# Patient Record
Sex: FEMALE | Race: WHITE | NOT HISPANIC OR LATINO | Employment: FULL TIME | URBAN - METROPOLITAN AREA
[De-identification: names, ages, dates, MRNs, and addresses within clinical notes are randomized per-mention and may not be internally consistent; named-entity substitution may affect disease eponyms.]

---

## 2017-09-28 ENCOUNTER — DOCUMENTATION ONLY (OUTPATIENT)
Dept: FAMILY MEDICINE | Facility: CLINIC | Age: 57
End: 2017-09-28

## 2017-09-28 NOTE — PROGRESS NOTES
Pre-Visit Chart Review  For Appointment Scheduled on 9/29/17.    Health Maintenance Due   Topic Date Due    Hepatitis C Screening  1960    TETANUS VACCINE  01/27/1980    Pap Smear with HPV Cotest  01/27/1983    Colonoscopy  01/27/2012    Lipid Panel  06/28/2015    Mammogram  06/13/2016    Influenza Vaccine  08/01/2017

## 2017-09-29 ENCOUNTER — HOSPITAL ENCOUNTER (OUTPATIENT)
Dept: RADIOLOGY | Facility: CLINIC | Age: 57
Discharge: HOME | End: 2017-09-29
Attending: FAMILY MEDICINE
Payer: MEDICAID

## 2017-09-29 ENCOUNTER — OFFICE VISIT (OUTPATIENT)
Dept: FAMILY MEDICINE | Facility: CLINIC | Age: 57
End: 2017-09-29
Payer: MEDICAID

## 2017-09-29 VITALS
HEIGHT: 61 IN | DIASTOLIC BLOOD PRESSURE: 75 MMHG | BODY MASS INDEX: 21.31 KG/M2 | RESPIRATION RATE: 20 BRPM | HEART RATE: 84 BPM | SYSTOLIC BLOOD PRESSURE: 120 MMHG | TEMPERATURE: 99 F | WEIGHT: 112.88 LBS

## 2017-09-29 DIAGNOSIS — Z12.39 BREAST CANCER SCREENING: ICD-10-CM

## 2017-09-29 DIAGNOSIS — Z29.9 PREVENTIVE MEASURE: ICD-10-CM

## 2017-09-29 DIAGNOSIS — Z11.4 ENCOUNTER FOR SCREENING FOR HIV: ICD-10-CM

## 2017-09-29 DIAGNOSIS — Z12.4 CERVICAL CANCER SCREENING: ICD-10-CM

## 2017-09-29 DIAGNOSIS — L60.0 INGROWN TOENAIL: ICD-10-CM

## 2017-09-29 DIAGNOSIS — Z00.00 WELLNESS EXAMINATION: ICD-10-CM

## 2017-09-29 DIAGNOSIS — H80.90 OTOSCLEROSIS, UNSPECIFIED LATERALITY: Primary | ICD-10-CM

## 2017-09-29 DIAGNOSIS — J06.9 UPPER RESPIRATORY TRACT INFECTION, UNSPECIFIED TYPE: ICD-10-CM

## 2017-09-29 DIAGNOSIS — Z12.11 COLON CANCER SCREENING: ICD-10-CM

## 2017-09-29 PROCEDURE — 77067 SCR MAMMO BI INCL CAD: CPT | Mod: 26,,, | Performed by: RADIOLOGY

## 2017-09-29 PROCEDURE — 99215 OFFICE O/P EST HI 40 MIN: CPT | Mod: PBBFAC,PO,25 | Performed by: FAMILY MEDICINE

## 2017-09-29 PROCEDURE — 77067 SCR MAMMO BI INCL CAD: CPT | Mod: TC

## 2017-09-29 PROCEDURE — 96372 THER/PROPH/DIAG INJ SC/IM: CPT | Mod: PBBFAC,PO

## 2017-09-29 PROCEDURE — 88142 CYTOPATH C/V THIN LAYER: CPT

## 2017-09-29 PROCEDURE — 87624 HPV HI-RISK TYP POOLED RSLT: CPT

## 2017-09-29 PROCEDURE — 3008F BODY MASS INDEX DOCD: CPT | Mod: ,,, | Performed by: FAMILY MEDICINE

## 2017-09-29 PROCEDURE — 99999 PR PBB SHADOW E&M-EST. PATIENT-LVL V: CPT | Mod: PBBFAC,,, | Performed by: FAMILY MEDICINE

## 2017-09-29 PROCEDURE — 99214 OFFICE O/P EST MOD 30 MIN: CPT | Mod: S$PBB,,, | Performed by: FAMILY MEDICINE

## 2017-09-29 PROCEDURE — 77063 BREAST TOMOSYNTHESIS BI: CPT | Mod: 26,,, | Performed by: RADIOLOGY

## 2017-09-29 RX ORDER — GABAPENTIN 400 MG/1
CAPSULE ORAL
Refills: 0 | COMMUNITY
Start: 2017-07-31 | End: 2017-09-29

## 2017-09-29 RX ORDER — DEXAMETHASONE SODIUM PHOSPHATE 4 MG/ML
2 INJECTION, SOLUTION INTRA-ARTICULAR; INTRALESIONAL; INTRAMUSCULAR; INTRAVENOUS; SOFT TISSUE
Status: DISCONTINUED | OUTPATIENT
Start: 2017-09-29 | End: 2017-09-29

## 2017-09-29 RX ORDER — DEXAMETHASONE SODIUM PHOSPHATE 4 MG/ML
2 INJECTION, SOLUTION INTRA-ARTICULAR; INTRALESIONAL; INTRAMUSCULAR; INTRAVENOUS; SOFT TISSUE
Status: COMPLETED | OUTPATIENT
Start: 2017-09-29 | End: 2017-09-29

## 2017-09-29 RX ORDER — IBUPROFEN 800 MG/1
TABLET ORAL
Refills: 0 | COMMUNITY
Start: 2017-07-31 | End: 2018-10-26

## 2017-09-29 RX ADMIN — DEXAMETHASONE SODIUM PHOSPHATE 2 MG: 4 INJECTION, SOLUTION INTRA-ARTICULAR; INTRALESIONAL; INTRAMUSCULAR; INTRAVENOUS; SOFT TISSUE at 09:09

## 2017-09-29 NOTE — PROGRESS NOTES
Administered 2 mg dexamethasone IM. Patient tolerated well. No bleeding at insertion site noted. Pain scale 0/10 with injection. 2 patient identifiers used (name, ), aseptic technique maintained.

## 2017-09-29 NOTE — PROGRESS NOTES
"[facility]  Progress Note    Subjective:       Patient ID: Floresita Martin is a 57 y.o. female.    Chief Complaint: Annual Exam and Establish Care    HPI57 y.o.female is here today to establish care.  Patient is currently not on any daily medications.  Patient is complaining of sinus congestion.  Patient has had sinus congestion for the past few weeks.  Patient has been using Tylenol Sinus over-the-counter which has not helped.  Patient is due for Pap smear, mammogram, colonoscopy, and annual labs today.  No further complaints at today's visit.  Review of Systems   Constitutional: Negative for chills, fatigue and fever.   HENT: Positive for congestion. Negative for ear pain, postnasal drip, sinus pressure, sneezing and sore throat.    Eyes: Negative for pain.   Respiratory: Negative for cough, shortness of breath and wheezing.    Cardiovascular: Negative for chest pain, palpitations and leg swelling.   Gastrointestinal: Negative for abdominal distention, abdominal pain, constipation, diarrhea, nausea and vomiting.   Genitourinary: Negative for difficulty urinating.   Musculoskeletal: Negative for back pain and myalgias.   Skin: Negative for rash.   Neurological: Negative for dizziness, seizures, syncope, weakness and numbness.   Psychiatric/Behavioral: Negative for sleep disturbance. The patient is not nervous/anxious.        Objective:      Vitals:    09/29/17 0843   BP: 120/75   BP Location: Right arm   Patient Position: Sitting   BP Method: Medium (Automatic)   Pulse: 84   Resp: 20   Temp: 98.5 °F (36.9 °C)   TempSrc: Oral   Weight: 51.2 kg (112 lb 14 oz)   Height: 5' 1" (1.549 m)     Body mass index is 21.33 kg/m².  Physical Exam   Constitutional: She is oriented to person, place, and time. She appears well-developed and well-nourished.   HENT:   Head: Normocephalic and atraumatic.   Right ear impaction   Eyes: Conjunctivae and EOM are normal. Pupils are equal, round, and reactive to light.   Neck: Normal " range of motion. Neck supple. No JVD present.   Cardiovascular: Normal rate, regular rhythm, normal heart sounds and intact distal pulses.  Exam reveals no gallop and no friction rub.    No murmur heard.  Pulmonary/Chest: Effort normal. No respiratory distress. She has no wheezes.   Abdominal: Soft. Bowel sounds are normal. There is no tenderness. There is no rebound and no guarding.   Musculoskeletal: Normal range of motion.   Neurological: She is alert and oriented to person, place, and time. No cranial nerve deficit.   Skin: Skin is warm and dry.   Psychiatric: She has a normal mood and affect. Her behavior is normal. Judgment and thought content normal.   Nursing note and vitals reviewed.      Assessment:       1. Otosclerosis, unspecified laterality    2. Breast cancer screening    3. Wellness examination    4. Preventive measure    5. Encounter for screening for HIV    6. Ingrown toenail    7. Upper respiratory tract infection, unspecified type    8. Colon cancer screening    9. Cervical cancer screening        Plan:       Otosclerosis, unspecified laterality  -     Ambulatory referral to ENT    Breast cancer screening  -     Mammo Digital Screening Bilateral With CAD; Future; Expected date: 09/29/2017    Wellness examination  -     CBC auto differential; Future; Expected date: 09/29/2017  -     Comprehensive metabolic panel; Future; Expected date: 09/29/2017  -     Hemoglobin A1c; Future; Expected date: 09/29/2017  -     Lipid panel; Future; Expected date: 09/29/2017  -     TSH; Future; Expected date: 09/29/2017  -     Vitamin D; Future; Expected date: 09/29/2017    Preventive measure  -     Hepatitis C antibody; Future; Expected date: 09/29/2017  -     RPR; Future; Expected date: 09/29/2017    Encounter for screening for HIV  -     HIV-1 and HIV-2 antibodies; Future; Expected date: 09/29/2017    Ingrown toenail  -     Ambulatory referral to Podiatry    Upper respiratory tract infection, unspecified type  -      dexamethasone injection 2 mg; Inject 0.5 mLs (2 mg total) into the vein one time.    Colon cancer screening  -     Ambulatory referral to Gastroenterology    Cervical cancer screening  -     Liquid-based pap smear, screening  -     HPV High Risk Genotypes, PCR      Return in about 6 months (around 3/29/2018).  Ryan Hollis MD  [facility]  9/29/2017 9:10 AM

## 2017-10-05 ENCOUNTER — TELEPHONE (OUTPATIENT)
Dept: FAMILY MEDICINE | Facility: CLINIC | Age: 57
End: 2017-10-05

## 2017-10-05 DIAGNOSIS — Z12.11 COLON CANCER SCREENING: ICD-10-CM

## 2017-10-05 NOTE — TELEPHONE ENCOUNTER
----- Message from Xena Rivera sent at 10/5/2017 10:04 AM CDT -----  Contact: Mariola  [facility] lab called regarding the patient. Please ask for Mariola at #[phone]

## 2017-10-08 ENCOUNTER — PATIENT MESSAGE (OUTPATIENT)
Dept: FAMILY MEDICINE | Facility: CLINIC | Age: 57
End: 2017-10-08

## 2017-10-16 DIAGNOSIS — Z12.11 COLON CANCER SCREENING: Primary | ICD-10-CM

## 2017-10-26 ENCOUNTER — HOSPITAL ENCOUNTER (OUTPATIENT)
Dept: RADIOLOGY | Facility: CLINIC | Age: 57
Discharge: HOME | End: 2017-10-26
Attending: PODIATRIST
Payer: MEDICAID

## 2017-10-26 ENCOUNTER — DOCUMENTATION ONLY (OUTPATIENT)
Dept: FAMILY MEDICINE | Facility: CLINIC | Age: 57
End: 2017-10-26

## 2017-10-26 ENCOUNTER — OFFICE VISIT (OUTPATIENT)
Dept: PODIATRY | Facility: CLINIC | Age: 57
End: 2017-10-26
Payer: MEDICAID

## 2017-10-26 VITALS — WEIGHT: 114.19 LBS | HEIGHT: 61 IN | BODY MASS INDEX: 21.56 KG/M2

## 2017-10-26 DIAGNOSIS — M79.674 TOE PAIN, RIGHT: ICD-10-CM

## 2017-10-26 DIAGNOSIS — M77.9 CAPSULITIS: Primary | ICD-10-CM

## 2017-10-26 DIAGNOSIS — L60.0 INGROWN NAIL: ICD-10-CM

## 2017-10-26 DIAGNOSIS — M79.671 FOOT PAIN, RIGHT: ICD-10-CM

## 2017-10-26 DIAGNOSIS — M77.9 CAPSULITIS: ICD-10-CM

## 2017-10-26 PROCEDURE — 99999 PR PBB SHADOW E&M-EST. PATIENT-LVL III: CPT | Mod: PBBFAC,,, | Performed by: PODIATRIST

## 2017-10-26 PROCEDURE — 73630 X-RAY EXAM OF FOOT: CPT | Mod: TC,PO,RT

## 2017-10-26 PROCEDURE — 99203 OFFICE O/P NEW LOW 30 MIN: CPT | Mod: 25,S$PBB,, | Performed by: PODIATRIST

## 2017-10-26 PROCEDURE — 29540 STRAPPING ANKLE &/FOOT: CPT | Mod: PBBFAC,PO,RT | Performed by: PODIATRIST

## 2017-10-26 PROCEDURE — 29540 STRAPPING ANKLE &/FOOT: CPT | Mod: S$PBB,RT,, | Performed by: PODIATRIST

## 2017-10-26 PROCEDURE — 99213 OFFICE O/P EST LOW 20 MIN: CPT | Mod: PBBFAC,25,PO | Performed by: PODIATRIST

## 2017-10-26 PROCEDURE — 73630 X-RAY EXAM OF FOOT: CPT | Mod: 26,RT,S$GLB, | Performed by: RADIOLOGY

## 2017-10-26 RX ORDER — DICLOFENAC SODIUM 10 MG/G
2 GEL TOPICAL 4 TIMES DAILY
Qty: 100 G | Refills: 2 | Status: SHIPPED | OUTPATIENT
Start: 2017-10-26 | End: 2017-10-26

## 2017-10-26 RX ORDER — LIDOCAINE HYDROCHLORIDE 20 MG/ML
JELLY TOPICAL
Qty: 30 ML | Refills: 2 | Status: SHIPPED | OUTPATIENT
Start: 2017-10-26 | End: 2019-02-15

## 2017-10-26 RX ORDER — HYDROCODONE BITARTRATE AND ACETAMINOPHEN 5; 325 MG/1; MG/1
TABLET ORAL
Refills: 0 | COMMUNITY
Start: 2017-10-09 | End: 2018-01-22 | Stop reason: DRUGHIGH

## 2017-10-26 RX ORDER — FAMOTIDINE 20 MG/1
TABLET, FILM COATED ORAL
Refills: 0 | COMMUNITY
Start: 2017-10-09 | End: 2018-01-22 | Stop reason: ALTCHOICE

## 2017-10-26 NOTE — PROGRESS NOTES
Reviewed resident note, exam and procedures were performed under my direct supervision.  Agree with note and care.  Discrepancies discussed.    Patient will stretch the tendo achilles complex three times daily as demonstrated in the office.  Literature was dispensed illustrating proper stretching technique.    I applied a plantar rest strapping to the patient's right foot to offload symptomatic area, support the arch, and relieve pain.    Patient will obtain over the counter arch supports and wear them in shoes whenever possible.  Athletic shoes intended for walking or running are usually best.    The patient was advised that NSAID-type medications have two very important potential side effects: gastrointestinal irritation including hemorrhage and renal injuries. She was asked to take the medication with food and to stop if she experiences any GI upset. I asked her to call for vomiting, abdominal pain or black/bloody stools. The patient expresses understanding of these issues and questions were answered.

## 2017-10-26 NOTE — PROGRESS NOTES
Pre-Visit Chart Review  For Appointment Scheduled on 10/27/17.    Health Maintenance Due   Topic Date Due    TETANUS VACCINE  01/27/1980    Pneumococcal PPSV23 (Medium Risk) (1) 01/27/1980    Colonoscopy  01/27/2012    Influenza Vaccine  08/01/2017

## 2017-10-26 NOTE — LETTER
October 26, 2017      Ryan Hollis MD  [address]  [place] [address]           [place] [facility]  [address]  [place] [address]  Phone: [phone]          Patient: Floresita Martin   MR Number: [id number]   YOB: 1960   Date of Visit: 10/26/2017       Dear Dr. Ryan Hollis:    Thank you for referring Floresita Martin to me for evaluation. Attached you will find relevant portions of my assessment and plan of care.    If you have questions, please do not hesitate to call me. I look forward to following Floresita Martin along with you.    Sincerely,    Onel Ramirez, JOCELYNN    Enclosure  CC:  No Recipients    If you would like to receive this communication electronically, please contact [email or web address] or [phone] to request more information on [employer] access.    For providers and/or their staff who would like to refer a patient to [facility], please contact us through our one-stop-shop provider referral line, [facility], at [phone].    If you feel you have received this communication in error or would no longer like to receive these types of communications, please e-mail [email or web address]

## 2017-10-26 NOTE — PROGRESS NOTES
Subjective:      Patient ID: Floresita Martin is a 57 y.o. female.    Chief Complaint: Foot Pain (right) and Ingrown Toenail (right great toe)    Pt is a 58 y/o female  has a past medical history of Alcohol abuse; Headache; and Hearing loss. Pt presents to clinic for a painful ingrown toe nail of the right big toe and right 5th digit pain. Pt relates she has a hx of ingrown fungal toe nails and normally uses nail nippers to cut out the edges at home. States she was not able to relieve the pain to the medial border of the big toe nail. States it hurts with pressure. Pt states she also has pain adjacent to the 5th toe on the ball of her foot. States she tripped at work (she is a ) about a month ago and has had pain since around that time. States she feels that she walks on the outside of her feet. Denies any open lacerations or SOI. No other complaints at this time.     Review of Systems   Skin: Positive for nail changes.   All other systems reviewed and are negative.          Objective:      Physical Exam   Constitutional: She is oriented to person, place, and time. She appears well-developed and well-nourished. No distress.   Cardiovascular: Intact distal pulses.    PT and DP 2/4, cande. CRT < 3 seconds to digits 1-5, cande. No erythema or edema noted, cande. No varicosities noted, cande.    Musculoskeletal: She exhibits no edema.   Strength 5/5 to all LE muscle groups, cande. POP noted to plantar lateral aspect of 5th metatarsal head with tailors bunion noted. No pain noted with ROM of the 5th MPJ.     5th metatarsal in plantarflexed position with forefoot varus noted. Arch present with NWB. Equinus noted with less than 10 degrees of dorsiflexion, cande.    Neurological: She is alert and oriented to person, place, and time.   Sensation intact to digits via light touch.    Skin: Skin is warm and dry. Capillary refill takes 2 to 3 seconds. She is not diaphoretic. No erythema.   Skin is warm, dry, and supple, cande. Toe nails  1-5 are thickened and discolored, cande. Incurvated toenail noted to medial border of right foot, proximally with POP noted.     No open lesions, drainage, or SOI noted, stable.    Psychiatric: She has a normal mood and affect. Her behavior is normal. Judgment and thought content normal.             Assessment:       Encounter Diagnoses   Name Primary?    Capsulitis Yes    Foot pain, right     Ingrown nail     Toe pain, right     Onychomycosis due to dermatophyte     Metatarsalgia of right foot          Plan:       Floresita was seen today for foot pain and ingrown toenail.    Diagnoses and all orders for this visit:    Capsulitis  -     X-Ray Foot Complete Right; Future    Foot pain, right  -     X-Ray Foot Complete Right; Future    Ingrown nail  -     X-Ray Foot Complete Right; Future    Toe pain, right  -     X-Ray Foot Complete Right; Future    Onychomycosis due to dermatophyte    Metatarsalgia of right foot    Other orders  -     Discontinue: diclofenac sodium 1 % Gel; Apply 2 g topically 4 (four) times daily.  -     lidocaine HCL 2% (XYLOCAINE) 2 % jelly; Apply topically as needed. Apply once nightly topically to affected area right foot.      I counseled the patient on her conditions, their implications and medical management.  Discussed conservative options vs sx options for onychocryptosis of medial border of right hallux. Pt she would like a permanent partial nail avulsion, to be scheduled next week.   Discussed conservative options for right 5th metatarsalgia such as stretches, OTC arch supports, rigid shoes with a wider toe box, RICE therapy, OTC NSAIDS and sx options in the future if conservative treatments do not work.   Xray ordered  Pt to RTC in 1 week for permanent partial nail avulsuion procedure    Zayra Lu, PGY2

## 2017-10-27 ENCOUNTER — TELEPHONE (OUTPATIENT)
Dept: FAMILY MEDICINE | Facility: CLINIC | Age: 57
End: 2017-10-27

## 2017-10-27 ENCOUNTER — OFFICE VISIT (OUTPATIENT)
Dept: FAMILY MEDICINE | Facility: CLINIC | Age: 57
End: 2017-10-27
Payer: MEDICAID

## 2017-10-27 VITALS
SYSTOLIC BLOOD PRESSURE: 110 MMHG | RESPIRATION RATE: 18 BRPM | DIASTOLIC BLOOD PRESSURE: 73 MMHG | HEART RATE: 81 BPM | TEMPERATURE: 98 F | BODY MASS INDEX: 21.64 KG/M2 | WEIGHT: 114.63 LBS | HEIGHT: 61 IN

## 2017-10-27 DIAGNOSIS — J30.9 ALLERGIC RHINITIS, UNSPECIFIED CHRONICITY, UNSPECIFIED SEASONALITY, UNSPECIFIED TRIGGER: ICD-10-CM

## 2017-10-27 DIAGNOSIS — E78.5 HYPERLIPIDEMIA, UNSPECIFIED HYPERLIPIDEMIA TYPE: Primary | ICD-10-CM

## 2017-10-27 DIAGNOSIS — Z72.0 TOBACCO ABUSE: ICD-10-CM

## 2017-10-27 DIAGNOSIS — R07.9 CHEST PAIN, UNSPECIFIED TYPE: ICD-10-CM

## 2017-10-27 DIAGNOSIS — Z23 IMMUNIZATION DUE: ICD-10-CM

## 2017-10-27 PROCEDURE — 99213 OFFICE O/P EST LOW 20 MIN: CPT | Mod: PBBFAC,PO | Performed by: FAMILY MEDICINE

## 2017-10-27 PROCEDURE — 90670 PCV13 VACCINE IM: CPT | Mod: PBBFAC,PO

## 2017-10-27 PROCEDURE — 99214 OFFICE O/P EST MOD 30 MIN: CPT | Mod: S$PBB,,, | Performed by: FAMILY MEDICINE

## 2017-10-27 PROCEDURE — 99999 PR PBB SHADOW E&M-EST. PATIENT-LVL III: CPT | Mod: PBBFAC,,, | Performed by: FAMILY MEDICINE

## 2017-10-27 PROCEDURE — 90471 IMMUNIZATION ADMIN: CPT | Mod: PBBFAC,PO

## 2017-10-27 PROCEDURE — 90472 IMMUNIZATION ADMIN EACH ADD: CPT | Mod: PBBFAC,PO

## 2017-10-27 RX ORDER — LORATADINE 10 MG/1
10 TABLET ORAL DAILY
Qty: 90 TABLET | Refills: 3 | Status: SHIPPED | OUTPATIENT
Start: 2017-10-27 | End: 2018-02-16 | Stop reason: SDUPTHER

## 2017-10-27 RX ORDER — FLUTICASONE PROPIONATE 50 MCG
1 SPRAY, SUSPENSION (ML) NASAL DAILY
Qty: 1 BOTTLE | Refills: 3 | Status: SHIPPED | OUTPATIENT
Start: 2017-10-27 | End: 2018-02-16 | Stop reason: SDUPTHER

## 2017-10-27 RX ORDER — ROSUVASTATIN CALCIUM 20 MG/1
20 TABLET, COATED ORAL DAILY
Qty: 90 TABLET | Refills: 3 | Status: SHIPPED | OUTPATIENT
Start: 2017-10-27 | End: 2017-10-30

## 2017-10-27 NOTE — TELEPHONE ENCOUNTER
----- Message from Floresita Fleming sent at 10/27/2017  8:43 AM CDT -----  Call [phone]  / asking to clarify message received this am

## 2017-10-27 NOTE — TELEPHONE ENCOUNTER
----- Message from Cassandra Carroll RT sent at 10/27/2017  3:50 PM CDT -----  Contact: pt    pt , requesting to inform her [facility] Medicaid needs pre authorization (lung scan and stress test) , please call [facility] at , please ida urgent, her appt for tomorrow may need cancellation, please call pt to confirm, thanks.

## 2017-10-28 ENCOUNTER — HOSPITAL ENCOUNTER (OUTPATIENT)
Dept: RADIOLOGY | Facility: HOSPITAL | Age: 57
Discharge: HOME | End: 2017-10-28
Attending: FAMILY MEDICINE
Payer: MEDICAID

## 2017-10-28 DIAGNOSIS — Z72.0 TOBACCO ABUSE: ICD-10-CM

## 2017-10-28 PROCEDURE — 76497 UNLISTED CT PROCEDURE: CPT | Mod: TC

## 2017-10-30 ENCOUNTER — TELEPHONE (OUTPATIENT)
Dept: FAMILY MEDICINE | Facility: CLINIC | Age: 57
End: 2017-10-30

## 2017-10-30 RX ORDER — ATORVASTATIN CALCIUM 40 MG/1
40 TABLET, FILM COATED ORAL DAILY
Qty: 90 TABLET | Refills: 3 | Status: SHIPPED | OUTPATIENT
Start: 2017-10-30 | End: 2018-11-21 | Stop reason: SDUPTHER

## 2017-10-30 NOTE — TELEPHONE ENCOUNTER
Patient needs anti cholesterol medication; insurance did not cover crestor without prior authorization ;patient has not taken any statins before ; did you want her to try something else ?

## 2017-10-31 ENCOUNTER — TELEPHONE (OUTPATIENT)
Dept: FAMILY MEDICINE | Facility: CLINIC | Age: 57
End: 2017-10-31

## 2017-10-31 NOTE — PROGRESS NOTES
"[facility]  [facility]    Subjective:       Patient ID: Floresita Martin is a 57 y.o. female.    Chief Complaint:HLD    HPI57 y.o.female is here today for hyperlipidemia follow-up visit.  Patient was recently found to have elevated cholesterol.  Patient is currently not on any medications for cholesterol.  Patient is also complaining of left-sided chest pain.  Patient will have chest pain occasionally.  The patient will sometimes radiate into the left arm.  Patient denies any other associated symptoms.  Patient has not had these symptoms worked up.  Patient has not had stress test completed in the past.  No further complaints at today's visit.  Review of Systems   Constitutional: Negative for chills, fatigue and fever.   HENT: Negative for congestion, ear pain, postnasal drip, sinus pressure, sneezing and sore throat.    Eyes: Negative for pain.   Respiratory: Negative for cough, shortness of breath and wheezing.    Cardiovascular: Positive for chest pain. Negative for palpitations and leg swelling.   Gastrointestinal: Negative for abdominal distention, abdominal pain, constipation, diarrhea, nausea and vomiting.   Genitourinary: Negative for difficulty urinating.   Musculoskeletal: Negative for back pain and myalgias.   Skin: Negative for rash.   Neurological: Negative for dizziness, seizures, syncope, weakness and numbness.   Psychiatric/Behavioral: Negative for sleep disturbance. The patient is not nervous/anxious.        Objective:      Vitals:    10/27/17 1320   BP: 110/73   BP Location: Right arm   Patient Position: Sitting   BP Method: Medium (Automatic)   Pulse: 81   Resp: 18   Temp: 98.4 °F (36.9 °C)   TempSrc: Oral   Weight: 52 kg (114 lb 10.2 oz)   Height: 5' 1" (1.549 m)     Body mass index is 21.66 kg/m².  Physical Exam   Constitutional: She is oriented to person, place, and time. She appears well-developed and well-nourished.   Eyes: Conjunctivae are normal.   Cardiovascular: Normal rate, " regular rhythm, normal heart sounds and intact distal pulses.  Exam reveals no gallop and no friction rub.    No murmur heard.  Pulmonary/Chest: Effort normal and breath sounds normal. No respiratory distress. She has no wheezes. She has no rales. She exhibits no tenderness.   Abdominal: Soft.   Musculoskeletal: Normal range of motion.   Neurological: She is alert and oriented to person, place, and time.   Skin: She is not diaphoretic.   Psychiatric: She has a normal mood and affect. Her behavior is normal. Judgment and thought content normal.   Vitals reviewed.      Assessment:       1. Hyperlipidemia, unspecified hyperlipidemia type    2. Tobacco abuse    3. Chest pain, unspecified type    4. Immunization due    5. Allergic rhinitis, unspecified chronicity, unspecified seasonality, unspecified trigger        Plan:       Hyperlipidemia, unspecified hyperlipidemia type  -     rosuvastatin (CRESTOR) 20 MG tablet; Take 1 tablet (20 mg total) by mouth once daily.  Dispense: 90 tablet; Refill: 3    Tobacco abuse  -     CT Chest Lung Screening Low Dose; Future; Expected date: 10/27/2017    Chest pain, unspecified type  -     Exercise stress echo with color flow; Future    Immunization due  -     Pneumococcal Conjugate Vaccine (13 Valent) (IM)    Allergic rhinitis, unspecified chronicity, unspecified seasonality, unspecified trigger  -     fluticasone (FLONASE) 50 mcg/actuation nasal spray; 1 spray by Each Nare route once daily.  Dispense: 1 Bottle; Refill: 3  -     loratadine (CLARITIN) 10 mg tablet; Take 1 tablet (10 mg total) by mouth once daily.  Dispense: 90 tablet; Refill: 3    Other orders  -     Influenza - Quadrivalent (3 years & older) (PF)      Return in about 4 weeks (around 11/24/2017).  Ryan Hollis MD  [facility]  10/31/2017 10:00 AM

## 2017-11-01 NOTE — TELEPHONE ENCOUNTER
----- Message from Ryan Hollis MD sent at 11/1/2017  2:01 PM CDT -----  Chest CT shows small left upper lobe pulmonary nodules. Followup chest CT in one year is recommended

## 2017-11-09 ENCOUNTER — CLINICAL SUPPORT (OUTPATIENT)
Dept: CARDIOLOGY | Facility: CLINIC | Age: 57
End: 2017-11-09
Payer: MEDICAID

## 2017-11-09 DIAGNOSIS — R07.9 CHEST PAIN, UNSPECIFIED TYPE: ICD-10-CM

## 2017-11-09 PROCEDURE — 93325 DOPPLER ECHO COLOR FLOW MAPG: CPT | Mod: PBBFAC,PO | Performed by: INTERNAL MEDICINE

## 2017-11-09 PROCEDURE — 93351 STRESS TTE COMPLETE: CPT | Mod: 26,S$PBB,, | Performed by: INTERNAL MEDICINE

## 2017-11-09 PROCEDURE — 93320 DOPPLER ECHO COMPLETE: CPT | Mod: 26,S$PBB,, | Performed by: INTERNAL MEDICINE

## 2017-11-14 LAB
DIASTOLIC DYSFUNCTION: NO
ESTIMATED PA SYSTOLIC PRESSURE: 13.99
MITRAL VALVE REGURGITATION: NORMAL
RETIRED EF AND QEF - SEE NOTES: 55 (ref 55–65)

## 2017-11-15 ENCOUNTER — HOSPITAL ENCOUNTER (OUTPATIENT)
Facility: HOSPITAL | Age: 57
Discharge: HOME | End: 2017-11-15
Attending: INTERNAL MEDICINE | Admitting: INTERNAL MEDICINE
Payer: MEDICAID

## 2017-11-15 ENCOUNTER — ANESTHESIA (OUTPATIENT)
Dept: ENDOSCOPY | Facility: HOSPITAL | Age: 57
End: 2017-11-15
Payer: MEDICAID

## 2017-11-15 ENCOUNTER — ANESTHESIA EVENT (OUTPATIENT)
Dept: ENDOSCOPY | Facility: HOSPITAL | Age: 57
End: 2017-11-15
Payer: MEDICAID

## 2017-11-15 ENCOUNTER — SURGERY (OUTPATIENT)
Age: 57
End: 2017-11-15

## 2017-11-15 VITALS
TEMPERATURE: 98 F | WEIGHT: 115 LBS | SYSTOLIC BLOOD PRESSURE: 112 MMHG | RESPIRATION RATE: 15 BRPM | HEIGHT: 61 IN | HEART RATE: 75 BPM | DIASTOLIC BLOOD PRESSURE: 72 MMHG | OXYGEN SATURATION: 96 % | BODY MASS INDEX: 21.71 KG/M2

## 2017-11-15 VITALS — RESPIRATION RATE: 24 BRPM

## 2017-11-15 DIAGNOSIS — Z12.11 SCREEN FOR COLON CANCER: ICD-10-CM

## 2017-11-15 DIAGNOSIS — K63.89 MELANOSIS COLI: Primary | ICD-10-CM

## 2017-11-15 PROCEDURE — 37000008 HC ANESTHESIA 1ST 15 MINUTES: Performed by: INTERNAL MEDICINE

## 2017-11-15 PROCEDURE — 63600175 PHARM REV CODE 636 W HCPCS: Performed by: NURSE ANESTHETIST, CERTIFIED REGISTERED

## 2017-11-15 PROCEDURE — D9220A PRA ANESTHESIA: Mod: ANES,,, | Performed by: ANESTHESIOLOGY

## 2017-11-15 PROCEDURE — 37000009 HC ANESTHESIA EA ADD 15 MINS: Performed by: INTERNAL MEDICINE

## 2017-11-15 PROCEDURE — 25000003 PHARM REV CODE 250: Performed by: INTERNAL MEDICINE

## 2017-11-15 PROCEDURE — G0121 COLON CA SCRN NOT HI RSK IND: HCPCS | Performed by: INTERNAL MEDICINE

## 2017-11-15 PROCEDURE — 45378 DIAGNOSTIC COLONOSCOPY: CPT | Mod: ,,, | Performed by: INTERNAL MEDICINE

## 2017-11-15 PROCEDURE — D9220A PRA ANESTHESIA: Mod: CRNA,,, | Performed by: NURSE ANESTHETIST, CERTIFIED REGISTERED

## 2017-11-15 RX ORDER — SODIUM CHLORIDE 9 MG/ML
INJECTION, SOLUTION INTRAVENOUS CONTINUOUS
Status: DISCONTINUED | OUTPATIENT
Start: 2017-11-15 | End: 2017-11-15 | Stop reason: HOSPADM

## 2017-11-15 RX ORDER — LIDOCAINE HYDROCHLORIDE 20 MG/ML
INJECTION, SOLUTION EPIDURAL; INFILTRATION; INTRACAUDAL; PERINEURAL
Status: DISCONTINUED
Start: 2017-11-15 | End: 2017-11-15 | Stop reason: HOSPADM

## 2017-11-15 RX ORDER — PROPOFOL 10 MG/ML
VIAL (ML) INTRAVENOUS
Status: DISCONTINUED | OUTPATIENT
Start: 2017-11-15 | End: 2017-11-15

## 2017-11-15 RX ORDER — LIDOCAINE HCL/PF 100 MG/5ML
SYRINGE (ML) INTRAVENOUS
Status: DISCONTINUED | OUTPATIENT
Start: 2017-11-15 | End: 2017-11-15

## 2017-11-15 RX ORDER — PROPOFOL 10 MG/ML
INJECTION, EMULSION INTRAVENOUS
Status: COMPLETED
Start: 2017-11-15 | End: 2017-11-15

## 2017-11-15 RX ADMIN — LIDOCAINE HYDROCHLORIDE 75 MG: 20 INJECTION, SOLUTION INTRAVENOUS at 10:11

## 2017-11-15 RX ADMIN — PROPOFOL 30 MG: 10 INJECTION, EMULSION INTRAVENOUS at 10:11

## 2017-11-15 RX ADMIN — SODIUM CHLORIDE: 0.9 INJECTION, SOLUTION INTRAVENOUS at 09:11

## 2017-11-15 RX ADMIN — PROPOFOL 100 MG: 10 INJECTION, EMULSION INTRAVENOUS at 10:11

## 2017-11-15 NOTE — DISCHARGE INSTRUCTIONS
Eating a High-Fiber Diet  Fiber is what gives strength and structure to plants. Most grains, beans, vegetables, and fruits contain fiber. Foods rich in fiber are often low in calories and fat, and they fill you up more. They may also reduce your risks for certain health problems. To find out the amount of fiber in canned, packaged, or frozen foods, read the Nutrition Facts label. It tells you how much fiber is in a serving.    Types of fiber and their benefits  There are two types of fiber: insoluble and soluble. They both aid digestion and help you maintain a healthy weight.  · Insoluble fiber. This is found in whole grains, cereals, certain fruits and vegetables such as apple skin, corn, and carrots. Insoluble fiber may prevent constipation and reduce the risk for certain types of cancer.  · Soluble fiber. This type of fiber is in oats, beans, and certain fruits and vegetables such as strawberries and peas. Soluble fiber can reduce cholesterol, which may help lower the risk for heart disease. It also helps control blood sugar levels.  Look for high-fiber foods  Try these foods to add fiber to your diet:  · Whole-grain breads and cereals. Try to eat 6 to 8 ounces a day. Include wheat and oat bran cereals, whole-wheat muffins or toast, and corn tortillas in your meals.  · Fruits. Try to eat 2 cups a day. Apples, oranges, strawberries, pears, and bananas are good sources. (Note: Fruit juice is low in fiber.)  · Vegetables. Try to eat at least 2.5 cups a day. Add asparagus, carrots, broccoli, peas, and corn to your meals.  · Beans. One cup of cooked lentils gives you over 15 grams of fiber. Try navy beans, lentils, and chickpeas.  · Seeds. A small handful of seeds gives you about 3 grams of fiber. Try sunflower seeds.  Keep track of your fiber  Keep track of how much fiber you eat. Start by reading food labels. Then eat a variety of foods high in fiber. As you begin to eat more fiber, ask your healthcare provider  how much water you should be drinking to keep your digestive system working smoothly.  You should aim for a certain amount of fiber in your diet each day. If you are a woman, that amount is between 25 and 28 grams per day. Men should aim for 30 to 33 grams per day. After age 50, your daily fiber needs drop to 22 grams for women and 28 grams for men.  Before you reach for the fiber supplements, think about this. Fiber is found naturally in healthy whole foods. It gives you that feeling of fullness after you eat. Taking fiber supplements or eating fiber-enriched foods will not give you this full feeling.  Your fiber intake is a good measure for the quality of your overall diet. If you are missing out on your daily amount of fiber, you may be lacking other important nutrients as well.  Date Last Reviewed: 5/11/2015 © 2000-2017 [employer]. [address] [place] [address]. All rights reserved. This information is not intended as a substitute for professional medical care. Always follow your healthcare professional's instructions.

## 2017-11-15 NOTE — ANESTHESIA POSTPROCEDURE EVALUATION
"Anesthesia Post Evaluation    Patient: Floresita Martin    Procedure(s) Performed: Procedure(s) (LRB):  COLONOSCOPY (N/A)    Final Anesthesia Type: general  Patient location during evaluation: PACU  Patient participation: Yes- Able to Participate  Level of consciousness: awake and alert  Post-procedure vital signs: reviewed and stable  Pain management: adequate  Airway patency: patent  PONV status at discharge: No PONV  Anesthetic complications: no      Cardiovascular status: hemodynamically stable  Respiratory status: unassisted and room air  Hydration status: euvolemic  Follow-up not needed.        Visit Vitals  /72   Pulse 75   Temp 36.5 °C (97.7 °F)   Resp 15   Ht 5' 1" (1.549 m)   Wt 52.2 kg (115 lb)   LMP  (LMP Unknown)   SpO2 96%   Breastfeeding? No   BMI 21.73 kg/m²       Pain/Lauren Score: Pain Assessment Performed: Yes (11/15/2017 11:16 AM)  Presence of Pain: denies (11/15/2017 11:16 AM)  Lauren Score: 10 (11/15/2017 11:16 AM)      "

## 2017-11-15 NOTE — TRANSFER OF CARE
"Anesthesia Transfer of Care Note    Patient: Floresita Martin    Procedure(s) Performed: Procedure(s) (LRB):  COLONOSCOPY (N/A)    Patient location: PACU    Anesthesia Type: general    Transport from OR: Transported from OR on room air with adequate spontaneous ventilation    Post pain: adequate analgesia    Post assessment: no apparent anesthetic complications    Post vital signs: stable    Level of consciousness: awake    Nausea/Vomiting: no nausea/vomiting    Complications: none    Transfer of care protocol was followed      Last vitals:   Visit Vitals  /74 (BP Location: Left arm)   Pulse 84   Temp 36.6 °C (97.9 °F) (Skin)   Resp 18   Ht 5' 1" (1.549 m)   Wt 52.2 kg (115 lb)   LMP  (LMP Unknown)   SpO2 97%   Breastfeeding? No   BMI 21.73 kg/m²     "

## 2017-11-15 NOTE — ANESTHESIA PREPROCEDURE EVALUATION
11/15/2017  Floresita Martin is a 57 y.o., female.    Anesthesia Evaluation    I have reviewed the Patient Summary Reports.    I have reviewed the Nursing Notes.   I have reviewed the Medications.     Review of Systems  Anesthesia Hx:  No problems with previous Anesthesia    Social:  Alcohol Use, Smoker    Hematology/Oncology:  Hematology Normal   Oncology Normal     EENT/Dental:   Hearing loss   Cardiovascular:  Cardiovascular Normal     Pulmonary:  Pulmonary Normal    Renal/:  Renal/ Normal     Hepatic/GI:   Bowel Prep.    Musculoskeletal:  Musculoskeletal Normal    Neurological:   Headaches    Endocrine:  Endocrine Normal    Dermatological:  Skin Normal    Psych:  Psychiatric Normal           Physical Exam  General:  Well nourished    Airway/Jaw/Neck:  Airway Findings: Mouth Opening: Normal Tongue: Normal  General Airway Assessment: Adult  Mallampati: I  TM Distance: Normal, at least 6 cm        Eyes/Ears/Nose:  EYES/EARS/NOSE FINDINGS: Normal   Dental:  DENTAL FINDINGS: Normal   Chest/Lungs:  Chest/Lungs Findings: Clear to auscultation, Normal Respiratory Rate     Heart/Vascular:  Heart Findings: Rate: Normal  Rhythm: Regular Rhythm  Sounds: Normal        Mental Status:  Mental Status Findings: Normal        Anesthesia Plan  Type of Anesthesia, risks & benefits discussed:  Anesthesia Type:  general  Patient's Preference:   Intra-op Monitoring Plan: standard ASA monitors  Intra-op Monitoring Plan Comments:   Post Op Pain Control Plan:   Post Op Pain Control Plan Comments:   Induction:   IV  Beta Blocker:  Patient is not currently on a Beta-Blocker (No further documentation required).       Informed Consent: Patient understands risks and agrees with Anesthesia plan.  Questions answered. Anesthesia consent signed with patient.  ASA Score: 2     Day of Surgery Review of History & Physical: I have interviewed  and examined the patient. I have reviewed the patient's H&P dated:  There are no significant changes.  H&P update referred to the provider.         Ready For Surgery From Anesthesia Perspective.

## 2017-11-15 NOTE — H&P
"[facility] Note    CC: Screening colonoscopy    HPI 57 y.o. female presents for initial routine screening colonoscopy    Past Medical History:   Diagnosis Date    Alcohol abuse     Headache     Hearing loss          Review of Systems  General ROS: negative for - chills, fever or weight loss  Cardiovascular ROS: no chest pain or dyspnea on exertion  Gastrointestinal ROS: no blood in stool, no diarrhea, no hematemesis    Physical Examination  /74 (BP Location: Left arm)   Pulse 84   Temp 97.9 °F (36.6 °C) (Skin)   Resp 18   Ht 5' 1" (1.549 m)   Wt 52.2 kg (115 lb)   LMP  (LMP Unknown)   SpO2 97%   Breastfeeding? No   BMI 21.73 kg/m²   General appearance: alert, cooperative, no distress  HENT: Normocephalic, atraumatic, neck symmetrical, no nasal discharge, sclera anicteric   Lungs: clear to auscultation bilaterally, symmetric chest wall expansion bilaterally  Heart: regular rate and rhythm without rub; no displacement of the PMI   Abdomen: soft, nontender, nondistended  Extremities: extremities symmetric; no clubbing, cyanosis, or edema    Labs:  Lab Results   Component Value Date    WBC 7.31 09/29/2017    HGB 15.0 09/29/2017    HCT 43.2 09/29/2017    MCV 90 09/29/2017     09/29/2017         Assessment:   57 y.o. female presents for routine screening colonoscopy    Plan:  -Proceed to colonoscopy    Peg Powers MD  [facility]  [address], [address]  [place] [address]  Office: [phone]  Fax: [phone]  "

## 2017-11-15 NOTE — PLAN OF CARE
Written and verbal discharge instructions given and reviewed with pt and Rohan perdue at bedside.  Both received and verbalized understanding.  Dr Powers at bedside to review findings.  Pt in NAD and meets all discharge criteria.

## 2017-11-17 ENCOUNTER — TELEPHONE (OUTPATIENT)
Dept: FAMILY MEDICINE | Facility: CLINIC | Age: 57
End: 2017-11-17

## 2017-11-17 NOTE — TELEPHONE ENCOUNTER
----- Message from Ryan Hollis MD sent at 11/17/2017  8:14 AM CST -----  Please call informed patient that her recent stress test did not show any signs of ischemia.  Follow-up as scheduled.

## 2017-12-13 ENCOUNTER — TELEPHONE (OUTPATIENT)
Dept: FAMILY MEDICINE | Facility: CLINIC | Age: 57
End: 2017-12-13

## 2017-12-13 NOTE — TELEPHONE ENCOUNTER
----- Message from Karly Aragon sent at 12/13/2017 10:19 AM CST -----  Contact: self   Patient want to speak with a nurse regarding atorvastatin  Giving her diarrhea and upset stomach please call back at [phone] (home)

## 2017-12-13 NOTE — TELEPHONE ENCOUNTER
Patient states that she started atorvastatin in early November. Has been having an upset stomach on a daily basis. She stopped all other vitamin supplements and medications but continues to have an issue with daily nausea. Patient feels it is due to the atorvastatin.  Reporting that she is going to hold the medication for a few days and will let us know the outcome.

## 2018-01-17 ENCOUNTER — PATIENT MESSAGE (OUTPATIENT)
Dept: PODIATRY | Facility: CLINIC | Age: 58
End: 2018-01-17

## 2018-01-22 ENCOUNTER — DOCUMENTATION ONLY (OUTPATIENT)
Dept: FAMILY MEDICINE | Facility: CLINIC | Age: 58
End: 2018-01-22

## 2018-01-22 ENCOUNTER — OFFICE VISIT (OUTPATIENT)
Dept: FAMILY MEDICINE | Facility: CLINIC | Age: 58
End: 2018-01-22
Payer: MEDICAID

## 2018-01-22 VITALS
HEIGHT: 61 IN | DIASTOLIC BLOOD PRESSURE: 72 MMHG | HEART RATE: 82 BPM | WEIGHT: 118.81 LBS | SYSTOLIC BLOOD PRESSURE: 107 MMHG | TEMPERATURE: 98 F | BODY MASS INDEX: 22.43 KG/M2

## 2018-01-22 DIAGNOSIS — J06.9 UPPER RESPIRATORY TRACT INFECTION, UNSPECIFIED TYPE: ICD-10-CM

## 2018-01-22 DIAGNOSIS — E78.5 HYPERLIPIDEMIA, UNSPECIFIED HYPERLIPIDEMIA TYPE: ICD-10-CM

## 2018-01-22 DIAGNOSIS — R05.3 CHRONIC COUGH: ICD-10-CM

## 2018-01-22 DIAGNOSIS — K21.9 GASTROESOPHAGEAL REFLUX DISEASE WITHOUT ESOPHAGITIS: ICD-10-CM

## 2018-01-22 PROCEDURE — 99999 PR PBB SHADOW E&M-EST. PATIENT-LVL III: CPT | Mod: PBBFAC,,, | Performed by: FAMILY MEDICINE

## 2018-01-22 PROCEDURE — 99213 OFFICE O/P EST LOW 20 MIN: CPT | Mod: PBBFAC,PO | Performed by: FAMILY MEDICINE

## 2018-01-22 PROCEDURE — 99214 OFFICE O/P EST MOD 30 MIN: CPT | Mod: S$PBB,,, | Performed by: FAMILY MEDICINE

## 2018-01-22 RX ORDER — DEXAMETHASONE SODIUM PHOSPHATE 4 MG/ML
4 INJECTION, SOLUTION INTRA-ARTICULAR; INTRALESIONAL; INTRAMUSCULAR; INTRAVENOUS; SOFT TISSUE
Status: COMPLETED | OUTPATIENT
Start: 2018-01-22 | End: 2018-01-22

## 2018-01-22 RX ORDER — OMEPRAZOLE 20 MG/1
20 CAPSULE, DELAYED RELEASE ORAL DAILY
Qty: 90 CAPSULE | Refills: 1 | Status: SHIPPED | OUTPATIENT
Start: 2018-01-22 | End: 2018-02-15 | Stop reason: SDUPTHER

## 2018-01-22 RX ORDER — HYDROCODONE BITARTRATE AND ACETAMINOPHEN 10; 325 MG/1; MG/1
1 TABLET ORAL 3 TIMES DAILY PRN
COMMUNITY
End: 2020-10-02

## 2018-01-22 RX ADMIN — DEXAMETHASONE SODIUM PHOSPHATE 4 MG: 4 INJECTION, SOLUTION INTRAMUSCULAR; INTRAVENOUS at 07:01

## 2018-01-22 NOTE — PROGRESS NOTES
"[facility]  Progress Note    Subjective:       Patient ID: Floresita Martin is a 57 y.o. female.    Chief Complaint: HLD Follow-up and Discuss CT Chest Results    HPI57 y.o.female here for her Hyperlipidemia follow-up. She has been taking Atorvastatin 40MG daily. She denies any chest pain, SOB or palpitations. Will repeat lipid panel in 3 months. She complains of a chronic morning cough  productive of whitish sputum which she attributes to her continued smoking. She is interested in quitting and has tried Chantix and nicotine patches in the past without success. She denies any weight loss, night sweats, hemoptysis or fevers. She reports worsening reflux, nausea, vomiting and constipation which are likely due to her use of hydrocodone-acetaminophen. Recommend limiting her use of this medication and starting Omeprazole.      Review of Systems   Constitutional: Negative for activity change and unexpected weight change.   HENT: Negative for hearing loss, rhinorrhea and trouble swallowing.    Eyes: Negative for discharge and visual disturbance.   Respiratory: Positive for cough. Negative for chest tightness, shortness of breath and wheezing.    Cardiovascular: Negative for chest pain and palpitations.   Gastrointestinal: Positive for constipation, nausea and vomiting. Negative for abdominal pain, blood in stool and diarrhea.   Endocrine: Negative for polydipsia and polyuria.   Genitourinary: Negative for difficulty urinating, dysuria, hematuria and menstrual problem.   Musculoskeletal: Positive for neck pain. Negative for arthralgias and joint swelling.   Neurological: Positive for weakness and headaches.   Psychiatric/Behavioral: Negative for confusion and dysphoric mood.       Objective:      Vitals:    01/22/18 0711   BP: 107/72   Pulse: 82   Temp: 98.4 °F (36.9 °C)   TempSrc: Oral   Weight: 53.9 kg (118 lb 13.3 oz)   Height: 5' 1" (1.549 m)     Body mass index is 22.45 kg/m².  Physical Exam   Constitutional: She " is oriented to person, place, and time. She appears well-developed and well-nourished.   HENT:   Head: Normocephalic and atraumatic.   Cardiovascular: Normal rate, regular rhythm, normal heart sounds and intact distal pulses.  Exam reveals no gallop and no friction rub.    No murmur heard.  Pulmonary/Chest: Effort normal and breath sounds normal. No respiratory distress. She has no wheezes.   Abdominal: Soft. Bowel sounds are normal.   Neurological: She is alert and oriented to person, place, and time.   Psychiatric: She has a normal mood and affect.       Assessment:       1. Lung nodule < 6cm on CT    2. Hyperlipidemia, unspecified hyperlipidemia type    3. Chronic cough    4. Upper respiratory tract infection, unspecified type    5. Gastroesophageal reflux disease without esophagitis        Plan:       Lung nodule < 6cm on CT   Chest CT 10/27/17 with 4 small L-pulmonary nodules largest <4mm   F/u CT chest in 1 year   Encouraged smoking cessation     Hyperlipidemia, unspecified hyperlipidemia type   Continue Atorvastatin 40 MG daily   Repeat Lipid Panel   F/u in 3 months     Chronic cough   Encouraged smoking cessation    F/u CT chest in 1 year    Upper respiratory tract infection, unspecified type  -     dexamethasone injection 4 mg; Inject 1 mL (4 mg total) into the muscle one time.    Gastroesophageal reflux disease without esophagitis  -     omeprazole (PRILOSEC) 20 MG capsule; Take 1 capsule (20 mg total) by mouth once daily.  Dispense: 90 capsule; Refill: 1      Follow-up in about 3 months (around 4/22/2018).  Ryan Hollis MD  [facility]  1/22/2018 7:29 AM

## 2018-01-22 NOTE — PROGRESS NOTES
Pre-Visit Chart Review  For Appointment Scheduled on  1/22/18.    Health Maintenance Due   Topic Date Due    TETANUS VACCINE  01/27/1980    Pneumococcal PPSV23 (Medium Risk) (1) 01/27/1980

## 2018-02-01 ENCOUNTER — OFFICE VISIT (OUTPATIENT)
Dept: PODIATRY | Facility: CLINIC | Age: 58
End: 2018-02-01
Payer: MEDICAID

## 2018-02-01 VITALS — WEIGHT: 120.56 LBS | HEIGHT: 61 IN | BODY MASS INDEX: 22.76 KG/M2

## 2018-02-01 DIAGNOSIS — M79.674 TOE PAIN, RIGHT: ICD-10-CM

## 2018-02-01 DIAGNOSIS — L60.0 INGROWN NAIL: Primary | ICD-10-CM

## 2018-02-01 PROCEDURE — 11750 EXCISION NAIL&NAIL MATRIX: CPT | Mod: PBBFAC,PO | Performed by: PODIATRIST

## 2018-02-01 PROCEDURE — 99999 PR PBB SHADOW E&M-EST. PATIENT-LVL III: CPT | Mod: PBBFAC,,, | Performed by: PODIATRIST

## 2018-02-01 PROCEDURE — 11750 EXCISION NAIL&NAIL MATRIX: CPT | Mod: T5,PBBFAC,PO | Performed by: PODIATRIST

## 2018-02-01 PROCEDURE — 11750 EXCISION NAIL&NAIL MATRIX: CPT | Mod: T5,S$PBB,, | Performed by: PODIATRIST

## 2018-02-01 PROCEDURE — 99499 UNLISTED E&M SERVICE: CPT | Mod: S$PBB,,, | Performed by: PODIATRIST

## 2018-02-01 PROCEDURE — 99213 OFFICE O/P EST LOW 20 MIN: CPT | Mod: PBBFAC,PO,25 | Performed by: PODIATRIST

## 2018-02-01 RX ORDER — TOBRAMYCIN 3 MG/ML
SOLUTION/ DROPS OPHTHALMIC
Qty: 5 ML | Refills: 3 | Status: SHIPPED | OUTPATIENT
Start: 2018-02-01 | End: 2020-02-05

## 2018-02-01 NOTE — PROGRESS NOTES
Subjective:      Patient ID: Floresita Martin is a 58 y.o. female.    Chief Complaint: Ingrown Toenail (procedure right great toe later border)    Sharp pain right big toe/toenail from ingrown nail.  indicated medial border with index finger. Gradual onset, worsening over past several weeks, aggravated by increased weight bearing, shoe gear, pressure.  No previous medical treatment.  OTC pain med not helping.  Denies trauma, surgery right big toe.  Review of Systems   Constitution: Negative for chills, diaphoresis, fever, malaise/fatigue and night sweats.   Cardiovascular: Negative for claudication, cyanosis, leg swelling and syncope.   Skin: Positive for nail changes. Negative for color change, dry skin, rash, suspicious lesions and unusual hair distribution.   Musculoskeletal: Negative for falls, joint pain, joint swelling, muscle cramps, muscle weakness and stiffness.   Gastrointestinal: Negative for constipation, diarrhea, nausea and vomiting.   Neurological: Negative for brief paralysis, disturbances in coordination, focal weakness, numbness, paresthesias, sensory change and tremors.           Objective:      Physical Exam   Constitutional: She is oriented to person, place, and time. She appears well-developed and well-nourished. She is cooperative. No distress.   Cardiovascular:   Pulses:       Popliteal pulses are 2+ on the right side, and 2+ on the left side.        Dorsalis pedis pulses are 2+ on the right side, and 2+ on the left side.        Posterior tibial pulses are 1+ on the right side, and 1+ on the left side.   Capillary refill 3 seconds all toes/distal feet, all toes/both feet warm to touch.      Negative lymphadenopathy bilateral popliteal fossa and tarsal tunnel.      Negavie lower extremity edema bilateral.     Musculoskeletal:        Right ankle: She exhibits normal range of motion, no swelling, no ecchymosis, no deformity, no laceration and normal pulse. Achilles tendon normal. Achilles tendon  exhibits no pain, no defect and normal Diana's test results.   Normal angle, base, station of gait. All ten toes without clubbing, cyanosis, or signs of ischemia.  No pain to palpation bilateral lower extremities.  Range of motion, stability, muscle strength, and muscle tone normal bilateral feet and legs.     Lymphadenopathy:   Negative lymphadenopathy bilateral popliteal fossa and tarsal tunnel.    Negative lymphangitic streaking bilateral feet/ankles/legs.   Neurological: She is alert and oriented to person, place, and time. She has normal strength. She displays no atrophy and no tremor. No sensory deficit. She exhibits normal muscle tone. Gait normal.   Reflex Scores:       Patellar reflexes are 2+ on the right side and 2+ on the left side.       Achilles reflexes are 2+ on the right side and 2+ on the left side.  Negative tinel sign to percussion sural, superficial peroneal, deep peroneal, saphenous, and posterior tibial nerves right and left ankles and feet.     Skin: Skin is warm, dry and intact. Capillary refill takes 2 to 3 seconds. No abrasion, no bruising, no burn, no ecchymosis, no laceration, no lesion and no rash noted. She is not diaphoretic. No cyanosis or erythema. No pallor. Nails show no clubbing.     Visible and palpable ingrowth of toenail medial border right hallux with pain to palpation, and focal localized erythema and edema,  without ulceration, drainage, pus, tracking, fluctuance, malodor, or cardinal signs infection.    Otherwise, Skin is normal age and health appropriate color, turgor, texture, and temperature bilateral lower extremities without ulceration, hyperpigmentation, discoloration, masses nodules or cords palpated.  No ecchymosis, erythema, edema, or cardinal signs of infection bilateral lower extremities.     Psychiatric: She has a normal mood and affect.             Assessment:       Encounter Diagnoses   Name Primary?    Ingrown nail Yes    Toe pain, right          Plan:        Floresita was seen today for ingrown toenail.    Diagnoses and all orders for this visit:    Ingrown nail    Toe pain, right    Other orders  -     tobramycin sulfate 0.3% (TOBREX) 0.3 % ophthalmic solution; 1-2 drops topically twice daily to affected toe(s).      I counseled the patient on her conditions, their implications and medical management.    Dress daily with antibiotic drops and dry sterile dressing.    Discussed conservative treatment with shoes of adequate dimensions, material, and style to alleviate symptoms and delay or prevent surgical intervention.    Matrixectomy right medial hallux.              Follow-up in about 2 weeks (around 2/15/2018).

## 2018-02-01 NOTE — PROCEDURES
Nail Removal  Date/Time: 2/1/2018 10:14 AM  Performed by: KARO ESPINOZA  Authorized by: KARO ESPINOZA     Consent Done?:  Yes (Verbal)    Location:  Right foot  Location detail:  Right big toe  Anesthesia:  Digital block  Local anesthetic: lidocaine 2% without epinephrine  Anesthetic total (ml):  3  Preparation:  Skin prepped with alcohol    Amount removed:  1/5 (medial border)  Wedge excision of skin of nail fold: No    Nail bed sutured?: No    Nail matrix removed:  Partial  Removed nail replaced and anchored: No    Dressing applied:  4x4, antibiotic ointment and dressing applied  Patient tolerance:  Patient tolerated the procedure well with no immediate complications     Time out performed prior to anesthetizing the surgical area and all patient identifiers, procedures, and site markings are in agreement.

## 2018-02-15 ENCOUNTER — OFFICE VISIT (OUTPATIENT)
Dept: PODIATRY | Facility: CLINIC | Age: 58
End: 2018-02-15
Payer: MEDICAID

## 2018-02-15 ENCOUNTER — TELEPHONE (OUTPATIENT)
Dept: PODIATRY | Facility: CLINIC | Age: 58
End: 2018-02-15

## 2018-02-15 VITALS — BODY MASS INDEX: 22.51 KG/M2 | WEIGHT: 119.25 LBS | HEIGHT: 61 IN

## 2018-02-15 DIAGNOSIS — Z98.890 POST-OPERATIVE STATE: Primary | ICD-10-CM

## 2018-02-15 DIAGNOSIS — K21.9 GASTROESOPHAGEAL REFLUX DISEASE WITHOUT ESOPHAGITIS: ICD-10-CM

## 2018-02-15 PROCEDURE — 99213 OFFICE O/P EST LOW 20 MIN: CPT | Mod: PBBFAC,PO | Performed by: PODIATRIST

## 2018-02-15 PROCEDURE — 99024 POSTOP FOLLOW-UP VISIT: CPT | Mod: ,,, | Performed by: PODIATRIST

## 2018-02-15 PROCEDURE — 99999 PR PBB SHADOW E&M-EST. PATIENT-LVL III: CPT | Mod: PBBFAC,,, | Performed by: PODIATRIST

## 2018-02-15 RX ORDER — OMEPRAZOLE 20 MG/1
20 CAPSULE, DELAYED RELEASE ORAL DAILY
Qty: 90 CAPSULE | Refills: 1 | Status: SHIPPED | OUTPATIENT
Start: 2018-02-15 | End: 2018-08-14 | Stop reason: SDUPTHER

## 2018-02-15 NOTE — TELEPHONE ENCOUNTER
Patient is currently taking Omeprazole 20 mg would like a refill. Please send to [facility] on [address].

## 2018-02-15 NOTE — PROGRESS NOTES
Subjective:      Patient ID: Floresita Martin is a 58 y.o. female.    Chief Complaint: Post-op Evaluation (right foot)    2 weeks s/p matrixectomy medial right hallux.  Covering all times with band aid dressings and using topical antibiotic.  Denies trauma, signs infection, pain right big toe.    Review of Systems   Constitution: Negative for chills, diaphoresis, fever, weakness, malaise/fatigue and night sweats.   Skin: Positive for nail changes.           Objective:      Physical Exam   Cardiovascular:   Pulses:       Dorsalis pedis pulses are 2+ on the right side, and 2+ on the left side.        Posterior tibial pulses are 2+ on the right side, and 2+ on the left side.   All toes warm, pink.   Skin:   Wound medial right hallux pink, granular,  without drainage, pus, tracking, fluctuance, malodor, or cardinal signs infection.     Otherwise, Skin is normal age and health appropriate color, turgor, texture, and temperature bilateral lower extremities without ulceration, hyperpigmentation, discoloration, masses nodules or cords palpated.  No ecchymosis, erythema, edema, or cardinal signs of infection bilateral lower extremities.               Assessment:       Encounter Diagnosis   Name Primary?    Post-operative state Yes         Plan:       Floresita was seen today for post-op evaluation.    Diagnoses and all orders for this visit:    Post-operative state      I counseled the patient on her conditions, their implications and medical management.    Continue current care until completely healed.    Discussed conservative treatment with shoes of adequate dimensions, material, and style to alleviate symptoms and delay or prevent surgical intervention.            Follow-up if symptoms worsen or fail to improve.

## 2018-02-16 DIAGNOSIS — J30.9 ALLERGIC RHINITIS, UNSPECIFIED CHRONICITY, UNSPECIFIED SEASONALITY, UNSPECIFIED TRIGGER: ICD-10-CM

## 2018-02-16 RX ORDER — FLUTICASONE PROPIONATE 50 MCG
1 SPRAY, SUSPENSION (ML) NASAL DAILY
Qty: 1 BOTTLE | Refills: 3 | Status: SHIPPED | OUTPATIENT
Start: 2018-02-16 | End: 2018-08-19 | Stop reason: SDUPTHER

## 2018-02-16 RX ORDER — LORATADINE 10 MG/1
10 TABLET ORAL DAILY
Qty: 90 TABLET | Refills: 3 | Status: SHIPPED | OUTPATIENT
Start: 2018-02-16 | End: 2019-02-28 | Stop reason: SDUPTHER

## 2018-03-23 ENCOUNTER — TELEPHONE (OUTPATIENT)
Dept: FAMILY MEDICINE | Facility: CLINIC | Age: 58
End: 2018-03-23

## 2018-03-23 NOTE — TELEPHONE ENCOUNTER
Spoke with patient. Appointment scheduled. Patient given the cardiologist name she saw for her procedure

## 2018-03-23 NOTE — TELEPHONE ENCOUNTER
----- Message from Malissa Nguyen sent at 3/23/2018 12:53 PM CDT -----  Contact: patient  Needs to schedule appt to get medical clearance for surgery on April 24th. Please call back [phone]

## 2018-04-12 ENCOUNTER — TELEPHONE (OUTPATIENT)
Dept: FAMILY MEDICINE | Facility: CLINIC | Age: 58
End: 2018-04-12

## 2018-04-12 PROBLEM — E78.49 OTHER HYPERLIPIDEMIA: Status: ACTIVE | Noted: 2018-04-12

## 2018-04-12 NOTE — TELEPHONE ENCOUNTER
Called Pt to schedule appt for surgical clearance for TDA C-7 with Donnell Garibay, she stated appt was already made, verbalizing and understanding

## 2018-04-19 ENCOUNTER — TELEPHONE (OUTPATIENT)
Dept: FAMILY MEDICINE | Facility: CLINIC | Age: 58
End: 2018-04-19

## 2018-04-19 NOTE — TELEPHONE ENCOUNTER
----- Message from Serene Briceño LPN sent at 4/16/2018  4:33 PM CDT -----  Contact: self      ----- Message -----  From: Malissa Nguyen  Sent: 4/16/2018  11:52 AM  To: [facility] Staff    Patient requesting call back regarding upcoming labs. Call back number [phone]

## 2018-04-19 NOTE — TELEPHONE ENCOUNTER
Returned patient's call. Patient stated that she cancelled her lab and xray appt because [facility] already ordered the labs and xray and the results will be sent to Dr. Hollis and she doesn't want to do them twice.

## 2018-04-24 ENCOUNTER — TELEPHONE (OUTPATIENT)
Dept: FAMILY MEDICINE | Facility: CLINIC | Age: 58
End: 2018-04-24

## 2018-04-24 NOTE — TELEPHONE ENCOUNTER
Called Pt regarding lab she states she had them done at [facility], verbalization and understanding

## 2018-04-25 ENCOUNTER — DOCUMENTATION ONLY (OUTPATIENT)
Dept: FAMILY MEDICINE | Facility: CLINIC | Age: 58
End: 2018-04-25

## 2018-04-25 NOTE — PROGRESS NOTES
Pre-Visit Chart Review  For Appointment Scheduled on 4/26/18    Health Maintenance Due   Topic Date Due    TETANUS VACCINE  01/27/1980    Pneumococcal PPSV23 (Medium Risk) (1) 01/27/1980

## 2018-04-26 ENCOUNTER — OFFICE VISIT (OUTPATIENT)
Dept: FAMILY MEDICINE | Facility: CLINIC | Age: 58
End: 2018-04-26
Payer: MEDICAID

## 2018-04-26 ENCOUNTER — LAB VISIT (OUTPATIENT)
Dept: LAB | Facility: HOSPITAL | Age: 58
End: 2018-04-26
Attending: FAMILY MEDICINE
Payer: MEDICAID

## 2018-04-26 VITALS
TEMPERATURE: 99 F | BODY MASS INDEX: 22.15 KG/M2 | HEIGHT: 61 IN | DIASTOLIC BLOOD PRESSURE: 82 MMHG | HEART RATE: 84 BPM | SYSTOLIC BLOOD PRESSURE: 116 MMHG | WEIGHT: 117.31 LBS

## 2018-04-26 DIAGNOSIS — H80.90 OTOSCLEROSIS, UNSPECIFIED LATERALITY: Primary | ICD-10-CM

## 2018-04-26 DIAGNOSIS — Z01.818 PRE-OP EVALUATION: ICD-10-CM

## 2018-04-26 DIAGNOSIS — E78.49 OTHER HYPERLIPIDEMIA: ICD-10-CM

## 2018-04-26 LAB
CHOLEST SERPL-MCNC: 173 MG/DL
CHOLEST/HDLC SERPL: 2.7 {RATIO}
HDLC SERPL-MCNC: 64 MG/DL
HDLC SERPL: 37 %
LDLC SERPL CALC-MCNC: 88.4 MG/DL
NONHDLC SERPL-MCNC: 109 MG/DL
TRIGL SERPL-MCNC: 103 MG/DL

## 2018-04-26 PROCEDURE — 36415 COLL VENOUS BLD VENIPUNCTURE: CPT | Mod: PO

## 2018-04-26 PROCEDURE — 80061 LIPID PANEL: CPT

## 2018-04-26 PROCEDURE — 99213 OFFICE O/P EST LOW 20 MIN: CPT | Mod: PBBFAC,PO | Performed by: FAMILY MEDICINE

## 2018-04-26 PROCEDURE — 99999 PR PBB SHADOW E&M-EST. PATIENT-LVL III: CPT | Mod: PBBFAC,,, | Performed by: FAMILY MEDICINE

## 2018-04-26 PROCEDURE — 99214 OFFICE O/P EST MOD 30 MIN: CPT | Mod: S$PBB,,, | Performed by: FAMILY MEDICINE

## 2018-04-26 NOTE — PROGRESS NOTES
"[facility]  Progress Note    Subjective:       Patient ID: Floresita Martin is a 58 y.o. female.    Chief Complaint: follow up after labs and xray and surgical clearance    HPI58 y.o.female is here today for pre-op clearance.  Patient is scheduled for cervical neck surgery this month.  Have reviewed all imaging and labs.  Patient has had hearing loss in right ear. No further complaints.   Review of Systems   Constitutional: Negative for activity change, chills, fever and unexpected weight change.   HENT: Positive for hearing loss. Negative for congestion, ear pain, postnasal drip, rhinorrhea, sneezing, sore throat and trouble swallowing.    Eyes: Negative for pain, discharge and visual disturbance.   Respiratory: Negative for cough, chest tightness, shortness of breath and wheezing.    Cardiovascular: Negative for chest pain, palpitations and leg swelling.   Gastrointestinal: Negative for abdominal pain, blood in stool, nausea and vomiting.   Endocrine: Negative.  Negative for polydipsia and polyuria.   Genitourinary: Negative for difficulty urinating, dysuria, frequency, hematuria, menstrual problem and urgency.   Musculoskeletal: Positive for neck pain. Negative for arthralgias, joint swelling and myalgias.   Skin: Negative for rash.   Allergic/Immunologic: Negative.    Neurological: Negative for syncope and headaches.   Hematological: Negative.    Psychiatric/Behavioral: Negative.  Negative for confusion and dysphoric mood.       Objective:      Vitals:    04/26/18 0744   BP: 116/82   BP Location: Right arm   Patient Position: Sitting   BP Method: Small (Automatic)   Pulse: 84   Temp: 98.9 °F (37.2 °C)   TempSrc: Oral   Weight: 53.2 kg (117 lb 4.6 oz)   Height: 5' 1" (1.549 m)     Body mass index is 22.16 kg/m².  Physical Exam   Constitutional: She is oriented to person, place, and time. She appears well-developed and well-nourished. No distress.   HENT:   Head: Normocephalic and atraumatic. "   Cardiovascular: Normal rate, regular rhythm, normal heart sounds and intact distal pulses.  Exam reveals no gallop and no friction rub.    No murmur heard.  Pulmonary/Chest: Effort normal and breath sounds normal. No respiratory distress. She has no rales.   Abdominal: Soft. She exhibits no distension and no mass. There is no rebound and no guarding. No hernia.   Musculoskeletal: Normal range of motion.   Neurological: She is alert and oriented to person, place, and time.   Skin: Skin is warm.   Psychiatric: She has a normal mood and affect. Her behavior is normal. Judgment and thought content normal.   Vitals reviewed.      Assessment:       1. Otosclerosis, unspecified laterality    2. Other hyperlipidemia    3. Pre-op evaluation    4. Lung nodule < 6cm on CT        Plan:       Otosclerosis, unspecified laterality  -     Ambulatory referral to ENT    Other hyperlipidemia        - Follow up lipid panel     Pre-op evaluation        - Low risk for moderate risk procedure proceed as scheduled     Lung nodule < 6cm on CT        - Repeat CT chest 6 months     Follow-up in about 6 months (around 10/26/2018).  Ryan Hollis MD  [facility]  4/26/2018 8:03 AM

## 2018-05-08 ENCOUNTER — TELEPHONE (OUTPATIENT)
Dept: CARDIOLOGY | Facility: CLINIC | Age: 58
End: 2018-05-08

## 2018-05-08 ENCOUNTER — TELEPHONE (OUTPATIENT)
Dept: FAMILY MEDICINE | Facility: CLINIC | Age: 58
End: 2018-05-08

## 2018-05-08 NOTE — TELEPHONE ENCOUNTER
----- Message from Kath Petty sent at 5/8/2018  3:38 PM CDT -----  Contact: Pam with Dr Phoenix Orozco with Dr Garibay calling regarding EKG, and stress test. Please fax to [phone]. Please call Pam at [phone].Thanks!

## 2018-05-08 NOTE — TELEPHONE ENCOUNTER
----- Message from Jackson Howe sent at 5/8/2018  9:32 AM CDT -----  Contact: Liliana with Dr. Victorino Ford with Dr. Gleason, [phone]. Calling to check on medical clearance for the patient. Please advise. Thanks.

## 2018-08-14 DIAGNOSIS — K21.9 GASTROESOPHAGEAL REFLUX DISEASE WITHOUT ESOPHAGITIS: ICD-10-CM

## 2018-08-14 RX ORDER — OMEPRAZOLE 20 MG/1
20 CAPSULE, DELAYED RELEASE ORAL DAILY
Qty: 90 CAPSULE | Refills: 0 | Status: SHIPPED | OUTPATIENT
Start: 2018-08-14 | End: 2018-11-11 | Stop reason: SDUPTHER

## 2018-08-19 DIAGNOSIS — J30.9 ALLERGIC RHINITIS: ICD-10-CM

## 2018-08-20 RX ORDER — FLUTICASONE PROPIONATE 50 MCG
SPRAY, SUSPENSION (ML) NASAL
Qty: 16 ML | Refills: 0 | Status: SHIPPED | OUTPATIENT
Start: 2018-08-20 | End: 2018-09-25 | Stop reason: SDUPTHER

## 2018-09-25 DIAGNOSIS — J30.9 ALLERGIC RHINITIS: ICD-10-CM

## 2018-09-26 RX ORDER — FLUTICASONE PROPIONATE 50 MCG
SPRAY, SUSPENSION (ML) NASAL
Qty: 16 ML | Refills: 0 | Status: SHIPPED | OUTPATIENT
Start: 2018-09-26 | End: 2018-10-24 | Stop reason: SDUPTHER

## 2018-10-24 DIAGNOSIS — J30.9 ALLERGIC RHINITIS: ICD-10-CM

## 2018-10-24 RX ORDER — FLUTICASONE PROPIONATE 50 MCG
SPRAY, SUSPENSION (ML) NASAL
Qty: 16 ML | Refills: 0 | Status: SHIPPED | OUTPATIENT
Start: 2018-10-24 | End: 2019-06-03 | Stop reason: SDUPTHER

## 2018-10-25 ENCOUNTER — DOCUMENTATION ONLY (OUTPATIENT)
Dept: FAMILY MEDICINE | Facility: CLINIC | Age: 58
End: 2018-10-25

## 2018-10-25 NOTE — PROGRESS NOTES
Pre-Visit Chart Review  For Appointment Scheduled on 10/26/2018      Health Maintenance Due   Topic Date Due    TETANUS VACCINE  01/27/1980    Pneumococcal PPSV23 (Medium Risk) (1) 01/27/1980    Influenza Vaccine  08/01/2018

## 2018-10-26 ENCOUNTER — OFFICE VISIT (OUTPATIENT)
Dept: FAMILY MEDICINE | Facility: CLINIC | Age: 58
End: 2018-10-26
Payer: MEDICAID

## 2018-10-26 ENCOUNTER — LAB VISIT (OUTPATIENT)
Dept: LAB | Facility: HOSPITAL | Age: 58
End: 2018-10-26
Attending: PHYSICIAN ASSISTANT
Payer: MEDICAID

## 2018-10-26 VITALS
DIASTOLIC BLOOD PRESSURE: 80 MMHG | WEIGHT: 116.63 LBS | TEMPERATURE: 99 F | SYSTOLIC BLOOD PRESSURE: 138 MMHG | HEIGHT: 61 IN | BODY MASS INDEX: 22.02 KG/M2 | HEART RATE: 102 BPM

## 2018-10-26 DIAGNOSIS — N39.0 URINARY TRACT INFECTION WITHOUT HEMATURIA, SITE UNSPECIFIED: Primary | ICD-10-CM

## 2018-10-26 DIAGNOSIS — Z72.0 TOBACCO ABUSE: ICD-10-CM

## 2018-10-26 DIAGNOSIS — R10.10 UPPER ABDOMINAL PAIN: ICD-10-CM

## 2018-10-26 DIAGNOSIS — R49.0 HOARSE: ICD-10-CM

## 2018-10-26 DIAGNOSIS — E78.5 HYPERLIPIDEMIA, UNSPECIFIED HYPERLIPIDEMIA TYPE: ICD-10-CM

## 2018-10-26 DIAGNOSIS — Z29.9 PREVENTIVE MEASURE: ICD-10-CM

## 2018-10-26 LAB
ALBUMIN SERPL BCP-MCNC: 3.9 G/DL
ALP SERPL-CCNC: 107 U/L
ALT SERPL W/O P-5'-P-CCNC: 57 U/L
AMYLASE SERPL-CCNC: 61 U/L
ANION GAP SERPL CALC-SCNC: 4 MMOL/L
AST SERPL-CCNC: 43 U/L
BASOPHILS # BLD AUTO: 0.03 K/UL
BASOPHILS NFR BLD: 0.3 %
BILIRUB SERPL-MCNC: 0.5 MG/DL
BILIRUB SERPL-MCNC: NEGATIVE MG/DL
BLOOD URINE, POC: ABNORMAL
BUN SERPL-MCNC: 12 MG/DL
CALCIUM SERPL-MCNC: 9.8 MG/DL
CHLORIDE SERPL-SCNC: 107 MMOL/L
CHOLEST SERPL-MCNC: 187 MG/DL
CHOLEST/HDLC SERPL: 2.2 {RATIO}
CO2 SERPL-SCNC: 28 MMOL/L
COLOR, POC UA: YELLOW
CREAT SERPL-MCNC: 0.7 MG/DL
DIFFERENTIAL METHOD: ABNORMAL
EOSINOPHIL # BLD AUTO: 0.1 K/UL
EOSINOPHIL NFR BLD: 0.5 %
ERYTHROCYTE [DISTWIDTH] IN BLOOD BY AUTOMATED COUNT: 17.1 %
EST. GFR  (AFRICAN AMERICAN): >60 ML/MIN/1.73 M^2
EST. GFR  (NON AFRICAN AMERICAN): >60 ML/MIN/1.73 M^2
GLUCOSE SERPL-MCNC: 87 MG/DL
GLUCOSE UR QL STRIP: NORMAL
HCT VFR BLD AUTO: 37.2 %
HDLC SERPL-MCNC: 84 MG/DL
HDLC SERPL: 44.9 %
HGB BLD-MCNC: 12.3 G/DL
IMM GRANULOCYTES # BLD AUTO: 0.03 K/UL
IMM GRANULOCYTES NFR BLD AUTO: 0.3 %
KETONES UR QL STRIP: NEGATIVE
LDLC SERPL CALC-MCNC: 92 MG/DL
LEUKOCYTE ESTERASE URINE, POC: ABNORMAL
LIPASE SERPL-CCNC: 24 U/L
LYMPHOCYTES # BLD AUTO: 2.9 K/UL
LYMPHOCYTES NFR BLD: 27.4 %
MCH RBC QN AUTO: 31.8 PG
MCHC RBC AUTO-ENTMCNC: 33.1 G/DL
MCV RBC AUTO: 96 FL
MONOCYTES # BLD AUTO: 0.5 K/UL
MONOCYTES NFR BLD: 4.4 %
NEUTROPHILS # BLD AUTO: 7.1 K/UL
NEUTROPHILS NFR BLD: 67.1 %
NITRITE, POC UA: NEGATIVE
NONHDLC SERPL-MCNC: 103 MG/DL
NRBC BLD-RTO: 0 /100 WBC
PH, POC UA: 7
PLATELET # BLD AUTO: 360 K/UL
PMV BLD AUTO: 8.9 FL
POTASSIUM SERPL-SCNC: 4.1 MMOL/L
PROT SERPL-MCNC: 6.7 G/DL
PROTEIN, POC: NEGATIVE
RBC # BLD AUTO: 3.87 M/UL
SODIUM SERPL-SCNC: 139 MMOL/L
SPECIFIC GRAVITY, POC UA: 1
TRIGL SERPL-MCNC: 55 MG/DL
UROBILINOGEN, POC UA: NORMAL
WBC # BLD AUTO: 10.5 K/UL

## 2018-10-26 PROCEDURE — 85025 COMPLETE CBC W/AUTO DIFF WBC: CPT

## 2018-10-26 PROCEDURE — 81001 URINALYSIS AUTO W/SCOPE: CPT | Mod: PBBFAC,PO | Performed by: PHYSICIAN ASSISTANT

## 2018-10-26 PROCEDURE — 36415 COLL VENOUS BLD VENIPUNCTURE: CPT | Mod: PO

## 2018-10-26 PROCEDURE — 90686 IIV4 VACC NO PRSV 0.5 ML IM: CPT | Mod: PBBFAC,PO

## 2018-10-26 PROCEDURE — 99999 PR PBB SHADOW E&M-EST. PATIENT-LVL V: CPT | Mod: PBBFAC,,, | Performed by: PHYSICIAN ASSISTANT

## 2018-10-26 PROCEDURE — 90732 PPSV23 VACC 2 YRS+ SUBQ/IM: CPT | Mod: PBBFAC,PO

## 2018-10-26 PROCEDURE — 99214 OFFICE O/P EST MOD 30 MIN: CPT | Mod: S$PBB,,, | Performed by: PHYSICIAN ASSISTANT

## 2018-10-26 PROCEDURE — 80053 COMPREHEN METABOLIC PANEL: CPT

## 2018-10-26 PROCEDURE — 82150 ASSAY OF AMYLASE: CPT

## 2018-10-26 PROCEDURE — 99215 OFFICE O/P EST HI 40 MIN: CPT | Mod: PBBFAC,PO,25 | Performed by: PHYSICIAN ASSISTANT

## 2018-10-26 PROCEDURE — 80061 LIPID PANEL: CPT

## 2018-10-26 PROCEDURE — 83690 ASSAY OF LIPASE: CPT

## 2018-10-26 RX ORDER — CYCLOBENZAPRINE HCL 10 MG
TABLET ORAL
Refills: 3 | COMMUNITY
Start: 2018-09-18 | End: 2022-01-05 | Stop reason: SDUPTHER

## 2018-10-26 RX ORDER — DIAZEPAM 5 MG/1
TABLET ORAL
COMMUNITY
Start: 2018-07-25 | End: 2019-02-15

## 2018-10-26 RX ORDER — SULFAMETHOXAZOLE AND TRIMETHOPRIM 800; 160 MG/1; MG/1
1 TABLET ORAL 2 TIMES DAILY
Qty: 14 TABLET | Refills: 0 | Status: SHIPPED | OUTPATIENT
Start: 2018-10-26 | End: 2019-02-15

## 2018-10-26 NOTE — PROGRESS NOTES
Subjective:       Patient ID: Floresita Martin is a 58 y.o. female.    Chief Complaint: Follow-up    Patient with hyperlipidemia on statin presents for routine follow-up.  She is complaining of dysuria and urinary frequency.  Symptoms began several days ago.  Symptoms improved temporarily with increased water intake.  She does not have frequent urinary tract infections.  Patient is also complaining of left upper quadrant and right upper quadrant abdominal pain. Pain has been occurring now for approximately 2 months.  Pain occurs regardless of mealtime.  She describes the pain as a dull aching sensation.  She has not taken anything for the pain.  She denies diarrhea and constipation.  She denies nausea and vomiting.  She takes omeprazole with control of GERD.  Patient was found to have lung nodule on screening chest CT.  She is due for repeat CT scan.  She continues to smoke.  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  She has not seen ENT for evaluation  Patients patient medical/surgical, social and family histories have been reviewed         Review of Systems   Constitutional: Negative for activity change and unexpected weight change.   HENT: Positive for hearing loss. Negative for rhinorrhea and trouble swallowing.    Eyes: Negative for discharge and visual disturbance.   Respiratory: Positive for chest tightness. Negative for wheezing.    Cardiovascular: Positive for chest pain. Negative for palpitations.   Gastrointestinal: Negative for blood in stool, constipation, diarrhea and vomiting.   Endocrine: Negative for polydipsia and polyuria.   Genitourinary: Positive for difficulty urinating and dysuria. Negative for hematuria and menstrual problem.   Musculoskeletal: Positive for arthralgias and neck pain. Negative for joint swelling.   Neurological: Positive for headaches. Negative for weakness.   Psychiatric/Behavioral: Negative for confusion and dysphoric  mood.       Objective:      Physical Exam   Constitutional: She appears well-developed and well-nourished. She is cooperative. No distress.   HENT:   Head: Normocephalic and atraumatic.   Right Ear: Tympanic membrane, external ear and ear canal normal.   Left Ear: Tympanic membrane, external ear and ear canal normal.   Mouth/Throat: Oropharynx is clear and moist.   Very hoarse deep voice   Eyes: Conjunctivae and EOM are normal. Pupils are equal, round, and reactive to light. No scleral icterus.   Neck: Trachea normal. No tracheal tenderness present. No thyroid mass and no thyromegaly present.   Cardiovascular: Normal rate, regular rhythm and normal heart sounds.   Pulmonary/Chest: Effort normal and breath sounds normal.   Abdominal: Soft. Bowel sounds are normal.   Musculoskeletal:        Right lower leg: She exhibits no edema.        Left lower leg: She exhibits no edema.   Lymphadenopathy:        Head (right side): No submental, no submandibular, no tonsillar, no preauricular and no posterior auricular adenopathy present.        Head (left side): No submental, no submandibular, no tonsillar, no preauricular and no posterior auricular adenopathy present.     She has no cervical adenopathy.   Neurological: She is alert.   Skin: Skin is warm and dry.   Psychiatric: She has a normal mood and affect. Her speech is normal. Judgment normal. Cognition and memory are normal.   Vitals reviewed.      Assessment:       1. Urinary tract infection without hematuria, site unspecified    2. Lung nodule < 6cm on CT    3. Hyperlipidemia, unspecified hyperlipidemia type    4. Preventive measure    5. Upper abdominal pain    6. Tobacco abuse    7. Hoarse        Plan:       Floresita was seen today for follow-up.    Diagnoses and all orders for this visit:    Urinary tract infection without hematuria, site unspecified  -     Urine culture; Future    sulfamethoxazole-trimethoprim 800-160mg (BACTRIM DS) 800-160 mg Tab; Take 1 tablet by  mouth 2 (two) times daily.    Lung nodule < 6cm on CT      -     CT Chest W Wo Contrast; Future    Hyperlipidemia, unspecified hyperlipidemia type  -     Comprehensive metabolic panel; Future  -     Lipid panel; Future    Preventive measure  -     CBC auto differential; Future    Upper abdominal pain  -     US Abdomen Complete; Future  -     Amylase; Future  -     Lipase; Future    Tobacco abuse    Hoarse  -     Ambulatory referral to ENT    Other orders  -     (In Office Administered) Pneumococcal Polysaccharide Vaccine (23 Valent) (SQ/IM)  -

## 2018-10-26 NOTE — PROGRESS NOTES
Administered Pneumovax 23 and flu vaccine, IM. Identified patient's name&. Patient tolerated well, aseptic technique maintained. Pain scale 0/10 with injection. Instructed patient to wait in the clinic for 15 minutes after the shot was given.

## 2018-10-30 ENCOUNTER — TELEPHONE (OUTPATIENT)
Dept: FAMILY MEDICINE | Facility: CLINIC | Age: 58
End: 2018-10-30

## 2018-10-30 NOTE — TELEPHONE ENCOUNTER
----- Message from Adolfo Blanc sent at 10/30/2018  2:23 PM CDT -----  Contact: Patient  Type:  Patient Returning Call    Who Called:  Patient  Who Left Message for Patient:  Dr. Michael's office  Does the patient know what this is regarding?:  Does not wish to disclose  Best Call Back Number:  [phone]   Additional Information:  NA

## 2018-10-31 ENCOUNTER — HOSPITAL ENCOUNTER (OUTPATIENT)
Dept: RADIOLOGY | Facility: HOSPITAL | Age: 58
Discharge: HOME | End: 2018-10-31
Attending: PHYSICIAN ASSISTANT
Payer: MEDICAID

## 2018-10-31 DIAGNOSIS — R10.10 UPPER ABDOMINAL PAIN: ICD-10-CM

## 2018-10-31 DIAGNOSIS — R74.01 TRANSAMINITIS: Primary | ICD-10-CM

## 2018-10-31 PROCEDURE — 71250 CT THORAX DX C-: CPT | Mod: TC

## 2018-10-31 PROCEDURE — 76700 US EXAM ABDOM COMPLETE: CPT | Mod: 26,,, | Performed by: RADIOLOGY

## 2018-10-31 PROCEDURE — 76700 US EXAM ABDOM COMPLETE: CPT | Mod: TC

## 2018-10-31 PROCEDURE — 71250 CT THORAX DX C-: CPT | Mod: 26,,, | Performed by: RADIOLOGY

## 2018-11-05 ENCOUNTER — TELEPHONE (OUTPATIENT)
Dept: FAMILY MEDICINE | Facility: CLINIC | Age: 58
End: 2018-11-05

## 2018-11-05 DIAGNOSIS — Z12.39 SCREENING FOR BREAST CANCER: Primary | ICD-10-CM

## 2018-11-05 NOTE — TELEPHONE ENCOUNTER
Pt is requesting for Mammogram order . She is coming in for her lab this Wednesday.    LOV: 10/26/18  Last mammo: 09/29/17

## 2018-11-05 NOTE — TELEPHONE ENCOUNTER
----- Message from Anat Vanessa sent at 11/5/2018  9:04 AM CST -----  Contact: pt [phone]  Patient called and asked if you will add the Mammogram orders to be done this Wednesday along with her Labs.

## 2018-11-07 ENCOUNTER — HOSPITAL ENCOUNTER (OUTPATIENT)
Dept: RADIOLOGY | Facility: CLINIC | Age: 58
Discharge: HOME | End: 2018-11-07
Attending: PHYSICIAN ASSISTANT
Payer: MEDICAID

## 2018-11-07 DIAGNOSIS — Z12.39 SCREENING FOR BREAST CANCER: ICD-10-CM

## 2018-11-07 PROCEDURE — 77063 BREAST TOMOSYNTHESIS BI: CPT | Mod: TC,PO

## 2018-11-07 PROCEDURE — 77063 BREAST TOMOSYNTHESIS BI: CPT | Mod: 26,,, | Performed by: RADIOLOGY

## 2018-11-07 PROCEDURE — 77067 SCR MAMMO BI INCL CAD: CPT | Mod: TC,PO

## 2018-11-07 PROCEDURE — 77067 SCR MAMMO BI INCL CAD: CPT | Mod: 26,,, | Performed by: RADIOLOGY

## 2018-11-08 ENCOUNTER — TELEPHONE (OUTPATIENT)
Dept: RADIOLOGY | Facility: HOSPITAL | Age: 58
End: 2018-11-08

## 2018-11-08 DIAGNOSIS — R92.8 ABNORMAL MAMMOGRAM OF LEFT BREAST: Primary | ICD-10-CM

## 2018-11-11 DIAGNOSIS — K21.9 GASTROESOPHAGEAL REFLUX DISEASE WITHOUT ESOPHAGITIS: ICD-10-CM

## 2018-11-12 RX ORDER — OMEPRAZOLE 20 MG/1
CAPSULE, DELAYED RELEASE ORAL
Qty: 90 CAPSULE | Refills: 0 | Status: SHIPPED | OUTPATIENT
Start: 2018-11-12 | End: 2019-02-15

## 2018-11-19 ENCOUNTER — HOSPITAL ENCOUNTER (OUTPATIENT)
Dept: RADIOLOGY | Facility: HOSPITAL | Age: 58
Discharge: HOME | End: 2018-11-19
Attending: PHYSICIAN ASSISTANT
Payer: MEDICAID

## 2018-11-19 DIAGNOSIS — R92.8 ABNORMAL MAMMOGRAM OF LEFT BREAST: ICD-10-CM

## 2018-11-19 PROCEDURE — 77061 BREAST TOMOSYNTHESIS UNI: CPT | Mod: TC,LT

## 2018-11-19 PROCEDURE — 77061 BREAST TOMOSYNTHESIS UNI: CPT | Mod: 26,LT,, | Performed by: RADIOLOGY

## 2018-11-19 PROCEDURE — 77065 DX MAMMO INCL CAD UNI: CPT | Mod: 26,LT,, | Performed by: RADIOLOGY

## 2018-11-19 PROCEDURE — 77065 DX MAMMO INCL CAD UNI: CPT | Mod: TC,LT

## 2018-11-21 RX ORDER — ATORVASTATIN CALCIUM 40 MG/1
TABLET, FILM COATED ORAL
Qty: 30 TABLET | Refills: 5 | Status: SHIPPED | OUTPATIENT
Start: 2018-11-21 | End: 2019-06-25 | Stop reason: SDUPTHER

## 2019-02-14 ENCOUNTER — DOCUMENTATION ONLY (OUTPATIENT)
Dept: FAMILY MEDICINE | Facility: CLINIC | Age: 59
End: 2019-02-14

## 2019-02-15 ENCOUNTER — TELEPHONE (OUTPATIENT)
Dept: FAMILY MEDICINE | Facility: CLINIC | Age: 59
End: 2019-02-15

## 2019-02-15 ENCOUNTER — OFFICE VISIT (OUTPATIENT)
Dept: FAMILY MEDICINE | Facility: CLINIC | Age: 59
End: 2019-02-15
Payer: MEDICAID

## 2019-02-15 VITALS
HEIGHT: 61 IN | SYSTOLIC BLOOD PRESSURE: 129 MMHG | DIASTOLIC BLOOD PRESSURE: 86 MMHG | TEMPERATURE: 98 F | WEIGHT: 121.25 LBS | BODY MASS INDEX: 22.89 KG/M2 | HEART RATE: 107 BPM

## 2019-02-15 DIAGNOSIS — K21.9 GASTROESOPHAGEAL REFLUX DISEASE WITHOUT ESOPHAGITIS: Primary | ICD-10-CM

## 2019-02-15 DIAGNOSIS — E78.49 OTHER HYPERLIPIDEMIA: ICD-10-CM

## 2019-02-15 DIAGNOSIS — F17.210 CIGARETTE NICOTINE DEPENDENCE WITHOUT COMPLICATION: ICD-10-CM

## 2019-02-15 PROCEDURE — 99214 OFFICE O/P EST MOD 30 MIN: CPT | Mod: S$PBB,,, | Performed by: FAMILY MEDICINE

## 2019-02-15 PROCEDURE — 99213 OFFICE O/P EST LOW 20 MIN: CPT | Mod: PBBFAC,PO | Performed by: FAMILY MEDICINE

## 2019-02-15 PROCEDURE — 99214 PR OFFICE/OUTPT VISIT, EST, LEVL IV, 30-39 MIN: ICD-10-PCS | Mod: S$PBB,,, | Performed by: FAMILY MEDICINE

## 2019-02-15 PROCEDURE — 99999 PR PBB SHADOW E&M-EST. PATIENT-LVL III: ICD-10-PCS | Mod: PBBFAC,,, | Performed by: FAMILY MEDICINE

## 2019-02-15 PROCEDURE — 99999 PR PBB SHADOW E&M-EST. PATIENT-LVL III: CPT | Mod: PBBFAC,,, | Performed by: FAMILY MEDICINE

## 2019-02-15 RX ORDER — OMEPRAZOLE 40 MG/1
40 CAPSULE, DELAYED RELEASE ORAL DAILY
Qty: 90 CAPSULE | Refills: 3 | Status: SHIPPED | OUTPATIENT
Start: 2019-02-15 | End: 2020-03-09

## 2019-02-15 NOTE — TELEPHONE ENCOUNTER
----- Message from Toni Hughes MD sent at 2/15/2019  3:35 PM CST -----  Please call patient and assist with scheduling a 6 month follow-up with me.  Please also notify her that I called in her increased dose of omeprazole to her pharmacy.  She is now to take omeprazole 40 mg daily.

## 2019-02-15 NOTE — TELEPHONE ENCOUNTER
Spoke to pt. Informed 40mg omeprazole was called into pharm. Scheduled 6 month f/u. Pt verbalized understanding.

## 2019-02-16 NOTE — PROGRESS NOTES
Subjective:   Patient ID: Floresita Martin is a 59 y.o. female     Chief Complaint:Establish Care      Patient with GERD which is currently stable on omeprazole.  Patient with hyperlipidemia which is currently stable on statin.  Patient with chronic nicotine dependence which is currently uncontrolled.      Review of Systems   Constitutional: Negative for activity change and unexpected weight change.   HENT: Positive for hearing loss. Negative for rhinorrhea and trouble swallowing.    Eyes: Negative for discharge and visual disturbance.   Respiratory: Negative for chest tightness and wheezing.    Cardiovascular: Negative for chest pain and palpitations.   Gastrointestinal: Negative for blood in stool, constipation, diarrhea and vomiting.   Endocrine: Negative for polydipsia and polyuria.   Genitourinary: Negative for difficulty urinating, dysuria, hematuria and menstrual problem.   Musculoskeletal: Positive for neck pain. Negative for arthralgias and joint swelling.   Neurological: Negative for weakness and headaches.   Psychiatric/Behavioral: Negative for confusion and dysphoric mood.     Past Medical History:   Diagnosis Date    Alcohol abuse     Headache     Hearing loss      Objective:     Vitals:    02/15/19 1330   BP: 129/86   Pulse: 107   Temp: 98.2 °F (36.8 °C)     Body mass index is 22.91 kg/m².  Physical Exam   Neck: Neck supple. No tracheal deviation present.   Cardiovascular: Normal rate, regular rhythm and normal heart sounds.   Pulmonary/Chest: Effort normal. No respiratory distress.   Musculoskeletal: Normal range of motion. She exhibits no edema.     Assessment:     1. Gastroesophageal reflux disease without esophagitis    2. Other hyperlipidemia    3. Cigarette nicotine dependence without complication      Plan:   Gastroesophageal reflux disease without esophagitis  -     omeprazole (PRILOSEC) 40 MG capsule; Take 1 capsule (40 mg total) by mouth once daily.  Dispense: 90 capsule; Refill:  3  Consult patient on importance of taking this medication for 3 months daily.  Patient also notified follow-up if after discontinuing medication for 3 months her symptoms recur or if her symptoms recur while being on the medication.    Other hyperlipidemia  Stable on statin.  Reviewed blood work from October 2018 shows an LDL cholesterol of 92 which is well controlled.    Cigarette nicotine dependence without complication  Constipation length the treatment options for her nicotine dependence.  Patient has been on changes in the past but was unable to quit.  Patient understands the risks of smoking including cardiovascular disease, cancer, and stroke.  Patient is currently vaping with some nicotine.  Counseled patient that we do not know the complications of vaping and may not be better than smoking cigarettes.      Time spent with patient: 30 minutes and over half of that time was spent on counseling an coordination of care.    Toni Hughes MD  02/15/2019    Portions of this note have been dictated with NARGIS Reyes

## 2019-02-28 DIAGNOSIS — J30.9 ALLERGIC RHINITIS: ICD-10-CM

## 2019-02-28 RX ORDER — LORATADINE 10 MG/1
TABLET ORAL
Qty: 90 TABLET | Refills: 0 | Status: SHIPPED | OUTPATIENT
Start: 2019-02-28 | End: 2019-05-25 | Stop reason: SDUPTHER

## 2019-05-25 DIAGNOSIS — J30.9 ALLERGIC RHINITIS: ICD-10-CM

## 2019-05-27 RX ORDER — LORATADINE 10 MG/1
TABLET ORAL
Qty: 90 TABLET | Refills: 0 | Status: SHIPPED | OUTPATIENT
Start: 2019-05-27 | End: 2019-08-21 | Stop reason: SDUPTHER

## 2019-06-03 DIAGNOSIS — J30.9 ALLERGIC RHINITIS: ICD-10-CM

## 2019-06-03 RX ORDER — FLUTICASONE PROPIONATE 50 MCG
SPRAY, SUSPENSION (ML) NASAL
Qty: 16 ML | Refills: 0 | Status: SHIPPED | OUTPATIENT
Start: 2019-06-03 | End: 2019-06-30 | Stop reason: SDUPTHER

## 2019-06-25 RX ORDER — ATORVASTATIN CALCIUM 40 MG/1
TABLET, FILM COATED ORAL
Qty: 30 TABLET | Refills: 5 | Status: SHIPPED | OUTPATIENT
Start: 2019-06-25 | End: 2019-12-10 | Stop reason: SDUPTHER

## 2019-06-30 DIAGNOSIS — J30.9 ALLERGIC RHINITIS: ICD-10-CM

## 2019-07-01 RX ORDER — FLUTICASONE PROPIONATE 50 MCG
SPRAY, SUSPENSION (ML) NASAL
Qty: 16 ML | Refills: 0 | Status: SHIPPED | OUTPATIENT
Start: 2019-07-01 | End: 2019-08-01 | Stop reason: SDUPTHER

## 2019-08-01 DIAGNOSIS — J30.9 ALLERGIC RHINITIS: ICD-10-CM

## 2019-08-01 RX ORDER — FLUTICASONE PROPIONATE 50 MCG
SPRAY, SUSPENSION (ML) NASAL
Qty: 16 ML | Refills: 0 | Status: SHIPPED | OUTPATIENT
Start: 2019-08-01 | End: 2019-09-02 | Stop reason: SDUPTHER

## 2019-08-07 ENCOUNTER — DOCUMENTATION ONLY (OUTPATIENT)
Dept: FAMILY MEDICINE | Facility: CLINIC | Age: 59
End: 2019-08-07

## 2019-08-07 NOTE — PROGRESS NOTES
Pre-Visit Chart Review  For Appointment Scheduled on 8/12/19    Health Maintenance Due   Topic Date Due    TETANUS VACCINE  [birth year]27[birth year]1980

## 2019-08-12 ENCOUNTER — OFFICE VISIT (OUTPATIENT)
Dept: FAMILY MEDICINE | Facility: CLINIC | Age: 59
End: 2019-08-12
Payer: MEDICAID

## 2019-08-12 VITALS
WEIGHT: 119.25 LBS | TEMPERATURE: 98 F | SYSTOLIC BLOOD PRESSURE: 112 MMHG | HEIGHT: 61 IN | OXYGEN SATURATION: 96 % | DIASTOLIC BLOOD PRESSURE: 82 MMHG | BODY MASS INDEX: 22.51 KG/M2 | HEART RATE: 79 BPM

## 2019-08-12 DIAGNOSIS — E78.49 OTHER HYPERLIPIDEMIA: ICD-10-CM

## 2019-08-12 DIAGNOSIS — K21.9 GASTROESOPHAGEAL REFLUX DISEASE WITHOUT ESOPHAGITIS: ICD-10-CM

## 2019-08-12 DIAGNOSIS — Z01.419 WOMEN'S ANNUAL ROUTINE GYNECOLOGICAL EXAMINATION: Primary | ICD-10-CM

## 2019-08-12 PROCEDURE — 99999 PR PBB SHADOW E&M-EST. PATIENT-LVL IV: CPT | Mod: PBBFAC,,, | Performed by: FAMILY MEDICINE

## 2019-08-12 PROCEDURE — 99214 OFFICE O/P EST MOD 30 MIN: CPT | Mod: PBBFAC,PO | Performed by: FAMILY MEDICINE

## 2019-08-12 PROCEDURE — 99214 OFFICE O/P EST MOD 30 MIN: CPT | Mod: S$PBB,,, | Performed by: FAMILY MEDICINE

## 2019-08-12 PROCEDURE — 90750 HZV VACC RECOMBINANT IM: CPT | Mod: PBBFAC,PO

## 2019-08-12 PROCEDURE — 99214 PR OFFICE/OUTPT VISIT, EST, LEVL IV, 30-39 MIN: ICD-10-PCS | Mod: S$PBB,,, | Performed by: FAMILY MEDICINE

## 2019-08-12 PROCEDURE — 99999 PR PBB SHADOW E&M-EST. PATIENT-LVL IV: ICD-10-PCS | Mod: PBBFAC,,, | Performed by: FAMILY MEDICINE

## 2019-08-12 RX ORDER — UBIDECARENONE 30 MG
30 CAPSULE ORAL DAILY
COMMUNITY

## 2019-08-12 RX ORDER — LUBIPROSTONE 8 UG/1
CAPSULE, GELATIN COATED ORAL
Refills: 0 | COMMUNITY
Start: 2019-07-23 | End: 2020-02-05

## 2019-08-14 NOTE — PROGRESS NOTES
Subjective:   Patient ID: Floresita Martin is a 59 y.o. female     Chief Complaint:Follow-up (6 months)      Patient hyperlipidemia currently stable statin.  Patient occurred currently stable on PPI.  Patient with irritable bowel syndrome currently stable on Amitiza.    Review of Systems   Constitutional: Negative for activity change.   HENT: Positive for hearing loss. Negative for rhinorrhea and trouble swallowing.    Eyes: Negative for discharge and visual disturbance.   Respiratory: Negative for chest tightness and wheezing.    Cardiovascular: Negative for chest pain and palpitations.   Gastrointestinal: Negative for blood in stool, constipation, diarrhea and vomiting.   Endocrine: Negative for polydipsia and polyuria.   Genitourinary: Negative for difficulty urinating, dysuria, hematuria and menstrual problem.   Musculoskeletal: Positive for neck pain. Negative for arthralgias and joint swelling.   Neurological: Positive for weakness. Negative for headaches.   Psychiatric/Behavioral: Negative for confusion and dysphoric mood.     Past Medical History:   Diagnosis Date    Alcohol abuse     Headache     Hearing loss      Objective:     Vitals:    08/12/19 0751   BP: 112/82   Pulse: 79   Temp: 97.7 °F (36.5 °C)     Body mass index is 22.54 kg/m².  Physical Exam   Neck: Neck supple. No tracheal deviation present.   Cardiovascular: Normal rate, regular rhythm and normal heart sounds.   Pulmonary/Chest: Effort normal. No respiratory distress.   Musculoskeletal: Normal range of motion. She exhibits no edema.     Assessment:     1. Women's annual routine gynecological examination    2. Other hyperlipidemia    3. Gastroesophageal reflux disease without esophagitis      Plan:   Women's annual routine gynecological examination  -     Ambulatory referral to Obstetrics / Gynecology  -     Mammo Digital Screening Bilateral With CAD; Future; Expected date: 11/12/2019    Other hyperlipidemia  -     Lipid panel; Future;  Expected date: 11/12/2019  Reviewed blood work from October 2018 shows an LDL cholesterol 92 which is well controlled.  Will continue monitor this annually.    Gastroesophageal reflux disease without esophagitis  -     Comprehensive metabolic panel; Future; Expected date: 11/12/2019  Patient with GERD which currently stable on chronic PPI.  Reviewed blood work from October 2018 does not show any evidence of kidney dysfunction.  Will continue patient on PPI monitor regularly for any evidence of this.      Other orders  -     (In Office Administered) Zoster Recombinant Vaccine        Time spent with patient: 15 minutes and over half of that time was spent on counseling an coordination of care.    Toni Hughes MD  08/14/2019    Portions of this note have been dictated with NARGIS Melchor.

## 2019-08-21 DIAGNOSIS — J30.9 ALLERGIC RHINITIS: ICD-10-CM

## 2019-08-21 RX ORDER — LORATADINE 10 MG/1
TABLET ORAL
Qty: 90 TABLET | Refills: 0 | Status: SHIPPED | OUTPATIENT
Start: 2019-08-21 | End: 2019-11-13 | Stop reason: SDUPTHER

## 2019-09-02 DIAGNOSIS — J30.9 ALLERGIC RHINITIS: ICD-10-CM

## 2019-09-03 RX ORDER — FLUTICASONE PROPIONATE 50 MCG
SPRAY, SUSPENSION (ML) NASAL
Qty: 16 ML | Refills: 5 | Status: SHIPPED | OUTPATIENT
Start: 2019-09-03 | End: 2020-02-24

## 2019-09-09 ENCOUNTER — TELEPHONE (OUTPATIENT)
Dept: FAMILY MEDICINE | Facility: CLINIC | Age: 59
End: 2019-09-09

## 2019-09-09 NOTE — TELEPHONE ENCOUNTER
----- Message from Joseluis Brown sent at 9/9/2019 12:26 PM CDT -----  Patient does not know if she should be fasting or not. Please message patient to advise.

## 2019-11-12 ENCOUNTER — HOSPITAL ENCOUNTER (OUTPATIENT)
Dept: RADIOLOGY | Facility: CLINIC | Age: 59
Discharge: HOME | End: 2019-11-12
Attending: FAMILY MEDICINE
Payer: MEDICAID

## 2019-11-12 ENCOUNTER — CLINICAL SUPPORT (OUTPATIENT)
Dept: INTERNAL MEDICINE | Facility: CLINIC | Age: 59
End: 2019-11-12
Payer: MEDICAID

## 2019-11-12 DIAGNOSIS — Z12.31 ENCOUNTER FOR SCREENING MAMMOGRAM FOR MALIGNANT NEOPLASM OF BREAST: ICD-10-CM

## 2019-11-12 DIAGNOSIS — Z01.419 WOMEN'S ANNUAL ROUTINE GYNECOLOGICAL EXAMINATION: ICD-10-CM

## 2019-11-12 PROCEDURE — 77063 MAMMO DIGITAL SCREENING BILAT WITH TOMOSYNTHESIS_CAD: ICD-10-PCS | Mod: 26,,, | Performed by: RADIOLOGY

## 2019-11-12 PROCEDURE — 90471 IMMUNIZATION ADMIN: CPT | Mod: PBBFAC,PO

## 2019-11-12 PROCEDURE — 77067 SCR MAMMO BI INCL CAD: CPT | Mod: TC,PO

## 2019-11-12 PROCEDURE — 77067 SCR MAMMO BI INCL CAD: CPT | Mod: 26,,, | Performed by: RADIOLOGY

## 2019-11-12 PROCEDURE — 77067 MAMMO DIGITAL SCREENING BILAT WITH TOMOSYNTHESIS_CAD: ICD-10-PCS | Mod: 26,,, | Performed by: RADIOLOGY

## 2019-11-12 PROCEDURE — 77063 BREAST TOMOSYNTHESIS BI: CPT | Mod: 26,,, | Performed by: RADIOLOGY

## 2019-11-12 NOTE — PROGRESS NOTES
Two person identification name, d.o.b with verbal feedback.  Aseptic technique used. Administration influenza Quad  vaccine on L deltoid.  Tolerated well.  VIS 8/7/15  given/mp

## 2019-11-13 DIAGNOSIS — J30.9 ALLERGIC RHINITIS: ICD-10-CM

## 2019-11-13 RX ORDER — LORATADINE 10 MG/1
TABLET ORAL
Qty: 90 TABLET | Refills: 3 | Status: SHIPPED | OUTPATIENT
Start: 2019-11-13 | End: 2020-10-29

## 2019-12-10 RX ORDER — ATORVASTATIN CALCIUM 40 MG/1
TABLET, FILM COATED ORAL
Qty: 30 TABLET | Refills: 11 | Status: SHIPPED | OUTPATIENT
Start: 2019-12-10 | End: 2021-02-24 | Stop reason: SDUPTHER

## 2019-12-27 ENCOUNTER — HOSPITAL ENCOUNTER (EMERGENCY)
Facility: HOSPITAL | Age: 59
Discharge: HOME | End: 2019-12-27
Attending: EMERGENCY MEDICINE
Payer: MEDICAID

## 2019-12-27 VITALS
DIASTOLIC BLOOD PRESSURE: 80 MMHG | BODY MASS INDEX: 21.71 KG/M2 | OXYGEN SATURATION: 100 % | RESPIRATION RATE: 16 BRPM | SYSTOLIC BLOOD PRESSURE: 131 MMHG | HEART RATE: 95 BPM | HEIGHT: 61 IN | TEMPERATURE: 98 F | WEIGHT: 115 LBS

## 2019-12-27 DIAGNOSIS — M79.674 PAIN OF TOE OF RIGHT FOOT: ICD-10-CM

## 2019-12-27 DIAGNOSIS — L03.031 CELLULITIS OF TOE OF RIGHT FOOT: Primary | ICD-10-CM

## 2019-12-27 PROCEDURE — 99283 EMERGENCY DEPT VISIT LOW MDM: CPT

## 2019-12-27 RX ORDER — SULFAMETHOXAZOLE AND TRIMETHOPRIM 800; 160 MG/1; MG/1
1 TABLET ORAL 2 TIMES DAILY
Qty: 14 TABLET | Refills: 0 | Status: SHIPPED | OUTPATIENT
Start: 2019-12-27 | End: 2020-01-03

## 2019-12-27 NOTE — ED PROVIDER NOTES
"Encounter Date: 2019    SCRIBE #1 NOTE: Leny DO, maribeth scribing for, and in the presence of, CLAU Banda.       History     Chief Complaint   Patient presents with    Toe Pain     "popped a blister and doesn't seem to be healing", right 2nd digit       Time seen by provider: 12:01 PM on 2019    Floresita Martin is a 59 y.o. female who presents to the ED with complaints of nonhealing wound to the right 2nd toe that occurred a few days ago. Patient endorses "popping a blister" on her toe and has applied neosporin and bandages to the toe without improvements. Patient denies drainage from the blister. She has no other medical concerns or complaints at this moment. She denies onset of any other new symptoms currently. PMHx includes alcohol abuse. SHx includes  and stapedectomy. Tylenol-codeine allergy noted.     The history is provided by the patient.     Review of patient's allergies indicates:   Allergen Reactions    Tylenol-codeine [acetaminophen-codeine] Nausea And Vomiting     Past Medical History:   Diagnosis Date    Alcohol abuse     Headache     Hearing loss      Past Surgical History:   Procedure Laterality Date     SECTION      2    COLONOSCOPY N/A 11/15/2017    Procedure: COLONOSCOPY;  Surgeon: Peg Powers MD;  Location: [facility];  Service: Endoscopy;  Laterality: N/A;    NECK SURGERY  2018    STAPEDECTOMY Right 2015     Family History   Problem Relation Age of Onset    Diabetes Mother     Hypertension Father     Breast cancer Sister 47     Social History     Tobacco Use    Smoking status: Current Every Day Smoker     Packs/day: 1.50     Years: 30.00     Pack years: 45.00     Types: Cigarettes    Smokeless tobacco: Never Used   Substance Use Topics    Alcohol use: Yes     Comment: social     Drug use: No     Review of Systems   Constitutional: Negative for chills, diaphoresis and fever.   HENT: Negative for sore throat.  "   Respiratory: Negative for shortness of breath.    Cardiovascular: Negative for chest pain.   Gastrointestinal: Negative for nausea.   Genitourinary: Negative for dysuria.   Musculoskeletal: Negative for back pain and myalgias.   Skin: Positive for color change and wound (right 2nd toe). Negative for pallor and rash.   Neurological: Negative for weakness and numbness.   Hematological: Does not bruise/bleed easily.   Psychiatric/Behavioral: The patient is not nervous/anxious.    All other systems reviewed and are negative.      Physical Exam     Initial Vitals [12/27/19 1131]   BP Pulse Resp Temp SpO2   131/80 95 16 98.1 °F (36.7 °C) 100 %      MAP       --         Physical Exam    Nursing note and vitals reviewed.  Constitutional: She appears well-developed and well-nourished. She is cooperative.  Non-toxic appearance.   HENT:   Head: Normocephalic and atraumatic.   Nose: Nose normal.   Mouth/Throat: Uvula is midline, oropharynx is clear and moist and mucous membranes are normal.   Eyes: Conjunctivae and EOM are normal. Pupils are equal, round, and reactive to light.   Cardiovascular: Normal rate, regular rhythm, S1 normal, S2 normal, normal heart sounds, intact distal pulses and normal pulses. Exam reveals no gallop and no friction rub.    No murmur heard.  Pulmonary/Chest: Breath sounds normal. No respiratory distress. She has no wheezes. She has no rhonchi. She has no rales. She exhibits no tenderness.   Abdominal: Normal appearance.   Musculoskeletal: Normal range of motion. She exhibits no edema or tenderness.   Neurological: She is alert and oriented to person, place, and time. She has normal strength. GCS score is 15. GCS eye subscore is 4. GCS verbal subscore is 5. GCS motor subscore is 6.   Skin: Skin is warm. Capillary refill takes less than 2 seconds. There is erythema.   Reddened blister to the tip of right toe with surrounding cellulitis.    Psychiatric: She has a normal mood and affect. Thought  content normal.       ED Course   Procedures  Labs Reviewed - No data to display        Medical Decision Making:   History:   Old Medical Records: I decided to obtain old medical records.  Initial Assessment:   59 y.o. female who presents to the ED with complaints of nonhealing wound to the right 2nd toe that occurred a few days ago.  Differential Diagnosis:   Toe cellulitis, abscess, blister  ED Management:  Pt examined and noted to have a blister to the tip of her right 2nd toe with surrounding cellulitis. Pt denies any fevers. She has been advised to wear better fitting shoes and I believe the cause of the blister was poor fitting shoes. Pt discharged with bactrim and advised to return tomorrow if there is not any improvement. Patient given strict return precautions and voiced understanding of all discharge instructions. Pt was stable at discharge.                 Scribe Attestation:   Scribe #1: I performed the above scribed service and the documentation accurately describes the services I performed. I attest to the accuracy of the note.      Patient given strict return precautions and voiced understanding of all discharge instructions. Pt was stable at discharge.           ED Course as of Dec 27 1241   Fri Dec 27, 2019   1149 BP: 131/80 [AT]   1149 Temp: 98.1 °F (36.7 °C) [AT]   1149 Temp src: Oral [AT]   1150 Pulse: 95 [AT]   1150 Resp: 16 [AT]   1150 SpO2: 100 % [AT]      ED Course User Index  [AT] MOI Banda          Clinical Impression:       ICD-10-CM ICD-9-CM   1. Cellulitis of toe of right foot L03.031 681.10   2. Pain of toe of right foot M79.674 729.5         Disposition:   Disposition: Discharged  Condition: Stable                     MOI Banda  12/27/19 7335

## 2019-12-30 ENCOUNTER — TELEPHONE (OUTPATIENT)
Dept: FAMILY MEDICINE | Facility: CLINIC | Age: 59
End: 2019-12-30

## 2019-12-30 NOTE — TELEPHONE ENCOUNTER
----- Message from Saige Rice sent at 12/30/2019  8:25 AM CST -----  Type:  Sooner Appointment Request    Caller is requesting a sooner appointment.  Caller declined first available appointment listed below.  Caller will not accept being placed on the waitlist and is requesting a message be sent to doctor.    Name of Caller:  patient  When is the first available appointment?  01 07 19 with nurse practitioner  Symptoms:  Hospital followup appt from [facility] on 12 27 19 for cellulitis on rt toe next to big toe  Best Call Back Number: [phone]  Additional Information:  Patient is needing appt Thursday or Friday of this week but I do not show any appts with anyone/please advise

## 2020-01-07 ENCOUNTER — CLINICAL SUPPORT (OUTPATIENT)
Dept: URGENT CARE | Facility: CLINIC | Age: 60
End: 2020-01-07
Payer: MEDICAID

## 2020-01-07 VITALS
BODY MASS INDEX: 21.9 KG/M2 | WEIGHT: 116 LBS | OXYGEN SATURATION: 97 % | HEIGHT: 61 IN | TEMPERATURE: 98 F | RESPIRATION RATE: 18 BRPM | SYSTOLIC BLOOD PRESSURE: 137 MMHG | DIASTOLIC BLOOD PRESSURE: 87 MMHG | HEART RATE: 91 BPM

## 2020-01-07 DIAGNOSIS — S90.424A BLISTER OF TOE OF RIGHT FOOT, INITIAL ENCOUNTER: ICD-10-CM

## 2020-01-07 DIAGNOSIS — R09.89 DECREASED CIRCULATION IN FINGERS OR TOES: Primary | ICD-10-CM

## 2020-01-07 PROCEDURE — 99204 OFFICE O/P NEW MOD 45 MIN: CPT | Mod: S$GLB,,, | Performed by: NURSE PRACTITIONER

## 2020-01-07 PROCEDURE — 99204 PR OFFICE/OUTPT VISIT, NEW, LEVL IV, 45-59 MIN: ICD-10-PCS | Mod: S$GLB,,, | Performed by: NURSE PRACTITIONER

## 2020-01-07 RX ORDER — MUPIROCIN 20 MG/G
OINTMENT TOPICAL
Qty: 22 G | Refills: 1 | Status: SHIPPED | OUTPATIENT
Start: 2020-01-07 | End: 2020-02-05

## 2020-01-07 NOTE — PROGRESS NOTES
"Subjective:       Patient ID: Floresita Martin is a 59 y.o. female.    Vitals:  height is 5' 1" (1.549 m) and weight is 52.6 kg (116 lb). Her oral temperature is 98.1 °F (36.7 °C). Her blood pressure is 137/87 and her pulse is 91. Her respiration is 18 and oxygen saturation is 97%.     Chief Complaint: Recurrent Skin Infections    Was seen at [facility] on 12/27/19 for cellulitis of Rt 2nd toe; here for follow up (wound check)    Also complains of feet turning purplish red. Tested for reynauds a few years ago and told was negative. Wants to see a podiatrist      Constitution: Negative for appetite change, chills and fever.   HENT: Negative for ear pain, congestion and sore throat.    Neck: Negative for painful lymph nodes.   Eyes: Negative for eye discharge and eye redness.   Respiratory: Negative for cough.    Gastrointestinal: Negative for vomiting and diarrhea.   Genitourinary: Negative for dysuria.   Musculoskeletal: Negative for muscle ache.   Skin: Positive for erythema. Negative for rash.   Neurological: Negative for headaches and seizures.   Hematologic/Lymphatic: Negative for swollen lymph nodes.       Objective:      Physical Exam   Constitutional: She is oriented to person, place, and time. She appears well-developed and well-nourished.   HENT:   Head: Normocephalic and atraumatic. Head is without abrasion, without contusion and without laceration.   Right Ear: External ear normal.   Left Ear: External ear normal.   Nose: Nose normal.   Mouth/Throat: Oropharynx is clear and moist and mucous membranes are normal.   Eyes: Pupils are equal, round, and reactive to light. Conjunctivae, EOM and lids are normal.   Neck: Trachea normal, full passive range of motion without pain and phonation normal. Neck supple.   Cardiovascular: Normal rate, regular rhythm and normal heart sounds.   Pulmonary/Chest: Effort normal and breath sounds normal. No stridor. No respiratory distress.   Musculoskeletal: Normal range of " motion.        Left foot: There is tenderness.   Purplish discoloration to distal half of left foot, cap refill <2sec   Neurological: She is alert and oriented to person, place, and time.   Skin: Skin is warm, dry, intact and no rash. Capillary refill takes less than 2 seconds. Lesions:  erythema and lesion (open well healing blister to plantar aspect of tip of right second toe, no erythema)abrasion, burn, bruising and ecchymosis  Psychiatric: She has a normal mood and affect. Her speech is normal and behavior is normal. Judgment and thought content normal. Cognition and memory are normal.   Nursing note and vitals reviewed.        Assessment:       1. Decreased circulation in fingers or toes    2. Blister of toe of right foot, initial encounter        Plan:         Decreased circulation in fingers or toes  -     Ambulatory referral to Podiatry    Blister of toe of right foot, initial encounter    Other orders  -     mupirocin (BACTROBAN) 2 % ointment; Apply to affected area 3 times daily  Dispense: 22 g; Refill: 1

## 2020-01-10 ENCOUNTER — TELEPHONE (OUTPATIENT)
Dept: FAMILY MEDICINE | Facility: CLINIC | Age: 60
End: 2020-01-10

## 2020-01-10 NOTE — TELEPHONE ENCOUNTER
Spoke to ramila from Dr. Gillis office. Pt scheduled for spine surgery on 02/11/2020. Pt having pre-op at hospital on 01/14/2019, per Dr. Hughes a 20 minute appointment is okay. Pt scheduled on 02/05/2020 with Dr. Hughes. Ramila stated she would fax over the pre-op information from 01/14/2020

## 2020-01-10 NOTE — TELEPHONE ENCOUNTER
----- Message from Surinder Chance sent at 1/10/2020  9:55 AM CST -----  Contact: lorrie with Dr. Phoenix Mcgrath with Dr. Garibay's office called and needs a medical clearance.    Lorrie can be reached at [phone]

## 2020-02-04 ENCOUNTER — DOCUMENTATION ONLY (OUTPATIENT)
Dept: FAMILY MEDICINE | Facility: CLINIC | Age: 60
End: 2020-02-04

## 2020-02-04 NOTE — PROGRESS NOTES
Pre-Visit Chart Review  For Appointment Scheduled on 2/5/2020.       Health Maintenance Due   Topic Date Due    TETANUS VACCINE  01/27/1978    LDCT Lung Screen  10/31/2019

## 2020-02-05 ENCOUNTER — OFFICE VISIT (OUTPATIENT)
Dept: FAMILY MEDICINE | Facility: CLINIC | Age: 60
End: 2020-02-05
Payer: MEDICAID

## 2020-02-05 VITALS
WEIGHT: 117.5 LBS | BODY MASS INDEX: 22.19 KG/M2 | HEIGHT: 61 IN | DIASTOLIC BLOOD PRESSURE: 64 MMHG | HEART RATE: 66 BPM | TEMPERATURE: 97 F | SYSTOLIC BLOOD PRESSURE: 104 MMHG

## 2020-02-05 DIAGNOSIS — E78.49 OTHER HYPERLIPIDEMIA: ICD-10-CM

## 2020-02-05 DIAGNOSIS — F17.210 CIGARETTE NICOTINE DEPENDENCE WITHOUT COMPLICATION: ICD-10-CM

## 2020-02-05 DIAGNOSIS — Z01.818 PRE-OP EVALUATION: Primary | ICD-10-CM

## 2020-02-05 PROCEDURE — 99999 PR PBB SHADOW E&M-EST. PATIENT-LVL III: ICD-10-PCS | Mod: PBBFAC,,, | Performed by: FAMILY MEDICINE

## 2020-02-05 PROCEDURE — 99214 OFFICE O/P EST MOD 30 MIN: CPT | Mod: S$PBB,,, | Performed by: FAMILY MEDICINE

## 2020-02-05 PROCEDURE — 99214 PR OFFICE/OUTPT VISIT, EST, LEVL IV, 30-39 MIN: ICD-10-PCS | Mod: S$PBB,,, | Performed by: FAMILY MEDICINE

## 2020-02-05 PROCEDURE — 99213 OFFICE O/P EST LOW 20 MIN: CPT | Mod: PBBFAC,PO | Performed by: FAMILY MEDICINE

## 2020-02-05 PROCEDURE — 99999 PR PBB SHADOW E&M-EST. PATIENT-LVL III: CPT | Mod: PBBFAC,,, | Performed by: FAMILY MEDICINE

## 2020-02-05 NOTE — PROGRESS NOTES
Subjective:   Patient ID: Floresita Martin is a 60 y.o. female     Chief Complaint:Pre-op Exam      Patient here for preop evaluation for lumbar fusion.  Patient has had major surgery for the past and denies any issues with anesthesia.  Patient states she is able to walk a block without shortness of breath or chest pain.  Patient states she has quit smoking cigarettes.  Patient is smoking they.  Patient denies any previous issues with cardiac problems.    Review of Systems   Constitutional: Negative for activity change and unexpected weight change.   HENT: Negative for hearing loss, rhinorrhea and trouble swallowing.    Eyes: Negative for discharge and visual disturbance.   Respiratory: Negative for chest tightness and wheezing.    Cardiovascular: Negative for chest pain and palpitations.   Gastrointestinal: Negative for blood in stool, constipation, diarrhea and vomiting.   Endocrine: Negative for polydipsia and polyuria.   Genitourinary: Negative for difficulty urinating, dysuria, hematuria and menstrual problem.   Musculoskeletal: Negative for arthralgias, joint swelling and neck pain.   Neurological: Positive for weakness. Negative for headaches.   Psychiatric/Behavioral: Negative for confusion and dysphoric mood.     Past Medical History:   Diagnosis Date    Alcohol abuse     Headache     Hearing loss      Objective:     Vitals:    02/05/20 1357   BP: 104/64   Pulse: 66   Temp: 97.4 °F (36.3 °C)     Body mass index is 22.2 kg/m².  Physical Exam   Neck: Neck supple. No tracheal deviation present.   Cardiovascular: Normal rate, regular rhythm and normal heart sounds.   Pulmonary/Chest: Effort normal. No respiratory distress.   Musculoskeletal: Normal range of motion. She exhibits no edema.     Assessment:     1. Pre-op evaluation    2. Other hyperlipidemia    3. Cigarette nicotine dependence without complication      Plan:   Pre-op evaluation  Reviewed patient's blood work, EKG, chest x-ray and does not  indicate any reason to delay surgery further.  Patient is moderate risk for high-risk surgery.    Other hyperlipidemia  Currently stable statin.    Nicotine dependence  Patient states she has quit smoking cigarettes in favor of a being.  Counseled patient this is not necessarily a better option.  It is good that she has quit smoking cigarettes but she also needs to reducing quit smoking vapoe.  Patient understands risks associated with this.    Counseled the patient in length on the risks of chronic tobacco and nicotine abuse.  Counseled him on the risks including heart disease, lung disease, brain disease, and cancer.  Patient understands these risks.  Also  the patient on assistance that we can provide with helping him to reduce the amount of tobacco he abuses.  Discussed prescription medications including both Chantix and Wellbutrin as well as the side effects associated with these medications.  Also discussed nicotine replacement therapy such as the gum and patch.  Also discussed counseling which we can provide here in this clinic to assist him with smoking cessation. Discussion lasted for 3-10mins.  Patient will reduce smoking.      Time spent with patient: 15 minutes and over half of that time was spent on counseling an coordination of care.    Toni Hughes MD  02/05/2020    Portions of this note have been dictated with NARGIS Reyes

## 2020-02-23 DIAGNOSIS — J30.9 ALLERGIC RHINITIS: ICD-10-CM

## 2020-02-24 RX ORDER — FLUTICASONE PROPIONATE 50 MCG
SPRAY, SUSPENSION (ML) NASAL
Qty: 16 G | Refills: 0 | Status: SHIPPED | OUTPATIENT
Start: 2020-02-24 | End: 2020-03-27

## 2020-03-07 DIAGNOSIS — K21.9 GASTROESOPHAGEAL REFLUX DISEASE WITHOUT ESOPHAGITIS: ICD-10-CM

## 2020-03-09 ENCOUNTER — TELEPHONE (OUTPATIENT)
Dept: FAMILY MEDICINE | Facility: CLINIC | Age: 60
End: 2020-03-09

## 2020-03-09 RX ORDER — OMEPRAZOLE 40 MG/1
CAPSULE, DELAYED RELEASE ORAL
Qty: 90 CAPSULE | Refills: 3 | Status: SHIPPED | OUTPATIENT
Start: 2020-03-09 | End: 2021-02-24

## 2020-03-09 NOTE — TELEPHONE ENCOUNTER
----- Message from Melissa Barrera sent at 3/9/2020 11:47 AM CDT -----  Type:  RX Refill Request    Who Called:  Pat with [facility]  Refill or New Rx:  refill  RX Name and Strength:  omeprazole (PRILOSEC) 40 MG capsule  How is the patient currently taking it? (ex. 1XDay):  1 x day  Is this a 30 day or 90 day RX:  90  Preferred Pharmacy with phone number:    [id number] - [place] - [address] AT [facility] [address]  [address] [address]  Phone: [phone] Fax: [phone]  Local or Mail Order:  local  Ordering Provider:  Toni Hughes  Best Call Back Number:  [phone]  Additional Information:

## 2020-03-27 DIAGNOSIS — J30.9 ALLERGIC RHINITIS: ICD-10-CM

## 2020-03-27 RX ORDER — FLUTICASONE PROPIONATE 50 MCG
SPRAY, SUSPENSION (ML) NASAL
Qty: 16 G | Refills: 0 | Status: SHIPPED | OUTPATIENT
Start: 2020-03-27 | End: 2020-04-28

## 2020-04-28 DIAGNOSIS — J30.9 ALLERGIC RHINITIS: ICD-10-CM

## 2020-04-28 RX ORDER — FLUTICASONE PROPIONATE 50 MCG
SPRAY, SUSPENSION (ML) NASAL
Qty: 16 G | Refills: 0 | Status: SHIPPED | OUTPATIENT
Start: 2020-04-28 | End: 2020-05-25

## 2020-05-23 DIAGNOSIS — J30.9 ALLERGIC RHINITIS: ICD-10-CM

## 2020-05-25 RX ORDER — FLUTICASONE PROPIONATE 50 MCG
SPRAY, SUSPENSION (ML) NASAL
Qty: 16 G | Refills: 0 | Status: SHIPPED | OUTPATIENT
Start: 2020-05-25 | End: 2020-06-23

## 2020-10-02 ENCOUNTER — OFFICE VISIT (OUTPATIENT)
Dept: FAMILY MEDICINE | Facility: CLINIC | Age: 60
End: 2020-10-02
Payer: MEDICAID

## 2020-10-02 VITALS
TEMPERATURE: 97 F | BODY MASS INDEX: 21.94 KG/M2 | HEART RATE: 53 BPM | WEIGHT: 116.19 LBS | HEIGHT: 61 IN | OXYGEN SATURATION: 95 % | SYSTOLIC BLOOD PRESSURE: 118 MMHG | DIASTOLIC BLOOD PRESSURE: 66 MMHG

## 2020-10-02 DIAGNOSIS — E78.49 OTHER HYPERLIPIDEMIA: ICD-10-CM

## 2020-10-02 DIAGNOSIS — J32.9 SINUSITIS, UNSPECIFIED CHRONICITY, UNSPECIFIED LOCATION: Primary | ICD-10-CM

## 2020-10-02 DIAGNOSIS — I25.10 ATHEROSCLEROSIS OF NATIVE CORONARY ARTERY OF NATIVE HEART WITHOUT ANGINA PECTORIS: ICD-10-CM

## 2020-10-02 PROCEDURE — 99213 OFFICE O/P EST LOW 20 MIN: CPT | Mod: PBBFAC,PO | Performed by: FAMILY MEDICINE

## 2020-10-02 PROCEDURE — 99999 PR PBB SHADOW E&M-EST. PATIENT-LVL III: ICD-10-PCS | Mod: PBBFAC,,, | Performed by: FAMILY MEDICINE

## 2020-10-02 PROCEDURE — 99999 PR PBB SHADOW E&M-EST. PATIENT-LVL III: CPT | Mod: PBBFAC,,, | Performed by: FAMILY MEDICINE

## 2020-10-02 PROCEDURE — 99214 PR OFFICE/OUTPT VISIT, EST, LEVL IV, 30-39 MIN: ICD-10-PCS | Mod: S$PBB,,, | Performed by: FAMILY MEDICINE

## 2020-10-02 PROCEDURE — 99214 OFFICE O/P EST MOD 30 MIN: CPT | Mod: S$PBB,,, | Performed by: FAMILY MEDICINE

## 2020-10-02 RX ORDER — PREGABALIN 50 MG/1
CAPSULE ORAL
COMMUNITY
Start: 2020-09-14 | End: 2021-02-26

## 2020-10-02 RX ORDER — OXYCODONE HYDROCHLORIDE 20 MG/1
TABLET ORAL
COMMUNITY
Start: 2020-09-16 | End: 2022-01-05 | Stop reason: SDUPTHER

## 2020-10-02 RX ORDER — AMOXICILLIN AND CLAVULANATE POTASSIUM 875; 125 MG/1; MG/1
1 TABLET, FILM COATED ORAL EVERY 12 HOURS
Qty: 14 TABLET | Refills: 0 | Status: SHIPPED | OUTPATIENT
Start: 2020-10-02 | End: 2020-10-09

## 2020-10-02 RX ORDER — LUBIPROSTONE 8 UG/1
CAPSULE, GELATIN COATED ORAL
COMMUNITY
Start: 2020-09-22 | End: 2021-02-26

## 2020-10-02 RX ORDER — DIAZEPAM 5 MG/1
TABLET ORAL
COMMUNITY
Start: 2020-09-15 | End: 2022-01-05

## 2020-10-07 ENCOUNTER — TELEPHONE (OUTPATIENT)
Dept: CARDIOLOGY | Facility: HOSPITAL | Age: 60
End: 2020-10-07

## 2020-10-07 ENCOUNTER — TELEPHONE (OUTPATIENT)
Dept: FAMILY MEDICINE | Facility: CLINIC | Age: 60
End: 2020-10-07

## 2020-10-07 NOTE — TELEPHONE ENCOUNTER
Malissa Hughes Toni Staff             Good Morning,   I could not start the authorization for the requested test. I will need the completed office notes to submit to insurance to complete the process. Can someone let me know when they are completed so I can send them?     Thanks,   Malissa GRAYSON

## 2020-10-07 NOTE — TELEPHONE ENCOUNTER
Informed patient that stress test has not been approved and appointment had been cancelled. Patient verbalized understanding. Informed patient that we would send information in to her insurance as soon as we have it in.

## 2020-10-07 NOTE — TELEPHONE ENCOUNTER
----- Message from Melissa Barrera sent at 10/7/2020  2:18 PM CDT -----  Contact: [facility]  Type: Needs Medical Advice  Who Called:  Yessenia with [facility]  Best Call Back Number: [phone]  Additional Information: caller states will have to cancel patient stress test tomorrow, authorization not obtained, case was close with [employer] due to not receiving information to approve. , advise office needs contact patient to advise why canceled and to reschedule

## 2020-10-12 ENCOUNTER — TELEPHONE (OUTPATIENT)
Dept: FAMILY MEDICINE | Facility: CLINIC | Age: 60
End: 2020-10-12

## 2020-10-12 NOTE — TELEPHONE ENCOUNTER
----- Message from Sandie Bradley sent at 10/12/2020  7:59 AM CDT -----  Regarding: advise  Contact: ADALID NAZARIO [[id number]]  Patient is requesting a call back from the nurse checking on prior auth on stress test.   Please call the patient upon request at phone number [phone].

## 2020-10-12 NOTE — TELEPHONE ENCOUNTER
Phone number given to to [facility] so that she may schedule her test.

## 2020-10-12 NOTE — PROGRESS NOTES
Subjective:   Patient ID: Floresita Martin is a 60 y.o. female     Chief Complaint:Otalgia and Cough      Otalgia   This is a new problem. The current episode started in the past 7 days. The problem occurs constantly. The problem has been rapidly worsening. There has been no fever. The pain is at a severity of 8/10. Associated symptoms include coughing, hearing loss, neck pain, rhinorrhea and a sore throat. Pertinent negatives include no abdominal pain, diarrhea, drainage, ear discharge, headaches, rash or vomiting. She has tried nothing for the symptoms. The treatment provided no relief. Her past medical history is significant for hearing loss. There is no history of a chronic ear infection or a tympanostomy tube.     Review of Systems   Constitutional: Negative for chills and fever.   HENT: Positive for ear pain, hearing loss, rhinorrhea and sore throat. Negative for ear discharge and trouble swallowing.    Respiratory: Positive for cough. Negative for shortness of breath.    Cardiovascular: Negative for chest pain and leg swelling.   Gastrointestinal: Negative for abdominal distention, abdominal pain, diarrhea and vomiting.   Genitourinary: Negative for dysuria and flank pain.   Musculoskeletal: Positive for neck pain. Negative for arthralgias and back pain.   Skin: Negative for color change, pallor and rash.   Neurological: Negative for weakness and headaches.   Psychiatric/Behavioral: Negative for agitation and confusion.     Past Medical History:   Diagnosis Date    Alcohol abuse     Headache     Hearing loss      Past Surgical History:   Procedure Laterality Date     SECTION      2    COLONOSCOPY N/A 11/15/2017    Procedure: COLONOSCOPY;  Surgeon: Peg Powers MD;  Location: [facility];  Service: Endoscopy;  Laterality: N/A;    NECK SURGERY  2018    STAPEDECTOMY Right 2015     Objective:     Vitals:    10/02/20 1330   BP: 118/66   Pulse: (!) 53   Temp: 96.7 °F (35.9 °C)      Body mass index is 21.95 kg/m².  Physical Exam  Vitals signs and nursing note reviewed.   Constitutional:       Appearance: She is well-developed.   HENT:      Head: Normocephalic and atraumatic.   Eyes:      General: No scleral icterus.     Conjunctiva/sclera: Conjunctivae normal.   Neck:      Musculoskeletal: Normal range of motion and neck supple.   Cardiovascular:      Heart sounds: No murmur.   Pulmonary:      Effort: Pulmonary effort is normal. No respiratory distress.   Musculoskeletal: Normal range of motion.         General: No deformity.   Skin:     Coloration: Skin is not pale.      Findings: No rash.   Neurological:      Mental Status: She is alert and oriented to person, place, and time.   Psychiatric:         Behavior: Behavior normal.         Thought Content: Thought content normal.         Judgment: Judgment normal.       Assessment:     1. Sinusitis, unspecified chronicity, unspecified location    2. Atherosclerosis of native coronary artery of native heart without angina pectoris    3. Other hyperlipidemia      Plan:   Sinusitis, unspecified chronicity, unspecified location  -     amoxicillin-clavulanate 875-125mg (AUGMENTIN) 875-125 mg per tablet; Take 1 tablet by mouth every 12 (twelve) hours. for 7 days  Dispense: 14 tablet; Refill: 0    Atherosclerosis of native coronary artery of native heart without angina pectoris  -     NM Myocardial Perfusion Spect Multi Exer; Future; Expected date: 10/02/2020  -     Nuclear Stress Test; Future    Other hyperlipidemia  -     Comprehensive metabolic panel; Future; Expected date: 01/02/2021  -     Lipid Panel; Future; Expected date: 10/02/2020            Time spent with patient: 15 minutes and over half of that time was spent on counseling an coordination of care.    Toni Hughes MD  10/12/2020    Portions of this note have been dictated with NARGIS Reyes

## 2020-10-12 NOTE — TELEPHONE ENCOUNTER
----- Message from Shanae Calle sent at 10/12/2020  2:37 PM CDT -----  Type: Needs Medical Advice  Who Called: pt  Symptoms (please be specific):    How long has patient had these symptoms:    Pharmacy name and phone #:    Best Call Back Number:   Additional Information: requesting a callback in regards to after summary visit report

## 2020-10-15 ENCOUNTER — TELEPHONE (OUTPATIENT)
Dept: CARDIOLOGY | Facility: HOSPITAL | Age: 60
End: 2020-10-15

## 2020-10-15 ENCOUNTER — TELEPHONE (OUTPATIENT)
Dept: FAMILY MEDICINE | Facility: CLINIC | Age: 60
End: 2020-10-15

## 2020-10-15 NOTE — TELEPHONE ENCOUNTER
----- Message from Melissa Yen sent at 10/15/2020  8:55 AM CDT -----  Type: Need Stress test rescheduled    Who Called:  Yessenia Gonzáles with [facility]  Best Call Back Number: [phone]  Additional Information:  Stated patient's stress test scheduled for tomorrow needs to be rescheduled due to no authorization/please call patient back to reschedule or advise.

## 2020-10-16 NOTE — TELEPHONE ENCOUNTER
This message has been forwarded to Alize Stanton/Brenda Guzman over Radiology via Teams Messaging via teams messaging

## 2020-10-22 ENCOUNTER — TELEPHONE (OUTPATIENT)
Dept: FAMILY MEDICINE | Facility: CLINIC | Age: 60
End: 2020-10-22

## 2020-10-22 NOTE — TELEPHONE ENCOUNTER
----- Message from Uzma Stephenson sent at 10/22/2020 10:45 AM CDT -----  Type: Needs Medical Advice  Who Called:  kath   Symptoms (please be specific):    How long has patient had these symptoms:    Pharmacy name and phone #:    Best Call Back Number:   Additional Information: Kath juanita gaona order a nuclear stress test Oct 02 and had schedule had to cancel the test because was not authorized they would like r/s wanted to know if auth has been obtain

## 2020-10-30 ENCOUNTER — TELEPHONE (OUTPATIENT)
Dept: FAMILY MEDICINE | Facility: CLINIC | Age: 60
End: 2020-10-30

## 2020-10-30 NOTE — TELEPHONE ENCOUNTER
Kath notified cannot see authorization. Will call back with further info regarding authorization approval

## 2020-10-30 NOTE — TELEPHONE ENCOUNTER
----- Message from Karly Aragon sent at 10/30/2020  9:40 AM CDT -----  Regarding: Stress Test  Contact: Kath Houston  Placed call to Kath shields want to speak with a nurse regarding id stress test was authorized before scheduling please call back at [phone]

## 2020-10-30 NOTE — TELEPHONE ENCOUNTER
Received: Today  Message Contents   Vibha Hughes MD; KRUNAL Muñoz Staff; Lázaro Miller LPN; Malissa Higuera LPN             Good Morning,       The patient referenced above is scheduled for an OP NM MYOCARDIAL PERFUSION SPECT MULTI STUDY however, the authorization has been denied through [employer]. The case number [id number].       Denial Rational Description:   The specific reason for the decision is: Your provider asked for Nuclear Medicine Images of   myocardial (heart muscle) during rest or exercise because you have a potential problem   with the blood vessels to your heart. The request can be approved if your records (office   visit notes, test results, treatment) show you meet health plan criteria for the requested   Nuclear medicine test. Our physician reviewer looked at your records. You do not meet the   criteria described below.     Your records show that you may have a problem with the blood vessels that carry blood to   your heart. The reason this request cannot be approved is because your records must show   you have results of a recent workup (history and exam) performed by your doctor support   a diagnosis of CAD (coronary artery disease or a disease to the blood vessels that provide   blood to your heart muscle), results of a tracing of your heart's actions (electrocardiogram   or ECG) and one of the following:   -You are having new, returned, or worsening signs or symptoms related to your heart and   results of an ECG were unclear.   -You had results on your ECG that were unclear and have never been checked.   -You had a new change on your ECG.         If Toni Hughes MD wishes to have a physician to physician discussion to have the decision overturned, Toni Hughes MD should call [phone] and follow the prompts to engage in a Physician-to-Physician discussion. Please ensure Toni Hughes MD has the case number provided above available when  making this call.       Kindly,   Vibha Sullivan

## 2020-11-02 ENCOUNTER — TELEPHONE (OUTPATIENT)
Dept: FAMILY MEDICINE | Facility: CLINIC | Age: 60
End: 2020-11-02

## 2020-11-02 NOTE — TELEPHONE ENCOUNTER
Vibha Hughes MD; KRUNAL Mcclain; Lázaro Miller LPN; Malissa Higuera LPN             Good Morning,       The patient referenced above is scheduled for an OP NM MYOCARDIAL PERFUSION SPECT MULTI STUDY however, the authorization has been denied through [employer]. The case number is:[id number].       Denial Rational Description:   The specific reason for the decision is: Your provider asked for Nuclear Medicine Images of   myocardial (heart muscle) during rest or exercise because you have a potential problem   with the blood vessels to your heart. The request can be approved if your records (office   visit notes, test results, treatment) show you meet health plan criteria for the requested   Nuclear medicine test. Our physician reviewer looked at your records. You do not meet the   criteria described below.     Your records show that you may have a problem with the blood vessels that carry blood to   your heart. The reason this request cannot be approved is because your records must show   you have results of a recent workup (history and exam) performed by your doctor support   a diagnosis of CAD (coronary artery disease or a disease to the blood vessels that provide   blood to your heart muscle), results of a tracing of your heart's actions (electrocardiogram   or ECG) and one of the following:   -You are having new, returned, or worsening signs or symptoms related to your heart and   results of an ECG were unclear.   -You had results on your ECG that were unclear and have never been checked.   -You had a new change on your ECG.         If Toni Hughes MD wishes to have a physician to physician discussion to have the decision overturned, Toni Hughes MD should call [phone] and follow the prompts to engage in a Physician-to-Physician discussion. Please ensure Toni Hughes MD has the case number provided above available when making this call.       Kindly,   Vibha  Nate

## 2020-11-03 ENCOUNTER — LAB VISIT (OUTPATIENT)
Dept: LAB | Facility: HOSPITAL | Age: 60
End: 2020-11-03
Attending: FAMILY MEDICINE
Payer: MEDICAID

## 2020-11-03 DIAGNOSIS — E78.49 OTHER HYPERLIPIDEMIA: ICD-10-CM

## 2020-11-03 LAB
ALBUMIN SERPL BCP-MCNC: 3.9 G/DL (ref 3.5–5.2)
ALP SERPL-CCNC: 104 U/L (ref 55–135)
ALT SERPL W/O P-5'-P-CCNC: 28 U/L (ref 10–44)
ANION GAP SERPL CALC-SCNC: 11 MMOL/L (ref 8–16)
AST SERPL-CCNC: 26 U/L (ref 10–40)
BILIRUB SERPL-MCNC: 0.2 MG/DL (ref 0.1–1)
BUN SERPL-MCNC: 12 MG/DL (ref 6–20)
CALCIUM SERPL-MCNC: 9.3 MG/DL (ref 8.7–10.5)
CHLORIDE SERPL-SCNC: 102 MMOL/L (ref 95–110)
CHOLEST SERPL-MCNC: 191 MG/DL (ref 120–199)
CHOLEST/HDLC SERPL: 2.4 {RATIO} (ref 2–5)
CO2 SERPL-SCNC: 26 MMOL/L (ref 23–29)
CREAT SERPL-MCNC: 0.8 MG/DL (ref 0.5–1.4)
EST. GFR  (AFRICAN AMERICAN): >60 ML/MIN/1.73 M^2
EST. GFR  (NON AFRICAN AMERICAN): >60 ML/MIN/1.73 M^2
GLUCOSE SERPL-MCNC: 93 MG/DL (ref 70–110)
HDLC SERPL-MCNC: 81 MG/DL (ref 40–75)
HDLC SERPL: 42.4 % (ref 20–50)
LDLC SERPL CALC-MCNC: 88 MG/DL (ref 63–159)
NONHDLC SERPL-MCNC: 110 MG/DL
POTASSIUM SERPL-SCNC: 4.2 MMOL/L (ref 3.5–5.1)
PROT SERPL-MCNC: 7 G/DL (ref 6–8.4)
SODIUM SERPL-SCNC: 139 MMOL/L (ref 136–145)
TRIGL SERPL-MCNC: 110 MG/DL (ref 30–150)

## 2020-11-03 PROCEDURE — 80061 LIPID PANEL: CPT

## 2020-11-03 PROCEDURE — 36415 COLL VENOUS BLD VENIPUNCTURE: CPT | Mod: PO

## 2020-11-03 PROCEDURE — 80053 COMPREHEN METABOLIC PANEL: CPT

## 2020-11-12 ENCOUNTER — OFFICE VISIT (OUTPATIENT)
Dept: URGENT CARE | Facility: CLINIC | Age: 60
End: 2020-11-12
Payer: MEDICAID

## 2020-11-12 ENCOUNTER — TELEPHONE (OUTPATIENT)
Dept: FAMILY MEDICINE | Facility: CLINIC | Age: 60
End: 2020-11-12

## 2020-11-12 VITALS
SYSTOLIC BLOOD PRESSURE: 135 MMHG | RESPIRATION RATE: 16 BRPM | DIASTOLIC BLOOD PRESSURE: 79 MMHG | TEMPERATURE: 97 F | OXYGEN SATURATION: 96 % | HEART RATE: 95 BPM

## 2020-11-12 DIAGNOSIS — Z12.39 ENCOUNTER FOR SCREENING FOR MALIGNANT NEOPLASM OF BREAST, UNSPECIFIED SCREENING MODALITY: Primary | ICD-10-CM

## 2020-11-12 DIAGNOSIS — R09.82 POST-NASAL DRAINAGE: Primary | ICD-10-CM

## 2020-11-12 DIAGNOSIS — R05.9 COUGH: ICD-10-CM

## 2020-11-12 PROCEDURE — U0003 INFECTIOUS AGENT DETECTION BY NUCLEIC ACID (DNA OR RNA); SEVERE ACUTE RESPIRATORY SYNDROME CORONAVIRUS 2 (SARS-COV-2) (CORONAVIRUS DISEASE [COVID-19]), AMPLIFIED PROBE TECHNIQUE, MAKING USE OF HIGH THROUGHPUT TECHNOLOGIES AS DESCRIBED BY CMS-2020-01-R: HCPCS

## 2020-11-12 PROCEDURE — 99214 OFFICE O/P EST MOD 30 MIN: CPT | Mod: S$GLB,,, | Performed by: NURSE PRACTITIONER

## 2020-11-12 PROCEDURE — 99214 PR OFFICE/OUTPT VISIT, EST, LEVL IV, 30-39 MIN: ICD-10-PCS | Mod: S$GLB,,, | Performed by: NURSE PRACTITIONER

## 2020-11-12 RX ORDER — DEXAMETHASONE SODIUM PHOSPHATE 4 MG/ML
8 INJECTION, SOLUTION INTRA-ARTICULAR; INTRALESIONAL; INTRAMUSCULAR; INTRAVENOUS; SOFT TISSUE
Status: COMPLETED | OUTPATIENT
Start: 2020-11-12 | End: 2020-11-12

## 2020-11-12 RX ORDER — FLUTICASONE PROPIONATE 50 MCG
2 SPRAY, SUSPENSION (ML) NASAL DAILY
Qty: 16 G | Refills: 0 | Status: SHIPPED | OUTPATIENT
Start: 2020-11-12 | End: 2021-02-26 | Stop reason: SDUPTHER

## 2020-11-12 RX ORDER — CETIRIZINE HYDROCHLORIDE 10 MG/1
10 TABLET ORAL DAILY
Qty: 90 TABLET | Refills: 0 | Status: SHIPPED | OUTPATIENT
Start: 2020-11-12 | End: 2021-02-26

## 2020-11-12 RX ORDER — GUAIFENESIN 1200 MG/1
1200 TABLET, EXTENDED RELEASE ORAL 2 TIMES DAILY
Qty: 20 TABLET | Refills: 0 | Status: SHIPPED | OUTPATIENT
Start: 2020-11-12 | End: 2020-11-22

## 2020-11-12 RX ADMIN — DEXAMETHASONE SODIUM PHOSPHATE 8 MG: 4 INJECTION, SOLUTION INTRA-ARTICULAR; INTRALESIONAL; INTRAMUSCULAR; INTRAVENOUS; SOFT TISSUE at 06:11

## 2020-11-12 NOTE — PROGRESS NOTES
Subjective:       Patient ID: Floresita Martin is a 60 y.o. female.    Chief Complaint: No chief complaint on file.    Patient presents today for COVID testing. She is having symptoms. Symptoms include: cough, congestion. She was recently treated with Augmentin for URI. Denies f/c/n/v/d.     Review of Systems   Constitutional: Negative for chills, fatigue and fever.   HENT: Negative for congestion.    Respiratory: Positive for cough. Negative for shortness of breath.    Cardiovascular: Negative for chest pain.   Gastrointestinal: Negative for abdominal pain, diarrhea, nausea and vomiting.   Genitourinary: Negative for dysuria.   Musculoskeletal: Negative for myalgias and neck stiffness.   Neurological: Negative for dizziness, light-headedness and headaches.       Objective:      Vitals:    11/12/20 1449   BP: 135/79   Pulse: 95   Resp: 16   Temp: 97.4 °F (36.3 °C)     Physical Exam  Constitutional:       General: She is not in acute distress.     Appearance: Normal appearance. She is normal weight. She is not ill-appearing.   HENT:      Head: Normocephalic and atraumatic.      Right Ear: External ear normal.      Left Ear: External ear normal.      Mouth/Throat:      Pharynx: Pharyngeal swelling and posterior oropharyngeal erythema present.      Comments: Cobblestoning and PND noted.  Eyes:      General: No scleral icterus.  Cardiovascular:      Rate and Rhythm: Normal rate and regular rhythm.      Heart sounds: Normal heart sounds.   Pulmonary:      Effort: Pulmonary effort is normal.      Breath sounds: Normal breath sounds.   Skin:     Capillary Refill: Capillary refill takes less than 2 seconds.   Neurological:      Mental Status: She is alert and oriented to person, place, and time.   Psychiatric:         Mood and Affect: Mood normal.         Behavior: Behavior normal.         Thought Content: Thought content normal.         Judgment: Judgment normal.         Assessment:       1. Post-nasal drainage    2.  Cough        Plan:       Post-nasal drainage  -     dexamethasone injection 8 mg  -     fluticasone propionate (FLONASE) 50 mcg/actuation nasal spray; 2 sprays (100 mcg total) by Each Nostril route once daily.  Dispense: 16 g; Refill: 0  -     cetirizine (ZYRTEC) 10 MG tablet; Take 1 tablet (10 mg total) by mouth once daily.  Dispense: 90 tablet; Refill: 0  -     guaiFENesin 1,200 mg Ta12; Take 1,200 mg by mouth 2 (two) times daily. for 10 days  Dispense: 20 tablet; Refill: 0    Cough  -     COVID-19 Routine Screening  -     dexamethasone injection 8 mg  -     fluticasone propionate (FLONASE) 50 mcg/actuation nasal spray; 2 sprays (100 mcg total) by Each Nostril route once daily.  Dispense: 16 g; Refill: 0  -     cetirizine (ZYRTEC) 10 MG tablet; Take 1 tablet (10 mg total) by mouth once daily.  Dispense: 90 tablet; Refill: 0  -     guaiFENesin 1,200 mg Ta12; Take 1,200 mg by mouth 2 (two) times daily. for 10 days  Dispense: 20 tablet; Refill: 0    Further recommendations to follow after above.    No follow-ups on file.

## 2020-11-12 NOTE — TELEPHONE ENCOUNTER
----- Message from Uzma Stephenson sent at 11/12/2020 11:10 AM CST -----  Regarding: sooner appt  Type:  Sooner Apoointment Request    Caller is requesting a sooner appointment.  Caller declined first available appointment listed below.  Caller will not accept being placed on the waitlist and is requesting a message be sent to doctor.    Name of Caller:  pt  When is the first available appointment?  01/17  Symptoms:  rt ear poss infection  Best Call Back Number:  [phone] (home)     Additional Information:  na

## 2020-11-12 NOTE — TELEPHONE ENCOUNTER
----- Message from Uzma Stephenson sent at 11/12/2020 11:05 AM CST -----  Regarding: referral ob/gyn  Type: Needs Medical Advice  Who Called:  pt  Symptoms (please be specific):    How long has patient had these symptoms:    Pharmacy name and phone #:    Best Call Back Number: [phone] (home)     Additional Information: pt needs a referral to so she can schedule ob/gyn appt thanks

## 2020-11-12 NOTE — TELEPHONE ENCOUNTER
Pt called and stated still has cough, runny nose, and sore throat. Pt scheduled for Covid screening 11/12/2020 at 2:20. Pt verbalized understanding

## 2020-11-12 NOTE — TELEPHONE ENCOUNTER
----- Message from Kath Vaca sent at 11/12/2020 10:57 AM CST -----  Regarding: mammogram orders  Patient is calling to set appointment for yearly mammogram , please have md enter the orders in epic for Digital Mammo Screening with [facility] please, then let me know they are in so I can follow up with our patient to come in . Thanking you in advance.    Kath Vaca    [phone]

## 2020-11-13 LAB — SARS-COV-2 RNA RESP QL NAA+PROBE: NOT DETECTED

## 2020-11-17 ENCOUNTER — TELEPHONE (OUTPATIENT)
Dept: URGENT CARE | Facility: CLINIC | Age: 60
End: 2020-11-17

## 2020-12-09 ENCOUNTER — HOSPITAL ENCOUNTER (OUTPATIENT)
Dept: RADIOLOGY | Facility: CLINIC | Age: 60
Discharge: HOME | End: 2020-12-09
Attending: FAMILY MEDICINE
Payer: MEDICAID

## 2020-12-09 DIAGNOSIS — Z12.39 ENCOUNTER FOR SCREENING FOR MALIGNANT NEOPLASM OF BREAST, UNSPECIFIED SCREENING MODALITY: ICD-10-CM

## 2020-12-09 PROCEDURE — 77067 MAMMO DIGITAL SCREENING BILAT WITH TOMO: ICD-10-PCS | Mod: 26,,, | Performed by: RADIOLOGY

## 2020-12-09 PROCEDURE — 77067 SCR MAMMO BI INCL CAD: CPT | Mod: TC,PO

## 2020-12-09 PROCEDURE — 77067 SCR MAMMO BI INCL CAD: CPT | Mod: 26,,, | Performed by: RADIOLOGY

## 2020-12-09 PROCEDURE — 77063 BREAST TOMOSYNTHESIS BI: CPT | Mod: 26,,, | Performed by: RADIOLOGY

## 2020-12-09 PROCEDURE — 77063 MAMMO DIGITAL SCREENING BILAT WITH TOMO: ICD-10-PCS | Mod: 26,,, | Performed by: RADIOLOGY

## 2021-01-04 ENCOUNTER — PATIENT MESSAGE (OUTPATIENT)
Dept: ADMINISTRATIVE | Facility: HOSPITAL | Age: 61
End: 2021-01-04

## 2021-02-26 ENCOUNTER — OFFICE VISIT (OUTPATIENT)
Dept: FAMILY MEDICINE | Facility: CLINIC | Age: 61
End: 2021-02-26
Payer: MEDICARE

## 2021-02-26 VITALS
TEMPERATURE: 98 F | DIASTOLIC BLOOD PRESSURE: 76 MMHG | HEART RATE: 92 BPM | HEIGHT: 61 IN | WEIGHT: 123.44 LBS | SYSTOLIC BLOOD PRESSURE: 132 MMHG | BODY MASS INDEX: 23.3 KG/M2 | OXYGEN SATURATION: 97 %

## 2021-02-26 DIAGNOSIS — R82.90 CLOUDY URINE: ICD-10-CM

## 2021-02-26 DIAGNOSIS — R63.5 WEIGHT GAIN: ICD-10-CM

## 2021-02-26 DIAGNOSIS — R07.9 CHEST PAIN, UNSPECIFIED TYPE: ICD-10-CM

## 2021-02-26 DIAGNOSIS — N39.0 URINARY TRACT INFECTION WITHOUT HEMATURIA, SITE UNSPECIFIED: Primary | ICD-10-CM

## 2021-02-26 DIAGNOSIS — Z12.2 ENCOUNTER FOR SCREENING FOR MALIGNANT NEOPLASM OF RESPIRATORY ORGANS: ICD-10-CM

## 2021-02-26 LAB
BILIRUB SERPL-MCNC: NEGATIVE MG/DL
BLOOD URINE, POC: NORMAL
CLARITY, POC UA: CLEAR
COLOR, POC UA: YELLOW
GLUCOSE UR QL STRIP: NORMAL
KETONES UR QL STRIP: NEGATIVE
LEUKOCYTE ESTERASE URINE, POC: NORMAL
NITRITE, POC UA: NEGATIVE
PH, POC UA: 6
PROTEIN, POC: NORMAL
SPECIFIC GRAVITY, POC UA: 1.01
UROBILINOGEN, POC UA: NORMAL

## 2021-02-26 PROCEDURE — 3008F PR BODY MASS INDEX (BMI) DOCUMENTED: ICD-10-PCS | Mod: CPTII,S$GLB,, | Performed by: PHYSICIAN ASSISTANT

## 2021-02-26 PROCEDURE — 1125F PR PAIN SEVERITY QUANTIFIED, PAIN PRESENT: ICD-10-PCS | Mod: S$GLB,,, | Performed by: PHYSICIAN ASSISTANT

## 2021-02-26 PROCEDURE — 99999 PR PBB SHADOW E&M-EST. PATIENT-LVL V: ICD-10-PCS | Mod: PBBFAC,,, | Performed by: PHYSICIAN ASSISTANT

## 2021-02-26 PROCEDURE — 3008F BODY MASS INDEX DOCD: CPT | Mod: CPTII,S$GLB,, | Performed by: PHYSICIAN ASSISTANT

## 2021-02-26 PROCEDURE — 99214 OFFICE O/P EST MOD 30 MIN: CPT | Mod: 25,S$GLB,, | Performed by: PHYSICIAN ASSISTANT

## 2021-02-26 PROCEDURE — 93010 EKG 12-LEAD: ICD-10-PCS | Mod: S$GLB,,, | Performed by: INTERNAL MEDICINE

## 2021-02-26 PROCEDURE — 93005 EKG 12-LEAD: ICD-10-PCS | Mod: S$GLB,,, | Performed by: PHYSICIAN ASSISTANT

## 2021-02-26 PROCEDURE — 99214 PR OFFICE/OUTPT VISIT, EST, LEVL IV, 30-39 MIN: ICD-10-PCS | Mod: 25,S$GLB,, | Performed by: PHYSICIAN ASSISTANT

## 2021-02-26 PROCEDURE — 81002 URINALYSIS NONAUTO W/O SCOPE: CPT | Mod: S$GLB,,, | Performed by: PHYSICIAN ASSISTANT

## 2021-02-26 PROCEDURE — 99999 PR PBB SHADOW E&M-EST. PATIENT-LVL V: CPT | Mod: PBBFAC,,, | Performed by: PHYSICIAN ASSISTANT

## 2021-02-26 PROCEDURE — 1125F AMNT PAIN NOTED PAIN PRSNT: CPT | Mod: S$GLB,,, | Performed by: PHYSICIAN ASSISTANT

## 2021-02-26 PROCEDURE — 93005 ELECTROCARDIOGRAM TRACING: CPT | Mod: S$GLB,,, | Performed by: PHYSICIAN ASSISTANT

## 2021-02-26 PROCEDURE — 81002 POCT URINE DIPSTICK WITHOUT MICROSCOPE: ICD-10-PCS | Mod: S$GLB,,, | Performed by: PHYSICIAN ASSISTANT

## 2021-02-26 PROCEDURE — 93010 ELECTROCARDIOGRAM REPORT: CPT | Mod: S$GLB,,, | Performed by: INTERNAL MEDICINE

## 2021-02-26 RX ORDER — GABAPENTIN 300 MG/1
300 CAPSULE ORAL 3 TIMES DAILY
COMMUNITY
Start: 2021-02-04 | End: 2022-01-05

## 2021-02-26 RX ORDER — NITROFURANTOIN 25; 75 MG/1; MG/1
100 CAPSULE ORAL 2 TIMES DAILY
Qty: 14 CAPSULE | Refills: 0 | Status: SHIPPED | OUTPATIENT
Start: 2021-02-26 | End: 2021-05-26

## 2021-03-01 ENCOUNTER — LAB VISIT (OUTPATIENT)
Dept: LAB | Facility: HOSPITAL | Age: 61
End: 2021-03-01
Attending: PHYSICIAN ASSISTANT
Payer: MEDICARE

## 2021-03-01 DIAGNOSIS — R63.5 WEIGHT GAIN: ICD-10-CM

## 2021-03-01 PROCEDURE — 84439 ASSAY OF FREE THYROXINE: CPT

## 2021-03-01 PROCEDURE — 36415 COLL VENOUS BLD VENIPUNCTURE: CPT | Mod: PO

## 2021-03-01 PROCEDURE — 84443 ASSAY THYROID STIM HORMONE: CPT

## 2021-03-02 ENCOUNTER — HOSPITAL ENCOUNTER (OUTPATIENT)
Dept: RADIOLOGY | Facility: HOSPITAL | Age: 61
Discharge: HOME | End: 2021-03-02
Attending: PHYSICIAN ASSISTANT
Payer: MEDICARE

## 2021-03-02 DIAGNOSIS — Z12.2 ENCOUNTER FOR SCREENING FOR MALIGNANT NEOPLASM OF RESPIRATORY ORGANS: ICD-10-CM

## 2021-03-02 LAB
T4 FREE SERPL-MCNC: 0.99 NG/DL (ref 0.71–1.51)
TSH SERPL DL<=0.005 MIU/L-ACNC: 0.77 UIU/ML (ref 0.4–4)

## 2021-03-02 PROCEDURE — 71271 CT THORAX LUNG CANCER SCR C-: CPT | Mod: TC

## 2021-03-02 PROCEDURE — 71271 CT CHEST LUNG SCREENING LOW DOSE: ICD-10-PCS | Mod: 26,,, | Performed by: RADIOLOGY

## 2021-03-02 PROCEDURE — 71271 CT THORAX LUNG CANCER SCR C-: CPT | Mod: 26,,, | Performed by: RADIOLOGY

## 2021-03-13 ENCOUNTER — PATIENT OUTREACH (OUTPATIENT)
Dept: ADMINISTRATIVE | Facility: OTHER | Age: 61
End: 2021-03-13

## 2021-03-17 ENCOUNTER — OFFICE VISIT (OUTPATIENT)
Dept: CARDIOLOGY | Facility: CLINIC | Age: 61
End: 2021-03-17
Payer: MEDICARE

## 2021-03-17 VITALS
DIASTOLIC BLOOD PRESSURE: 70 MMHG | HEART RATE: 84 BPM | BODY MASS INDEX: 22.68 KG/M2 | HEIGHT: 61 IN | WEIGHT: 120.13 LBS | SYSTOLIC BLOOD PRESSURE: 110 MMHG

## 2021-03-17 DIAGNOSIS — F17.200 TOBACCO DEPENDENCE: ICD-10-CM

## 2021-03-17 DIAGNOSIS — M54.50 LOW BACK PAIN, UNSPECIFIED BACK PAIN LATERALITY, UNSPECIFIED CHRONICITY, UNSPECIFIED WHETHER SCIATICA PRESENT: ICD-10-CM

## 2021-03-17 DIAGNOSIS — R07.9 EXERTIONAL CHEST PAIN: ICD-10-CM

## 2021-03-17 DIAGNOSIS — E78.5 DYSLIPIDEMIA: ICD-10-CM

## 2021-03-17 DIAGNOSIS — R06.09 DOE (DYSPNEA ON EXERTION): ICD-10-CM

## 2021-03-17 DIAGNOSIS — J44.9 CHRONIC OBSTRUCTIVE PULMONARY DISEASE, UNSPECIFIED COPD TYPE: ICD-10-CM

## 2021-03-17 PROCEDURE — 99499 UNLISTED E&M SERVICE: CPT | Mod: S$GLB,,, | Performed by: INTERNAL MEDICINE

## 2021-03-17 PROCEDURE — 3008F PR BODY MASS INDEX (BMI) DOCUMENTED: ICD-10-PCS | Mod: CPTII,S$GLB,, | Performed by: INTERNAL MEDICINE

## 2021-03-17 PROCEDURE — 99204 PR OFFICE/OUTPT VISIT, NEW, LEVL IV, 45-59 MIN: ICD-10-PCS | Mod: S$GLB,,, | Performed by: INTERNAL MEDICINE

## 2021-03-17 PROCEDURE — 3008F BODY MASS INDEX DOCD: CPT | Mod: CPTII,S$GLB,, | Performed by: INTERNAL MEDICINE

## 2021-03-17 PROCEDURE — 99999 PR PBB SHADOW E&M-EST. PATIENT-LVL III: CPT | Mod: PBBFAC,,, | Performed by: INTERNAL MEDICINE

## 2021-03-17 PROCEDURE — 1126F PR PAIN SEVERITY QUANTIFIED, NO PAIN PRESENT: ICD-10-PCS | Mod: S$GLB,,, | Performed by: INTERNAL MEDICINE

## 2021-03-17 PROCEDURE — 1126F AMNT PAIN NOTED NONE PRSNT: CPT | Mod: S$GLB,,, | Performed by: INTERNAL MEDICINE

## 2021-03-17 PROCEDURE — 99999 PR PBB SHADOW E&M-EST. PATIENT-LVL III: ICD-10-PCS | Mod: PBBFAC,,, | Performed by: INTERNAL MEDICINE

## 2021-03-17 PROCEDURE — 99204 OFFICE O/P NEW MOD 45 MIN: CPT | Mod: S$GLB,,, | Performed by: INTERNAL MEDICINE

## 2021-03-17 PROCEDURE — 99499 RISK ADDL DX/OHS AUDIT: ICD-10-PCS | Mod: S$GLB,,, | Performed by: INTERNAL MEDICINE

## 2021-03-17 RX ORDER — IBUPROFEN 800 MG/1
800 TABLET ORAL 3 TIMES DAILY
COMMUNITY
End: 2022-01-05 | Stop reason: SDUPTHER

## 2021-04-05 ENCOUNTER — PATIENT MESSAGE (OUTPATIENT)
Dept: ADMINISTRATIVE | Facility: HOSPITAL | Age: 61
End: 2021-04-05

## 2021-04-13 ENCOUNTER — HOSPITAL ENCOUNTER (OUTPATIENT)
Dept: RADIOLOGY | Facility: HOSPITAL | Age: 61
Discharge: HOME | End: 2021-04-13
Attending: INTERNAL MEDICINE
Payer: MEDICARE

## 2021-04-13 ENCOUNTER — CLINICAL SUPPORT (OUTPATIENT)
Dept: CARDIOLOGY | Facility: CLINIC | Age: 61
End: 2021-04-13
Attending: INTERNAL MEDICINE
Payer: MEDICARE

## 2021-04-13 VITALS — BODY MASS INDEX: 22.66 KG/M2 | HEIGHT: 61 IN | WEIGHT: 120 LBS

## 2021-04-13 DIAGNOSIS — R07.9 EXERTIONAL CHEST PAIN: ICD-10-CM

## 2021-04-13 DIAGNOSIS — J44.9 CHRONIC OBSTRUCTIVE PULMONARY DISEASE, UNSPECIFIED COPD TYPE: ICD-10-CM

## 2021-04-13 DIAGNOSIS — M54.50 LOW BACK PAIN, UNSPECIFIED BACK PAIN LATERALITY, UNSPECIFIED CHRONICITY, UNSPECIFIED WHETHER SCIATICA PRESENT: ICD-10-CM

## 2021-04-13 DIAGNOSIS — E78.5 DYSLIPIDEMIA: ICD-10-CM

## 2021-04-13 DIAGNOSIS — F17.200 TOBACCO DEPENDENCE: ICD-10-CM

## 2021-04-13 DIAGNOSIS — R06.09 DOE (DYSPNEA ON EXERTION): ICD-10-CM

## 2021-04-13 LAB
AV INDEX (PROSTH): 0.62
AV MEAN GRADIENT: 3 MMHG
AV PEAK GRADIENT: 5 MMHG
AV VALVE AREA: 1.95 CM2
AV VELOCITY RATIO: 0.66
CV ECHO LV RWT: 0.4 CM
CV PHARM DOSE: 0.4 MG
CV STRESS BASE HR: 90 BPM
DIASTOLIC BLOOD PRESSURE: 78 MMHG
DOP CALC AO PEAK VEL: 1.17 M/S
DOP CALC AO VTI: 20.89 CM
DOP CALC LVOT AREA: 3.1 CM2
DOP CALC LVOT DIAMETER: 2 CM
DOP CALC LVOT PEAK VEL: 0.77 M/S
DOP CALC LVOT STROKE VOLUME: 40.69 CM3
DOP CALCLVOT PEAK VEL VTI: 12.96 CM
E WAVE DECELERATION TIME: 214.01 MSEC
E/A RATIO: 0.92
E/E' RATIO: 6.35 M/S
ECHO LV POSTERIOR WALL: 0.78 CM (ref 0.6–1.1)
EJECTION FRACTION: 60 %
FRACTIONAL SHORTENING: 23 % (ref 28–44)
INTERVENTRICULAR SEPTUM: 0.78 CM (ref 0.6–1.1)
IVRT: 133.21 MSEC
LA MAJOR: 3.63 CM
LA MINOR: 3.82 CM
LA WIDTH: 2.79 CM
LEFT ATRIUM SIZE: 2.47 CM
LEFT ATRIUM VOLUME: 21.81 CM3
LEFT INTERNAL DIMENSION IN SYSTOLE: 2.98 CM (ref 2.1–4)
LEFT VENTRICLE DIASTOLIC VOLUME: 65.19 ML
LEFT VENTRICLE SYSTOLIC VOLUME: 34.5 ML
LEFT VENTRICULAR INTERNAL DIMENSION IN DIASTOLE: 3.88 CM (ref 3.5–6)
LEFT VENTRICULAR MASS: 85.94 G
LV LATERAL E/E' RATIO: 6.75 M/S
LV SEPTAL E/E' RATIO: 6 M/S
MV PEAK A VEL: 0.59 M/S
MV PEAK E VEL: 0.54 M/S
MV STENOSIS PRESSURE HALF TIME: 62.06 MS
MV VALVE AREA P 1/2 METHOD: 3.54 CM2
NUC REST EJECTION FRACTION: 69
OHS CV CPX 1 MINUTE RECOVERY HEART RATE: 117 BPM
OHS CV CPX 85 PERCENT MAX PREDICTED HEART RATE MALE: 131
OHS CV CPX MAX PREDICTED HEART RATE: 154
OHS CV CPX PATIENT IS FEMALE: 1
OHS CV CPX PATIENT IS MALE: 0
OHS CV CPX PEAK DIASTOLIC BLOOD PRESSURE: 74 MMHG
OHS CV CPX PEAK HEAR RATE: 121 BPM
OHS CV CPX PEAK RATE PRESSURE PRODUCT: NORMAL
OHS CV CPX PEAK SYSTOLIC BLOOD PRESSURE: 130 MMHG
OHS CV CPX PERCENT MAX PREDICTED HEART RATE ACHIEVED: 79
OHS CV CPX RATE PRESSURE PRODUCT PRESENTING: NORMAL
PISA TR MAX VEL: 2.61 M/S
RA MAJOR: 3.32 CM
RA PRESSURE: 3 MMHG
RA WIDTH: 2.74 CM
RIGHT VENTRICULAR END-DIASTOLIC DIMENSION: 2.47 CM
RV TISSUE DOPPLER FREE WALL SYSTOLIC VELOCITY 1 (APICAL 4 CHAMBER VIEW): 11.76 CM/S
SINUS: 3.11 CM
STJ: 2.54 CM
SYSTOLIC BLOOD PRESSURE: 129 MMHG
TDI LATERAL: 0.08 M/S
TDI SEPTAL: 0.09 M/S
TDI: 0.09 M/S
TR MAX PG: 27 MMHG
TRICUSPID ANNULAR PLANE SYSTOLIC EXCURSION: 1.03 CM
TV REST PULMONARY ARTERY PRESSURE: 30 MMHG

## 2021-04-13 PROCEDURE — 93017 CV STRESS TEST TRACING ONLY: CPT | Mod: PO

## 2021-04-13 PROCEDURE — 78452 HT MUSCLE IMAGE SPECT MULT: CPT | Mod: PO

## 2021-04-13 PROCEDURE — 93016 STRESS TEST WITH MYOCARDIAL PERFUSION (CUPID ONLY): ICD-10-PCS | Mod: ,,, | Performed by: INTERNAL MEDICINE

## 2021-04-13 PROCEDURE — 93306 ECHO (CUPID ONLY): ICD-10-PCS | Mod: S$GLB,,, | Performed by: INTERNAL MEDICINE

## 2021-04-13 PROCEDURE — 93016 CV STRESS TEST SUPVJ ONLY: CPT | Mod: ,,, | Performed by: INTERNAL MEDICINE

## 2021-04-13 PROCEDURE — A9502 TC99M TETROFOSMIN: HCPCS | Mod: PO

## 2021-04-13 PROCEDURE — 93306 TTE W/DOPPLER COMPLETE: CPT | Mod: S$GLB,,, | Performed by: INTERNAL MEDICINE

## 2021-04-13 PROCEDURE — 78452 STRESS TEST WITH MYOCARDIAL PERFUSION (CUPID ONLY): ICD-10-PCS | Mod: 26,,, | Performed by: INTERNAL MEDICINE

## 2021-04-13 PROCEDURE — 93018 CV STRESS TEST I&R ONLY: CPT | Mod: ,,, | Performed by: INTERNAL MEDICINE

## 2021-04-13 PROCEDURE — 78452 HT MUSCLE IMAGE SPECT MULT: CPT | Mod: 26,,, | Performed by: INTERNAL MEDICINE

## 2021-04-13 PROCEDURE — 93018 PR CARDIAC STRESS TST,INTERP/REPT ONLY: ICD-10-PCS | Mod: ,,, | Performed by: INTERNAL MEDICINE

## 2021-04-13 RX ORDER — REGADENOSON 0.08 MG/ML
0.4 INJECTION, SOLUTION INTRAVENOUS ONCE
Status: COMPLETED | OUTPATIENT
Start: 2021-04-13 | End: 2021-04-13

## 2021-04-13 RX ADMIN — REGADENOSON 0.4 MG: 0.08 INJECTION, SOLUTION INTRAVENOUS at 09:04

## 2021-04-14 ENCOUNTER — PATIENT MESSAGE (OUTPATIENT)
Dept: CARDIOLOGY | Facility: CLINIC | Age: 61
End: 2021-04-14

## 2021-04-14 ENCOUNTER — TELEPHONE (OUTPATIENT)
Dept: CARDIOLOGY | Facility: CLINIC | Age: 61
End: 2021-04-14

## 2021-05-06 ENCOUNTER — PATIENT MESSAGE (OUTPATIENT)
Dept: RESEARCH | Facility: HOSPITAL | Age: 61
End: 2021-05-06

## 2021-05-26 ENCOUNTER — OFFICE VISIT (OUTPATIENT)
Dept: FAMILY MEDICINE | Facility: CLINIC | Age: 61
End: 2021-05-26
Payer: MEDICARE

## 2021-05-26 VITALS
OXYGEN SATURATION: 95 % | BODY MASS INDEX: 22.73 KG/M2 | HEART RATE: 84 BPM | HEIGHT: 61 IN | TEMPERATURE: 98 F | SYSTOLIC BLOOD PRESSURE: 130 MMHG | DIASTOLIC BLOOD PRESSURE: 78 MMHG | WEIGHT: 120.38 LBS

## 2021-05-26 DIAGNOSIS — E78.49 OTHER HYPERLIPIDEMIA: Primary | ICD-10-CM

## 2021-05-26 PROCEDURE — 1126F AMNT PAIN NOTED NONE PRSNT: CPT | Mod: S$GLB,,, | Performed by: FAMILY MEDICINE

## 2021-05-26 PROCEDURE — 99499 UNLISTED E&M SERVICE: CPT | Mod: S$GLB,,, | Performed by: FAMILY MEDICINE

## 2021-05-26 PROCEDURE — 99499 RISK ADDL DX/OHS AUDIT: ICD-10-PCS | Mod: S$GLB,,, | Performed by: FAMILY MEDICINE

## 2021-05-26 PROCEDURE — 99396 PREV VISIT EST AGE 40-64: CPT | Mod: S$GLB,,, | Performed by: FAMILY MEDICINE

## 2021-05-26 PROCEDURE — 99999 PR PBB SHADOW E&M-EST. PATIENT-LVL III: CPT | Mod: PBBFAC,,, | Performed by: FAMILY MEDICINE

## 2021-05-26 PROCEDURE — 1126F PR PAIN SEVERITY QUANTIFIED, NO PAIN PRESENT: ICD-10-PCS | Mod: S$GLB,,, | Performed by: FAMILY MEDICINE

## 2021-05-26 PROCEDURE — 99999 PR PBB SHADOW E&M-EST. PATIENT-LVL III: ICD-10-PCS | Mod: PBBFAC,,, | Performed by: FAMILY MEDICINE

## 2021-05-26 PROCEDURE — 3008F PR BODY MASS INDEX (BMI) DOCUMENTED: ICD-10-PCS | Mod: CPTII,S$GLB,, | Performed by: FAMILY MEDICINE

## 2021-05-26 PROCEDURE — 3008F BODY MASS INDEX DOCD: CPT | Mod: CPTII,S$GLB,, | Performed by: FAMILY MEDICINE

## 2021-05-26 PROCEDURE — 99396 PR PREVENTIVE VISIT,EST,40-64: ICD-10-PCS | Mod: S$GLB,,, | Performed by: FAMILY MEDICINE

## 2021-05-26 RX ORDER — OXYCODONE 9 MG/1
CAPSULE, EXTENDED RELEASE ORAL
COMMUNITY
Start: 2021-05-04 | End: 2022-01-05

## 2021-05-26 RX ORDER — LACTULOSE 10 G/15ML
SOLUTION ORAL; RECTAL
COMMUNITY
Start: 2021-05-10 | End: 2022-01-05

## 2021-05-26 RX ORDER — AMITRIPTYLINE HYDROCHLORIDE 25 MG/1
TABLET, FILM COATED ORAL
COMMUNITY
Start: 2021-03-12 | End: 2022-01-05 | Stop reason: SDUPTHER

## 2021-12-07 ENCOUNTER — PATIENT MESSAGE (OUTPATIENT)
Dept: ADMINISTRATIVE | Facility: OTHER | Age: 61
End: 2021-12-07
Payer: MEDICARE

## 2021-12-09 ENCOUNTER — PATIENT MESSAGE (OUTPATIENT)
Dept: ADMINISTRATIVE | Facility: OTHER | Age: 61
End: 2021-12-09
Payer: MEDICARE

## 2021-12-09 ENCOUNTER — PATIENT OUTREACH (OUTPATIENT)
Dept: OTHER | Facility: OTHER | Age: 61
End: 2021-12-09
Payer: MEDICARE

## 2021-12-09 ENCOUNTER — PATIENT MESSAGE (OUTPATIENT)
Dept: OTHER | Facility: OTHER | Age: 61
End: 2021-12-09
Payer: MEDICARE

## 2021-12-09 DIAGNOSIS — F17.200 SMOKING: Primary | ICD-10-CM

## 2022-01-05 ENCOUNTER — LAB VISIT (OUTPATIENT)
Dept: LAB | Facility: HOSPITAL | Age: 62
End: 2022-01-05
Attending: FAMILY MEDICINE
Payer: MEDICARE

## 2022-01-05 ENCOUNTER — OFFICE VISIT (OUTPATIENT)
Dept: FAMILY MEDICINE | Facility: CLINIC | Age: 62
End: 2022-01-05
Payer: MEDICARE

## 2022-01-05 VITALS
WEIGHT: 119.94 LBS | BODY MASS INDEX: 22.64 KG/M2 | OXYGEN SATURATION: 99 % | DIASTOLIC BLOOD PRESSURE: 60 MMHG | SYSTOLIC BLOOD PRESSURE: 120 MMHG | HEIGHT: 61 IN | HEART RATE: 70 BPM

## 2022-01-05 DIAGNOSIS — E78.49 OTHER HYPERLIPIDEMIA: ICD-10-CM

## 2022-01-05 DIAGNOSIS — J44.9 CHRONIC OBSTRUCTIVE PULMONARY DISEASE, UNSPECIFIED COPD TYPE: ICD-10-CM

## 2022-01-05 DIAGNOSIS — Z12.39 ENCOUNTER FOR SCREENING FOR MALIGNANT NEOPLASM OF BREAST, UNSPECIFIED SCREENING MODALITY: ICD-10-CM

## 2022-01-05 DIAGNOSIS — L72.9 SCALP CYST: ICD-10-CM

## 2022-01-05 DIAGNOSIS — Z12.31 ENCOUNTER FOR SCREENING MAMMOGRAM FOR MALIGNANT NEOPLASM OF BREAST: ICD-10-CM

## 2022-01-05 DIAGNOSIS — J44.9 CHRONIC OBSTRUCTIVE PULMONARY DISEASE, UNSPECIFIED COPD TYPE: Primary | ICD-10-CM

## 2022-01-05 LAB
ALBUMIN SERPL BCP-MCNC: 3.8 G/DL (ref 3.5–5.2)
ALP SERPL-CCNC: 100 U/L (ref 55–135)
ALT SERPL W/O P-5'-P-CCNC: 23 U/L (ref 10–44)
ANION GAP SERPL CALC-SCNC: 8 MMOL/L (ref 8–16)
AST SERPL-CCNC: 22 U/L (ref 10–40)
BASOPHILS # BLD AUTO: 0.02 K/UL (ref 0–0.2)
BASOPHILS NFR BLD: 0.4 % (ref 0–1.9)
BILIRUB SERPL-MCNC: 0.4 MG/DL (ref 0.1–1)
BUN SERPL-MCNC: 11 MG/DL (ref 6–20)
CALCIUM SERPL-MCNC: 9.6 MG/DL (ref 8.7–10.5)
CHLORIDE SERPL-SCNC: 102 MMOL/L (ref 95–110)
CHOLEST SERPL-MCNC: 188 MG/DL (ref 120–199)
CHOLEST/HDLC SERPL: 2.9 {RATIO} (ref 2–5)
CO2 SERPL-SCNC: 26 MMOL/L (ref 23–29)
CREAT SERPL-MCNC: 0.7 MG/DL (ref 0.5–1.4)
DIFFERENTIAL METHOD: ABNORMAL
EOSINOPHIL # BLD AUTO: 0.1 K/UL (ref 0–0.5)
EOSINOPHIL NFR BLD: 1.8 % (ref 0–8)
ERYTHROCYTE [DISTWIDTH] IN BLOOD BY AUTOMATED COUNT: 14.8 % (ref 11.5–14.5)
EST. GFR  (AFRICAN AMERICAN): >60 ML/MIN/1.73 M^2
EST. GFR  (NON AFRICAN AMERICAN): >60 ML/MIN/1.73 M^2
GLUCOSE SERPL-MCNC: 101 MG/DL (ref 70–110)
HCT VFR BLD AUTO: 37.9 % (ref 37–48.5)
HDLC SERPL-MCNC: 64 MG/DL (ref 40–75)
HDLC SERPL: 34 % (ref 20–50)
HGB BLD-MCNC: 12.7 G/DL (ref 12–16)
IMM GRANULOCYTES # BLD AUTO: 0.01 K/UL (ref 0–0.04)
IMM GRANULOCYTES NFR BLD AUTO: 0.2 % (ref 0–0.5)
LDLC SERPL CALC-MCNC: 107.2 MG/DL (ref 63–159)
LYMPHOCYTES # BLD AUTO: 1.8 K/UL (ref 1–4.8)
LYMPHOCYTES NFR BLD: 34.7 % (ref 18–48)
MCH RBC QN AUTO: 31.5 PG (ref 27–31)
MCHC RBC AUTO-ENTMCNC: 33.5 G/DL (ref 32–36)
MCV RBC AUTO: 94 FL (ref 82–98)
MONOCYTES # BLD AUTO: 0.4 K/UL (ref 0.3–1)
MONOCYTES NFR BLD: 7.2 % (ref 4–15)
NEUTROPHILS # BLD AUTO: 2.9 K/UL (ref 1.8–7.7)
NEUTROPHILS NFR BLD: 55.7 % (ref 38–73)
NONHDLC SERPL-MCNC: 124 MG/DL
NRBC BLD-RTO: 0 /100 WBC
PLATELET # BLD AUTO: 389 K/UL (ref 150–450)
PMV BLD AUTO: 8.7 FL (ref 9.2–12.9)
POTASSIUM SERPL-SCNC: 4.2 MMOL/L (ref 3.5–5.1)
PROT SERPL-MCNC: 6.7 G/DL (ref 6–8.4)
RBC # BLD AUTO: 4.03 M/UL (ref 4–5.4)
SODIUM SERPL-SCNC: 136 MMOL/L (ref 136–145)
TRIGL SERPL-MCNC: 84 MG/DL (ref 30–150)
WBC # BLD AUTO: 5.13 K/UL (ref 3.9–12.7)

## 2022-01-05 PROCEDURE — 3074F PR MOST RECENT SYSTOLIC BLOOD PRESSURE < 130 MM HG: ICD-10-PCS | Mod: HCNC,CPTII,S$GLB, | Performed by: FAMILY MEDICINE

## 2022-01-05 PROCEDURE — 80061 LIPID PANEL: CPT | Mod: HCNC | Performed by: FAMILY MEDICINE

## 2022-01-05 PROCEDURE — 85025 COMPLETE CBC W/AUTO DIFF WBC: CPT | Mod: HCNC | Performed by: FAMILY MEDICINE

## 2022-01-05 PROCEDURE — 90686 IIV4 VACC NO PRSV 0.5 ML IM: CPT | Mod: HCNC,S$GLB,, | Performed by: FAMILY MEDICINE

## 2022-01-05 PROCEDURE — G0008 FLU VACCINE (QUAD) GREATER THAN OR EQUAL TO 3YO PRESERVATIVE FREE IM: ICD-10-PCS | Mod: HCNC,S$GLB,, | Performed by: FAMILY MEDICINE

## 2022-01-05 PROCEDURE — 3074F SYST BP LT 130 MM HG: CPT | Mod: HCNC,CPTII,S$GLB, | Performed by: FAMILY MEDICINE

## 2022-01-05 PROCEDURE — 99999 PR PBB SHADOW E&M-EST. PATIENT-LVL III: CPT | Mod: PBBFAC,HCNC,, | Performed by: FAMILY MEDICINE

## 2022-01-05 PROCEDURE — 36415 COLL VENOUS BLD VENIPUNCTURE: CPT | Mod: HCNC,PO | Performed by: FAMILY MEDICINE

## 2022-01-05 PROCEDURE — 99499 RISK ADDL DX/OHS AUDIT: ICD-10-PCS | Mod: HCNC,S$GLB,, | Performed by: FAMILY MEDICINE

## 2022-01-05 PROCEDURE — 1159F MED LIST DOCD IN RCRD: CPT | Mod: HCNC,CPTII,S$GLB, | Performed by: FAMILY MEDICINE

## 2022-01-05 PROCEDURE — 3078F PR MOST RECENT DIASTOLIC BLOOD PRESSURE < 80 MM HG: ICD-10-PCS | Mod: HCNC,CPTII,S$GLB, | Performed by: FAMILY MEDICINE

## 2022-01-05 PROCEDURE — 99999 PR PBB SHADOW E&M-EST. PATIENT-LVL III: ICD-10-PCS | Mod: PBBFAC,HCNC,, | Performed by: FAMILY MEDICINE

## 2022-01-05 PROCEDURE — 99396 PR PREVENTIVE VISIT,EST,40-64: ICD-10-PCS | Mod: HCNC,S$GLB,, | Performed by: FAMILY MEDICINE

## 2022-01-05 PROCEDURE — 1159F PR MEDICATION LIST DOCUMENTED IN MEDICAL RECORD: ICD-10-PCS | Mod: HCNC,CPTII,S$GLB, | Performed by: FAMILY MEDICINE

## 2022-01-05 PROCEDURE — 99396 PREV VISIT EST AGE 40-64: CPT | Mod: HCNC,S$GLB,, | Performed by: FAMILY MEDICINE

## 2022-01-05 PROCEDURE — 90686 FLU VACCINE (QUAD) GREATER THAN OR EQUAL TO 3YO PRESERVATIVE FREE IM: ICD-10-PCS | Mod: HCNC,S$GLB,, | Performed by: FAMILY MEDICINE

## 2022-01-05 PROCEDURE — 3008F PR BODY MASS INDEX (BMI) DOCUMENTED: ICD-10-PCS | Mod: HCNC,CPTII,S$GLB, | Performed by: FAMILY MEDICINE

## 2022-01-05 PROCEDURE — 3008F BODY MASS INDEX DOCD: CPT | Mod: HCNC,CPTII,S$GLB, | Performed by: FAMILY MEDICINE

## 2022-01-05 PROCEDURE — 80053 COMPREHEN METABOLIC PANEL: CPT | Mod: HCNC | Performed by: FAMILY MEDICINE

## 2022-01-05 PROCEDURE — G0008 ADMIN INFLUENZA VIRUS VAC: HCPCS | Mod: HCNC,S$GLB,, | Performed by: FAMILY MEDICINE

## 2022-01-05 PROCEDURE — 3078F DIAST BP <80 MM HG: CPT | Mod: HCNC,CPTII,S$GLB, | Performed by: FAMILY MEDICINE

## 2022-01-05 PROCEDURE — 99499 UNLISTED E&M SERVICE: CPT | Mod: HCNC,S$GLB,, | Performed by: FAMILY MEDICINE

## 2022-01-05 NOTE — PROGRESS NOTES
Subjective:   Patient ID: Floresita Martin is a 61 y.o. female     Chief Complaint:Hyperlipidemia      Here for checkup    Hyperlipidemia  Pertinent negatives include no chest pain.     Review of Systems   Constitutional: Negative for activity change and unexpected weight change.   HENT: Negative for hearing loss, rhinorrhea and trouble swallowing.    Eyes: Negative for discharge and visual disturbance.   Respiratory: Negative for chest tightness and wheezing.    Cardiovascular: Negative for chest pain and palpitations.   Gastrointestinal: Negative for blood in stool, constipation, diarrhea and vomiting.   Endocrine: Negative for polydipsia and polyuria.   Genitourinary: Positive for difficulty urinating. Negative for dysuria, hematuria and menstrual problem.   Musculoskeletal: Positive for arthralgias and neck pain. Negative for joint swelling.   Neurological: Negative for weakness and headaches.   Psychiatric/Behavioral: Negative for confusion and dysphoric mood.     Past Medical History:   Diagnosis Date    Alcohol abuse     Headache     Hearing loss      Past Surgical History:   Procedure Laterality Date     SECTION      2    COLONOSCOPY N/A 11/15/2017    Procedure: COLONOSCOPY;  Surgeon: Peg Powers MD;  Location: [facility];  Service: Endoscopy;  Laterality: N/A;    NECK SURGERY  2018    STAPEDECTOMY Right 2015     Objective:     Vitals:    22 0835   BP: 120/60   Pulse: 70     Body mass index is 22.66 kg/m².  Physical Exam  Vitals and nursing note reviewed.   Constitutional:       Appearance: She is well-developed and well-nourished.   HENT:      Head: Normocephalic and atraumatic.   Eyes:      General: No scleral icterus.     Conjunctiva/sclera: Conjunctivae normal.   Cardiovascular:      Heart sounds: No murmur heard.      Pulmonary:      Effort: Pulmonary effort is normal. No respiratory distress.   Musculoskeletal:         General: No deformity. Normal range of  motion.      Cervical back: Normal range of motion and neck supple.   Skin:     Coloration: Skin is not pale.      Findings: No rash.   Neurological:      Mental Status: She is alert and oriented to person, place, and time.   Psychiatric:         Mood and Affect: Mood and affect normal.         Behavior: Behavior normal.         Thought Content: Thought content normal.         Judgment: Judgment normal.       Assessment:     1. Chronic obstructive pulmonary disease, unspecified COPD type    2. Other hyperlipidemia    3. Encounter for screening for malignant neoplasm of breast, unspecified screening modality    4. Encounter for screening mammogram for malignant neoplasm of breast     5. Scalp cyst      Plan:   Chronic obstructive pulmonary disease, unspecified COPD type  -     CBC Auto Differential; Future; Expected date: 01/05/2022    Other hyperlipidemia  -     CBC Auto Differential; Future; Expected date: 01/05/2022  -     Comprehensive Metabolic Panel; Future; Expected date: 01/05/2022  -     Lipid Panel; Future; Expected date: 01/05/2022  -     Urinalysis; Future; Expected date: 01/05/2022    Encounter for screening for malignant neoplasm of breast, unspecified screening modality  -     Mammo Digital Screening Bilat; Future; Expected date: 01/05/2022    Encounter for screening mammogram for malignant neoplasm of breast   -     Mammo Digital Screening Bilat; Future; Expected date: 01/05/2022    Scalp cyst  Noted behing right hear. Has had for years with limited change. Advised if any change in size/shape/color f/u immediately but otherwise will watch as appears benign at this time    Other orders  -     Influenza - Quadrivalent *Preferred* (6 months+) (PF)            Toni Hughes MD  01/05/2022    Portions of this note have been dictated with M Modal.

## 2022-01-05 NOTE — PROGRESS NOTES
Patient verified by name and . Patient received flu vaccine in left Deltoid per patient request. Patient tolerated injection well. Patient advised to wait in clinic for 15 minutes in case of adverse reactions. Patient demonstrated understanding.

## 2022-01-06 ENCOUNTER — PATIENT MESSAGE (OUTPATIENT)
Dept: FAMILY MEDICINE | Facility: CLINIC | Age: 62
End: 2022-01-06
Payer: MEDICARE

## 2022-01-06 DIAGNOSIS — N30.00 ACUTE CYSTITIS WITHOUT HEMATURIA: Primary | ICD-10-CM

## 2022-01-06 RX ORDER — NITROFURANTOIN 25; 75 MG/1; MG/1
100 CAPSULE ORAL 2 TIMES DAILY
Qty: 10 CAPSULE | Refills: 0 | Status: SHIPPED | OUTPATIENT
Start: 2022-01-06 | End: 2022-01-12

## 2022-01-07 ENCOUNTER — PATIENT MESSAGE (OUTPATIENT)
Dept: FAMILY MEDICINE | Facility: CLINIC | Age: 62
End: 2022-01-07
Payer: MEDICARE

## 2022-01-11 ENCOUNTER — PATIENT OUTREACH (OUTPATIENT)
Dept: OTHER | Facility: OTHER | Age: 62
End: 2022-01-11

## 2022-01-18 ENCOUNTER — HOSPITAL ENCOUNTER (OUTPATIENT)
Dept: RADIOLOGY | Facility: CLINIC | Age: 62
Discharge: HOME | End: 2022-01-18
Attending: FAMILY MEDICINE
Payer: MEDICARE

## 2022-01-18 DIAGNOSIS — Z12.31 ENCOUNTER FOR SCREENING MAMMOGRAM FOR MALIGNANT NEOPLASM OF BREAST: ICD-10-CM

## 2022-01-18 DIAGNOSIS — Z12.39 ENCOUNTER FOR SCREENING FOR MALIGNANT NEOPLASM OF BREAST, UNSPECIFIED SCREENING MODALITY: ICD-10-CM

## 2022-01-18 PROCEDURE — 77063 BREAST TOMOSYNTHESIS BI: CPT | Mod: TC,HCNC,PO

## 2022-01-18 PROCEDURE — 77067 MAMMO DIGITAL SCREENING BILAT WITH TOMO: ICD-10-PCS | Mod: 26,HCNC,, | Performed by: RADIOLOGY

## 2022-01-18 PROCEDURE — 77067 SCR MAMMO BI INCL CAD: CPT | Mod: TC,HCNC,PO

## 2022-01-18 PROCEDURE — 77067 SCR MAMMO BI INCL CAD: CPT | Mod: 26,HCNC,, | Performed by: RADIOLOGY

## 2022-01-18 PROCEDURE — 77063 MAMMO DIGITAL SCREENING BILAT WITH TOMO: ICD-10-PCS | Mod: 26,HCNC,, | Performed by: RADIOLOGY

## 2022-01-18 PROCEDURE — 77063 BREAST TOMOSYNTHESIS BI: CPT | Mod: 26,HCNC,, | Performed by: RADIOLOGY

## 2022-02-04 ENCOUNTER — TELEPHONE (OUTPATIENT)
Dept: FAMILY MEDICINE | Facility: CLINIC | Age: 62
End: 2022-02-04
Payer: MEDICARE

## 2022-02-04 NOTE — TELEPHONE ENCOUNTER
----- Message from Nik Mccarthy sent at 2/4/2022  3:57 PM CST -----  Contact: Self  Type:  Patient Returning Call    Who Called:  Patient  Who Left Message for Patient:  Ruby  Does the patient know what this is regarding?:  appt  Best Call Back Number:  [phone]   Additional Information:

## 2022-02-04 NOTE — TELEPHONE ENCOUNTER
----- Message from Jose Rome sent at 2/4/2022  2:40 PM CST -----  Contact: pt at [phone]  Type: Needs Medical Advice  Who Called:  pt  Best Call Back Number: [phone]  Additional Information: pt is calling the office to let the doctor know she thinks she still has an UTI cause she still has the symptoms she had before after taking all the medication. Please call back and advise.

## 2022-02-07 ENCOUNTER — TELEPHONE (OUTPATIENT)
Dept: FAMILY MEDICINE | Facility: CLINIC | Age: 62
End: 2022-02-07
Payer: MEDICARE

## 2022-02-07 NOTE — TELEPHONE ENCOUNTER
----- Message from Leidy Condon sent at 2/7/2022 10:43 AM CST -----  Type:  Patient Returning Call    Who Called:  patient   Who Left Message for Patient:  Ruby   Does the patient know what this is regarding?:  yes   Best Call Back Number:  [phone]   Additional Information:  please advise-thank you

## 2022-02-07 NOTE — TELEPHONE ENCOUNTER
----- Message from Gerri Orta sent at 2/7/2022 10:54 AM CST -----  Type:  Patient Returning Call    Who Called:  PT  Who Left Message for Patient:  Ruby  Does the patient know what this is regarding?:  Being seen by DR. Sebastian Call Back Number:  [phone]  Additional Information:  Please call and advise

## 2022-02-07 NOTE — TELEPHONE ENCOUNTER
Spoke to pt states she has an OB/GYN appt scheduled for Wed. Offered pt sooner appt pt advised she will wait to discuss with OB

## 2022-03-07 ENCOUNTER — OFFICE VISIT (OUTPATIENT)
Dept: FAMILY MEDICINE | Facility: CLINIC | Age: 62
End: 2022-03-07
Payer: MEDICARE

## 2022-03-07 VITALS
HEART RATE: 100 BPM | SYSTOLIC BLOOD PRESSURE: 118 MMHG | BODY MASS INDEX: 21.52 KG/M2 | WEIGHT: 114 LBS | DIASTOLIC BLOOD PRESSURE: 78 MMHG | TEMPERATURE: 99 F | HEIGHT: 61 IN | OXYGEN SATURATION: 96 %

## 2022-03-07 DIAGNOSIS — R82.90 MALODOROUS URINE: Primary | ICD-10-CM

## 2022-03-07 LAB
BILIRUB SERPL-MCNC: NEGATIVE MG/DL
BLOOD URINE, POC: NEGATIVE
CLARITY, POC UA: CLEAR
COLOR, POC UA: NORMAL
GLUCOSE UR QL STRIP: NORMAL
KETONES UR QL STRIP: NEGATIVE
LEUKOCYTE ESTERASE URINE, POC: NORMAL
NITRITE, POC UA: POSITIVE
PH, POC UA: 6
PROTEIN, POC: NORMAL
SPECIFIC GRAVITY, POC UA: 1.01
UROBILINOGEN, POC UA: NORMAL

## 2022-03-07 PROCEDURE — 3074F SYST BP LT 130 MM HG: CPT | Mod: CPTII,S$GLB,, | Performed by: PHYSICIAN ASSISTANT

## 2022-03-07 PROCEDURE — 99213 PR OFFICE/OUTPT VISIT, EST, LEVL III, 20-29 MIN: ICD-10-PCS | Mod: S$GLB,,, | Performed by: PHYSICIAN ASSISTANT

## 2022-03-07 PROCEDURE — 99999 PR PBB SHADOW E&M-EST. PATIENT-LVL IV: CPT | Mod: PBBFAC,,, | Performed by: PHYSICIAN ASSISTANT

## 2022-03-07 PROCEDURE — 3078F PR MOST RECENT DIASTOLIC BLOOD PRESSURE < 80 MM HG: ICD-10-PCS | Mod: CPTII,S$GLB,, | Performed by: PHYSICIAN ASSISTANT

## 2022-03-07 PROCEDURE — 81002 URINALYSIS NONAUTO W/O SCOPE: CPT | Mod: S$GLB,,, | Performed by: PHYSICIAN ASSISTANT

## 2022-03-07 PROCEDURE — 81002 POCT URINE DIPSTICK WITHOUT MICROSCOPE: ICD-10-PCS | Mod: S$GLB,,, | Performed by: PHYSICIAN ASSISTANT

## 2022-03-07 PROCEDURE — 3078F DIAST BP <80 MM HG: CPT | Mod: CPTII,S$GLB,, | Performed by: PHYSICIAN ASSISTANT

## 2022-03-07 PROCEDURE — 99213 OFFICE O/P EST LOW 20 MIN: CPT | Mod: S$GLB,,, | Performed by: PHYSICIAN ASSISTANT

## 2022-03-07 PROCEDURE — 99999 PR PBB SHADOW E&M-EST. PATIENT-LVL IV: ICD-10-PCS | Mod: PBBFAC,,, | Performed by: PHYSICIAN ASSISTANT

## 2022-03-07 PROCEDURE — 1159F PR MEDICATION LIST DOCUMENTED IN MEDICAL RECORD: ICD-10-PCS | Mod: CPTII,S$GLB,, | Performed by: PHYSICIAN ASSISTANT

## 2022-03-07 PROCEDURE — 1159F MED LIST DOCD IN RCRD: CPT | Mod: CPTII,S$GLB,, | Performed by: PHYSICIAN ASSISTANT

## 2022-03-07 PROCEDURE — 87186 SC STD MICRODIL/AGAR DIL: CPT | Performed by: PHYSICIAN ASSISTANT

## 2022-03-07 PROCEDURE — 3008F BODY MASS INDEX DOCD: CPT | Mod: CPTII,S$GLB,, | Performed by: PHYSICIAN ASSISTANT

## 2022-03-07 PROCEDURE — 3008F PR BODY MASS INDEX (BMI) DOCUMENTED: ICD-10-PCS | Mod: CPTII,S$GLB,, | Performed by: PHYSICIAN ASSISTANT

## 2022-03-07 PROCEDURE — 87088 URINE BACTERIA CULTURE: CPT | Performed by: PHYSICIAN ASSISTANT

## 2022-03-07 PROCEDURE — 81001 URINALYSIS AUTO W/SCOPE: CPT | Performed by: PHYSICIAN ASSISTANT

## 2022-03-07 PROCEDURE — 3074F PR MOST RECENT SYSTOLIC BLOOD PRESSURE < 130 MM HG: ICD-10-PCS | Mod: CPTII,S$GLB,, | Performed by: PHYSICIAN ASSISTANT

## 2022-03-07 PROCEDURE — 87077 CULTURE AEROBIC IDENTIFY: CPT | Performed by: PHYSICIAN ASSISTANT

## 2022-03-07 PROCEDURE — 87086 URINE CULTURE/COLONY COUNT: CPT | Performed by: PHYSICIAN ASSISTANT

## 2022-03-07 NOTE — PROGRESS NOTES
Subjective:       Patient ID: Floresita Martin is a 62 y.o. female.    Chief Complaint: Urinary Tract Infection    Patient presents for follow up today of recurrent uti symptoms. She first presented to clinic on 1/5/22 with symptoms of cloudy urine and odor. Her UA was positive for cystitis and she was prescribed Nitrofurantoin BID x 5 days. Following this her symptoms persisted and had her urine checked at her OBGYN's office on 2/9/22, and she was prescribed Bactrim BID x 3 days. Today she reports she still occasionally has cloudy urine and odor. In office , her dipstick today is positive for leukocytes and nitrates. She notes that when she tries to urinate, she has to wait and relax longer before she will begin to urinate. She endorses neck and back pain, but states this is chronic issue for her which she takes oxycodone for. She denies any fevers, chills, night sweats, flank pain, pelvic pain, dysuria, hematuria, urgency, increased frequency, nausea, vomiting, chest pain, shortness of breath, numbness or headaches.    Dysuria   This is a recurrent problem. The current episode started more than 1 month ago. The problem occurs intermittently. The problem has been unchanged. The pain is at a severity of 6/10. The pain is moderate. There has been no fever. She is not sexually active. There is no history of pyelonephritis. Associated symptoms include a discharge and hesitancy. Pertinent negatives include no behavior changes, chills, flank pain, frequency, hematuria, nausea, possible pregnancy, sweats, urgency, vomiting, weight loss, constipation, rash or withholding. She has tried antibiotics for the symptoms. The treatment provided mild relief. There is no history of catheterization, diabetes insipidus, diabetes mellitus, genitourinary reflux, hypertension, kidney stones, recurrent UTIs, a single kidney, STD, urinary stasis or a urological procedure.     Review of Systems   Constitutional: Negative for chills, fever  and weight loss.   HENT: Negative for sore throat.    Respiratory: Negative for cough and shortness of breath.    Cardiovascular: Negative for chest pain.   Gastrointestinal: Negative for abdominal pain, constipation, nausea and vomiting.   Genitourinary: Positive for difficulty urinating and hesitancy. Negative for dysuria, flank pain, frequency, hematuria, pelvic pain and urgency.   Skin: Negative for rash.   Neurological: Negative for light-headedness, numbness and headaches.       Objective:      Physical Exam  Vitals reviewed.   Constitutional:       Appearance: Normal appearance. She is not ill-appearing.   HENT:      Head: Normocephalic and atraumatic.      Right Ear: External ear normal.      Left Ear: External ear normal.   Eyes:      Conjunctiva/sclera: Conjunctivae normal.   Cardiovascular:      Rate and Rhythm: Normal rate and regular rhythm.      Pulses: Normal pulses.   Pulmonary:      Effort: Pulmonary effort is normal.      Breath sounds: Normal breath sounds.   Abdominal:      General: Abdomen is flat. Bowel sounds are normal. There is no distension.      Palpations: Abdomen is soft.      Tenderness: There is no right CVA tenderness or left CVA tenderness.   Musculoskeletal:      Cervical back: Neck supple.   Lymphadenopathy:      Cervical: No cervical adenopathy.   Skin:     General: Skin is warm and dry.      Capillary Refill: Capillary refill takes less than 2 seconds.   Neurological:      Mental Status: She is alert and oriented to person, place, and time.         Assessment:       1. Malodorous urine        Plan:           Floresita was seen today for urinary tract infection. Patient is still experiencing recurrent UTI symptoms despite previous antibiotic use. I will wait for her culture results before prescribing an antibiotic. I instructed patient to call our office if her symptoms worsen. Patient was agreeable to this plan and had no other questions.     Diagnoses and all orders for this  "visit:    Urinary tract infection without hematuria, site unspecified  -order urinalysis and culture      patient was seen and examined by me and I agree with HPI, ROS, PE as well as  assessment and plan   Documentation entered by me for this encounter may have been done in part using speech-recognition technology. Although I have made an effort to ensure accuracy, "sound like" errors may exist and should be interpreted in context.     "

## 2022-03-08 LAB
BACTERIA #/AREA URNS AUTO: ABNORMAL /HPF
BILIRUB UR QL STRIP: NEGATIVE
CLARITY UR REFRACT.AUTO: ABNORMAL
COLOR UR AUTO: YELLOW
GLUCOSE UR QL STRIP: NEGATIVE
HGB UR QL STRIP: NEGATIVE
KETONES UR QL STRIP: ABNORMAL
LEUKOCYTE ESTERASE UR QL STRIP: ABNORMAL
MICROSCOPIC COMMENT: ABNORMAL
NITRITE UR QL STRIP: POSITIVE
PH UR STRIP: 6 [PH] (ref 5–8)
PROT UR QL STRIP: NEGATIVE
RBC #/AREA URNS AUTO: 1 /HPF (ref 0–4)
SP GR UR STRIP: 1 (ref 1–1.03)
SQUAMOUS #/AREA URNS AUTO: 1 /HPF
URN SPEC COLLECT METH UR: ABNORMAL
WBC #/AREA URNS AUTO: 13 /HPF (ref 0–5)
WBC CLUMPS UR QL AUTO: ABNORMAL

## 2022-03-10 LAB — BACTERIA UR CULT: ABNORMAL

## 2022-03-10 RX ORDER — NITROFURANTOIN 25; 75 MG/1; MG/1
100 CAPSULE ORAL 2 TIMES DAILY
Qty: 14 CAPSULE | Refills: 0 | Status: SHIPPED | OUTPATIENT
Start: 2022-03-10 | End: 2023-01-05 | Stop reason: ALTCHOICE

## 2022-04-20 ENCOUNTER — PES CALL (OUTPATIENT)
Dept: ADMINISTRATIVE | Facility: CLINIC | Age: 62
End: 2022-04-20
Payer: MEDICARE

## 2022-05-12 ENCOUNTER — PATIENT MESSAGE (OUTPATIENT)
Dept: SMOKING CESSATION | Facility: CLINIC | Age: 62
End: 2022-05-12
Payer: MEDICARE

## 2022-07-13 ENCOUNTER — TELEPHONE (OUTPATIENT)
Dept: FAMILY MEDICINE | Facility: CLINIC | Age: 62
End: 2022-07-13
Payer: MEDICARE

## 2022-07-13 ENCOUNTER — PATIENT MESSAGE (OUTPATIENT)
Dept: FAMILY MEDICINE | Facility: CLINIC | Age: 62
End: 2022-07-13
Payer: MEDICARE

## 2022-12-10 DIAGNOSIS — J30.9 ALLERGIC RHINITIS: ICD-10-CM

## 2022-12-10 RX ORDER — FLUTICASONE PROPIONATE 50 MCG
SPRAY, SUSPENSION (ML) NASAL
Qty: 48 G | Refills: 0 | Status: SHIPPED | OUTPATIENT
Start: 2022-12-10 | End: 2023-08-07

## 2022-12-10 NOTE — TELEPHONE ENCOUNTER
Refill Decision Note   Floresita Martin  is requesting a refill authorization.  Brief Assessment and Rationale for Refill:  Approve    -Medication-Related Problems Identified: Requires labs  Medication Therapy Plan:       Medication Reconciliation Completed: No   Comments:     Provider Staff:     Action is required for this patient.   Please see care gap opportunities below in Care Due Message.     Thanks!  [facility]     Appointments      Date Provider   Last Visit   1/5/2022 Toni Hughes MD   Next Visit   Visit date not found Toni Hughes MD     Note composed:5:30 PM 12/10/2022           Note composed:5:30 PM 12/10/2022

## 2022-12-10 NOTE — TELEPHONE ENCOUNTER
Care Due:                  Date            Visit Type   Department     Provider  --------------------------------------------------------------------------------                                MYCHART                              FOLLOWUP/OF  [facility]  Last Visit: 01-      [facility] VISIT   MEDICINE       Toni Hughes  Next Visit: None Scheduled  None         None Found                                                            Last  Test          Frequency    Reason                     Performed    Due Date  --------------------------------------------------------------------------------    Office Visit  12 months..  atorvastatin, loratadine,   01- 12-                             omeprazole...............    CMP.........  12 months..  atorvastatin.............  01- 01-    Lipid Panel.  12 months..  atorvastatin.............  01- 01-    [employer] Embedded Care Gaps. Reference number: [id number]. 12/10/2022   3:12:16 PM CST

## 2023-01-05 ENCOUNTER — OFFICE VISIT (OUTPATIENT)
Dept: FAMILY MEDICINE | Facility: CLINIC | Age: 63
End: 2023-01-05
Payer: MEDICARE

## 2023-01-05 VITALS
TEMPERATURE: 98 F | WEIGHT: 115.31 LBS | HEART RATE: 91 BPM | OXYGEN SATURATION: 99 % | DIASTOLIC BLOOD PRESSURE: 80 MMHG | SYSTOLIC BLOOD PRESSURE: 122 MMHG | BODY MASS INDEX: 21.77 KG/M2 | HEIGHT: 61 IN

## 2023-01-05 DIAGNOSIS — R23.1 LIVEDO RETICULARIS: ICD-10-CM

## 2023-01-05 DIAGNOSIS — Z72.0 TOBACCO USE: ICD-10-CM

## 2023-01-05 DIAGNOSIS — I73.00 RAYNAUD'S PHENOMENON WITHOUT GANGRENE: ICD-10-CM

## 2023-01-05 DIAGNOSIS — Z87.891 PERSONAL HISTORY OF NICOTINE DEPENDENCE: ICD-10-CM

## 2023-01-05 DIAGNOSIS — Z12.31 ENCOUNTER FOR SCREENING MAMMOGRAM FOR MALIGNANT NEOPLASM OF BREAST: ICD-10-CM

## 2023-01-05 DIAGNOSIS — D17.0 LIPOMA OF HEAD: ICD-10-CM

## 2023-01-05 DIAGNOSIS — E78.49 OTHER HYPERLIPIDEMIA: Primary | ICD-10-CM

## 2023-01-05 DIAGNOSIS — S91.309A WOUND OF FOOT: ICD-10-CM

## 2023-01-05 DIAGNOSIS — Z23 NEED FOR IMMUNIZATION AGAINST INFLUENZA: ICD-10-CM

## 2023-01-05 PROCEDURE — 99999 PR PBB SHADOW E&M-EST. PATIENT-LVL IV: ICD-10-PCS | Mod: PBBFAC,HCNC,, | Performed by: PHYSICIAN ASSISTANT

## 2023-01-05 PROCEDURE — 3079F DIAST BP 80-89 MM HG: CPT | Mod: HCNC,CPTII,S$GLB, | Performed by: PHYSICIAN ASSISTANT

## 2023-01-05 PROCEDURE — 99214 OFFICE O/P EST MOD 30 MIN: CPT | Mod: HCNC,S$GLB,, | Performed by: PHYSICIAN ASSISTANT

## 2023-01-05 PROCEDURE — 3008F PR BODY MASS INDEX (BMI) DOCUMENTED: ICD-10-PCS | Mod: HCNC,CPTII,S$GLB, | Performed by: PHYSICIAN ASSISTANT

## 2023-01-05 PROCEDURE — 90686 FLU VACCINE (QUAD) GREATER THAN OR EQUAL TO 3YO PRESERVATIVE FREE IM: ICD-10-PCS | Mod: HCNC,S$GLB,, | Performed by: PHYSICIAN ASSISTANT

## 2023-01-05 PROCEDURE — 99999 PR PBB SHADOW E&M-EST. PATIENT-LVL IV: CPT | Mod: PBBFAC,HCNC,, | Performed by: PHYSICIAN ASSISTANT

## 2023-01-05 PROCEDURE — 3074F PR MOST RECENT SYSTOLIC BLOOD PRESSURE < 130 MM HG: ICD-10-PCS | Mod: HCNC,CPTII,S$GLB, | Performed by: PHYSICIAN ASSISTANT

## 2023-01-05 PROCEDURE — 3079F PR MOST RECENT DIASTOLIC BLOOD PRESSURE 80-89 MM HG: ICD-10-PCS | Mod: HCNC,CPTII,S$GLB, | Performed by: PHYSICIAN ASSISTANT

## 2023-01-05 PROCEDURE — 3074F SYST BP LT 130 MM HG: CPT | Mod: HCNC,CPTII,S$GLB, | Performed by: PHYSICIAN ASSISTANT

## 2023-01-05 PROCEDURE — 99214 PR OFFICE/OUTPT VISIT, EST, LEVL IV, 30-39 MIN: ICD-10-PCS | Mod: HCNC,S$GLB,, | Performed by: PHYSICIAN ASSISTANT

## 2023-01-05 PROCEDURE — G0008 ADMIN INFLUENZA VIRUS VAC: HCPCS | Mod: HCNC,S$GLB,, | Performed by: PHYSICIAN ASSISTANT

## 2023-01-05 PROCEDURE — G0008 FLU VACCINE (QUAD) GREATER THAN OR EQUAL TO 3YO PRESERVATIVE FREE IM: ICD-10-PCS | Mod: HCNC,S$GLB,, | Performed by: PHYSICIAN ASSISTANT

## 2023-01-05 PROCEDURE — 3008F BODY MASS INDEX DOCD: CPT | Mod: HCNC,CPTII,S$GLB, | Performed by: PHYSICIAN ASSISTANT

## 2023-01-05 PROCEDURE — 90686 IIV4 VACC NO PRSV 0.5 ML IM: CPT | Mod: HCNC,S$GLB,, | Performed by: PHYSICIAN ASSISTANT

## 2023-01-05 NOTE — PROGRESS NOTES
Subjective:       Patient ID: Floresita Martin is a 62 y.o. female.    Chief Complaint: Annual Exam    Patient with hyperlipidemia presents for routine follow-up.  Cholesterol is at goal.  She is compliant with Lipitor and has no side effects.  She continues to smoke.  She is concerned about a lump on the scalp.  The lump has been present for about 10 years.  She feels that the lump is increasing in size.  Lump is not tender.  She also is concerned about a nonhealing wound on the right 2nd toe.  She states that the wound has been present for several weeks.  She states that frequently when her hands or feet get cold her fingers and toes will turn white.  Color returns to normal with rewarming.  She states that for as long as she can remember she is had a mottled look to her skin.  Patients patient medical/surgical, social and family histories have been reviewed       Review of Systems   Constitutional:  Negative for unexpected weight change.   Respiratory:  Negative for cough.    Skin:  Positive for wound.        Positive for lump on scalp      Objective:      Physical Exam  Constitutional:       General: She is not in acute distress.     Appearance: She is well-developed.   HENT:      Head: Normocephalic and atraumatic.      Right Ear: Tympanic membrane, ear canal and external ear normal.      Left Ear: Tympanic membrane, ear canal and external ear normal.      Mouth/Throat:      Mouth: Mucous membranes are moist.      Pharynx: Oropharynx is clear.   Cardiovascular:      Rate and Rhythm: Normal rate and regular rhythm.      Pulses:           Dorsalis pedis pulses are 1+ on the right side and 2+ on the left side.      Heart sounds: Normal heart sounds.   Pulmonary:      Effort: Pulmonary effort is normal.      Breath sounds: Normal breath sounds.   Musculoskeletal:      Cervical back: No tenderness.      Right lower leg: No edema.      Left lower leg: No edema.        Feet:    Skin:     General: Skin is warm and  "dry.      Capillary Refill: Capillary refill takes less than 2 seconds.      Coloration: Skin is mottled.   Neurological:      General: No focal deficit present.      Mental Status: She is alert.   Psychiatric:         Behavior: Behavior is cooperative.       Assessment:       1. Other hyperlipidemia    2. Tobacco use    3. Personal history of nicotine dependence    4. Encounter for screening mammogram for malignant neoplasm of breast    5. Lipoma of head    6. Wound of foot    7. Raynaud's phenomenon without gangrene    8. Livedo reticularis    9. Need for immunization against influenza          Plan:       Floresita was seen today for annual exam.    Diagnoses and all orders for this visit:    Other hyperlipidemia  -     COMPREHENSIVE METABOLIC PANEL; Future  -     Lipid Panel; Future  Continue statin  Tobacco use  -     CBC Auto Differential; Future  -     CT Chest Lung Screening Low Dose; Future    Personal history of nicotine dependence  -     CT Chest Lung Screening Low Dose; Future  -     US Lower Extremity Arteries Bilateral; Future    Encounter for screening mammogram for malignant neoplasm of breast  -     Mammo Digital Screening Bilat w/ Jairo; Future    Lipoma of head  -     Ambulatory referral/consult to General Surgery; Future    Wound of foot  -     Ambulatory referral/consult to Podiatry; Future  -     US Lower Extremity Arteries Bilateral; Future    Raynaud's phenomenon without gangrene  -     DERREK; Future  -     Sedimentation rate; Future  -     C-REACTIVE PROTEIN; Future    Livedo reticularis  Will send information via my chart          Follow up for fastng lab, CT, podiatry, gen surg, US , .           Documentation entered by me for this encounter may have been done in part using speech-recognition technology. Although I have made an effort to ensure accuracy, "sound like" errors may exist and should be interpreted in context.   I spent a total of 30 minutes on the day of the visit.This includes face " to face time and non-face to face time preparing to see the patient (eg, review of tests), obtaining and/or reviewing separately obtained history, documenting clinical information in the electronic or other health record, independently interpreting results and communicating results to the patient/family/caregiver, or care coordinator.

## 2023-01-11 ENCOUNTER — HOSPITAL ENCOUNTER (OUTPATIENT)
Dept: RADIOLOGY | Facility: HOSPITAL | Age: 63
Discharge: HOME | End: 2023-01-11
Attending: PHYSICIAN ASSISTANT
Payer: MEDICARE

## 2023-01-11 ENCOUNTER — OFFICE VISIT (OUTPATIENT)
Dept: SURGERY | Facility: CLINIC | Age: 63
End: 2023-01-11
Payer: MEDICARE

## 2023-01-11 VITALS
SYSTOLIC BLOOD PRESSURE: 158 MMHG | HEART RATE: 103 BPM | RESPIRATION RATE: 16 BRPM | HEIGHT: 61 IN | TEMPERATURE: 98 F | WEIGHT: 115.31 LBS | DIASTOLIC BLOOD PRESSURE: 80 MMHG | BODY MASS INDEX: 21.77 KG/M2

## 2023-01-11 DIAGNOSIS — Z87.891 PERSONAL HISTORY OF NICOTINE DEPENDENCE: ICD-10-CM

## 2023-01-11 DIAGNOSIS — D17.0 LIPOMA OF HEAD: Primary | ICD-10-CM

## 2023-01-11 DIAGNOSIS — F17.200 SMOKER: ICD-10-CM

## 2023-01-11 DIAGNOSIS — S91.309A WOUND OF FOOT: ICD-10-CM

## 2023-01-11 DIAGNOSIS — R91.1 LUNG NODULE: Primary | ICD-10-CM

## 2023-01-11 DIAGNOSIS — Z72.0 TOBACCO USE: ICD-10-CM

## 2023-01-11 PROCEDURE — 3077F PR MOST RECENT SYSTOLIC BLOOD PRESSURE >= 140 MM HG: ICD-10-PCS | Mod: CPTII,S$GLB,, | Performed by: SURGERY

## 2023-01-11 PROCEDURE — 93922 US ARTERIAL LOWER EXTREMITY BILAT WITH ABI (XPD): ICD-10-PCS | Mod: 26,HCNC,, | Performed by: RADIOLOGY

## 2023-01-11 PROCEDURE — 3077F SYST BP >= 140 MM HG: CPT | Mod: CPTII,S$GLB,, | Performed by: SURGERY

## 2023-01-11 PROCEDURE — 71271 CT CHEST LUNG SCREENING LOW DOSE: ICD-10-PCS | Mod: 26,HCNC,, | Performed by: RADIOLOGY

## 2023-01-11 PROCEDURE — 3008F BODY MASS INDEX DOCD: CPT | Mod: CPTII,S$GLB,, | Performed by: SURGERY

## 2023-01-11 PROCEDURE — 71271 CT THORAX LUNG CANCER SCR C-: CPT | Mod: TC,HCNC

## 2023-01-11 PROCEDURE — 3008F PR BODY MASS INDEX (BMI) DOCUMENTED: ICD-10-PCS | Mod: CPTII,S$GLB,, | Performed by: SURGERY

## 2023-01-11 PROCEDURE — 99203 OFFICE O/P NEW LOW 30 MIN: CPT | Mod: S$GLB,,, | Performed by: SURGERY

## 2023-01-11 PROCEDURE — 1159F PR MEDICATION LIST DOCUMENTED IN MEDICAL RECORD: ICD-10-PCS | Mod: CPTII,S$GLB,, | Performed by: SURGERY

## 2023-01-11 PROCEDURE — 93925 US ARTERIAL LOWER EXTREMITY BILAT WITH ABI (XPD): ICD-10-PCS | Mod: 26,HCNC,, | Performed by: RADIOLOGY

## 2023-01-11 PROCEDURE — 3079F PR MOST RECENT DIASTOLIC BLOOD PRESSURE 80-89 MM HG: ICD-10-PCS | Mod: CPTII,S$GLB,, | Performed by: SURGERY

## 2023-01-11 PROCEDURE — 93925 LOWER EXTREMITY STUDY: CPT | Mod: 26,HCNC,, | Performed by: RADIOLOGY

## 2023-01-11 PROCEDURE — 71271 CT THORAX LUNG CANCER SCR C-: CPT | Mod: 26,HCNC,, | Performed by: RADIOLOGY

## 2023-01-11 PROCEDURE — 1159F MED LIST DOCD IN RCRD: CPT | Mod: CPTII,S$GLB,, | Performed by: SURGERY

## 2023-01-11 PROCEDURE — 3079F DIAST BP 80-89 MM HG: CPT | Mod: CPTII,S$GLB,, | Performed by: SURGERY

## 2023-01-11 PROCEDURE — 93922 UPR/L XTREMITY ART 2 LEVELS: CPT | Mod: 26,HCNC,, | Performed by: RADIOLOGY

## 2023-01-11 PROCEDURE — 99203 PR OFFICE/OUTPT VISIT, NEW, LEVL III, 30-44 MIN: ICD-10-PCS | Mod: S$GLB,,, | Performed by: SURGERY

## 2023-01-11 PROCEDURE — 93925 LOWER EXTREMITY STUDY: CPT | Mod: TC,HCNC

## 2023-01-11 NOTE — H&P
GENERAL SURGERY  OUTPATIENT H&P    REASON FOR VISIT/CC: Scalp lesion    HPI: Floresita Martin is a 62 y.o. female here for evaluation of a right posterior scalp lesion.  Patient reports lesions been present for at least 5 years but is slowly enlarging.  There is no drainage or pain at baseline.  There is slight discomfort with pressure. No similar lesions present. No previous attempt at removal. Reports she has a twin sister which has had multiple similar lesions removed from her scalp.    I have reviewed the patient's chart including prior progress notes, procedures and testing.     ROS:   Review of Systems   All other systems reviewed and are negative.    PROBLEM LIST:  Patient Active Problem List   Diagnosis    Otosclerosis    URI (upper respiratory infection)    Screen for colon cancer    Melanosis coli    Lung nodule < 6cm on CT    Gastroesophageal reflux disease without esophagitis    Other hyperlipidemia    Dependence on nicotine from cigarettes    Pain of toe of right foot    Cellulitis of toe of right foot    Chronic obstructive pulmonary disease, unspecified COPD type         HISTORY  Past Medical History:   Diagnosis Date    Alcohol abuse     Headache     Hearing loss        Past Surgical History:   Procedure Laterality Date     SECTION      2    COLONOSCOPY N/A 11/15/2017    Procedure: COLONOSCOPY;  Surgeon: Peg Powers MD;  Location: [facility];  Service: Endoscopy;  Laterality: N/A;    NECK SURGERY  2018    STAPEDECTOMY Right 2015       Social History     Tobacco Use    Smoking status: Every Day     Packs/day: 1.50     Years: 30.00     Pack years: 45.00     Types: Cigarettes     Last attempt to quit: 2020     Years since quittin.9    Smokeless tobacco: Never   Substance Use Topics    Alcohol use: Yes     Comment: social     Drug use: No       Family History   Problem Relation Age of Onset    Diabetes Mother     Hypertension Father     Breast cancer Sister 47    Breast  cancer Sister     Breast cancer Sister     Breast cancer Sister          MEDS:  Current Outpatient Medications on File Prior to Visit   Medication Sig Dispense Refill    amitriptyline (ELAVIL) 25 MG tablet Take 1-2 tablets by mouth at bedtime 60 tablet 1    atorvastatin (LIPITOR) 40 MG tablet Take 1 tablet (40 mg total) by mouth once daily. 90 tablet 1    co-enzyme Q-10 30 mg capsule Take 30 mg by mouth 3 (three) times daily.      cyclobenzaprine (FLEXERIL) 10 MG tablet Take 1 tablet by mouth twice a day 60 tablet 1    fluticasone propionate (FLONASE) 50 mcg/actuation nasal spray SHAKE LIQUID AND USE 1 SPRAY(50 MCG) IN EACH NOSTRIL EVERY DAY AT 6 AM 48 g 0    ibuprofen (ADVIL,MOTRIN) 800 MG tablet Take 1 tablet by mouth three times a day as needed for pain 90 tablet 1    lactulose (CHRONULAC) 10 gram/15 mL solution TAKE 15 MLS BY MOUTH TWICE DAILY AS NEEDED 900 mL 1    loratadine (CLARITIN) 10 mg tablet Take 1 tablet (10 mg total) by mouth once daily. 90 tablet 3    multivitamin capsule Take 1 capsule by mouth once daily.      naloxone (NARCAN) 4 mg/actuation Spry Spray 1 spray into one nostril single dose may repeat every 2-3 minutes until responsive or EMS arrives 1 each 1    omeprazole (PRILOSEC) 40 MG capsule Take 1 capsule (40 mg total) by mouth once daily. 90 capsule 1    oxyCODONE (ROXICODONE) 20 mg Tab immediate release tablet Take 1 tablet by mouth five times a day as needed for pain More than 7 days supply is medically necessary 150 tablet 0     No current facility-administered medications on file prior to visit.       ALLERGIES:  Review of patient's allergies indicates:   Allergen Reactions    Codeine Nausea And Vomiting    Tylenol-codeine [acetaminophen-codeine] Nausea And Vomiting         VITALS:  Vitals:    01/11/23 1323   BP: (!) 158/80   Pulse: 103   Resp: 16   Temp: 97.6 °F (36.4 °C)         PHYSICAL EXAM:  Physical Exam  Vitals reviewed.   Constitutional:       General: She is not in acute  distress.     Appearance: Normal appearance. She is well-developed.   HENT:      Head: Normocephalic and atraumatic.        Comments: Approximally 2 x 2 cm mobile subcutaneous mass consistent with lipoma     Nose: Nose normal.   Eyes:      General: No scleral icterus.     Conjunctiva/sclera: Conjunctivae normal.   Neck:      Trachea: No tracheal tenderness or tracheal deviation.   Cardiovascular:      Rate and Rhythm: Normal rate and regular rhythm.      Pulses: Normal pulses.   Pulmonary:      Effort: Pulmonary effort is normal. No accessory muscle usage or respiratory distress.      Breath sounds: Normal breath sounds.   Abdominal:      General: There is no distension.      Palpations: Abdomen is soft.      Tenderness: There is no abdominal tenderness.   Musculoskeletal:         General: No swelling or tenderness. Normal range of motion.      Cervical back: Normal range of motion and neck supple. No rigidity.   Skin:     General: Skin is warm and dry.      Coloration: Skin is not jaundiced.      Findings: No erythema.   Neurological:      General: No focal deficit present.      Mental Status: She is alert and oriented to person, place, and time.      Motor: No weakness or abnormal muscle tone.   Psychiatric:         Mood and Affect: Mood normal.         Behavior: Behavior normal.         Thought Content: Thought content normal.         Judgment: Judgment normal.         LABS:  Lab Results   Component Value Date    WBC 5.20 01/11/2023    RBC 4.17 01/11/2023    HGB 13.4 01/11/2023    HCT 38.8 01/11/2023     01/11/2023     Lab Results   Component Value Date    GLU 82 01/11/2023     (L) 01/11/2023    K 4.7 01/11/2023    CL 98 01/11/2023    CO2 25 01/11/2023    BUN 12 01/11/2023    CREATININE 0.8 01/11/2023    CALCIUM 9.5 01/11/2023     Lab Results   Component Value Date    ALT 22 01/11/2023    AST 27 01/11/2023    ALKPHOS 101 01/11/2023    BILITOT 0.4 01/11/2023     No results found for: MG,  PHOS    STUDIES:      ASSESSMENT & PLAN:  62 y.o. female with right scalp subcutaneous mass  -exam and history consistent with lipoma  -given location on the scale I have recommended excision OR better visualization and use of electrocautery  -risk of removal including pain, bleeding, scarring, infection, recurrence, intraoperative fire, etc. were reviewed, patient expressed understanding these risks and desires surgical intervention prevent further enlargement and relieve occasional discomfort  -will schedule for 01/23/2023 at [facility]  -labs and chest imaging up-to-date, will obtain EKG

## 2023-01-17 ENCOUNTER — HOSPITAL ENCOUNTER (OUTPATIENT)
Dept: PREADMISSION TESTING | Facility: HOSPITAL | Age: 63
Discharge: HOME | End: 2023-01-17
Attending: SURGERY
Payer: MEDICARE

## 2023-01-17 VITALS
OXYGEN SATURATION: 97 % | TEMPERATURE: 99 F | BODY MASS INDEX: 22.58 KG/M2 | HEIGHT: 60 IN | HEART RATE: 88 BPM | SYSTOLIC BLOOD PRESSURE: 136 MMHG | DIASTOLIC BLOOD PRESSURE: 83 MMHG | WEIGHT: 115 LBS | RESPIRATION RATE: 18 BRPM

## 2023-01-17 DIAGNOSIS — D17.0 LIPOMA OF HEAD: ICD-10-CM

## 2023-01-17 PROCEDURE — 93010 ELECTROCARDIOGRAM REPORT: CPT | Mod: ,,, | Performed by: SPECIALIST

## 2023-01-17 PROCEDURE — 93005 ELECTROCARDIOGRAM TRACING: CPT | Performed by: SPECIALIST

## 2023-01-17 PROCEDURE — 93010 EKG 12-LEAD: ICD-10-PCS | Mod: ,,, | Performed by: SPECIALIST

## 2023-01-17 NOTE — DISCHARGE INSTRUCTIONS
To confirm, Your doctor has instructed you that surgery is scheduled for: Monday 1/23/23    Pre-Op will call the afternoon prior to surgery between 4:00 and 6:00 PM with the final arrival time.  Friday 1/20/23    Please report to Registration at front of the hospital --it is best to park in the garage on [facility] [place]    Do not eat or drink anything after midnight the night before your surgery - THIS INCLUDES  WATER, GUM, MINTS AND CANDY.    YOU MAY BRUSH YOUR TEETH     TAKE APPROVED  MEDICATIONS WITH A SMALL SIP OF WATER THE MORNING OF YOUR PROCEDURE: See Medication list    PLEASE NOTE:  The surgery schedule has many variables which may affect the time of your surgery case.  Family members should be available if your surgery time changes.  Plan to be here the day of your procedure between 4-6 hours.    DO NOT TAKE THESE MEDICATIONS 5-7 DAYS PRIOR to your procedure or per your surgeon's request: ASPIRIN, ALEVE, ADVIL, IBUPROFEN,  JORDAN SELTZER, BC , FISH OIL , VITAMIN E, HERBALS  (May take Tylenol)                                                   IMPORTANT INSTRUCTIONS    Do not smoke, vape or drink alcoholic beverages 24 hours prior to your procedure.  Shower the night before AND the morning of your procedure with a Chlorhexidine wash such as Hibiclens or Dial antibacterial soap from the neck down.    Do not get it on your face or in your eyes.  You may use your own shampoo and face wash. This helps your skin to be as bacteria free as possible.   Do not apply any deodorant, lotion or powder after you shower.   DO NOT remove hair from the surgery site.  Do not shave the incision site unless you are given specific instructions to do so.    Sleep in a bed with clean sheets.  Do not sleep with a pet in the bed.   If you wear contact lenses, dentures, hearing aids or glasses, bring a container to put them in during surgery and give to a family member for safe keeping.    Please leave all jewelry, piercing's and valuables  at home.   ONLY if you have been diagnosed with sleep apnea , please bring your C-PAP machine.  ONLY if you wear home oxygen please bring your portable oxygen tank the day of your procedure.   ONLY if you have a neuro stimulator, please bring the controller with you the morning of surgery  If your doctor has scheduled you for an overnight stay, bring a small overnight bag with any personal items you need.  Wear comfortable clothing that is easy to remove and place back on after surgery.     Make arrangements in advance for transportation home by a responsible adult.    You must make arrangements for transportation, TAXI'S, UBER'S OR LYFTS ARE NOT ALLOWED.      If you have any questions about these instructions, call Pre-Op Admit  Nursing at [phone] , Pre-Op Day Surgery Unit at [phone]

## 2023-01-19 ENCOUNTER — HOSPITAL ENCOUNTER (OUTPATIENT)
Dept: RADIOLOGY | Facility: CLINIC | Age: 63
Discharge: HOME | End: 2023-01-19
Attending: PHYSICIAN ASSISTANT
Payer: MEDICARE

## 2023-01-19 DIAGNOSIS — Z12.31 ENCOUNTER FOR SCREENING MAMMOGRAM FOR MALIGNANT NEOPLASM OF BREAST: ICD-10-CM

## 2023-01-19 PROCEDURE — 77063 MAMMO DIGITAL SCREENING BILAT WITH TOMO: ICD-10-PCS | Mod: 26,HCNC,, | Performed by: RADIOLOGY

## 2023-01-19 PROCEDURE — 77067 MAMMO DIGITAL SCREENING BILAT WITH TOMO: ICD-10-PCS | Mod: 26,HCNC,, | Performed by: RADIOLOGY

## 2023-01-19 PROCEDURE — 77067 SCR MAMMO BI INCL CAD: CPT | Mod: 26,HCNC,, | Performed by: RADIOLOGY

## 2023-01-19 PROCEDURE — 77067 SCR MAMMO BI INCL CAD: CPT | Mod: TC,HCNC,PO

## 2023-01-19 PROCEDURE — 77063 BREAST TOMOSYNTHESIS BI: CPT | Mod: 26,HCNC,, | Performed by: RADIOLOGY

## 2023-01-20 ENCOUNTER — OFFICE VISIT (OUTPATIENT)
Dept: FAMILY MEDICINE | Facility: CLINIC | Age: 63
End: 2023-01-20
Payer: MEDICARE

## 2023-01-20 ENCOUNTER — HOSPITAL ENCOUNTER (OUTPATIENT)
Dept: RADIOLOGY | Facility: HOSPITAL | Age: 63
Discharge: HOME | End: 2023-01-20
Attending: PHYSICIAN ASSISTANT
Payer: MEDICARE

## 2023-01-20 VITALS
HEART RATE: 102 BPM | HEIGHT: 60 IN | TEMPERATURE: 98 F | SYSTOLIC BLOOD PRESSURE: 130 MMHG | OXYGEN SATURATION: 98 % | BODY MASS INDEX: 22.64 KG/M2 | WEIGHT: 115.31 LBS | DIASTOLIC BLOOD PRESSURE: 84 MMHG

## 2023-01-20 DIAGNOSIS — M25.649 STIFFNESS OF HAND JOINT, UNSPECIFIED LATERALITY: ICD-10-CM

## 2023-01-20 DIAGNOSIS — M25.561 CHRONIC PAIN OF RIGHT KNEE: ICD-10-CM

## 2023-01-20 DIAGNOSIS — G89.29 CHRONIC PAIN OF RIGHT KNEE: ICD-10-CM

## 2023-01-20 DIAGNOSIS — M25.649 STIFFNESS OF HAND JOINT, UNSPECIFIED LATERALITY: Primary | ICD-10-CM

## 2023-01-20 PROCEDURE — 3079F PR MOST RECENT DIASTOLIC BLOOD PRESSURE 80-89 MM HG: ICD-10-PCS | Mod: HCNC,CPTII,S$GLB, | Performed by: PHYSICIAN ASSISTANT

## 2023-01-20 PROCEDURE — 99214 OFFICE O/P EST MOD 30 MIN: CPT | Mod: HCNC,S$GLB,, | Performed by: PHYSICIAN ASSISTANT

## 2023-01-20 PROCEDURE — 99999 PR PBB SHADOW E&M-EST. PATIENT-LVL V: ICD-10-PCS | Mod: PBBFAC,HCNC,, | Performed by: PHYSICIAN ASSISTANT

## 2023-01-20 PROCEDURE — 3008F BODY MASS INDEX DOCD: CPT | Mod: HCNC,CPTII,S$GLB, | Performed by: PHYSICIAN ASSISTANT

## 2023-01-20 PROCEDURE — 77077 JOINT SURVEY SINGLE VIEW: CPT | Mod: 26,HCNC,, | Performed by: RADIOLOGY

## 2023-01-20 PROCEDURE — 3008F PR BODY MASS INDEX (BMI) DOCUMENTED: ICD-10-PCS | Mod: HCNC,CPTII,S$GLB, | Performed by: PHYSICIAN ASSISTANT

## 2023-01-20 PROCEDURE — 3075F SYST BP GE 130 - 139MM HG: CPT | Mod: HCNC,CPTII,S$GLB, | Performed by: PHYSICIAN ASSISTANT

## 2023-01-20 PROCEDURE — 99999 PR PBB SHADOW E&M-EST. PATIENT-LVL V: CPT | Mod: PBBFAC,HCNC,, | Performed by: PHYSICIAN ASSISTANT

## 2023-01-20 PROCEDURE — 1159F PR MEDICATION LIST DOCUMENTED IN MEDICAL RECORD: ICD-10-PCS | Mod: HCNC,CPTII,S$GLB, | Performed by: PHYSICIAN ASSISTANT

## 2023-01-20 PROCEDURE — 99214 PR OFFICE/OUTPT VISIT, EST, LEVL IV, 30-39 MIN: ICD-10-PCS | Mod: HCNC,S$GLB,, | Performed by: PHYSICIAN ASSISTANT

## 2023-01-20 PROCEDURE — 3079F DIAST BP 80-89 MM HG: CPT | Mod: HCNC,CPTII,S$GLB, | Performed by: PHYSICIAN ASSISTANT

## 2023-01-20 PROCEDURE — 3075F PR MOST RECENT SYSTOLIC BLOOD PRESS GE 130-139MM HG: ICD-10-PCS | Mod: HCNC,CPTII,S$GLB, | Performed by: PHYSICIAN ASSISTANT

## 2023-01-20 PROCEDURE — 77077 JOINT SURVEY SINGLE VIEW: CPT | Mod: TC,HCNC

## 2023-01-20 PROCEDURE — 1159F MED LIST DOCD IN RCRD: CPT | Mod: HCNC,CPTII,S$GLB, | Performed by: PHYSICIAN ASSISTANT

## 2023-01-20 PROCEDURE — 77077 XR ARTHRITIS SURVEY: ICD-10-PCS | Mod: 26,HCNC,, | Performed by: RADIOLOGY

## 2023-01-20 RX ORDER — CELECOXIB 200 MG/1
200 CAPSULE ORAL DAILY
Qty: 30 CAPSULE | Refills: 2 | Status: SHIPPED | OUTPATIENT
Start: 2023-01-20 | End: 2023-06-28

## 2023-01-21 NOTE — PROGRESS NOTES
Subjective:       Patient ID: Floresita Martin is a 62 y.o. female.    Chief Complaint: No chief complaint on file.    Patient presents for evaluation of hand pain/stiffness and right knee pain.  She has had this off/on for many months.  Symptoms are worsening.  She takes ibuprofen 800 without relief.  She is under pain management for chronic neck and low back pain having had surgeries without pain relief.  She takes oxycodone as prescribed and this too is not helping hands or knee. No injury.  Patients patient medical/surgical, social and family histories have been reviewed       Review of Systems   Constitutional:  Negative for activity change and unexpected weight change.   HENT:  Negative for hearing loss, rhinorrhea and trouble swallowing.    Eyes:  Negative for discharge.   Respiratory:  Negative for chest tightness and wheezing.    Cardiovascular:  Negative for chest pain and palpitations.   Gastrointestinal:  Negative for blood in stool, constipation, diarrhea and vomiting.   Endocrine: Negative for polydipsia and polyuria.   Genitourinary:  Negative for dysuria, hematuria and menstrual problem.   Musculoskeletal:  Positive for arthralgias, joint swelling and neck pain.   Neurological:  Negative for weakness and headaches.   Psychiatric/Behavioral:  Negative for confusion and dysphoric mood.      Objective:      Physical Exam  Constitutional:       General: She is not in acute distress.     Appearance: Normal appearance. She is not ill-appearing.   HENT:      Head: Normocephalic and atraumatic.   Eyes:      Conjunctiva/sclera: Conjunctivae normal.   Pulmonary:      Effort: Pulmonary effort is normal.   Musculoskeletal:      Right hand: Deformity present. No bony tenderness. Decreased range of motion. Normal strength. Normal capillary refill.      Left hand: Deformity present. No bony tenderness. Decreased range of motion. Normal strength. Normal capillary refill.      Right knee: No deformity, effusion or  "crepitus. Decreased range of motion. No tenderness. No LCL laxity or MCL laxity. Normal patellar mobility.      Instability Tests: Anterior drawer test negative. Posterior drawer test negative.   Neurological:      Mental Status: She is alert.   Psychiatric:         Mood and Affect: Mood normal.       Assessment:       1. Stiffness of hand joint, unspecified laterality    2. Chronic pain of right knee          Plan:       Diagnoses and all orders for this visit:    Stiffness of hand joint, unspecified laterality  -     RHEUMATOID FACTOR; Future  -     X-ray Arthritis Survey; Future  -     celecoxib (CELEBREX) 200 MG capsule; Take 1 capsule (200 mg total) by mouth once daily.    Chronic pain of right knee  -     RHEUMATOID FACTOR; Future  -     X-ray Arthritis Survey; Future  -     celecoxib (CELEBREX) 200 MG capsule; Take 1 capsule (200 mg total) by mouth once daily.  -     Ambulatory referral/consult to Orthopedics; Future  -     Ambulatory referral/consult to Orthopedics; Future     D/c ibuprofen 800      Follow up for lab and xrays .       Further recommendations will be made based on results       Documentation entered by me for this encounter may have been done in part using speech-recognition technology. Although I have made an effort to ensure accuracy, "sound like" errors may exist and should be interpreted in context.   I spent a total of 40 minutes on the day of the visit.This includes face to face time and non-face to face time preparing to see the patient (eg, review of tests), obtaining and/or reviewing separately obtained history, documenting clinical information in the electronic or other health record, independently interpreting results and communicating results to the patient/family/caregiver, or care coordinator.    "

## 2023-01-23 ENCOUNTER — ANESTHESIA (OUTPATIENT)
Dept: SURGERY | Facility: HOSPITAL | Age: 63
End: 2023-01-23
Payer: MEDICARE

## 2023-01-23 ENCOUNTER — HOSPITAL ENCOUNTER (OUTPATIENT)
Facility: HOSPITAL | Age: 63
Discharge: HOME | End: 2023-01-23
Attending: SURGERY | Admitting: SURGERY
Payer: MEDICARE

## 2023-01-23 ENCOUNTER — ANESTHESIA EVENT (OUTPATIENT)
Dept: SURGERY | Facility: HOSPITAL | Age: 63
End: 2023-01-23
Payer: MEDICARE

## 2023-01-23 ENCOUNTER — TELEPHONE (OUTPATIENT)
Dept: ORTHOPEDICS | Facility: CLINIC | Age: 63
End: 2023-01-23
Payer: MEDICARE

## 2023-01-23 VITALS
TEMPERATURE: 99 F | WEIGHT: 115 LBS | HEART RATE: 80 BPM | SYSTOLIC BLOOD PRESSURE: 137 MMHG | RESPIRATION RATE: 18 BRPM | BODY MASS INDEX: 22.58 KG/M2 | HEIGHT: 60 IN | OXYGEN SATURATION: 95 % | DIASTOLIC BLOOD PRESSURE: 75 MMHG

## 2023-01-23 DIAGNOSIS — D17.0 LIPOMA OF HEAD: ICD-10-CM

## 2023-01-23 PROCEDURE — 36000707: Performed by: SURGERY

## 2023-01-23 PROCEDURE — 37000009 HC ANESTHESIA EA ADD 15 MINS: Performed by: SURGERY

## 2023-01-23 PROCEDURE — 71000015 HC POSTOP RECOV 1ST HR: Performed by: SURGERY

## 2023-01-23 PROCEDURE — 37000008 HC ANESTHESIA 1ST 15 MINUTES: Performed by: SURGERY

## 2023-01-23 PROCEDURE — 25000003 PHARM REV CODE 250: Performed by: NURSE ANESTHETIST, CERTIFIED REGISTERED

## 2023-01-23 PROCEDURE — 63600175 PHARM REV CODE 636 W HCPCS: Performed by: NURSE ANESTHETIST, CERTIFIED REGISTERED

## 2023-01-23 PROCEDURE — 11422 PR EXC SKIN BENIG 1.1-2 CM REMAINDR BODY: ICD-10-PCS | Mod: ,,, | Performed by: SURGERY

## 2023-01-23 PROCEDURE — 25000003 PHARM REV CODE 250: Performed by: SURGERY

## 2023-01-23 PROCEDURE — 27201423 OPTIME MED/SURG SUP & DEVICES STERILE SUPPLY: Performed by: SURGERY

## 2023-01-23 PROCEDURE — 36000706: Performed by: SURGERY

## 2023-01-23 PROCEDURE — 11422 EXC H-F-NK-SP B9+MARG 1.1-2: CPT | Mod: ,,, | Performed by: SURGERY

## 2023-01-23 PROCEDURE — 88304 TISSUE EXAM BY PATHOLOGIST: CPT | Mod: TC

## 2023-01-23 RX ORDER — IBUPROFEN 200 MG
400 TABLET ORAL EVERY 6 HOURS PRN
Refills: 0 | COMMUNITY
Start: 2023-01-23 | End: 2023-01-26

## 2023-01-23 RX ORDER — ONDANSETRON 4 MG/1
4 TABLET, ORALLY DISINTEGRATING ORAL EVERY 6 HOURS PRN
Qty: 8 TABLET | Refills: 0 | Status: SHIPPED | OUTPATIENT
Start: 2023-01-23

## 2023-01-23 RX ORDER — OXYCODONE HYDROCHLORIDE 5 MG/1
5 TABLET ORAL ONCE AS NEEDED
Status: DISCONTINUED | OUTPATIENT
Start: 2023-01-23 | End: 2023-01-23 | Stop reason: HOSPADM

## 2023-01-23 RX ORDER — PROPOFOL 10 MG/ML
INJECTION, EMULSION INTRAVENOUS
Status: DISCONTINUED | OUTPATIENT
Start: 2023-01-23 | End: 2023-01-23

## 2023-01-23 RX ORDER — ACETAMINOPHEN 325 MG/1
650 TABLET ORAL EVERY 6 HOURS PRN
Refills: 0 | COMMUNITY
Start: 2023-01-23 | End: 2023-06-28

## 2023-01-23 RX ORDER — SODIUM CHLORIDE, SODIUM LACTATE, POTASSIUM CHLORIDE, CALCIUM CHLORIDE 600; 310; 30; 20 MG/100ML; MG/100ML; MG/100ML; MG/100ML
INJECTION, SOLUTION INTRAVENOUS CONTINUOUS PRN
Status: DISCONTINUED | OUTPATIENT
Start: 2023-01-23 | End: 2023-01-23

## 2023-01-23 RX ORDER — ONDANSETRON 4 MG/1
4 TABLET, ORALLY DISINTEGRATING ORAL ONCE AS NEEDED
Status: DISCONTINUED | OUTPATIENT
Start: 2023-01-23 | End: 2023-01-23 | Stop reason: HOSPADM

## 2023-01-23 RX ORDER — OXYCODONE HYDROCHLORIDE 5 MG/1
10 TABLET ORAL ONCE AS NEEDED
Status: DISCONTINUED | OUTPATIENT
Start: 2023-01-23 | End: 2023-01-23 | Stop reason: HOSPADM

## 2023-01-23 RX ORDER — LIDOCAINE HCL/PF 100 MG/5ML
SYRINGE (ML) INTRAVENOUS
Status: DISCONTINUED | OUTPATIENT
Start: 2023-01-23 | End: 2023-01-23

## 2023-01-23 RX ORDER — LIDOCAINE HYDROCHLORIDE AND EPINEPHRINE 10; 10 MG/ML; UG/ML
INJECTION, SOLUTION INFILTRATION; PERINEURAL
Status: DISCONTINUED | OUTPATIENT
Start: 2023-01-23 | End: 2023-01-23 | Stop reason: HOSPADM

## 2023-01-23 RX ORDER — TRAMADOL HYDROCHLORIDE 50 MG/1
50 TABLET ORAL EVERY 6 HOURS PRN
Qty: 8 TABLET | Refills: 0 | Status: SHIPPED | OUTPATIENT
Start: 2023-01-23 | End: 2023-06-28

## 2023-01-23 RX ORDER — ACETAMINOPHEN 10 MG/ML
INJECTION, SOLUTION INTRAVENOUS
Status: DISCONTINUED | OUTPATIENT
Start: 2023-01-23 | End: 2023-01-23

## 2023-01-23 RX ORDER — FENTANYL CITRATE 50 UG/ML
INJECTION, SOLUTION INTRAMUSCULAR; INTRAVENOUS
Status: DISCONTINUED | OUTPATIENT
Start: 2023-01-23 | End: 2023-01-23

## 2023-01-23 RX ORDER — FAMOTIDINE 10 MG/ML
INJECTION INTRAVENOUS
Status: DISCONTINUED | OUTPATIENT
Start: 2023-01-23 | End: 2023-01-23

## 2023-01-23 RX ORDER — ONDANSETRON 2 MG/ML
INJECTION INTRAMUSCULAR; INTRAVENOUS
Status: DISCONTINUED | OUTPATIENT
Start: 2023-01-23 | End: 2023-01-23

## 2023-01-23 RX ORDER — MIDAZOLAM HYDROCHLORIDE 1 MG/ML
INJECTION INTRAMUSCULAR; INTRAVENOUS
Status: DISCONTINUED | OUTPATIENT
Start: 2023-01-23 | End: 2023-01-23

## 2023-01-23 RX ORDER — BUPIVACAINE HYDROCHLORIDE 2.5 MG/ML
INJECTION, SOLUTION EPIDURAL; INFILTRATION; INTRACAUDAL
Status: DISCONTINUED | OUTPATIENT
Start: 2023-01-23 | End: 2023-01-23 | Stop reason: HOSPADM

## 2023-01-23 RX ADMIN — FENTANYL CITRATE 50 MCG: 50 INJECTION INTRAMUSCULAR; INTRAVENOUS at 07:01

## 2023-01-23 RX ADMIN — FAMOTIDINE 20 MG: 10 INJECTION, SOLUTION INTRAVENOUS at 07:01

## 2023-01-23 RX ADMIN — PROPOFOL 50 MG: 10 INJECTION, EMULSION INTRAVENOUS at 07:01

## 2023-01-23 RX ADMIN — PROPOFOL 3 MG: 10 INJECTION, EMULSION INTRAVENOUS at 07:01

## 2023-01-23 RX ADMIN — SODIUM CHLORIDE, SODIUM LACTATE, POTASSIUM CHLORIDE, AND CALCIUM CHLORIDE: .6; .31; .03; .02 INJECTION, SOLUTION INTRAVENOUS at 07:01

## 2023-01-23 RX ADMIN — ACETAMINOPHEN 1000 MG: 10 INJECTION, SOLUTION INTRAVENOUS at 07:01

## 2023-01-23 RX ADMIN — LIDOCAINE HYDROCHLORIDE 50 MG: 20 INJECTION, SOLUTION INTRAVENOUS at 07:01

## 2023-01-23 RX ADMIN — PROPOFOL 20 MG: 10 INJECTION, EMULSION INTRAVENOUS at 07:01

## 2023-01-23 RX ADMIN — ONDANSETRON 4 MG: 2 INJECTION INTRAMUSCULAR; INTRAVENOUS at 07:01

## 2023-01-23 RX ADMIN — MIDAZOLAM HYDROCHLORIDE 2 MG: 1 INJECTION, SOLUTION INTRAMUSCULAR; INTRAVENOUS at 07:01

## 2023-01-23 NOTE — PLAN OF CARE
Patient vital signs normal. Patient leaving via car with friend. Patient instructed to call doctor for any problems.

## 2023-01-23 NOTE — TRANSFER OF CARE
Anesthesia Transfer of Care Note    Patient: Floresita Martin    Procedure(s) Performed: Procedure(s) (LRB):  EXCISION, LIPOMA (SCALP) (Right)    Patient location: OPS    Anesthesia Type: MAC    Transport from OR: Transported from OR on room air with adequate spontaneous ventilation    Post pain: adequate analgesia    Post assessment: no apparent anesthetic complications    Post vital signs: stable    Level of consciousness: awake and alert    Nausea/Vomiting: no nausea/vomiting    Complications: none    Transfer of care protocol was followed      Last vitals:   Visit Vitals  /70   Pulse 85   Temp 36.9 °C (98.5 °F) (Oral)   Resp 18   Ht 5' (1.524 m)   Wt 52.2 kg (115 lb)   LMP  (LMP Unknown)   Breastfeeding No   BMI 22.46 kg/m²

## 2023-01-23 NOTE — ANESTHESIA PREPROCEDURE EVALUATION
2023  Floresita Martin is a 62 y.o., female.    Patient Active Problem List   Diagnosis    Otosclerosis    URI (upper respiratory infection)    Screen for colon cancer    Melanosis coli    Lung nodule < 6cm on CT    Gastroesophageal reflux disease without esophagitis    Other hyperlipidemia    Dependence on nicotine from cigarettes    Pain of toe of right foot    Cellulitis of toe of right foot    Chronic obstructive pulmonary disease, unspecified COPD type       Past Surgical History:   Procedure Laterality Date     SECTION      2    COLONOSCOPY N/A 11/15/2017    Procedure: COLONOSCOPY;  Surgeon: Peg Powers MD;  Location: [facility];  Service: Endoscopy;  Laterality: N/A;    EPIDURAL STEROID INJECTION INTO CERVICAL SPINE      EPIDURAL STEROID INJECTION INTO LUMBAR SPINE      LUMBAR FUSION      with cage    NECK SURGERY  2018    with hardware    RADIOFREQUENCY ABLATION      cervical    RADIOFREQUENCY ABLATION      lumbar    STAPEDECTOMY Right 2015    STAPEDECTOMY Left         Tobacco Use:  The patient  reports that she has been smoking cigarettes. She has a 45.00 pack-year smoking history. She has never used smokeless tobacco.     Results for orders placed or performed during the hospital encounter of 23   EKG 12-lead    Collection Time: 23  2:53 PM    Narrative    Test Reason : D17.0,    Vent. Rate : 087 BPM     Atrial Rate : 087 BPM     P-R Int : 150 ms          QRS Dur : 092 ms      QT Int : 384 ms       P-R-T Axes : 052 077 037 degrees     QTc Int : 462 ms    Normal sinus rhythm  Low voltage QRS  Borderline Abnormal ECG  When compared with ECG of 2021 09:04,  Previous ECG has undetermined rhythm, needs review  Minimal criteria for Anterior infarct are no longer Present  Confirmed by Emmanuel PEDERSON, Viet GARCIA ([id number]) on 2023 9:53:12  AM    Referred By:             Confirmed By:Viet Benitez MD             Lab Results   Component Value Date    WBC 5.20 01/11/2023    HGB 13.4 01/11/2023    HCT 38.8 01/11/2023    MCV 93 01/11/2023     01/11/2023     BMP  Lab Results   Component Value Date     (L) 01/11/2023    K 4.7 01/11/2023    CL 98 01/11/2023    CO2 25 01/11/2023    BUN 12 01/11/2023    CREATININE 0.8 01/11/2023    CALCIUM 9.5 01/11/2023    ANIONGAP 11 01/11/2023    GLU 82 01/11/2023     01/05/2022    GLU 93 11/03/2020       Results for orders placed in visit on 04/13/21    Echo Color Flow Doppler? Yes    Interpretation Summary  · The left ventricle is normal in size with normal systolic function.  · The estimated ejection fraction is 60%.  · Normal left ventricular diastolic function.  · Normal right ventricular size with normal right ventricular systolic function.  · Tricuspid Valve There is trace regurgitation  · Normal central venous pressure (3 mmHg).  · The estimated PA systolic pressure is 30 mmHg.            Pre-op Assessment    I have reviewed the Patient Summary Reports.     I have reviewed the Nursing Notes. I have reviewed the NPO Status.   I have reviewed the Medications.     Review of Systems  Anesthesia Hx:  No problems with previous Anesthesia  Denies Family Hx of Anesthesia complications.   Denies Personal Hx of Anesthesia complications.   Social:  Smoker Alcohol abuse   Hematology/Oncology:  Hematology Normal        EENT/Dental:  EENT/Dental Normal  Ears General/Symptom(s) (Reports hearing loss)    Cardiovascular:  Cardiovascular Normal     Pulmonary:   COPD    Renal/:  Renal/ Normal     Hepatic/GI:   GERD    Musculoskeletal:  Musculoskeletal Normal    Neurological:   Headaches    Endocrine:  Endocrine Normal    Psych:  Psychiatric Normal           Physical Exam  General: Well nourished    Airway:  Mallampati: II   Mouth Opening: Normal  TM Distance: Normal  Tongue: Normal  Neck ROM: Normal  ROM    Dental:  Edentulous    Chest/Lungs:  Clear to auscultation, Normal Respiratory Rate    Heart:  Rate: Normal  Rhythm: Regular Rhythm        Anesthesia Plan  Type of Anesthesia, risks & benefits discussed:    Anesthesia Type: MAC  Intra-op Monitoring Plan: Standard ASA Monitors  Post Op Pain Control Plan: IV/PO Opioids PRN  (medical reason for not using multimodal pain management)  Informed Consent: Informed consent signed with the Patient and all parties understand the risks and agree with anesthesia plan.  All questions answered.   ASA Score: 3  Anesthesia Plan Notes: MAC with propofol  Multimodal Ofirmev 1000 mg and local by surgeon    Ready For Surgery From Anesthesia Perspective.     .

## 2023-01-23 NOTE — DISCHARGE SUMMARY
[facility]  Discharge Note  Short Stay    Procedure(s) (LRB):  EXCISION, LIPOMA (SCALP) (Right)      OUTCOME: Patient tolerated treatment/procedure well without complication and is now ready for discharge.    DISPOSITION: Home or Self Care    FINAL DIAGNOSIS:  <principal problem not specified>    FOLLOWUP: In clinic    DISCHARGE INSTRUCTIONS:    Discharge Procedure Orders   Diet Adult Regular     Ice to affected area     Change dressing (specify)   Order Comments: Post-Operative Wound Care    A triple antibiotic ointment has been placed over the incision on your scalp. You may have some bloody drainage present.  It is okay to shower using soap and water the day after surgery and gently pat the area dry. You may reapply some triple antibiotic ointment over the incision site after showering. After 3-5 days the incision should be mostly healed and you do not necessarily need to apply the ointment after that.    If you noticed redness, swelling, fever, increasing pain or significant drainage from your wound please call/message the office or the 24 hr nurse hotline after hours.     Notify your health care provider if you experience any of the following:  temperature >100.4     Notify your health care provider if you experience any of the following:  persistent nausea and vomiting or diarrhea     Notify your health care provider if you experience any of the following:  severe uncontrolled pain     Notify your health care provider if you experience any of the following:  redness, tenderness, or signs of infection (pain, swelling, redness, odor or green/yellow discharge around incision site)     Notify your health care provider if you experience any of the following:  worsening rash     Notify your health care provider if you experience any of the following:  increased confusion or weakness     Shower on day dressing removed (No bath)   Order Comments: Okay to shower day after surgery.  Okay to wash hair with  typical shampoo.  Would avoid prolonged submersion for 1 week.        TIME SPENT ON DISCHARGE: 10 minutes

## 2023-01-23 NOTE — ANESTHESIA POSTPROCEDURE EVALUATION
Anesthesia Post Evaluation    Patient: Floresita Martin    Procedure(s) Performed: Procedure(s) (LRB):  EXCISION, LIPOMA (SCALP) (Right)    Final Anesthesia Type: MAC      Patient location: OR Holding Area.  Patient participation: Yes- Able to Participate  Level of consciousness: awake and alert, oriented and awake  Post-procedure vital signs: reviewed and stable  Pain management: adequate  Airway patency: patent    PONV status at discharge: No PONV  Anesthetic complications: no      Cardiovascular status: blood pressure returned to baseline, hemodynamically stable and stable  Respiratory status: unassisted, spontaneous ventilation and room air  Hydration status: euvolemic  Follow-up not needed.          Vitals Value Taken Time   /58 01/23/23 0807   Temp 98.0 01/23/23 0807   Pulse 85 01/23/23 0807   Resp 16 01/23/23 0807   SpO2 98 % 01/23/23 0807         No case tracking events are documented in the log.      Pain/Lauren Score: No data recorded

## 2023-01-23 NOTE — OP NOTE
DATE OF PROCEDURE: 01/23/2023    PREOPERATIVE DIAGNOSIS: Scalp lipoma    POSTOPERATIVE DIAGNOSIS: Sebaceous cyst of the scalp    PROCEDURE: Excision of right posterior scalp sebaceous cyst 2 x 2 x 1 cm    SURGEON: Ruben Brooks M.D    ASSISTANT: None    ANESTHESIA: Local MAC    ESTIMATED BLOOD LOSS: Minimal    SPECIMEN: Scalp cyst    CONDITION: Stable    COMPLICATIONS: None    FINDINGS:   1. Lesion was consistent with sebaceous cyst and measured 2 x 2 x 1 cm    INDICATIONS: The patient is a 8-year-old female with a somewhat tender and enlarging right posterior scalp mass.  Counseled on options and agreed proceed with excision in the OR.    PROCEDURE IN DETAIL: Patient taken operating room and laid in the left lateral decubitus position.  Monitored anesthesia care was administered.  She did not require antibiotics. Her hair was parted and the lesion was identified.  It was prepped with a non alcoholic based prep.  It was then draped in sterile fashion.  Time-out was performed by members of the operative team. Lidocaine 1% with epinephrine was injected surrounding the lesion.  Using a 15 blade scalpel elliptical incision was made.  As we continued dissection into the subcutaneous tissue was obvious that this was a cystic structure.  It was circumferentially dissected from the scalp and surrounding tissue. It measured approximally 2 x 2 x 1 cm. It was excised in totality.  It was passed off for specimen.  The wound was irrigated copiously.  Hemostasis was assured with electrocautery.  The incision was then reapproximated with interrupted 3-0 chromic sutures. Triple antibiotic ointment was applied over the incision. Patient was aroused from sedation and taken to the recovery room stable condition having suffered no complications.  All counts correct x2 the case. I was present scrubbed throughout all operative portions of the case.    DISPO: Discharge home

## 2023-01-24 ENCOUNTER — TELEPHONE (OUTPATIENT)
Dept: ORTHOPEDICS | Facility: CLINIC | Age: 63
End: 2023-01-24
Payer: MEDICARE

## 2023-01-26 DIAGNOSIS — M25.561 RIGHT KNEE PAIN, UNSPECIFIED CHRONICITY: Primary | ICD-10-CM

## 2023-01-27 ENCOUNTER — OFFICE VISIT (OUTPATIENT)
Dept: ORTHOPEDICS | Facility: CLINIC | Age: 63
End: 2023-01-27
Payer: MEDICARE

## 2023-01-27 ENCOUNTER — HOSPITAL ENCOUNTER (OUTPATIENT)
Dept: RADIOLOGY | Facility: HOSPITAL | Age: 63
Discharge: HOME | End: 2023-01-27
Attending: ORTHOPAEDIC SURGERY
Payer: MEDICARE

## 2023-01-27 VITALS — WEIGHT: 115 LBS | BODY MASS INDEX: 22.58 KG/M2 | HEIGHT: 60 IN

## 2023-01-27 DIAGNOSIS — G89.29 CHRONIC PAIN OF RIGHT KNEE: ICD-10-CM

## 2023-01-27 DIAGNOSIS — M25.561 RIGHT KNEE PAIN, UNSPECIFIED CHRONICITY: ICD-10-CM

## 2023-01-27 DIAGNOSIS — S83.241A ACUTE MEDIAL MENISCUS TEAR OF RIGHT KNEE, INITIAL ENCOUNTER: Primary | ICD-10-CM

## 2023-01-27 DIAGNOSIS — M25.561 CHRONIC PAIN OF RIGHT KNEE: ICD-10-CM

## 2023-01-27 PROCEDURE — 99999 PR PBB SHADOW E&M-EST. PATIENT-LVL IV: CPT | Mod: PBBFAC,HCNC,, | Performed by: ORTHOPAEDIC SURGERY

## 2023-01-27 PROCEDURE — 1159F MED LIST DOCD IN RCRD: CPT | Mod: HCNC,CPTII,S$GLB, | Performed by: ORTHOPAEDIC SURGERY

## 2023-01-27 PROCEDURE — 99204 OFFICE O/P NEW MOD 45 MIN: CPT | Mod: HCNC,S$GLB,, | Performed by: ORTHOPAEDIC SURGERY

## 2023-01-27 PROCEDURE — 73562 XR KNEE ORTHO RIGHT WITH FLEXION: ICD-10-PCS | Mod: 26,HCNC,LT, | Performed by: RADIOLOGY

## 2023-01-27 PROCEDURE — 73562 X-RAY EXAM OF KNEE 3: CPT | Mod: 26,HCNC,LT, | Performed by: RADIOLOGY

## 2023-01-27 PROCEDURE — 99999 PR PBB SHADOW E&M-EST. PATIENT-LVL IV: ICD-10-PCS | Mod: PBBFAC,HCNC,, | Performed by: ORTHOPAEDIC SURGERY

## 2023-01-27 PROCEDURE — 3008F BODY MASS INDEX DOCD: CPT | Mod: HCNC,CPTII,S$GLB, | Performed by: ORTHOPAEDIC SURGERY

## 2023-01-27 PROCEDURE — 73564 X-RAY EXAM KNEE 4 OR MORE: CPT | Mod: 26,HCNC,RT, | Performed by: RADIOLOGY

## 2023-01-27 PROCEDURE — 99204 PR OFFICE/OUTPT VISIT, NEW, LEVL IV, 45-59 MIN: ICD-10-PCS | Mod: HCNC,S$GLB,, | Performed by: ORTHOPAEDIC SURGERY

## 2023-01-27 PROCEDURE — 73564 X-RAY EXAM KNEE 4 OR MORE: CPT | Mod: TC,HCNC,PN,RT

## 2023-01-27 PROCEDURE — 3008F PR BODY MASS INDEX (BMI) DOCUMENTED: ICD-10-PCS | Mod: HCNC,CPTII,S$GLB, | Performed by: ORTHOPAEDIC SURGERY

## 2023-01-27 PROCEDURE — 1159F PR MEDICATION LIST DOCUMENTED IN MEDICAL RECORD: ICD-10-PCS | Mod: HCNC,CPTII,S$GLB, | Performed by: ORTHOPAEDIC SURGERY

## 2023-01-27 PROCEDURE — 73564 XR KNEE ORTHO RIGHT WITH FLEXION: ICD-10-PCS | Mod: 26,HCNC,RT, | Performed by: RADIOLOGY

## 2023-01-27 NOTE — PROGRESS NOTES
Patient ID: Floresita Martin is a 63 y.o. female    Chief Complaint:   Chief Complaint   Patient presents with    Right Knee - Pain, Initial Visit       History of Present Illness:    Floresita Martin is a pleasant 63 y.o. female who presents for evaluation of right knee pain. She  reports pain for the past 3 weeks. She does endorse a history of trauma.  Suffered a twisting injury of her knee and had acute pain mostly medial..  Squatting and stairs makes it worse. The pain is mostly medial. She does endorse nighttime pain.   She is independent with all activities of daily living.     In the past she has tried the following treatments:    NSAIDS     She currently does not work.     Instability: Yes  Mechanical Symptoms: Yes    Diabetic:  No  Smoker:  Yes        PAST MEDICAL HISTORY:   Past Medical History:   Diagnosis Date    Alcohol abuse     Headache     Hearing loss     HLD (hyperlipidemia)      PAST SURGICAL HISTORY:   Past Surgical History:   Procedure Laterality Date     SECTION      2    COLONOSCOPY N/A 11/15/2017    Procedure: COLONOSCOPY;  Surgeon: Peg Powers MD;  Location: [facility];  Service: Endoscopy;  Laterality: N/A;    EPIDURAL STEROID INJECTION INTO CERVICAL SPINE      EPIDURAL STEROID INJECTION INTO LUMBAR SPINE      LIPOMA RESECTION Right 2023    Procedure: EXCISION, LIPOMA;  Surgeon: Ruben Boroks Jr., MD;  Location: [facility] OR;  Service: General;  Laterality: Right;    LUMBAR FUSION  2019    with cage    NECK SURGERY  2018    with hardware    RADIOFREQUENCY ABLATION      cervical    RADIOFREQUENCY ABLATION      lumbar    STAPEDECTOMY Right 2015    STAPEDECTOMY Left      FAMILY HISTORY:   Family History   Problem Relation Age of Onset    Diabetes Mother     Hypertension Father     Breast cancer Sister 47    Breast cancer Sister     Breast cancer Sister     Breast cancer Sister      SOCIAL HISTORY:   Social History     Occupational History    Not on file    Tobacco Use    Smoking status: Every Day     Packs/day: 1.50     Years: 30.00     Pack years: 45.00     Types: Cigarettes    Smokeless tobacco: Never   Substance and Sexual Activity    Alcohol use: Yes     Comment: social     Drug use: No    Sexual activity: Yes     Partners: Male        MEDICATIONS:   Current Outpatient Medications:     acetaminophen (TYLENOL) 325 MG tablet, Take 2 tablets (650 mg total) by mouth every 6 (six) hours as needed for Pain., Disp: , Rfl: 0    amitriptyline (ELAVIL) 25 MG tablet, Take 1-2 tablets by mouth at bedtime, Disp: 60 tablet, Rfl: 1    atorvastatin (LIPITOR) 40 MG tablet, Take 1 tablet (40 mg total) by mouth once daily., Disp: 90 tablet, Rfl: 1    celecoxib (CELEBREX) 200 MG capsule, Take 1 capsule (200 mg total) by mouth once daily., Disp: 30 capsule, Rfl: 2    co-enzyme Q-10 30 mg capsule, Take 30 mg by mouth once daily., Disp: , Rfl:     cyclobenzaprine (FLEXERIL) 10 MG tablet, Take 1 tablet by mouth twice a day, Disp: 60 tablet, Rfl: 1    fluticasone propionate (FLONASE) 50 mcg/actuation nasal spray, SHAKE LIQUID AND USE 1 SPRAY(50 MCG) IN EACH NOSTRIL EVERY DAY AT 6 AM, Disp: 48 g, Rfl: 0    lactulose (CHRONULAC) 10 gram/15 mL solution, TAKE 15 MLS BY MOUTH TWICE DAILY AS NEEDED, Disp: 900 mL, Rfl: 1    loratadine (CLARITIN) 10 mg tablet, Take 1 tablet (10 mg total) by mouth once daily., Disp: 90 tablet, Rfl: 3    multivitamin capsule, Take 1 capsule by mouth once daily., Disp: , Rfl:     naloxone (NARCAN) 4 mg/actuation Spry, Spray 1 spray into one nostril single dose may repeat every 2-3 minutes until responsive or EMS arrives, Disp: 1 each, Rfl: 1    omeprazole (PRILOSEC) 40 MG capsule, Take 1 capsule (40 mg total) by mouth once daily., Disp: 90 capsule, Rfl: 1    ondansetron (ZOFRAN-ODT) 4 MG TbDL, Take 1 tablet (4 mg total) by mouth every 6 (six) hours as needed (nausea)., Disp: 8 tablet, Rfl: 0    oxyCODONE (ROXICODONE) 20 mg Tab immediate release tablet, Take 1  tablet by mouth five times a day as needed for pain More than 7 days supply is medically necessary, Disp: 150 tablet, Rfl: 0    [START ON 1/31/2023] oxyCODONE (ROXICODONE) 20 mg Tab immediate release tablet, Take 1 tablet by mouth five times a day as needed for pain More than 7 days supply is medically necessary, Disp: 150 tablet, Rfl: 0    traMADoL (ULTRAM) 50 mg tablet, Take 1 tablet (50 mg total) by mouth every 6 (six) hours as needed for Pain., Disp: 8 tablet, Rfl: 0    TUMERIC-GING-OLIVE-OREG-CAPRYL ORAL, Take 1 tablet by mouth., Disp: , Rfl:   ALLERGIES:   Review of patient's allergies indicates:   Allergen Reactions    Codeine Nausea And Vomiting    Tylenol-codeine [acetaminophen-codeine] Nausea And Vomiting         Physical Exam     There were no vitals filed for this visit.  Alert and oriented to person, place and time. No acute distress. Well-groomed, not ill appearing. Pupils round and reactive, normal respiratory effort, no audible wheezing.     GENERAL:  A well-developed, well-nourished 63 y.o. female who is alert and       oriented in no acute distress.      Gait: She  walks with a antalgic gait.                   EXTREMITIES:  Examination of lower extremities reveals there is no visible mass or deformity.    Left knee:  ROM 5-135    Ligamentously stable to varus/valgus stress.    Anterior and posterior drawers negative.    No pain over pes bursa.    No warmth    No erythema     Effusion Yes    medial joint line tenderness    Negative Patellofemoral grind/crepitus     Positive medial Justus      The skin over both lower extremities is normal and unremarkable.  She has a  painless range of motion of the hips and ankles bilaterally.   Sensation is intact in both lower extremities.    There are no motor deficits in the lower extremities bilaterally.   Pedal pulses are palpable distally bilaterally.    She has no calf tenderness to palpation nor edema.       Imaging:       X-Ray: I have reviewed all  pertinent results/findings and my personal findings are:  Mild arthritis of the medial compartment otherwise normal alignment.  No evidence of fracture or loose body.      Assessment & Plan    Acute medial meniscus tear of right knee, initial encounter  -     MRI Knee Without Contrast Right; Future; Expected date: 01/27/2023    Chronic pain of right knee  -     Ambulatory referral/consult to Orthopedics  -     MRI Knee Without Contrast Right; Future; Expected date: 01/27/2023         Treatment options were discussed with the patient in detail. We discussed the patient's current imaging as well as differential diagnosis in detail. Based on the patient's symptoms of mechanical symptoms, instability , and traumatic injury, I do believe an MRI of the Right Knee is indicated to rule out meniscus and/or ACL tear, chondral injury and damage to intra-articular structures    The patient will follow-up after MRI to discuss results and further treatment options. They will call the clinic with any questions or concerns.     Follow up: for MRI/CT Results

## 2023-02-03 ENCOUNTER — OFFICE VISIT (OUTPATIENT)
Dept: PULMONOLOGY | Facility: CLINIC | Age: 63
End: 2023-02-03
Payer: MEDICARE

## 2023-02-03 VITALS
DIASTOLIC BLOOD PRESSURE: 79 MMHG | OXYGEN SATURATION: 97 % | HEART RATE: 92 BPM | WEIGHT: 118.38 LBS | BODY MASS INDEX: 23.24 KG/M2 | HEIGHT: 60 IN | SYSTOLIC BLOOD PRESSURE: 134 MMHG

## 2023-02-03 DIAGNOSIS — F17.200 SMOKER: ICD-10-CM

## 2023-02-03 DIAGNOSIS — F17.210 CIGARETTE NICOTINE DEPENDENCE WITHOUT COMPLICATION: ICD-10-CM

## 2023-02-03 DIAGNOSIS — J44.9 CHRONIC OBSTRUCTIVE PULMONARY DISEASE, UNSPECIFIED COPD TYPE: Primary | ICD-10-CM

## 2023-02-03 DIAGNOSIS — R91.8 LUNG NODULES: ICD-10-CM

## 2023-02-03 PROBLEM — R91.1 LUNG NODULE: Status: ACTIVE | Noted: 2018-01-22

## 2023-02-03 PROCEDURE — 1159F PR MEDICATION LIST DOCUMENTED IN MEDICAL RECORD: ICD-10-PCS | Mod: HCNC,CPTII,S$GLB, | Performed by: NURSE PRACTITIONER

## 2023-02-03 PROCEDURE — 3075F SYST BP GE 130 - 139MM HG: CPT | Mod: HCNC,CPTII,S$GLB, | Performed by: NURSE PRACTITIONER

## 2023-02-03 PROCEDURE — 99204 PR OFFICE/OUTPT VISIT, NEW, LEVL IV, 45-59 MIN: ICD-10-PCS | Mod: HCNC,S$GLB,, | Performed by: NURSE PRACTITIONER

## 2023-02-03 PROCEDURE — 99999 PR PBB SHADOW E&M-EST. PATIENT-LVL V: CPT | Mod: PBBFAC,HCNC,, | Performed by: NURSE PRACTITIONER

## 2023-02-03 PROCEDURE — 3078F DIAST BP <80 MM HG: CPT | Mod: HCNC,CPTII,S$GLB, | Performed by: NURSE PRACTITIONER

## 2023-02-03 PROCEDURE — 99204 OFFICE O/P NEW MOD 45 MIN: CPT | Mod: HCNC,S$GLB,, | Performed by: NURSE PRACTITIONER

## 2023-02-03 PROCEDURE — 1159F MED LIST DOCD IN RCRD: CPT | Mod: HCNC,CPTII,S$GLB, | Performed by: NURSE PRACTITIONER

## 2023-02-03 PROCEDURE — 3078F PR MOST RECENT DIASTOLIC BLOOD PRESSURE < 80 MM HG: ICD-10-PCS | Mod: HCNC,CPTII,S$GLB, | Performed by: NURSE PRACTITIONER

## 2023-02-03 PROCEDURE — 3008F PR BODY MASS INDEX (BMI) DOCUMENTED: ICD-10-PCS | Mod: HCNC,CPTII,S$GLB, | Performed by: NURSE PRACTITIONER

## 2023-02-03 PROCEDURE — 3008F BODY MASS INDEX DOCD: CPT | Mod: HCNC,CPTII,S$GLB, | Performed by: NURSE PRACTITIONER

## 2023-02-03 PROCEDURE — 99999 PR PBB SHADOW E&M-EST. PATIENT-LVL V: ICD-10-PCS | Mod: PBBFAC,HCNC,, | Performed by: NURSE PRACTITIONER

## 2023-02-03 PROCEDURE — 3075F PR MOST RECENT SYSTOLIC BLOOD PRESS GE 130-139MM HG: ICD-10-PCS | Mod: HCNC,CPTII,S$GLB, | Performed by: NURSE PRACTITIONER

## 2023-02-03 RX ORDER — IBUPROFEN 200 MG
1 TABLET ORAL DAILY
Qty: 28 PATCH | Refills: 0 | Status: SHIPPED | OUTPATIENT
Start: 2023-02-03 | End: 2023-06-28

## 2023-02-03 NOTE — PATIENT INSTRUCTIONS
CT chest reviewed, shows mild emphysema to bilateral upper lung fields.  This is consistent with a diagnosis of COPD.    I ordered a Lung Function Test to evaluate lung strength. Please complete prior to next appointment.     Can trial daily inhaler, Trelegy.  This is 1 puff once per day.  This inhaler contains an inhaled steroid component. Rinse mouth after each use due to risk for thrush development. If mouth or tongue develops white sores please contact the clinic and I will order a prescription mouth wash.     CT chest does show new 12 mm nodule to the left upper lung and new 5 mm nodule to the left lower lung.  Will proceed with PET scan at this time.  Broch calculator 32% change of nodule being cancerous.   Will review CT images with collaborating MD next week and if change of plan, will call you.    Nicotine patches ordered.    Continue current medication regiment. Keep follow up appointment as scheduled. Please call the office if you have any questions or concerns.

## 2023-02-03 NOTE — PROGRESS NOTES
2/3/2023    Floresita Martin  New Patient Consult    Chief Complaint   Patient presents with    Cough     Pt states she coughs up white thick mucus.        HPI:  2023:  Denies being seen by prior pulmonologist. Denies current inhaler use, supplemental oxygen use, CPAP use.  Denies personal history of cancer, PE, anticoagulation use.  Reports she believes she is diagnosed with early stages of COPD.  Underwent CT chest per PCP was referred to pulmonology.  Denies shortness of breath, states she is able to do all of her ADLs without issue.  Denies wheezing, chest tightness.  Cough:  Intermittent, productive with white sputum.  No time correlation identified.  Denies frequent use of antibiotic, steroid.  Current smoker - typical 1 pack per day use.  Started at 17 years old.  Currently vaping as well, nicotine containing. Not interested in smoking cessation referral at this time however requesting Nicotine patches.            Social Hx: Lives with friend - 1 dog in the home.  Former .  No Asbestosis exposure, Smoking Hx:  As above  Family Hx:  No Lung Cancer, no COPD, no Asthma  Medical Hx:  No previous pneumonia ; no previous shoulder/chest surgery          The chief compliant  problem is new to me   PFSH:  Past Medical History:   Diagnosis Date    Alcohol abuse     Headache     Hearing loss     HLD (hyperlipidemia)          Past Surgical History:   Procedure Laterality Date     SECTION      2    COLONOSCOPY N/A 11/15/2017    Procedure: COLONOSCOPY;  Surgeon: Peg Powers MD;  Location: [facility];  Service: Endoscopy;  Laterality: N/A;    EPIDURAL STEROID INJECTION INTO CERVICAL SPINE      EPIDURAL STEROID INJECTION INTO LUMBAR SPINE      LIPOMA RESECTION Right 2023    Procedure: EXCISION, LIPOMA;  Surgeon: Ruben Brooks Jr., MD;  Location: [facility] OR;  Service: General;  Laterality: Right;    LUMBAR FUSION  2019    with cage    NECK SURGERY  2018    with hardware     RADIOFREQUENCY ABLATION      cervical    RADIOFREQUENCY ABLATION      lumbar    STAPEDECTOMY Right 05/28/2015    STAPEDECTOMY Left      Social History     Tobacco Use    Smoking status: Every Day     Packs/day: 1.50     Years: 30.00     Pack years: 45.00     Types: Cigarettes    Smokeless tobacco: Never   Substance Use Topics    Alcohol use: Yes     Comment: social     Drug use: No     Family History   Problem Relation Age of Onset    Diabetes Mother     Hypertension Father     Breast cancer Sister 47    Breast cancer Sister     Breast cancer Sister     Breast cancer Sister      Review of patient's allergies indicates:   Allergen Reactions    Codeine Nausea And Vomiting    Tylenol-codeine [acetaminophen-codeine] Nausea And Vomiting     I have reviewed past medical, family, and social history. I have reviewed previous nurse notes.    Performance Status:The patient's activity level is no limits with regular activity.      Review of Systems   Constitutional:  Negative for activity change, appetite change, chills, diaphoresis, fatigue, fever and unexpected weight change.   HENT:  Positive for congestion, postnasal drip and rhinorrhea. Negative for sinus pressure, sinus pain, sore throat and trouble swallowing.    Eyes:  Negative for photophobia and visual disturbance.   Respiratory:  Positive for cough. Negative for choking, chest tightness, shortness of breath and wheezing.    Cardiovascular:  Negative for chest pain, palpitations and leg swelling.   Gastrointestinal:  Negative for abdominal distention, abdominal pain, blood in stool, constipation, diarrhea, nausea and vomiting.   Genitourinary:  Negative for difficulty urinating, dysuria, flank pain and hematuria.   Musculoskeletal:  Positive for back pain and neck pain. Negative for gait problem and joint swelling.   Skin:  Negative for rash and wound.   Allergic/Immunologic: Negative for environmental allergies and food allergies.   Neurological:  Negative for  dizziness, seizures, syncope, weakness, light-headedness, numbness and headaches.   Hematological:  Does not bruise/bleed easily.   Psychiatric/Behavioral:  Negative for confusion and sleep disturbance. The patient is not nervous/anxious.        Exam:Comprehensive exam done. /79 (BP Location: Left arm, Patient Position: Sitting, BP Method: Medium (Automatic))   Pulse 92   Ht 5' (1.524 m)   Wt 53.7 kg (118 lb 6.2 oz)   LMP  (LMP Unknown)   SpO2 97%   BMI 23.12 kg/m²   Exam included Vitals as listed  Constitutional: She is oriented to person, place, and time. She appears well-developed. No distress.   Nose: Nose normal.   Mouth/Throat: Uvula is midline, oropharynx is clear and moist and mucous membranes are normal. No dental caries. No oropharyngeal exudate, posterior oropharyngeal edema, posterior oropharyngeal erythema or tonsillar abscesses.    Eyes: Pupils are equal, round, and reactive to light.   Neck: No JVD present. No thyromegaly present.   Cardiovascular: Normal rate, regular rhythm and normal heart sounds. Exam reveals no gallop and no friction rub.   No murmur heard.  Pulmonary/Chest: Effort normal and breath sounds normal. No accessory muscle usage or stridor. No apnea and no tachypnea. No respiratory distress, decreased breath sounds, wheezes, rhonchi, rales, or tenderness. Clear breath sounds throughout all lung fields, on room air, in no acute distress.   Abdominal: Soft. She exhibits no mass. There is no tenderness. No hepatosplenomegaly, hernias and normoactive bowel sounds  Musculoskeletal: Normal range of motion. exhibits no edema.   Neurological:  alert and oriented to person, place, and time. not disoriented.   Skin: Skin is warm and dry. Capillary refill takes less 2 sec. No cyanosis or erythema. No pallor. Nails show no clubbing.   Psychiatric: normal mood and affect. behavior is normal. Judgment and thought content normal.       Radiographs (ct chest and cxr) reviewed: view by  direct vision     CT Chest Lung Screening Low Dose1/11/2023 FINDINGS:  Lungs: There is a new spiculated, solid nodule within the left upper lobe measuring 1.2 cm.  There is also new solid nodule at the left lung base measuring 5 mm (series 4, image 378).  A few scattered old calcified granulomas are present.  There are mild emphysematous changes.   Pleura:   No effusion..   Heart and pericardium: Normal size without effusion.   Aorta and vasculature: Atherosclerosis including coronary arteries.   Lymph nodes: No hilar, mediastinal, or axial lymphadenopathy.   Chest wall and skeletal structures: Postoperative change of the lower cervical spine.  No suspicious bony lesions.   Upper abdomen: Unremarkable.   Impression:   Lung-RADS Category:  4B - Suspicious - consultation advised - possible next steps  Chest CT, tissue sampling and-or PET/CT.   Clinically or potentially clinically significant non lung cancer finding:  None.   Prior Lung Cancer Modifier:  No history of prior lung cancer.   This report was flagged in Epic as abnormal.       X-Ray Chest PA And Lateral 4/15/2016 Findings: The cardiomediastinal silhouette is within normal limits.  The lungs are well expanded and clear.   In pression: No active cardiopulmonary process.      Labs reviewed    Lab Results   Component Value Date    WBC 5.20 01/11/2023    RBC 4.17 01/11/2023    HGB 13.4 01/11/2023    HCT 38.8 01/11/2023    MCV 93 01/11/2023    MCH 32.1 (H) 01/11/2023    MCHC 34.5 01/11/2023    RDW 14.1 01/11/2023     01/11/2023    MPV 8.6 (L) 01/11/2023    GRAN 2.6 01/11/2023    GRAN 49.2 01/11/2023    LYMPH 2.0 01/11/2023    LYMPH 37.5 01/11/2023    MONO 0.4 01/11/2023    MONO 7.3 01/11/2023    EOS 0.3 01/11/2023    BASO 0.02 01/11/2023    EOSINOPHIL 5.4 01/11/2023    BASOPHIL 0.4 01/11/2023       PFT will be done and results to be reviewed  Pulmonary Functions Testing Results:        Plan:  Clinical impression is ambiguous and will need repeated  evaluation wrt will require conference with MD.    Floresita was seen today for cough.    Diagnoses and all orders for this visit:    Chronic obstructive pulmonary disease, unspecified COPD type  -     Complete PFT w/ bronchodilator; Future    Lung nodules  -     Ambulatory referral/consult to Pulmonology  -     NM PET CT Routine FDG; Future    Smoker  -     Ambulatory referral/consult to Pulmonology    Cigarette nicotine dependence without complication  -     nicotine (NICODERM CQ) 14 mg/24 hr; Place 1 patch onto the skin once daily.        Follow up in about 4 weeks (around 3/3/2023), or if symptoms worsen or fail to improve.    Discussed with patient above for education the following:      Patient Instructions   CT chest reviewed, shows mild emphysema to bilateral upper lung fields.  This is consistent with a diagnosis of COPD.    I ordered a Lung Function Test to evaluate lung strength. Please complete prior to next appointment.     Can trial daily inhaler, Trelegy.  This is 1 puff once per day.  This inhaler contains an inhaled steroid component. Rinse mouth after each use due to risk for thrush development. If mouth or tongue develops white sores please contact the clinic and I will order a prescription mouth wash.     CT chest does show new 12 mm nodule to the left upper lung and new 5 mm nodule to the left lower lung.  Will proceed with PET scan at this time.  Broch calculator 32% change of nodule being cancerous.   Will review CT images with collaborating MD next week and if change of plan, will call you.    Nicotine patches ordered.    Continue current medication regiment. Keep follow up appointment as scheduled. Please call the office if you have any questions or concerns.

## 2023-02-05 ENCOUNTER — HOSPITAL ENCOUNTER (OUTPATIENT)
Dept: RADIOLOGY | Facility: HOSPITAL | Age: 63
Discharge: HOME | End: 2023-02-05
Attending: ORTHOPAEDIC SURGERY
Payer: MEDICARE

## 2023-02-05 DIAGNOSIS — S83.241A ACUTE MEDIAL MENISCUS TEAR OF RIGHT KNEE, INITIAL ENCOUNTER: ICD-10-CM

## 2023-02-05 DIAGNOSIS — M25.561 CHRONIC PAIN OF RIGHT KNEE: ICD-10-CM

## 2023-02-05 DIAGNOSIS — G89.29 CHRONIC PAIN OF RIGHT KNEE: ICD-10-CM

## 2023-02-05 PROCEDURE — 73721 MRI JNT OF LWR EXTRE W/O DYE: CPT | Mod: TC,HCNC,RT

## 2023-02-05 PROCEDURE — 73721 MRI JNT OF LWR EXTRE W/O DYE: CPT | Mod: 26,HCNC,RT, | Performed by: RADIOLOGY

## 2023-02-05 PROCEDURE — 73721 MRI KNEE WITHOUT CONTRAST RIGHT: ICD-10-PCS | Mod: 26,HCNC,RT, | Performed by: RADIOLOGY

## 2023-02-06 ENCOUNTER — TELEPHONE (OUTPATIENT)
Dept: PULMONOLOGY | Facility: CLINIC | Age: 63
End: 2023-02-06
Payer: MEDICARE

## 2023-02-06 ENCOUNTER — OFFICE VISIT (OUTPATIENT)
Dept: SURGERY | Facility: CLINIC | Age: 63
End: 2023-02-06
Payer: MEDICARE

## 2023-02-06 VITALS — TEMPERATURE: 99 F | HEART RATE: 92 BPM | DIASTOLIC BLOOD PRESSURE: 76 MMHG | SYSTOLIC BLOOD PRESSURE: 132 MMHG

## 2023-02-06 DIAGNOSIS — L72.11 PILAR CYST OF SCALP: Primary | ICD-10-CM

## 2023-02-06 DIAGNOSIS — F17.210 CIGARETTE NICOTINE DEPENDENCE WITHOUT COMPLICATION: ICD-10-CM

## 2023-02-06 PROCEDURE — 3078F DIAST BP <80 MM HG: CPT | Mod: CPTII,S$GLB,, | Performed by: SURGERY

## 2023-02-06 PROCEDURE — 3075F PR MOST RECENT SYSTOLIC BLOOD PRESS GE 130-139MM HG: ICD-10-PCS | Mod: CPTII,S$GLB,, | Performed by: SURGERY

## 2023-02-06 PROCEDURE — 99024 PR POST-OP FOLLOW-UP VISIT: ICD-10-PCS | Mod: S$GLB,,, | Performed by: SURGERY

## 2023-02-06 PROCEDURE — 99024 POSTOP FOLLOW-UP VISIT: CPT | Mod: S$GLB,,, | Performed by: SURGERY

## 2023-02-06 PROCEDURE — 3075F SYST BP GE 130 - 139MM HG: CPT | Mod: CPTII,S$GLB,, | Performed by: SURGERY

## 2023-02-06 PROCEDURE — 3078F PR MOST RECENT DIASTOLIC BLOOD PRESSURE < 80 MM HG: ICD-10-PCS | Mod: CPTII,S$GLB,, | Performed by: SURGERY

## 2023-02-06 RX ORDER — IBUPROFEN 200 MG
1 TABLET ORAL DAILY
Qty: 28 PATCH | Refills: 0 | OUTPATIENT
Start: 2023-02-06

## 2023-02-06 NOTE — PROGRESS NOTES
GENERAL SURGERY  POST-OP PROGRESS NOTE    HPI: Floresita Martin is a 63 y.o. female status post excision of right posterior scalp lesion here for follow-up.  No major complaints.  Mild soreness at the site.  No drainage.    VITALS:  Vitals:    02/06/23 1332   BP: 132/76   Pulse: 92   Temp: 98.7 °F (37.1 °C)       PHYSICAL EXAM:  Scalp excision site well healed with sutures in place, no significant underlying fluid collection, no wound breakdown, no sign of infection    PATHOLOGY:  SKIN, SCALP CYST, EXCISION:   - PILAR CYST.     ASSESSMENT & PLAN:  63 y.o. female s/p excision of pilar cyst from the scalp  -area well healed without signs of infection or underlying fluid collection  -suture knots and material removed  -return to clinic p.r.n.

## 2023-02-06 NOTE — TELEPHONE ENCOUNTER
Spoke with patient already about Freya Swartz NP talking with Dr. López already and agrees with plan of care.

## 2023-02-06 NOTE — TELEPHONE ENCOUNTER
Medication requesting - Nicotine TD 14mg/24H   Last Rx-02/03/2023 quanity -1 refill-0   Last office visit -02/03/2023  Next Office Visit-03/07/2023

## 2023-02-06 NOTE — TELEPHONE ENCOUNTER
----- Message from Freya Swartz NP sent at 2/6/2023 11:54 AM CST -----  Regarding: Follow Up  Please call patient and inform her that I discussed case with Dr. López - she agrees with plan of care we discussed in clinic last week. Proceed with PET at this time.    Thank you!

## 2023-02-07 ENCOUNTER — OFFICE VISIT (OUTPATIENT)
Dept: ORTHOPEDICS | Facility: CLINIC | Age: 63
End: 2023-02-07
Payer: MEDICARE

## 2023-02-07 VITALS — WEIGHT: 115 LBS | BODY MASS INDEX: 21.71 KG/M2 | RESPIRATION RATE: 18 BRPM | HEIGHT: 61 IN

## 2023-02-07 DIAGNOSIS — Z00.00 ENCOUNTER FOR MEDICARE ANNUAL WELLNESS EXAM: ICD-10-CM

## 2023-02-07 DIAGNOSIS — M25.561 CHRONIC PAIN OF RIGHT KNEE: Primary | ICD-10-CM

## 2023-02-07 DIAGNOSIS — G89.29 CHRONIC PAIN OF RIGHT KNEE: Primary | ICD-10-CM

## 2023-02-07 DIAGNOSIS — S83.272A COMPLEX TEAR OF LATERAL MENISCUS OF LEFT KNEE AS CURRENT INJURY, INITIAL ENCOUNTER: ICD-10-CM

## 2023-02-07 PROCEDURE — 3008F BODY MASS INDEX DOCD: CPT | Mod: HCNC,CPTII,S$GLB, | Performed by: ORTHOPAEDIC SURGERY

## 2023-02-07 PROCEDURE — 3008F PR BODY MASS INDEX (BMI) DOCUMENTED: ICD-10-PCS | Mod: HCNC,CPTII,S$GLB, | Performed by: ORTHOPAEDIC SURGERY

## 2023-02-07 PROCEDURE — 20610 DRAIN/INJ JOINT/BURSA W/O US: CPT | Mod: HCNC,LT,S$GLB, | Performed by: ORTHOPAEDIC SURGERY

## 2023-02-07 PROCEDURE — 20610 PR DRAIN/INJECT LARGE JOINT/BURSA: ICD-10-PCS | Mod: HCNC,LT,S$GLB, | Performed by: ORTHOPAEDIC SURGERY

## 2023-02-07 PROCEDURE — 99214 OFFICE O/P EST MOD 30 MIN: CPT | Mod: 25,HCNC,S$GLB, | Performed by: ORTHOPAEDIC SURGERY

## 2023-02-07 PROCEDURE — 99214 PR OFFICE/OUTPT VISIT, EST, LEVL IV, 30-39 MIN: ICD-10-PCS | Mod: 25,HCNC,S$GLB, | Performed by: ORTHOPAEDIC SURGERY

## 2023-02-07 PROCEDURE — 99999 PR PBB SHADOW E&M-EST. PATIENT-LVL II: ICD-10-PCS | Mod: PBBFAC,HCNC,, | Performed by: ORTHOPAEDIC SURGERY

## 2023-02-07 PROCEDURE — 99999 PR PBB SHADOW E&M-EST. PATIENT-LVL II: CPT | Mod: PBBFAC,HCNC,, | Performed by: ORTHOPAEDIC SURGERY

## 2023-02-07 RX ORDER — TRIAMCINOLONE ACETONIDE 40 MG/ML
40 INJECTION, SUSPENSION INTRA-ARTICULAR; INTRAMUSCULAR
Status: DISCONTINUED | OUTPATIENT
Start: 2023-02-07 | End: 2023-02-07 | Stop reason: HOSPADM

## 2023-02-07 RX ADMIN — TRIAMCINOLONE ACETONIDE 40 MG: 40 INJECTION, SUSPENSION INTRA-ARTICULAR; INTRAMUSCULAR at 02:02

## 2023-02-07 NOTE — PROGRESS NOTES
Patient ID: Floresita Martin is a 63 y.o. female    Chief Complaint:   Chief Complaint   Patient presents with    Right Knee - Pain       History of Present Illness:    Floresita Martin is a pleasant 63 y.o. female who presents for evaluation of right knee pain. She  reports pain for the past 3 weeks. She does endorse a history of trauma.  Suffered a twisting injury of her knee and had acute pain mostly medial..  Squatting and stairs makes it worse. The pain is mostly medial. She does endorse nighttime pain.   She is independent with all activities of daily living.     In the past she has tried the following treatments:    NSAIDS     She currently does not work.     Instability: Yes  Mechanical Symptoms: Yes    Diabetic:  No  Smoker:  Yes    ____________________________________________________________________    Interval history 2023 : Patient returns today for MRI follow-up of their left knee.  They report no changes since I saw them last.  Still having pain with terminal flexion.    PAST MEDICAL HISTORY:   Past Medical History:   Diagnosis Date    Alcohol abuse     Headache     Hearing loss     HLD (hyperlipidemia)      PAST SURGICAL HISTORY:   Past Surgical History:   Procedure Laterality Date     SECTION      2    COLONOSCOPY N/A 11/15/2017    Procedure: COLONOSCOPY;  Surgeon: Peg Powers MD;  Location: [facility];  Service: Endoscopy;  Laterality: N/A;    EPIDURAL STEROID INJECTION INTO CERVICAL SPINE      EPIDURAL STEROID INJECTION INTO LUMBAR SPINE      LIPOMA RESECTION Right 2023    Procedure: EXCISION, LIPOMA;  Surgeon: Ruben Brooks Jr., MD;  [facility]: [facility] OR;  Service: General;  Laterality: Right;    LUMBAR FUSION  2019    with cage    NECK SURGERY  2018    with hardware    RADIOFREQUENCY ABLATION      cervical    RADIOFREQUENCY ABLATION      lumbar    STAPEDECTOMY Right 2015    STAPEDECTOMY Left      FAMILY HISTORY:   Family History   Problem Relation Age of  Onset    Diabetes Mother     Hypertension Father     Breast cancer Sister 47    Breast cancer Sister     Breast cancer Sister     Breast cancer Sister      SOCIAL HISTORY:   Social History     Occupational History    Not on file   Tobacco Use    Smoking status: Every Day     Packs/day: 1.50     Years: 30.00     Pack years: 45.00     Types: Cigarettes    Smokeless tobacco: Never   Substance and Sexual Activity    Alcohol use: Yes     Comment: social     Drug use: No    Sexual activity: Yes     Partners: Male        MEDICATIONS:   Current Outpatient Medications:     acetaminophen (TYLENOL) 325 MG tablet, Take 2 tablets (650 mg total) by mouth every 6 (six) hours as needed for Pain., Disp: , Rfl: 0    amitriptyline (ELAVIL) 25 MG tablet, Take 1-2 tablets by mouth at bedtime, Disp: 60 tablet, Rfl: 1    atorvastatin (LIPITOR) 40 MG tablet, Take 1 tablet (40 mg total) by mouth once daily., Disp: 90 tablet, Rfl: 1    celecoxib (CELEBREX) 200 MG capsule, Take 1 capsule (200 mg total) by mouth once daily., Disp: 30 capsule, Rfl: 2    co-enzyme Q-10 30 mg capsule, Take 30 mg by mouth once daily., Disp: , Rfl:     cyclobenzaprine (FLEXERIL) 10 MG tablet, Take 1 tablet by mouth twice a day, Disp: 60 tablet, Rfl: 1    fluticasone propionate (FLONASE) 50 mcg/actuation nasal spray, SHAKE LIQUID AND USE 1 SPRAY(50 MCG) IN EACH NOSTRIL EVERY DAY AT 6 AM, Disp: 48 g, Rfl: 0    lactulose (CHRONULAC) 10 gram/15 mL solution, TAKE 15 MLS BY MOUTH TWICE DAILY AS NEEDED, Disp: 900 mL, Rfl: 1    loratadine (CLARITIN) 10 mg tablet, Take 1 tablet (10 mg total) by mouth once daily., Disp: 90 tablet, Rfl: 3    multivitamin capsule, Take 1 capsule by mouth once daily., Disp: , Rfl:     naloxone (NARCAN) 4 mg/actuation Spry, Spray 1 spray into one nostril single dose may repeat every 2-3 minutes until responsive or EMS arrives, Disp: 1 each, Rfl: 1    nicotine (NICODERM CQ) 14 mg/24 hr, Place 1 patch onto the skin once daily., Disp: 28 patch,  Rfl: 0    omeprazole (PRILOSEC) 40 MG capsule, Take 1 capsule (40 mg total) by mouth once daily., Disp: 90 capsule, Rfl: 1    ondansetron (ZOFRAN-ODT) 4 MG TbDL, Take 1 tablet (4 mg total) by mouth every 6 (six) hours as needed (nausea)., Disp: 8 tablet, Rfl: 0    oxyCODONE (ROXICODONE) 20 mg Tab immediate release tablet, Take 1 tablet by mouth five times a day as needed for pain More than 7 days supply is medically necessary, Disp: 150 tablet, Rfl: 0    oxyCODONE (ROXICODONE) 20 mg Tab immediate release tablet, Take 1 tablet by mouth five times a day as needed for pain More than 7 days supply is medically necessary, Disp: 150 tablet, Rfl: 0    traMADoL (ULTRAM) 50 mg tablet, Take 1 tablet (50 mg total) by mouth every 6 (six) hours as needed for Pain., Disp: 8 tablet, Rfl: 0    TUMERIC-GING-OLIVE-OREG-CAPRYL ORAL, Take 1 tablet by mouth., Disp: , Rfl:   ALLERGIES:   Review of patient's allergies indicates:   Allergen Reactions    Codeine Nausea And Vomiting    Tylenol-codeine [acetaminophen-codeine] Nausea And Vomiting         Physical Exam     Vitals:    02/07/23 1433   Resp: 18     Alert and oriented to person, place and time. No acute distress. Well-groomed, not ill appearing. Pupils round and reactive, normal respiratory effort, no audible wheezing.     GENERAL:  A well-developed, well-nourished 63 y.o. female who is alert and       oriented in no acute distress.      Gait: She  walks with a antalgic gait.                   EXTREMITIES:  Examination of lower extremities reveals there is no visible mass or deformity.    Left knee:  ROM 5-135    Ligamentously stable to varus/valgus stress.    Anterior and posterior drawers negative.    No pain over pes bursa.    No warmth    No erythema     Effusion Yes    medial joint line tenderness    Negative Patellofemoral grind/crepitus     Positive medial Justus      The skin over both lower extremities is normal and unremarkable.  She has a  painless range of motion of  the hips and ankles bilaterally.   Sensation is intact in both lower extremities.    There are no motor deficits in the lower extremities bilaterally.   Pedal pulses are palpable distally bilaterally.    She has no calf tenderness to palpation nor edema.       Imaging:       X-Ray: I have reviewed all pertinent results/findings and my personal findings are:  Mild arthritis of the medial compartment otherwise normal alignment.  No evidence of fracture or loose body.    MRI of the left knee showing lateral meniscus tear    Assessment & Plan    Chronic pain of right knee  -     Ambulatory referral/consult to Physical/Occupational Therapy; Future; Expected date: 02/14/2023    Complex tear of lateral meniscus of left knee as current injury, initial encounter     I made the decision to obtain old records of the patient including previous notes and imaging. New imaging, if ordered today of the extremity or extremities, were evaluated. I independently reviewed and interpreted the radiographs and/or MRIs/CT scan today as well as prior imaging. I also reviewed the referring provider's documentation as well as any pertinent labs, tests and imaging.         After clinical evaluation, we discussed at length different treatment options including anti-inflammatories, acetaminophen, rest, ice, heat, formal physical therapy including strengthening and stretching exercises, home exercise programs, injections and finally surgical intervention. We discussed the morbidity and mortality associated with surgery as well as the risks and expectations of surgery.  We also discussed weight loss and BMI and its effects on joints and overall health as well as smoking cessation if appropriate.    After shared medical decision-making with the patient and  family, the patient would like to proceed with a trial of:     Corticosteroid Injection left knee   Formal Physical Therapy    We can consider arthroscopy with meniscus debridement versus repair  if pain is refractory to conservative treatment    All questions were answered and patient is agreeable to the above plan.

## 2023-02-07 NOTE — PROCEDURES
Large Joint Aspiration/Injection: L knee joint    Date/Time: 2/7/2023 2:45 PM  Performed by: Ryne Reed MD  Authorized by: Ryne Reed MD     Consent Done?:  Yes (Verbal)  Indications:  Pain  Site marked: the procedure site was marked    Timeout: prior to procedure the correct patient, procedure, and site was verified    Local anesthetic:  Lidocaine 1% without epinephrine  Anesthetic total (ml):  3      Details:  Needle Size:  22 G  Approach:  Anterolateral  Location:  Knee  Site:  L knee joint  Medications:  40 mg triamcinolone acetonide 40 mg/mL  Patient tolerance:  Patient tolerated the procedure well with no immediate complications

## 2023-02-09 DIAGNOSIS — Z00.00 ENCOUNTER FOR MEDICARE ANNUAL WELLNESS EXAM: ICD-10-CM

## 2023-02-22 ENCOUNTER — HOSPITAL ENCOUNTER (OUTPATIENT)
Dept: RADIOLOGY | Facility: HOSPITAL | Age: 63
Discharge: HOME | End: 2023-02-22
Attending: NURSE PRACTITIONER
Payer: MEDICARE

## 2023-02-22 VITALS — BODY MASS INDEX: 21.14 KG/M2 | WEIGHT: 112 LBS | HEIGHT: 61 IN

## 2023-02-22 DIAGNOSIS — R91.8 LUNG NODULES: ICD-10-CM

## 2023-02-22 DIAGNOSIS — R91.1 SOLITARY PULMONARY NODULE ON LUNG CT: Primary | ICD-10-CM

## 2023-02-22 LAB — GLUCOSE SERPL-MCNC: 97 MG/DL (ref 70–110)

## 2023-02-22 PROCEDURE — A9552 F18 FDG: HCPCS | Mod: PO

## 2023-02-22 PROCEDURE — 78815 PET IMAGE W/CT SKULL-THIGH: CPT | Mod: TC,PS,PO

## 2023-02-28 ENCOUNTER — TELEPHONE (OUTPATIENT)
Dept: PULMONOLOGY | Facility: CLINIC | Age: 63
End: 2023-02-28
Payer: MEDICARE

## 2023-02-28 NOTE — TELEPHONE ENCOUNTER
----- Message from Jeannine Pitts sent at 2/28/2023 10:23 AM CST -----  Contact: ADALID NAZARIO [[id number]] [phone]  Type:  Needs Medical Advice    Who Called: ADALID NAZARIO [[id number]]  Symptoms (please be specific): Mouth tenderness  How long has patient had these symptoms: Since beginning use of inhaler for about 3 days  Pharmacy name and phone #: [facility] STORE #[id number] [place], [place] - [address] AT [facility] [address], [phone]  Would the patient rather a call back or a response via [facility]? Phone call  Best Call Back Number: [phone]  Additional Information:

## 2023-03-03 ENCOUNTER — TELEPHONE (OUTPATIENT)
Dept: PULMONOLOGY | Facility: CLINIC | Age: 63
End: 2023-03-03
Payer: MEDICARE

## 2023-03-03 NOTE — TELEPHONE ENCOUNTER
Left message with patient to  patches from the [facility] at [facility] RX was sent to this Pharmacy. 3/3/2023

## 2023-03-06 ENCOUNTER — TELEPHONE (OUTPATIENT)
Dept: PULMONOLOGY | Facility: CLINIC | Age: 63
End: 2023-03-06
Payer: MEDICARE

## 2023-03-06 NOTE — TELEPHONE ENCOUNTER
Spoke with patient. Patient had back injections today. She is still extremely sore. Patient wishing to postpone test and appointment until feeling better. She requested last week of month. Moved patient appointment. Patient v/u.     ----- Message from Lexy Simon sent at 3/6/2023  4:33 PM CST -----  Contact: pt  Patient is calling she was told she needs to martha appt  Please give pt a call back [phone]

## 2023-03-09 ENCOUNTER — TELEPHONE (OUTPATIENT)
Dept: INTERVENTIONAL RADIOLOGY/VASCULAR | Facility: HOSPITAL | Age: 63
End: 2023-03-09
Payer: MEDICARE

## 2023-03-09 NOTE — NURSING
Radiology Pre-Procedural Instructions- [facility]    Radiology Nurse contacted patient to provide instructions for scheduled CT Lung Biopsy Friday 3/10 at 9 AM .   Patient instructed to arrive no later than 730 AM am to outpatient registration.   Registration will direct patient to outpatient lab for pre-op lab work if required.  Following labs, patient needs to check in at the surgery waiting room desk located on the second floor.   Patient is not currently taking anticoagulants at this time.  Patient instructed to remain NPO after midnight with the exception of approved essential meds taken with a small sip of water.  Instructed patient to bathe the night before and the morning of their procedure with an anti bacterial soap or Hibiclens.   Patient is aware of their responsibility to make post transportation arrangements home by a responsible adult.   Patient educated on all additional pre-op instructions per policy and verbalized understanding.

## 2023-03-10 ENCOUNTER — HOSPITAL ENCOUNTER (OUTPATIENT)
Dept: RADIOLOGY | Facility: HOSPITAL | Age: 63
Discharge: HOME | End: 2023-03-10
Attending: RADIOLOGY
Payer: MEDICARE

## 2023-03-10 ENCOUNTER — HOSPITAL ENCOUNTER (OUTPATIENT)
Dept: RADIOLOGY | Facility: HOSPITAL | Age: 63
Discharge: HOME | End: 2023-03-10
Attending: NURSE PRACTITIONER
Payer: MEDICARE

## 2023-03-10 ENCOUNTER — PES CALL (OUTPATIENT)
Dept: ADMINISTRATIVE | Facility: CLINIC | Age: 63
End: 2023-03-10
Payer: MEDICARE

## 2023-03-10 VITALS
SYSTOLIC BLOOD PRESSURE: 122 MMHG | WEIGHT: 112 LBS | RESPIRATION RATE: 18 BRPM | HEART RATE: 72 BPM | TEMPERATURE: 98 F | OXYGEN SATURATION: 94 % | DIASTOLIC BLOOD PRESSURE: 65 MMHG | BODY MASS INDEX: 21.14 KG/M2 | HEIGHT: 61 IN

## 2023-03-10 DIAGNOSIS — R91.1 LUNG NODULE: Primary | ICD-10-CM

## 2023-03-10 DIAGNOSIS — R91.1 SOLITARY PULMONARY NODULE ON LUNG CT: ICD-10-CM

## 2023-03-10 PROCEDURE — 71045 X-RAY EXAM CHEST 1 VIEW: CPT | Mod: 26,HCNC,, | Performed by: RADIOLOGY

## 2023-03-10 PROCEDURE — 88305 TISSUE EXAM BY PATHOLOGIST: CPT | Mod: HCNC | Performed by: PATHOLOGY

## 2023-03-10 PROCEDURE — 88305 TISSUE EXAM BY PATHOLOGIST: ICD-10-PCS | Mod: 26,HCNC,, | Performed by: PATHOLOGY

## 2023-03-10 PROCEDURE — 71045 X-RAY EXAM CHEST 1 VIEW: CPT | Mod: TC,HCNC,FY

## 2023-03-10 PROCEDURE — 32408 CT BIOPSY LUNG W/ GUIDANCE: ICD-10-PCS | Mod: HCNC,,, | Performed by: RADIOLOGY

## 2023-03-10 PROCEDURE — 32408 CORE NDL BX LNG/MED PERQ: CPT | Mod: HCNC

## 2023-03-10 PROCEDURE — 32408 CORE NDL BX LNG/MED PERQ: CPT | Mod: HCNC,,, | Performed by: RADIOLOGY

## 2023-03-10 PROCEDURE — 88305 TISSUE EXAM BY PATHOLOGIST: CPT | Mod: 26,HCNC,, | Performed by: PATHOLOGY

## 2023-03-10 PROCEDURE — 71045 XR CHEST 1 VIEW: ICD-10-PCS | Mod: 26,HCNC,76, | Performed by: RADIOLOGY

## 2023-03-10 PROCEDURE — 99900103 DSU ONLY-NO CHARGE-INITIAL HR (STAT): Mod: HCNC

## 2023-03-10 PROCEDURE — 71045 XR CHEST 1 VIEW: ICD-10-PCS | Mod: 26,HCNC,, | Performed by: RADIOLOGY

## 2023-03-10 PROCEDURE — 99900104 DSU ONLY-NO CHARGE-EA ADD'L HR (STAT): Mod: HCNC

## 2023-03-10 PROCEDURE — 63600175 PHARM REV CODE 636 W HCPCS: Mod: HCNC | Performed by: RADIOLOGY

## 2023-03-10 PROCEDURE — 71045 X-RAY EXAM CHEST 1 VIEW: CPT | Mod: 26,HCNC,76, | Performed by: RADIOLOGY

## 2023-03-10 RX ORDER — SODIUM CHLORIDE 9 MG/ML
INJECTION, SOLUTION INTRAVENOUS CONTINUOUS
Status: DISCONTINUED | OUTPATIENT
Start: 2023-03-10 | End: 2023-03-11 | Stop reason: HOSPADM

## 2023-03-10 RX ORDER — MIDAZOLAM HYDROCHLORIDE 1 MG/ML
INJECTION INTRAMUSCULAR; INTRAVENOUS
Status: COMPLETED | OUTPATIENT
Start: 2023-03-10 | End: 2023-03-10

## 2023-03-10 RX ORDER — LIDOCAINE HYDROCHLORIDE 10 MG/ML
1 INJECTION, SOLUTION EPIDURAL; INFILTRATION; INTRACAUDAL; PERINEURAL ONCE AS NEEDED
Status: DISCONTINUED | OUTPATIENT
Start: 2023-03-10 | End: 2023-03-11 | Stop reason: HOSPADM

## 2023-03-10 RX ORDER — FENTANYL CITRATE 50 UG/ML
INJECTION, SOLUTION INTRAMUSCULAR; INTRAVENOUS
Status: COMPLETED | OUTPATIENT
Start: 2023-03-10 | End: 2023-03-10

## 2023-03-10 RX ADMIN — FENTANYL CITRATE 25 MCG: 50 INJECTION, SOLUTION INTRAMUSCULAR; INTRAVENOUS at 09:03

## 2023-03-10 RX ADMIN — MIDAZOLAM HYDROCHLORIDE 1 MG: 1 INJECTION, SOLUTION INTRAMUSCULAR; INTRAVENOUS at 09:03

## 2023-03-10 NOTE — H&P
H&P   Pt had Pulmonary consult 2 February 2023 for 12 mm nodule in left lung. Agree with that H&P and have no changes to make. Will proceed with Ct directed left lung nodule biopsy.

## 2023-03-10 NOTE — Clinical Note
A pre-sedation assessment was completed by the physician immediately prior to sedation start. ASA2/mallampati2

## 2023-03-10 NOTE — NURSING
Procedure explained and pt consented in DSU by Radiologist.   Pt arrived via stretcher to CT for CT Guided Lung Biopsy.   AAOx4,- Time out performed.     Pt received Fentanyl 75 mcg and Versed 2 mg IV. Vital signs monitored and remained stable for duration of procedure. Biopsies collected By Radiologist.   Specimens submitted to lab for Pathology.   Bandage applied to pts left lateral thorax. CDI- Report called to Marjorie   Pt transported to DSU  STAT Post- Chest CT - bleeding present- no pneumo per rad  STAT Post- Chest  Xray - completed and awaiting dictation.  Timed chest Xray ordered and due at 1300 and must be cleared by radiologist prior to pts discharge

## 2023-03-10 NOTE — DISCHARGE SUMMARY
Radiology Discharge Summary      Admit date: 3/10/2023  7:30 AM  Discharge date: March 10, 2023    Instructions Given to patient: YesVerbal    Diet: Regular    Activity:NO Restrictions    Medications on discharge (List): Refer to Discharge Medication List    Hospital Course: Following informed consent and time out  the patient received moderate sedation Fentanyl 75 mcg and Versed 2 mg IV and Dr. Byrd and RN Stan monitored the patient for BP, pulse and pulse oximetry from 0945 until 1000.      Pt brought to Ct and following local prep and local anesthesia a 19 g needle advance to edge of mass and three 20 g needle cores obtained. Post Bx no pneumothorax and moiderate contusion seen. CXCRs ordered     Description of Condition on Discharge: stable    Discharge Disposition: Home    Discharge Diagnosis: left lung mass    Patient to Follow up with Dr. Abernathy

## 2023-03-10 NOTE — PLAN OF CARE
1325 Md stated ok to d/c pt home, xr wnl.  1330 Pt tolerating po fluids, pain controlled. All belongings returned to pt. D/c instructions given and explained to pt, pt v/u. D/c'd out to pov via wc per staff with nadn and  at side

## 2023-03-13 ENCOUNTER — TELEPHONE (OUTPATIENT)
Dept: PULMONOLOGY | Facility: CLINIC | Age: 63
End: 2023-03-13
Payer: MEDICARE

## 2023-03-13 LAB
FINAL PATHOLOGIC DIAGNOSIS: NORMAL
GROSS: NORMAL
Lab: NORMAL

## 2023-03-13 NOTE — TELEPHONE ENCOUNTER
----- Message from Vanessa Paula sent at 3/13/2023  1:09 PM CDT -----  Regarding: advise  Contact: pt  Type: Needs Medical Advice  Who Called:  Patient  Symptoms (please be specific):  biopsy   How long has patient had these symptoms:    Pharmacy name and phone #:   Best Call Back Number: [phone] (home)     Additional Information: test results needed and has questions regarding it.

## 2023-03-20 ENCOUNTER — TELEPHONE (OUTPATIENT)
Dept: PULMONOLOGY | Facility: CLINIC | Age: 63
End: 2023-03-20
Payer: MEDICARE

## 2023-03-20 NOTE — TELEPHONE ENCOUNTER
Talked with 3/20/2023, and addressed concerns and questions. Appointment remains and follow up made with Provider.

## 2023-03-20 NOTE — TELEPHONE ENCOUNTER
----- Message from Ora Diana sent at 3/20/2023 10:11 AM CDT -----  Who Called: Pt    What is the request in detail: Requesting call back to speak with Brook Rodrigez about upcoming appt. Please advise.     Can the clinic reply by [facility]? No    Best Call Back Number: [phone]      Additional Information:

## 2023-03-20 NOTE — TELEPHONE ENCOUNTER
----- Message from Gerri Frankie sent at 3/20/2023  9:52 AM CDT -----  Contact: patient  Type:  Needs Medical Advice    Who Called:  patient     Would the patient rather a call back or a response via [facility]? Call     Best Call Back Number: [phone] (home)      Additional Information:  Patient would like to speak with the nurse in regards to a question that she has about an upcoming appointment that she has scheduled.     Please call to advise

## 2023-03-21 DIAGNOSIS — R91.8 LUNG NODULES: Primary | ICD-10-CM

## 2023-05-10 ENCOUNTER — HOSPITAL ENCOUNTER (OUTPATIENT)
Dept: RADIOLOGY | Facility: HOSPITAL | Age: 63
Discharge: HOME | End: 2023-05-10
Attending: NURSE PRACTITIONER
Payer: MEDICARE

## 2023-05-10 DIAGNOSIS — R91.8 LUNG NODULES: ICD-10-CM

## 2023-05-10 PROCEDURE — 71250 CT CHEST WITHOUT CONTRAST: ICD-10-PCS | Mod: 26,HCNC,, | Performed by: RADIOLOGY

## 2023-05-10 PROCEDURE — 71250 CT THORAX DX C-: CPT | Mod: 26,HCNC,, | Performed by: RADIOLOGY

## 2023-05-10 PROCEDURE — 71250 CT THORAX DX C-: CPT | Mod: TC,HCNC

## 2023-05-17 ENCOUNTER — OFFICE VISIT (OUTPATIENT)
Dept: PULMONOLOGY | Facility: CLINIC | Age: 63
End: 2023-05-17
Payer: MEDICARE

## 2023-05-17 VITALS
HEIGHT: 61 IN | DIASTOLIC BLOOD PRESSURE: 87 MMHG | BODY MASS INDEX: 21.48 KG/M2 | SYSTOLIC BLOOD PRESSURE: 169 MMHG | HEART RATE: 83 BPM | OXYGEN SATURATION: 96 % | WEIGHT: 113.75 LBS

## 2023-05-17 DIAGNOSIS — J44.9 CHRONIC OBSTRUCTIVE PULMONARY DISEASE, UNSPECIFIED COPD TYPE: ICD-10-CM

## 2023-05-17 DIAGNOSIS — F17.210 CIGARETTE NICOTINE DEPENDENCE WITHOUT COMPLICATION: ICD-10-CM

## 2023-05-17 DIAGNOSIS — J40 BRONCHITIS: ICD-10-CM

## 2023-05-17 DIAGNOSIS — R91.1 SOLITARY PULMONARY NODULE ON LUNG CT: ICD-10-CM

## 2023-05-17 DIAGNOSIS — R91.8 LUNG NODULES: Primary | ICD-10-CM

## 2023-05-17 DIAGNOSIS — R91.8 GROUND GLASS OPACITY PRESENT ON IMAGING OF LUNG: ICD-10-CM

## 2023-05-17 PROCEDURE — 3077F PR MOST RECENT SYSTOLIC BLOOD PRESSURE >= 140 MM HG: ICD-10-PCS | Mod: CPTII,,, | Performed by: NURSE PRACTITIONER

## 2023-05-17 PROCEDURE — 3008F PR BODY MASS INDEX (BMI) DOCUMENTED: ICD-10-PCS | Mod: CPTII,,, | Performed by: NURSE PRACTITIONER

## 2023-05-17 PROCEDURE — 1159F PR MEDICATION LIST DOCUMENTED IN MEDICAL RECORD: ICD-10-PCS | Mod: CPTII,,, | Performed by: NURSE PRACTITIONER

## 2023-05-17 PROCEDURE — 3077F SYST BP >= 140 MM HG: CPT | Mod: CPTII,,, | Performed by: NURSE PRACTITIONER

## 2023-05-17 PROCEDURE — 99999 PR PBB SHADOW E&M-EST. PATIENT-LVL IV: CPT | Mod: PBBFAC,,, | Performed by: NURSE PRACTITIONER

## 2023-05-17 PROCEDURE — 3079F DIAST BP 80-89 MM HG: CPT | Mod: CPTII,,, | Performed by: NURSE PRACTITIONER

## 2023-05-17 PROCEDURE — 3079F PR MOST RECENT DIASTOLIC BLOOD PRESSURE 80-89 MM HG: ICD-10-PCS | Mod: CPTII,,, | Performed by: NURSE PRACTITIONER

## 2023-05-17 PROCEDURE — 99999 PR PBB SHADOW E&M-EST. PATIENT-LVL IV: ICD-10-PCS | Mod: PBBFAC,,, | Performed by: NURSE PRACTITIONER

## 2023-05-17 PROCEDURE — 99214 OFFICE O/P EST MOD 30 MIN: CPT | Mod: ,,, | Performed by: NURSE PRACTITIONER

## 2023-05-17 PROCEDURE — 3008F BODY MASS INDEX DOCD: CPT | Mod: CPTII,,, | Performed by: NURSE PRACTITIONER

## 2023-05-17 PROCEDURE — 1159F MED LIST DOCD IN RCRD: CPT | Mod: CPTII,,, | Performed by: NURSE PRACTITIONER

## 2023-05-17 PROCEDURE — 99214 PR OFFICE/OUTPT VISIT, EST, LEVL IV, 30-39 MIN: ICD-10-PCS | Mod: ,,, | Performed by: NURSE PRACTITIONER

## 2023-05-17 RX ORDER — ALBUTEROL SULFATE 90 UG/1
2 AEROSOL, METERED RESPIRATORY (INHALATION) EVERY 6 HOURS PRN
Qty: 18 G | Refills: 11 | Status: SHIPPED | OUTPATIENT
Start: 2023-05-17

## 2023-05-17 RX ORDER — IPRATROPIUM BROMIDE AND ALBUTEROL SULFATE 2.5; .5 MG/3ML; MG/3ML
3 SOLUTION RESPIRATORY (INHALATION) EVERY 6 HOURS PRN
Qty: 75 ML | Refills: 11 | Status: SHIPPED | OUTPATIENT
Start: 2023-05-17 | End: 2024-05-16

## 2023-05-17 RX ORDER — FLUTICASONE FUROATE, UMECLIDINIUM BROMIDE AND VILANTEROL TRIFENATATE 200; 62.5; 25 UG/1; UG/1; UG/1
1 POWDER RESPIRATORY (INHALATION) DAILY
Qty: 60 EACH | Refills: 11 | Status: SHIPPED | OUTPATIENT
Start: 2023-05-17

## 2023-05-17 NOTE — PATIENT INSTRUCTIONS
Repeat CT Chest shows unchanged extent of prior noted nodule - now shows concern to left lower lobe in addition to ground glass opacities throughout.    I have ordered sputum cultures - you will leave the office today with sputum specimen cups. Bring to any [facility] within 4 hours of obtaining specimen. Rinse your mouth prior to collecting sample. Please collect sample in the morning by deep coughing prior to eating or drinking.  Checking Respiratory culture, AFB, Fungal.    I ordered a Lung Function Test to evaluate lung strength. Please complete prior to next appointment. Do this In one month.    Recommend getting back on COPD inhaler - Trelegy, one puff once per day.  This inhaler contains an inhaled steroid component. Rinse mouth after each use due to risk for thrush development. If mouth or tongue develops white sores please contact the clinic and I will order a prescription mouth wash.     Can use Albuterol as needed for shortness of breath, wheezing, cough.    Recommend doing nebulized treatments, two treatments per day for the next 10 days.    Continue Mucinex.    Continue current medication regiment. Keep follow up appointment as scheduled. Please call the office if you have any questions or concerns.

## 2023-05-17 NOTE — PROGRESS NOTES
"5/17/2023    Floresita Martin  In office visit     Chief Complaint   Patient presents with    Cough     A little. Coughs up white/yellow mucus.        HPI:  05/17/2023:  Underwent lung biopsy to lung nodule of BELEN - lung biopsy was negative for malignancy - Per pathology report, "Alveolated lung tissue with regionally dense fibrosis and occasional interstitial lymphoid follicles."  Underwent repeat CT Chest last week revealing unchanged lung nodule however shows new ground glass opacities throughout both lungs, with new area of concern to LLL.   Developed symptoms of a head cold approx 5 days ago - states has been coughing over the last three days. Initially, mucous was thick, difficult to expectorate however has been on Mucinex for the last three days - mucous is yellow/white in color.  Denies wheezing, chest tightness.  Recently cut back on smoking over the last five days - states was smoking 2.5 ppd however is now down to less than 15 cigarettes per day.   Did not yet undergo PFT.  Trialed Trelegy for one month - states did not notice much improvement in symptoms with use.          02/03/2023:  Denies being seen by prior pulmonologist. Denies current inhaler use, supplemental oxygen use, CPAP use.  Denies personal history of cancer, PE, anticoagulation use.  Reports she believes she is diagnosed with early stages of COPD.  Underwent CT chest per PCP was referred to pulmonology.  Denies shortness of breath, states she is able to do all of her ADLs without issue.  Denies wheezing, chest tightness.  Cough:  Intermittent, productive with white sputum.  No time correlation identified.  Denies frequent use of antibiotic, steroid.  Current smoker - typical 1 pack per day use.  Started at 17 years old.  Currently vaping as well, nicotine containing. Not interested in smoking cessation referral at this time however requesting Nicotine patches.  Patient Instructions   CT chest reviewed, shows mild emphysema to bilateral " upper lung fields.  This is consistent with a diagnosis of COPD.  I ordered a Lung Function Test to evaluate lung strength. Please complete prior to next appointment.   Can trial daily inhaler, Trelegy.  This is 1 puff once per day.  This inhaler contains an inhaled steroid component. Rinse mouth after each use due to risk for thrush development. If mouth or tongue develops white sores please contact the clinic and I will order a prescription mouth wash.   CT chest does show new 12 mm nodule to the left upper lung and new 5 mm nodule to the left lower lung.  Will proceed with PET scan at this time.  Broch calculator 32% change of nodule being cancerous.   Will review CT images with collaborating MD next week and if change of plan, will call you.  Nicotine patches ordered.  Continue current medication regiment. Keep follow up appointment as scheduled. Please call the office if you have any questions or concerns.         Social Hx: Lives with friend - 1 dog in the home.  Former .  No Asbestosis exposure, Smoking Hx:  As above  Family Hx:  No Lung Cancer, no COPD, no Asthma  Medical Hx:  No previous pneumonia ; no previous shoulder/chest surgery        The chief compliant  problem varies with instability at times.  PFSH:  Past Medical History:   Diagnosis Date    Alcohol abuse     Headache     Hearing loss     HLD (hyperlipidemia)          Past Surgical History:   Procedure Laterality Date    BACK SURGERY       SECTION      2    COLONOSCOPY N/A 11/15/2017    Procedure: COLONOSCOPY;  Surgeon: Peg Powers MD;  Location: [facility];  Service: Endoscopy;  Laterality: N/A;    EPIDURAL STEROID INJECTION INTO CERVICAL SPINE      EPIDURAL STEROID INJECTION INTO LUMBAR SPINE      LIPOMA RESECTION Right 2023    Procedure: EXCISION, LIPOMA;  Surgeon: Ruben Brooks Jr., MD;  Location: [facility] OR;  Service: General;  Laterality: Right;    LUMBAR FUSION  2019    with cage    NECK SURGERY  2018     "with hardware    RADIOFREQUENCY ABLATION      cervical    RADIOFREQUENCY ABLATION      lumbar    STAPEDECTOMY Right 05/28/2015    STAPEDECTOMY Left      Social History     Tobacco Use    Smoking status: Every Day     Packs/day: 1.50     Years: 30.00     Pack years: 45.00     Types: Cigarettes    Smokeless tobacco: Never   Substance Use Topics    Alcohol use: Yes     Comment: social     Drug use: No     Family History   Problem Relation Age of Onset    Diabetes Mother     Hypertension Father     Breast cancer Sister 47    Breast cancer Sister     Breast cancer Sister     Breast cancer Sister      Review of patient's allergies indicates:   Allergen Reactions    Codeine Nausea And Vomiting    Tylenol-codeine [acetaminophen-codeine] Nausea And Vomiting     I have reviewed past medical, family, and social history. I have reviewed previous nurse notes.    Performance Status:The patient's activity level is no limits with regular activity.      Review of Systems:  a review of eleven systems covering constitutional, Eye, HEENT, Psych, Respiratory, Cardiac, GI, , Musculoskeletal, Endocrine, Dermatologic was negative except for pertinent findings as listed ABOVE and below: pertinent positive as above, rest is good       Exam:Comprehensive exam done. BP (!) 169/87 (BP Location: Left arm, Patient Position: Sitting, BP Method: Pediatric (Automatic))   Pulse 83   Ht 5' 1" (1.549 m)   Wt 51.6 kg (113 lb 12.1 oz)   LMP  (LMP Unknown)   SpO2 96% Comment: room air at rest  BMI 21.49 kg/m²   Exam included Vitals as listed  Constitutional: She is oriented to person, place, and time. She appears well-developed. No distress.   Nose: Nose normal.   Mouth/Throat: Uvula is midline, oropharynx is clear and moist and mucous membranes are normal. No dental caries. No oropharyngeal exudate, posterior oropharyngeal edema, posterior oropharyngeal erythema or tonsillar abscesses.    Eyes: Pupils are equal, round, and reactive to light. "   Neck: No JVD present. No thyromegaly present.   Cardiovascular: Normal rate, regular rhythm and normal heart sounds. Exam reveals no gallop and no friction rub.   No murmur heard.  Pulmonary/Chest: Effort normal and breath sounds normal. No accessory muscle usage or stridor. No apnea and no tachypnea. No respiratory distress, decreased breath sounds, wheezes, rhonchi, rales, or tenderness. Clear breath sounds throughout all lung fields, on room air, in no acute distress.   Abdominal: Soft. She exhibits no mass. There is no tenderness. No hepatosplenomegaly, hernias and normoactive bowel sounds  Musculoskeletal: Normal range of motion. exhibits no edema.   Neurological:  alert and oriented to person, place, and time. not disoriented.   Skin: Skin is warm and dry. Capillary refill takes less 2 sec. No cyanosis or erythema. No pallor. Nails show no clubbing.   Psychiatric: normal mood and affect. behavior is normal. Judgment and thought content normal.       Radiographs (ct chest and cxr) reviewed: view by direct vision     CT Chest Without Contrast 5/10/2023 FINDINGS:  The heart and great vessels are of normal size and contour. Enlargement or aneurysm is not seen. Adenopathy or soft tissue masses within the mediastinum are not seen.   In the left upper lobe there is still seen a peanut shaped 1.6 cm by 1.1 cm soft tissue density mass.  This was biopsied on March 10, 2023 with a benign result.  There is a strand extending to the lateral pleura.   There is however new patchy interstitial infiltrate identified in the left lingula and at the lung bases..  This is most likely infectious since it was not present on the prior CTs chest.  There is a small area of consolidation seen in the lingula on series 4, image 314 measuring 1.5 cm.  A mass or infiltrate in the right lung is not identified.  No pneumothorax or pleural effusion is noted.   Impression:   Stable 1.6 x 1.1 cm peanut shaped soft tissue density mass of the  left upper lobe status post biopsy.  New patchy interstitial infiltrates identified in the left lingula and lung bases, however with a small area of consolidation measuring 1.5 cm.  These are most likely an infectious process.  Follow-up by CT scan is recommended.   This report was flagged in Epic as abnormal.             CT Chest Lung Screening Low Dose 1/11/2023 FINDINGS:  Lungs: There is a new spiculated, solid nodule within the left upper lobe measuring 1.2 cm.  There is also new solid nodule at the left lung base measuring 5 mm (series 4, image 378).  A few scattered old calcified granulomas are present.  There are mild emphysematous changes.   Pleura:   No effusion..   Heart and pericardium: Normal size without effusion.   Aorta and vasculature: Atherosclerosis including coronary arteries.   Lymph nodes: No hilar, mediastinal, or axial lymphadenopathy.   Chest wall and skeletal structures: Postoperative change of the lower cervical spine.  No suspicious bony lesions.   Upper abdomen: Unremarkable.   Impression:   Lung-RADS Category:  4B - Suspicious - consultation advised - possible next steps  Chest CT, tissue sampling and-or PET/CT.   Clinically or potentially clinically significant non lung cancer finding:  None.   Prior Lung Cancer Modifier:  No history of prior lung cancer.   This report was flagged in Epic as abnormal.       X-Ray Chest PA And Lateral 4/15/2016 Findings: The cardiomediastinal silhouette is within normal limits.  The lungs are well expanded and clear.   In pression: No active cardiopulmonary process.      Labs reviewed    Lab Results   Component Value Date    WBC 7.06 03/10/2023    RBC 4.14 03/10/2023    HGB 13.2 03/10/2023    HCT 38.7 03/10/2023    MCV 94 03/10/2023    MCH 31.9 (H) 03/10/2023    MCHC 34.1 03/10/2023    RDW 14.9 (H) 03/10/2023     03/10/2023    MPV 8.4 (L) 03/10/2023    GRAN 3.8 03/10/2023    GRAN 53.8 03/10/2023    LYMPH 2.6 03/10/2023    LYMPH 36.3 03/10/2023     MONO 0.5 03/10/2023    MONO 7.6 03/10/2023    EOS 0.1 03/10/2023    BASO 0.03 03/10/2023    EOSINOPHIL 1.6 03/10/2023    BASOPHIL 0.4 03/10/2023       PFT will be done and results to be reviewed  Pulmonary Functions Testing Results:        Plan:  Clinical impression is ambiguous and will need repeated evaluation wrt will require conference with MD.    Floresita was seen today for cough.    Diagnoses and all orders for this visit:    Lung nodules    Solitary pulmonary nodule on lung CT  -     NEBULIZER FOR HOME USE  -     albuterol-ipratropium (DUO-NEB) 2.5 mg-0.5 mg/3 mL nebulizer solution; Take 3 mLs by nebulization every 6 (six) hours as needed for Wheezing or Shortness of Breath. Rescue  -     fluticasone-umeclidin-vilanter (TRELEGY ELLIPTA) 200-62.5-25 mcg inhaler; Inhale 1 puff into the lungs once daily.  -     albuterol (PROVENTIL/VENTOLIN HFA) 90 mcg/actuation inhaler; Inhale 2 puffs into the lungs every 6 (six) hours as needed for Wheezing or Shortness of Breath. Rescue  -     Culture, Respiratory with Gram Stain; Standing  -     AFB Culture & Smear; Standing  -     CULTURE, FUNGUS; Future    Cigarette nicotine dependence without complication    Chronic obstructive pulmonary disease, unspecified COPD type  -     NEBULIZER FOR HOME USE  -     albuterol-ipratropium (DUO-NEB) 2.5 mg-0.5 mg/3 mL nebulizer solution; Take 3 mLs by nebulization every 6 (six) hours as needed for Wheezing or Shortness of Breath. Rescue  -     fluticasone-umeclidin-vilanter (TRELEGY ELLIPTA) 200-62.5-25 mcg inhaler; Inhale 1 puff into the lungs once daily.  -     albuterol (PROVENTIL/VENTOLIN HFA) 90 mcg/actuation inhaler; Inhale 2 puffs into the lungs every 6 (six) hours as needed for Wheezing or Shortness of Breath. Rescue  -     Culture, Respiratory with Gram Stain; Standing  -     AFB Culture & Smear; Standing  -     CULTURE, FUNGUS; Future    Bronchitis  -     NEBULIZER FOR HOME USE  -     albuterol-ipratropium (DUO-NEB) 2.5 mg-0.5  mg/3 mL nebulizer solution; Take 3 mLs by nebulization every 6 (six) hours as needed for Wheezing or Shortness of Breath. Rescue  -     fluticasone-umeclidin-vilanter (TRELEGY ELLIPTA) 200-62.5-25 mcg inhaler; Inhale 1 puff into the lungs once daily.  -     albuterol (PROVENTIL/VENTOLIN HFA) 90 mcg/actuation inhaler; Inhale 2 puffs into the lungs every 6 (six) hours as needed for Wheezing or Shortness of Breath. Rescue  -     Culture, Respiratory with Gram Stain; Standing  -     AFB Culture & Smear; Standing  -     CULTURE, FUNGUS; Future    Ground glass opacity present on imaging of lung          Follow up in about 8 weeks (around 7/12/2023), or if symptoms worsen or fail to improve.    Discussed with patient above for education the following:      Patient Instructions   Repeat CT Chest shows unchanged extent of prior noted nodule - now shows concern to left lower lobe in addition to ground glass opacities throughout.    I have ordered sputum cultures - you will leave the office today with sputum specimen cups. Bring to any [facility] within 4 hours of obtaining specimen. Rinse your mouth prior to collecting sample. Please collect sample in the morning by deep coughing prior to eating or drinking.  Checking Respiratory culture, AFB, Fungal.    I ordered a Lung Function Test to evaluate lung strength. Please complete prior to next appointment. Do this In one month.    Recommend getting back on COPD inhaler - Trelegy, one puff once per day.  This inhaler contains an inhaled steroid component. Rinse mouth after each use due to risk for thrush development. If mouth or tongue develops white sores please contact the clinic and I will order a prescription mouth wash.     Can use Albuterol as needed for shortness of breath, wheezing, cough.    Recommend doing nebulized treatments, two treatments per day for the next 10 days.    Continue Mucinex.    Continue current medication regiment. Keep follow up appointment as  scheduled. Please call the office if you have any questions or concerns.

## 2023-05-19 ENCOUNTER — TELEPHONE (OUTPATIENT)
Dept: PULMONOLOGY | Facility: CLINIC | Age: 63
End: 2023-05-19
Payer: MEDICARE

## 2023-05-19 NOTE — TELEPHONE ENCOUNTER
Advised [facility] handles nebulizer. Provided phone number for patient to contact for status.     ----- Message from Jose Rome sent at 5/19/2023 10:56 AM CDT -----  Contact: pt at [phone]  Type: Needs Medical Advice  Who Called:  pt  Best Call Back Number: [phone]  Additional Information: pt is calling the office to get a script for the nebulizer and the medicine to go in it.

## 2023-05-30 DIAGNOSIS — J44.9 CHRONIC OBSTRUCTIVE PULMONARY DISEASE, UNSPECIFIED COPD TYPE: Primary | ICD-10-CM

## 2023-05-31 DIAGNOSIS — R91.8 GROUND GLASS OPACITY PRESENT ON IMAGING OF LUNG: ICD-10-CM

## 2023-05-31 DIAGNOSIS — R91.8 LUNG NODULES: Primary | ICD-10-CM

## 2023-06-15 ENCOUNTER — HOSPITAL ENCOUNTER (OUTPATIENT)
Dept: RADIOLOGY | Facility: HOSPITAL | Age: 63
Discharge: HOME | End: 2023-06-15
Attending: NURSE PRACTITIONER
Payer: MEDICARE

## 2023-06-15 ENCOUNTER — TELEPHONE (OUTPATIENT)
Dept: PULMONOLOGY | Facility: CLINIC | Age: 63
End: 2023-06-15
Payer: MEDICARE

## 2023-06-15 DIAGNOSIS — J44.9 CHRONIC OBSTRUCTIVE PULMONARY DISEASE, UNSPECIFIED COPD TYPE: ICD-10-CM

## 2023-06-15 PROCEDURE — 71046 X-RAY EXAM CHEST 2 VIEWS: CPT | Mod: 26,,, | Performed by: RADIOLOGY

## 2023-06-15 PROCEDURE — 71046 X-RAY EXAM CHEST 2 VIEWS: CPT | Mod: TC,FY

## 2023-06-15 PROCEDURE — 71046 XR CHEST PA AND LATERAL: ICD-10-PCS | Mod: 26,,, | Performed by: RADIOLOGY

## 2023-06-15 NOTE — TELEPHONE ENCOUNTER
Spoke with patient. Patient wanting to know if PFT still needed. I advised it would still be needed to determine lung strength.     ----- Message from Gerri David sent at 6/15/2023  1:32 PM CDT -----  Contact: patient  Type:  Needs Medical Advice    Who Called: patient     Would the patient rather a call back or a response via [facility]? Call     Best Call Back Number: [phone] (home)      Additional Information: Patient would like to speak with the nurse in regards to her xray.     Please call to advise

## 2023-06-16 ENCOUNTER — HOSPITAL ENCOUNTER (OUTPATIENT)
Dept: PULMONOLOGY | Facility: HOSPITAL | Age: 63
Discharge: HOME | End: 2023-06-16
Payer: MEDICARE

## 2023-06-16 DIAGNOSIS — J44.9 CHRONIC OBSTRUCTIVE PULMONARY DISEASE, UNSPECIFIED COPD TYPE: ICD-10-CM

## 2023-06-16 PROCEDURE — 94726 PLETHYSMOGRAPHY LUNG VOLUMES: CPT

## 2023-06-16 PROCEDURE — 94060 EVALUATION OF WHEEZING: CPT

## 2023-06-16 PROCEDURE — 94729 DIFFUSING CAPACITY: CPT

## 2023-06-16 RX ORDER — ALBUTEROL SULFATE 2.5 MG/.5ML
2.5 SOLUTION RESPIRATORY (INHALATION)
Status: COMPLETED | OUTPATIENT
Start: 2023-06-16 | End: 2023-06-16

## 2023-06-16 RX ADMIN — ALBUTEROL SULFATE 2.5 MG: 2.5 SOLUTION RESPIRATORY (INHALATION) at 08:06

## 2023-06-20 LAB
BRPFT: ABNORMAL
BRPFT: ABNORMAL
DLCO SINGLE BREATH LLN: 15
DLCO SINGLE BREATH PRE REF: 69.5 %
DLCO SINGLE BREATH REF: 20.74
DLCOC SBVA LLN: 3.01
DLCOC SBVA REF: 4.67
DLCOC SINGLE BREATH LLN: 15
DLCOC SINGLE BREATH REF: 20.74
DLCOVA LLN: 3.01
DLCOVA PRE REF: 62.4 %
DLCOVA PRE: 2.91 ML/(MIN*MMHG*L) (ref 3.01–6.34)
DLCOVA REF: 4.67
ERV LLN: -16449.25
ERV PRE REF: 203.5 %
ERV REF: 0.75
FEF 25 75 CHG: -10 %
FEF 25 75 LLN: 1.05
FEF 25 75 POST REF: 70.3 %
FEF 25 75 PRE REF: 78.1 %
FEF 25 75 REF: 2.07
FET100 CHG: 2.5 %
FEV1 CHG: -0.2 %
FEV1 FVC CHG: -0.2 %
FEV1 FVC LLN: 67
FEV1 FVC POST REF: 91 %
FEV1 FVC PRE REF: 91.2 %
FEV1 FVC REF: 80
FEV1 LLN: 1.67
FEV1 POST REF: 105.4 %
FEV1 PRE REF: 105.6 %
FEV1 REF: 2.22
FRCPLETH LLN: 1.71
FRCPLETH PREREF: 140.3 %
FRCPLETH REF: 2.53
FVC CHG: 0 %
FVC LLN: 2.11
FVC POST REF: 115.3 %
FVC PRE REF: 115.3 %
FVC REF: 2.8
IVC PRE: 3.17 L (ref 2.11–3.52)
IVC SINGLE BREATH LLN: 2.11
IVC SINGLE BREATH PRE REF: 113.2 %
IVC SINGLE BREATH REF: 2.8
MVV LLN: 68
MVV PRE REF: 111.7 %
MVV REF: 80
PEF CHG: 25.6 %
PEF LLN: 4.23
PEF POST REF: 104.2 %
PEF PRE REF: 83 %
PEF REF: 5.79
POST FEF 25 75: 1.45 L/S (ref 1.05–3.45)
POST FET 100: 9.76 SEC
POST FEV1 FVC: 72.43 % (ref 67.35–90.04)
POST FEV1: 2.34 L (ref 1.67–2.75)
POST FVC: 3.23 L (ref 2.11–3.52)
POST PEF: 6.03 L/S (ref 4.23–7.34)
PRE DLCO: 14.41 ML/(MIN*MMHG) (ref 15–26.47)
PRE ERV: 1.53 L (ref -16449.25–16450.75)
PRE FEF 25 75: 1.62 L/S (ref 1.05–3.45)
PRE FET 100: 9.52 SEC
PRE FEV1 FVC: 72.59 % (ref 67.35–90.04)
PRE FEV1: 2.35 L (ref 1.67–2.75)
PRE FRC PL: 3.55 L (ref 1.71–3.35)
PRE FVC: 3.23 L (ref 2.11–3.52)
PRE MVV: 89.12 L/MIN (ref 67.83–91.78)
PRE PEF: 4.8 L/S (ref 4.23–7.34)
PRE RV: 2.02 L (ref 1.2–2.36)
PRE TLC: 5.25 L (ref 3.45–5.42)
RAW LLN: 3.06
RAW PRE REF: 115.1 %
RAW PRE: 3.52 CMH2O*S/L (ref 3.06–3.06)
RAW REF: 3.06
RV LLN: 1.2
RV PRE REF: 113.7 %
RV REF: 1.78
RVTLC LLN: 30
RVTLC PRE REF: 97 %
RVTLC PRE: 38.51 % (ref 30.11–49.29)
RVTLC REF: 40
TLC LLN: 3.45
TLC PRE REF: 118.5 %
TLC REF: 4.44
VA PRE: 4.94 L (ref 4.29–4.29)
VA SINGLE BREATH LLN: 4.29
VA SINGLE BREATH PRE REF: 115.4 %
VA SINGLE BREATH REF: 4.29
VC LLN: 2.11
VC PRE REF: 115.3 %
VC PRE: 3.23 L (ref 2.11–3.52)
VC REF: 2.8

## 2023-06-20 PROCEDURE — 94060 EVALUATION OF WHEEZING: CPT | Mod: 26,,, | Performed by: INTERNAL MEDICINE

## 2023-06-20 PROCEDURE — 94729 DIFFUSING CAPACITY: CPT | Mod: 26,,, | Performed by: INTERNAL MEDICINE

## 2023-06-20 PROCEDURE — 94060 PR EVAL OF BRONCHOSPASM: ICD-10-PCS | Mod: 26,,, | Performed by: INTERNAL MEDICINE

## 2023-06-20 PROCEDURE — 94726 PULM FUNCT TST PLETHYSMOGRAP: ICD-10-PCS | Mod: 26,,, | Performed by: INTERNAL MEDICINE

## 2023-06-20 PROCEDURE — 94726 PLETHYSMOGRAPHY LUNG VOLUMES: CPT | Mod: 26,,, | Performed by: INTERNAL MEDICINE

## 2023-06-20 PROCEDURE — 94729 PR C02/MEMBANE DIFFUSE CAPACITY: ICD-10-PCS | Mod: 26,,, | Performed by: INTERNAL MEDICINE

## 2023-06-21 ENCOUNTER — TELEPHONE (OUTPATIENT)
Dept: PULMONOLOGY | Facility: CLINIC | Age: 63
End: 2023-06-21
Payer: MEDICARE

## 2023-06-21 NOTE — TELEPHONE ENCOUNTER
----- Message from Hortencia Batista Patient Care Assistant sent at 6/21/2023 10:59 AM CDT -----  Contact: Pt  Type: Return Call    Who Called: Pt  Who Left Message for Pt: Unsure  Does the patient know what this is regarding: Yes/Results  Best Call Back Number: [phone]  Thank you~

## 2023-06-28 ENCOUNTER — OFFICE VISIT (OUTPATIENT)
Dept: FAMILY MEDICINE | Facility: CLINIC | Age: 63
End: 2023-06-28
Payer: MEDICARE

## 2023-06-28 VITALS
DIASTOLIC BLOOD PRESSURE: 70 MMHG | SYSTOLIC BLOOD PRESSURE: 112 MMHG | HEART RATE: 91 BPM | HEIGHT: 61 IN | TEMPERATURE: 99 F | WEIGHT: 110.88 LBS | OXYGEN SATURATION: 95 % | BODY MASS INDEX: 20.93 KG/M2

## 2023-06-28 DIAGNOSIS — J84.10 CALCIFIED GRANULOMA OF LUNG: ICD-10-CM

## 2023-06-28 DIAGNOSIS — G89.29 OTHER CHRONIC PAIN: ICD-10-CM

## 2023-06-28 DIAGNOSIS — Z00.00 ENCOUNTER FOR MEDICARE ANNUAL WELLNESS EXAM: Primary | ICD-10-CM

## 2023-06-28 DIAGNOSIS — Z00.00 ENCOUNTER FOR PREVENTIVE HEALTH EXAMINATION: ICD-10-CM

## 2023-06-28 DIAGNOSIS — F11.20 OPIOID DEPENDENCE WITH CURRENT USE: ICD-10-CM

## 2023-06-28 DIAGNOSIS — F17.210 CIGARETTE NICOTINE DEPENDENCE WITHOUT COMPLICATION: ICD-10-CM

## 2023-06-28 DIAGNOSIS — E78.49 OTHER HYPERLIPIDEMIA: ICD-10-CM

## 2023-06-28 DIAGNOSIS — I70.0 ABDOMINAL AORTIC ATHEROSCLEROSIS: ICD-10-CM

## 2023-06-28 DIAGNOSIS — J44.9 CHRONIC OBSTRUCTIVE PULMONARY DISEASE, UNSPECIFIED COPD TYPE: ICD-10-CM

## 2023-06-28 PROBLEM — L03.031 CELLULITIS OF TOE OF RIGHT FOOT: Status: RESOLVED | Noted: 2019-12-27 | Resolved: 2023-06-28

## 2023-06-28 PROBLEM — J06.9 URI (UPPER RESPIRATORY INFECTION): Status: RESOLVED | Noted: 2017-09-29 | Resolved: 2023-06-28

## 2023-06-28 PROBLEM — R91.1 LUNG NODULE: Status: RESOLVED | Noted: 2018-01-22 | Resolved: 2023-06-28

## 2023-06-28 PROBLEM — Z12.11 SCREEN FOR COLON CANCER: Status: RESOLVED | Noted: 2017-11-15 | Resolved: 2023-06-28

## 2023-06-28 PROBLEM — J98.4 CALCIFIED GRANULOMA OF LUNG: Status: ACTIVE | Noted: 2023-06-28

## 2023-06-28 PROBLEM — M79.674 PAIN OF TOE OF RIGHT FOOT: Status: RESOLVED | Noted: 2019-12-27 | Resolved: 2023-06-28

## 2023-06-28 PROCEDURE — 3074F PR MOST RECENT SYSTOLIC BLOOD PRESSURE < 130 MM HG: ICD-10-PCS | Mod: CPTII,S$GLB,, | Performed by: PHYSICIAN ASSISTANT

## 2023-06-28 PROCEDURE — G9919 PR SCREENING AND POSITIVE: ICD-10-PCS | Mod: CPTII,S$GLB,, | Performed by: PHYSICIAN ASSISTANT

## 2023-06-28 PROCEDURE — 1160F PR REVIEW ALL MEDS BY PRESCRIBER/CLIN PHARMACIST DOCUMENTED: ICD-10-PCS | Mod: CPTII,S$GLB,, | Performed by: PHYSICIAN ASSISTANT

## 2023-06-28 PROCEDURE — 1159F MED LIST DOCD IN RCRD: CPT | Mod: CPTII,S$GLB,, | Performed by: PHYSICIAN ASSISTANT

## 2023-06-28 PROCEDURE — 3074F SYST BP LT 130 MM HG: CPT | Mod: CPTII,S$GLB,, | Performed by: PHYSICIAN ASSISTANT

## 2023-06-28 PROCEDURE — 99999 PR PBB SHADOW E&M-EST. PATIENT-LVL V: ICD-10-PCS | Mod: PBBFAC,,, | Performed by: PHYSICIAN ASSISTANT

## 2023-06-28 PROCEDURE — 3078F DIAST BP <80 MM HG: CPT | Mod: CPTII,S$GLB,, | Performed by: PHYSICIAN ASSISTANT

## 2023-06-28 PROCEDURE — 99499 UNLISTED E&M SERVICE: CPT | Mod: HCNC,S$GLB,, | Performed by: PHYSICIAN ASSISTANT

## 2023-06-28 PROCEDURE — 99999 PR PBB SHADOW E&M-EST. PATIENT-LVL V: CPT | Mod: PBBFAC,,, | Performed by: PHYSICIAN ASSISTANT

## 2023-06-28 PROCEDURE — 1160F RVW MEDS BY RX/DR IN RCRD: CPT | Mod: CPTII,S$GLB,, | Performed by: PHYSICIAN ASSISTANT

## 2023-06-28 PROCEDURE — 1159F PR MEDICATION LIST DOCUMENTED IN MEDICAL RECORD: ICD-10-PCS | Mod: CPTII,S$GLB,, | Performed by: PHYSICIAN ASSISTANT

## 2023-06-28 PROCEDURE — 3078F PR MOST RECENT DIASTOLIC BLOOD PRESSURE < 80 MM HG: ICD-10-PCS | Mod: CPTII,S$GLB,, | Performed by: PHYSICIAN ASSISTANT

## 2023-06-28 PROCEDURE — G0439 PPPS, SUBSEQ VISIT: HCPCS | Mod: S$GLB,,, | Performed by: PHYSICIAN ASSISTANT

## 2023-06-28 PROCEDURE — G0439 PR MEDICARE ANNUAL WELLNESS SUBSEQUENT VISIT: ICD-10-PCS | Mod: S$GLB,,, | Performed by: PHYSICIAN ASSISTANT

## 2023-06-28 PROCEDURE — G9919 SCRN ND POS ND PROV OF REC: HCPCS | Mod: CPTII,S$GLB,, | Performed by: PHYSICIAN ASSISTANT

## 2023-06-28 PROCEDURE — 99499 RISK ADDL DX/OHS AUDIT: ICD-10-PCS | Mod: HCNC,S$GLB,, | Performed by: PHYSICIAN ASSISTANT

## 2023-06-28 PROCEDURE — 3008F PR BODY MASS INDEX (BMI) DOCUMENTED: ICD-10-PCS | Mod: CPTII,S$GLB,, | Performed by: PHYSICIAN ASSISTANT

## 2023-06-28 PROCEDURE — 3008F BODY MASS INDEX DOCD: CPT | Mod: CPTII,S$GLB,, | Performed by: PHYSICIAN ASSISTANT

## 2023-06-28 RX ORDER — CYCLOSPORINE 0.5 MG/ML
1 EMULSION OPHTHALMIC 2 TIMES DAILY
COMMUNITY
Start: 2023-06-27

## 2023-06-28 NOTE — PROGRESS NOTES
"Floresita Martin presented for a  Medicare AWV and comprehensive Health Risk Assessment today. The following components were reviewed and updated:    Medical history  Family History  Social history  Allergies and Current Medications  Health Risk Assessment  Health Maintenance  Care Team     ** See Completed Assessments for Annual Wellness Visit within the encounter summary.**       The following assessments were completed:  Living Situation  CAGE  Depression Screening  Timed Get Up and Go  Whisper Test  Cognitive Function Screening  Nutrition Screening  ADL Screening  PAQ Screening    Vitals:    06/28/23 1058   BP: 112/70   BP Location: Left arm   Patient Position: Sitting   BP Method: Medium (Manual)   Pulse: 91   Temp: 98.8 °F (37.1 °C)   TempSrc: Oral   SpO2: 95%   Weight: 50.3 kg (110 lb 14.3 oz)   Height: 5' 1" (1.549 m)     Body mass index is 20.95 kg/m².  Physical Exam  Constitutional:       Appearance: Normal appearance. She is well-developed and normal weight.   Pulmonary:      Effort: Pulmonary effort is normal.   Musculoskeletal:      Right foot: No deformity.   Neurological:      Mental Status: She is alert and oriented to person, place, and time.   Psychiatric:         Behavior: Behavior normal.         Thought Content: Thought content normal.         Judgment: Judgment normal.           Diagnoses and health risks identified today and associated recommendations/orders:    Floresita was seen today for health risk assessment.    Diagnoses and all orders for this visit:    Encounter for Medicare annual wellness exam  -     Ambulatory Referral/Consult to Enhanced Annual Wellness Visit (eAWV)    Chronic obstructive pulmonary disease, unspecified COPD type  Comments:  Stable, followed by pulmonology    Opioid dependence with current use  Comments:  Stable, followed by pain management  I have reviewed patient's current opioid prescription and he/she is at moderate risk of abuse- score of 7  The prescription is followed " by Dr. Bond.    Opioid Risk Assessment:7         Abdominal aortic atherosclerosis- on PET scan  Comments:  Stable, continue regimen    Calcified granuloma of lung- petscan 2023  Comments:  Stable, recent biopsy.  Followed by pulmonology    Other hyperlipidemia  Comments:  Controlled, continue current regimen    Cigarette nicotine dependence without complication  Comments:  Patient has decreased her smoking amount but does not wish to quit    Other chronic pain  Comments:  Stable, followed by pain management    Encounter for preventive health examination        Provided Floresita with a 5-10 year written screening schedule and personal prevention plan. Recommendations were developed using the USPSTF age appropriate recommendations. Education, counseling, and referrals were provided as needed. After Visit Summary printed and given to patient which includes a list of additional screenings\tests needed.    No follow-ups on file.    POONAM Gomes    I offered to discuss advanced care planning, including how to pick a person who would make decisions for you if you were unable to make them for yourself, called a health care power of , and what kind of decisions you might make such as use of life sustaining treatments such as ventilators and tube feeding when faced with a life limiting illness recorded on a living will that they will need to know. (How you want to be cared for as you near the end of your natural life)     X Patient is interested in learning more about how to make advanced directives.  I provided them paperwork and offered to discuss this with them.

## 2023-06-28 NOTE — PATIENT INSTRUCTIONS
Counseling and Referral of Other Preventative  (Italic type indicates deductible and co-insurance are waived)    Patient Name: Floresita Martin  Today's Date: 6/28/2023    Health Maintenance       Date Due Completion Date    TETANUS VACCINE Never done ---    Lipid Panel 01/11/2024 1/11/2023    Mammogram 01/19/2024 1/19/2023    LDCT Lung Screen 05/10/2024 5/10/2023    High Dose Statin 06/28/2024 6/28/2023    Cervical Cancer Screening 02/09/2025 2/9/2022    Pneumococcal Vaccines (Age 0-64) (3 - PPSV23 if available, else PCV20) 01/27/2027 10/26/2018    Colorectal Cancer Screening 11/15/2027 11/15/2017        No orders of the defined types were placed in this encounter.    The following information is provided to all patients.  This information is to help you find resources for any of the problems found today that may be affecting your health:                Living healthy guide: www.[facility].[place].[facility]      Understanding Diabetes: www.diabetes.org      Eating healthy: www.cdc.gov/healthyweight      CDC home safety checklist: www.cdc.gov/steadi/patient.html      Agency on Aging: www.goea.[place].[facility]      Alcoholics anonymous (AA): www.aa.org      Physical Activity: www.cathleen.nih.gov/sw0yxey      Tobacco use: www.quitwithusla.org

## 2023-08-03 DIAGNOSIS — J30.9 ALLERGIC RHINITIS: ICD-10-CM

## 2023-08-03 NOTE — TELEPHONE ENCOUNTER
Care Due:                  Date            Visit Type   Department     Provider  --------------------------------------------------------------------------------                                MYCHART                              FOLLOWUP/OF  [facility]  Last Visit: 01-      [facility] VISIT   MEDICINE       Toni Hughes  Next Visit: None Scheduled  None         None Found                                                            Last  Test          Frequency    Reason                     Performed    Due Date  --------------------------------------------------------------------------------    Office Visit  12 months..  atorvastatin, loratadine,   01- 12-                             omeprazole...............    [employer] Embedded Care Due Messages. Reference number: [id number].   8/03/2023 3:28:11 AM CDT

## 2023-08-07 RX ORDER — FLUTICASONE PROPIONATE 50 MCG
SPRAY, SUSPENSION (ML) NASAL
Qty: 16 G | Refills: 1 | Status: SHIPPED | OUTPATIENT
Start: 2023-08-07 | End: 2023-12-05

## 2023-09-14 ENCOUNTER — HOSPITAL ENCOUNTER (OUTPATIENT)
Dept: RADIOLOGY | Facility: HOSPITAL | Age: 63
Discharge: HOME | End: 2023-09-14
Attending: NURSE PRACTITIONER
Payer: MEDICARE

## 2023-09-14 DIAGNOSIS — R91.8 GROUND GLASS OPACITY PRESENT ON IMAGING OF LUNG: ICD-10-CM

## 2023-09-14 PROCEDURE — 71250 CT THORAX DX C-: CPT | Mod: TC

## 2023-09-14 PROCEDURE — 71250 CT THORAX DX C-: CPT | Mod: 26,,, | Performed by: RADIOLOGY

## 2023-09-14 PROCEDURE — 71250 CT CHEST WITHOUT CONTRAST: ICD-10-PCS | Mod: 26,,, | Performed by: RADIOLOGY

## 2023-09-15 ENCOUNTER — TELEPHONE (OUTPATIENT)
Dept: PULMONOLOGY | Facility: CLINIC | Age: 63
End: 2023-09-15
Payer: MEDICARE

## 2023-09-15 NOTE — TELEPHONE ENCOUNTER
----- Message from Ashley Simental sent at 9/15/2023  2:21 PM CDT -----  Contact: pt [phone]  Type:  Patient Returning Call    Who Called:  Pt   Who Left Message for Patient: na   Does the patient know what this is regarding?:  Possible test results   Best Call Back Number:  [phone]

## 2023-09-20 ENCOUNTER — OFFICE VISIT (OUTPATIENT)
Dept: PULMONOLOGY | Facility: CLINIC | Age: 63
End: 2023-09-20
Payer: MEDICARE

## 2023-09-20 VITALS
DIASTOLIC BLOOD PRESSURE: 96 MMHG | BODY MASS INDEX: 20.62 KG/M2 | OXYGEN SATURATION: 99 % | SYSTOLIC BLOOD PRESSURE: 173 MMHG | HEART RATE: 79 BPM | WEIGHT: 109.13 LBS

## 2023-09-20 DIAGNOSIS — J84.10 CALCIFIED GRANULOMA OF LUNG: ICD-10-CM

## 2023-09-20 DIAGNOSIS — F17.200 TOBACCO DEPENDENCE: ICD-10-CM

## 2023-09-20 DIAGNOSIS — J44.9 CHRONIC OBSTRUCTIVE PULMONARY DISEASE, UNSPECIFIED COPD TYPE: Primary | ICD-10-CM

## 2023-09-20 DIAGNOSIS — R91.8 LUNG NODULES: ICD-10-CM

## 2023-09-20 PROCEDURE — 1159F MED LIST DOCD IN RCRD: CPT | Mod: HCNC,CPTII,S$GLB, | Performed by: NURSE PRACTITIONER

## 2023-09-20 PROCEDURE — 99214 OFFICE O/P EST MOD 30 MIN: CPT | Mod: HCNC,S$GLB,, | Performed by: NURSE PRACTITIONER

## 2023-09-20 PROCEDURE — 1159F PR MEDICATION LIST DOCUMENTED IN MEDICAL RECORD: ICD-10-PCS | Mod: HCNC,CPTII,S$GLB, | Performed by: NURSE PRACTITIONER

## 2023-09-20 PROCEDURE — 3008F PR BODY MASS INDEX (BMI) DOCUMENTED: ICD-10-PCS | Mod: HCNC,CPTII,S$GLB, | Performed by: NURSE PRACTITIONER

## 2023-09-20 PROCEDURE — 3008F BODY MASS INDEX DOCD: CPT | Mod: HCNC,CPTII,S$GLB, | Performed by: NURSE PRACTITIONER

## 2023-09-20 PROCEDURE — 99999 PR PBB SHADOW E&M-EST. PATIENT-LVL IV: ICD-10-PCS | Mod: PBBFAC,HCNC,, | Performed by: NURSE PRACTITIONER

## 2023-09-20 PROCEDURE — 99999 PR PBB SHADOW E&M-EST. PATIENT-LVL IV: CPT | Mod: PBBFAC,HCNC,, | Performed by: NURSE PRACTITIONER

## 2023-09-20 PROCEDURE — 99214 PR OFFICE/OUTPT VISIT, EST, LEVL IV, 30-39 MIN: ICD-10-PCS | Mod: HCNC,S$GLB,, | Performed by: NURSE PRACTITIONER

## 2023-09-20 NOTE — PROGRESS NOTES
"9/20/2023    Floresita Martin  In office visit     Chief Complaint   Patient presents with    Follow-up     8 wk f/u -  Pt states she is doing well.       HPI:  09/20/2023: Hx: Lung nodules, Tobacco Dependence  States shortness of breath is at baseline - denies wheezing, chest tightness. Cough has resolved, mucous production is minimal with white mucous.   Still smoking - at approx 1 ppd at this point - not interested in smoking cessation program.  Using Trelegy once per day with benefit and has Albuterol inhaler for as needed use. Did trial Neb treatments after last appt however states she experienced dizziness after use.   Did not submit sputum samples - states not enough mucous is being produced.   Denies recent UC or ER visits for respiratory complaints - denies recent use of abx, steroids.        05/17/2023:  Underwent lung biopsy to lung nodule of BELEN - lung biopsy was negative for malignancy - Per pathology report, "Alveolated lung tissue with regionally dense fibrosis and occasional interstitial lymphoid follicles."  Underwent repeat CT Chest last week revealing unchanged lung nodule however shows new ground glass opacities throughout both lungs, with new area of concern to LLL.   Developed symptoms of a head cold approx 5 days ago - states has been coughing over the last three days. Initially, mucous was thick, difficult to expectorate however has been on Mucinex for the last three days - mucous is yellow/white in color.  Denies wheezing, chest tightness.  Recently cut back on smoking over the last five days - states was smoking 2.5 ppd however is now down to less than 15 cigarettes per day.   Did not yet undergo PFT.  Trialed Trelegy for one month - states did not notice much improvement in symptoms with use.  Patient Instructions   Repeat CT Chest shows unchanged extent of prior noted nodule - now shows concern to left lower lobe in addition to ground glass opacities throughout.  I have ordered sputum " cultures - you will leave the office today with sputum specimen cups. Bring to any [facility] within 4 hours of obtaining specimen. Rinse your mouth prior to collecting sample. Please collect sample in the morning by deep coughing prior to eating or drinking.  Checking Respiratory culture, AFB, Fungal.  I ordered a Lung Function Test to evaluate lung strength. Please complete prior to next appointment. Do this In one month.  Recommend getting back on COPD inhaler - Trelegy, one puff once per day.  This inhaler contains an inhaled steroid component. Rinse mouth after each use due to risk for thrush development. If mouth or tongue develops white sores please contact the clinic and I will order a prescription mouth wash.   Can use Albuterol as needed for shortness of breath, wheezing, cough.  Recommend doing nebulized treatments, two treatments per day for the next 10 days.  Continue Mucinex.  Continue current medication regiment. Keep follow up appointment as scheduled. Please call the office if you have any questions or concerns.             02/03/2023:  Denies being seen by prior pulmonologist. Denies current inhaler use, supplemental oxygen use, CPAP use.  Denies personal history of cancer, PE, anticoagulation use.  Reports she believes she is diagnosed with early stages of COPD.  Underwent CT chest per PCP was referred to pulmonology.  Denies shortness of breath, states she is able to do all of her ADLs without issue.  Denies wheezing, chest tightness.  Cough:  Intermittent, productive with white sputum.  No time correlation identified.  Denies frequent use of antibiotic, steroid.  Current smoker - typical 1 pack per day use.  Started at 17 years old.  Currently vaping as well, nicotine containing. Not interested in smoking cessation referral at this time however requesting Nicotine patches.  Patient Instructions   CT chest reviewed, shows mild emphysema to bilateral upper lung fields.  This is consistent with a  diagnosis of COPD.  I ordered a Lung Function Test to evaluate lung strength. Please complete prior to next appointment.   Can trial daily inhaler, Trelegy.  This is 1 puff once per day.  This inhaler contains an inhaled steroid component. Rinse mouth after each use due to risk for thrush development. If mouth or tongue develops white sores please contact the clinic and I will order a prescription mouth wash.   CT chest does show new 12 mm nodule to the left upper lung and new 5 mm nodule to the left lower lung.  Will proceed with PET scan at this time.  Broch calculator 32% change of nodule being cancerous.   Will review CT images with collaborating MD next week and if change of plan, will call you.  Nicotine patches ordered.  Continue current medication regiment. Keep follow up appointment as scheduled. Please call the office if you have any questions or concerns.         Social Hx: Lives with friend - 1 dog in the home.  Former .  No Asbestosis exposure, Smoking Hx:  As above  Family Hx:  No Lung Cancer, no COPD, no Asthma  Medical Hx:  No previous pneumonia ; no previous shoulder/chest surgery        The chief compliant problem varies with instability at times.  PFSH:  Past Medical History:   Diagnosis Date    Alcohol abuse     Cellulitis of toe of right foot 2019    Headache     Hearing loss     HLD (hyperlipidemia)          Past Surgical History:   Procedure Laterality Date    BACK SURGERY       SECTION      2    COLONOSCOPY N/A 11/15/2017    Procedure: COLONOSCOPY;  Surgeon: Peg Powers MD;  Location: [facility];  Service: Endoscopy;  Laterality: N/A;    EPIDURAL STEROID INJECTION INTO CERVICAL SPINE      EPIDURAL STEROID INJECTION INTO LUMBAR SPINE      JOINT REPLACEMENT  2018    LIPOMA RESECTION Right 2023    Procedure: EXCISION, LIPOMA;  Surgeon: Ruben Brooks Jr., MD;  Location: [facility] OR;  Service: General;  Laterality: Right;    LUMBAR FUSION      with cage     LUNG BIOPSY      NECK SURGERY  05/2018    with hardware    RADIOFREQUENCY ABLATION      cervical    RADIOFREQUENCY ABLATION      lumbar    SPINE SURGERY  05/2018    STAPEDECTOMY Right 05/28/2015    STAPEDECTOMY Left     TUBAL LIGATION  1/1990     Social History     Tobacco Use    Smoking status: Every Day     Current packs/day: 1.50     Average packs/day: 1.5 packs/day for 30.0 years (45.0 ttl pk-yrs)     Types: Cigarettes    Smokeless tobacco: Never   Substance Use Topics    Alcohol use: Yes     Comment: social     Drug use: No     Family History   Problem Relation Age of Onset    Arthritis Mother     Diabetes Mother     Hypertension Father     Cancer Sister     Breast cancer Sister     No Known Problems Sister     Cancer Sister     Cancer Sister     Cancer Sister      Review of patient's allergies indicates:   Allergen Reactions    Codeine Nausea And Vomiting    Tylenol-codeine [acetaminophen-codeine] Nausea And Vomiting     I have reviewed past medical, family, and social history. I have reviewed previous nurse notes.    Performance Status:The patient's activity level is no limits with regular activity.      Review of Systems:  a review of eleven systems covering constitutional, Eye, HEENT, Psych, Respiratory, Cardiac, GI, , Musculoskeletal, Endocrine, Dermatologic was negative except for pertinent findings as listed ABOVE and below: pertinent positive as above, rest is good       Exam:Comprehensive exam done. Wt 49.5 kg (109 lb 2 oz)   LMP  (LMP Unknown)   SpO2 99%   BMI 20.62 kg/m²   Exam included Vitals as listed  Constitutional: She is oriented to person, place, and time. She appears well-developed. No distress.   Nose: Nose normal.   Mouth/Throat: Uvula is midline, oropharynx is clear and moist and mucous membranes are normal. No dental caries. No oropharyngeal exudate, posterior oropharyngeal edema, posterior oropharyngeal erythema or tonsillar abscesses.    Eyes: Pupils are equal, round, and  reactive to light.   Neck: No JVD present. No thyromegaly present.   Cardiovascular: Normal rate, regular rhythm and normal heart sounds. Exam reveals no gallop and no friction rub.   No murmur heard.  Pulmonary/Chest: Effort normal and breath sounds normal. No accessory muscle usage or stridor. No apnea and no tachypnea. No respiratory distress, decreased breath sounds, wheezes, rhonchi, rales, or tenderness. Clear breath sounds throughout all lung fields, on room air, in no acute distress.   Abdominal: Soft. She exhibits no mass. There is no tenderness. No hepatosplenomegaly, hernias and normoactive bowel sounds  Musculoskeletal: Normal range of motion. exhibits no edema.   Neurological:  alert and oriented to person, place, and time. not disoriented.   Skin: Skin is warm and dry. Capillary refill takes less 2 sec. No cyanosis or erythema. No pallor. Nails show no clubbing.   Psychiatric: normal mood and affect. behavior is normal. Judgment and thought content normal.       Radiographs (ct chest and cxr) reviewed: view by direct vision   Patient imaging studies were reviewed and interpreted independently. My personal interpretation of most recent Chest Xray and CT Chest includes:    CT Chest - 9/14/2023 Nodule with no growth noted to BELEN - two smaller nodules to LLL have been present and unchanged since at least Jan 2023.    CT Chest Without Contrast 5/10/2023 FINDINGS:  The heart and great vessels are of normal size and contour. Enlargement or aneurysm is not seen. Adenopathy or soft tissue masses within the mediastinum are not seen.   In the left upper lobe there is still seen a peanut shaped 1.6 cm by 1.1 cm soft tissue density mass.  This was biopsied on March 10, 2023 with a benign result.  There is a strand extending to the lateral pleura.   There is however new patchy interstitial infiltrate identified in the left lingula and at the lung bases..  This is most likely infectious since it was not present on  the prior CTs chest.  There is a small area of consolidation seen in the lingula on series 4, image 314 measuring 1.5 cm.  A mass or infiltrate in the right lung is not identified.  No pneumothorax or pleural effusion is noted.   Impression:   Stable 1.6 x 1.1 cm peanut shaped soft tissue density mass of the left upper lobe status post biopsy.  New patchy interstitial infiltrates identified in the left lingula and lung bases, however with a small area of consolidation measuring 1.5 cm.  These are most likely an infectious process.  Follow-up by CT scan is recommended.   This report was flagged in Epic as abnormal.             CT Chest Lung Screening Low Dose 1/11/2023 FINDINGS:  Lungs: There is a new spiculated, solid nodule within the left upper lobe measuring 1.2 cm.  There is also new solid nodule at the left lung base measuring 5 mm (series 4, image 378).  A few scattered old calcified granulomas are present.  There are mild emphysematous changes.   Pleura:   No effusion..   Heart and pericardium: Normal size without effusion.   Aorta and vasculature: Atherosclerosis including coronary arteries.   Lymph nodes: No hilar, mediastinal, or axial lymphadenopathy.   Chest wall and skeletal structures: Postoperative change of the lower cervical spine.  No suspicious bony lesions.   Upper abdomen: Unremarkable.   Impression:   Lung-RADS Category:  4B - Suspicious - consultation advised - possible next steps  Chest CT, tissue sampling and-or PET/CT.   Clinically or potentially clinically significant non lung cancer finding:  None.   Prior Lung Cancer Modifier:  No history of prior lung cancer.   This report was flagged in Epic as abnormal.       X-Ray Chest PA And Lateral 4/15/2016 Findings: The cardiomediastinal silhouette is within normal limits.  The lungs are well expanded and clear.   In pression: No active cardiopulmonary process.    Patient's labs were reviewed including CBC and CMP    Lab Results   Component  Value Date    WBC 7.06 03/10/2023    RBC 4.14 03/10/2023    HGB 13.2 03/10/2023    HCT 38.7 03/10/2023    MCV 94 03/10/2023    MCH 31.9 (H) 03/10/2023    MCHC 34.1 03/10/2023    RDW 14.9 (H) 03/10/2023     03/10/2023    MPV 8.4 (L) 03/10/2023    GRAN 3.8 03/10/2023    GRAN 53.8 03/10/2023    LYMPH 2.6 03/10/2023    LYMPH 36.3 03/10/2023    MONO 0.5 03/10/2023    MONO 7.6 03/10/2023    EOS 0.1 03/10/2023    BASO 0.03 03/10/2023    EOSINOPHIL 1.6 03/10/2023    BASOPHIL 0.4 03/10/2023   CMP  Sodium   Date Value Ref Range Status   01/11/2023 134 (L) 136 - 145 mmol/L Final     Potassium   Date Value Ref Range Status   01/11/2023 4.7 3.5 - 5.1 mmol/L Final     Chloride   Date Value Ref Range Status   01/11/2023 98 95 - 110 mmol/L Final     CO2   Date Value Ref Range Status   01/11/2023 25 23 - 29 mmol/L Final     Glucose   Date Value Ref Range Status   01/11/2023 82 70 - 110 mg/dL Final     BUN   Date Value Ref Range Status   01/11/2023 12 6 - 20 mg/dL Final     Creatinine   Date Value Ref Range Status   01/11/2023 0.8 0.5 - 1.4 mg/dL Final     Calcium   Date Value Ref Range Status   01/11/2023 9.5 8.7 - 10.5 mg/dL Final     Total Protein   Date Value Ref Range Status   01/11/2023 7.0 6.0 - 8.4 g/dL Final     Albumin   Date Value Ref Range Status   01/11/2023 3.9 3.5 - 5.2 g/dL Final     Total Bilirubin   Date Value Ref Range Status   01/11/2023 0.4 0.1 - 1.0 mg/dL Final     Comment:     For infants and newborns, interpretation of results should be based  on gestational age, weight and in agreement with clinical  observations.    Premature Infant recommended reference ranges:  Up to 24 hours.............<8.0 mg/dL  Up to 48 hours............<12.0 mg/dL  3-5 days..................<15.0 mg/dL  6-29 days.................<15.0 mg/dL       Alkaline Phosphatase   Date Value Ref Range Status   01/11/2023 101 55 - 135 U/L Final     AST   Date Value Ref Range Status   01/11/2023 27 10 - 40 U/L Final     ALT   Date Value Ref  Range Status   01/11/2023 22 10 - 44 U/L Final     Anion Gap   Date Value Ref Range Status   01/11/2023 11 8 - 16 mmol/L Final     eGFR   Date Value Ref Range Status   01/11/2023 >60 >60 mL/min/1.73 m^2 Final         PFT reviewed  Pulmonary Functions Testing Results:        Plan:  Clinical impression is ambiguous and will need repeated evaluation wrt will require conference with MD.    Floresita was seen today for follow-up.    Diagnoses and all orders for this visit:    Chronic obstructive pulmonary disease, unspecified COPD type    Calcified granuloma of lung- petscan 2023    Tobacco dependence    Lung nodules            Follow up in about 3 months (around 12/20/2023), or if symptoms worsen or fail to improve.    Discussed with patient above for education the following:      Patient Instructions   Overall CT Chest with no acute changes - recommend repeat CT in one year to further monitor nodules. CT Due in Sept 2024.    Your PFT shows overall preserved lung function or lung strength.     It would be beneficial to stop tobacco use. Please inform the office if you become interested [facility] as discussed in the clinic today.      Continue COPD regimen including Trelegy once per day. Can continue to use Albuterol as needed. Will give spacer in clinic today for Albuterol.    If you need nebulized treatments, can trial half a vial of medicine and see if you tolerate that better.    Continue current prescription medication regiment. Keep follow up appointment as scheduled. Please call the office if you have any questions or concerns.

## 2023-09-21 ENCOUNTER — NURSE TRIAGE (OUTPATIENT)
Dept: ADMINISTRATIVE | Facility: CLINIC | Age: 63
End: 2023-09-21
Payer: MEDICARE

## 2023-09-21 NOTE — TELEPHONE ENCOUNTER
Pt reports her blood pressure has been running around 165/90. Advised, per protocol. Verbalizes understanding. Transferred to scheduling.    Reason for Disposition   Systolic BP >= 160 OR Diastolic >= 100    Additional Information   Negative: Sounds like a life-threatening emergency to the triager   Negative: Systolic BP >= 160 OR Diastolic >= 100, and any cardiac (e.g., breathing difficulty, chest pain) or neurologic symptoms (e.g., new-onset blurred or double vision)   Negative: Pregnant 20 or more weeks (or postpartum < 6 weeks) with new hand or face swelling   Negative: Pregnant 20 or more weeks (or postpartum < 6 weeks) and Systolic BP >= 160 OR Diastolic >= 110   Negative: Patient sounds very sick or weak to the triager   Negative: Systolic BP >= 200 OR Diastolic >= 120 and having NO cardiac or neurologic symptoms   Negative: Pregnant 20 or more weeks (or postpartum < 6 weeks) with Systolic BP >= 140 OR Diastolic >= 90   Negative: Systolic BP >= 180 OR Diastolic >= 110, and missed most recent dose of blood pressure medication   Negative: Systolic BP >= 180 OR Diastolic >= 110   Negative: Patient wants to be seen   Negative: Ran out of BP medications   Negative: Taking BP medications and feels is having side effects (e.g., impotence, cough, dizziness)    Protocols used: Blood Pressure - High-A-OH

## 2023-09-22 ENCOUNTER — OFFICE VISIT (OUTPATIENT)
Dept: FAMILY MEDICINE | Facility: CLINIC | Age: 63
End: 2023-09-22
Payer: MEDICARE

## 2023-09-22 VITALS
TEMPERATURE: 98 F | SYSTOLIC BLOOD PRESSURE: 172 MMHG | WEIGHT: 104.75 LBS | DIASTOLIC BLOOD PRESSURE: 90 MMHG | HEART RATE: 85 BPM | HEIGHT: 61 IN | RESPIRATION RATE: 18 BRPM | OXYGEN SATURATION: 98 % | BODY MASS INDEX: 19.78 KG/M2

## 2023-09-22 DIAGNOSIS — R03.0 ELEVATED BP WITHOUT DIAGNOSIS OF HYPERTENSION: Primary | ICD-10-CM

## 2023-09-22 DIAGNOSIS — F17.210 CIGARETTE NICOTINE DEPENDENCE WITHOUT COMPLICATION: ICD-10-CM

## 2023-09-22 DIAGNOSIS — J44.9 CHRONIC OBSTRUCTIVE PULMONARY DISEASE, UNSPECIFIED COPD TYPE: ICD-10-CM

## 2023-09-22 DIAGNOSIS — F11.20 OPIOID DEPENDENCE WITH CURRENT USE: ICD-10-CM

## 2023-09-22 PROCEDURE — 3008F PR BODY MASS INDEX (BMI) DOCUMENTED: ICD-10-PCS | Mod: HCNC,CPTII,S$GLB, | Performed by: NURSE PRACTITIONER

## 2023-09-22 PROCEDURE — 3080F DIAST BP >= 90 MM HG: CPT | Mod: HCNC,CPTII,S$GLB, | Performed by: NURSE PRACTITIONER

## 2023-09-22 PROCEDURE — 99214 PR OFFICE/OUTPT VISIT, EST, LEVL IV, 30-39 MIN: ICD-10-PCS | Mod: HCNC,S$GLB,, | Performed by: NURSE PRACTITIONER

## 2023-09-22 PROCEDURE — 1159F PR MEDICATION LIST DOCUMENTED IN MEDICAL RECORD: ICD-10-PCS | Mod: HCNC,CPTII,S$GLB, | Performed by: NURSE PRACTITIONER

## 2023-09-22 PROCEDURE — 1160F PR REVIEW ALL MEDS BY PRESCRIBER/CLIN PHARMACIST DOCUMENTED: ICD-10-PCS | Mod: HCNC,CPTII,S$GLB, | Performed by: NURSE PRACTITIONER

## 2023-09-22 PROCEDURE — 3077F PR MOST RECENT SYSTOLIC BLOOD PRESSURE >= 140 MM HG: ICD-10-PCS | Mod: HCNC,CPTII,S$GLB, | Performed by: NURSE PRACTITIONER

## 2023-09-22 PROCEDURE — 99999 PR PBB SHADOW E&M-EST. PATIENT-LVL V: CPT | Mod: PBBFAC,HCNC,, | Performed by: NURSE PRACTITIONER

## 2023-09-22 PROCEDURE — 99214 OFFICE O/P EST MOD 30 MIN: CPT | Mod: HCNC,S$GLB,, | Performed by: NURSE PRACTITIONER

## 2023-09-22 PROCEDURE — 3077F SYST BP >= 140 MM HG: CPT | Mod: HCNC,CPTII,S$GLB, | Performed by: NURSE PRACTITIONER

## 2023-09-22 PROCEDURE — 1159F MED LIST DOCD IN RCRD: CPT | Mod: HCNC,CPTII,S$GLB, | Performed by: NURSE PRACTITIONER

## 2023-09-22 PROCEDURE — 3008F BODY MASS INDEX DOCD: CPT | Mod: HCNC,CPTII,S$GLB, | Performed by: NURSE PRACTITIONER

## 2023-09-22 PROCEDURE — 3080F PR MOST RECENT DIASTOLIC BLOOD PRESSURE >= 90 MM HG: ICD-10-PCS | Mod: HCNC,CPTII,S$GLB, | Performed by: NURSE PRACTITIONER

## 2023-09-22 PROCEDURE — 99999 PR PBB SHADOW E&M-EST. PATIENT-LVL V: ICD-10-PCS | Mod: PBBFAC,HCNC,, | Performed by: NURSE PRACTITIONER

## 2023-09-22 PROCEDURE — 1160F RVW MEDS BY RX/DR IN RCRD: CPT | Mod: HCNC,CPTII,S$GLB, | Performed by: NURSE PRACTITIONER

## 2023-09-22 RX ORDER — LOSARTAN POTASSIUM 50 MG/1
50 TABLET ORAL DAILY
Qty: 30 TABLET | Refills: 11 | Status: SHIPPED | OUTPATIENT
Start: 2023-09-22 | End: 2023-10-24

## 2023-09-22 NOTE — PATIENT INSTRUCTIONS
Take blood pressure in a.m. and p.m. daily until Thursday-send readings to  vis patient portal  Take Losartan medication once daily for blood pressure greater than 140/90  Follow low sodium diet  4 weeks nurse visit for blood pressure checks                  Boaz Canas,     If you are due for any health screening(s) below please notify me so we can arrange them to be ordered and scheduled to maintain your health. Most healthy patients complete it. Don't lose out on improving your health.     Tests to Keep You Healthy    Mammogram: Met on 1/19/2023  Colon Cancer Screening: Met on 11/15/2017  Cervical Cancer Screening: Met on 2/9/2022  Tobacco Cessation: NO              Dear MsFlavia Martin:    Your [facility] Team is dedicated to helping you stay healthy with regularly scheduled recommended screenings.  Scheduling routine screenings is important to maintaining good health. Our records indicate that you may be overdue for your screening pap smear. A pap smear screening can help identify patients at risk for developing cervical cancer at an early stage, when it is most likely to be successfully treated.    We encourage you to schedule your appointment with your [facility] provider or some primary care providers also perform this screening.    If you have completed or scheduled your pap smear screening outside of [facility], please notify your primary care team so we can update your health record.      If you have questions or would like to schedule your screening, please contact your primary care clinic.    Sincerely,    Your [facility] Team             Patient Education       Checking Your Blood Pressure at Home   The Basics   Written by the doctors and editors at [facility]   How is blood pressure measured? -- Blood pressure is usually measured with a device that goes around your upper arm. This is often done in a doctor's office. But some people also check their blood pressure themselves, at home or  "at work.  Blood pressure is explained with 2 numbers. For instance, your blood pressure might be "140 over 90." The first (top) number is the pressure inside your arteries when your heart is david. The second (bottom) number is the pressure inside your arteries when your heart is relaxed. The table shows how doctors and nurses define high and normal blood pressure (table 1).  If your blood pressure gets too high, it puts you at risk for heart attack, stroke, and kidney disease. High blood pressure does not usually cause symptoms. But it can be serious.  What is a home blood pressure meter? -- A home blood pressure meter (or "monitor") is a device you can use to check your blood pressure yourself. It has a cuff that goes around your upper arm (figure 1). Some devices have a cuff that goes around your wrist instead. But doctors aren't sure if these work as well. The meter also has a small screen, or dial, that shows your blood pressure numbers.  There are also special meters you can wear for a day or 2. These are different because they automatically check your blood pressure throughout the day and night, even while you are sleeping. If your doctor thinks you should use one of these devices, they will talk to you about how to wear it.  Why do I need to check my blood pressure at home? -- If your doctor knows or suspects that you have high blood pressure, they might want you to check it at home. There are a few reasons for this. Your doctor might want to look at:  Whether your blood pressure measures the same at home as it did in the doctor's office  How well your blood pressure medicines are working  Changes in your blood pressure, for example, if it goes up and down  People who check their own blood pressure at home usually do better at keeping it low.  How do I choose a home blood pressure meter? -- When choosing a home blood pressure meter, you will probably want to think about:  Cost - Some devices cost more " than others. You should also check to see if your insurance will help pay for your device.  Size - It's important to make sure the cuff fits your arm comfortably. Your doctor or nurse can help you with this.  How easy it is to use - You should make sure you understand how to use the device. You also need to be able to read the numbers on the screen.  You do not need a prescription to buy a home blood pressure meter. You can buy them at most pharmacies or over the internet. Your doctor or nurse can help you choose the right device for you.  How do I check my blood pressure at home? -- Once you have a home blood pressure meter, your doctor or nurse should check it to make sure it fits you and works correctly.  When it's time to check your blood pressure:  Go to the bathroom and empty your bladder first. Having a full bladder can temporarily increase your blood pressure, making the results inaccurate.  Sit in a chair with your feet flat on the ground.  Try to breathe normally and stay calm.  Attach the cuff to your arm. Place the cuff directly on your skin, not over your clothing. The cuff should be tight enough to not slip down, but not uncomfortably tight.  Sit and relax for about 3 to 5 minutes with the cuff on.  Follow the directions that came with your device to start measuring your blood pressure. This might involve squeezing the bulb at the end of the tube to inflate the cuff (fill it with air). With some monitors, you just need to press a button to inflate the cuff. When the cuff fills with air, it feels like someone is squeezing your arm, but it should not hurt. Then you will slowly deflate the cuff (let the air out of it), or it will deflate by itself. The screen or dial will show your blood pressure numbers.  Stay seated and relax for 1 minute, then measure your blood pressure again.  How often should I check my blood pressure? -- It depends. Different people need to follow different schedules. Your doctor  or nurse will tell you how often to check your blood pressure, and when. Some people need to check their blood pressure twice a day, in the morning and evening.  Your doctor or nurse will probably tell you to keep track of your blood pressure for at least a few days (table 2). Then they will look at the numbers. The reason for this is that it's normal for your blood pressure to change a bit from day to day. For example, the numbers might change depending on whether you recently had caffeine, just exercised, or feel stressed. Checking your blood pressure over several days - or longer - will give your doctor or nurse a better idea of what is average for you.  How should I keep track of my blood pressure? -- Some blood pressure meters will record your numbers for you, or send them to your computer or smartphone. If yours does not do this, you will need to write them down. Your doctor or nurse can help you figure out the best way to keep track of the numbers.  What if my blood pressure is high? -- Your doctor or nurse will tell you what to do if your blood pressure is high when you check it at home. If you get a number that is higher than normal, measure it again to see if it is still high. If it is very high (above a certain number, which your doctor or nurse will tell you to watch out for), you should call your doctor right away.  If your blood pressure is only a little high, your doctor or nurse might tell you to keep checking it for a few more days or weeks, and then call if it does not go back down. Then they can help you decide what to do next.  All topics are updated as new evidence becomes available and our peer review process is complete.  This topic retrieved from [employer] on: Sep 21, 2021.  Topic 752995 Version 4.0  Release: 29.4.2 - C29.263  © 2021 UpToDate, Inc. and/or its affiliates. All rights reserved.  table 1: Definition of normal and high blood pressure  Level  Top number  Bottom number    High 130 or  "above 80 or above   Elevated 120 to 129 79 or below   Normal 119 or below 79 or below   These definitions are from the American College of Cardiology/American Heart Association. Other expert groups might use slightly different definitions.  "Elevated blood pressure" is a term doctor or nurses use as a warning. It means you do not yet have high blood pressure, but your blood pressure is not as low as it should be for good health.  Graphic 25644 Version 6.0  figure 1: Using a home blood pressure meter     This is an example of a person using a home blood pressure meter.  Graphic 463603 Version 1.0    table 2: 7-day diary for checking blood pressure at home  Day 1  Day 2  Day 3  Day 4  Day 5  Day 6  Day 7    Morning  1st read Morning  1st read Morning  1st read Morning  1st read Morning  1st read Morning  1st read Morning  1st read   Systolic: __________ Systolic: __________ Systolic: __________ Systolic: __________ Systolic: __________ Systolic: __________ Systolic: __________   Diastolic: __________ Diastolic: __________ Diastolic: __________ Diastolic: __________ Diastolic: __________ Diastolic: __________ Diastolic: __________   Pulse: __________ Pulse: __________ Pulse: __________ Pulse: __________ Pulse: __________ Pulse: __________ Pulse: __________   Morning  2nd read Morning  2nd read Morning  2nd read Morning  2nd read Morning  2nd read Morning  2nd read Morning  2nd read   Systolic: __________ Systolic: __________ Systolic: __________ Systolic: __________ Systolic: __________ Systolic: __________ Systolic: __________   Diastolic: __________ Diastolic: __________ Diastolic: __________ Diastolic: __________ Diastolic: __________ Diastolic: __________ Diastolic: __________   Pulse: __________ Pulse: __________ Pulse: __________ Pulse: __________ Pulse: __________ Pulse: __________ Pulse: __________   Evening  1st read Evening  1st read Evening  1st read Evening  1st read Evening  1st read Evening  1st read " Evening  1st read   Systolic: __________ Systolic: __________ Systolic: __________ Systolic: __________ Systolic: __________ Systolic: __________ Systolic: __________   Diastolic: __________ Diastolic: __________ Diastolic: __________ Diastolic: __________ Diastolic: __________ Diastolic: __________ Diastolic: __________   Pulse: __________ Pulse: __________ Pulse: __________ Pulse: __________ Pulse: __________ Pulse: __________ Pulse: __________   Evening  2nd read Evening  2nd read Evening  2nd read Evening  2nd read Evening  2nd read Evening  2nd read Evening  2nd read   Systolic: __________ Systolic: __________ Systolic: __________ Systolic: __________ Systolic: __________ Systolic: __________ Systolic: __________   Diastolic: __________ Diastolic: __________ Diastolic: __________ Diastolic: __________ Diastolic: __________ Diastolic: __________ Diastolic: __________   Pulse: __________ Pulse: __________ Pulse: __________ Pulse: __________ Pulse: __________ Pulse: __________ Pulse: __________   Notes    Notes    Notes    Notes    Notes    Notes    Notes      ____________________ ____________________ ____________________ ____________________ ____________________ ____________________ ____________________   ____________________ ____________________ ____________________ ____________________ ____________________ ____________________ ____________________   ____________________ ____________________ ____________________ ____________________ ____________________ ____________________ ____________________   Patient name: ______________________________     Patient ID: ________________________________    Primary care provider: _______________________    Average BP: _______________________________    Graphic 613269 Version 1.0  Consumer Information Use and Disclaimer   This information is not specific medical advice and does not replace information you receive from your health care provider. This is only a brief summary of  general information. It does NOT include all information about conditions, illnesses, injuries, tests, procedures, treatments, therapies, discharge instructions or life-style choices that may apply to you. You must talk with your health care provider for complete information about your health and treatment options. This information should not be used to decide whether or not to accept your health care provider's advice, instructions or recommendations. Only your health care provider has the knowledge and training to provide advice that is right for you. The use of this information is governed by the [employer] End User License Agreement, available at [email or web address]://[email or web address]/solutions/[email or web address]/about/xiomy.The use of [employer] content is governed by the [employer] Terms of Use. ©2021 UpToDate, Inc. All rights reserved.  Copyright   © 2021 UpToDate, Inc. and/or its affiliates. All rights reserved.

## 2023-09-22 NOTE — PROGRESS NOTES
Subjective:       Patient ID: Floresita Martin is a 63 y.o. female.    Chief Complaint: Follow-up (B/P issues)    Hypertension  This is a recurrent problem. The current episode started 1 to 4 weeks ago. The problem has been waxing and waning since onset. Associated symptoms include anxiety. Pertinent negatives include no blurred vision, chest pain, headaches, malaise/fatigue, palpitations, peripheral edema or shortness of breath. Agents associated with hypertension include decongestants and NSAIDs. Risk factors for coronary artery disease include smoking/tobacco exposure. Past treatments include nothing. There are no compliance problems.  Identifiable causes of hypertension include a hypertension causing med.     Last office visit with  on 1/5/22  Past Medical History:   Diagnosis Date    Alcohol abuse     Cellulitis of toe of right foot 12/27/2019    Headache     Hearing loss     HLD (hyperlipidemia)        Review of patient's allergies indicates:   Allergen Reactions    Codeine Nausea And Vomiting    Tylenol-codeine [acetaminophen-codeine] Nausea And Vomiting         Current Outpatient Medications:     albuterol (PROVENTIL/VENTOLIN HFA) 90 mcg/actuation inhaler, Inhale 2 puffs into the lungs every 6 (six) hours as needed for Wheezing or Shortness of Breath. Rescue, Disp: 18 g, Rfl: 11    albuterol-ipratropium (DUO-NEB) 2.5 mg-0.5 mg/3 mL nebulizer solution, Take 3 mLs by nebulization every 6 (six) hours as needed for Wheezing or Shortness of Breath. Rescue, Disp: 75 mL, Rfl: 11    amitriptyline (ELAVIL) 25 MG tablet, Take 1-2 tablet by mouth at bedtime More than a 7 day supply are medically necessary, Disp: 60 tablet, Rfl: 1    atorvastatin (LIPITOR) 40 MG tablet, Take 1 tablet (40 mg total) by mouth once daily., Disp: 90 tablet, Rfl: 1    co-enzyme Q-10 30 mg capsule, Take 30 mg by mouth once daily., Disp: , Rfl:     fluticasone propionate (FLONASE) 50 mcg/actuation nasal spray, SHAKE LIQUID AND USE 1  SPRAY(50 MCG) IN EACH NOSTRIL EVERY DAY AT 6 AM, Disp: 16 g, Rfl: 1    fluticasone-umeclidin-vilanter (TRELEGY ELLIPTA) 200-62.5-25 mcg inhaler, Inhale 1 puff into the lungs once daily., Disp: 60 each, Rfl: 11    ibuprofen (ADVIL,MOTRIN) 800 MG tablet, Take 1 tablet by mouth three times a day as needed for pain, Disp: 90 tablet, Rfl: 1    lactulose (CHRONULAC) 10 gram/15 mL solution, Take 15 mls by mouth twice daily as needed, Disp: 900 mL, Rfl: 1    loratadine (CLARITIN) 10 mg tablet, Take 1 tablet (10 mg total) by mouth once daily., Disp: 90 tablet, Rfl: 3    morphine (MS CONTIN) 15 MG 12 hr tablet, Take 1 tablet by mouth every twelve hours, Disp: 60 tablet, Rfl: 0    multivitamin capsule, Take 1 capsule by mouth once daily., Disp: , Rfl:     naloxone (NARCAN) 4 mg/actuation Spry, Spray 1 spray into one nostril single dose may repeat every 2-3 minutes until responsive or EMS arrives, Disp: 1 each, Rfl: 1    omeprazole (PRILOSEC) 40 MG capsule, TAKE 1 CAPSULE(40 MG) BY MOUTH EVERY DAY, Disp: 90 capsule, Rfl: 1    ondansetron (ZOFRAN-ODT) 4 MG TbDL, Take 1 tablet (4 mg total) by mouth every 6 (six) hours as needed (nausea)., Disp: 8 tablet, Rfl: 0    oxyCODONE (ROXICODONE) 20 mg Tab immediate release tablet, 1 tablet by mouth five times a day, Disp: 150 tablet, Rfl: 0    RESTASIS 0.05 % ophthalmic emulsion, , Disp: , Rfl:     losartan (COZAAR) 50 MG tablet, Take 1 tablet (50 mg total) by mouth once daily., Disp: 30 tablet, Rfl: 11    Review of Systems   Constitutional:  Negative for malaise/fatigue and unexpected weight change.   HENT:  Negative for trouble swallowing.    Eyes:  Negative for blurred vision and visual disturbance.   Respiratory:  Negative for shortness of breath.    Cardiovascular:  Negative for chest pain, palpitations and leg swelling.   Gastrointestinal:  Negative for blood in stool.   Genitourinary:  Negative for hematuria.   Allergic/Immunologic: Negative for immunocompromised state.  "  Neurological:  Negative for headaches.   Hematological:  Does not bruise/bleed easily.   Psychiatric/Behavioral:  Negative for agitation. The patient is not nervous/anxious.        Objective:      BP (!) 172/90   Pulse 85   Temp 97.7 °F (36.5 °C) (Oral)   Resp 18   Ht 5' 1" (1.549 m)   Wt 47.5 kg (104 lb 11.5 oz)   LMP  (LMP Unknown)   SpO2 98%   BMI 19.79 kg/m²   Physical Exam  Constitutional:       Appearance: She is well-developed.   HENT:      Head: Normocephalic.   Cardiovascular:      Rate and Rhythm: Normal rate and regular rhythm.      Heart sounds: Normal heart sounds.   Pulmonary:      Effort: Pulmonary effort is normal.   Musculoskeletal:         General: Normal range of motion.   Skin:     General: Skin is warm and dry.   Neurological:      Mental Status: She is alert and oriented to person, place, and time.   Psychiatric:         Behavior: Behavior normal.         Thought Content: Thought content normal.         Judgment: Judgment normal.         Assessment:       1. Elevated BP without diagnosis of hypertension    2. Chronic obstructive pulmonary disease, unspecified COPD type    3. Cigarette nicotine dependence without complication    4. Opioid dependence with current use    5. Body mass index (BMI) of 19.0 to 19.9 in adult        Plan:       Elevated BP without diagnosis of hypertension  -     losartan (COZAAR) 50 MG tablet; Take 1 tablet (50 mg total) by mouth once daily.  Dispense: 30 tablet; Refill: 11  One week BP log  Low sodium diet  4 weeks nurse visit for BP check  Chronic obstructive pulmonary disease, unspecified COPD type  Stable, continue inhaler  Cigarette nicotine dependence without complication  Educated on tobacco cessation  Opioid dependence with current use  Reji Moss  Body mass index (BMI) of 19.0 to 19.9 in adult  Stable, continue managment        Patient readiness: acceptance and barriers:none    During the course of the visit the patient was educated and " counseled about the following:     Hypertension:   Dietary sodium restriction.  Regular aerobic exercise.    Goals: Hypertension: Reduce Blood Pressure    Did patient meet goals/outcomes: No    The following self management tools provided: blood pressure log    Patient Instructions (the written plan) was given to the patient/family.     Time spent with patient: 30 minutes    Barriers to medications present (no )    Adverse reactions to current medications (no)    Over the counter medications reviewed (Yes)

## 2023-09-23 ENCOUNTER — PATIENT MESSAGE (OUTPATIENT)
Dept: FAMILY MEDICINE | Facility: CLINIC | Age: 63
End: 2023-09-23
Payer: MEDICARE

## 2023-09-24 NOTE — TELEPHONE ENCOUNTER
LOV--9-22-23 with Mrs. Matt. Elevated blood pressure was one of the issues discussed during visit. Plan states submit blood pressure lone in one week/nurse blood pressure check in 4 weeks.  Patient submitted a one day blood pressure log. Please see attached portal message. Thank you.

## 2023-09-27 ENCOUNTER — PATIENT MESSAGE (OUTPATIENT)
Dept: FAMILY MEDICINE | Facility: CLINIC | Age: 63
End: 2023-09-27
Payer: MEDICARE

## 2023-09-27 NOTE — TELEPHONE ENCOUNTER
Patient submitted blood pressure log for review. Please see attached portal message from patient. Thank you.

## 2023-09-28 ENCOUNTER — PATIENT MESSAGE (OUTPATIENT)
Dept: FAMILY MEDICINE | Facility: CLINIC | Age: 63
End: 2023-09-28
Payer: MEDICARE

## 2023-09-29 ENCOUNTER — PATIENT MESSAGE (OUTPATIENT)
Dept: FAMILY MEDICINE | Facility: CLINIC | Age: 63
End: 2023-09-29
Payer: MEDICARE

## 2023-10-02 DIAGNOSIS — E78.49 OTHER HYPERLIPIDEMIA: Primary | ICD-10-CM

## 2023-10-02 RX ORDER — ATORVASTATIN CALCIUM 40 MG/1
40 TABLET, FILM COATED ORAL DAILY
Qty: 90 TABLET | Refills: 1 | Status: SHIPPED | OUTPATIENT
Start: 2023-10-02

## 2023-10-02 NOTE — TELEPHONE ENCOUNTER
Care Due:                  Date            Visit Type   Department     Provider  --------------------------------------------------------------------------------                                MYCHART                              FOLLOWUP/OF  [facility]  Last Visit: 01-      [facility] VISIT   MEDICINE       Toni Hughse  Next Visit: None Scheduled  None         None Found                                                            Last  Test          Frequency    Reason                     Performed    Due Date  --------------------------------------------------------------------------------    Office Visit  12 months..  atorvastatin, loratadine,   01- 12-                             omeprazole...............    [employer] Embedded Care Due Messages. Reference number: [id number].   10/02/2023 9:15:17 AM CDT

## 2023-10-02 NOTE — TELEPHONE ENCOUNTER
Please inform patient that I have reviewed her BP readings. It seems her BP readings are better when she takes Losartan 1 mg-please take the Losartan 1 mg daily.

## 2023-10-18 DIAGNOSIS — M48.062 SPINAL STENOSIS, LUMBAR REGION, WITH NEUROGENIC CLAUDICATION: ICD-10-CM

## 2023-10-18 DIAGNOSIS — M43.27 FUSION OF SPINE OF LUMBOSACRAL REGION: ICD-10-CM

## 2023-10-18 DIAGNOSIS — M54.16 RADICULOPATHY, LUMBAR REGION: Primary | ICD-10-CM

## 2023-10-23 ENCOUNTER — TELEPHONE (OUTPATIENT)
Dept: FAMILY MEDICINE | Facility: CLINIC | Age: 63
End: 2023-10-23

## 2023-10-23 ENCOUNTER — CLINICAL SUPPORT (OUTPATIENT)
Dept: FAMILY MEDICINE | Facility: CLINIC | Age: 63
End: 2023-10-23
Payer: MEDICARE

## 2023-10-23 VITALS — DIASTOLIC BLOOD PRESSURE: 90 MMHG | SYSTOLIC BLOOD PRESSURE: 150 MMHG | HEART RATE: 91 BPM

## 2023-10-23 DIAGNOSIS — Z01.30 BLOOD PRESSURE CHECK: Primary | ICD-10-CM

## 2023-10-23 PROCEDURE — 99999 PR PBB SHADOW E&M-EST. PATIENT-LVL II: ICD-10-PCS | Mod: PBBFAC,HCNC,,

## 2023-10-23 PROCEDURE — 99999 PR PBB SHADOW E&M-EST. PATIENT-LVL II: CPT | Mod: PBBFAC,HCNC,,

## 2023-10-23 NOTE — TELEPHONE ENCOUNTER
"Patient present for nurse blood pressure check. Denies chest pain, headache, dizziness, dyspnea, or pain. No noted edema.  Patient is currently prescribed the following:     Losartan 50 mg take one tablet by mouth once daily.         Patient advised she did not take blood pressure medication today.  Patient states she hasn't taken blood pressure medication in 2-3 days.  Patient states "I take it only if my blood pressure is high. Some times I take only half a tablet because I don't want my blood pressure to drop too low."  Patient states she drank coffee this morning.  Patient also verbalized smoking a cigarette this morning.      Blood pressure assessed manually with result of 150/86, Pulse 91 in right arm.  Blood pressure reassessed in left arm with result of 150/90.  Will send message to Dr. Hughes for notification.  "

## 2023-10-23 NOTE — TELEPHONE ENCOUNTER
Call placed to patient for notification. Patient verbalized understanding.  Appointment scheduled for the date of 10-24-23. Patient agreed to appointment date, time, and location.

## 2023-10-24 ENCOUNTER — OFFICE VISIT (OUTPATIENT)
Dept: FAMILY MEDICINE | Facility: CLINIC | Age: 63
End: 2023-10-24
Payer: MEDICARE

## 2023-10-24 VITALS
DIASTOLIC BLOOD PRESSURE: 86 MMHG | OXYGEN SATURATION: 97 % | HEART RATE: 90 BPM | HEIGHT: 61 IN | WEIGHT: 105.19 LBS | SYSTOLIC BLOOD PRESSURE: 138 MMHG | TEMPERATURE: 98 F | BODY MASS INDEX: 19.86 KG/M2

## 2023-10-24 DIAGNOSIS — I10 HYPERTENSION, UNSPECIFIED TYPE: ICD-10-CM

## 2023-10-24 PROCEDURE — 99213 OFFICE O/P EST LOW 20 MIN: CPT | Mod: HCNC,S$GLB,,

## 2023-10-24 PROCEDURE — 3008F PR BODY MASS INDEX (BMI) DOCUMENTED: ICD-10-PCS | Mod: HCNC,CPTII,S$GLB,

## 2023-10-24 PROCEDURE — 1160F RVW MEDS BY RX/DR IN RCRD: CPT | Mod: HCNC,CPTII,S$GLB,

## 2023-10-24 PROCEDURE — 3079F PR MOST RECENT DIASTOLIC BLOOD PRESSURE 80-89 MM HG: ICD-10-PCS | Mod: HCNC,CPTII,S$GLB,

## 2023-10-24 PROCEDURE — 3079F DIAST BP 80-89 MM HG: CPT | Mod: HCNC,CPTII,S$GLB,

## 2023-10-24 PROCEDURE — 4010F PR ACE/ARB THEARPY RXD/TAKEN: ICD-10-PCS | Mod: HCNC,CPTII,S$GLB,

## 2023-10-24 PROCEDURE — 3075F PR MOST RECENT SYSTOLIC BLOOD PRESS GE 130-139MM HG: ICD-10-PCS | Mod: HCNC,CPTII,S$GLB,

## 2023-10-24 PROCEDURE — 1160F PR REVIEW ALL MEDS BY PRESCRIBER/CLIN PHARMACIST DOCUMENTED: ICD-10-PCS | Mod: HCNC,CPTII,S$GLB,

## 2023-10-24 PROCEDURE — 1159F MED LIST DOCD IN RCRD: CPT | Mod: HCNC,CPTII,S$GLB,

## 2023-10-24 PROCEDURE — 99213 PR OFFICE/OUTPT VISIT, EST, LEVL III, 20-29 MIN: ICD-10-PCS | Mod: HCNC,S$GLB,,

## 2023-10-24 PROCEDURE — 99999 PR PBB SHADOW E&M-EST. PATIENT-LVL V: CPT | Mod: PBBFAC,HCNC,,

## 2023-10-24 PROCEDURE — 4010F ACE/ARB THERAPY RXD/TAKEN: CPT | Mod: HCNC,CPTII,S$GLB,

## 2023-10-24 PROCEDURE — 3008F BODY MASS INDEX DOCD: CPT | Mod: HCNC,CPTII,S$GLB,

## 2023-10-24 PROCEDURE — 3075F SYST BP GE 130 - 139MM HG: CPT | Mod: HCNC,CPTII,S$GLB,

## 2023-10-24 PROCEDURE — 1159F PR MEDICATION LIST DOCUMENTED IN MEDICAL RECORD: ICD-10-PCS | Mod: HCNC,CPTII,S$GLB,

## 2023-10-24 PROCEDURE — 99999 PR PBB SHADOW E&M-EST. PATIENT-LVL V: ICD-10-PCS | Mod: PBBFAC,HCNC,,

## 2023-10-24 RX ORDER — LOSARTAN POTASSIUM 25 MG/1
25 TABLET ORAL DAILY
Qty: 30 TABLET | Refills: 0
Start: 2023-10-24 | End: 2023-12-19 | Stop reason: SDUPTHER

## 2023-10-24 NOTE — PROGRESS NOTES
Subjective:       Patient ID: Floresita Martin is a 63 y.o. female.    Chief Complaint: Follow-up      Floresita Martin is a 63 y.o. female with history of  HLP who presents to clinic for evaluation of elevated blood pressure. Patient brings in blood pressure log with systolic ranging from 160-130 and diastolic ranging from 90-70. She came in for a blood pressure check yesterday, and her blood pressure was elevated at 150/90. She has been taking Losartan 50 mg when she takes her blood pressure and it is >140/80. She took her Losartan today, and her blood pressure in clinic is 138/86.     Follow-up  Pertinent negatives include no abdominal pain, arthralgias, chest pain, chills, coughing, fatigue, fever, headaches, nausea or rash.       Review of Systems   Constitutional:  Negative for activity change, appetite change, chills, fatigue, fever and unexpected weight change.   Eyes:  Negative for visual disturbance.   Respiratory:  Negative for cough, shortness of breath and wheezing.    Cardiovascular:  Negative for chest pain, palpitations and leg swelling.   Gastrointestinal:  Negative for abdominal pain, constipation, diarrhea and nausea.   Musculoskeletal:  Negative for arthralgias.   Skin:  Negative for rash.   Neurological:  Negative for dizziness, light-headedness and headaches.   Psychiatric/Behavioral:  Negative for dysphoric mood. The patient is not nervous/anxious.          Past Medical History:   Diagnosis Date    Alcohol abuse     Cellulitis of toe of right foot 12/27/2019    Headache     Hearing loss     HLD (hyperlipidemia)        Review of patient's allergies indicates:   Allergen Reactions    Codeine Nausea And Vomiting    Tylenol-codeine [acetaminophen-codeine] Nausea And Vomiting         Current Outpatient Medications:     albuterol (PROVENTIL/VENTOLIN HFA) 90 mcg/actuation inhaler, Inhale 2 puffs into the lungs every 6 (six) hours as needed for Wheezing or Shortness of Breath. Rescue, Disp: 18 g,  Rfl: 11    albuterol-ipratropium (DUO-NEB) 2.5 mg-0.5 mg/3 mL nebulizer solution, Take 3 mLs by nebulization every 6 (six) hours as needed for Wheezing or Shortness of Breath. Rescue, Disp: 75 mL, Rfl: 11    amitriptyline (ELAVIL) 25 MG tablet, Take 1-2 tablet by mouth at bedtime More than a 7 day supply are medically necessary, Disp: 60 tablet, Rfl: 1    atorvastatin (LIPITOR) 40 MG tablet, Take 1 tablet (40 mg total) by mouth once daily., Disp: 90 tablet, Rfl: 1    co-enzyme Q-10 30 mg capsule, Take 30 mg by mouth once daily., Disp: , Rfl:     fluticasone propionate (FLONASE) 50 mcg/actuation nasal spray, SHAKE LIQUID AND USE 1 SPRAY(50 MCG) IN EACH NOSTRIL EVERY DAY AT 6 AM, Disp: 16 g, Rfl: 1    fluticasone-umeclidin-vilanter (TRELEGY ELLIPTA) 200-62.5-25 mcg inhaler, Inhale 1 puff into the lungs once daily., Disp: 60 each, Rfl: 11    ibuprofen (ADVIL,MOTRIN) 800 MG tablet, Take 1 tablet by mouth three times a day as needed for pain, Disp: 90 tablet, Rfl: 1    lactulose (CHRONULAC) 10 gram/15 mL solution, Take 15 mls by mouth twice daily as needed, Disp: 900 mL, Rfl: 1    loratadine (CLARITIN) 10 mg tablet, TAKE 1 TABLET BY MOUTH DAILY, Disp: 90 tablet, Rfl: 3    lubiprostone (AMITIZA) 24 MCG Cap, Take 1 capsule by mouth twice a day as needed for pain, Disp: 60 capsule, Rfl: 1    morphine (MS CONTIN) 15 MG 12 hr tablet, Take 1 tablet by mouth every twelve hours, Disp: 60 tablet, Rfl: 0    [START ON 11/17/2023] morphine (MS CONTIN) 30 MG 12 hr tablet, Take 1 tablet by mouth every twelve hours More than 7 days supply is medically necessary, Disp: 60 tablet, Rfl: 0    morphine (MS CONTIN) 30 MG 12 hr tablet, Take 1 tablet by mouth twice a day as needed for pain More than a 7 day supply are medically necessary, Disp: 60 tablet, Rfl: 0    multivitamin capsule, Take 1 capsule by mouth once daily., Disp: , Rfl:     naloxone (NARCAN) 4 mg/actuation Spry, Spray 1 spray into one nostril single dose may repeat every 2-3  minutes until responsive or EMS arrives, Disp: 1 each, Rfl: 1    omeprazole (PRILOSEC) 40 MG capsule, TAKE 1 CAPSULE(40 MG) BY MOUTH EVERY DAY, Disp: 90 capsule, Rfl: 1    ondansetron (ZOFRAN-ODT) 4 MG TbDL, Take 1 tablet (4 mg total) by mouth every 6 (six) hours as needed (nausea)., Disp: 8 tablet, Rfl: 0    oxyCODONE (ROXICODONE) 20 mg Tab immediate release tablet, 1 tablet by mouth five times a day, Disp: 150 tablet, Rfl: 0    oxyCODONE (ROXICODONE) 20 mg Tab immediate release tablet, Take 1 tablet by mouth five times a day as needed for pain More than 7 days supply is medically necessary, Disp: 150 tablet, Rfl: 0    [START ON 11/17/2023] oxyCODONE (ROXICODONE) 20 mg Tab immediate release tablet, Take 1 tablet by mouth five times a day as needed for pain More than 7 days supply is medically necessary, Disp: 150 tablet, Rfl: 0    RESTASIS 0.05 % ophthalmic emulsion, , Disp: , Rfl:     losartan (COZAAR) 25 MG tablet, Take 1 tablet (25 mg total) by mouth once daily., Disp: 30 tablet, Rfl: 0    Objective:        Physical Exam  Vitals reviewed.   Constitutional:       Appearance: Normal appearance.   HENT:      Head: Normocephalic and atraumatic.   Eyes:      Conjunctiva/sclera: Conjunctivae normal.   Cardiovascular:      Rate and Rhythm: Normal rate and regular rhythm.      Heart sounds: Normal heart sounds.   Pulmonary:      Effort: Pulmonary effort is normal.      Breath sounds: Normal breath sounds.   Musculoskeletal:         General: Normal range of motion.      Cervical back: Normal range of motion and neck supple.      Right lower leg: No edema.      Left lower leg: No edema.   Skin:     General: Skin is warm and dry.   Neurological:      Mental Status: She is alert and oriented to person, place, and time.   Psychiatric:         Mood and Affect: Mood normal.         Behavior: Behavior normal.         Thought Content: Thought content normal.           Visit Vitals  /86   Pulse 90   Temp 98 °F (36.7 °C)  "  Ht 5' 1" (1.549 m)   Wt 47.7 kg (105 lb 2.6 oz)   LMP  (LMP Unknown)   SpO2 97%   BMI 19.87 kg/m²      Assessment:         1. Hypertension, unspecified type        Plan:         Floresita was seen today for follow-up.    Diagnoses and all orders for this visit:    Hypertension, unspecified type  -     losartan (COZAAR) 25 MG tablet; Take 1 tablet (25 mg total) by mouth once daily.  -     Continue to monitor blood pressure 1-2x daily and please bring log into next appointment  -     Discontinue the Losartan if she experiences lightheadedness/ dizziness or blood pressure is less than 100/60      Follow up in about 4 weeks (around 11/21/2023) with me for blood pressure f/u. Follow up with Dr. Hughes in about 6 months.         Family Medicine Physician Assistant     Future Appointments       Date Provider Specialty Appt Notes    11/28/2023 Tennille Castañeda PA-C Family Medicine 1MO F/U    1/8/2024 Freya Swartz NP Pulmonology 3 month f/u             Tests to Keep You Healthy    Mammogram: Met on 1/19/2023  Colon Cancer Screening: Met on 11/15/2017  Cervical Cancer Screening: Met on 2/9/2022  Last Blood Pressure <= 139/89 (10/24/2023): Yes  Tobacco Cessation: NO       I spent a total of 20 minutes on the day of the visit.This includes face to face time and non-face to face time preparing to see the patient (eg, review of tests), obtaining and/or reviewing separately obtained history, documenting clinical information in the electronic or other health record, independently interpreting results and communicating results to the patient/family/caregiver, or care coordinator.    We have addressed [3] Low: 2 or more self-limited or minor problems / 1 stable chronic illness / 1 acute, uncomplicated illness or injury  The complexity of the data reviewed and analyzed for this visit was [3] Limited (Reviewed prior external note, ordered unique testing or reviewed the results of each unique test)   The risk of complications and/or " morbidity or mortality are [3] Low risk   The level of Medical Decision Making for this visit is [3] Low

## 2023-10-24 NOTE — PATIENT INSTRUCTIONS
Take Losartan 25 mg daily and continue to monitor your blood pressures daily    Boaz Canas,     If you are due for any health screening(s) below please notify me so we can arrange them to be ordered and scheduled. Most healthy patients at your age complete them, but you are free to accept or refuse.     If you can't do it, I'll definitely understand. If you can, I'd certainly appreciate it!    Tests to Keep You Healthy    Mammogram: Met on 1/19/2023  Colon Cancer Screening: Met on 11/15/2017  Cervical Cancer Screening: Met on 2/9/2022  Tobacco Cessation: NO      Were here to help you quit smoking     Our records indicated that you are still smoking. One of the best things you can do for your health is to stop smoking and we are here to help.     Talk with your provider about our Smoking Cessation Program and how we can support you on your journey.

## 2023-10-27 ENCOUNTER — HOSPITAL ENCOUNTER (OUTPATIENT)
Dept: RADIOLOGY | Facility: HOSPITAL | Age: 63
Discharge: HOME | End: 2023-10-27
Attending: PAIN MEDICINE
Payer: OTHER MISCELLANEOUS

## 2023-10-27 DIAGNOSIS — M43.27 FUSION OF SPINE OF LUMBOSACRAL REGION: ICD-10-CM

## 2023-10-27 DIAGNOSIS — M54.16 RADICULOPATHY, LUMBAR REGION: ICD-10-CM

## 2023-10-27 DIAGNOSIS — M48.062 SPINAL STENOSIS, LUMBAR REGION, WITH NEUROGENIC CLAUDICATION: ICD-10-CM

## 2023-10-27 LAB
CREAT SERPL-MCNC: 0.7 MG/DL (ref 0.5–1.4)
SAMPLE: NORMAL

## 2023-10-27 PROCEDURE — 82565 ASSAY OF CREATININE: CPT | Mod: PO

## 2023-10-27 PROCEDURE — A9585 GADOBUTROL INJECTION: HCPCS | Mod: PO | Performed by: PAIN MEDICINE

## 2023-10-27 PROCEDURE — 25500020 PHARM REV CODE 255: Mod: PO | Performed by: PAIN MEDICINE

## 2023-10-27 PROCEDURE — 72114 X-RAY EXAM L-S SPINE BENDING: CPT | Mod: TC,PO

## 2023-10-27 RX ORDER — GADOBUTROL 604.72 MG/ML
5.5 INJECTION INTRAVENOUS
Status: COMPLETED | OUTPATIENT
Start: 2023-10-27 | End: 2023-10-27

## 2023-10-27 RX ADMIN — GADOBUTROL 5.5 ML: 604.72 INJECTION INTRAVENOUS at 10:10

## 2023-12-05 DIAGNOSIS — J30.9 ALLERGIC RHINITIS: ICD-10-CM

## 2023-12-05 RX ORDER — FLUTICASONE PROPIONATE 50 MCG
1 SPRAY, SUSPENSION (ML) NASAL DAILY
Qty: 16 G | Refills: 11 | Status: SHIPPED | OUTPATIENT
Start: 2023-12-05 | End: 2024-01-22 | Stop reason: SDUPTHER

## 2023-12-05 NOTE — TELEPHONE ENCOUNTER
Care Due:                  Date            Visit Type   Department     Provider  --------------------------------------------------------------------------------                                MYCHART                              FOLLOWUP/OF  [facility]  Last Visit: 01-      FICE VISIT   MEDICINE       Toni Hughes                              EP -                              PRIMARY      [facility]  Next Visit: 07-      CARE ([facility])   MEDICINE       Toni Hughes                                                            Last  Test          Frequency    Reason                     Performed    Due Date  --------------------------------------------------------------------------------    Office Visit  15 months..  loratadine, omeprazole...  01- 03-    [facility] Embedded Care Due Messages. Reference number: [id number].   12/05/2023 10:10:21 AM CST

## 2023-12-19 ENCOUNTER — OFFICE VISIT (OUTPATIENT)
Dept: FAMILY MEDICINE | Facility: CLINIC | Age: 63
End: 2023-12-19
Payer: MEDICARE

## 2023-12-19 VITALS
WEIGHT: 108.44 LBS | HEIGHT: 61 IN | RESPIRATION RATE: 12 BRPM | TEMPERATURE: 98 F | HEART RATE: 80 BPM | OXYGEN SATURATION: 95 % | SYSTOLIC BLOOD PRESSURE: 120 MMHG | BODY MASS INDEX: 20.47 KG/M2 | DIASTOLIC BLOOD PRESSURE: 60 MMHG

## 2023-12-19 DIAGNOSIS — F17.200 NEEDS SMOKING CESSATION EDUCATION: ICD-10-CM

## 2023-12-19 DIAGNOSIS — I10 HYPERTENSION, UNSPECIFIED TYPE: ICD-10-CM

## 2023-12-19 DIAGNOSIS — Z12.31 BREAST CANCER SCREENING BY MAMMOGRAM: Primary | ICD-10-CM

## 2023-12-19 PROCEDURE — 3078F DIAST BP <80 MM HG: CPT | Mod: HCNC,CPTII,S$GLB,

## 2023-12-19 PROCEDURE — 3074F SYST BP LT 130 MM HG: CPT | Mod: HCNC,CPTII,S$GLB,

## 2023-12-19 PROCEDURE — 99213 OFFICE O/P EST LOW 20 MIN: CPT | Mod: HCNC,S$GLB,,

## 2023-12-19 PROCEDURE — 1159F MED LIST DOCD IN RCRD: CPT | Mod: HCNC,CPTII,S$GLB,

## 2023-12-19 PROCEDURE — 1159F PR MEDICATION LIST DOCUMENTED IN MEDICAL RECORD: ICD-10-PCS | Mod: HCNC,CPTII,S$GLB,

## 2023-12-19 PROCEDURE — 3074F PR MOST RECENT SYSTOLIC BLOOD PRESSURE < 130 MM HG: ICD-10-PCS | Mod: HCNC,CPTII,S$GLB,

## 2023-12-19 PROCEDURE — 3008F BODY MASS INDEX DOCD: CPT | Mod: HCNC,CPTII,S$GLB,

## 2023-12-19 PROCEDURE — 1160F PR REVIEW ALL MEDS BY PRESCRIBER/CLIN PHARMACIST DOCUMENTED: ICD-10-PCS | Mod: HCNC,CPTII,S$GLB,

## 2023-12-19 PROCEDURE — 99999 PR PBB SHADOW E&M-EST. PATIENT-LVL V: CPT | Mod: PBBFAC,HCNC,,

## 2023-12-19 PROCEDURE — 3008F PR BODY MASS INDEX (BMI) DOCUMENTED: ICD-10-PCS | Mod: HCNC,CPTII,S$GLB,

## 2023-12-19 PROCEDURE — 4010F PR ACE/ARB THEARPY RXD/TAKEN: ICD-10-PCS | Mod: HCNC,CPTII,S$GLB,

## 2023-12-19 PROCEDURE — 99213 PR OFFICE/OUTPT VISIT, EST, LEVL III, 20-29 MIN: ICD-10-PCS | Mod: HCNC,S$GLB,,

## 2023-12-19 PROCEDURE — 4010F ACE/ARB THERAPY RXD/TAKEN: CPT | Mod: HCNC,CPTII,S$GLB,

## 2023-12-19 PROCEDURE — 3078F PR MOST RECENT DIASTOLIC BLOOD PRESSURE < 80 MM HG: ICD-10-PCS | Mod: HCNC,CPTII,S$GLB,

## 2023-12-19 PROCEDURE — 1160F RVW MEDS BY RX/DR IN RCRD: CPT | Mod: HCNC,CPTII,S$GLB,

## 2023-12-19 PROCEDURE — 99999 PR PBB SHADOW E&M-EST. PATIENT-LVL V: ICD-10-PCS | Mod: PBBFAC,HCNC,,

## 2023-12-19 RX ORDER — LOSARTAN POTASSIUM 50 MG/1
50 TABLET ORAL DAILY
Qty: 90 TABLET | Refills: 3 | Status: SHIPPED | OUTPATIENT
Start: 2023-12-19 | End: 2024-12-18

## 2023-12-19 NOTE — PROGRESS NOTES
Subjective:       Patient ID: Floresita Martin is a 63 y.o. female.    Chief Complaint: Follow-up (2mth f/u)      Floresita Martin is a 63 y.o. female with history of HLP who presents to clinic for follow up on HTN. Has been taking Losartan 50 mg since last visit. Brings in her blood pressure log and cuff. Log with systolics ranging from 120-150. Her automatic blood pressure cuff reads 133/78, our manual blood pressure 120/60. No headache, dizziness, weakness, chest pain, palpitations.         Review of Systems   Constitutional:  Negative for activity change, appetite change, fatigue and unexpected weight change.   Respiratory:  Negative for cough and shortness of breath.    Cardiovascular:  Negative for chest pain, palpitations and leg swelling.   Gastrointestinal:  Negative for abdominal pain, constipation, diarrhea and nausea.   Skin:  Negative for rash.   Neurological:  Negative for dizziness, weakness, light-headedness and headaches.         Past Medical History:   Diagnosis Date    Alcohol abuse     Cellulitis of toe of right foot 12/27/2019    Headache     Hearing loss     HLD (hyperlipidemia)        Review of patient's allergies indicates:   Allergen Reactions    Codeine Nausea And Vomiting    Tylenol-codeine [acetaminophen-codeine] Nausea And Vomiting         Current Outpatient Medications:     albuterol (PROVENTIL/VENTOLIN HFA) 90 mcg/actuation inhaler, Inhale 2 puffs into the lungs every 6 (six) hours as needed for Wheezing or Shortness of Breath. Rescue, Disp: 18 g, Rfl: 11    albuterol-ipratropium (DUO-NEB) 2.5 mg-0.5 mg/3 mL nebulizer solution, Take 3 mLs by nebulization every 6 (six) hours as needed for Wheezing or Shortness of Breath. Rescue, Disp: 75 mL, Rfl: 11    amitriptyline (ELAVIL) 25 MG tablet, Take 1-2 tablet by mouth every night at bedtime, Disp: 60 tablet, Rfl: 1    atorvastatin (LIPITOR) 40 MG tablet, Take 1 tablet (40 mg total) by mouth once daily., Disp: 90 tablet, Rfl: 1    co-enzyme  Q-10 30 mg capsule, Take 30 mg by mouth once daily., Disp: , Rfl:     fluticasone propionate (FLONASE) 50 mcg/actuation nasal spray, 1 spray (50 mcg total) by Each Nostril route once daily., Disp: 16 g, Rfl: 11    fluticasone-umeclidin-vilanter (TRELEGY ELLIPTA) 200-62.5-25 mcg inhaler, Inhale 1 puff into the lungs once daily., Disp: 60 each, Rfl: 11    ibuprofen (ADVIL,MOTRIN) 800 MG tablet, Take 1 tablet by mouth three times a day as needed for pain, Disp: 90 tablet, Rfl: 1    lactulose (CHRONULAC) 10 gram/15 mL solution, Take 15 mls by mouth twice daily as needed, Disp: 900 mL, Rfl: 1    lactulose (CHRONULAC) 10 gram/15 mL solution, Take 15 ml twice a day as needed, Disp: 946 mL, Rfl: 1    loratadine (CLARITIN) 10 mg tablet, TAKE 1 TABLET BY MOUTH DAILY, Disp: 90 tablet, Rfl: 3    lubiprostone (AMITIZA) 24 MCG Cap, Take 1 capsule by mouth twice a day as needed for pain, Disp: 60 capsule, Rfl: 1    morphine (MS CONTIN) 30 MG 12 hr tablet, Take 1 tablet by mouth every twelve hours More than 7 days supply is medically necessary, Disp: 60 tablet, Rfl: 0    morphine (MS CONTIN) 30 MG 12 hr tablet, Take 1 tablet by mouth twice a day as needed for pain More than a 7 day supply are medically necessary, Disp: 60 tablet, Rfl: 0    morphine (MS CONTIN) 30 MG 12 hr tablet, Take 1 tablet by mouth twice a day as needed for pain, Disp: 60 tablet, Rfl: 0    multivitamin capsule, Take 1 capsule by mouth once daily., Disp: , Rfl:     naloxone (NARCAN) 4 mg/actuation Spry, Spray 1 spray into one nostril single dose may repeat every 2-3 minutes until responsive or EMS arrives, Disp: 1 each, Rfl: 1    omeprazole (PRILOSEC) 40 MG capsule, TAKE 1 CAPSULE(40 MG) BY MOUTH EVERY DAY, Disp: 90 capsule, Rfl: 1    ondansetron (ZOFRAN-ODT) 4 MG TbDL, Take 1 tablet (4 mg total) by mouth every 6 (six) hours as needed (nausea)., Disp: 8 tablet, Rfl: 0    oxyCODONE (ROXICODONE) 20 mg Tab immediate release tablet, 1 tablet by mouth five times a  "day, Disp: 150 tablet, Rfl: 0    oxyCODONE (ROXICODONE) 20 mg Tab immediate release tablet, Take 1 tablet by mouth five times a day as needed for pain More than 7 days supply is medically necessary, Disp: 150 tablet, Rfl: 0    oxyCODONE (ROXICODONE) 20 mg Tab immediate release tablet, Take 1 tablet by mouth five times a day as needed for pain More than a 7 day supply are medically necessary, Disp: 150 tablet, Rfl: 0    [START ON 1/12/2024] oxyCODONE (ROXICODONE) 20 mg Tab immediate release tablet, Take 1 tablet by mouth five times a day as needed for pain More than a 7 day supply are medically necessary, Disp: 150 tablet, Rfl: 0    RESTASIS 0.05 % ophthalmic emulsion, , Disp: , Rfl:     losartan (COZAAR) 50 MG tablet, Take 1 tablet (50 mg total) by mouth once daily., Disp: 90 tablet, Rfl: 3    Objective:        Physical Exam  Vitals reviewed.   Constitutional:       Appearance: Normal appearance.   HENT:      Head: Normocephalic and atraumatic.   Eyes:      Conjunctiva/sclera: Conjunctivae normal.   Cardiovascular:      Rate and Rhythm: Normal rate and regular rhythm.      Heart sounds: Normal heart sounds.   Pulmonary:      Effort: Pulmonary effort is normal.      Breath sounds: Normal breath sounds.   Musculoskeletal:         General: Normal range of motion.      Cervical back: Normal range of motion and neck supple.   Skin:     General: Skin is warm and dry.   Neurological:      Mental Status: She is alert and oriented to person, place, and time.   Psychiatric:         Mood and Affect: Mood normal.         Behavior: Behavior normal.         Thought Content: Thought content normal.         Judgment: Judgment normal.           Visit Vitals  /60   Pulse 80   Temp 98.4 °F (36.9 °C) (Oral)   Resp 12   Ht 5' 1" (1.549 m)   Wt 49.2 kg (108 lb 7.5 oz)   LMP  (LMP Unknown)   SpO2 95%   BMI 20.49 kg/m²      Assessment:         1. Breast cancer screening by mammogram    2. Hypertension, unspecified type        Plan: "         Floresita was seen today for follow-up.    Diagnoses and all orders for this visit:    Breast cancer screening by mammogram  -     Mammo Digital Screening Bilat w/ J[facility]; Future    Hypertension, unspecified type  -     losartan (COZAAR) 50 MG tablet; Take 1 tablet (50 mg total) by mouth once daily.  -     Stable, continue current medications. Continue to monitor. Discussed that her cuff seems to run about 10 mmHg higher than manual reading.   -     CBC Auto Differential; Future  -     COMPREHENSIVE METABOLIC PANEL; Future  -     Lipid Panel; Future   -     Routine labs due in January        Follow up as scheduled with PCP         Family Medicine Physician Assistant     Future Appointments       Date Provider Specialty Appt Notes    1/8/2024 Freya Swartz NP Pulmonology 3 month f/u    2/12/2024  Lab .    7/1/2024 Toni Hughes MD Family Medicine Annual             Tests to Keep You Healthy    Mammogram: Met on 1/19/2023  Colon Cancer Screening: Met on 11/15/2017  Cervical Cancer Screening: Met on 2/9/2022  Last Blood Pressure <= 139/89 (12/19/2023): Yes  Tobacco Cessation: NO       I spent a total of 20 minutes on the day of the visit.This includes face to face time and non-face to face time preparing to see the patient (eg, review of tests), obtaining and/or reviewing separately obtained history, documenting clinical information in the electronic or other health record, independently interpreting results and communicating results to the patient/family/caregiver, or care coordinator.    We have addressed [3] Low: 2 or more self-limited or minor problems / 1 stable chronic illness / 1 acute, uncomplicated illness or injury  The complexity of the data reviewed and analyzed for this visit was [3] Limited (Reviewed prior external note, ordered unique testing or reviewed the results of each unique test)   The risk of complications and/or morbidity or mortality are [3] Low risk   The level of Medical Decision  Making for this visit is [3] Low

## 2024-01-04 ENCOUNTER — TELEPHONE (OUTPATIENT)
Dept: PULMONOLOGY | Facility: CLINIC | Age: 64
End: 2024-01-04
Payer: MEDICARE

## 2024-01-04 NOTE — TELEPHONE ENCOUNTER
----- Message from Ushaaugustus Mata sent at 1/4/2024 10:00 AM CST -----  Regarding: appt request  Contact: pt  Type: Appointment Request    Caller is requesting an appointment.      Name of Caller:pt    Would the patient rather a call back or a response via [facility]? Call back    Best Call Back Number:[phone]    Additional Information: pt sts she received a letter stating it was time for a lung scan and xrays.  Please advise.  Thank you.

## 2024-01-22 DIAGNOSIS — J30.9 ALLERGIC RHINITIS: ICD-10-CM

## 2024-01-22 RX ORDER — FLUTICASONE PROPIONATE 50 MCG
1 SPRAY, SUSPENSION (ML) NASAL DAILY
Qty: 16 G | Refills: 11 | Status: SHIPPED | OUTPATIENT
Start: 2024-01-22

## 2024-01-22 RX ORDER — LORATADINE 10 MG/1
10 TABLET ORAL DAILY
Qty: 90 TABLET | Refills: 3 | Status: SHIPPED | OUTPATIENT
Start: 2024-01-22

## 2024-01-22 NOTE — TELEPHONE ENCOUNTER
No care due was identified.  [facility] Embedded Care Due Messages. Reference number: [id number].   1/22/2024 10:00:49 AM CST

## 2024-02-12 ENCOUNTER — HOSPITAL ENCOUNTER (OUTPATIENT)
Dept: RADIOLOGY | Facility: CLINIC | Age: 64
Discharge: HOME | End: 2024-02-12
Payer: MEDICARE

## 2024-02-12 DIAGNOSIS — Z12.31 BREAST CANCER SCREENING BY MAMMOGRAM: ICD-10-CM

## 2024-02-12 PROCEDURE — 77067 SCR MAMMO BI INCL CAD: CPT | Mod: TC,HCNC,PO

## 2024-02-12 PROCEDURE — 77067 SCR MAMMO BI INCL CAD: CPT | Mod: 26,HCNC,, | Performed by: RADIOLOGY

## 2024-02-12 PROCEDURE — 77063 BREAST TOMOSYNTHESIS BI: CPT | Mod: 26,HCNC,, | Performed by: RADIOLOGY

## 2024-02-13 ENCOUNTER — TELEPHONE (OUTPATIENT)
Dept: FAMILY MEDICINE | Facility: CLINIC | Age: 64
End: 2024-02-13
Payer: MEDICARE

## 2024-02-13 NOTE — TELEPHONE ENCOUNTER
----- Message from Tennille Castañeda PA-C sent at 2/12/2024  4:55 PM CST -----  Mammogram without evidence of malignancy. Recommend to repeat in 1 year.

## 2024-02-13 NOTE — TELEPHONE ENCOUNTER
Patient viewed lab results online on 2-12-24 @ 2:30 PM.  Follow up call placed to patient. No answer received. Left message requesting return call to office.

## 2024-02-13 NOTE — TELEPHONE ENCOUNTER
----- Message from Tennille Castañeda PA-C sent at 2/12/2024  1:20 PM CST -----  Labs look good overall. Alkaline phosphatase slightly elevated. Is the patient having any right upper abdominal pain?     Cholesterol, liver and kidney function look good.

## 2024-02-15 NOTE — TELEPHONE ENCOUNTER
Patient Seen in: [facility] Emergency Department      History     Chief Complaint   Patient presents with    Headache     Stated Complaint: headache, sore throat, body aches    Subjective:   HPI    Patient for constellation of symptoms including sore throat, ear pain right worse than left, body aches, subjective fevers at home.  Slight nausea.  No phono photophobia.  Headache is generalized.  No neck pain or stiffness    No vomiting    Going on for 3 days    Objective:   No pertinent past medical history.            No pertinent past surgical history.              No pertinent social history.            Review of Systems    Positive for stated complaint: headache, sore throat, body aches  Other systems are as noted in HPI.  Constitutional and vital signs reviewed.      All other systems reviewed and negative except as noted above.    Physical Exam     ED Triage Vitals [02/14/24 2036]   /70   Pulse 95   Resp 20   Temp 100.1 °F (37.8 °C)   Temp src Temporal   SpO2 95 %   O2 Device None (Room air)       Current:/70   Pulse 95   Temp 100.1 °F (37.8 °C) (Temporal)   Resp 20   Ht 154.9 cm (5' 1\")   Wt 56.7 kg   LMP 10/20/2021   SpO2 95%   BMI 23.62 kg/m²         Physical Exam    Constitutional:  Appears well-developed and well-nourished.   Head: Normocephalic and atraumatic.   Nose: Nose normal.   Eyes: EOM are normal. Pupils are equal, round, and reactive to light.   Neck: Normal range of motion. Neck supple. No JVD present.   Cardiovascular: Normal rate and regular rhythm.    Pulmonary/Chest: Effort normal and breath sounds normal. No stridor.   Abdominal: Soft. There is no tenderness. There is no guarding.   Musculoskeletal: Exhibits no edema or tenderness.   Neurological: Pt is alert and oriented to person, place, and time.     There is no nystagmus.  There are no cranial nerve deficits.  Speech is fluent and not slurred.  There is no word finding difficulty.     Strength is 5 out of 5 in  Returned Liliana's call and advised that Dr. Hollis cleared her for surgery. Faxed clinic notes over to office. Received fax confirmation back.    the upper extremities bilaterally.  Sensation is symmetric in the upper extremities and not diminished.    Normal strength and sensation bilateral lower extremities.      no cerebellar   no meningeal signs    Skin: Skin is warm and dry.   Psychiatric: Normal mood and affect. Thought content normal.       Bilateral TMs unremarkable    Lungs clear no murmurs    Oropharynx:    ED Course     Labs Reviewed   RAPID STREP A SCREEN () - Abnormal; Notable for the following components:       Result Value    Rapid Strep A Result Positive for Beta Streptococcus, Group A (*)     All other components within normal limits   SARS-COV-2/FLU A AND B/RSV BY PCR (GENEXPERT) - Normal    Narrative:     This test is intended for the qualitative detection and differentiation of SARS-CoV-2, influenza A, influenza B, and respiratory syncytial virus (RSV) viral RNA in nasopharyngeal or nares swabs from individuals suspected of respiratory viral infection consistent with COVID-19 by their healthcare provider. Signs and symptoms of respiratory viral infection due to SARS-CoV-2, influenza, and RSV can be similar.    Test performed using the Xpert Xpress SARS-CoV-2/FLU/RSV (real time RT-PCR)  assay on the FlexMinderpert instrument, [employer], [place] [address].   This test is being used under the Food and Drug Administration's Emergency Use Authorization.    The authorized Fact Sheet for Healthcare Providers for this assay is available upon request from the laboratory.             Viral swabs negative, strep test positive         MDM      Well-appearing well-hydrated patient here with infectious symptoms as described above.    Differential diagnoses considered: Viral syndrome such as COVID, influenza, strep throat also considered.  Pneumonia also consideration history as she reports a slight cough.  Patient is well-appearing not meningitic, would not consider a lumbar puncture at this point.    Strep test is positive.  This may be the source of her  symptoms.  Will treat amoxicillin and see if her symptoms improve.  She return to the ER for new or worsening symptoms.  Explained to the patient and her  at bedside      *My independent interpretation of radiographs: No pneumonia or pneumothorax on chest x-ray    *Discussion of ongoing management of this patient's care included: n/a  *Comorbidities contributing to the complexity of decision making: n/a  *External charts reviewed: n/a  *Additional sources of history: n/a    Shared decision making was done by: patient, myself.                                     Medical Decision Making      Disposition and Plan     Clinical Impression:  1. Streptococcal sore throat         Disposition:  Discharge  2/14/2024  9:53 pm    Follow-up:  Ben Cortez  [address] [place] [address]  [address] [place] [id number] [phone]    Go in 2 day(s)            Medications Prescribed:  Current Discharge Medication List        START taking these medications    Details   amoxicillin 875 MG Oral Tab Take 1 tablet (875 mg total) by mouth 2 (two) times daily for 10 days.  Qty: 20 tablet, Refills: 0

## 2024-02-27 ENCOUNTER — OFFICE VISIT (OUTPATIENT)
Dept: PULMONOLOGY | Facility: CLINIC | Age: 64
End: 2024-02-27
Payer: MEDICARE

## 2024-02-27 VITALS
HEART RATE: 73 BPM | BODY MASS INDEX: 20.82 KG/M2 | HEIGHT: 61 IN | WEIGHT: 110.25 LBS | DIASTOLIC BLOOD PRESSURE: 70 MMHG | OXYGEN SATURATION: 96 % | SYSTOLIC BLOOD PRESSURE: 112 MMHG

## 2024-02-27 DIAGNOSIS — R91.8 GROUND GLASS OPACITY PRESENT ON IMAGING OF LUNG: ICD-10-CM

## 2024-02-27 DIAGNOSIS — F17.210 CIGARETTE NICOTINE DEPENDENCE WITHOUT COMPLICATION: ICD-10-CM

## 2024-02-27 DIAGNOSIS — J44.9 CHRONIC OBSTRUCTIVE PULMONARY DISEASE, UNSPECIFIED COPD TYPE: Primary | ICD-10-CM

## 2024-02-27 DIAGNOSIS — R91.8 LUNG NODULES: ICD-10-CM

## 2024-02-27 DIAGNOSIS — J84.10 CALCIFIED GRANULOMA OF LUNG: ICD-10-CM

## 2024-02-27 PROCEDURE — 3074F SYST BP LT 130 MM HG: CPT | Mod: HCNC,CPTII,S$GLB, | Performed by: NURSE PRACTITIONER

## 2024-02-27 PROCEDURE — 3008F BODY MASS INDEX DOCD: CPT | Mod: HCNC,CPTII,S$GLB, | Performed by: NURSE PRACTITIONER

## 2024-02-27 PROCEDURE — 3078F DIAST BP <80 MM HG: CPT | Mod: HCNC,CPTII,S$GLB, | Performed by: NURSE PRACTITIONER

## 2024-02-27 PROCEDURE — 99999 PR PBB SHADOW E&M-EST. PATIENT-LVL V: CPT | Mod: PBBFAC,HCNC,, | Performed by: NURSE PRACTITIONER

## 2024-02-27 PROCEDURE — 99214 OFFICE O/P EST MOD 30 MIN: CPT | Mod: HCNC,S$GLB,, | Performed by: NURSE PRACTITIONER

## 2024-02-27 PROCEDURE — 1159F MED LIST DOCD IN RCRD: CPT | Mod: HCNC,CPTII,S$GLB, | Performed by: NURSE PRACTITIONER

## 2024-02-27 NOTE — PATIENT INSTRUCTIONS
CT Due in Sept 2024.    It would be beneficial to stop tobacco use. Please inform the office if you become interested in the [facility] as discussed in the clinic today.      Continue COPD regimen including Trelegy once per day.     Can continue to use Albuterol as needed for shortness of breath, wheezing, cough.    If you need nebulized treatments, can trial half a vial of medicine and see if you tolerate that better.    Continue current prescription medication regiment. Keep follow up appointment as scheduled. Please call the office if you have any questions or concerns.

## 2024-02-27 NOTE — PROGRESS NOTES
2/27/2024    Floresitanick Martin  In office visit     Chief Complaint   Patient presents with    3m F/U       HPI:  02/27/2024: Hx: Lung nodules, Tobacco Dependence  Using Trelegy once per week with benefit - only using PRN.   States shortness of breath is at baseline - denies wheezing, chest tightness, denies cough, mucous production.  Still smoking - approx 15 cigarettes per day - has recently cut down in use.  Denies recent UC or ER visit for respiratory complaints, denies recent steroid or abx use.        09/20/2023: Hx: Lung nodules, Tobacco Dependence  States shortness of breath is at baseline - denies wheezing, chest tightness. Cough has resolved, mucous production is minimal with white mucous.   Still smoking - at approx 1 ppd at this point - not interested in smoking cessation program.  Using Trelegy once per day with benefit and has Albuterol inhaler for as needed use. Did trial Neb treatments after last appt however states she experienced dizziness after use.   Did not submit sputum samples - states not enough mucous is being produced.   Denies recent UC or ER visits for respiratory complaints - denies recent use of abx, steroids.  Patient Instructions   Overall CT Chest with no acute changes - recommend repeat CT in one year to further monitor nodules. CT Due in Sept 2024.  Your PFT shows overall preserved lung function or lung strength.   It would be beneficial to stop tobacco use. Please inform the office if you become interested in the [facility] Smoking Cessation Program as discussed in the clinic today.    Continue COPD regimen including Trelegy once per day. Can continue to use Albuterol as needed. Will give spacer in clinic today for Albuterol.  If you need nebulized treatments, can trial half a vial of medicine and see if you tolerate that better.  Continue current prescription medication regiment. Keep follow up appointment as scheduled. Please call the office if you have any questions or concerns.  "        05/17/2023:  Underwent lung biopsy to lung nodule of BELEN - lung biopsy was negative for malignancy - Per pathology report, "Alveolated lung tissue with regionally dense fibrosis and occasional interstitial lymphoid follicles."  Underwent repeat CT Chest last week revealing unchanged lung nodule however shows new ground glass opacities throughout both lungs, with new area of concern to LLL.   Developed symptoms of a head cold approx 5 days ago - states has been coughing over the last three days. Initially, mucous was thick, difficult to expectorate however has been on Mucinex for the last three days - mucous is yellow/white in color.  Denies wheezing, chest tightness.  Recently cut back on smoking over the last five days - states was smoking 2.5 ppd however is now down to less than 15 cigarettes per day.   Did not yet undergo PFT.  Trialed Trelegy for one month - states did not notice much improvement in symptoms with use.  Patient Instructions   Repeat CT Chest shows unchanged extent of prior noted nodule - now shows concern to left lower lobe in addition to ground glass opacities throughout.  I have ordered sputum cultures - you will leave the office today with sputum specimen cups. Bring to any [facility] within 4 hours of obtaining specimen. Rinse your mouth prior to collecting sample. Please collect sample in the morning by deep coughing prior to eating or drinking.  Checking Respiratory culture, AFB, Fungal.  I ordered a Lung Function Test to evaluate lung strength. Please complete prior to next appointment. Do this In one month.  Recommend getting back on COPD inhaler - Trelegy, one puff once per day.  This inhaler contains an inhaled steroid component. Rinse mouth after each use due to risk for thrush development. If mouth or tongue develops white sores please contact the clinic and I will order a prescription mouth wash.   Can use Albuterol as needed for shortness of breath, wheezing, " cough.  Recommend doing nebulized treatments, two treatments per day for the next 10 days.  Continue Mucinex.  Continue current medication regiment. Keep follow up appointment as scheduled. Please call the office if you have any questions or concerns.             02/03/2023:  Denies being seen by prior pulmonologist. Denies current inhaler use, supplemental oxygen use, CPAP use.  Denies personal history of cancer, PE, anticoagulation use.  Reports she believes she is diagnosed with early stages of COPD.  Underwent CT chest per PCP was referred to pulmonology.  Denies shortness of breath, states she is able to do all of her ADLs without issue.  Denies wheezing, chest tightness.  Cough:  Intermittent, productive with white sputum.  No time correlation identified.  Denies frequent use of antibiotic, steroid.  Current smoker - typical 1 pack per day use.  Started at 17 years old.  Currently vaping as well, nicotine containing. Not interested in smoking cessation referral at this time however requesting Nicotine patches.  Patient Instructions   CT chest reviewed, shows mild emphysema to bilateral upper lung fields.  This is consistent with a diagnosis of COPD.  I ordered a Lung Function Test to evaluate lung strength. Please complete prior to next appointment.   Can trial daily inhaler, Trelegy.  This is 1 puff once per day.  This inhaler contains an inhaled steroid component. Rinse mouth after each use due to risk for thrush development. If mouth or tongue develops white sores please contact the clinic and I will order a prescription mouth wash.   CT chest does show new 12 mm nodule to the left upper lung and new 5 mm nodule to the left lower lung.  Will proceed with PET scan at this time.  Broch calculator 32% change of nodule being cancerous.   Will review CT images with collaborating MD next week and if change of plan, will call you.  Nicotine patches ordered.  Continue current medication regiment. Keep follow up  appointment as scheduled. Please call the office if you have any questions or concerns.         Social Hx: Lives with friend - 1 dog in the home.  Former .  No Asbestosis exposure, Smoking Hx:  As above  Family Hx:  No Lung Cancer, no COPD, no Asthma  Medical Hx:  No previous pneumonia ; no previous shoulder/chest surgery        The chief compliant problem varies with instability at times.  PFSH:  Past Medical History:   Diagnosis Date    Alcohol abuse     Cellulitis of toe of right foot 2019    Headache     Hearing loss     HLD (hyperlipidemia)          Past Surgical History:   Procedure Laterality Date    BACK SURGERY       SECTION      2    COLONOSCOPY N/A 11/15/2017    Procedure: COLONOSCOPY;  Surgeon: Peg Powers MD;  Location: [facility];  Service: Endoscopy;  Laterality: N/A;    EPIDURAL STEROID INJECTION INTO CERVICAL SPINE      EPIDURAL STEROID INJECTION INTO LUMBAR SPINE      JOINT REPLACEMENT  2018    LIPOMA RESECTION Right 2023    Procedure: EXCISION, LIPOMA;  Surgeon: Ruben Brooks Jr., MD;  Location: [facility] OR;  Service: General;  Laterality: Right;    LUMBAR FUSION      with cage    LUNG BIOPSY      NECK SURGERY  2018    with hardware    RADIOFREQUENCY ABLATION      cervical    RADIOFREQUENCY ABLATION      lumbar    SPINE SURGERY  2018    STAPEDECTOMY Right 2015    STAPEDECTOMY Left     TUBAL LIGATION  1990     Social History     Tobacco Use    Smoking status: Every Day     Current packs/day: 1.50     Average packs/day: 1.5 packs/day for 30.0 years (45.0 ttl pk-yrs)     Types: Cigarettes    Smokeless tobacco: Never   Substance Use Topics    Alcohol use: Yes     Comment: social     Drug use: No     Family History   Problem Relation Age of Onset    Arthritis Mother     Diabetes Mother     Hypertension Father     Cancer Sister     Breast cancer Sister     No Known Problems Sister     Cancer Sister     Cancer Sister     Cancer Sister      Review  "of patient's allergies indicates:   Allergen Reactions    Codeine Nausea And Vomiting    Tylenol-codeine [acetaminophen-codeine] Nausea And Vomiting     I have reviewed past medical, family, and social history. I have reviewed previous nurse notes.    Performance Status:The patient's activity level is no limits with regular activity.      Review of Systems:  a review of eleven systems covering constitutional, Eye, HEENT, Psych, Respiratory, Cardiac, GI, , Musculoskeletal, Endocrine, Dermatologic was negative except for pertinent findings as listed ABOVE and below: pertinent positive as above, rest is good       Exam:Comprehensive exam done. /70 (BP Location: Left arm, Patient Position: Sitting, BP Method: Medium (Automatic))   Pulse 73   Ht 5' 1" (1.549 m)   Wt 50 kg (110 lb 3.7 oz)   LMP  (LMP Unknown)   SpO2 96%   BMI 20.83 kg/m²   Exam included Vitals as listed  Constitutional: She is oriented to person, place, and time. She appears well-developed. No distress.   Nose: Nose normal.   Mouth/Throat: Uvula is midline, oropharynx is clear and moist and mucous membranes are normal. No dental caries. No oropharyngeal exudate, posterior oropharyngeal edema, posterior oropharyngeal erythema or tonsillar abscesses.    Eyes: Pupils are equal, round, and reactive to light.   Neck: No JVD present. No thyromegaly present.   Cardiovascular: Normal rate, regular rhythm and normal heart sounds. Exam reveals no gallop and no friction rub.   No murmur heard.  Pulmonary/Chest: Effort normal and breath sounds normal. No accessory muscle usage or stridor. No apnea and no tachypnea. No respiratory distress, decreased breath sounds, wheezes, rhonchi, rales, or tenderness. Clear breath sounds throughout all lung fields, on room air, in no acute distress.   Abdominal: Soft. She exhibits no mass. There is no tenderness. No hepatosplenomegaly, hernias and normoactive bowel sounds  Musculoskeletal: Normal range of motion. " exhibits no edema.   Neurological:  alert and oriented to person, place, and time. not disoriented.   Skin: Skin is warm and dry. Capillary refill takes less 2 sec. No cyanosis or erythema. No pallor. Nails show no clubbing.   Psychiatric: normal mood and affect. behavior is normal. Judgment and thought content normal.       Radiographs (ct chest and cxr) reviewed: view by direct vision   Patient imaging studies were reviewed and interpreted independently. My personal interpretation of most recent Chest Xray and CT Chest includes:    CT Chest - 9/14/2023 Nodule with no growth noted to BELEN - two smaller nodules to LLL have been present and unchanged since at least Jan 2023.    CT Chest Without Contrast 5/10/2023 FINDINGS:  The heart and great vessels are of normal size and contour. Enlargement or aneurysm is not seen. Adenopathy or soft tissue masses within the mediastinum are not seen.   In the left upper lobe there is still seen a peanut shaped 1.6 cm by 1.1 cm soft tissue density mass.  This was biopsied on March 10, 2023 with a benign result.  There is a strand extending to the lateral pleura.   There is however new patchy interstitial infiltrate identified in the left lingula and at the lung bases..  This is most likely infectious since it was not present on the prior CTs chest.  There is a small area of consolidation seen in the lingula on series 4, image 314 measuring 1.5 cm.  A mass or infiltrate in the right lung is not identified.  No pneumothorax or pleural effusion is noted.   Impression:   Stable 1.6 x 1.1 cm peanut shaped soft tissue density mass of the left upper lobe status post biopsy.  New patchy interstitial infiltrates identified in the left lingula and lung bases, however with a small area of consolidation measuring 1.5 cm.  These are most likely an infectious process.  Follow-up by CT scan is recommended.   This report was flagged in Epic as abnormal.             CT Chest Lung Screening Low Dose  1/11/2023 FINDINGS:  Lungs: There is a new spiculated, solid nodule within the left upper lobe measuring 1.2 cm.  There is also new solid nodule at the left lung base measuring 5 mm (series 4, image 378).  A few scattered old calcified granulomas are present.  There are mild emphysematous changes.   Pleura:   No effusion..   Heart and pericardium: Normal size without effusion.   Aorta and vasculature: Atherosclerosis including coronary arteries.   Lymph nodes: No hilar, mediastinal, or axial lymphadenopathy.   Chest wall and skeletal structures: Postoperative change of the lower cervical spine.  No suspicious bony lesions.   Upper abdomen: Unremarkable.   Impression:   Lung-RADS Category:  4B - Suspicious - consultation advised - possible next steps  Chest CT, tissue sampling and-or PET/CT.   Clinically or potentially clinically significant non lung cancer finding:  None.   Prior Lung Cancer Modifier:  No history of prior lung cancer.   This report was flagged in Epic as abnormal.       X-Ray Chest PA And Lateral 4/15/2016 Findings: The cardiomediastinal silhouette is within normal limits.  The lungs are well expanded and clear.   In pression: No active cardiopulmonary process.    Patient's labs were reviewed including CBC and CMP    Lab Results   Component Value Date    WBC 5.37 02/12/2024    RBC 4.23 02/12/2024    HGB 13.0 02/12/2024    HCT 38.3 02/12/2024    MCV 91 02/12/2024    MCH 30.7 02/12/2024    MCHC 33.9 02/12/2024    RDW 15.7 (H) 02/12/2024     02/12/2024    MPV 9.1 (L) 02/12/2024    GRAN 2.4 02/12/2024    GRAN 44.6 02/12/2024    LYMPH 2.4 02/12/2024    LYMPH 43.8 02/12/2024    MONO 0.4 02/12/2024    MONO 7.4 02/12/2024    EOS 0.2 02/12/2024    BASO 0.03 02/12/2024    EOSINOPHIL 3.0 02/12/2024    BASOPHIL 0.6 02/12/2024   CMP  Sodium   Date Value Ref Range Status   02/12/2024 138 136 - 145 mmol/L Final     Potassium   Date Value Ref Range Status   02/12/2024 4.0 3.5 - 5.1 mmol/L Final     Chloride    Date Value Ref Range Status   02/12/2024 103 95 - 110 mmol/L Final     CO2   Date Value Ref Range Status   02/12/2024 28 23 - 29 mmol/L Final     Glucose   Date Value Ref Range Status   02/12/2024 85 70 - 110 mg/dL Final     BUN   Date Value Ref Range Status   02/12/2024 9 8 - 23 mg/dL Final     Creatinine   Date Value Ref Range Status   02/12/2024 0.7 0.5 - 1.4 mg/dL Final     Calcium   Date Value Ref Range Status   02/12/2024 9.8 8.7 - 10.5 mg/dL Final     Total Protein   Date Value Ref Range Status   02/12/2024 6.9 6.0 - 8.4 g/dL Final     Albumin   Date Value Ref Range Status   02/12/2024 3.4 (L) 3.5 - 5.2 g/dL Final     Total Bilirubin   Date Value Ref Range Status   02/12/2024 0.2 0.1 - 1.0 mg/dL Final     Comment:     For infants and newborns, interpretation of results should be based  on gestational age, weight and in agreement with clinical  observations.    Premature Infant recommended reference ranges:  Up to 24 hours.............<8.0 mg/dL  Up to 48 hours............<12.0 mg/dL  3-5 days..................<15.0 mg/dL  6-29 days.................<15.0 mg/dL       Alkaline Phosphatase   Date Value Ref Range Status   02/12/2024 161 (H) 55 - 135 U/L Final     AST   Date Value Ref Range Status   02/12/2024 29 10 - 40 U/L Final     ALT   Date Value Ref Range Status   02/12/2024 20 10 - 44 U/L Final     Anion Gap   Date Value Ref Range Status   02/12/2024 7 (L) 8 - 16 mmol/L Final     eGFR   Date Value Ref Range Status   02/12/2024 >60.0 >60 mL/min/1.73 m^2 Final         PFT reviewed  Pulmonary Functions Testing Results:        Plan:  Clinical impression is ambiguous and will need repeated evaluation wrt will require conference with MD. Canas was seen today for 3m f/u.    Diagnoses and all orders for this visit:    Chronic obstructive pulmonary disease, unspecified COPD type    Calcified granuloma of lung    Lung nodules    Ground glass opacity present on imaging of lung    Cigarette nicotine dependence  without complication  -     CT Chest Lung Screening Low Dose; Future              Follow up in about 8 months (around 10/27/2024), or if symptoms worsen or fail to improve.    Discussed with patient above for education the following:      Patient Instructions   CT Due in Sept 2024.    It would be beneficial to stop tobacco use. Please inform the office if you become interested in the [facility]essation [facility] as discussed in the clinic today.      Continue COPD regimen including Trelegy once per day.     Can continue to use Albuterol as needed for shortness of breath, wheezing, cough.    If you need nebulized treatments, can trial half a vial of medicine and see if you tolerate that better.    Continue current prescription medication regiment. Keep follow up appointment as scheduled. Please call the office if you have any questions or concerns.

## 2024-03-24 ENCOUNTER — HOSPITAL ENCOUNTER (INPATIENT)
Facility: HOSPITAL | Age: 64
LOS: 2 days | Discharge: LEFT BEFORE DISCHARGE | DRG: 070 | End: 2024-03-26
Attending: EMERGENCY MEDICINE | Admitting: INTERNAL MEDICINE
Payer: MEDICARE

## 2024-03-24 DIAGNOSIS — R78.81 BACTEREMIA DUE TO PSEUDOMONAS: ICD-10-CM

## 2024-03-24 DIAGNOSIS — B96.5 BACTEREMIA DUE TO PSEUDOMONAS: ICD-10-CM

## 2024-03-24 DIAGNOSIS — E87.1 HYPONATREMIA: ICD-10-CM

## 2024-03-24 DIAGNOSIS — J18.9 PNEUMONIA DUE TO INFECTIOUS ORGANISM, UNSPECIFIED LATERALITY, UNSPECIFIED PART OF LUNG: Primary | ICD-10-CM

## 2024-03-24 DIAGNOSIS — G93.41 ACUTE METABOLIC ENCEPHALOPATHY: ICD-10-CM

## 2024-03-24 PROBLEM — N30.90 CYSTITIS: Status: ACTIVE | Noted: 2024-03-24

## 2024-03-24 LAB
ADENOVIRUS: NOT DETECTED
ALBUMIN SERPL BCP-MCNC: 3.2 G/DL (ref 3.5–5.2)
ALP SERPL-CCNC: 110 U/L (ref 55–135)
ALT SERPL W/O P-5'-P-CCNC: 21 U/L (ref 10–44)
AMPHET+METHAMPHET UR QL: NEGATIVE
ANION GAP SERPL CALC-SCNC: 10 MMOL/L (ref 8–16)
APAP SERPL-MCNC: 0.3 UG/ML (ref 10–20)
AST SERPL-CCNC: 47 U/L (ref 10–40)
BACTERIA #/AREA URNS HPF: ABNORMAL /HPF
BARBITURATES UR QL SCN>200 NG/ML: NEGATIVE
BASOPHILS # BLD AUTO: 0.04 K/UL (ref 0–0.2)
BASOPHILS NFR BLD: 0.4 % (ref 0–1.9)
BENZODIAZ UR QL SCN>200 NG/ML: NEGATIVE
BILIRUB SERPL-MCNC: 0.3 MG/DL (ref 0.1–1)
BILIRUB UR QL STRIP: NEGATIVE
BORDETELLA PARAPERTUSSIS (IS1001): NOT DETECTED
BORDETELLA PERTUSSIS (PTXP): NOT DETECTED
BUN SERPL-MCNC: 26 MG/DL (ref 8–23)
BZE UR QL SCN: NEGATIVE
CALCIUM SERPL-MCNC: 9.3 MG/DL (ref 8.7–10.5)
CANNABINOIDS UR QL SCN: NEGATIVE
CHLAMYDIA PNEUMONIAE: NOT DETECTED
CHLORIDE SERPL-SCNC: 100 MMOL/L (ref 95–110)
CLARITY UR: CLEAR
CO2 SERPL-SCNC: 19 MMOL/L (ref 23–29)
COLOR UR: YELLOW
CORONAVIRUS 229E, COMMON COLD VIRUS: NOT DETECTED
CORONAVIRUS HKU1, COMMON COLD VIRUS: NOT DETECTED
CORONAVIRUS NL63, COMMON COLD VIRUS: NOT DETECTED
CORONAVIRUS OC43, COMMON COLD VIRUS: NOT DETECTED
CREAT SERPL-MCNC: 0.9 MG/DL (ref 0.5–1.4)
CREAT UR-MCNC: 58 MG/DL (ref 15–325)
DIFFERENTIAL METHOD BLD: ABNORMAL
EOSINOPHIL # BLD AUTO: 0 K/UL (ref 0–0.5)
EOSINOPHIL NFR BLD: 0.2 % (ref 0–8)
ERYTHROCYTE [DISTWIDTH] IN BLOOD BY AUTOMATED COUNT: 15.8 % (ref 11.5–14.5)
EST. GFR  (NO RACE VARIABLE): >60 ML/MIN/1.73 M^2
ETHANOL SERPL-MCNC: <10 MG/DL
FLUBV RNA NPH QL NAA+NON-PROBE: NOT DETECTED
GLUCOSE SERPL-MCNC: 91 MG/DL (ref 70–110)
GLUCOSE UR QL STRIP: NEGATIVE
HCT VFR BLD AUTO: 27.3 % (ref 37–48.5)
HGB BLD-MCNC: 9.1 G/DL (ref 12–16)
HGB UR QL STRIP: ABNORMAL
HPIV1 RNA NPH QL NAA+NON-PROBE: NOT DETECTED
HPIV2 RNA NPH QL NAA+NON-PROBE: NOT DETECTED
HPIV3 RNA NPH QL NAA+NON-PROBE: NOT DETECTED
HPIV4 RNA NPH QL NAA+NON-PROBE: NOT DETECTED
HUMAN METAPNEUMOVIRUS: NOT DETECTED
HYALINE CASTS #/AREA URNS LPF: 4 /LPF
IMM GRANULOCYTES # BLD AUTO: 0.03 K/UL (ref 0–0.04)
IMM GRANULOCYTES NFR BLD AUTO: 0.3 % (ref 0–0.5)
INFLUENZA A (SUBTYPES H1,H1-2009,H3): NOT DETECTED
KETONES UR QL STRIP: NEGATIVE
LACTATE SERPL-SCNC: 0.8 MMOL/L (ref 0.5–1.9)
LEUKOCYTE ESTERASE UR QL STRIP: NEGATIVE
LYMPHOCYTES # BLD AUTO: 0.9 K/UL (ref 1–4.8)
LYMPHOCYTES NFR BLD: 9.2 % (ref 18–48)
MCH RBC QN AUTO: 30.4 PG (ref 27–31)
MCHC RBC AUTO-ENTMCNC: 33.3 G/DL (ref 32–36)
MCV RBC AUTO: 91 FL (ref 82–98)
MICROSCOPIC COMMENT: ABNORMAL
MONOCYTES # BLD AUTO: 0.4 K/UL (ref 0.3–1)
MONOCYTES NFR BLD: 4.1 % (ref 4–15)
MYCOPLASMA PNEUMONIAE: NOT DETECTED
NEUTROPHILS # BLD AUTO: 8 K/UL (ref 1.8–7.7)
NEUTROPHILS NFR BLD: 85.8 % (ref 38–73)
NITRITE UR QL STRIP: NEGATIVE
NRBC BLD-RTO: 0 /100 WBC
OPIATES UR QL SCN: ABNORMAL
PCP UR QL SCN>25 NG/ML: NEGATIVE
PH UR STRIP: 6 [PH] (ref 5–8)
PLATELET # BLD AUTO: 231 K/UL (ref 150–450)
PMV BLD AUTO: 9.6 FL (ref 9.2–12.9)
POTASSIUM SERPL-SCNC: 4.3 MMOL/L (ref 3.5–5.1)
PROT SERPL-MCNC: 6 G/DL (ref 6–8.4)
PROT UR QL STRIP: ABNORMAL
RBC # BLD AUTO: 2.99 M/UL (ref 4–5.4)
RBC #/AREA URNS HPF: 3 /HPF (ref 0–4)
RESPIRATORY INFECTION PANEL SOURCE: NORMAL
RSV RNA NPH QL NAA+NON-PROBE: NOT DETECTED
RV+EV RNA NPH QL NAA+NON-PROBE: NOT DETECTED
SALICYLATES SERPL-MCNC: <1.5 MG/DL (ref 15–30)
SARS-COV-2 RNA RESP QL NAA+PROBE: NOT DETECTED
SODIUM SERPL-SCNC: 129 MMOL/L (ref 136–145)
SP GR UR STRIP: 1.01 (ref 1–1.03)
TOXICOLOGY INFORMATION: ABNORMAL
TSH SERPL DL<=0.005 MIU/L-ACNC: 1.59 UIU/ML (ref 0.34–5.6)
URN SPEC COLLECT METH UR: ABNORMAL
UROBILINOGEN UR STRIP-ACNC: NEGATIVE EU/DL
WBC # BLD AUTO: 9.31 K/UL (ref 3.9–12.7)
WBC #/AREA URNS HPF: 3 /HPF (ref 0–5)

## 2024-03-24 PROCEDURE — 12000002 HC ACUTE/MED SURGE SEMI-PRIVATE ROOM

## 2024-03-24 PROCEDURE — 87486 CHLMYD PNEUM DNA AMP PROBE: CPT | Performed by: EMERGENCY MEDICINE

## 2024-03-24 PROCEDURE — 96374 THER/PROPH/DIAG INJ IV PUSH: CPT

## 2024-03-24 PROCEDURE — 99285 EMERGENCY DEPT VISIT HI MDM: CPT | Mod: 25

## 2024-03-24 PROCEDURE — 99900035 HC TECH TIME PER 15 MIN (STAT)

## 2024-03-24 PROCEDURE — 85025 COMPLETE CBC W/AUTO DIFF WBC: CPT | Performed by: EMERGENCY MEDICINE

## 2024-03-24 PROCEDURE — 83605 ASSAY OF LACTIC ACID: CPT | Performed by: EMERGENCY MEDICINE

## 2024-03-24 PROCEDURE — 99406 BEHAV CHNG SMOKING 3-10 MIN: CPT

## 2024-03-24 PROCEDURE — 94761 N-INVAS EAR/PLS OXIMETRY MLT: CPT

## 2024-03-24 PROCEDURE — 80143 DRUG ASSAY ACETAMINOPHEN: CPT | Performed by: EMERGENCY MEDICINE

## 2024-03-24 PROCEDURE — 87154 CUL TYP ID BLD PTHGN 6+ TRGT: CPT | Performed by: EMERGENCY MEDICINE

## 2024-03-24 PROCEDURE — 25000003 PHARM REV CODE 250: Performed by: EMERGENCY MEDICINE

## 2024-03-24 PROCEDURE — 84443 ASSAY THYROID STIM HORMONE: CPT | Performed by: EMERGENCY MEDICINE

## 2024-03-24 PROCEDURE — 87186 SC STD MICRODIL/AGAR DIL: CPT | Performed by: EMERGENCY MEDICINE

## 2024-03-24 PROCEDURE — 63600175 PHARM REV CODE 636 W HCPCS: Performed by: EMERGENCY MEDICINE

## 2024-03-24 PROCEDURE — 82077 ASSAY SPEC XCP UR&BREATH IA: CPT | Performed by: EMERGENCY MEDICINE

## 2024-03-24 PROCEDURE — 80307 DRUG TEST PRSMV CHEM ANLYZR: CPT | Performed by: EMERGENCY MEDICINE

## 2024-03-24 PROCEDURE — 27000221 HC OXYGEN, UP TO 24 HOURS

## 2024-03-24 PROCEDURE — 96361 HYDRATE IV INFUSION ADD-ON: CPT

## 2024-03-24 PROCEDURE — 99900031 HC PATIENT EDUCATION (STAT)

## 2024-03-24 PROCEDURE — 36415 COLL VENOUS BLD VENIPUNCTURE: CPT | Performed by: EMERGENCY MEDICINE

## 2024-03-24 PROCEDURE — 25000003 PHARM REV CODE 250: Performed by: INTERNAL MEDICINE

## 2024-03-24 PROCEDURE — 80179 DRUG ASSAY SALICYLATE: CPT | Performed by: EMERGENCY MEDICINE

## 2024-03-24 PROCEDURE — 87798 DETECT AGENT NOS DNA AMP: CPT | Mod: 59 | Performed by: EMERGENCY MEDICINE

## 2024-03-24 PROCEDURE — 81001 URINALYSIS AUTO W/SCOPE: CPT | Performed by: EMERGENCY MEDICINE

## 2024-03-24 PROCEDURE — 94799 UNLISTED PULMONARY SVC/PX: CPT

## 2024-03-24 PROCEDURE — 63600175 PHARM REV CODE 636 W HCPCS: Performed by: INTERNAL MEDICINE

## 2024-03-24 PROCEDURE — 87077 CULTURE AEROBIC IDENTIFY: CPT | Performed by: EMERGENCY MEDICINE

## 2024-03-24 PROCEDURE — 87040 BLOOD CULTURE FOR BACTERIA: CPT | Mod: 59 | Performed by: EMERGENCY MEDICINE

## 2024-03-24 PROCEDURE — 80053 COMPREHEN METABOLIC PANEL: CPT | Performed by: EMERGENCY MEDICINE

## 2024-03-24 RX ORDER — LEVOFLOXACIN 5 MG/ML
250 INJECTION, SOLUTION INTRAVENOUS
Status: DISCONTINUED | OUTPATIENT
Start: 2024-03-25 | End: 2024-03-24

## 2024-03-24 RX ORDER — NALOXONE HCL 0.4 MG/ML
0.4 VIAL (ML) INJECTION
Status: COMPLETED | OUTPATIENT
Start: 2024-03-24 | End: 2024-03-24

## 2024-03-24 RX ORDER — LEVOFLOXACIN 5 MG/ML
500 INJECTION, SOLUTION INTRAVENOUS
Status: DISCONTINUED | OUTPATIENT
Start: 2024-03-24 | End: 2024-03-24

## 2024-03-24 RX ORDER — LANOLIN ALCOHOL/MO/W.PET/CERES
800 CREAM (GRAM) TOPICAL
Status: DISCONTINUED | OUTPATIENT
Start: 2024-03-24 | End: 2024-03-27 | Stop reason: HOSPADM

## 2024-03-24 RX ORDER — ACETAMINOPHEN 325 MG/1
650 TABLET ORAL EVERY 4 HOURS PRN
Status: DISCONTINUED | OUTPATIENT
Start: 2024-03-24 | End: 2024-03-27 | Stop reason: HOSPADM

## 2024-03-24 RX ORDER — ATORVASTATIN CALCIUM 40 MG/1
40 TABLET, FILM COATED ORAL DAILY
Status: DISCONTINUED | OUTPATIENT
Start: 2024-03-25 | End: 2024-03-27 | Stop reason: HOSPADM

## 2024-03-24 RX ORDER — SODIUM CHLORIDE 9 MG/ML
1000 INJECTION, SOLUTION INTRAVENOUS
Status: COMPLETED | OUTPATIENT
Start: 2024-03-24 | End: 2024-03-24

## 2024-03-24 RX ORDER — TALC
6 POWDER (GRAM) TOPICAL NIGHTLY PRN
Status: DISCONTINUED | OUTPATIENT
Start: 2024-03-24 | End: 2024-03-27 | Stop reason: HOSPADM

## 2024-03-24 RX ORDER — IPRATROPIUM BROMIDE AND ALBUTEROL SULFATE 2.5; .5 MG/3ML; MG/3ML
3 SOLUTION RESPIRATORY (INHALATION) EVERY 6 HOURS PRN
Status: DISCONTINUED | OUTPATIENT
Start: 2024-03-24 | End: 2024-03-27 | Stop reason: HOSPADM

## 2024-03-24 RX ORDER — ONDANSETRON HYDROCHLORIDE 2 MG/ML
4 INJECTION, SOLUTION INTRAVENOUS EVERY 6 HOURS PRN
Status: DISCONTINUED | OUTPATIENT
Start: 2024-03-24 | End: 2024-03-27 | Stop reason: HOSPADM

## 2024-03-24 RX ORDER — LEVOFLOXACIN 5 MG/ML
500 INJECTION, SOLUTION INTRAVENOUS ONCE
Status: DISCONTINUED | OUTPATIENT
Start: 2024-03-24 | End: 2024-03-24

## 2024-03-24 RX ORDER — ALUMINUM HYDROXIDE, MAGNESIUM HYDROXIDE, AND SIMETHICONE 1200; 120; 1200 MG/30ML; MG/30ML; MG/30ML
30 SUSPENSION ORAL 4 TIMES DAILY PRN
Status: DISCONTINUED | OUTPATIENT
Start: 2024-03-24 | End: 2024-03-27 | Stop reason: HOSPADM

## 2024-03-24 RX ORDER — FAMOTIDINE 20 MG/1
20 TABLET, FILM COATED ORAL DAILY
Status: DISCONTINUED | OUTPATIENT
Start: 2024-03-25 | End: 2024-03-26

## 2024-03-24 RX ORDER — SODIUM CHLORIDE 9 MG/ML
INJECTION, SOLUTION INTRAVENOUS CONTINUOUS
Status: DISCONTINUED | OUTPATIENT
Start: 2024-03-24 | End: 2024-03-25

## 2024-03-24 RX ORDER — SODIUM,POTASSIUM PHOSPHATES 280-250MG
2 POWDER IN PACKET (EA) ORAL
Status: DISCONTINUED | OUTPATIENT
Start: 2024-03-24 | End: 2024-03-27 | Stop reason: HOSPADM

## 2024-03-24 RX ADMIN — PIPERACILLIN SODIUM AND TAZOBACTAM SODIUM 3.38 G: 3; .375 INJECTION, POWDER, LYOPHILIZED, FOR SOLUTION INTRAVENOUS at 07:03

## 2024-03-24 RX ADMIN — SODIUM CHLORIDE 1000 ML: 9 INJECTION, SOLUTION INTRAVENOUS at 05:03

## 2024-03-24 RX ADMIN — SODIUM CHLORIDE: 9 INJECTION, SOLUTION INTRAVENOUS at 07:03

## 2024-03-24 RX ADMIN — NALXONE HYDROCHLORIDE 0.4 MG: 0.4 INJECTION INTRAMUSCULAR; INTRAVENOUS; SUBCUTANEOUS at 02:03

## 2024-03-24 NOTE — ED PROVIDER NOTES
Encounter Date: 3/24/2024       History     Chief Complaint   Patient presents with    Altered Mental Status     Pt arrived by ems from home, pt was noted by family to be altered starting around 1000 yesterday. Pt family reports hallucinations and fatigue. Pt is prescribed oxycodone and morphine, unknown if she took any     Patient presents emergency department with reported altered mental status per patient's  patient hallucinating started around 10:00 a.m. yesterday morning patient appear to be seen birds in the house she told the paramedics that she was looking for her dogs telephone she does answer my questions to orientation but confabulates on more detailed questions patient does have access to multiple medications that are sedating including multiple narcotics found at the home she does have pinpoint pupils but there is no evidence of respiratory depression at this time she does have a history of alcohol abuse in the past        Review of patient's allergies indicates:   Allergen Reactions    Codeine Nausea And Vomiting    Tylenol-codeine [acetaminophen-codeine] Nausea And Vomiting     Past Medical History:   Diagnosis Date    Alcohol abuse     Cellulitis of toe of right foot 2019    Headache     Hearing loss     HLD (hyperlipidemia)      Past Surgical History:   Procedure Laterality Date    BACK SURGERY       SECTION      2    COLONOSCOPY N/A 11/15/2017    Procedure: COLONOSCOPY;  Surgeon: Peg Powers MD;  Location: [facility];  Service: Endoscopy;  Laterality: N/A;    EPIDURAL STEROID INJECTION INTO CERVICAL SPINE      EPIDURAL STEROID INJECTION INTO LUMBAR SPINE      JOINT REPLACEMENT  2018    LIPOMA RESECTION Right 2023    Procedure: EXCISION, LIPOMA;  Surgeon: Ruben Brooks Jr., MD;  Location: [facility];  Service: General;  Laterality: Right;    LUMBAR FUSION      with cage    LUNG BIOPSY      NECK SURGERY  2018    with hardware    RADIOFREQUENCY ABLATION       cervical    RADIOFREQUENCY ABLATION      lumbar    SPINE SURGERY  05/2018    STAPEDECTOMY Right 05/28/2015    STAPEDECTOMY Left     TUBAL LIGATION  1/1990     Family History   Problem Relation Age of Onset    Arthritis Mother     Diabetes Mother     Hypertension Father     Cancer Sister     Breast cancer Sister     No Known Problems Sister     Cancer Sister     Cancer Sister     Cancer Sister      Social History     Tobacco Use    Smoking status: Every Day     Current packs/day: 1.50     Average packs/day: 1.5 packs/day for 30.0 years (45.0 ttl pk-yrs)     Types: Cigarettes    Smokeless tobacco: Never   Substance Use Topics    Alcohol use: Yes     Comment: social     Drug use: No     Review of Systems   Unable to perform ROS: Mental status change       Physical Exam     Initial Vitals [03/24/24 1411]   BP Pulse Resp Temp SpO2   109/68 100 16 98.1 °F (36.7 °C) 100 %      MAP       --         Physical Exam    Constitutional: She appears well-developed and well-nourished. No distress.   HENT:   Head: Normocephalic and atraumatic.   Right Ear: External ear normal.   Left Ear: External ear normal.   Mouth/Throat: Oropharynx is clear and moist.   Eyes: Conjunctivae and EOM are normal. Pupils are equal, round, and reactive to light.   Neck: Neck supple.   Normal range of motion.  Cardiovascular:  Normal rate, regular rhythm, normal heart sounds and intact distal pulses.           Pulmonary/Chest: Breath sounds normal. No respiratory distress.   Abdominal: Abdomen is soft. Bowel sounds are normal. There is no abdominal tenderness.   Musculoskeletal:         General: No edema. Normal range of motion.      Cervical back: Normal range of motion and neck supple.     Neurological: She is alert and oriented to person, place, and time. She has normal strength. GCS score is 15. GCS eye subscore is 4. GCS verbal subscore is 5. GCS motor subscore is 6.   Skin: Skin is warm and dry. Capillary refill takes less than 2 seconds. No  rash noted.   Psychiatric: Her behavior is normal. Her affect is blunt. Her speech is delayed. She is actively hallucinating. Cognition and memory are normal. She is inattentive.         ED Course   Procedures  Labs Reviewed   CBC W/ AUTO DIFFERENTIAL - Abnormal; Notable for the following components:       Result Value    RBC 2.99 (*)     Hemoglobin 9.1 (*)     Hematocrit 27.3 (*)     RDW 15.8 (*)     Gran # (ANC) 8.0 (*)     Lymph # 0.9 (*)     Gran % 85.8 (*)     Lymph % 9.2 (*)     All other components within normal limits   COMPREHENSIVE METABOLIC PANEL - Abnormal; Notable for the following components:    Sodium 129 (*)     CO2 19 (*)     BUN 26 (*)     Albumin 3.2 (*)     AST 47 (*)     All other components within normal limits   URINALYSIS, REFLEX TO URINE CULTURE - Abnormal; Notable for the following components:    Protein, UA Trace (*)     Occult Blood UA 1+ (*)     All other components within normal limits    Narrative:     Specimen Source->Urine   DRUG SCREEN PANEL, URINE EMERGENCY - Abnormal; Notable for the following components:    Opiate Scrn, Ur Presumptive Positive (*)     All other components within normal limits    Narrative:     Specimen Source->Urine   ACETAMINOPHEN LEVEL - Abnormal; Notable for the following components:    Acetaminophen (Tylenol), Serum 0.3 (*)     All other components within normal limits   SALICYLATE LEVEL - Abnormal; Notable for the following components:    Salicylate Lvl <1.5 (*)     All other components within normal limits   URINALYSIS MICROSCOPIC - Abnormal; Notable for the following components:    Hyaline Casts, UA 4 (*)     All other components within normal limits    Narrative:     Specimen Source->Urine   RESPIRATORY INFECTION PANEL (PCR), NASOPHARYNGEAL    Narrative:     Respiratory Infection Panel source->NP Swab   CULTURE, URINE   CULTURE, BLOOD   CULTURE, BLOOD   TSH   ALCOHOL,MEDICAL (ETHANOL)   LACTIC ACID, PLASMA   URINALYSIS, REFLEX TO URINE CULTURE           Imaging Results              X-Ray Chest AP Portable (Final result)  Result time 03/24/24 17:47:33      Final result by Delia Bueno Jr., MD (03/24/24 17:47:33)                   Narrative:    EXAMINATION:  XR CHEST AP PORTABLE    CLINICAL INDICATION:  Female, 64 years old. ams    COMPARISON:  March 10, 2023    FINDINGS:  No change in the cardiomediastinal silhouette.    Today's study shows rather diffuse interstitial prominence in the lungs with reticulonodular infiltrative changes in the lower lung fields, more pronounced on the right. No pleural effusion can be seen. No pneumothorax can be seen.    IMPRESSION:  Interstitial infiltration in the lungs has developed since March 10, 2023. The changes are suggestive of pneumonia.    Electronically signed by:  Delia Bueno MD  03/24/2024 05:47 PM CDT Workstation: [id number]                                     CT Head Without Contrast (Final result)  Result time 03/24/24 15:47:32      Final result by Varinder Gregory MD (03/24/24 15:47:32)                   Narrative:    CMS MANDATED QUALITY DATA - CT RADIATION 436    All CT scans at this facility utilize dose modulation, iterative reconstruction, and/or weight based dosing when appropriate to reduce radiation dose to as low as reasonably achievable.    CLINICAL HISTORY:  64 years (1960) Female Mental status change, unknown cause Altered Mental Status (Pt arrived by ems from home, pt was noted by family to be altered starting around 1000 yesterday. Pt family reports hallucinations and fatigue. Pt is prescribed oxycodone and morphine, unknown if she took any)    TECHNIQUE:  CT HEAD WITHOUT IV CONTRAST. Axial CT of the brain without contrast using soft tissue and bone algorithm. Please note in the acute setting if there is a clinical concern for an acute stroke MRI would be more sensitive/specific for evaluation of ischemia.    COMPARISON:  PET/CT from 2/22/2023.    FINDINGS:  No acute intracranial  hemorrhage, hydrocephalus, herniation or midline shift and the basal and suprasellar cisterns are patent. No acute skull fracture is identified. The ventricles and sulci are normal. There is normal gray white differentiation.  Orbital contents appear within normal limits. External auditory canals are unremarkable. The visualized paranasal sinuses and mastoid air cells are essentially clear. The patient is edentulous.    IMPRESSION:  No acute intracranial process                  .    Electronically signed by:  Varinder Gregory MD  03/24/2024 03:47 PM CDT Workstation: [id number]                                     Medications   piperacillin-tazobactam 3.375 g in dextrose 5 % 100 mL IVPB (ready to mix) (has no administration in time range)   albuterol-ipratropium 2.5 mg-0.5 mg/3 mL nebulizer solution 3 mL (has no administration in time range)   atorvastatin tablet 40 mg (has no administration in time range)   melatonin tablet 6 mg (has no administration in time range)   ondansetron injection 4 mg (has no administration in time range)   aluminum-magnesium hydroxide-simethicone 200-200-20 mg/5 mL suspension 30 mL (has no administration in time range)   acetaminophen tablet 650 mg (has no administration in time range)   potassium bicarbonate disintegrating tablet 50 mEq (has no administration in time range)   potassium bicarbonate disintegrating tablet 35 mEq (has no administration in time range)   potassium bicarbonate disintegrating tablet 60 mEq (has no administration in time range)   magnesium oxide tablet 800 mg (has no administration in time range)   magnesium oxide tablet 800 mg (has no administration in time range)   potassium, sodium phosphates 280-160-250 mg packet 2 packet (has no administration in time range)   potassium, sodium phosphates 280-160-250 mg packet 2 packet (has no administration in time range)   potassium, sodium phosphates 280-160-250 mg packet 2 packet (has no administration in time range)    famotidine tablet 20 mg (has no administration in time range)   0.9%  NaCl infusion (has no administration in time range)   naloxone 0.4 mg/mL injection 0.4 mg (0.4 mg Intravenous Given 3/24/24 1440)   0.9%  NaCl infusion (1,000 mLs Intravenous New Bag 3/24/24 1701)     Medical Decision Making  Laboratory evaluation reviewed patient is mildly hyponatremic he does have rare bacteria in her urine lactate is normal chest x-ray does reveal evidence of possible pneumonia she did develop hypoxia in the emergency department which improved with Narcan I suspect some of her mental status change maybe secondary to excessive narcotic use however given her pneumonia and hyponatremia the encephalopathy may be partially metabolic in nature I have begun IV fluid resuscitation we have administered antibiotics emergency department discussed patient's findings with Dr. Townsend who will evaluate patient in the ER for admission    Amount and/or Complexity of Data Reviewed  Labs: ordered. Decision-making details documented in ED Course.  Radiology: ordered. Decision-making details documented in ED Course.    Risk  Prescription drug management.               ED Course as of 03/24/24 1930   Sun Mar 24, 2024   1704 Bacteria, UA: Rare [AT]      ED Course User Index  [AT] Waldo Simmons MD                           Clinical Impression:  Final diagnoses:  [G93.41] Acute metabolic encephalopathy  [J18.9] Pneumonia due to infectious organism, unspecified laterality, unspecified part of lung (Primary)  [E87.1] Hyponatremia          ED Disposition Condition    Admit Fair                Waldo Simmons MD  03/24/24 1930

## 2024-03-25 PROBLEM — J96.01 ACUTE HYPOXIC RESPIRATORY FAILURE: Status: ACTIVE | Noted: 2024-03-25

## 2024-03-25 LAB
ACINETOBACTER CALCOACETICUS/BAUMANNII COMPLEX: NOT DETECTED
ALBUMIN SERPL BCP-MCNC: 3 G/DL (ref 3.5–5.2)
ALP SERPL-CCNC: 106 U/L (ref 55–135)
ALT SERPL W/O P-5'-P-CCNC: 19 U/L (ref 10–44)
ANION GAP SERPL CALC-SCNC: 5 MMOL/L (ref 8–16)
ANISOCYTOSIS BLD QL SMEAR: SLIGHT
AST SERPL-CCNC: 27 U/L (ref 10–40)
BACTEROIDES FRAGILIS: NOT DETECTED
BASOPHILS # BLD AUTO: 0.02 K/UL (ref 0–0.2)
BASOPHILS NFR BLD: 0.2 % (ref 0–1.9)
BILIRUB SERPL-MCNC: 0.6 MG/DL (ref 0.1–1)
BUN SERPL-MCNC: 11 MG/DL (ref 8–23)
CALCIUM SERPL-MCNC: 9.1 MG/DL (ref 8.7–10.5)
CANDIDA ALBICANS: NOT DETECTED
CANDIDA AURIS: NOT DETECTED
CANDIDA GLABRATA: NOT DETECTED
CANDIDA KRUSEI: NOT DETECTED
CANDIDA PARAPSILOSIS: NOT DETECTED
CANDIDA TROPICALIS: NOT DETECTED
CHLORIDE SERPL-SCNC: 106 MMOL/L (ref 95–110)
CO2 SERPL-SCNC: 24 MMOL/L (ref 23–29)
CREAT SERPL-MCNC: 0.6 MG/DL (ref 0.5–1.4)
CRYPTOCOCCUS NEOFORMANS/GATTII: NOT DETECTED
CTX-M GENE (ESBL PRODUCER): NOT DETECTED
DIFFERENTIAL METHOD BLD: ABNORMAL
ENTEROBACTER CLOACAE COMPLEX: NOT DETECTED
ENTEROBACTERALES: NOT DETECTED
ENTEROCOCCUS FAECALIS: NOT DETECTED
ENTEROCOCCUS FAECIUM: NOT DETECTED
EOSINOPHIL # BLD AUTO: 0 K/UL (ref 0–0.5)
EOSINOPHIL NFR BLD: 0.3 % (ref 0–8)
ERYTHROCYTE [DISTWIDTH] IN BLOOD BY AUTOMATED COUNT: 15.9 % (ref 11.5–14.5)
ESCHERICHIA COLI: NOT DETECTED
EST. GFR  (NO RACE VARIABLE): >60 ML/MIN/1.73 M^2
GLUCOSE SERPL-MCNC: 100 MG/DL (ref 70–110)
HAEMOPHILUS INFLUENZAE: NOT DETECTED
HCT VFR BLD AUTO: 28.8 % (ref 37–48.5)
HGB BLD-MCNC: 9.9 G/DL (ref 12–16)
IMM GRANULOCYTES # BLD AUTO: 0.05 K/UL (ref 0–0.04)
IMM GRANULOCYTES NFR BLD AUTO: 0.4 % (ref 0–0.5)
IMP GENE (CARBAPENEM RESISTANT): NOT DETECTED
KLEBSIELLA AEROGENES: NOT DETECTED
KLEBSIELLA OXYTOCA: NOT DETECTED
KLEBSIELLA PNEUMONIAE GROUP: NOT DETECTED
KPC RESISTANCE GENE (CARBAPENEM): NOT DETECTED
LISTERIA MONOCYTOGENES: NOT DETECTED
LYMPHOCYTES # BLD AUTO: 1.5 K/UL (ref 1–4.8)
LYMPHOCYTES NFR BLD: 11.6 % (ref 18–48)
MAGNESIUM SERPL-MCNC: 1.6 MG/DL (ref 1.6–2.6)
MCH RBC QN AUTO: 30.7 PG (ref 27–31)
MCHC RBC AUTO-ENTMCNC: 34.4 G/DL (ref 32–36)
MCR-1: ABNORMAL
MCV RBC AUTO: 89 FL (ref 82–98)
MEC A/C AND MREJ (MRSA): ABNORMAL
MEC A/C: ABNORMAL
MONOCYTES # BLD AUTO: 0.5 K/UL (ref 0.3–1)
MONOCYTES NFR BLD: 3.7 % (ref 4–15)
NDM GENE (CARBAPENEM RESISTANT): NOT DETECTED
NEISSERIA MENINGITIDIS: NOT DETECTED
NEUTROPHILS # BLD AUTO: 10.6 K/UL (ref 1.8–7.7)
NEUTROPHILS NFR BLD: 83.8 % (ref 38–73)
NRBC BLD-RTO: 0 /100 WBC
OXA-48-LIKE (CARBAPENEM RESISTANT): ABNORMAL
PLATELET # BLD AUTO: 259 K/UL (ref 150–450)
PLATELET BLD QL SMEAR: ABNORMAL
PMV BLD AUTO: 9.4 FL (ref 9.2–12.9)
POTASSIUM SERPL-SCNC: 3.8 MMOL/L (ref 3.5–5.1)
PROCALCITONIN SERPL IA-MCNC: 4.36 NG/ML (ref 0–0.5)
PROT SERPL-MCNC: 5.7 G/DL (ref 6–8.4)
PROTEUS SPECIES: NOT DETECTED
PSEUDOMONAS AERUGINOSA: DETECTED
RBC # BLD AUTO: 3.23 M/UL (ref 4–5.4)
SALMONELLA SP: NOT DETECTED
SERRATIA MARCESCENS: NOT DETECTED
SODIUM SERPL-SCNC: 135 MMOL/L (ref 136–145)
STAPHYLOCOCCUS AUREUS: NOT DETECTED
STAPHYLOCOCCUS EPIDERMIDIS: NOT DETECTED
STAPHYLOCOCCUS LUGDUNESIS: NOT DETECTED
STAPHYLOCOCCUS SPECIES: NOT DETECTED
STENOTROPHOMONAS MALTOPHILIA: NOT DETECTED
STREPTOCOCCUS AGALACTIAE: NOT DETECTED
STREPTOCOCCUS PNEUMONIAE: NOT DETECTED
STREPTOCOCCUS PYOGENES: NOT DETECTED
STREPTOCOCCUS SPECIES: NOT DETECTED
VAN A/B (VRE GENE): ABNORMAL
VIM GENE (CARBAPENEM RESISTANT): NOT DETECTED
WBC # BLD AUTO: 12.64 K/UL (ref 3.9–12.7)

## 2024-03-25 PROCEDURE — 63600175 PHARM REV CODE 636 W HCPCS: Performed by: INTERNAL MEDICINE

## 2024-03-25 PROCEDURE — 84145 PROCALCITONIN (PCT): CPT | Performed by: INTERNAL MEDICINE

## 2024-03-25 PROCEDURE — 36415 COLL VENOUS BLD VENIPUNCTURE: CPT | Performed by: INTERNAL MEDICINE

## 2024-03-25 PROCEDURE — 85025 COMPLETE CBC W/AUTO DIFF WBC: CPT | Performed by: INTERNAL MEDICINE

## 2024-03-25 PROCEDURE — 25000003 PHARM REV CODE 250: Performed by: INTERNAL MEDICINE

## 2024-03-25 PROCEDURE — 83735 ASSAY OF MAGNESIUM: CPT | Performed by: INTERNAL MEDICINE

## 2024-03-25 PROCEDURE — 80053 COMPREHEN METABOLIC PANEL: CPT | Performed by: INTERNAL MEDICINE

## 2024-03-25 PROCEDURE — 12000002 HC ACUTE/MED SURGE SEMI-PRIVATE ROOM

## 2024-03-25 RX ORDER — MORPHINE SULFATE 2 MG/ML
1 INJECTION, SOLUTION INTRAMUSCULAR; INTRAVENOUS ONCE
Status: COMPLETED | OUTPATIENT
Start: 2024-03-25 | End: 2024-03-25

## 2024-03-25 RX ORDER — OXYCODONE HYDROCHLORIDE 10 MG/1
10 TABLET ORAL EVERY 6 HOURS PRN
Status: DISCONTINUED | OUTPATIENT
Start: 2024-03-26 | End: 2024-03-27 | Stop reason: HOSPADM

## 2024-03-25 RX ORDER — MAGNESIUM SULFATE HEPTAHYDRATE 40 MG/ML
2 INJECTION, SOLUTION INTRAVENOUS ONCE
Status: COMPLETED | OUTPATIENT
Start: 2024-03-25 | End: 2024-03-25

## 2024-03-25 RX ORDER — OXYCODONE HYDROCHLORIDE 10 MG/1
10 TABLET ORAL 4 TIMES DAILY
Status: DISCONTINUED | OUTPATIENT
Start: 2024-03-25 | End: 2024-03-25

## 2024-03-25 RX ORDER — PREGABALIN 75 MG/1
75 CAPSULE ORAL ONCE
Status: COMPLETED | OUTPATIENT
Start: 2024-03-25 | End: 2024-03-25

## 2024-03-25 RX ADMIN — PREGABALIN 75 MG: 75 CAPSULE ORAL at 02:03

## 2024-03-25 RX ADMIN — ACETAMINOPHEN 650 MG: 325 TABLET ORAL at 10:03

## 2024-03-25 RX ADMIN — OXYCODONE HYDROCHLORIDE 10 MG: 5 TABLET ORAL at 01:03

## 2024-03-25 RX ADMIN — MORPHINE SULFATE 1 MG: 2 INJECTION, SOLUTION INTRAMUSCULAR; INTRAVENOUS at 02:03

## 2024-03-25 RX ADMIN — ATORVASTATIN CALCIUM 40 MG: 40 TABLET, FILM COATED ORAL at 09:03

## 2024-03-25 RX ADMIN — PIPERACILLIN SODIUM AND TAZOBACTAM SODIUM 3.38 G: 3; .375 INJECTION, POWDER, LYOPHILIZED, FOR SOLUTION INTRAVENOUS at 04:03

## 2024-03-25 RX ADMIN — PIPERACILLIN SODIUM AND TAZOBACTAM SODIUM 3.38 G: 3; .375 INJECTION, POWDER, LYOPHILIZED, FOR SOLUTION INTRAVENOUS at 12:03

## 2024-03-25 RX ADMIN — OXYCODONE HYDROCHLORIDE 10 MG: 5 TABLET ORAL at 07:03

## 2024-03-25 RX ADMIN — FAMOTIDINE 20 MG: 20 TABLET ORAL at 09:03

## 2024-03-25 RX ADMIN — PIPERACILLIN SODIUM AND TAZOBACTAM SODIUM 3.38 G: 3; .375 INJECTION, POWDER, LYOPHILIZED, FOR SOLUTION INTRAVENOUS at 07:03

## 2024-03-25 RX ADMIN — MAGNESIUM SULFATE HEPTAHYDRATE 2 G: 40 INJECTION, SOLUTION INTRAVENOUS at 10:03

## 2024-03-25 NOTE — ASSESSMENT & PLAN NOTE
In the background of hyponatremia  Also encephalopathic from opioid use  Check urine drug screen

## 2024-03-25 NOTE — PHARMACY MED REC
"              .        Admission Medication History     The home medication history was taken by Nanci Gabriel.    You may go to "Admission" then "Reconcile Home Medications" tabs to review and/or act upon these items.     The home medication list has been updated by the Pharmacy department.   Please read ALL comments highlighted in yellow.   Please address this information as you see fit.    Feel free to contact us if you have any questions or require assistance.        Medications listed below were obtained from: Patient/family and [employer]ytic software- [employer]  No current facility-administered medications on file prior to encounter.     Current Outpatient Medications on File Prior to Encounter   Medication Sig Dispense Refill    albuterol (PROVENTIL/VENTOLIN HFA) 90 mcg/actuation inhaler Inhale 2 puffs into the lungs every 6 (six) hours as needed for Wheezing or Shortness of Breath. Rescue 18 g 11    albuterol-ipratropium (DUO-NEB) 2.5 mg-0.5 mg/3 mL nebulizer solution Take 3 mLs by nebulization every 6 (six) hours as needed for Wheezing or Shortness of Breath. Rescue 75 mL 11    amitriptyline (ELAVIL) 25 MG tablet Take 1-2 tablet by mouth every night at bedtime (Patient taking differently: Take 25-50 mg by mouth every evening.) 60 tablet 0    atorvastatin (LIPITOR) 40 MG tablet Take 1 tablet (40 mg total) by mouth once daily. 90 tablet 1    co-enzyme Q-10 30 mg capsule Take 30 mg by mouth once daily.      fluticasone propionate (FLONASE) 50 mcg/actuation nasal spray 1 spray (50 mcg total) by Each Nostril route once daily. 16 g 11    fluticasone-umeclidin-vilanter (TRELEGY ELLIPTA) 200-62.5-25 mcg inhaler Inhale 1 puff into the lungs once daily. 60 each 11    loratadine (CLARITIN) 10 mg tablet Take 1 tablet (10 mg total) by mouth once daily. 90 tablet 3    losartan (COZAAR) 50 MG tablet Take 1 tablet (50 mg total) by mouth once daily. 90 tablet 3    morphine (MS CONTIN) 30 MG 12 hr tablet Take 1 tablet by mouth " every twelve hours More than 7 days supply is medically necessary (Patient taking differently: Take 30 mg by mouth 2 (two) times daily.) 60 tablet 0    multivitamin capsule Take 1 capsule by mouth once daily.      naloxone (NARCAN) 4 mg/actuation Spry Spray 1 spray into one nostril single dose may repeat every 2-3 minutes until responsive or EMS arrives (Patient taking differently: 1 spray by Nasal route once.) 1 each 1    omeprazole (PRILOSEC) 40 MG capsule TAKE 1 CAPSULE(40 MG) BY MOUTH EVERY DAY (Patient taking differently: Take 40 mg by mouth every morning.) 90 capsule 1    ondansetron (ZOFRAN-ODT) 4 MG TbDL Take 1 tablet (4 mg total) by mouth every 6 (six) hours as needed (nausea). 8 tablet 0    oxyCODONE (ROXICODONE) 20 mg Tab immediate release tablet Take 1 tablet by mouth five times a day as needed for pain More than 7 days supply is medically necessary (Patient taking differently: Take 20 mg by mouth 5 (five) times daily.) 150 tablet 0    pregabalin (LYRICA) 75 MG capsule Take 1 capsule by mouth three times a day More than 7 days supply is medically necessary (Patient taking differently: Take 75 mg by mouth 3 (three) times daily.) 90 capsule 1    RESTASIS 0.05 % ophthalmic emulsion Place 1 drop into both eyes 2 (two) times daily.      lubiprostone (AMITIZA) 24 MCG Cap Take 1 capsule by mouth twice a day as needed for pain (Patient not taking: Reported on 3/24/2024) 60 capsule 1    [DISCONTINUED] ibuprofen (ADVIL,MOTRIN) 800 MG tablet Take 1 tablet by mouth three times a day as needed for pain 90 tablet 1    [DISCONTINUED] lactulose (CHRONULAC) 10 gram/15 mL solution Take 15 mls by mouth twice daily as needed 900 mL 1    [DISCONTINUED] lactulose (CHRONULAC) 10 gram/15 mL solution Take 15 ml twice a day as needed 946 mL 1    [DISCONTINUED] lactulose (CHRONULAC) 10 gram/15 mL solution Take 15 mLs (10 g total) by mouth 2 (two) times daily. 900 mL 0    [DISCONTINUED] lactulose (CHRONULAC) 10 gram/15 mL solution  Take 15 mLs (10 g total) by mouth 2 (two) times daily as needed. 900 mL 0    [DISCONTINUED] morphine (MS CONTIN) 30 MG 12 hr tablet Take 1 tablet by mouth every twelve hours More than 7 days supply is medically necessary 60 tablet 0    [DISCONTINUED] morphine (MS CONTIN) 30 MG 12 hr tablet Take 1 tablet by mouth twice a day as needed for pain More than a 7 day supply are medically necessary 60 tablet 0    [DISCONTINUED] morphine (MS CONTIN) 30 MG 12 hr tablet Take 1 tablet by mouth twice a day as needed for pain 60 tablet 0    [DISCONTINUED] morphine (MS CONTIN) 30 MG 12 hr tablet Take 1 tablet by mouth twice a day as needed for pain 60 tablet 0    [DISCONTINUED] morphine (MS CONTIN) 30 MG 12 hr tablet Take 1 tablet by mouth every twelve hours More than 7 days supply is medically necessary 60 tablet 0    [DISCONTINUED] morphine (MS CONTIN) 30 MG 12 hr tablet Take 1 tablet by mouth every twelve hours More than 7 days supply is medically necessary 60 tablet 0    [DISCONTINUED] oxyCODONE (ROXICODONE) 20 mg Tab immediate release tablet 1 tablet by mouth five times a day 150 tablet 0    [DISCONTINUED] oxyCODONE (ROXICODONE) 20 mg Tab immediate release tablet Take 1 tablet by mouth five times a day as needed for pain More than 7 days supply is medically necessary 150 tablet 0    [DISCONTINUED] oxyCODONE (ROXICODONE) 20 mg Tab immediate release tablet Take 1 tablet by mouth five times a day as needed for pain More than a 7 day supply are medically necessary 150 tablet 0    [DISCONTINUED] oxyCODONE (ROXICODONE) 20 mg Tab immediate release tablet Take 1 tablet by mouth five times a day as needed for pain More than a 7 day supply are medically necessary 150 tablet 0    [DISCONTINUED] oxyCODONE (ROXICODONE) 20 mg Tab immediate release tablet Take 1 tablet by mouth five times a day as needed for pain More than 7 days supply is medically necessary 150 tablet 0    [DISCONTINUED] oxyCODONE (ROXICODONE) 20 mg Tab immediate release  tablet Take 1 tablet by mouth five times a day as needed for pain More than 7 days supply is medically necessary 150 tablet 0       Potential issues to be addressed PRIOR TO DISCHARGE  Patient reported not taking the following medications: (Amitiza). These medications remain on the home medication list. Please address accordingly.     Nanci Gabriel  EXT 1920

## 2024-03-25 NOTE — RESPIRATORY THERAPY
03/24/24 2114   Patient Assessment/Suction   Level of Consciousness (AVPU) alert   Respiratory Effort Normal;Unlabored   Expansion/Accessory Muscles/Retractions no retractions;no use of accessory muscles   All Lung Fields Breath Sounds coarse   Rhythm/Pattern, Respiratory pattern regular;unlabored   Cough Frequency infrequent   Cough Type nonproductive   Skin Integrity   $ Wound Care Tech Time 15 min   Area Observed Left;Right;Behind ear;Cheek;Nares   Skin Appearance without discoloration   PRE-TX-O2   Device (Oxygen Therapy) nasal cannula   $ Is the patient on Low Flow Oxygen? Yes   Flow (L/min) 2   SpO2 95 %   Pulse Oximetry Type Continuous   $ Pulse Oximetry - Multiple Charge Pulse Oximetry - Multiple   Pulse 103   Resp 19   Aerosol Therapy   $ Aerosol Therapy Charges PRN treatment not required   Respiratory Treatment Status (SVN) PRN treatment not required   Education   $ Education Bronchodilator;15 min   Tobacco Cessation Intervention   Do you use any type of tobacco product? Yes   Are you interested in quitting use of tobacco products? Thinking about quitting   Are you interested in Nicotine Replacement for symptom relief? No   $ Smoking Cessation Charges Smoking Cessation - Intermediate (CTTS)   Respiratory Evaluation   $ Care Plan Tech Time 15 min   $ Eval/Re-eval Charges Evaluation   Evaluation For New Orders   Admitting Diagnosis Encehphalopathy   Cardiac Diagnosis n/a   Pulmonary Diagnosis COPD   Current Surgeries n/a   Home Oxygen   Has Home Oxygen? No   Home Aerosol, MDI, DPI, and Other Treatments/Therapies   Home Respiratory Therapy Per Patient/Review of Chart Yes   Aerosol Home Meds/Freq Duoneb prn   MDI Home Meds/Freq Albuterol prn   Oxygen Care Plan   Oxygen Care Plan Per Protocol   SPO2 Goal (%) 92% non-cardiac   Bronchodilator Care Plan   Rationale No Rationale found   Atelectasis Care Plan   Rationale No Rational Found   Airway Clearance Care Plan   Rationale No rationale found

## 2024-03-25 NOTE — PROGRESS NOTES
"Pharmacist Renal Dose Adjustment Note    Floresita Martin is a 64 y.o. female being treated with the medication Levofloxacin    Patient Data:    Vital Signs (Most Recent):  Temp: 98.1 °F (36.7 °C) (03/24/24 1411)  Pulse: 100 (03/24/24 1844)  Resp: 13 (03/24/24 1844)  BP: 109/68 (03/24/24 1411)  SpO2: (!) 94 % (03/24/24 1844) Vital Signs (72h Range):  Temp:  [98.1 °F (36.7 °C)]   Pulse:  [100]   Resp:  [13-16]   BP: (109)/(68)   SpO2:  [82 %-100 %]        Ht: 5' 1" (1.549 m)  Wt: 49.9 kg (110 lb)  Estimated Creatinine Clearance: 48.9 mL/min (based on SCr of 0.9 mg/dL).  Body mass index is 20.78 kg/m².    Per [facility] renal dosing protocol:     Previous Order: Levofloxacin 500 mg Q24H    Will be changed to:     New Order: Levofloxacin 500 mg once followed by Levofloxacin 250 mg Q24H,    Due to: CrCl < 50 mL/min    Renal dose adjustments performed as noted above.    We will continue monitoring and adjusting as necessary.    Pharmacist: Chester Godinez PharmD  Ext: [phone]      "

## 2024-03-25 NOTE — SUBJECTIVE & OBJECTIVE
Interval History:  Patient reported she wants to go home, I told her that she is still on 2 L O2 NC and her cultures are pending, and she needs to be here in hospital today for treatment.  Blood culture and respiratory culture pending, procalcitonin 4.361, on IV Zosyn.  Patient requested to resume home med oxycodone.    Review of Systems   Unable to perform ROS: Acuity of condition     Objective:     Vital Signs (Most Recent):  Temp: 98.1 °F (36.7 °C) (03/24/24 1411)  Pulse: 82 (03/25/24 1001)  Resp: (!) 23 (03/25/24 1001)  BP: (!) 166/79 (03/25/24 1001)  SpO2: 100 % (03/25/24 1001) Vital Signs (24h Range):  Temp:  [98.1 °F (36.7 °C)] 98.1 °F (36.7 °C)  Pulse:  [] 82  Resp:  [13-23] 23  SpO2:  [82 %-100 %] 100 %  BP: (105-166)/(60-90) 166/79     Weight: 49.9 kg (110 lb)  Body mass index is 20.78 kg/m².    Intake/Output Summary (Last 24 hours) at 3/25/2024 1337  Last data filed at 3/24/2024 2343  Gross per 24 hour   Intake 412.5 ml   Output --   Net 412.5 ml         Physical Exam  Constitutional:       General: She is not in acute distress.     Appearance: She is ill-appearing.      Comments: Looks anxious    HENT:      Head: Normocephalic.      Nose: Nose normal.   Eyes:      Extraocular Movements: Extraocular movements intact.   Cardiovascular:      Rate and Rhythm: Normal rate.   Pulmonary:      Effort: No respiratory distress.   Abdominal:      Tenderness: There is no abdominal tenderness.   Musculoskeletal:         General: No swelling.   Skin:     General: Skin is warm.      Capillary Refill: Capillary refill takes less than 2 seconds.   Neurological:      Comments: Alert and Oriented x2             Significant Labs: All pertinent labs within the past 24 hours have been reviewed.  CBC:   Recent Labs   Lab 03/24/24  1443 03/25/24  0710   WBC 9.31 12.64   HGB 9.1* 9.9*   HCT 27.3* 28.8*    259     CMP:   Recent Labs   Lab 03/24/24  1443 03/25/24  0710   * 135*   K 4.3 3.8    106   CO2 19*  24   GLU 91 100   BUN 26* 11   CREATININE 0.9 0.6   CALCIUM 9.3 9.1   PROT 6.0 5.7*   ALBUMIN 3.2* 3.0*   BILITOT 0.3 0.6   ALKPHOS 110 106   AST 47* 27   ALT 21 19   ANIONGAP 10 5*     Magnesium:   Recent Labs   Lab 03/25/24  0710   MG 1.6       Significant Imaging: I have reviewed all pertinent imaging results/findings within the past 24 hours.

## 2024-03-25 NOTE — HPI
64 year old pt getting admitted with acute metabolic encephalopathy/acute hyponatremia/acute pneumonia/suspected cystitis  Pt was very confused since yesterday   She was hallucinating ans was seeing birds inside the house and was very sleepy   called EMS and pt was brought to ER  Pt has H/o opioid dependence and on a variety  of pain meds as per chart review

## 2024-03-25 NOTE — ASSESSMENT & PLAN NOTE
In the background of hyponatremia  Also encephalopathic from opioid use  urine drug screen positive for opiate     Fall precautions  Delirium precautions   Improving

## 2024-03-25 NOTE — ASSESSMENT & PLAN NOTE
Patient with Hypoxic Respiratory failure which is Acute.  she is not on home oxygen. Supplemental oxygen was provided and noted-      .   Signs/symptoms of respiratory failure include- tachypnea. Contributing diagnoses includes - Pneumonia Labs and images were reviewed. Patient Has not had a recent ABG. Will treat underlying causes and adjust management of respiratory failure as follows-     Supplemental oxygen, wean as able

## 2024-03-25 NOTE — PLAN OF CARE
[facility] - Emergency Dept  Initial Discharge Assessment       Primary Care Provider: Toni Hughes MD    Admission Diagnosis: Acute metabolic encephalopathy [G93.41]    Admission Date: 3/24/2024  Expected Discharge Date:     Transition of Care Barriers: None     completed discharge assessment with Pt and spouse at bedside. Pt AAOx4s. Pt lives at home with spouse. Demographics, PCP, and insurance verified. No home health. No dialysis. Pt completes ADLs without assistance. Pt to discharge home via family transport. Pt has no other needs to be addressed at this time.    Payor: Isabella Oliver MEDICARE / Plan: [employer] H[employer] PPO SPECIAL NEEDS / Product Type: Medicare Advantage /     Extended Emergency Contact Information  Primary Emergency Contact: Lilly Cabrera  Address: pt unknown   [place]  Mobile Phone: [phone]  Relation: Sister  Preferred language: English   needed? No  Secondary Emergency Contact: TimcarolynRohan  Mobile Phone: [phone]  Relation: Spouse    Discharge Plan A: Home with family  Discharge Plan B: Home with family      [facility]  [address]  [place] [address]  Phone: [phone] Fax: [phone]    [id number] [place] - [address] AT [facility] [address]  [address] [place] [address]  Phone: [phone] Fax: [phone]      Initial Assessment (most recent)       Adult Discharge Assessment - 03/25/24 1140          Discharge Assessment    Assessment Type Discharge Planning Assessment     Confirmed/corrected address, phone number and insurance Yes     Confirmed Demographics Correct on Facesheet     Source of Information patient;family     Does patient/caregiver understand observation status Yes     Communicated GRACE with patient/caregiver Date not available/Unable to determine     Reason For Admission Acute metabolic encephalopathy      People in Home spouse     Facility Arrived From: home     Do you expect to return to your current living situation? Yes     Do you have help at home or someone to help you manage your care at home? Yes     Who are your caregiver(s) and their phone number(s)? Rohan Fernandes (significant other) [phone]     Prior to hospitilization cognitive status: Alert/Oriented     Current cognitive status: Alert/Oriented     Walking or Climbing Stairs Difficulty no     Dressing/Bathing Difficulty no     Home Accessibility not wheelchair accessible;stairs to enter home     Number of Stairs, Main Entrance other (see comments)   14    Stair Railings, Main Entrance railings safe and in good condition;railings on both sides of stairs     Home Layout Able to live on 1st floor     Equipment Currently Used at Home blood pressure machine     Readmission within 30 days? No     Patient currently being followed by outpatient case management? No     Do you currently have service(s) that help you manage your care at home? No     Do you take prescription medications? Yes     Do you have prescription coverage? Yes     Coverage Payor: [employer] MANAGED MEDICARE - [facility] [facility] SPECIAL NEEDS - CAPITATED     Do you have any problems affording any of your prescribed medications? No     Is the patient taking medications as prescribed? yes     Who is going to help you get home at discharge? Rohan Fernandes (significant other) [phone]     How do you get to doctors appointments? car, drives self     Are you on dialysis? No     Do you take coumadin? No     Discharge Plan A Home with family     Discharge Plan B Home with family     DME Needed Upon Discharge  none     Discharge Plan discussed with: Spouse/sig other;Patient     Name(s) and Number(s) Rohan Fernandes (significant other) [phone]     Transition of Care Barriers None

## 2024-03-25 NOTE — H&P
[facility] - Emergency Dept  Hospital Medicine  History & Physical    Patient Name: Floresita Martin  MRN: [id number]  Patient Class: IP- Inpatient  Admission Date: 3/24/2024  Attending Physician: Kris Townsend MD   Primary Care Provider: Toni Hughes MD         Patient information was obtained from past medical records, ER records, and ER MD .     Subjective:     Principal Problem:Acute metabolic encephalopathy    Chief Complaint:   Chief Complaint   Patient presents with    Altered Mental Status     Pt arrived by ems from home, pt was noted by family to be altered starting around 1000 yesterday. Pt family reports hallucinations and fatigue. Pt is prescribed oxycodone and morphine, unknown if she took any        HPI: 64 year old pt getting admitted with acute metabolic encephalopathy/acute hyponatremia/acute pneumonia/suspected cystitis  Pt was very confused since yesterday   She was hallucinating ans was seeing birds inside the house and was very sleepy   called EMS and pt was brought to ER  Pt has H/o opioid dependence and on a variety  of pain meds as per chart review     Past Medical History:   Diagnosis Date    Alcohol abuse     Cellulitis of toe of right foot 2019    Headache     Hearing loss     HLD (hyperlipidemia)        Past Surgical History:   Procedure Laterality Date    BACK SURGERY       SECTION      2    COLONOSCOPY N/A 11/15/2017    Procedure: COLONOSCOPY;  Surgeon: Peg Powers MD;  Location: [facility];  Service: Endoscopy;  Laterality: N/A;    EPIDURAL STEROID INJECTION INTO CERVICAL SPINE      EPIDURAL STEROID INJECTION INTO LUMBAR SPINE      JOINT REPLACEMENT  2018    LIPOMA RESECTION Right 2023    Procedure: EXCISION, LIPOMA;  Surgeon: Ruben Brooks Jr., MD;  Location: [facility] OR;  Service: General;  Laterality: Right;    LUMBAR FUSION      with cage    LUNG BIOPSY      NECK SURGERY  2018    with hardware    RADIOFREQUENCY  ABLATION      cervical    RADIOFREQUENCY ABLATION      lumbar    SPINE SURGERY  05/2018    STAPEDECTOMY Right 05/28/2015    STAPEDECTOMY Left     TUBAL LIGATION  1/1990       Review of patient's allergies indicates:   Allergen Reactions    Codeine Nausea And Vomiting    Tylenol-codeine [acetaminophen-codeine] Nausea And Vomiting       No current facility-administered medications on file prior to encounter.     Current Outpatient Medications on File Prior to Encounter   Medication Sig    albuterol (PROVENTIL/VENTOLIN HFA) 90 mcg/actuation inhaler Inhale 2 puffs into the lungs every 6 (six) hours as needed for Wheezing or Shortness of Breath. Rescue    albuterol-ipratropium (DUO-NEB) 2.5 mg-0.5 mg/3 mL nebulizer solution Take 3 mLs by nebulization every 6 (six) hours as needed for Wheezing or Shortness of Breath. Rescue    amitriptyline (ELAVIL) 25 MG tablet Take 1-2 tablet by mouth every night at bedtime    atorvastatin (LIPITOR) 40 MG tablet Take 1 tablet (40 mg total) by mouth once daily.    co-enzyme Q-10 30 mg capsule Take 30 mg by mouth once daily.    fluticasone propionate (FLONASE) 50 mcg/actuation nasal spray 1 spray (50 mcg total) by Each Nostril route once daily.    fluticasone-umeclidin-vilanter (TRELEGY ELLIPTA) 200-62.5-25 mcg inhaler Inhale 1 puff into the lungs once daily.    ibuprofen (ADVIL,MOTRIN) 800 MG tablet Take 1 tablet by mouth three times a day as needed for pain    lactulose (CHRONULAC) 10 gram/15 mL solution Take 15 mls by mouth twice daily as needed    lactulose (CHRONULAC) 10 gram/15 mL solution Take 15 ml twice a day as needed (Patient not taking: Reported on 2/27/2024)    lactulose (CHRONULAC) 10 gram/15 mL solution Take 15 mLs (10 g total) by mouth 2 (two) times daily. (Patient not taking: Reported on 2/27/2024)    lactulose (CHRONULAC) 10 gram/15 mL solution Take 15 mLs (10 g total) by mouth 2 (two) times daily as needed.    loratadine (CLARITIN) 10 mg tablet Take 1 tablet (10 mg  total) by mouth once daily.    losartan (COZAAR) 50 MG tablet Take 1 tablet (50 mg total) by mouth once daily.    lubiprostone (AMITIZA) 24 MCG Cap Take 1 capsule by mouth twice a day as needed for pain (Patient not taking: Reported on 2/27/2024)    morphine (MS CONTIN) 30 MG 12 hr tablet Take 1 tablet by mouth every twelve hours More than 7 days supply is medically necessary    morphine (MS CONTIN) 30 MG 12 hr tablet Take 1 tablet by mouth twice a day as needed for pain More than a 7 day supply are medically necessary (Patient not taking: Reported on 2/27/2024)    morphine (MS CONTIN) 30 MG 12 hr tablet Take 1 tablet by mouth twice a day as needed for pain    morphine (MS CONTIN) 30 MG 12 hr tablet Take 1 tablet by mouth twice a day as needed for pain (Patient not taking: Reported on 2/27/2024)    morphine (MS CONTIN) 30 MG 12 hr tablet Take 1 tablet by mouth every twelve hours More than 7 days supply is medically necessary (Patient not taking: Reported on 2/27/2024)    [START ON 4/17/2024] morphine (MS CONTIN) 30 MG 12 hr tablet Take 1 tablet by mouth every twelve hours More than 7 days supply is medically necessary    morphine (MS CONTIN) 30 MG 12 hr tablet Take 1 tablet by mouth every twelve hours More than 7 days supply is medically necessary    multivitamin capsule Take 1 capsule by mouth once daily.    naloxone (NARCAN) 4 mg/actuation Spry Spray 1 spray into one nostril single dose may repeat every 2-3 minutes until responsive or EMS arrives    omeprazole (PRILOSEC) 40 MG capsule TAKE 1 CAPSULE(40 MG) BY MOUTH EVERY DAY    ondansetron (ZOFRAN-ODT) 4 MG TbDL Take 1 tablet (4 mg total) by mouth every 6 (six) hours as needed (nausea).    oxyCODONE (ROXICODONE) 20 mg Tab immediate release tablet 1 tablet by mouth five times a day (Patient not taking: Reported on 2/27/2024)    oxyCODONE (ROXICODONE) 20 mg Tab immediate release tablet Take 1 tablet by mouth five times a day as needed for pain More than 7 days  supply is medically necessary    oxyCODONE (ROXICODONE) 20 mg Tab immediate release tablet Take 1 tablet by mouth five times a day as needed for pain More than a 7 day supply are medically necessary (Patient not taking: Reported on 2/27/2024)    oxyCODONE (ROXICODONE) 20 mg Tab immediate release tablet Take 1 tablet by mouth five times a day as needed for pain More than a 7 day supply are medically necessary (Patient not taking: Reported on 2/27/2024)    oxyCODONE (ROXICODONE) 20 mg Tab immediate release tablet Take 1 tablet by mouth five times a day as needed for pain More than 7 days supply is medically necessary (Patient not taking: Reported on 2/27/2024)    [START ON 4/9/2024] oxyCODONE (ROXICODONE) 20 mg Tab immediate release tablet Take 1 tablet by mouth five times a day as needed for pain More than 7 days supply is medically necessary    oxyCODONE (ROXICODONE) 20 mg Tab immediate release tablet Take 1 tablet by mouth five times a day as needed for pain More than 7 days supply is medically necessary    pregabalin (LYRICA) 75 MG capsule Take 1 capsule by mouth three times a day More than 7 days supply is medically necessary    RESTASIS 0.05 % ophthalmic emulsion      Family History       Problem Relation (Age of Onset)    Arthritis Mother    Breast cancer Sister    Cancer Sister, Sister, Sister, Sister    Diabetes Mother    Hypertension Father    No Known Problems Sister          Tobacco Use    Smoking status: Every Day     Current packs/day: 1.50     Average packs/day: 1.5 packs/day for 30.0 years (45.0 ttl pk-yrs)     Types: Cigarettes    Smokeless tobacco: Never   Substance and Sexual Activity    Alcohol use: Yes     Comment: social     Drug use: No    Sexual activity: Not Currently     Partners: Male     Birth control/protection: Post-menopausal     Review of Systems   Unable to perform ROS: Mental status change     Objective:     Vital Signs (Most Recent):  Temp: 98.1 °F (36.7 °C) (03/24/24 1411)  Pulse:  100 (03/24/24 1844)  Resp: 13 (03/24/24 1844)  BP: 109/68 (03/24/24 1411)  SpO2: (!) 94 % (03/24/24 1844) Vital Signs (24h Range):  Temp:  [98.1 °F (36.7 °C)] 98.1 °F (36.7 °C)  Pulse:  [100] 100  Resp:  [13-16] 13  SpO2:  [82 %-100 %] 94 %  BP: (109)/(68) 109/68     Weight: 49.9 kg (110 lb)  Body mass index is 20.78 kg/m².     Physical Exam  Vitals and nursing note reviewed.   Constitutional:       General: She is not in acute distress.     Comments: Confused    HENT:      Head: Atraumatic.      Right Ear: External ear normal.      Left Ear: External ear normal.      Nose: Nose normal.      Mouth/Throat:      Mouth: Mucous membranes are moist.   Eyes:      Extraocular Movements: Extraocular movements intact.   Cardiovascular:      Rate and Rhythm: Normal rate.   Pulmonary:      Effort: Pulmonary effort is normal.   Abdominal:      Palpations: Abdomen is soft.   Musculoskeletal:         General: Normal range of motion.      Cervical back: Normal range of motion.   Skin:     General: Skin is warm.   Neurological:      Mental Status: She is disoriented.   Psychiatric:         Behavior: Behavior normal.                Significant Labs: All pertinent labs within the past 24 hours have been reviewed.  CBC:   Recent Labs   Lab 03/24/24  1443   WBC 9.31   HGB 9.1*   HCT 27.3*        CMP:   Recent Labs   Lab 03/24/24  1443   *   K 4.3      CO2 19*   GLU 91   BUN 26*   CREATININE 0.9   CALCIUM 9.3   PROT 6.0   ALBUMIN 3.2*   BILITOT 0.3   ALKPHOS 110   AST 47*   ALT 21   ANIONGAP 10       Significant Imaging: I have reviewed all pertinent imaging results/findings within the past 24 hours.    Assessment/Plan:     * Acute metabolic encephalopathy  In the background of hyponatremia  Also encephalopathic from opioid use  Check urine drug screen        Cystitis  Possible cystitis  Check cultures  Maintain iv abx      Acute pneumonia  Has to r/o aspiration pneumonia  Maintain iv zosyn      Acute  "hyponatremia  Gentle iv hydration and reassess later      Opioid dependence with current use  Aware       Chronic obstructive pulmonary disease, unspecified COPD type  Stable  Duonebs PRN basis    Dependence on nicotine from cigarettes  Aware       VTE Risk Mitigation (From admission, onward)           Ordered     IP VTE LOW RISK PATIENT  Once         03/24/24 1923     Place sequential compression device  Until discontinued         03/24/24 1923                               Pharmacist Renal Dose Adjustment Note    Floresita Martin is a 64 y.o. female being treated with the medication Levofloxacin    Patient Data:    Vital Signs (Most Recent):  Temp: 98.1 °F (36.7 °C) (03/24/24 1411)  Pulse: 100 (03/24/24 1844)  Resp: 13 (03/24/24 1844)  BP: 109/68 (03/24/24 1411)  SpO2: (!) 94 % (03/24/24 1844) Vital Signs (72h Range):  Temp:  [98.1 °F (36.7 °C)]   Pulse:  [100]   Resp:  [13-16]   BP: (109)/(68)   SpO2:  [82 %-100 %]        Ht: 5' 1" (1.549 m)  Wt: 49.9 kg (110 lb)  Estimated Creatinine Clearance: 48.9 mL/min (based on SCr of 0.9 mg/dL).  Body mass index is 20.78 kg/m².    Per [facility] renal dosing protocol:     Previous Order: Levofloxacin 500 mg Q24H    Will be changed to:     New Order: Levofloxacin 500 mg once followed by Levofloxacin 250 mg Q24H,    Due to: CrCl < 50 mL/min    Renal dose adjustments performed as noted above.    We will continue monitoring and adjusting as necessary.    Pharmacist: Chester Godinez, PharmD  Ext: [phone]        Kris Townsend MD  Department of Hospital Medicine  Formerly [facility] - Emergency Dept          "

## 2024-03-25 NOTE — SUBJECTIVE & OBJECTIVE
Past Medical History:   Diagnosis Date    Alcohol abuse     Cellulitis of toe of right foot 2019    Headache     Hearing loss     HLD (hyperlipidemia)        Past Surgical History:   Procedure Laterality Date    BACK SURGERY       SECTION      2    COLONOSCOPY N/A 11/15/2017    Procedure: COLONOSCOPY;  Surgeon: Peg Powers MD;  Location: [facility];  Service: Endoscopy;  Laterality: N/A;    EPIDURAL STEROID INJECTION INTO CERVICAL SPINE      EPIDURAL STEROID INJECTION INTO LUMBAR SPINE      JOINT REPLACEMENT  2018    LIPOMA RESECTION Right 2023    Procedure: EXCISION, LIPOMA;  Surgeon: Ruben Brooks Jr., MD;  Location: [facility];  Service: General;  Laterality: Right;    LUMBAR FUSION  2019    with cage    LUNG BIOPSY      NECK SURGERY  2018    with hardware    RADIOFREQUENCY ABLATION      cervical    RADIOFREQUENCY ABLATION      lumbar    SPINE SURGERY  2018    STAPEDECTOMY Right 2015    STAPEDECTOMY Left     TUBAL LIGATION  1990       Review of patient's allergies indicates:   Allergen Reactions    Codeine Nausea And Vomiting    Tylenol-codeine [acetaminophen-codeine] Nausea And Vomiting       No current facility-administered medications on file prior to encounter.     Current Outpatient Medications on File Prior to Encounter   Medication Sig    albuterol (PROVENTIL/VENTOLIN HFA) 90 mcg/actuation inhaler Inhale 2 puffs into the lungs every 6 (six) hours as needed for Wheezing or Shortness of Breath. Rescue    albuterol-ipratropium (DUO-NEB) 2.5 mg-0.5 mg/3 mL nebulizer solution Take 3 mLs by nebulization every 6 (six) hours as needed for Wheezing or Shortness of Breath. Rescue    amitriptyline (ELAVIL) 25 MG tablet Take 1-2 tablet by mouth every night at bedtime    atorvastatin (LIPITOR) 40 MG tablet Take 1 tablet (40 mg total) by mouth once daily.    co-enzyme Q-10 30 mg capsule Take 30 mg by mouth once daily.    fluticasone propionate (FLONASE) 50 mcg/actuation  nasal spray 1 spray (50 mcg total) by Each Nostril route once daily.    fluticasone-umeclidin-vilanter (TRELEGY ELLIPTA) 200-62.5-25 mcg inhaler Inhale 1 puff into the lungs once daily.    ibuprofen (ADVIL,MOTRIN) 800 MG tablet Take 1 tablet by mouth three times a day as needed for pain    lactulose (CHRONULAC) 10 gram/15 mL solution Take 15 mls by mouth twice daily as needed    lactulose (CHRONULAC) 10 gram/15 mL solution Take 15 ml twice a day as needed (Patient not taking: Reported on 2/27/2024)    lactulose (CHRONULAC) 10 gram/15 mL solution Take 15 mLs (10 g total) by mouth 2 (two) times daily. (Patient not taking: Reported on 2/27/2024)    lactulose (CHRONULAC) 10 gram/15 mL solution Take 15 mLs (10 g total) by mouth 2 (two) times daily as needed.    loratadine (CLARITIN) 10 mg tablet Take 1 tablet (10 mg total) by mouth once daily.    losartan (COZAAR) 50 MG tablet Take 1 tablet (50 mg total) by mouth once daily.    lubiprostone (AMITIZA) 24 MCG Cap Take 1 capsule by mouth twice a day as needed for pain (Patient not taking: Reported on 2/27/2024)    morphine (MS CONTIN) 30 MG 12 hr tablet Take 1 tablet by mouth every twelve hours More than 7 days supply is medically necessary    morphine (MS CONTIN) 30 MG 12 hr tablet Take 1 tablet by mouth twice a day as needed for pain More than a 7 day supply are medically necessary (Patient not taking: Reported on 2/27/2024)    morphine (MS CONTIN) 30 MG 12 hr tablet Take 1 tablet by mouth twice a day as needed for pain    morphine (MS CONTIN) 30 MG 12 hr tablet Take 1 tablet by mouth twice a day as needed for pain (Patient not taking: Reported on 2/27/2024)    morphine (MS CONTIN) 30 MG 12 hr tablet Take 1 tablet by mouth every twelve hours More than 7 days supply is medically necessary (Patient not taking: Reported on 2/27/2024)    [START ON 4/17/2024] morphine (MS CONTIN) 30 MG 12 hr tablet Take 1 tablet by mouth every twelve hours More than 7 days supply is medically  necessary    morphine (MS CONTIN) 30 MG 12 hr tablet Take 1 tablet by mouth every twelve hours More than 7 days supply is medically necessary    multivitamin capsule Take 1 capsule by mouth once daily.    naloxone (NARCAN) 4 mg/actuation Spry Spray 1 spray into one nostril single dose may repeat every 2-3 minutes until responsive or EMS arrives    omeprazole (PRILOSEC) 40 MG capsule TAKE 1 CAPSULE(40 MG) BY MOUTH EVERY DAY    ondansetron (ZOFRAN-ODT) 4 MG TbDL Take 1 tablet (4 mg total) by mouth every 6 (six) hours as needed (nausea).    oxyCODONE (ROXICODONE) 20 mg Tab immediate release tablet 1 tablet by mouth five times a day (Patient not taking: Reported on 2/27/2024)    oxyCODONE (ROXICODONE) 20 mg Tab immediate release tablet Take 1 tablet by mouth five times a day as needed for pain More than 7 days supply is medically necessary    oxyCODONE (ROXICODONE) 20 mg Tab immediate release tablet Take 1 tablet by mouth five times a day as needed for pain More than a 7 day supply are medically necessary (Patient not taking: Reported on 2/27/2024)    oxyCODONE (ROXICODONE) 20 mg Tab immediate release tablet Take 1 tablet by mouth five times a day as needed for pain More than a 7 day supply are medically necessary (Patient not taking: Reported on 2/27/2024)    oxyCODONE (ROXICODONE) 20 mg Tab immediate release tablet Take 1 tablet by mouth five times a day as needed for pain More than 7 days supply is medically necessary (Patient not taking: Reported on 2/27/2024)    [START ON 4/9/2024] oxyCODONE (ROXICODONE) 20 mg Tab immediate release tablet Take 1 tablet by mouth five times a day as needed for pain More than 7 days supply is medically necessary    oxyCODONE (ROXICODONE) 20 mg Tab immediate release tablet Take 1 tablet by mouth five times a day as needed for pain More than 7 days supply is medically necessary    pregabalin (LYRICA) 75 MG capsule Take 1 capsule by mouth three times a day More than 7 days supply is  medically necessary    RESTASIS 0.05 % ophthalmic emulsion      Family History       Problem Relation (Age of Onset)    Arthritis Mother    Breast cancer Sister    Cancer Sister, Sister, Sister, Sister    Diabetes Mother    Hypertension Father    No Known Problems Sister          Tobacco Use    Smoking status: Every Day     Current packs/day: 1.50     Average packs/day: 1.5 packs/day for 30.0 years (45.0 ttl pk-yrs)     Types: Cigarettes    Smokeless tobacco: Never   Substance and Sexual Activity    Alcohol use: Yes     Comment: social     Drug use: No    Sexual activity: Not Currently     Partners: Male     Birth control/protection: Post-menopausal     Review of Systems   Unable to perform ROS: Mental status change     Objective:     Vital Signs (Most Recent):  Temp: 98.1 °F (36.7 °C) (03/24/24 1411)  Pulse: 100 (03/24/24 1844)  Resp: 13 (03/24/24 1844)  BP: 109/68 (03/24/24 1411)  SpO2: (!) 94 % (03/24/24 1844) Vital Signs (24h Range):  Temp:  [98.1 °F (36.7 °C)] 98.1 °F (36.7 °C)  Pulse:  [100] 100  Resp:  [13-16] 13  SpO2:  [82 %-100 %] 94 %  BP: (109)/(68) 109/68     Weight: 49.9 kg (110 lb)  Body mass index is 20.78 kg/m².     Physical Exam  Vitals and nursing note reviewed.   Constitutional:       General: She is not in acute distress.     Comments: Confused    HENT:      Head: Atraumatic.      Right Ear: External ear normal.      Left Ear: External ear normal.      Nose: Nose normal.      Mouth/Throat:      Mouth: Mucous membranes are moist.   Eyes:      Extraocular Movements: Extraocular movements intact.   Cardiovascular:      Rate and Rhythm: Normal rate.   Pulmonary:      Effort: Pulmonary effort is normal.   Abdominal:      Palpations: Abdomen is soft.   Musculoskeletal:         General: Normal range of motion.      Cervical back: Normal range of motion.   Skin:     General: Skin is warm.   Neurological:      Mental Status: She is disoriented.   Psychiatric:         Behavior: Behavior normal.                 Significant Labs: All pertinent labs within the past 24 hours have been reviewed.  CBC:   Recent Labs   Lab 03/24/24  1443   WBC 9.31   HGB 9.1*   HCT 27.3*        CMP:   Recent Labs   Lab 03/24/24  1443   *   K 4.3      CO2 19*   GLU 91   BUN 26*   CREATININE 0.9   CALCIUM 9.3   PROT 6.0   ALBUMIN 3.2*   BILITOT 0.3   ALKPHOS 110   AST 47*   ALT 21   ANIONGAP 10       Significant Imaging: I have reviewed all pertinent imaging results/findings within the past 24 hours.

## 2024-03-25 NOTE — PROGRESS NOTES
[facility] Emergency Dept  Hospital Medicine  Progress Note    Patient Name: Floresita Martin  MRN: [id number]  Patient Class: IP- Inpatient   Admission Date: 3/24/2024  Length of Stay: 1 days  Attending Physician: Giuseppe Fox MD  Primary Care Provider: Toni Hughes MD        Subjective:     Principal Problem:Acute metabolic encephalopathy        HPI:  64 year old pt getting admitted with acute metabolic encephalopathy/acute hyponatremia/acute pneumonia/suspected cystitis  Pt was very confused since yesterday   She was hallucinating ans was seeing birds inside the house and was very sleepy   called EMS and pt was brought to ER  Pt has H/o opioid dependence and on a variety  of pain meds as per chart review     Overview/Hospital Course:  Patient got admitted due to acute hypoxic respiratory failure and pneumonia.  Blood cultures and respiratory culture obtained.  Patient is started on IV Zosyn.    Interval History:  Patient reported she wants to go home, I told her that she is still on 2 L O2 NC and her cultures are pending, and she needs to be here in hospital today for treatment.  Blood culture and respiratory culture pending, procalcitonin 4.361, on IV Zosyn.  Patient requested to resume home med oxycodone.    Review of Systems   Unable to perform ROS: Acuity of condition     Objective:     Vital Signs (Most Recent):  Temp: 98.1 °F (36.7 °C) (03/24/24 1411)  Pulse: 82 (03/25/24 1001)  Resp: (!) 23 (03/25/24 1001)  BP: (!) 166/79 (03/25/24 1001)  SpO2: 100 % (03/25/24 1001) Vital Signs (24h Range):  Temp:  [98.1 °F (36.7 °C)] 98.1 °F (36.7 °C)  Pulse:  [] 82  Resp:  [13-23] 23  SpO2:  [82 %-100 %] 100 %  BP: (105-166)/(60-90) 166/79     Weight: 49.9 kg (110 lb)  Body mass index is 20.78 kg/m².    Intake/Output Summary (Last 24 hours) at 3/25/2024 1337  Last data filed at 3/24/2024 2343  Gross per 24 hour   Intake 412.5 ml   Output --   Net 412.5 ml         Physical  Exam  Constitutional:       General: She is not in acute distress.     Appearance: She is ill-appearing.      Comments: Looks anxious    HENT:      Head: Normocephalic.      Nose: Nose normal.   Eyes:      Extraocular Movements: Extraocular movements intact.   Cardiovascular:      Rate and Rhythm: Normal rate.   Pulmonary:      Effort: No respiratory distress.   Abdominal:      Tenderness: There is no abdominal tenderness.   Musculoskeletal:         General: No swelling.   Skin:     General: Skin is warm.      Capillary Refill: Capillary refill takes less than 2 seconds.   Neurological:      Comments: Alert and Oriented x2             Significant Labs: All pertinent labs within the past 24 hours have been reviewed.  CBC:   Recent Labs   Lab 03/24/24  1443 03/25/24  0710   WBC 9.31 12.64   HGB 9.1* 9.9*   HCT 27.3* 28.8*    259     CMP:   Recent Labs   Lab 03/24/24  1443 03/25/24  0710   * 135*   K 4.3 3.8    106   CO2 19* 24   GLU 91 100   BUN 26* 11   CREATININE 0.9 0.6   CALCIUM 9.3 9.1   PROT 6.0 5.7*   ALBUMIN 3.2* 3.0*   BILITOT 0.3 0.6   ALKPHOS 110 106   AST 47* 27   ALT 21 19   ANIONGAP 10 5*     Magnesium:   Recent Labs   Lab 03/25/24  0710   MG 1.6       Significant Imaging: I have reviewed all pertinent imaging results/findings within the past 24 hours.    Assessment/Plan:      * Acute metabolic encephalopathy  In the background of hyponatremia  Also encephalopathic from opioid use  urine drug screen positive for opiate     Fall precautions  Delirium precautions   Improving          Acute hypoxic respiratory failure  Patient with Hypoxic Respiratory failure which is Acute.  she is not on home oxygen. Supplemental oxygen was provided and noted-      .   Signs/symptoms of respiratory failure include- tachypnea. Contributing diagnoses includes - Pneumonia Labs and images were reviewed. Patient Has not had a recent ABG. Will treat underlying causes and adjust management of respiratory  failure as follows-     Supplemental oxygen, wean as able     Cystitis  Possible cystitis  Check cultures  Maintain iv abx      Acute pneumonia  Blood culture and respiratory culture pending   iv zosyn      Acute hyponatremia  Gentle iv hydration  Monitor       Opioid dependence with current use  Aware       Chronic obstructive pulmonary disease, unspecified COPD type  Stable  Duonebs PRN basis    Dependence on nicotine from cigarettes  Aware       VTE Risk Mitigation (From admission, onward)           Ordered     IP VTE LOW RISK PATIENT  Once         03/24/24 1923     Place sequential compression device  Until discontinued         03/24/24 1923                    Discharge Planning   GRACE:      Code Status: Full Code   Is the patient medically ready for discharge?:     Reason for patient still in hospital (select all that apply): Treatment  Discharge Plan A: Home with family                  Giuseppe Fox MD  Department of Hospital Medicine   [facility] - Emergency Dept

## 2024-03-25 NOTE — HOSPITAL COURSE
Patient got admitted due to acute hypoxic respiratory failure and pneumonia.  Blood cultures and respiratory culture obtained.  Patient is started on IV Zosyn.  Blood cultures grew Pseudomonas, repeat blood cultures sent, Infectious Disease consulted.  Anemia workup including iron studies, vitamin B12 and folic acid levels, stool for occult blood ordered. At night, patient left against medical advice.

## 2024-03-26 ENCOUNTER — CLINICAL SUPPORT (OUTPATIENT)
Dept: CARDIOLOGY | Facility: HOSPITAL | Age: 64
DRG: 070 | End: 2024-03-26
Attending: INTERNAL MEDICINE
Payer: MEDICARE

## 2024-03-26 ENCOUNTER — CLINICAL SUPPORT (OUTPATIENT)
Dept: SMOKING CESSATION | Facility: CLINIC | Age: 64
End: 2024-03-26
Payer: MEDICARE

## 2024-03-26 VITALS
OXYGEN SATURATION: 100 % | SYSTOLIC BLOOD PRESSURE: 142 MMHG | HEIGHT: 61 IN | BODY MASS INDEX: 22.07 KG/M2 | WEIGHT: 116.88 LBS | DIASTOLIC BLOOD PRESSURE: 72 MMHG | HEART RATE: 82 BPM | TEMPERATURE: 98 F | RESPIRATION RATE: 19 BRPM

## 2024-03-26 VITALS — WEIGHT: 116 LBS | BODY MASS INDEX: 21.9 KG/M2 | HEIGHT: 61 IN

## 2024-03-26 DIAGNOSIS — F17.200 NICOTINE DEPENDENCE: Primary | ICD-10-CM

## 2024-03-26 PROBLEM — I10 HYPERTENSION, ESSENTIAL: Status: ACTIVE | Noted: 2024-03-26

## 2024-03-26 PROBLEM — D64.9 NORMOCYTIC ANEMIA: Status: ACTIVE | Noted: 2024-03-26

## 2024-03-26 PROBLEM — R78.81 BACTEREMIA DUE TO PSEUDOMONAS: Status: ACTIVE | Noted: 2024-03-26

## 2024-03-26 PROBLEM — B96.5 BACTEREMIA DUE TO PSEUDOMONAS: Status: ACTIVE | Noted: 2024-03-26

## 2024-03-26 LAB
ALBUMIN SERPL BCP-MCNC: 3.1 G/DL (ref 3.5–5.2)
ALP SERPL-CCNC: 114 U/L (ref 55–135)
ALT SERPL W/O P-5'-P-CCNC: 20 U/L (ref 10–44)
ANION GAP SERPL CALC-SCNC: 9 MMOL/L (ref 8–16)
ANISOCYTOSIS BLD QL SMEAR: SLIGHT
AORTIC ROOT ANNULUS: 3 CM
AORTIC VALVE CUSP SEPERATION: 1.8 CM
AST SERPL-CCNC: 22 U/L (ref 10–40)
AV INDEX (PROSTH): 0.77
AV MEAN GRADIENT: 3 MMHG
AV PEAK GRADIENT: 6 MMHG
AV VALVE AREA BY VELOCITY RATIO: 2.39 CM²
AV VALVE AREA: 2.43 CM²
AV VELOCITY RATIO: 0.76
BASOPHILS # BLD AUTO: 0.02 K/UL (ref 0–0.2)
BASOPHILS NFR BLD: 0.2 % (ref 0–1.9)
BILIRUB SERPL-MCNC: 0.6 MG/DL (ref 0.1–1)
BSA FOR ECHO PROCEDURE: 1.5 M2
BUN SERPL-MCNC: 7 MG/DL (ref 8–23)
CALCIUM SERPL-MCNC: 9.2 MG/DL (ref 8.7–10.5)
CHLORIDE SERPL-SCNC: 103 MMOL/L (ref 95–110)
CO2 SERPL-SCNC: 25 MMOL/L (ref 23–29)
CREAT SERPL-MCNC: 0.6 MG/DL (ref 0.5–1.4)
CV ECHO LV RWT: 0.38 CM
DIFFERENTIAL METHOD BLD: ABNORMAL
DOP CALC AO PEAK VEL: 1.22 M/S
DOP CALC AO VTI: 27.8 CM
DOP CALC LVOT AREA: 3.1 CM2
DOP CALC LVOT DIAMETER: 2 CM
DOP CALC LVOT PEAK VEL: 0.93 M/S
DOP CALC LVOT STROKE VOLUME: 67.51 CM3
DOP CALC MV VTI: 23.6 CM
DOP CALCLVOT PEAK VEL VTI: 21.5 CM
E WAVE DECELERATION TIME: 162 MSEC
E/A RATIO: 0.99
E/E' RATIO: 7.17 M/S
ECHO LV POSTERIOR WALL: 0.87 CM (ref 0.6–1.1)
EOSINOPHIL # BLD AUTO: 0.1 K/UL (ref 0–0.5)
EOSINOPHIL NFR BLD: 0.8 % (ref 0–8)
ERYTHROCYTE [DISTWIDTH] IN BLOOD BY AUTOMATED COUNT: 15.6 % (ref 11.5–14.5)
EST. GFR  (NO RACE VARIABLE): >60 ML/MIN/1.73 M^2
FERRITIN SERPL-MCNC: 417.2 NG/ML (ref 20–300)
FOLATE SERPL-MCNC: >22.3 NG/ML (ref 4–24)
FRACTIONAL SHORTENING: 36 % (ref 28–44)
GLUCOSE SERPL-MCNC: 93 MG/DL (ref 70–110)
HCT VFR BLD AUTO: 29.3 % (ref 37–48.5)
HGB BLD-MCNC: 10.2 G/DL (ref 12–16)
IMM GRANULOCYTES # BLD AUTO: 0.06 K/UL (ref 0–0.04)
IMM GRANULOCYTES NFR BLD AUTO: 0.7 % (ref 0–0.5)
INTERVENTRICULAR SEPTUM: 1.05 CM (ref 0.6–1.1)
IRON SERPL-MCNC: 31 UG/DL (ref 30–160)
IVC DIAMETER: 0.94 CM
LEFT INTERNAL DIMENSION IN SYSTOLE: 2.96 CM (ref 2.1–4)
LEFT VENTRICLE DIASTOLIC VOLUME INDEX: 65.22 ML/M2
LEFT VENTRICLE DIASTOLIC VOLUME: 97.83 ML
LEFT VENTRICLE MASS INDEX: 101 G/M2
LEFT VENTRICLE SYSTOLIC VOLUME INDEX: 22.6 ML/M2
LEFT VENTRICLE SYSTOLIC VOLUME: 33.87 ML
LEFT VENTRICULAR INTERNAL DIMENSION IN DIASTOLE: 4.61 CM (ref 3.5–6)
LEFT VENTRICULAR MASS: 150.75 G
LV LATERAL E/E' RATIO: 8 M/S
LV SEPTAL E/E' RATIO: 6.5 M/S
LVOT MG: 2 MMHG
LVOT MV: 0.62 CM/S
LYMPHOCYTES # BLD AUTO: 1.7 K/UL (ref 1–4.8)
LYMPHOCYTES NFR BLD: 20 % (ref 18–48)
MAGNESIUM SERPL-MCNC: 1.8 MG/DL (ref 1.6–2.6)
MCH RBC QN AUTO: 30.6 PG (ref 27–31)
MCHC RBC AUTO-ENTMCNC: 34.8 G/DL (ref 32–36)
MCV RBC AUTO: 88 FL (ref 82–98)
MONOCYTES # BLD AUTO: 0.7 K/UL (ref 0.3–1)
MONOCYTES NFR BLD: 7.9 % (ref 4–15)
MV MEAN GRADIENT: 2 MMHG
MV PEAK A VEL: 1.05 M/S
MV PEAK E VEL: 1.04 M/S
MV PEAK GRADIENT: 5 MMHG
MV STENOSIS PRESSURE HALF TIME: 59 MS
MV VALVE AREA BY CONTINUITY EQUATION: 2.86 CM2
MV VALVE AREA P 1/2 METHOD: 3.73 CM2
NEUTROPHILS # BLD AUTO: 6 K/UL (ref 1.8–7.7)
NEUTROPHILS NFR BLD: 70.4 % (ref 38–73)
NRBC BLD-RTO: 0 /100 WBC
OB PNL STL: POSITIVE
OHS LV EJECTION FRACTION SIMPSONS BIPLANE MOD: 61 %
PISA MRMAX VEL: 2.84 M/S
PISA TR MAX VEL: 2.76 M/S
PLATELET # BLD AUTO: 291 K/UL (ref 150–450)
PLATELET BLD QL SMEAR: ABNORMAL
PMV BLD AUTO: 9.3 FL (ref 9.2–12.9)
POTASSIUM SERPL-SCNC: 3.6 MMOL/L (ref 3.5–5.1)
PROT SERPL-MCNC: 6.1 G/DL (ref 6–8.4)
PV MV: 0.65 M/S
PV PEAK GRADIENT: 3 MMHG
PV PEAK VELOCITY: 0.87 M/S
RA PRESSURE ESTIMATED: 3 MMHG
RBC # BLD AUTO: 3.33 M/UL (ref 4–5.4)
RV TB RVSP: 6 MMHG
SATURATED IRON: 12 % (ref 20–50)
SODIUM SERPL-SCNC: 137 MMOL/L (ref 136–145)
TDI LATERAL: 0.13 M/S
TDI SEPTAL: 0.16 M/S
TDI: 0.15 M/S
TOTAL IRON BINDING CAPACITY: 259 UG/DL (ref 250–450)
TR MAX PG: 30 MMHG
TRANSFERRIN SERPL-MCNC: 185 MG/DL (ref 200–375)
TRICUSPID ANNULAR PLANE SYSTOLIC EXCURSION: 1.37 CM
TV REST PULMONARY ARTERY PRESSURE: 33 MMHG
VIT B12 SERPL-MCNC: >1500 PG/ML (ref 210–950)
WBC # BLD AUTO: 8.51 K/UL (ref 3.9–12.7)
Z-SCORE OF LEFT VENTRICULAR DIMENSION IN END DIASTOLE: 0.39
Z-SCORE OF LEFT VENTRICULAR DIMENSION IN END SYSTOLE: 0.58

## 2024-03-26 PROCEDURE — 99900035 HC TECH TIME PER 15 MIN (STAT)

## 2024-03-26 PROCEDURE — 99223 1ST HOSP IP/OBS HIGH 75: CPT | Mod: ,,, | Performed by: INTERNAL MEDICINE

## 2024-03-26 PROCEDURE — 82272 OCCULT BLD FECES 1-3 TESTS: CPT | Performed by: INTERNAL MEDICINE

## 2024-03-26 PROCEDURE — 94761 N-INVAS EAR/PLS OXIMETRY MLT: CPT

## 2024-03-26 PROCEDURE — 83540 ASSAY OF IRON: CPT | Performed by: INTERNAL MEDICINE

## 2024-03-26 PROCEDURE — 82607 VITAMIN B-12: CPT | Performed by: INTERNAL MEDICINE

## 2024-03-26 PROCEDURE — 85025 COMPLETE CBC W/AUTO DIFF WBC: CPT | Performed by: INTERNAL MEDICINE

## 2024-03-26 PROCEDURE — 82746 ASSAY OF FOLIC ACID SERUM: CPT | Performed by: INTERNAL MEDICINE

## 2024-03-26 PROCEDURE — 36415 COLL VENOUS BLD VENIPUNCTURE: CPT | Performed by: INTERNAL MEDICINE

## 2024-03-26 PROCEDURE — 93306 TTE W/DOPPLER COMPLETE: CPT | Mod: 26,,, | Performed by: INTERNAL MEDICINE

## 2024-03-26 PROCEDURE — 25000003 PHARM REV CODE 250: Performed by: INTERNAL MEDICINE

## 2024-03-26 PROCEDURE — 80053 COMPREHEN METABOLIC PANEL: CPT | Performed by: INTERNAL MEDICINE

## 2024-03-26 PROCEDURE — 63600175 PHARM REV CODE 636 W HCPCS: Performed by: INTERNAL MEDICINE

## 2024-03-26 PROCEDURE — 99406 BEHAV CHNG SMOKING 3-10 MIN: CPT

## 2024-03-26 PROCEDURE — 83735 ASSAY OF MAGNESIUM: CPT | Performed by: INTERNAL MEDICINE

## 2024-03-26 PROCEDURE — 12000002 HC ACUTE/MED SURGE SEMI-PRIVATE ROOM

## 2024-03-26 PROCEDURE — 99406 BEHAV CHNG SMOKING 3-10 MIN: CPT | Mod: S$GLB,,, | Performed by: INTERNAL MEDICINE

## 2024-03-26 PROCEDURE — 87040 BLOOD CULTURE FOR BACTERIA: CPT | Mod: 59 | Performed by: INTERNAL MEDICINE

## 2024-03-26 PROCEDURE — 94760 N-INVAS EAR/PLS OXIMETRY 1: CPT

## 2024-03-26 PROCEDURE — 93306 TTE W/DOPPLER COMPLETE: CPT

## 2024-03-26 PROCEDURE — 82728 ASSAY OF FERRITIN: CPT | Performed by: INTERNAL MEDICINE

## 2024-03-26 RX ORDER — HYDRALAZINE HYDROCHLORIDE 20 MG/ML
5 INJECTION INTRAMUSCULAR; INTRAVENOUS ONCE
Status: COMPLETED | OUTPATIENT
Start: 2024-03-26 | End: 2024-03-26

## 2024-03-26 RX ORDER — MAGNESIUM SULFATE 1 G/100ML
1 INJECTION INTRAVENOUS ONCE
Status: COMPLETED | OUTPATIENT
Start: 2024-03-26 | End: 2024-03-26

## 2024-03-26 RX ORDER — HYDRALAZINE HYDROCHLORIDE 20 MG/ML
5 INJECTION INTRAMUSCULAR; INTRAVENOUS EVERY 6 HOURS PRN
Status: DISCONTINUED | OUTPATIENT
Start: 2024-03-26 | End: 2024-03-27 | Stop reason: HOSPADM

## 2024-03-26 RX ORDER — LOSARTAN POTASSIUM 50 MG/1
50 TABLET ORAL DAILY
Status: DISCONTINUED | OUTPATIENT
Start: 2024-03-26 | End: 2024-03-27 | Stop reason: HOSPADM

## 2024-03-26 RX ORDER — FAMOTIDINE 20 MG/1
20 TABLET, FILM COATED ORAL 2 TIMES DAILY
Status: DISCONTINUED | OUTPATIENT
Start: 2024-03-26 | End: 2024-03-27 | Stop reason: HOSPADM

## 2024-03-26 RX ORDER — OXYCODONE HYDROCHLORIDE 10 MG/1
10 TABLET ORAL ONCE
Status: COMPLETED | OUTPATIENT
Start: 2024-03-26 | End: 2024-03-26

## 2024-03-26 RX ORDER — HYDRALAZINE HYDROCHLORIDE 20 MG/ML
10 INJECTION INTRAMUSCULAR; INTRAVENOUS ONCE
Status: COMPLETED | OUTPATIENT
Start: 2024-03-26 | End: 2024-03-26

## 2024-03-26 RX ADMIN — MAGNESIUM SULFATE HEPTAHYDRATE 1 G: 1 INJECTION, SOLUTION INTRAVENOUS at 09:03

## 2024-03-26 RX ADMIN — PIPERACILLIN SODIUM AND TAZOBACTAM SODIUM 3.38 G: 3; .375 INJECTION, POWDER, LYOPHILIZED, FOR SOLUTION INTRAVENOUS at 03:03

## 2024-03-26 RX ADMIN — ACETAMINOPHEN 650 MG: 325 TABLET ORAL at 03:03

## 2024-03-26 RX ADMIN — OXYCODONE HYDROCHLORIDE 10 MG: 10 TABLET ORAL at 02:03

## 2024-03-26 RX ADMIN — Medication 6 MG: at 08:03

## 2024-03-26 RX ADMIN — OXYCODONE HYDROCHLORIDE 10 MG: 10 TABLET ORAL at 08:03

## 2024-03-26 RX ADMIN — PIPERACILLIN SODIUM AND TAZOBACTAM SODIUM 3.38 G: 3; .375 INJECTION, POWDER, LYOPHILIZED, FOR SOLUTION INTRAVENOUS at 11:03

## 2024-03-26 RX ADMIN — HYDRALAZINE HYDROCHLORIDE 10 MG: 20 INJECTION INTRAMUSCULAR; INTRAVENOUS at 05:03

## 2024-03-26 RX ADMIN — HYDRALAZINE HYDROCHLORIDE 5 MG: 20 INJECTION INTRAMUSCULAR; INTRAVENOUS at 05:03

## 2024-03-26 RX ADMIN — OXYCODONE HYDROCHLORIDE 10 MG: 10 TABLET ORAL at 07:03

## 2024-03-26 RX ADMIN — LOSARTAN POTASSIUM 50 MG: 50 TABLET, FILM COATED ORAL at 12:03

## 2024-03-26 RX ADMIN — HYDRALAZINE HYDROCHLORIDE 5 MG: 20 INJECTION INTRAMUSCULAR; INTRAVENOUS at 12:03

## 2024-03-26 RX ADMIN — PIPERACILLIN SODIUM AND TAZOBACTAM SODIUM 3.38 G: 3; .375 INJECTION, POWDER, LYOPHILIZED, FOR SOLUTION INTRAVENOUS at 07:03

## 2024-03-26 RX ADMIN — FAMOTIDINE 20 MG: 20 TABLET ORAL at 08:03

## 2024-03-26 RX ADMIN — OXYCODONE HYDROCHLORIDE 10 MG: 10 TABLET ORAL at 01:03

## 2024-03-26 RX ADMIN — ATORVASTATIN CALCIUM 40 MG: 40 TABLET, FILM COATED ORAL at 08:03

## 2024-03-26 NOTE — PROGRESS NOTES
Pharmacist Renal Dose Adjustment Note    Floresita Martin is a 64 y.o. female being treated with the medication famotidine    Patient Data:    Vital Signs (Most Recent):  Temp: 98.7 °F (37.1 °C) (03/26/24 0442)  Pulse: 65 (03/26/24 0442)  Resp: 20 (03/26/24 0442)  BP: (!) 167/79 (03/26/24 0442)  SpO2: 95 % (03/26/24 0442) Vital Signs (72h Range):  Temp:  [98.1 °F (36.7 °C)-98.7 °F (37.1 °C)]   Pulse:  []   Resp:  [13-34]   BP: (105-183)/(60-91)   SpO2:  [82 %-100 %]      Recent Labs   Lab 03/24/24  1443 03/25/24  0710 03/26/24  0432   CREATININE 0.9 0.6 0.6     Serum creatinine: 0.6 mg/dL 03/26/24 0432  Estimated creatinine clearance: 73.4 mL/min    Medication:famotidine dose: 20 mg frequency Q24H will be changed to medication:famotidine dose:20 mg frequency:BID  Reason: CrCl > 50 mL/min     Pharmacist's Name: Skyler Rucker  Pharmacist's Extension: [phone]

## 2024-03-26 NOTE — PLAN OF CARE
Plan of care reviewed with patient including safety measures, pain management, and assist with ADL's.  Questions answered.  Patient verbalizes understanding of plan of care.

## 2024-03-26 NOTE — PROGRESS NOTES
03/26/24 1130   Tobacco Cessation Intervention   Do you use any type of tobacco product? Yes   Are you interested in quitting use of tobacco products? Thinking about quitting  (Patient states she has cut back.)   Are you interested in Nicotine Replacement for symptom relief? No   $ Smoking Cessation Charges Smoking Cessation - Intermediate (Non-CTTS)

## 2024-03-26 NOTE — CONSULTS
[facility]   Department of Infectious Disease  Consult Note        PATIENT NAME: Floresita Martin  MRN: [id number]  TODAY'S DATE: 03/26/2024  ADMIT DATE: 3/24/2024    CHIEF COMPLAINT: Altered Mental Status (Pt arrived by ems from home, pt was noted by family to be altered starting around 1000 yesterday. Pt family reports hallucinations and fatigue. Pt is prescribed oxycodone and morphine, unknown if she took any)    PRINCIPLE PROBLEM: Acute metabolic encephalopathy    REASON FOR CONSULT:     ASSESSMENT and PLAN     1.  Pseudomonas aeruginosa bacteremia.  Etiology of this is unclear at this time.  She does have pneumonia with reticulonodular infiltrates.  Check ESR, CRP, TTE and repeat chest x-ray two views.  May need CT chest without contrast.    2. Hyponatremia.  Maybe related to 1.  Management as per hospitalist.    3. Normal MCV anemia.  Management as per hospitalist.        Recommendations:  Await sensitivity on Pseudomonas.  Check CRP, ESR, TTE and repeat chest x-ray two views.    Thank you for this consult. Please send ZAP secure chat with any questions.    HPI      Floresita Martin is a 64 y.o. female with history of chronic pain syndrome on opiates, alcohol abuse.  Also previous history of cervical and lumbar spine surgeries with hardware.  She presents to the ER 03/24/2024 with hallucinations though primarily visual which was seen birds in her home.  Also was confused.  Vitals in the ER unremarkable except for heart rate of 100.  Physical examination was unremarkable.  UA was normal.  CT head with no acute findings .  Chest x-ray showed bilateral lower lobe interstitial infiltrate with reticulonodular pattern.  Lab data significant for hyponatremia with sodium 129, hematocrit 27, lactic acid 0.8, procalcitonin 4.3.  She was admitted and placed on antibiotics.  Blood culture has grown Pseudomonas in 2/2 sets.  ID asked to assist with her care.      Microbiology   Respiratory panel 03/24/2024:  Negative  SARS-COVID-2 a and influenza assay 2024: Negative  Blood culture 2024: Pseudomonas aeruginosa 2/2 sets  Blood culture 2024: In progress    Antibiotics  Zosyn:  2024-      Outdoor activities:  Not contributory  Travel:  No recent travel  Implants:  Cervical and lumbar spine hardware  Antibiotic history:  Reviewed    Past Medical History:   Diagnosis Date    Alcohol abuse     Cellulitis of toe of right foot 2019    Headache     Hearing loss     HLD (hyperlipidemia)        Past Surgical History:   Procedure Laterality Date    BACK SURGERY       SECTION      2    COLONOSCOPY N/A 11/15/2017    Procedure: COLONOSCOPY;  Surgeon: Peg Powers MD;  Location: [facility];  Service: Endoscopy;  Laterality: N/A;    EPIDURAL STEROID INJECTION INTO CERVICAL SPINE      EPIDURAL STEROID INJECTION INTO LUMBAR SPINE      JOINT REPLACEMENT  2018    LIPOMA RESECTION Right 2023    Procedure: EXCISION, LIPOMA;  Surgeon: Ruben Brooks Jr., MD;  Location: [facility];  Service: General;  Laterality: Right;    LUMBAR FUSION      with cage    LUNG BIOPSY      NECK SURGERY  2018    with hardware    RADIOFREQUENCY ABLATION      cervical    RADIOFREQUENCY ABLATION      lumbar    SPINE SURGERY  2018    STAPEDECTOMY Right 2015    STAPEDECTOMY Left     TUBAL LIGATION  1990       Family History   Problem Relation Age of Onset    Arthritis Mother     Diabetes Mother     Hypertension Father     Cancer Sister     Breast cancer Sister     No Known Problems Sister     Cancer Sister     Cancer Sister     Cancer Sister        Social History     Socioeconomic History    Marital status: Single   Tobacco Use    Smoking status: Every Day     Current packs/day: 1.50     Average packs/day: 1.5 packs/day for 30.0 years (45.0 ttl pk-yrs)     Types: Cigarettes    Smokeless tobacco: Never   Substance and Sexual Activity    Alcohol use: Yes     Comment: social     Drug use: No    Sexual  activity: Not Currently     Partners: Male     Birth control/protection: Post-menopausal     Social Determinants of Health     Financial Resource Strain: Medium Risk (12/14/2023)    Overall Financial Resource Strain (CARDIA)     Difficulty of Paying Living Expenses: Somewhat hard   Food Insecurity: Food Insecurity Present (12/14/2023)    Hunger Vital Sign     Worried About Running Out of Food in the Last Year: Sometimes true     Ran Out of Food in the Last Year: Never true   Transportation Needs: No Transportation Needs (12/14/2023)    PRAPARE - Transportation     Lack of Transportation (Medical): No     Lack of Transportation (Non-Medical): No   Physical Activity: Unknown (12/14/2023)    Exercise Vital Sign     Days of Exercise per Week: Patient declined     Minutes of Exercise per Session: 0 min   Recent Concern: Physical Activity - Inactive (10/23/2023)    Exercise Vital Sign     Days of Exercise per Week: 0 days     Minutes of Exercise per Session: 0 min   Stress: Stress Concern Present (12/14/2023)    [employer] - Occupational Stress Questionnaire     Feeling of Stress : To some extent   Social Connections: Unknown (12/14/2023)    Social Connection and Isolation Panel [NHANES]     Frequency of Communication with Friends and Family: Three times a week     Frequency of Social Gatherings with Friends and Family: More than three times a week     Active Member of Clubs or Organizations: Yes     Attends Club or Organization Meetings: Patient declined     Marital Status: Patient declined   Housing Stability: Low Risk  (12/14/2023)    Housing Stability Vital Sign     Unable to Pay for Housing in the Last Year: No     Number of Places Lived in the Last Year: 1     Unstable Housing in the Last Year: No       Review of patient's allergies indicates:   Allergen Reactions    Codeine Nausea And Vomiting    Tylenol-codeine [acetaminophen-codeine] Nausea And Vomiting       Current Outpatient  Medications   Medication Instructions    albuterol (PROVENTIL/VENTOLIN HFA) 90 mcg/actuation inhaler 2 puffs, Inhalation, Every 6 hours PRN, Rescue    albuterol-ipratropium (DUO-NEB) 2.5 mg-0.5 mg/3 mL nebulizer solution 3 mLs, Nebulization, Every 6 hours PRN, Rescue    amitriptyline (ELAVIL) 25 MG tablet Take 1-2 tablet by mouth every night at bedtime    atorvastatin (LIPITOR) 40 mg, Oral, Daily    co-enzyme Q-10 30 mg, Oral, Daily    fluticasone propionate (FLONASE) 50 mcg, Each Nostril, Daily    fluticasone-umeclidin-vilanter (TRELEGY ELLIPTA) 200-62.5-25 mcg inhaler 1 puff, Inhalation, Daily    loratadine (CLARITIN) 10 mg, Oral, Daily    losartan (COZAAR) 50 mg, Oral, Daily    lubiprostone (AMITIZA) 24 MCG Cap Take 1 capsule by mouth twice a day as needed for pain    morphine (MS CONTIN) 30 MG 12 hr tablet Take 1 tablet by mouth every twelve hours More than 7 days supply is medically necessary    multivitamin capsule 1 capsule, Oral, Daily    naloxone (NARCAN) 4 mg/actuation Spry Spray 1 spray into one nostril single dose may repeat every 2-3 minutes until responsive or EMS arrives    omeprazole (PRILOSEC) 40 MG capsule TAKE 1 CAPSULE(40 MG) BY MOUTH EVERY DAY    ondansetron (ZOFRAN-ODT) 4 mg, Oral, Every 6 hours PRN    oxyCODONE (ROXICODONE) 20 mg Tab immediate release tablet Take 1 tablet by mouth five times a day as needed for pain More than 7 days supply is medically necessary    pregabalin (LYRICA) 75 MG capsule Take 1 capsule by mouth three times a day More than 7 days supply is medically necessary    RESTASIS 0.05 % ophthalmic emulsion 1 drop, Both Eyes, 2 times daily     Review of Systems    Constitutional:  Denies fevers, chills, night sweats, loss of appetite.  HEENT: Denies visual changes, ear pain, sinus congestion, mouth pain or trouble swallowing, sore throat or dental pain.  Neck: Denies neck pain or lumps.  Respiratory: Denies shortness of breath, coughing, wheezing or  hemoptysis.  Cardiovascular:  Denies chest pain, palpitations or edema.  Gastrointestinal: Denies  nausea, vomiting, constipation or diarrhea.  Genitourinary:  Denies dysuria, frequency, urgency or hematuria   Musculoskeletal:  Denies joint pain or swelling, difficulty walking.    Skin:  Denies rash or itching.  VAD:    Neurologic:  Denies motor sensory loss, headaches or dizziness.    Psychiatric:  Denies changes in mood or behavior.       OBJECTIVE     Temp:  [98.3 °F (36.8 °C)-98.7 °F (37.1 °C)] 98.3 °F (36.8 °C)  Pulse:  [65-87] 65  Resp:  [18-34] 18  SpO2:  [93 %-100 %] 97 %  BP: (137-183)/(72-91) 176/85       Physical Exam    General:  Middle-aged man lying quietly in bed.  Acutely ill looking, restless  Eyes: Eyes with no icterus or injection. Vision grossly normal  Ears: Hearing grossly normal.  Nose: Nares patent  Mouth: Moist mucous membranes, dentition is okay. No ulcerations, erythema or exudates.  Neck: Supple, no tenderness to palpation.  Cardiovascular: Regular rate and rhythm, no murmurs, no edema.    Respiratory:  Clear to auscultation bilaterally, no tachypnea or increased work of breathing.  Gastrointestinal:  Soft with active bowel sounds, no tenderness to palpation, no distention.  Genitourinary:  No suprapubic tenderness.  Musculoskeletal:  Moves all extremities with good strength.    Skin:  Warm and dry, no obvious rashes.    Neuro:   Oriented, conversant, follows commands.  Psych: Good mood, normal affect.     Wounds:  None  VAD:  None  ISOLATION:  None    CBC LAST 7  Recent Labs   Lab 03/24/24  1443 03/25/24  0710 03/26/24  0432   WBC 9.31 12.64 8.51   RBC 2.99* 3.23* 3.33*   HGB 9.1* 9.9* 10.2*   HCT 27.3* 28.8* 29.3*   MCV 91 89 88   MCH 30.4 30.7 30.6   MCHC 33.3 34.4 34.8   RDW 15.8* 15.9* 15.6*    259 291   MPV 9.6 9.4 9.3   GRAN 85.8*  8.0* 83.8*  10.6* 70.4  6.0   LYMPH 9.2*  0.9* 11.6*  1.5 20.0  1.7   MONO 4.1  0.4 3.7*  0.5 7.9  0.7   BASO 0.04 0.02 0.02   NRBC 0 0  "0       CHEMISTRY LAST 7  Recent Labs   Lab 03/24/24  1443 03/25/24  0710 03/26/24  0432   * 135* 137   K 4.3 3.8 3.6    106 103   CO2 19* 24 25   ANIONGAP 10 5* 9   BUN 26* 11 7*   CREATININE 0.9 0.6 0.6   GLU 91 100 93   CALCIUM 9.3 9.1 9.2   MG  --  1.6 1.8   ALBUMIN 3.2* 3.0* 3.1*   PROT 6.0 5.7* 6.1   ALKPHOS 110 106 114   ALT 21 19 20   AST 47* 27 22   BILITOT 0.3 0.6 0.6       Estimated Creatinine Clearance: 73.4 mL/min (based on SCr of 0.6 mg/dL).    INFLAMMATORY/PROCAL  LAST 7  Recent Labs   Lab 03/25/24  0710   PROCAL 4.361*     No results found for: "ESR"  CRP   Date Value Ref Range Status   01/11/2023 5.3 0.0 - 8.2 mg/L Final       PRIOR  MICROBIOLOGY:  Reviewed  Susceptibility data from last 90 days.  Collected Specimen Info Organism   03/24/24 Blood Pseudomonas aeruginosa   03/24/24 Blood Pseudomonas aeruginosa         LAST 7 DAYS MICROBIOLOGY   Microbiology Results (last 7 days)       Procedure Component Value Units Date/Time    Blood culture [[id number]] Collected: 03/26/24 0843    Order Status: Sent Specimen: Blood Updated: 03/26/24 0912    Blood culture [[id number]] Collected: 03/26/24 0843    Order Status: Sent Specimen: Blood Updated: 03/26/24 0912    Blood Culture #1 **CANNOT BE ORDERED STAT** [[id number]]  (Abnormal) Collected: 03/24/24 1602    Order Status: Completed Specimen: Blood Updated: 03/26/24 0801     Blood Culture, Routine Gram stain aer bottle: Gram negative rods      Results called to and read back by:Karma Ni RN ER  03/25/2024      13:03 JBM      PSEUDOMONAS AERUGINOSA  susceptibility pending      Blood Culture #2 **CANNOT BE ORDERED STAT** [[id number]]  (Abnormal) Collected: 03/24/24 1602    Order Status: Completed Specimen: Blood Updated: 03/26/24 0640     Blood Culture, Routine Gram stain aer bottle: Gram negative rods      Results called to and read back by:Karma Ni RN ER  03/25/2024      13:03 NATALIAM      PSEUDOMONAS AERUGINOSA  For susceptibility see order " #[id number]      Rapid Organism ID by PCR (from Blood culture) [[id number]]  (Abnormal) Collected: 03/24/24 1602    Order Status: Completed Updated: 03/25/24 1227     Enterococcus faecalis Not Detected     Enterococcus faecium Not Detected     Listeria monocytogenes Not Detected     Staphylococcus spp. Not Detected     Staphylococcus aureus Not Detected     Staphylococcus epidermidis Not Detected     Staphylococcus lugdunensis Not Detected     Streptococcus species Not Detected     Streptococcus agalactiae Not Detected     Streptococcus pneumoniae Not Detected     Streptococcus pyogenes Not Detected     Acinetobacter calcoaceticus/baumannii complex Not Detected     Bacteroides fragilis Not Detected     Enterobacterales Not Detected     Enterobacter cloacae complex Not Detected     Escherichia coli Not Detected     Klebsiella aerogenes Not Detected     Klebsiella oxytoca Not Detected     Klebsiella pneumoniae group Not Detected     Proteus Not Detected     Salmonella sp Not Detected     Serratia marcescens Not Detected     Haemophilus influenzae Not Detected     Neisseria meningtidis Not Detected     Pseudomonas aeruginosa Detected     Stenotrophomonas maltophilia Not Detected     Candida albicans Not Detected     Candida auris Not Detected     Candida glabrata Not Detected     Candida krusei Not Detected     Candida parapsilosis Not Detected     Candida tropicalis Not Detected     Cryptococcus neoformans/gattii Not Detected     CTX-M (ESBL ) Not Detected     IMP (Carbapenem resistant) Not Detected     KPC resistance gene (Carbapenem resistant) Not Detected     mcr-1  Test not applicable     mec A/C  Test not applicable     mec A/C and MREJ (MRSA) gene Test not applicable     NDM (Carbapenem resistant) Not Detected     OXA-48-like (Carbapenem resistant) Test not applicable     van A/B (VRE gene) Test not applicable     VIM (Carbapenem resistant) Not Detected    Urine Culture High Risk [[id number]]     Order  Status: Completed Specimen: Urine, Clean Catch     Urine Culture High Risk [[id number]]     Order Status: Canceled Specimen: Urine, Clean Catch     Respiratory Infection Panel (PCR), Nasopharyngeal [[id number]] Collected: 03/24/24 1512    Order Status: Completed Specimen: Nasopharyngeal Swab Updated: 03/24/24 1720     Respiratory Infection Panel Source NP swab     Adenovirus Not Detected     Coronavirus 229E, Common Cold Virus Not Detected     Coronavirus HKU1, Common Cold Virus Not Detected     Coronavirus NL63, Common Cold Virus Not Detected     Coronavirus OC43, Common Cold Virus Not Detected     Comment: Coronavirus strains 229E, HKU1, NL63, and OC43 can cause the common   cold   and are not associated with the respiratory disease outbreak caused   by  the COVID-19 (SARS-CoV-2 novel Coronavirus) strain.           SARS-CoV2 (COVID-19) Qualitative PCR Not Detected     Human Metapneumovirus Not Detected     Human Rhinovirus/Enterovirus Not Detected     Influenza A (subtypes H1, H1-2009,H3) Not Detected     Influenza B Not Detected     Parainfluenza Virus 1 Not Detected     Parainfluenza Virus 2 Not Detected     Parainfluenza Virus 3 Not Detected     Parainfluenza Virus 4 Not Detected     Respiratory Syncytial Virus Not Detected     Bordetella Parapertussis (CK2846) Not Detected     Bordetella pertussis (ptxP) Not Detected     Chlamydia pneumoniae Not Detected     Mycoplasma pneumoniae Not Detected     Comment: Respiratory Infection Panel testing performed by Multiplex PCR.       Narrative:      Respiratory Infection Panel source->NP Swab              CURRENT/PREVIOUS VISIT EKG  Results for orders placed or performed during the hospital encounter of 01/17/23   EKG 12-lead    Collection Time: 01/17/23  2:53 PM    Narrative    Test Reason : D17.0,    Vent. Rate : 087 BPM     Atrial Rate : 087 BPM     P-R Int : 150 ms          QRS Dur : 092 ms      QT Int : 384 ms       P-R-T Axes : 052 077 037 degrees     QTc Int :  462 ms    Normal sinus rhythm  Low voltage QRS  Borderline Abnormal ECG  When compared with ECG of 13-APR-2021 09:04,  Previous ECG has undetermined rhythm, needs review  Minimal criteria for Anterior infarct are no longer Present  Confirmed by Emmanuel PEDERSON, Viet GARCIA ([id number]) on 1/20/2023 9:53:12 AM    Referred By:             Confirmed By:Viet Benitez MD       Significant Labs: All pertinent labs within the past 24 hours have been reviewed.     Significant Imaging: I have reviewed all relevant and available imaging results/findings within the past 24 hours.      Pola Rodriguez MD  Date of Service: 03/26/2024  10:04 AM

## 2024-03-26 NOTE — PLAN OF CARE
Problem: Adult Inpatient Plan of Care  Goal: Plan of Care Review  Outcome: Ongoing, Progressing  Goal: Patient-Specific Goal (Individualized)  Outcome: Ongoing, Progressing  Goal: Absence of Hospital-Acquired Illness or Injury  Outcome: Ongoing, Progressing  Goal: Optimal Comfort and Wellbeing  Outcome: Ongoing, Progressing  Goal: Readiness for Transition of Care  Outcome: Ongoing, Progressing     Problem: Fluid Imbalance (Pneumonia)  Goal: Fluid Balance  Outcome: Ongoing, Progressing     Problem: Infection (Pneumonia)  Goal: Resolution of Infection Signs and Symptoms  Outcome: Ongoing, Progressing     Problem: Respiratory Compromise (Pneumonia)  Goal: Effective Oxygenation and Ventilation  Outcome: Ongoing, Progressing     Problem: Pain Acute  Goal: Acceptable Pain Control and Functional Ability  Outcome: Ongoing, Progressing

## 2024-03-26 NOTE — NURSING
Nurses Note -- 4 Eyes      3/25/2024   10:35 pm      Skin assessed during: Admit      [x] No Altered Skin Integrity Present    [x]Prevention Measures Documented      [] Yes- Altered Skin Integrity Present or Discovered   [] LDA Added if Not in Epic (Describe Wound)   [] New Altered Skin Integrity was Present on Admit and Documented in LDA   [] Wound Image Taken    Wound Care Consulted? No    Attending Nurse:  Candi Allred RN/Staff Member:  [id number]

## 2024-03-26 NOTE — PROGRESS NOTES
[facility] Medicine  Progress Note    Patient Name: Floresita Martin  MRN: [id number]  Patient Class: IP- Inpatient   Admission Date: 3/24/2024  Length of Stay: 2 days  Attending Physician: Giuseppe Fox MD  Primary Care Provider: Toni Hughes MD        Subjective:     Principal Problem:Acute metabolic encephalopathy        HPI:  64 year old pt getting admitted with acute metabolic encephalopathy/acute hyponatremia/acute pneumonia/suspected cystitis  Pt was very confused since yesterday   She was hallucinating ans was seeing birds inside the house and was very sleepy   called EMS and pt was brought to ER  Pt has H/o opioid dependence and on a variety  of pain meds as per chart review     Overview/Hospital Course:  Patient got admitted due to acute hypoxic respiratory failure and pneumonia.  Blood cultures and respiratory culture obtained.  Patient is started on IV Zosyn.  Blood cultures grew Pseudomonas, repeat blood cultures sent, Infectious Disease consulted.  Anemia workup including iron studies, vitamin B12 and folic acid levels, stool for occult blood ordered.    Interval History:    Mental status improved, patient denied chest pain shortness of breath abdominal pain, patient and her  at bedside requested for resuming home med oxycodone 5 times a day and continued to insist about need of pain medications. Blood cultures growing Pseudomonas, repeat blood cultures sent, Infectious Disease consulted.  Anemia workup including iron studies, vitamin B12 and folic acid levels, stool for occult blood ordered.      Review of Systems   Constitutional:  Positive for fatigue. Negative for fever.   HENT:  Negative for congestion.    Eyes:  Negative for visual disturbance.   Respiratory:  Negative for cough and shortness of breath.    Cardiovascular:  Negative for chest pain and leg swelling.   Gastrointestinal:  Negative for abdominal pain, diarrhea, nausea and vomiting.    Genitourinary:  Negative for dysuria.   Musculoskeletal:  Positive for back pain.   Neurological:  Negative for syncope.   Psychiatric/Behavioral:  Negative for agitation.      Objective:     Vital Signs (Most Recent):  Temp: 98 °F (36.7 °C) (03/26/24 1220)  Pulse: 62 (03/26/24 1220)  Resp: 18 (03/26/24 1220)  BP: (!) 184/88 (03/26/24 1220)  SpO2: 100 % (03/26/24 1220) Vital Signs (24h Range):  Temp:  [98 °F (36.7 °C)-98.7 °F (37.1 °C)] 98 °F (36.7 °C)  Pulse:  [62-79] 62  Resp:  [18-22] 18  SpO2:  [95 %-100 %] 100 %  BP: (137-184)/(72-91) 184/88     Weight: 53 kg (116 lb 13.5 oz)  Body mass index is 22.08 kg/m².    Intake/Output Summary (Last 24 hours) at 3/26/2024 1255  Last data filed at 3/26/2024 1221  Gross per 24 hour   Intake 480 ml   Output 550 ml   Net -70 ml           Physical Exam  Constitutional:       General: She is not in acute distress.     Appearance: She is ill-appearing.      Comments: Looks anxious    HENT:      Head: Normocephalic.      Nose: Nose normal.   Eyes:      Extraocular Movements: Extraocular movements intact.   Cardiovascular:      Rate and Rhythm: Normal rate.   Pulmonary:      Effort: No respiratory distress.   Abdominal:      Tenderness: There is no abdominal tenderness.   Musculoskeletal:         General: No swelling.   Skin:     General: Skin is warm.      Capillary Refill: Capillary refill takes less than 2 seconds.   Neurological:      Mental Status: She is oriented to person, place, and time.             Significant Labs: All pertinent labs within the past 24 hours have been reviewed.  CBC:   Recent Labs   Lab 03/24/24  1443 03/25/24  0710 03/26/24  0432   WBC 9.31 12.64 8.51   HGB 9.1* 9.9* 10.2*   HCT 27.3* 28.8* 29.3*    259 291       CMP:   Recent Labs   Lab 03/24/24  1443 03/25/24  0710 03/26/24  0432   * 135* 137   K 4.3 3.8 3.6    106 103   CO2 19* 24 25   GLU 91 100 93   BUN 26* 11 7*   CREATININE 0.9 0.6 0.6   CALCIUM 9.3 9.1 9.2   PROT 6.0 5.7*  6.1   ALBUMIN 3.2* 3.0* 3.1*   BILITOT 0.3 0.6 0.6   ALKPHOS 110 106 114   AST 47* 27 22   ALT 21 19 20   ANIONGAP 10 5* 9       Magnesium:   Recent Labs   Lab 03/25/24  0710 03/26/24  0432   MG 1.6 1.8         Significant Imaging: I have reviewed all pertinent imaging results/findings within the past 24 hours.    Assessment/Plan:      * Acute metabolic encephalopathy  Also encephalopathic from opioid use  urine drug screen positive for opiate   Fall precautions  Delirium precautions   Improved          Hypertension, essential  Chronic, uncontrolled. Latest blood pressure and vitals reviewed-     Temp:  [98 °F (36.7 °C)-98.7 °F (37.1 °C)]   Pulse:  [62-79]   Resp:  [18-22]   BP: (137-184)/(72-91)   SpO2:  [95 %-100 %] .   Home meds for hypertension were reviewed and noted below.   Hypertension Medications               losartan (COZAAR) 50 MG tablet Take 1 tablet (50 mg total) by mouth once daily.            While in the hospital, will manage blood pressure as follows; Continue home antihypertensive regimen    Will utilize p.r.n. blood pressure medication only if patient's blood pressure greater than 180/110 and she develops symptoms such as worsening chest pain or shortness of breath.    IV hydralazine 5 mg x1 advised   Home med valsartan resumed     Normocytic anemia  Patient's anemia is currently controlled. Has not received any PRBCs to date.    Current CBC reviewed-   Lab Results   Component Value Date    HGB 10.2 (L) 03/26/2024    HCT 29.3 (L) 03/26/2024     Monitor serial CBC and transfuse if patient becomes hemodynamically unstable, symptomatic or H/H drops below 7/21.    Anemia workup including iron studies, vitamin B12 and folic acid levels, stool for occult blood ordered.      Bacteremia due to Pseudomonas  Blood cultures growing Pseudomonas  repeat blood cultures sent  Infectious Disease consulted.      Acute hypoxic respiratory failure  Patient with Hypoxic Respiratory failure which is Acute.  she is  not on home oxygen. Supplemental oxygen was provided and noted-      .   Signs/symptoms of respiratory failure include- tachypnea. Contributing diagnoses includes - Pneumonia Labs and images were reviewed. Patient Has not had a recent ABG. Will treat underlying causes and adjust management of respiratory failure as follows-     Supplemental oxygen, wean as able     Cystitis  Possible cystitis  Check cultures  Maintain iv abx      Acute pneumonia  iv zosyn    Blood cultures growing Pseudomonas, repeat blood cultures sent, Infectious Disease consulted.      Acute hyponatremia  Goal sodium correction 6-8 mmol/L in 24 hrs   Gentle iv hydration  Improving    Monitor       Opioid dependence with current use  Home med oxycodone resumed        Chronic obstructive pulmonary disease, unspecified COPD type  Stable  Duonebs PRN basis    Dependence on nicotine from cigarettes  Aware       VTE Risk Mitigation (From admission, onward)           Ordered     IP VTE LOW RISK PATIENT  Once         03/24/24 1923     Place sequential compression device  Until discontinued         03/24/24 1923                    Discharge Planning   GRACE: 3/29/2024     Code Status: Full Code   Is the patient medically ready for discharge?:     Reason for patient still in hospital (select all that apply): Treatment  Discharge Plan A: Home with family                  Giuseppe Fox MD  Department of Hospital Medicine   Formerly [facility]

## 2024-03-26 NOTE — ASSESSMENT & PLAN NOTE
iv zosyn    Blood cultures growing Pseudomonas, repeat blood cultures sent, Infectious Disease consulted.

## 2024-03-26 NOTE — SUBJECTIVE & OBJECTIVE
Interval History:    Mental status improved, patient denied chest pain shortness of breath abdominal pain, patient and her  at bedside requested for resuming home med oxycodone 5 times a day and continued to insist about need of pain medications. Blood cultures growing Pseudomonas, repeat blood cultures sent, Infectious Disease consulted.  Anemia workup including iron studies, vitamin B12 and folic acid levels, stool for occult blood ordered.      Review of Systems   Constitutional:  Positive for fatigue. Negative for fever.   HENT:  Negative for congestion.    Eyes:  Negative for visual disturbance.   Respiratory:  Negative for cough and shortness of breath.    Cardiovascular:  Negative for chest pain and leg swelling.   Gastrointestinal:  Negative for abdominal pain, diarrhea, nausea and vomiting.   Genitourinary:  Negative for dysuria.   Musculoskeletal:  Positive for back pain.   Neurological:  Negative for syncope.   Psychiatric/Behavioral:  Negative for agitation.      Objective:     Vital Signs (Most Recent):  Temp: 98 °F (36.7 °C) (03/26/24 1220)  Pulse: 62 (03/26/24 1220)  Resp: 18 (03/26/24 1220)  BP: (!) 184/88 (03/26/24 1220)  SpO2: 100 % (03/26/24 1220) Vital Signs (24h Range):  Temp:  [98 °F (36.7 °C)-98.7 °F (37.1 °C)] 98 °F (36.7 °C)  Pulse:  [62-79] 62  Resp:  [18-22] 18  SpO2:  [95 %-100 %] 100 %  BP: (137-184)/(72-91) 184/88     Weight: 53 kg (116 lb 13.5 oz)  Body mass index is 22.08 kg/m².    Intake/Output Summary (Last 24 hours) at 3/26/2024 1255  Last data filed at 3/26/2024 1221  Gross per 24 hour   Intake 480 ml   Output 550 ml   Net -70 ml           Physical Exam  Constitutional:       General: She is not in acute distress.     Appearance: She is ill-appearing.      Comments: Looks anxious    HENT:      Head: Normocephalic.      Nose: Nose normal.   Eyes:      Extraocular Movements: Extraocular movements intact.   Cardiovascular:      Rate and Rhythm: Normal rate.   Pulmonary:       Effort: No respiratory distress.   Abdominal:      Tenderness: There is no abdominal tenderness.   Musculoskeletal:         General: No swelling.   Skin:     General: Skin is warm.      Capillary Refill: Capillary refill takes less than 2 seconds.   Neurological:      Mental Status: She is oriented to person, place, and time.             Significant Labs: All pertinent labs within the past 24 hours have been reviewed.  CBC:   Recent Labs   Lab 03/24/24  1443 03/25/24  0710 03/26/24  0432   WBC 9.31 12.64 8.51   HGB 9.1* 9.9* 10.2*   HCT 27.3* 28.8* 29.3*    259 291       CMP:   Recent Labs   Lab 03/24/24  1443 03/25/24  0710 03/26/24  0432   * 135* 137   K 4.3 3.8 3.6    106 103   CO2 19* 24 25   GLU 91 100 93   BUN 26* 11 7*   CREATININE 0.9 0.6 0.6   CALCIUM 9.3 9.1 9.2   PROT 6.0 5.7* 6.1   ALBUMIN 3.2* 3.0* 3.1*   BILITOT 0.3 0.6 0.6   ALKPHOS 110 106 114   AST 47* 27 22   ALT 21 19 20   ANIONGAP 10 5* 9       Magnesium:   Recent Labs   Lab 03/25/24  0710 03/26/24  0432   MG 1.6 1.8         Significant Imaging: I have reviewed all pertinent imaging results/findings within the past 24 hours.

## 2024-03-26 NOTE — ASSESSMENT & PLAN NOTE
Patient's anemia is currently controlled. Has not received any PRBCs to date.    Current CBC reviewed-   Lab Results   Component Value Date    HGB 10.2 (L) 03/26/2024    HCT 29.3 (L) 03/26/2024     Monitor serial CBC and transfuse if patient becomes hemodynamically unstable, symptomatic or H/H drops below 7/21.    Anemia workup including iron studies, vitamin B12 and folic acid levels, stool for occult blood ordered.

## 2024-03-26 NOTE — ASSESSMENT & PLAN NOTE
Chronic, uncontrolled. Latest blood pressure and vitals reviewed-     Temp:  [98 °F (36.7 °C)-98.7 °F (37.1 °C)]   Pulse:  [62-79]   Resp:  [18-22]   BP: (137-184)/(72-91)   SpO2:  [95 %-100 %] .   Home meds for hypertension were reviewed and noted below.   Hypertension Medications               losartan (COZAAR) 50 MG tablet Take 1 tablet (50 mg total) by mouth once daily.            While in the hospital, will manage blood pressure as follows; Continue home antihypertensive regimen    Will utilize p.r.n. blood pressure medication only if patient's blood pressure greater than 180/110 and she develops symptoms such as worsening chest pain or shortness of breath.    IV hydralazine 5 mg x1 advised   Home med valsartan resumed

## 2024-03-26 NOTE — ASSESSMENT & PLAN NOTE
Also encephalopathic from opioid use  urine drug screen positive for opiate   Fall precautions  Delirium precautions   Improved

## 2024-03-27 LAB
BACTERIA BLD CULT: ABNORMAL

## 2024-03-31 LAB
BACTERIA BLD CULT: NORMAL
BACTERIA BLD CULT: NORMAL

## 2024-04-01 ENCOUNTER — TELEPHONE (OUTPATIENT)
Dept: FAMILY MEDICINE | Facility: CLINIC | Age: 64
End: 2024-04-01
Payer: MEDICARE

## 2024-04-01 DIAGNOSIS — K21.9 GASTROESOPHAGEAL REFLUX DISEASE WITHOUT ESOPHAGITIS: ICD-10-CM

## 2024-04-01 NOTE — TELEPHONE ENCOUNTER
----- Message from Luke Goldberg sent at 4/1/2024  8:31 AM CDT -----  Contact: Self  Type:  Sooner Appointment Request    Caller is requesting a sooner appointment.  Caller declined first available appointment listed below.  Caller will not accept being placed on the waitlist and is requesting a message be sent to doctor.    Name of Caller:  PT  When is the first available appointment?  April 12  Symptoms:  Hos f/e  Would the patient rather a call back or a response via [facility]? Call  Best Call Back Number:  [phone]    Additional Information:  PT would like to be seen ASAP.

## 2024-04-01 NOTE — TELEPHONE ENCOUNTER
Spoke to patient.    Patient scheduled tomorrow with Onesimo Serrano        ----- Message from Luke Goldberg sent at 4/1/2024  8:31 AM CDT -----  Contact: Self  Type:  Sooner Appointment Request    Caller is requesting a sooner appointment.  Caller declined first available appointment listed below.  Caller will not accept being placed on the waitlist and is requesting a message be sent to doctor.    Name of Caller:  PT  When is the first available appointment?  April 12  Symptoms:  Hos f/e  Would the patient rather a call back or a response via [facility]? Call  Best Call Back Number:  [phone]    Additional Information:  PT would like to be seen ASAP.

## 2024-04-01 NOTE — TELEPHONE ENCOUNTER
Care Due:                  Date            Visit Type   Department     Provider  --------------------------------------------------------------------------------    Last Visit: None Found      None         None Found                              EP -                              PRIMARY      [facility]  Next Visit: 07-      CARE ([facility])   MEDICINE       Toni Hughes                                                            Last  Test          Frequency    Reason                     Performed    Due Date  --------------------------------------------------------------------------------    Office Visit  15 months..  loratadine, omeprazole...  Not Found    Overdue    [employer] Embedded Care Due Messages. Reference number: [id number].   4/01/2024 3:29:12 AM CDT

## 2024-04-02 ENCOUNTER — HOSPITAL ENCOUNTER (OUTPATIENT)
Dept: RADIOLOGY | Facility: CLINIC | Age: 64
Discharge: HOME | End: 2024-04-02
Payer: MEDICARE

## 2024-04-02 ENCOUNTER — OFFICE VISIT (OUTPATIENT)
Dept: FAMILY MEDICINE | Facility: CLINIC | Age: 64
End: 2024-04-02
Payer: MEDICARE

## 2024-04-02 VITALS
SYSTOLIC BLOOD PRESSURE: 136 MMHG | WEIGHT: 110.25 LBS | HEART RATE: 87 BPM | DIASTOLIC BLOOD PRESSURE: 82 MMHG | RESPIRATION RATE: 16 BRPM | OXYGEN SATURATION: 97 % | HEIGHT: 61 IN | TEMPERATURE: 98 F | BODY MASS INDEX: 20.82 KG/M2

## 2024-04-02 DIAGNOSIS — E87.1 HYPONATREMIA: ICD-10-CM

## 2024-04-02 DIAGNOSIS — F11.20 OPIOID DEPENDENCE WITH CURRENT USE: ICD-10-CM

## 2024-04-02 DIAGNOSIS — J18.9 ACUTE PNEUMONIA: ICD-10-CM

## 2024-04-02 DIAGNOSIS — Z09 HOSPITAL DISCHARGE FOLLOW-UP: Primary | ICD-10-CM

## 2024-04-02 DIAGNOSIS — Z09 HOSPITAL DISCHARGE FOLLOW-UP: ICD-10-CM

## 2024-04-02 DIAGNOSIS — D64.9 ANEMIA, UNSPECIFIED TYPE: ICD-10-CM

## 2024-04-02 DIAGNOSIS — I70.0 ABDOMINAL AORTIC ATHEROSCLEROSIS: ICD-10-CM

## 2024-04-02 PROCEDURE — 1160F RVW MEDS BY RX/DR IN RCRD: CPT | Mod: HCNC,CPTII,S$GLB,

## 2024-04-02 PROCEDURE — 1111F DSCHRG MED/CURRENT MED MERGE: CPT | Mod: HCNC,CPTII,S$GLB,

## 2024-04-02 PROCEDURE — 3079F DIAST BP 80-89 MM HG: CPT | Mod: HCNC,CPTII,S$GLB,

## 2024-04-02 PROCEDURE — 71046 X-RAY EXAM CHEST 2 VIEWS: CPT | Mod: TC,HCNC,FY,PO

## 2024-04-02 PROCEDURE — 3008F BODY MASS INDEX DOCD: CPT | Mod: HCNC,CPTII,S$GLB,

## 2024-04-02 PROCEDURE — 3075F SYST BP GE 130 - 139MM HG: CPT | Mod: HCNC,CPTII,S$GLB,

## 2024-04-02 PROCEDURE — 99999 PR PBB SHADOW E&M-EST. PATIENT-LVL V: CPT | Mod: PBBFAC,HCNC,,

## 2024-04-02 PROCEDURE — 99214 OFFICE O/P EST MOD 30 MIN: CPT | Mod: HCNC,S$GLB,,

## 2024-04-02 PROCEDURE — 71046 X-RAY EXAM CHEST 2 VIEWS: CPT | Mod: 26,HCNC,, | Performed by: RADIOLOGY

## 2024-04-02 PROCEDURE — 1159F MED LIST DOCD IN RCRD: CPT | Mod: HCNC,CPTII,S$GLB,

## 2024-04-02 RX ORDER — OMEPRAZOLE 40 MG/1
40 CAPSULE, DELAYED RELEASE ORAL EVERY MORNING
Qty: 30 CAPSULE | Refills: 0 | Status: SHIPPED | OUTPATIENT
Start: 2024-04-02 | End: 2024-04-24

## 2024-04-02 NOTE — PATIENT INSTRUCTIONS
Boaz Canas,     If you are due for any health screening(s) below please notify me so we can arrange them to be ordered and scheduled to maintain your health. Most healthy patients complete it. Don't lose out on improving your health.     All of your core healthy metrics are met.              Dear Ms. Martin:    Your [facility] Team is dedicated to helping you stay healthy with regularly scheduled recommended screenings.  Scheduling routine screenings is important to maintaining good health. Our records indicate that you may be overdue for your screening pap smear. A pap smear screening can help identify patients at risk for developing cervical cancer at an early stage, when it is most likely to be successfully treated.    We encourage you to schedule your appointment with your [facility] provider or some primary care providers also perform this screening.    If you have completed or scheduled your pap smear screening outside of [facility], please notify your primary care team so we can update your health record.      If you have questions or would like to schedule your screening, please contact your primary care clinic.    Sincerely,    Your [facility] Team

## 2024-04-02 NOTE — TELEPHONE ENCOUNTER
Refill Routing Note   Medication(s) are not appropriate for processing by [facility] for the following reason(s):        Patient not seen by provider within 15 months  ED/Hospital Visit since last OV with provider    [facility] action(s):  Route             Appointments  past 12m or future 3m with PCP    Date Provider   Last Visit   1/5/2022 Toni Hughes MD   Next Visit   4/1/2024 Toni Hughes MD   ED visits in past 90 days: 0        Note composed:6:48 AM 04/02/2024

## 2024-04-02 NOTE — PROGRESS NOTES
Transitional Care Note  Admit date: 03/24/2024  Discharge date: 03/26/2024  Hospitalized at: [facility]  Discharge diagnoses: Acute metabolic encephalopathy, HTN, normocytic anemia, bacteriemia due to pseudomonas, Acute hypoxic resp failure, cystitis, acute pnemonia, Acute hyponatremia, Opioid dependence with current use, COPD, Dependence on nictonie from cigarettes     Family and/or Caretaker present at visit?  No.  Diagnostic tests reviewed/disposition: I have reviewed all completed as well as pending diagnostic tests at the time of discharge.  Disease/illness education: As below  Medication changes: None today  Home health/community services discussion/referrals: Patient does not have home health established from hospital visit.  They do not need home health.  If needed, we will set up home health for the patient.   Establishment or re-establishment of referral orders for community resources: No other necessary community resources.   Discussion with other health care providers: No discussion with other health care providers necessary.                         Subjective:       Patient ID: Floresita Martin is a 64 y.o. female.    Chief Complaint: Hospital Follow Up (pneumonia)    Hospital discharge follow up. Presentation and course can be seen below. Since discharge she reports that she is feeling much better and more like herself. She left the hospital AMA as she reports that she could not stay any longer. Was not discharged with ABX therapy. Does not report fever, chills, CP, SoB, cough. Reviewed most recent labs and CXR results. Will recheck today.         HPI:  64 year old pt getting admitted with acute metabolic encephalopathy/acute hyponatremia/acute pneumonia/suspected cystitis  Pt was very confused since yesterday   She was hallucinating ans was seeing birds inside the house and was very sleepy   called EMS and pt was brought to ER  Pt has H/o opioid dependence and on a variety  of pain meds as per chart  review      Overview/Hospital Course:  Patient got admitted due to acute hypoxic respiratory failure and pneumonia.  Blood cultures and respiratory culture obtained.  Patient is started on IV Zosyn.  Blood cultures grew Pseudomonas, repeat blood cultures sent, Infectious Disease consulted.  Anemia workup including iron studies, vitamin B12 and folic acid levels, stool for occult blood ordered.     Interval History:    Mental status improved, patient denied chest pain shortness of breath abdominal pain, patient and her  at bedside requested for resuming home med oxycodone 5 times a day and continued to insist about need of pain medications. Blood cultures growing Pseudomonas, repeat blood cultures sent, Infectious Disease consulted.  Anemia workup including iron studies, vitamin B12 and folic acid levels, stool for occult blood ordered.      Past Medical History:   Diagnosis Date    Alcohol abuse     Cellulitis of toe of right foot 12/27/2019    Headache     Hearing loss     HLD (hyperlipidemia)        Review of patient's allergies indicates:   Allergen Reactions    Codeine Nausea And Vomiting    Tylenol-codeine [acetaminophen-codeine] Nausea And Vomiting         Current Outpatient Medications:     albuterol (PROVENTIL/VENTOLIN HFA) 90 mcg/actuation inhaler, Inhale 2 puffs into the lungs every 6 (six) hours as needed for Wheezing or Shortness of Breath. Rescue, Disp: 18 g, Rfl: 11    albuterol-ipratropium (DUO-NEB) 2.5 mg-0.5 mg/3 mL nebulizer solution, Take 3 mLs by nebulization every 6 (six) hours as needed for Wheezing or Shortness of Breath. Rescue, Disp: 75 mL, Rfl: 11    amitriptyline (ELAVIL) 25 MG tablet, Take 1-2 tablet by mouth every night at bedtime (Patient taking differently: Take 25-50 mg by mouth every evening.), Disp: 60 tablet, Rfl: 0    atorvastatin (LIPITOR) 40 MG tablet, Take 1 tablet (40 mg total) by mouth once daily., Disp: 90 tablet, Rfl: 1    co-enzyme Q-10 30 mg capsule, Take 30 mg by  mouth once daily., Disp: , Rfl:     fluticasone propionate (FLONASE) 50 mcg/actuation nasal spray, 1 spray (50 mcg total) by Each Nostril route once daily., Disp: 16 g, Rfl: 11    fluticasone-umeclidin-vilanter (TRELEGY ELLIPTA) 200-62.5-25 mcg inhaler, Inhale 1 puff into the lungs once daily., Disp: 60 each, Rfl: 11    loratadine (CLARITIN) 10 mg tablet, Take 1 tablet (10 mg total) by mouth once daily., Disp: 90 tablet, Rfl: 3    losartan (COZAAR) 50 MG tablet, Take 1 tablet (50 mg total) by mouth once daily., Disp: 90 tablet, Rfl: 3    morphine (MS CONTIN) 30 MG 12 hr tablet, Take 1 tablet by mouth every twelve hours More than 7 days supply is medically necessary (Patient taking differently: Take 30 mg by mouth 2 (two) times daily.), Disp: 60 tablet, Rfl: 0    multivitamin capsule, Take 1 capsule by mouth once daily., Disp: , Rfl:     naloxone (NARCAN) 4 mg/actuation Spry, Spray 1 spray into one nostril single dose may repeat every 2-3 minutes until responsive or EMS arrives (Patient taking differently: 1 spray by Nasal route once.), Disp: 1 each, Rfl: 1    omeprazole (PRILOSEC) 40 MG capsule, Take 1 capsule (40 mg total) by mouth every morning. You must be seen by Dr Hughes for further refils, Disp: 30 capsule, Rfl: 0    ondansetron (ZOFRAN-ODT) 4 MG TbDL, Take 1 tablet (4 mg total) by mouth every 6 (six) hours as needed (nausea)., Disp: 8 tablet, Rfl: 0    oxyCODONE (ROXICODONE) 20 mg Tab immediate release tablet, Take 1 tablet by mouth five times a day as needed for pain More than 7 days supply is medically necessary (Patient taking differently: Take 20 mg by mouth 5 (five) times daily.), Disp: 150 tablet, Rfl: 0    pregabalin (LYRICA) 75 MG capsule, Take 1 capsule by mouth three times a day More than 7 days supply is medically necessary (Patient taking differently: Take 75 mg by mouth 3 (three) times daily.), Disp: 90 capsule, Rfl: 1    RESTASIS 0.05 % ophthalmic emulsion, Place 1 drop into both eyes 2 (two)  "times daily., Disp: , Rfl:     lubiprostone (AMITIZA) 24 MCG Cap, Take 1 capsule by mouth twice a day as needed for pain (Patient not taking: Reported on 3/24/2024), Disp: 60 capsule, Rfl: 1    Review of Systems   Constitutional:  Negative for activity change, appetite change, chills, diaphoresis, fatigue, fever and unexpected weight change.   HENT:  Negative for congestion, ear pain, postnasal drip, rhinorrhea, sinus pressure, sneezing, sore throat and trouble swallowing.    Eyes:  Negative for pain, itching and visual disturbance.   Respiratory:  Negative for cough, chest tightness, shortness of breath and wheezing.    Cardiovascular:  Negative for chest pain, palpitations and leg swelling.   Gastrointestinal:  Negative for abdominal distention, abdominal pain, blood in stool, constipation, diarrhea, nausea and vomiting.   Endocrine: Negative for cold intolerance and heat intolerance.   Genitourinary:  Negative for difficulty urinating, dysuria, frequency, hematuria and urgency.   Musculoskeletal:  Negative for arthralgias, back pain, myalgias and neck pain.   Skin:  Negative for color change, pallor, rash and wound.   Neurological:  Negative for dizziness, syncope, weakness, numbness and headaches.   Hematological:  Negative for adenopathy.   Psychiatric/Behavioral:  Negative for behavioral problems. The patient is not nervous/anxious.        Objective:      /82 (BP Location: Right arm, Patient Position: Sitting, BP Method: Medium (Manual))   Pulse 87   Temp 98.2 °F (36.8 °C) (Oral)   Resp 16   Ht 5' 0.98" (1.549 m)   Wt 50 kg (110 lb 3.7 oz)   LMP  (LMP Unknown)   SpO2 97%   BMI 20.84 kg/m²   Physical Exam  Vitals reviewed.   Constitutional:       General: She is not in acute distress.     Appearance: Normal appearance. She is normal weight. She is not ill-appearing, toxic-appearing or diaphoretic.   HENT:      Head: Normocephalic.      Right Ear: External ear normal.      Left Ear: External ear " normal.      Nose: Nose normal. No congestion or rhinorrhea.      Mouth/Throat:      Mouth: Mucous membranes are moist.      Pharynx: Oropharynx is clear.   Eyes:      General: No scleral icterus.        Right eye: No discharge.         Left eye: No discharge.      Extraocular Movements: Extraocular movements intact.      Conjunctiva/sclera: Conjunctivae normal.   Cardiovascular:      Rate and Rhythm: Normal rate and regular rhythm.      Pulses: Normal pulses.      Heart sounds: Normal heart sounds. No murmur heard.     No friction rub. No gallop.   Pulmonary:      Effort: Pulmonary effort is normal. No respiratory distress.      Breath sounds: Normal breath sounds. No wheezing, rhonchi or rales.   Chest:      Chest wall: No tenderness.   Musculoskeletal:         General: No swelling, tenderness or deformity. Normal range of motion.      Cervical back: Normal range of motion.      Right lower leg: No edema.      Left lower leg: No edema.   Skin:     General: Skin is warm and dry.      Capillary Refill: Capillary refill takes less than 2 seconds.      Coloration: Skin is not jaundiced.      Findings: No bruising, erythema, lesion or rash.   Neurological:      Mental Status: She is alert and oriented to person, place, and time.      Gait: Gait normal.   Psychiatric:         Mood and Affect: Mood normal.         Behavior: Behavior normal.         Thought Content: Thought content normal.         Judgment: Judgment normal.         Assessment:       1. Hospital discharge follow-up    2. Hyponatremia    3. Acute pneumonia    4. Anemia, unspecified type    5. Abdominal aortic atherosclerosis    6. Opioid dependence with current use        Plan:       Hospital discharge follow-up  -     X-Ray Chest PA And Lateral; Future; Expected date: 04/02/2024  -     COMPREHENSIVE METABOLIC PANEL; Future; Expected date: 04/02/2024  -     CBC W/ AUTO DIFFERENTIAL; Future; Expected date: 04/02/2024        -     Recheck labs and CXR. Left  hospital AMA. Overall improving and feeling better currently.     Hyponatremia  -     COMPREHENSIVE METABOLIC PANEL; Future; Expected date: 04/02/2024    Acute pneumonia  -     X-Ray Chest PA And Lateral; Future; Expected date: 04/02/2024    Anemia, unspecified type  -     CBC W/ AUTO DIFFERENTIAL; Future; Expected date: 04/02/2024      Abdominal aortic atherosclerosis        -    Stable. Continue meds. Will continue to monitor.     Opioid dependence with current use        -    Continue meds. Follows with pain management. Will continue to monitor.               Onesimo Serrano PA-C  Family Medicine Physician Assistant       Future Appointments       Date Provider Specialty Appt Notes    7/1/2024 Toni Hughes MD Family Medicine Annual    10/9/2024 Freya Swartz NP Pulmonology Follow Up               I spent a total of 20 minutes on the day of the visit.This includes face to face time and non-face to face time preparing to see the patient (eg, review of tests), obtaining and/or reviewing separately obtained history, documenting clinical information in the electronic or other health record, independently interpreting results and communicating results to the patient/family/caregiver, or care coordinator.      We have addressed [4] Moderate: 1 or more chronic illnesses with exacerbation, progression, or side effects of treatment / 2 or more stable chronic illnesses / 1 undiagnosed new problem with uncertain prognosis / 1 acute illness with systemic symptoms / 1 acute complicated injury  The complexity of the data reviewed and analyzed for this visit was [3] Limited (Reviewed prior external note, ordered unique testing or reviewed the results of each unique test)   The risk of complications and/or morbidity or mortality are [4] Moderate risk (I.e. prescription drug management / decision regarding minor surgery with identified pt or procedure risk factors / decision regarding elective major surgery without  identified pt or procedure risk factors / diagnosis or treatment significantly limited by social determinants of health)   The level of Medical Decision Making for this visit is [4] Moderate

## 2024-04-03 DIAGNOSIS — Z09 HOSPITAL DISCHARGE FOLLOW-UP: ICD-10-CM

## 2024-04-03 DIAGNOSIS — J18.9 ACUTE PNEUMONIA: Primary | ICD-10-CM

## 2024-04-03 RX ORDER — LEVOFLOXACIN 750 MG/1
750 TABLET ORAL DAILY
Qty: 7 TABLET | Refills: 0 | Status: SHIPPED | OUTPATIENT
Start: 2024-04-03 | End: 2024-04-10

## 2024-04-03 NOTE — DISCHARGE SUMMARY
Critical access hospital Medicine  Discharge Summary      Patient Name: Floresita Martin  MRN: [id number]  OMAR: [id number]  Patient Class: IP- Inpatient  Admission Date: 3/24/2024  Hospital Length of Stay: 2 days  Discharge Date and Time: 3/26/2024 11:43 PM  Attending Physician: No att. providers found   Discharging Provider: Jamir Arora MD  Primary Care Provider: Toni Hughes MD    Primary Care Team: Networked reference to record PCT     HPI:   64 year old pt getting admitted with acute metabolic encephalopathy/acute hyponatremia/acute pneumonia/suspected cystitis  Pt was very confused since yesterday   She was hallucinating ans was seeing birds inside the house and was very sleepy   called EMS and pt was brought to ER  Pt has H/o opioid dependence and on a variety  of pain meds as per chart review     * No surgery found *      Hospital Course:   Patient got admitted due to acute hypoxic respiratory failure and pneumonia.  Blood cultures and respiratory culture obtained.  Patient is started on IV Zosyn.  Blood cultures grew Pseudomonas, repeat blood cultures sent, Infectious Disease consulted.  Anemia workup including iron studies, vitamin B12 and folic acid levels, stool for occult blood ordered. At night, patient left against medical advice.      Goals of Care Treatment Preferences:  Code Status: Full Code      Consults:   Consults (From admission, onward)          Status Ordering Provider     Inpatient consult to Infectious Diseases  Once        Provider:  Pola Rodriguez MD    Completed JAMIR ARORA            No new Assessment & Plan notes have been filed under this hospital service since the last note was generated.  Service: Hospital Medicine    Final Active Diagnoses:    Diagnosis Date Noted POA    PRINCIPAL PROBLEM:  Acute metabolic encephalopathy [G93.41] 03/24/2024 Yes    Bacteremia due to Pseudomonas [R78.81, B96.5] 03/26/2024 Yes    Normocytic anemia [D64.9]  03/26/2024 Yes    Hypertension, essential [I10] 03/26/2024 Yes    Acute hypoxic respiratory failure [J96.01] 03/25/2024 Yes    Acute hyponatremia [E87.1] 03/24/2024 Yes    Acute pneumonia [J18.9] 03/24/2024 Yes    Cystitis [N30.90] 03/24/2024 Yes    Opioid dependence with current use [F11.20] 06/28/2023 Yes    Chronic obstructive pulmonary disease, unspecified COPD type [J44.9] 01/05/2022 Yes    Dependence on nicotine from cigarettes [F17.210] 02/15/2019 Yes      Problems Resolved During this Admission:       Discharged Condition: against medical advice    Disposition: Left Against Medical Adv*    Follow Up:    Patient Instructions:   No discharge procedures on file.    Significant Diagnostic Studies: Labs: All labs within the past 24 hours have been reviewed    Pending Diagnostic Studies:       None           Medications:  None Left against medical advice at night     Indwelling Lines/Drains at time of discharge:   Lines/Drains/Airways       None                   Time spent on the discharge of patient: 33 minutes         Giuseppe Fox MD  Department of Hospital Medicine  [facility]

## 2024-04-19 DIAGNOSIS — E78.49 OTHER HYPERLIPIDEMIA: ICD-10-CM

## 2024-04-19 RX ORDER — ATORVASTATIN CALCIUM 40 MG/1
40 TABLET, FILM COATED ORAL DAILY
Qty: 30 TABLET | Refills: 0 | Status: SHIPPED | OUTPATIENT
Start: 2024-04-19 | End: 2024-05-17

## 2024-04-19 NOTE — TELEPHONE ENCOUNTER
LR--10-2-23        LOV--4-2-24       FOV--7-1-24         
No care due was identified.  [facility] Embedded Care Due Messages. Reference number: [id number].   4/19/2024 11:40:54 AM CDT  
Previously Declined (within the last year)

## 2024-04-19 NOTE — TELEPHONE ENCOUNTER
No care due was identified.  [facility] Embedded Care Due Messages. Reference number: [id number].   4/19/2024 2:09:37 PM CDT

## 2024-04-20 RX ORDER — ATORVASTATIN CALCIUM 40 MG/1
40 TABLET, FILM COATED ORAL
Qty: 90 TABLET | OUTPATIENT
Start: 2024-04-20

## 2024-04-21 NOTE — TELEPHONE ENCOUNTER
Quick DC. Inappropriate Request    Refill Authorization Note   Floresita Martin  is requesting a refill authorization.  Brief Assessment and Rationale for Refill:  Quick Discontinue  Medication Therapy Plan:       Medication Reconciliation Completed:  No      Comments:     Note composed:10:55 PM 04/20/2024

## 2024-05-17 DIAGNOSIS — E78.49 OTHER HYPERLIPIDEMIA: ICD-10-CM

## 2024-05-17 RX ORDER — ATORVASTATIN CALCIUM 40 MG/1
40 TABLET, FILM COATED ORAL
Qty: 30 TABLET | Refills: 0 | Status: SHIPPED | OUTPATIENT
Start: 2024-05-17

## 2024-05-17 NOTE — TELEPHONE ENCOUNTER
No care due was identified.  [facility] Embedded Care Due Messages. Reference number: [id number].   5/17/2024 4:17:17 PM CDT

## 2024-05-20 ENCOUNTER — TELEPHONE (OUTPATIENT)
Dept: FAMILY MEDICINE | Facility: CLINIC | Age: 64
End: 2024-05-20
Payer: MEDICARE

## 2024-05-20 NOTE — TELEPHONE ENCOUNTER
See other note   Left message on machine      ----- Message from Ashley Simental sent at 5/20/2024  1:22 PM CDT -----  Contact: Cale [phone]  Type:  Patient Returning Call    Who Called:  Pts  Cale   Who Left Message for Patient:  Dolly   Does the patient know what this is regarding?:  Yes   Best Call Back Number:  [phone]    Additional Information:  Pls call back and advise

## 2024-05-20 NOTE — TELEPHONE ENCOUNTER
Left message on machine to call back      ----- Message from Yg Treviño sent at 5/20/2024 11:53 AM CDT -----  Type: Needs Medical Advice  Who Called:  pt , Cale  Symptoms (please be specific):  said his wife is confused again--said she on her way home from [place] and he wanted to let the dr know that he going to bring her back to the hospital said he bringing her [facility] the [facility] said they treated her like a drug head and like dirt--said he would like to speak to the office--please call and advise  Best Call Back Number: [phone]  Additional Information: thank you

## 2024-05-21 ENCOUNTER — OFFICE VISIT (OUTPATIENT)
Dept: FAMILY MEDICINE | Facility: CLINIC | Age: 64
End: 2024-05-21
Payer: MEDICARE

## 2024-05-21 ENCOUNTER — HOSPITAL ENCOUNTER (INPATIENT)
Facility: HOSPITAL | Age: 64
LOS: 6 days | Discharge: HOME HEALTH | DRG: 871 | End: 2024-05-28
Attending: EMERGENCY MEDICINE | Admitting: STUDENT IN AN ORGANIZED HEALTH CARE EDUCATION/TRAINING PROGRAM
Payer: MEDICARE

## 2024-05-21 ENCOUNTER — TELEPHONE (OUTPATIENT)
Dept: FAMILY MEDICINE | Facility: CLINIC | Age: 64
End: 2024-05-21
Payer: MEDICARE

## 2024-05-21 VITALS
WEIGHT: 106.94 LBS | DIASTOLIC BLOOD PRESSURE: 60 MMHG | SYSTOLIC BLOOD PRESSURE: 110 MMHG | BODY MASS INDEX: 20.19 KG/M2 | RESPIRATION RATE: 17 BRPM | OXYGEN SATURATION: 92 % | TEMPERATURE: 100 F | HEART RATE: 113 BPM | HEIGHT: 61 IN

## 2024-05-21 DIAGNOSIS — R07.9 CHEST PAIN: ICD-10-CM

## 2024-05-21 DIAGNOSIS — J15.211 PNEUMONIA OF BOTH LUNGS DUE TO METHICILLIN SUSCEPTIBLE STAPHYLOCOCCUS AUREUS (MSSA), UNSPECIFIED PART OF LUNG: ICD-10-CM

## 2024-05-21 DIAGNOSIS — R50.9 FEVER: ICD-10-CM

## 2024-05-21 DIAGNOSIS — R78.81 BACTEREMIA: ICD-10-CM

## 2024-05-21 DIAGNOSIS — B95.61 MSSA BACTEREMIA: ICD-10-CM

## 2024-05-21 DIAGNOSIS — R41.0 CONFUSION: ICD-10-CM

## 2024-05-21 DIAGNOSIS — J18.9 PNEUMONIA OF RIGHT LUNG DUE TO INFECTIOUS ORGANISM, UNSPECIFIED PART OF LUNG: ICD-10-CM

## 2024-05-21 DIAGNOSIS — R00.0 TACHYCARDIA: ICD-10-CM

## 2024-05-21 DIAGNOSIS — A41.9 SEPSIS, DUE TO UNSPECIFIED ORGANISM, UNSPECIFIED WHETHER ACUTE ORGAN DYSFUNCTION PRESENT: Primary | ICD-10-CM

## 2024-05-21 DIAGNOSIS — R50.9 FEVER, UNSPECIFIED FEVER CAUSE: Primary | ICD-10-CM

## 2024-05-21 DIAGNOSIS — R06.02 SHORTNESS OF BREATH: ICD-10-CM

## 2024-05-21 DIAGNOSIS — R78.81 MSSA BACTEREMIA: ICD-10-CM

## 2024-05-21 LAB
ALBUMIN SERPL BCP-MCNC: 2.7 G/DL (ref 3.5–5.2)
ALP SERPL-CCNC: 159 U/L (ref 55–135)
ALT SERPL W/O P-5'-P-CCNC: 17 U/L (ref 10–44)
ANION GAP SERPL CALC-SCNC: 13 MMOL/L (ref 8–16)
AST SERPL-CCNC: 26 U/L (ref 10–40)
BACTERIA #/AREA URNS HPF: ABNORMAL /HPF
BASOPHILS # BLD AUTO: 0.03 K/UL (ref 0–0.2)
BASOPHILS NFR BLD: 0.3 % (ref 0–1.9)
BILIRUB SERPL-MCNC: 0.6 MG/DL (ref 0.1–1)
BILIRUB UR QL STRIP: NEGATIVE
BNP SERPL-MCNC: 49 PG/ML (ref 0–99)
BUN SERPL-MCNC: 24 MG/DL (ref 8–23)
CALCIUM SERPL-MCNC: 10.5 MG/DL (ref 8.7–10.5)
CHLORIDE SERPL-SCNC: 98 MMOL/L (ref 95–110)
CLARITY UR: CLEAR
CO2 SERPL-SCNC: 21 MMOL/L (ref 23–29)
COLOR UR: YELLOW
CREAT SERPL-MCNC: 0.9 MG/DL (ref 0.5–1.4)
DIFFERENTIAL METHOD BLD: ABNORMAL
EOSINOPHIL # BLD AUTO: 0 K/UL (ref 0–0.5)
EOSINOPHIL NFR BLD: 0.2 % (ref 0–8)
ERYTHROCYTE [DISTWIDTH] IN BLOOD BY AUTOMATED COUNT: 17 % (ref 11.5–14.5)
EST. GFR  (NO RACE VARIABLE): >60 ML/MIN/1.73 M^2
GLUCOSE SERPL-MCNC: 91 MG/DL (ref 70–110)
GLUCOSE UR QL STRIP: NEGATIVE
HCT VFR BLD AUTO: 33.3 % (ref 37–48.5)
HGB BLD-MCNC: 11.1 G/DL (ref 12–16)
HGB UR QL STRIP: NEGATIVE
HYALINE CASTS #/AREA URNS LPF: 1 /LPF
IMM GRANULOCYTES # BLD AUTO: 0.06 K/UL (ref 0–0.04)
IMM GRANULOCYTES NFR BLD AUTO: 0.5 % (ref 0–0.5)
KETONES UR QL STRIP: NEGATIVE
LACTATE SERPL-SCNC: 0.7 MMOL/L (ref 0.5–2.2)
LEUKOCYTE ESTERASE UR QL STRIP: ABNORMAL
LYMPHOCYTES # BLD AUTO: 1.9 K/UL (ref 1–4.8)
LYMPHOCYTES NFR BLD: 15.8 % (ref 18–48)
MAGNESIUM SERPL-MCNC: 2.1 MG/DL (ref 1.6–2.6)
MCH RBC QN AUTO: 29.3 PG (ref 27–31)
MCHC RBC AUTO-ENTMCNC: 33.3 G/DL (ref 32–36)
MCV RBC AUTO: 88 FL (ref 82–98)
MICROSCOPIC COMMENT: ABNORMAL
MONOCYTES # BLD AUTO: 0.8 K/UL (ref 0.3–1)
MONOCYTES NFR BLD: 6.5 % (ref 4–15)
NEUTROPHILS # BLD AUTO: 9 K/UL (ref 1.8–7.7)
NEUTROPHILS NFR BLD: 76.7 % (ref 38–73)
NITRITE UR QL STRIP: NEGATIVE
NRBC BLD-RTO: 0 /100 WBC
PH UR STRIP: 6 [PH] (ref 5–8)
PLATELET # BLD AUTO: 302 K/UL (ref 150–450)
PMV BLD AUTO: 8.9 FL (ref 9.2–12.9)
POTASSIUM SERPL-SCNC: 3.6 MMOL/L (ref 3.5–5.1)
PROT SERPL-MCNC: 7 G/DL (ref 6–8.4)
PROT UR QL STRIP: ABNORMAL
RBC # BLD AUTO: 3.79 M/UL (ref 4–5.4)
RBC #/AREA URNS HPF: 0 /HPF (ref 0–4)
SODIUM SERPL-SCNC: 132 MMOL/L (ref 136–145)
SP GR UR STRIP: 1.01 (ref 1–1.03)
SQUAMOUS #/AREA URNS HPF: 1 /HPF
TROPONIN I SERPL DL<=0.01 NG/ML-MCNC: <0.006 NG/ML (ref 0–0.03)
URN SPEC COLLECT METH UR: ABNORMAL
UROBILINOGEN UR STRIP-ACNC: NEGATIVE EU/DL
WBC # BLD AUTO: 11.76 K/UL (ref 3.9–12.7)
WBC #/AREA URNS HPF: 14 /HPF (ref 0–5)

## 2024-05-21 PROCEDURE — 87186 SC STD MICRODIL/AGAR DIL: CPT | Performed by: PHYSICIAN ASSISTANT

## 2024-05-21 PROCEDURE — 25000242 PHARM REV CODE 250 ALT 637 W/ HCPCS: Performed by: STUDENT IN AN ORGANIZED HEALTH CARE EDUCATION/TRAINING PROGRAM

## 2024-05-21 PROCEDURE — 94761 N-INVAS EAR/PLS OXIMETRY MLT: CPT

## 2024-05-21 PROCEDURE — 87040 BLOOD CULTURE FOR BACTERIA: CPT | Mod: 59 | Performed by: PHYSICIAN ASSISTANT

## 2024-05-21 PROCEDURE — 87154 CUL TYP ID BLD PTHGN 6+ TRGT: CPT | Performed by: PHYSICIAN ASSISTANT

## 2024-05-21 PROCEDURE — 87077 CULTURE AEROBIC IDENTIFY: CPT | Mod: 59

## 2024-05-21 PROCEDURE — 87077 CULTURE AEROBIC IDENTIFY: CPT | Performed by: PHYSICIAN ASSISTANT

## 2024-05-21 PROCEDURE — G0378 HOSPITAL OBSERVATION PER HR: HCPCS

## 2024-05-21 PROCEDURE — 96366 THER/PROPH/DIAG IV INF ADDON: CPT

## 2024-05-21 PROCEDURE — 93005 ELECTROCARDIOGRAM TRACING: CPT

## 2024-05-21 PROCEDURE — 1159F MED LIST DOCD IN RCRD: CPT | Mod: HCNC,CPTII,S$GLB,

## 2024-05-21 PROCEDURE — 99285 EMERGENCY DEPT VISIT HI MDM: CPT | Mod: 25

## 2024-05-21 PROCEDURE — 85025 COMPLETE CBC W/AUTO DIFF WBC: CPT | Performed by: PHYSICIAN ASSISTANT

## 2024-05-21 PROCEDURE — 3008F BODY MASS INDEX DOCD: CPT | Mod: HCNC,CPTII,S$GLB,

## 2024-05-21 PROCEDURE — 94640 AIRWAY INHALATION TREATMENT: CPT | Mod: XB

## 2024-05-21 PROCEDURE — 99215 OFFICE O/P EST HI 40 MIN: CPT | Mod: HCNC,S$GLB,,

## 2024-05-21 PROCEDURE — 94760 N-INVAS EAR/PLS OXIMETRY 1: CPT

## 2024-05-21 PROCEDURE — 87186 SC STD MICRODIL/AGAR DIL: CPT | Mod: 59

## 2024-05-21 PROCEDURE — 87147 CULTURE TYPE IMMUNOLOGIC: CPT

## 2024-05-21 PROCEDURE — 25000242 PHARM REV CODE 250 ALT 637 W/ HCPCS

## 2024-05-21 PROCEDURE — 83880 ASSAY OF NATRIURETIC PEPTIDE: CPT | Performed by: PHYSICIAN ASSISTANT

## 2024-05-21 PROCEDURE — S4991 NICOTINE PATCH NONLEGEND: HCPCS

## 2024-05-21 PROCEDURE — 84484 ASSAY OF TROPONIN QUANT: CPT | Performed by: PHYSICIAN ASSISTANT

## 2024-05-21 PROCEDURE — 99900035 HC TECH TIME PER 15 MIN (STAT)

## 2024-05-21 PROCEDURE — 36415 COLL VENOUS BLD VENIPUNCTURE: CPT | Performed by: PHYSICIAN ASSISTANT

## 2024-05-21 PROCEDURE — 63600175 PHARM REV CODE 636 W HCPCS

## 2024-05-21 PROCEDURE — 96372 THER/PROPH/DIAG INJ SC/IM: CPT

## 2024-05-21 PROCEDURE — 93010 ELECTROCARDIOGRAM REPORT: CPT | Mod: ,,, | Performed by: INTERNAL MEDICINE

## 2024-05-21 PROCEDURE — 83605 ASSAY OF LACTIC ACID: CPT | Performed by: PHYSICIAN ASSISTANT

## 2024-05-21 PROCEDURE — 3074F SYST BP LT 130 MM HG: CPT | Mod: HCNC,CPTII,S$GLB,

## 2024-05-21 PROCEDURE — 99999 PR PBB SHADOW E&M-EST. PATIENT-LVL III: CPT | Mod: PBBFAC,HCNC,,

## 2024-05-21 PROCEDURE — 87070 CULTURE OTHR SPECIMN AEROBIC: CPT

## 2024-05-21 PROCEDURE — 87205 SMEAR GRAM STAIN: CPT

## 2024-05-21 PROCEDURE — 3078F DIAST BP <80 MM HG: CPT | Mod: HCNC,CPTII,S$GLB,

## 2024-05-21 PROCEDURE — 96365 THER/PROPH/DIAG IV INF INIT: CPT

## 2024-05-21 PROCEDURE — 25000003 PHARM REV CODE 250: Performed by: EMERGENCY MEDICINE

## 2024-05-21 PROCEDURE — 63600175 PHARM REV CODE 636 W HCPCS: Performed by: EMERGENCY MEDICINE

## 2024-05-21 PROCEDURE — 81000 URINALYSIS NONAUTO W/SCOPE: CPT | Performed by: PHYSICIAN ASSISTANT

## 2024-05-21 PROCEDURE — 87086 URINE CULTURE/COLONY COUNT: CPT | Performed by: PHYSICIAN ASSISTANT

## 2024-05-21 PROCEDURE — 25000003 PHARM REV CODE 250

## 2024-05-21 PROCEDURE — 80053 COMPREHEN METABOLIC PANEL: CPT | Performed by: PHYSICIAN ASSISTANT

## 2024-05-21 PROCEDURE — 96361 HYDRATE IV INFUSION ADD-ON: CPT

## 2024-05-21 PROCEDURE — 83735 ASSAY OF MAGNESIUM: CPT | Performed by: PHYSICIAN ASSISTANT

## 2024-05-21 PROCEDURE — 1160F RVW MEDS BY RX/DR IN RCRD: CPT | Mod: HCNC,CPTII,S$GLB,

## 2024-05-21 PROCEDURE — 4010F ACE/ARB THERAPY RXD/TAKEN: CPT | Mod: HCNC,CPTII,S$GLB,

## 2024-05-21 RX ORDER — ACETAMINOPHEN 325 MG/1
650 TABLET ORAL EVERY 4 HOURS PRN
Status: DISCONTINUED | OUTPATIENT
Start: 2024-05-21 | End: 2024-05-28 | Stop reason: HOSPADM

## 2024-05-21 RX ORDER — BUDESONIDE 0.5 MG/2ML
1 INHALANT ORAL 2 TIMES DAILY
Status: DISCONTINUED | OUTPATIENT
Start: 2024-05-21 | End: 2024-05-28 | Stop reason: HOSPADM

## 2024-05-21 RX ORDER — NALOXONE HCL 0.4 MG/ML
0.02 VIAL (ML) INJECTION
Status: DISCONTINUED | OUTPATIENT
Start: 2024-05-21 | End: 2024-05-28 | Stop reason: HOSPADM

## 2024-05-21 RX ORDER — LANOLIN ALCOHOL/MO/W.PET/CERES
800 CREAM (GRAM) TOPICAL
Status: DISCONTINUED | OUTPATIENT
Start: 2024-05-21 | End: 2024-05-22

## 2024-05-21 RX ORDER — PROCHLORPERAZINE EDISYLATE 5 MG/ML
5 INJECTION INTRAMUSCULAR; INTRAVENOUS EVERY 6 HOURS PRN
Status: DISCONTINUED | OUTPATIENT
Start: 2024-05-21 | End: 2024-05-28 | Stop reason: HOSPADM

## 2024-05-21 RX ORDER — SODIUM CHLORIDE, SODIUM LACTATE, POTASSIUM CHLORIDE, CALCIUM CHLORIDE 600; 310; 30; 20 MG/100ML; MG/100ML; MG/100ML; MG/100ML
INJECTION, SOLUTION INTRAVENOUS CONTINUOUS
Status: DISCONTINUED | OUTPATIENT
Start: 2024-05-21 | End: 2024-05-22

## 2024-05-21 RX ORDER — MORPHINE SULFATE 15 MG/1
30 TABLET, FILM COATED, EXTENDED RELEASE ORAL 2 TIMES DAILY
Status: DISCONTINUED | OUTPATIENT
Start: 2024-05-21 | End: 2024-05-28 | Stop reason: HOSPADM

## 2024-05-21 RX ORDER — LEVOFLOXACIN 5 MG/ML
750 INJECTION, SOLUTION INTRAVENOUS
Status: DISCONTINUED | OUTPATIENT
Start: 2024-05-23 | End: 2024-05-23

## 2024-05-21 RX ORDER — ALUMINUM HYDROXIDE, MAGNESIUM HYDROXIDE, AND SIMETHICONE 1200; 120; 1200 MG/30ML; MG/30ML; MG/30ML
30 SUSPENSION ORAL 4 TIMES DAILY PRN
Status: DISCONTINUED | OUTPATIENT
Start: 2024-05-21 | End: 2024-05-28 | Stop reason: HOSPADM

## 2024-05-21 RX ORDER — OXYCODONE HYDROCHLORIDE 5 MG/1
20 TABLET ORAL EVERY 6 HOURS PRN
Status: DISCONTINUED | OUTPATIENT
Start: 2024-05-21 | End: 2024-05-28 | Stop reason: HOSPADM

## 2024-05-21 RX ORDER — SIMETHICONE 80 MG
1 TABLET,CHEWABLE ORAL 4 TIMES DAILY PRN
Status: DISCONTINUED | OUTPATIENT
Start: 2024-05-21 | End: 2024-05-28 | Stop reason: HOSPADM

## 2024-05-21 RX ORDER — PANTOPRAZOLE SODIUM 40 MG/1
40 TABLET, DELAYED RELEASE ORAL DAILY
Status: DISCONTINUED | OUTPATIENT
Start: 2024-05-22 | End: 2024-05-28 | Stop reason: HOSPADM

## 2024-05-21 RX ORDER — LUBIPROSTONE 8 UG/1
24 CAPSULE ORAL 2 TIMES DAILY PRN
Status: DISCONTINUED | OUTPATIENT
Start: 2024-05-21 | End: 2024-05-28 | Stop reason: HOSPADM

## 2024-05-21 RX ORDER — ATORVASTATIN CALCIUM 40 MG/1
40 TABLET, FILM COATED ORAL NIGHTLY
Status: DISCONTINUED | OUTPATIENT
Start: 2024-05-21 | End: 2024-05-28 | Stop reason: HOSPADM

## 2024-05-21 RX ORDER — ARFORMOTEROL TARTRATE 15 UG/2ML
15 SOLUTION RESPIRATORY (INHALATION) 2 TIMES DAILY
Status: DISCONTINUED | OUTPATIENT
Start: 2024-05-21 | End: 2024-05-28 | Stop reason: HOSPADM

## 2024-05-21 RX ORDER — ENOXAPARIN SODIUM 100 MG/ML
40 INJECTION SUBCUTANEOUS EVERY 24 HOURS
Status: DISCONTINUED | OUTPATIENT
Start: 2024-05-21 | End: 2024-05-28 | Stop reason: HOSPADM

## 2024-05-21 RX ORDER — IBUPROFEN 200 MG
24 TABLET ORAL
Status: DISCONTINUED | OUTPATIENT
Start: 2024-05-21 | End: 2024-05-28 | Stop reason: HOSPADM

## 2024-05-21 RX ORDER — IBUPROFEN 200 MG
16 TABLET ORAL
Status: DISCONTINUED | OUTPATIENT
Start: 2024-05-21 | End: 2024-05-28 | Stop reason: HOSPADM

## 2024-05-21 RX ORDER — SODIUM CHLORIDE 9 MG/ML
1000 INJECTION, SOLUTION INTRAVENOUS
Status: COMPLETED | OUTPATIENT
Start: 2024-05-21 | End: 2024-05-21

## 2024-05-21 RX ORDER — AMITRIPTYLINE HYDROCHLORIDE 25 MG/1
25 TABLET, FILM COATED ORAL NIGHTLY PRN
Status: DISCONTINUED | OUTPATIENT
Start: 2024-05-21 | End: 2024-05-28 | Stop reason: HOSPADM

## 2024-05-21 RX ORDER — LOSARTAN POTASSIUM 25 MG/1
50 TABLET ORAL DAILY
Status: DISCONTINUED | OUTPATIENT
Start: 2024-05-22 | End: 2024-05-25

## 2024-05-21 RX ORDER — SODIUM CHLORIDE 0.9 % (FLUSH) 0.9 %
10 SYRINGE (ML) INJECTION EVERY 12 HOURS PRN
Status: DISCONTINUED | OUTPATIENT
Start: 2024-05-21 | End: 2024-05-28 | Stop reason: HOSPADM

## 2024-05-21 RX ORDER — ONDANSETRON HYDROCHLORIDE 2 MG/ML
4 INJECTION, SOLUTION INTRAVENOUS EVERY 8 HOURS PRN
Status: DISCONTINUED | OUTPATIENT
Start: 2024-05-21 | End: 2024-05-28 | Stop reason: HOSPADM

## 2024-05-21 RX ORDER — GLUCAGON 1 MG
1 KIT INJECTION
Status: DISCONTINUED | OUTPATIENT
Start: 2024-05-21 | End: 2024-05-28 | Stop reason: HOSPADM

## 2024-05-21 RX ORDER — IPRATROPIUM BROMIDE AND ALBUTEROL SULFATE 2.5; .5 MG/3ML; MG/3ML
3 SOLUTION RESPIRATORY (INHALATION)
Status: DISCONTINUED | OUTPATIENT
Start: 2024-05-21 | End: 2024-05-22

## 2024-05-21 RX ORDER — LEVOFLOXACIN 5 MG/ML
750 INJECTION, SOLUTION INTRAVENOUS
Status: COMPLETED | OUTPATIENT
Start: 2024-05-21 | End: 2024-05-21

## 2024-05-21 RX ORDER — IBUPROFEN 200 MG
1 TABLET ORAL DAILY
Status: DISCONTINUED | OUTPATIENT
Start: 2024-05-21 | End: 2024-05-28 | Stop reason: HOSPADM

## 2024-05-21 RX ORDER — PREGABALIN 75 MG/1
75 CAPSULE ORAL 3 TIMES DAILY
Status: DISCONTINUED | OUTPATIENT
Start: 2024-05-21 | End: 2024-05-28 | Stop reason: HOSPADM

## 2024-05-21 RX ORDER — NYSTATIN 100000 [USP'U]/ML
10 SUSPENSION ORAL 3 TIMES DAILY
Status: ON HOLD | COMMUNITY
Start: 2024-05-04 | End: 2024-05-28 | Stop reason: HOSPADM

## 2024-05-21 RX ORDER — LACTULOSE 10 G/15ML
15 SOLUTION ORAL 2 TIMES DAILY
Status: DISCONTINUED | OUTPATIENT
Start: 2024-05-21 | End: 2024-05-28 | Stop reason: HOSPADM

## 2024-05-21 RX ADMIN — ATORVASTATIN CALCIUM 40 MG: 40 TABLET, FILM COATED ORAL at 09:05

## 2024-05-21 RX ADMIN — ARFORMOTEROL TARTRATE 15 MCG: 15 SOLUTION RESPIRATORY (INHALATION) at 08:05

## 2024-05-21 RX ADMIN — ENOXAPARIN SODIUM 40 MG: 40 INJECTION SUBCUTANEOUS at 07:05

## 2024-05-21 RX ADMIN — IPRATROPIUM BROMIDE AND ALBUTEROL SULFATE 3 ML: 2.5; .5 SOLUTION RESPIRATORY (INHALATION) at 07:05

## 2024-05-21 RX ADMIN — PREGABALIN 75 MG: 75 CAPSULE ORAL at 09:05

## 2024-05-21 RX ADMIN — OXYCODONE 20 MG: 5 TABLET ORAL at 08:05

## 2024-05-21 RX ADMIN — MORPHINE SULFATE 30 MG: 15 TABLET, EXTENDED RELEASE ORAL at 09:05

## 2024-05-21 RX ADMIN — SODIUM CHLORIDE 1000 ML: 9 INJECTION, SOLUTION INTRAVENOUS at 03:05

## 2024-05-21 RX ADMIN — SODIUM CHLORIDE, POTASSIUM CHLORIDE, SODIUM LACTATE AND CALCIUM CHLORIDE: 600; 310; 30; 20 INJECTION, SOLUTION INTRAVENOUS at 07:05

## 2024-05-21 RX ADMIN — BUDESONIDE INHALATION 1 MG: 0.5 SUSPENSION RESPIRATORY (INHALATION) at 07:05

## 2024-05-21 RX ADMIN — LEVOFLOXACIN 750 MG: 750 INJECTION, SOLUTION INTRAVENOUS at 03:05

## 2024-05-21 RX ADMIN — Medication 1 PATCH: at 07:05

## 2024-05-21 NOTE — FIRST PROVIDER EVALUATION
Emergency Department TeleTriage Encounter Note      CHIEF COMPLAINT    Chief Complaint   Patient presents with    Altered Mental Status     Cough, fever, sob and has had history of pneumonia with admission last month        VITAL SIGNS   Initial Vitals [05/21/24 1418]   BP Pulse Resp Temp SpO2   (!) 157/84 102 (!) 22 100 °F (37.8 °C) (!) 93 %      MAP       --            ALLERGIES    Review of patient's allergies indicates:   Allergen Reactions    Codeine Nausea And Vomiting    Tylenol-codeine [acetaminophen-codeine] Nausea And Vomiting       PROVIDER TRIAGE NOTE  Patient was sent from clinic due to concern about pneumonia. She has had fever, cough and shortness of breath.       ORDERS  Labs Reviewed - No data to display    ED Orders (720h ago, onward)      None              Virtual Visit Note: The provider triage portion of this emergency department evaluation and documentation was performed via [employer], a HIPAA-compliant telemedicine application, in concert with a tele-presenter in the room. A face to face patient evaluation with one of my colleagues will occur once the patient is placed in an emergency department room.      DISCLAIMER: This note was prepared with [employer] voice recognition transcription software. Garbled syntax, mangled pronouns, and other bizarre constructions may be attributed to that software system.

## 2024-05-21 NOTE — ASSESSMENT & PLAN NOTE
Chronic, controlled. Latest blood pressure and vitals reviewed-     Temp:  [99.9 °F (37.7 °C)-100 °F (37.8 °C)]   Pulse:  []   Resp:  [17-22]   BP: (100-157)/(55-84)   SpO2:  [92 %-99 %] .   Home meds for hypertension were reviewed and noted below.   Hypertension Medications               losartan (COZAAR) 50 MG tablet Take 1 tablet (50 mg total) by mouth once daily.            While in the hospital, will manage blood pressure as follows; Continue home antihypertensive regimen    Will utilize p.r.n. blood pressure medication only if patient's blood pressure greater than 160/100 and she develops symptoms such as worsening chest pain or shortness of breath.

## 2024-05-21 NOTE — ASSESSMENT & PLAN NOTE
Dangers of cigarette smoking were reviewed with patient in detail. Patient was Counseled for 3-10 minutes. Nicotine replacement options were discussed. Nicotine replacement was discussed- prescribed Nicotine patch PRN

## 2024-05-21 NOTE — ASSESSMENT & PLAN NOTE
Patient has a diagnosis of pneumonia. The cause of the pneumonia is suspected to be bacterial in etiology but organism is not known. The pneumonia is stable. The patient has the following signs/symptoms of pneumonia: cough, sputum production, and shortness of breath. The patient does not have a current oxygen requirement and the patient does not have a home oxygen requirement. I have reviewed the pertinent imaging. The following cultures have been collected: Blood cultures and Sputum culture The culture results are listed below.     Current antimicrobial regimen consists of the antibiotics listed below. Will monitor patient closely and continue current treatment plan unchanged.    Antibiotics (From admission, onward)      Start     Stop Route Frequency Ordered    05/23/24 1530  levoFLOXacin 750 mg/150 mL IVPB 750 mg         -- IV Every 48 hours (non-standard times) 05/21/24 1708            Microbiology Results (last 7 days)       Procedure Component Value Units Date/Time    Culture, Respiratory with Gram Stain [[id number]]     Order Status: No result Specimen: Sputum, Expectorated     Blood culture #2 **CANNOT BE ORDERED STAT** [[id number]] Collected: 05/21/24 1526    Order Status: Sent Specimen: Blood from Peripheral, Antecubital, Left     Blood culture #1 **CANNOT BE ORDERED STAT** [[id number]] Collected: 05/21/24 1516    Order Status: Sent Specimen: Blood from Peripheral, Antecubital, Right

## 2024-05-21 NOTE — PROGRESS NOTES
Pharmacist Renal Dose Adjustment Note    Floresita Martin is a 64 y.o. female being treated with the medication Levofloxacin    Patient Data:    Vital Signs (Most Recent):  Temp: 100 °F (37.8 °C) (05/21/24 1418)  Pulse: 85 (05/21/24 1709)  Resp: 20 (05/21/24 1709)  BP: 121/61 (05/21/24 1702)  SpO2: 98 % (05/21/24 1709) Vital Signs (72h Range):  Temp:  [99.9 °F (37.7 °C)-100 °F (37.8 °C)]   Pulse:  []   Resp:  [17-22]   BP: (100-157)/(55-84)   SpO2:  [92 %-99 %]      Recent Labs   Lab 05/21/24  1508   CREATININE 0.9     Serum creatinine: 0.9 mg/dL 05/21/24 1508  Estimated creatinine clearance: 46.6 mL/min    Levofloxacin 750 mg Q24H will be changed to Levofloxacin 750 mg Q48H  Due to CrCl < 50    Pharmacist's Name: Lorena Pitts  Pharmacist's Extension: [phone]

## 2024-05-21 NOTE — ED PROVIDER NOTES
Encounter Date: 2024       History     Chief Complaint   Patient presents with    Altered Mental Status     Cough, fever, sob and has had history of pneumonia with admission last month      Patient presents complaining of cough, congestion, shortness for breath, intermittent confusion.  Patient has a history of recent admission for bacteremia, pneumonia, Pseudomonas.  Patient states for the last 2 weeks she is felt ill.  Patient also has had low sodium.  Per the caretaker patient has been intermittently confused over the last few days.  Patient has a history of chronic pain.  Patient had previous treatment and left against medical advice.  At the worst symptoms are moderate.  Nothing makes it better or worse.      Review of patient's allergies indicates:   Allergen Reactions    Codeine Nausea And Vomiting    Tylenol-codeine [acetaminophen-codeine] Nausea And Vomiting     Past Medical History:   Diagnosis Date    Alcohol abuse     Cellulitis of toe of right foot 2019    Headache     Hearing loss     HLD (hyperlipidemia)      Past Surgical History:   Procedure Laterality Date    BACK SURGERY       SECTION      2    COLONOSCOPY N/A 11/15/2017    Procedure: COLONOSCOPY;  Surgeon: Peg Powers MD;  Location: [facility];  Service: Endoscopy;  Laterality: N/A;    EPIDURAL STEROID INJECTION INTO CERVICAL SPINE      EPIDURAL STEROID INJECTION INTO LUMBAR SPINE      JOINT REPLACEMENT  2018    LIPOMA RESECTION Right 2023    Procedure: EXCISION, LIPOMA;  Surgeon: Ruben Brooks Jr., MD;  Location: [facility];  Service: General;  Laterality: Right;    LUMBAR FUSION      with cage    LUNG BIOPSY      NECK SURGERY  2018    with hardware    RADIOFREQUENCY ABLATION      cervical    RADIOFREQUENCY ABLATION      lumbar    SPINE SURGERY  2018    STAPEDECTOMY Right 2015    STAPEDECTOMY Left     TUBAL LIGATION  1990     Family History   Problem Relation Name Age of Onset    Arthritis  Mother      Diabetes Mother      Hypertension Father Jose Johnson     Cancer Sister Lilly     Breast cancer Sister Lilly     No Known Problems Sister kristen     Cancer Sister Melissa     Cancer Sister [facility]     Cancer Sister Ana      Social History     Tobacco Use    Smoking status: Every Day     Current packs/day: 0.50     Average packs/day: 0.5 packs/day for 46.4 years (23.2 ttl pk-yrs)     Types: Cigarettes     Start date: 1/1/1978     Passive exposure: Past    Smokeless tobacco: Never    Tobacco comments:     Patient has smoked since a teenager, states that she has cut down from 2pk/day to .5pk/day. Can quit on her own when she's ready. Information and education on outpatient smoking cessation when ready. 3/26/24   Substance Use Topics    Alcohol use: Yes     Comment: social     Drug use: No     Review of Systems   All other systems reviewed and are negative.      Physical Exam     Initial Vitals [05/21/24 1418]   BP Pulse Resp Temp SpO2   (!) 157/84 102 (!) 22 100 °F (37.8 °C) (!) 93 %      MAP       --         Physical Exam    Nursing note and vitals reviewed.  Constitutional: She appears well-developed and well-nourished.   Pleasant, polite, frail   HENT:   Head: Normocephalic and atraumatic.   Eyes: EOM are normal.   Neck: Neck supple.   Normal range of motion.  Cardiovascular:  Intact distal pulses.           Pulmonary/Chest: No respiratory distress.   Abdominal: Abdomen is soft.   Musculoskeletal:      Cervical back: Normal range of motion and neck supple.     Neurological: She is alert and oriented to person, place, and time. She has normal strength.   Skin: Skin is warm and dry. Capillary refill takes less than 2 seconds.   Psychiatric: She has a normal mood and affect. Her behavior is normal. Judgment and thought content normal.         ED Course   Procedures  Labs Reviewed   CBC W/ AUTO DIFFERENTIAL - Abnormal; Notable for the following components:       Result Value    RBC 3.79 (*)      Hemoglobin 11.1 (*)     Hematocrit 33.3 (*)     RDW 17.0 (*)     MPV 8.9 (*)     Gran # (ANC) 9.0 (*)     Immature Grans (Abs) 0.06 (*)     Gran % 76.7 (*)     Lymph % 15.8 (*)     All other components within normal limits   COMPREHENSIVE METABOLIC PANEL - Abnormal; Notable for the following components:    Sodium 132 (*)     CO2 21 (*)     BUN 24 (*)     Albumin 2.7 (*)     Alkaline Phosphatase 159 (*)     All other components within normal limits   CULTURE, BLOOD   CULTURE, BLOOD   CULTURE, RESPIRATORY   B-TYPE NATRIURETIC PEPTIDE   TROPONIN I   LACTIC ACID, PLASMA   MAGNESIUM   URINALYSIS, REFLEX TO URINE CULTURE          Imaging Results              X-Ray Chest AP Portable (Final result)  Result time 05/21/24 16:13:55      Final result by Rain Beckwith MD (05/21/24 16:13:55)                   Impression:      Findings concerning for multi lobar pneumonia in the right lung with possible involvement of the left lung base as well.      Electronically signed by: Rain Beckwith  Date:    05/21/2024  Time:    16:13               Narrative:    EXAMINATION:  XR CHEST AP PORTABLE    CLINICAL HISTORY:  Fever, unspecified    TECHNIQUE:  Single frontal view of the chest was performed.    COMPARISON:  04/02/2024    FINDINGS:  Multifocal airspace opacities noted in the right lung, new since the prior study.  Left lung relatively clear with question minimal interstitial opacities at the base.  Faint nodule left upper lobe better demonstrated on previous CT.  Heart size normal.  No pleural effusion or acute bony abnormality.                                       Medications   levoFLOXacin 750 mg/150 mL IVPB 750 mg (750 mg Intravenous New Bag 5/21/24 1548)   sodium chloride 0.9% flush 10 mL (has no administration in time range)   naloxone 0.4 mg/mL injection 0.02 mg (has no administration in time range)   glucose chewable tablet 16 g (has no administration in time range)   glucose chewable tablet 24 g (has no  administration in time range)   glucagon (human recombinant) injection 1 mg (has no administration in time range)   enoxaparin injection 40 mg (has no administration in time range)   potassium bicarbonate disintegrating tablet 50 mEq (has no administration in time range)   potassium bicarbonate disintegrating tablet 35 mEq (has no administration in time range)   potassium bicarbonate disintegrating tablet 60 mEq (has no administration in time range)   magnesium oxide tablet 800 mg (has no administration in time range)   magnesium oxide tablet 800 mg (has no administration in time range)   acetaminophen tablet 650 mg (has no administration in time range)   ondansetron injection 4 mg (has no administration in time range)   prochlorperazine injection Soln 5 mg (has no administration in time range)   albuterol-ipratropium 2.5 mg-0.5 mg/3 mL nebulizer solution 3 mL (has no administration in time range)   simethicone chewable tablet 80 mg (has no administration in time range)   aluminum-magnesium hydroxide-simethicone 200-200-20 mg/5 mL suspension 30 mL (has no administration in time range)   levoFLOXacin 750 mg/150 mL IVPB 750 mg (has no administration in time range)   dextrose 10% bolus 125 mL 125 mL (has no administration in time range)   dextrose 10% bolus 250 mL 250 mL (has no administration in time range)   lactated ringers infusion (has no administration in time range)   0.9%  NaCl infusion (0 mLs Intravenous Stopped 5/21/24 5905)     Medical Decision Making  Patient was awake and alert answering questions appropriately     Considerations include but are not limited to acute kidney injury, dehydration, electrolyte abnormalities, infection, pneumonia    MDM    Patient presents for emergent evaluation of acute shortness of breath that poses a possible threat to life and/or bodily function.    In the ED patient found to have acute pneumonia.     Amount and/or complexity of data reviewed  I ordered labs and personally  reviewed them.  Labs significant for normal lactate.    I ordered X-rays and personally reviewed them and reviewed the radiologist interpretation.  Xray significant for multilobar pneumonia.    I ordered EKG and personally reviewed it.  EKG significant for regular rate and rhythm without ST elevation.        I did review prior medical records.    Social determinants of health - none    Admission MDM and Risk  I discussed the patient presentation labs, ekg, X-rays,  findings with the consultant(s) [facility]   Patient was managed in the ED with IV Levaquin, fluids.    The response to treatment was improved.    Shared decision-making with the patient regarding diagnosis, treatment, and plan was well received.    Patient required emergent consultation to hospitalist for admission.    Amount and/or Complexity of Data Reviewed  Labs:  Decision-making details documented in ED Course.    Risk  Prescription drug management.  Decision regarding hospitalization.               ED Course as of 05/21/24 1713   Tue May 21, 2024   1539 WBC: 11.76 [AP]   1539 Hemoglobin(!): 11.1 [AP]   1539 Hematocrit(!): 33.3 [AP]   1539 Platelet Count: 302 [AP]      ED Course User Index  [AP] Kris Sanchez MD                           Clinical Impression:  Final diagnoses:  [R06.02] Shortness of breath  [R50.9] Fever  [J18.9] Pneumonia of right lung due to infectious organism, unspecified part of lung (Primary)          ED Disposition Condition    Observation                 Kris Sanchez MD  05/21/24 1713

## 2024-05-21 NOTE — HPI
"Floresita Martin is a 64 year old female with a previous medical history of HTN, HLD, GERD, opioid dependence with current use, metabolic encephalopathy, pneumonia, and COPD who was sent from PCP office for fever, cough, and shortness of breath. Patient is poor historian and unable to exactly recall when symptoms began but endorses fever, cough, exertional shortness of breath and loss of voice over the past two weeks. Reports that her  could provide more information but he was not at bedside. ED note reports that patient endorsed intermittent confusion. Initial ED workup included a chest xray that reads: "Findings concerning for multi lobar pneumonia in the right lung with possible involvement of the left lung base as well". CBC shows stable anemia, magnesium and lactic acid normal, cmp unremarkable. Patient vitals are stable and she is maintaining an oxygen saturation of 96% on room air. She is having productive cough with moderate amount of brown/yellow sputum. Most recent hospital admission from 3/26 was for pseudomonas bacteremia. Patient initiated on Levaquin IV in ED which has been continued. Upon admit patient is AAOx4 and does not appear in any respiratory distress. She is positive for a voice change which she reports has worsened over the last two weeks and has "lost her voice" but at baseline it is raspy. Patient admitted by hospital medicine for further evaluation and management.  "

## 2024-05-21 NOTE — SUBJECTIVE & OBJECTIVE
Past Medical History:   Diagnosis Date    Alcohol abuse     Cellulitis of toe of right foot 2019    Headache     Hearing loss     HLD (hyperlipidemia)        Past Surgical History:   Procedure Laterality Date    BACK SURGERY       SECTION      2    COLONOSCOPY N/A 11/15/2017    Procedure: COLONOSCOPY;  Surgeon: Peg Powers MD;  Location: [facility];  Service: Endoscopy;  Laterality: N/A;    EPIDURAL STEROID INJECTION INTO CERVICAL SPINE      EPIDURAL STEROID INJECTION INTO LUMBAR SPINE      JOINT REPLACEMENT  2018    LIPOMA RESECTION Right 2023    Procedure: EXCISION, LIPOMA;  Surgeon: Ruben Brooks Jr., MD;  Location: [facility];  Service: General;  Laterality: Right;    LUMBAR FUSION  2019    with cage    LUNG BIOPSY      NECK SURGERY  2018    with hardware    RADIOFREQUENCY ABLATION      cervical    RADIOFREQUENCY ABLATION      lumbar    SPINE SURGERY  2018    STAPEDECTOMY Right 2015    STAPEDECTOMY Left     TUBAL LIGATION  1990       Review of patient's allergies indicates:   Allergen Reactions    Codeine Nausea And Vomiting    Tylenol-codeine [acetaminophen-codeine] Nausea And Vomiting       No current facility-administered medications on file prior to encounter.     Current Outpatient Medications on File Prior to Encounter   Medication Sig    albuterol (PROVENTIL/VENTOLIN HFA) 90 mcg/actuation inhaler Inhale 2 puffs into the lungs every 6 (six) hours as needed for Wheezing or Shortness of Breath. Rescue    albuterol-ipratropium (DUO-NEB) 2.5 mg-0.5 mg/3 mL nebulizer solution Take 3 mLs by nebulization every 6 (six) hours as needed for Wheezing or Shortness of Breath. Rescue    amitriptyline (ELAVIL) 25 MG tablet Take 1-2 tablet by mouth every night at bedtime    atorvastatin (LIPITOR) 40 MG tablet TAKE 1 TABLET(40 MG) BY MOUTH DAILY    co-enzyme Q-10 30 mg capsule Take 30 mg by mouth once daily.    cyclobenzaprine (FLEXERIL) 10 MG tablet Take 1 tablet by mouth  twice a day as needed    fluticasone propionate (FLONASE) 50 mcg/actuation nasal spray 1 spray (50 mcg total) by Each Nostril route once daily.    fluticasone-umeclidin-vilanter (TRELEGY ELLIPTA) 200-62.5-25 mcg inhaler Inhale 1 puff into the lungs once daily.    ibuprofen (ADVIL,MOTRIN) 800 MG tablet Take 1 tablet by mouth three times a day as needed for pain    lactulose (CHRONULAC) 10 gram/15 mL solution Take 15 ml twice a day as needeed    lactulose (CHRONULAC) 10 gram/15 mL solution 15 ml twice a day    loratadine (CLARITIN) 10 mg tablet Take 1 tablet (10 mg total) by mouth once daily.    losartan (COZAAR) 50 MG tablet Take 1 tablet (50 mg total) by mouth once daily.    lubiprostone (AMITIZA) 24 MCG Cap Take 1 capsule by mouth twice a day as needed for pain    morphine (MS CONTIN) 30 MG 12 hr tablet Take 1 tablet by mouth every twelve hours More than 7 days supply is medically necessary    morphine (MS CONTIN) 30 MG 12 hr tablet Take 1 tablet by mouth every twelve hours More than 7 days supply is medically necessary (Patient taking differently: Take 30 mg by mouth 2 (two) times daily.)    morphine (MS CONTIN) 30 MG 12 hr tablet Take 1 tablet by mouth twice a day as needed for pain    [START ON 6/14/2024] morphine (MS CONTIN) 30 MG 12 hr tablet Take 1 tablet by mouth twice a day as needed for pain    multivitamin capsule Take 1 capsule by mouth once daily.    naloxone (NARCAN) 4 mg/actuation Spry Spray 1 spray into one nostril single dose may repeat every 2-3 minutes until responsive or EMS arrives (Patient taking differently: 1 spray by Nasal route once.)    nystatin (MYCOSTATIN) 100,000 unit/mL suspension Take 10 mLs by mouth 3 (three) times daily.    omeprazole (PRILOSEC) 40 MG capsule TAKE 1 CAPSULE BY MOUTH EVERY MORNING    ondansetron (ZOFRAN-ODT) 4 MG TbDL Take 1 tablet (4 mg total) by mouth every 6 (six) hours as needed (nausea).    oxyCODONE (ROXICODONE) 20 mg Tab immediate release tablet Take 1 tablet  by mouth five times a day as needed for pain More than 7 days supply is medically necessary    oxyCODONE (ROXICODONE) 20 mg Tab immediate release tablet Take 1 tablet by mouth five times a day as needed for pain More than 7 days supply is medically necessary (Patient taking differently: Take 20 mg by mouth 5 (five) times daily.)    [START ON 6/7/2024] oxyCODONE (ROXICODONE) 20 mg Tab immediate release tablet Take 1 tablet by mouth five times a day as needed for pain More than a 7 day supply is medically necessary    oxyCODONE (ROXICODONE) 20 mg Tab immediate release tablet Take 1 tablet by mouth five times a day as needed for pain More than a 7 day supply is medically necessary    pregabalin (LYRICA) 75 MG capsule Take 1 capsule by mouth three times a day More than 7 days supply is medically necessary    RESTASIS 0.05 % ophthalmic emulsion Place 1 drop into both eyes 2 (two) times daily.     Family History       Problem Relation (Age of Onset)    Arthritis Mother    Breast cancer Sister    Cancer Sister, Sister, Sister, Sister    Diabetes Mother    Hypertension Father    No Known Problems Sister          Tobacco Use    Smoking status: Every Day     Current packs/day: 0.50     Average packs/day: 0.5 packs/day for 46.4 years (23.2 ttl pk-yrs)     Types: Cigarettes     Start date: 1/1/1978     Passive exposure: Past    Smokeless tobacco: Never    Tobacco comments:     Patient has smoked since a teenager, states that she has cut down from 2pk/day to .5pk/day. Can quit on her own when she's ready. Information and education on outpatient smoking cessation when ready. 3/26/24   Substance and Sexual Activity    Alcohol use: Yes     Comment: social     Drug use: No    Sexual activity: Not Currently     Partners: Male     Birth control/protection: Post-menopausal     Review of Systems   Constitutional:  Positive for fatigue.   HENT:  Positive for sore throat and voice change.    Respiratory:  Positive for cough and shortness  of breath.         Productive   All other systems reviewed and are negative.    Objective:     Vital Signs (Most Recent):  Temp: 100 °F (37.8 °C) (05/21/24 1418)  Pulse: 85 (05/21/24 1709)  Resp: 20 (05/21/24 1709)  BP: 121/61 (05/21/24 1702)  SpO2: 98 % (05/21/24 1709) Vital Signs (24h Range):  Temp:  [99.9 °F (37.7 °C)-100 °F (37.8 °C)] 100 °F (37.8 °C)  Pulse:  [] 85  Resp:  [17-22] 20  SpO2:  [92 %-99 %] 98 %  BP: (100-157)/(55-84) 121/61     Weight: 48.1 kg (106 lb)  Body mass index is 20.7 kg/m².     Physical Exam  Vitals reviewed.   Constitutional:       Appearance: Normal appearance.   HENT:      Head: Normocephalic and atraumatic.      Nose: Nose normal.      Mouth/Throat:      Mouth: Mucous membranes are dry.      Pharynx: Oropharynx is clear.   Eyes:      Extraocular Movements: Extraocular movements intact.      Pupils: Pupils are equal, round, and reactive to light.   Cardiovascular:      Rate and Rhythm: Normal rate and regular rhythm.      Pulses: Normal pulses.      Heart sounds: Normal heart sounds.   Pulmonary:      Effort: Pulmonary effort is normal.      Breath sounds: Normal breath sounds.   Abdominal:      General: Bowel sounds are normal.      Palpations: Abdomen is soft.   Musculoskeletal:         General: Normal range of motion.      Cervical back: Normal range of motion and neck supple.   Skin:     General: Skin is warm and dry.      Capillary Refill: Capillary refill takes less than 2 seconds.   Neurological:      General: No focal deficit present.      Mental Status: She is alert.   Psychiatric:         Mood and Affect: Mood normal.         Behavior: Behavior normal.         Thought Content: Thought content normal.         Judgment: Judgment normal.              CRANIAL NERVES     CN III, IV, VI   Pupils are equal, round, and reactive to light.       Significant Labs: All pertinent labs within the past 24 hours have been reviewed.  CBC:   Recent Labs   Lab 05/21/24  1508   WBC 11.76    HGB 11.1*   HCT 33.3*        CMP:   Recent Labs   Lab 05/21/24  1508   *   K 3.6   CL 98   CO2 21*   GLU 91   BUN 24*   CREATININE 0.9   CALCIUM 10.5   PROT 7.0   ALBUMIN 2.7*   BILITOT 0.6   ALKPHOS 159*   AST 26   ALT 17   ANIONGAP 13     Cardiac Markers:   Recent Labs   Lab 05/21/24  1508   BNP 49     Lactic Acid:   Recent Labs   Lab 05/21/24  1508   LACTATE 0.7     Magnesium:   Recent Labs   Lab 05/21/24  1508   MG 2.1     Troponin:   Recent Labs   Lab 05/21/24  1508   TROPONINI <0.006       Significant Imaging: I have reviewed all pertinent imaging results/findings within the past 24 hours.  EXAMINATION:  XR CHEST AP PORTABLE     CLINICAL HISTORY:  Fever, unspecified     TECHNIQUE:  Single frontal view of the chest was performed.     COMPARISON:  04/02/2024     FINDINGS:  Multifocal airspace opacities noted in the right lung, new since the prior study.  Left lung relatively clear with question minimal interstitial opacities at the base.  Faint nodule left upper lobe better demonstrated on previous CT.  Heart size normal.  No pleural effusion or acute bony abnormality.     Impression:     Findings concerning for multi lobar pneumonia in the right lung with possible involvement of the left lung base as well.        Electronically signed by:Rain Beckwith  Date:                                            05/21/2024  Time:                                           16:13

## 2024-05-21 NOTE — PROGRESS NOTES
Subjective:       Patient ID: Floresita Martin is a 64 y.o. female.    Chief Complaint: Pneumonia (X 2 weeks ago -  visit - steroid injection with antibiotics - no relief )      Floresita Martin is a 64 y.o. female with COPD, HLP, HTN who presents to clinic with spouse for evaluation of confusion, fever, cough ongoing x 2 days. The patient was admitted to the hospital on 3/24 for treatment of metabolic encephalopathy, hyponatremia, and pneumonia. Pt subsequently left AMA and followed up in clinic on 4/2 for hospital follow up. CXR revealed no improvement, so the patient was provided with Levaquin rx. She was treated once again at an urgent care about 2 weeks ago, but pt is unsure with what medications. Per spouse, her symptoms resolved and she left this past weekend to visit her children. He states when the patient awoke on Sunday she did not know where she was and her children had to drive her back home from [place]. Spouse states the patient has been very confused and disoriented since her arrival home. Associated symptoms include chills, sweats, nasal congestion, ear pain, sore throat, shortness of breath, vomiting, dizziness and headache.         Review of Systems   Constitutional:  Positive for chills, diaphoresis and fever. Negative for fatigue.   HENT:  Positive for congestion, ear pain, rhinorrhea and sore throat.    Respiratory:  Positive for cough and shortness of breath.    Cardiovascular:  Negative for chest pain.   Gastrointestinal:  Positive for vomiting. Negative for constipation, diarrhea and nausea.   Skin:  Negative for rash.   Neurological:  Positive for dizziness and headaches.   Psychiatric/Behavioral:  Positive for confusion. Negative for hallucinations.          Past Medical History:   Diagnosis Date    Alcohol abuse     Cellulitis of toe of right foot 12/27/2019    Headache     Hearing loss     HLD (hyperlipidemia)        Review of patient's allergies indicates:   Allergen Reactions     Codeine Nausea And Vomiting    Tylenol-codeine [acetaminophen-codeine] Nausea And Vomiting       No current facility-administered medications for this visit.    Current Outpatient Medications:     albuterol (PROVENTIL/VENTOLIN HFA) 90 mcg/actuation inhaler, Inhale 2 puffs into the lungs every 6 (six) hours as needed for Wheezing or Shortness of Breath. Rescue, Disp: 18 g, Rfl: 11    amitriptyline (ELAVIL) 25 MG tablet, Take 1-2 tablet by mouth every night at bedtime, Disp: 60 tablet, Rfl: 1    atorvastatin (LIPITOR) 40 MG tablet, TAKE 1 TABLET(40 MG) BY MOUTH DAILY, Disp: 30 tablet, Rfl: 0    co-enzyme Q-10 30 mg capsule, Take 30 mg by mouth once daily., Disp: , Rfl:     cyclobenzaprine (FLEXERIL) 10 MG tablet, Take 1 tablet by mouth twice a day as needed, Disp: 60 tablet, Rfl: 2    fluticasone propionate (FLONASE) 50 mcg/actuation nasal spray, 1 spray (50 mcg total) by Each Nostril route once daily., Disp: 16 g, Rfl: 11    fluticasone-umeclidin-vilanter (TRELEGY ELLIPTA) 200-62.5-25 mcg inhaler, Inhale 1 puff into the lungs once daily., Disp: 60 each, Rfl: 11    ibuprofen (ADVIL,MOTRIN) 800 MG tablet, Take 1 tablet by mouth three times a day as needed for pain, Disp: 90 tablet, Rfl: 1    lactulose (CHRONULAC) 10 gram/15 mL solution, Take 15 ml twice a day as needeed, Disp: 946 mL, Rfl: 0    lactulose (CHRONULAC) 10 gram/15 mL solution, 15 ml twice a day, Disp: 946 mL, Rfl: 1    loratadine (CLARITIN) 10 mg tablet, Take 1 tablet (10 mg total) by mouth once daily., Disp: 90 tablet, Rfl: 3    losartan (COZAAR) 50 MG tablet, Take 1 tablet (50 mg total) by mouth once daily., Disp: 90 tablet, Rfl: 3    lubiprostone (AMITIZA) 24 MCG Cap, Take 1 capsule by mouth twice a day as needed for pain, Disp: 60 capsule, Rfl: 1    morphine (MS CONTIN) 30 MG 12 hr tablet, Take 1 tablet by mouth every twelve hours More than 7 days supply is medically necessary, Disp: 60 tablet, Rfl: 0    morphine (MS CONTIN) 30 MG 12 hr tablet, Take 1  tablet by mouth every twelve hours More than 7 days supply is medically necessary (Patient taking differently: Take 30 mg by mouth 2 (two) times daily.), Disp: 60 tablet, Rfl: 0    morphine (MS CONTIN) 30 MG 12 hr tablet, Take 1 tablet by mouth twice a day as needed for pain, Disp: 60 tablet, Rfl: 0    [START ON 6/14/2024] morphine (MS CONTIN) 30 MG 12 hr tablet, Take 1 tablet by mouth twice a day as needed for pain, Disp: 60 tablet, Rfl: 0    multivitamin capsule, Take 1 capsule by mouth once daily., Disp: , Rfl:     naloxone (NARCAN) 4 mg/actuation Spry, Spray 1 spray into one nostril single dose may repeat every 2-3 minutes until responsive or EMS arrives (Patient taking differently: 1 spray by Nasal route once.), Disp: 1 each, Rfl: 1    nystatin (MYCOSTATIN) 100,000 unit/mL suspension, Take 10 mLs by mouth 3 (three) times daily., Disp: , Rfl:     omeprazole (PRILOSEC) 40 MG capsule, TAKE 1 CAPSULE BY MOUTH EVERY MORNING, Disp: 90 capsule, Rfl: 3    ondansetron (ZOFRAN-ODT) 4 MG TbDL, Take 1 tablet (4 mg total) by mouth every 6 (six) hours as needed (nausea)., Disp: 8 tablet, Rfl: 0    oxyCODONE (ROXICODONE) 20 mg Tab immediate release tablet, Take 1 tablet by mouth five times a day as needed for pain More than 7 days supply is medically necessary, Disp: 150 tablet, Rfl: 0    oxyCODONE (ROXICODONE) 20 mg Tab immediate release tablet, Take 1 tablet by mouth five times a day as needed for pain More than 7 days supply is medically necessary (Patient taking differently: Take 20 mg by mouth 5 (five) times daily.), Disp: 150 tablet, Rfl: 0    [START ON 6/7/2024] oxyCODONE (ROXICODONE) 20 mg Tab immediate release tablet, Take 1 tablet by mouth five times a day as needed for pain More than a 7 day supply is medically necessary, Disp: 150 tablet, Rfl: 0    oxyCODONE (ROXICODONE) 20 mg Tab immediate release tablet, Take 1 tablet by mouth five times a day as needed for pain More than a 7 day supply is medically necessary,  "Disp: 150 tablet, Rfl: 0    pregabalin (LYRICA) 75 MG capsule, Take 1 capsule by mouth three times a day More than 7 days supply is medically necessary, Disp: 90 capsule, Rfl: 1    RESTASIS 0.05 % ophthalmic emulsion, Place 1 drop into both eyes 2 (two) times daily., Disp: , Rfl:     albuterol-ipratropium (DUO-NEB) 2.5 mg-0.5 mg/3 mL nebulizer solution, Take 3 mLs by nebulization every 6 (six) hours as needed for Wheezing or Shortness of Breath. Rescue, Disp: 75 mL, Rfl: 11    Facility-Administered Medications Ordered in Other Visits:     levoFLOXacin 750 mg/150 mL IVPB 750 mg, 750 mg, Intravenous, Q24H, Kris Sanchez MD, Last Rate: 100 mL/hr at 05/21/24 1548, 750 mg at 05/21/24 1548    Objective:        Physical Exam  Vitals reviewed.   Constitutional:       Appearance: Normal appearance.      Comments: Pt answers questions appropriately   HENT:      Head: Normocephalic and atraumatic.   Eyes:      Conjunctiva/sclera: Conjunctivae normal.   Cardiovascular:      Rate and Rhythm: Regular rhythm. Tachycardia present.      Heart sounds: Normal heart sounds.   Pulmonary:      Effort: Pulmonary effort is normal. No respiratory distress.      Breath sounds: Rales (RLL) present.   Musculoskeletal:         General: Normal range of motion.      Cervical back: Normal range of motion and neck supple.   Skin:     General: Skin is warm and dry.   Neurological:      Mental Status: She is alert and oriented to person, place, and time.   Psychiatric:         Mood and Affect: Mood normal.         Behavior: Behavior normal.         Thought Content: Thought content normal.         Judgment: Judgment normal.           Visit Vitals  /60 (BP Location: Right arm, Patient Position: Sitting, BP Method: Small (Manual))   Pulse (!) 113   Temp 99.9 °F (37.7 °C) (Oral)   Resp 17   Ht 5' 0.98" (1.549 m)   Wt 48.5 kg (106 lb 14.8 oz)   LMP  (LMP Unknown)   SpO2 (!) 92%   BMI 20.22 kg/m²      Assessment:         1. Fever, unspecified " fever cause    2. Tachycardia    3. Confusion        Plan:         Floresita was seen today for pneumonia.    Diagnoses and all orders for this visit:    Fever, unspecified fever cause    Tachycardia    Confusion     Recommend the patient go to the ED for further evaluation and management. Discussed importance of not leaving AMA. Pt and spouse demonstrate understanding.     Called and gave report to nurse at [facility] ER.           Family Medicine Physician Assistant     Future Appointments       Date Provider Specialty Appt Notes    7/1/2024 Toni Hughes MD Family Medicine Annual    10/9/2024 Freya Swartz NP Pulmonology Follow Up             Tests to Keep You Healthy    Mammogram: Met on 2/12/2024  Colon Cancer Screening: Met on 11/15/2017  Cervical Cancer Screening: Met on 2/9/2022  Last Blood Pressure <= 139/89 (5/21/2024): Yes  Tobacco Cessation: NO       I spent a total of 25 minutes on the day of the visit.This includes face to face time and non-face to face time preparing to see the patient (eg, review of tests), obtaining and/or reviewing separately obtained history, documenting clinical information in the electronic or other health record, independently interpreting results and communicating results to the patient/family/caregiver, or care coordinator.

## 2024-05-21 NOTE — ED NOTES
Assumed care:  Floresita Martin is awake, alert and oriented x 3, skin warm and dry, in NAD with family at bedside.  Patient with recent history of pneumonia CO cough, fever, and SOB.  Patient placed on cardiac monitor and continuous pulse ox.    Patient identifiers for Floresita Martin checked and correct.  LOC:  Floresita Martin is awake, alert, and aware of environment with an appropriate affect. She is oriented x 3 and speaking appropriately.  APPEARANCE:  She is resting comfortably and in no acute distress. She is clean and well groomed, patient's clothing is properly fastened.  SKIN:  The skin is warm and dry. She has normal skin turgor and moist mucus membranes. Skin is intact; no bruising or breakdown noted.  MUSCULOSKELETAL:  She is moving all extremities well, no obvious deformities noted. Pulses intact.   RESPIRATORY:  Airway is open and patent. Respirations are spontaneous and non-labored with normal effort and rate.  Cough, SOB  CARDIAC:  She has a normal rate and rhythm. No peripheral edema noted. Capillary refill < 3 seconds.  ABDOMEN:  No distention noted.  Soft and non-tender upon palpation.  NEUROLOGICAL:  PERRL. Facial expression is symmetrical. Hand grasps are equal bilaterally. Normal sensation in all extremities when touched with finger.  Allergies reported:    Review of patient's allergies indicates:   Allergen Reactions    Codeine Nausea And Vomiting    Tylenol-codeine [acetaminophen-codeine] Nausea And Vomiting

## 2024-05-21 NOTE — TELEPHONE ENCOUNTER
Left message on machine   Patient is scheduled at 1:30 today with Tennille Castañeda.    Spoke to  several times yesterday   He states she is very confused and not doing well   Encourged hin to bring her to the ED    Instructed that the providers do not admit patients to the hospital.   She would need to go through the ED      ----- Message from Sravan Benton sent at 5/21/2024  8:17 AM CDT -----  Regarding: rt call  Type:  Patient Returning Call    Who Called:    Who Left Message for Patient:Deisy Salguero     Does the patient know what this is regarding?:yes    Best Call Back Number:[phone]      Additional Information:  wants a call back from mrs. LoveFlavia

## 2024-05-21 NOTE — H&P
"  [facility]  History & Physical    Patient Name: Floresita Martin  MRN: [id number]  Patient Class: OP- Observation  Admission Date: 5/21/2024  Attending Physician: Alirio Go MD   Primary Care Provider: Toni Hughes MD         Patient information was obtained from patient, past medical records, and ER records.     Subjective:     Principal Problem:Pneumonia involving right lung    Chief Complaint:   Chief Complaint   Patient presents with    Altered Mental Status     Cough, fever, sob and has had history of pneumonia with admission last month         HPI: Floresita Martin is a 64 year old female with a previous medical history of HTN, HLD, GERD, opioid dependence with current use, metabolic encephalopathy, pneumonia, and COPD who was sent from PCP office for fever, cough, and shortness of breath. Patient is poor historian and unable to exactly recall when symptoms began but endorses fever, cough, exertional shortness of breath and loss of voice over the past two weeks. Reports that her  could provide more information but he was not at bedside. ED note reports that patient endorsed intermittent confusion. Initial ED workup included a chest xray that reads: "Findings concerning for multi lobar pneumonia in the right lung with possible involvement of the left lung base as well". CBC shows stable anemia, magnesium and lactic acid normal, cmp unremarkable. Patient vitals are stable and she is maintaining an oxygen saturation of 96% on room air. She is having productive cough with moderate amount of brown/yellow sputum. Most recent hospital admission from 3/26 was for pseudomonas bacteremia. Patient initiated on Levaquin IV in ED which has been continued. Upon admit patient is AAOx4 and does not appear in any respiratory distress. She is positive for a voice change which she reports has worsened over the last two weeks and has "lost her voice" but at baseline it is " amy. Patient admitted by hospital medicine for further evaluation and management.    Past Medical History:   Diagnosis Date    Alcohol abuse     Cellulitis of toe of right foot 2019    Headache     Hearing loss     HLD (hyperlipidemia)        Past Surgical History:   Procedure Laterality Date    BACK SURGERY       SECTION      2    COLONOSCOPY N/A 11/15/2017    Procedure: COLONOSCOPY;  Surgeon: Peg Powers MD;  Location: [facility];  Service: Endoscopy;  Laterality: N/A;    EPIDURAL STEROID INJECTION INTO CERVICAL SPINE      EPIDURAL STEROID INJECTION INTO LUMBAR SPINE      JOINT REPLACEMENT  2018    LIPOMA RESECTION Right 2023    Procedure: EXCISION, LIPOMA;  Surgeon: Ruben Brooks Jr., MD;  Location: [facility];  Service: General;  Laterality: Right;    LUMBAR FUSION      with cage    LUNG BIOPSY      NECK SURGERY  2018    with hardware    RADIOFREQUENCY ABLATION      cervical    RADIOFREQUENCY ABLATION      lumbar    SPINE SURGERY  2018    STAPEDECTOMY Right 2015    STAPEDECTOMY Left     TUBAL LIGATION  1990       Review of patient's allergies indicates:   Allergen Reactions    Codeine Nausea And Vomiting    Tylenol-codeine [acetaminophen-codeine] Nausea And Vomiting       No current facility-administered medications on file prior to encounter.     Current Outpatient Medications on File Prior to Encounter   Medication Sig    albuterol (PROVENTIL/VENTOLIN HFA) 90 mcg/actuation inhaler Inhale 2 puffs into the lungs every 6 (six) hours as needed for Wheezing or Shortness of Breath. Rescue    albuterol-ipratropium (DUO-NEB) 2.5 mg-0.5 mg/3 mL nebulizer solution Take 3 mLs by nebulization every 6 (six) hours as needed for Wheezing or Shortness of Breath. Rescue    amitriptyline (ELAVIL) 25 MG tablet Take 1-2 tablet by mouth every night at bedtime    atorvastatin (LIPITOR) 40 MG tablet TAKE 1 TABLET(40 MG) BY MOUTH DAILY    co-enzyme Q-10 30 mg capsule Take 30 mg by  mouth once daily.    cyclobenzaprine (FLEXERIL) 10 MG tablet Take 1 tablet by mouth twice a day as needed    fluticasone propionate (FLONASE) 50 mcg/actuation nasal spray 1 spray (50 mcg total) by Each Nostril route once daily.    fluticasone-umeclidin-vilanter (TRELEGY ELLIPTA) 200-62.5-25 mcg inhaler Inhale 1 puff into the lungs once daily.    ibuprofen (ADVIL,MOTRIN) 800 MG tablet Take 1 tablet by mouth three times a day as needed for pain    lactulose (CHRONULAC) 10 gram/15 mL solution Take 15 ml twice a day as needeed    lactulose (CHRONULAC) 10 gram/15 mL solution 15 ml twice a day    loratadine (CLARITIN) 10 mg tablet Take 1 tablet (10 mg total) by mouth once daily.    losartan (COZAAR) 50 MG tablet Take 1 tablet (50 mg total) by mouth once daily.    lubiprostone (AMITIZA) 24 MCG Cap Take 1 capsule by mouth twice a day as needed for pain    morphine (MS CONTIN) 30 MG 12 hr tablet Take 1 tablet by mouth every twelve hours More than 7 days supply is medically necessary    morphine (MS CONTIN) 30 MG 12 hr tablet Take 1 tablet by mouth every twelve hours More than 7 days supply is medically necessary (Patient taking differently: Take 30 mg by mouth 2 (two) times daily.)    morphine (MS CONTIN) 30 MG 12 hr tablet Take 1 tablet by mouth twice a day as needed for pain    [START ON 6/14/2024] morphine (MS CONTIN) 30 MG 12 hr tablet Take 1 tablet by mouth twice a day as needed for pain    multivitamin capsule Take 1 capsule by mouth once daily.    naloxone (NARCAN) 4 mg/actuation Spry Spray 1 spray into one nostril single dose may repeat every 2-3 minutes until responsive or EMS arrives (Patient taking differently: 1 spray by Nasal route once.)    nystatin (MYCOSTATIN) 100,000 unit/mL suspension Take 10 mLs by mouth 3 (three) times daily.    omeprazole (PRILOSEC) 40 MG capsule TAKE 1 CAPSULE BY MOUTH EVERY MORNING    ondansetron (ZOFRAN-ODT) 4 MG TbDL Take 1 tablet (4 mg total) by mouth every 6 (six) hours as needed  (nausea).    oxyCODONE (ROXICODONE) 20 mg Tab immediate release tablet Take 1 tablet by mouth five times a day as needed for pain More than 7 days supply is medically necessary    oxyCODONE (ROXICODONE) 20 mg Tab immediate release tablet Take 1 tablet by mouth five times a day as needed for pain More than 7 days supply is medically necessary (Patient taking differently: Take 20 mg by mouth 5 (five) times daily.)    [START ON 6/7/2024] oxyCODONE (ROXICODONE) 20 mg Tab immediate release tablet Take 1 tablet by mouth five times a day as needed for pain More than a 7 day supply is medically necessary    oxyCODONE (ROXICODONE) 20 mg Tab immediate release tablet Take 1 tablet by mouth five times a day as needed for pain More than a 7 day supply is medically necessary    pregabalin (LYRICA) 75 MG capsule Take 1 capsule by mouth three times a day More than 7 days supply is medically necessary    RESTASIS 0.05 % ophthalmic emulsion Place 1 drop into both eyes 2 (two) times daily.     Family History       Problem Relation (Age of Onset)    Arthritis Mother    Breast cancer Sister    Cancer Sister, Sister, Sister, Sister    Diabetes Mother    Hypertension Father    No Known Problems Sister          Tobacco Use    Smoking status: Every Day     Current packs/day: 0.50     Average packs/day: 0.5 packs/day for 46.4 years (23.2 ttl pk-yrs)     Types: Cigarettes     Start date: 1/1/1978     Passive exposure: Past    Smokeless tobacco: Never    Tobacco comments:     Patient has smoked since a teenager, states that she has cut down from 2pk/day to .5pk/day. Can quit on her own when she's ready. Information and education on outpatient smoking cessation when ready. 3/26/24   Substance and Sexual Activity    Alcohol use: Yes     Comment: social     Drug use: No    Sexual activity: Not Currently     Partners: Male     Birth control/protection: Post-menopausal     Review of Systems   Constitutional:  Positive for fatigue.   HENT:  Positive  for sore throat and voice change.    Respiratory:  Positive for cough and shortness of breath.         Productive   All other systems reviewed and are negative.    Objective:     Vital Signs (Most Recent):  Temp: 100 °F (37.8 °C) (05/21/24 1418)  Pulse: 85 (05/21/24 1709)  Resp: 20 (05/21/24 1709)  BP: 121/61 (05/21/24 1702)  SpO2: 98 % (05/21/24 1709) Vital Signs (24h Range):  Temp:  [99.9 °F (37.7 °C)-100 °F (37.8 °C)] 100 °F (37.8 °C)  Pulse:  [] 85  Resp:  [17-22] 20  SpO2:  [92 %-99 %] 98 %  BP: (100-157)/(55-84) 121/61     Weight: 48.1 kg (106 lb)  Body mass index is 20.7 kg/m².     Physical Exam  Vitals reviewed.   Constitutional:       Appearance: Normal appearance.   HENT:      Head: Normocephalic and atraumatic.      Nose: Nose normal.      Mouth/Throat:      Mouth: Mucous membranes are dry.      Pharynx: Oropharynx is clear.   Eyes:      Extraocular Movements: Extraocular movements intact.      Pupils: Pupils are equal, round, and reactive to light.   Cardiovascular:      Rate and Rhythm: Normal rate and regular rhythm.      Pulses: Normal pulses.      Heart sounds: Normal heart sounds.   Pulmonary:      Effort: Pulmonary effort is normal.      Breath sounds: Normal breath sounds.   Abdominal:      General: Bowel sounds are normal.      Palpations: Abdomen is soft.   Musculoskeletal:         General: Normal range of motion.      Cervical back: Normal range of motion and neck supple.   Skin:     General: Skin is warm and dry.      Capillary Refill: Capillary refill takes less than 2 seconds.   Neurological:      General: No focal deficit present.      Mental Status: She is alert.   She is orient to person, place, situation. She recalled the month and year after some effort  Psychiatric:         Mood and Affect: Mood normal.         Behavior: Behavior normal.         Thought Content: Thought content normal.         Judgment: Judgment normal.              CRANIAL NERVES     CN III, IV, VI   Pupils are  equal, round, and reactive to light.       Significant Labs: All pertinent labs within the past 24 hours have been reviewed.  CBC:   Recent Labs   Lab 05/21/24  1508   WBC 11.76   HGB 11.1*   HCT 33.3*        CMP:   Recent Labs   Lab 05/21/24  1508   *   K 3.6   CL 98   CO2 21*   GLU 91   BUN 24*   CREATININE 0.9   CALCIUM 10.5   PROT 7.0   ALBUMIN 2.7*   BILITOT 0.6   ALKPHOS 159*   AST 26   ALT 17   ANIONGAP 13     Cardiac Markers:   Recent Labs   Lab 05/21/24  1508   BNP 49     Lactic Acid:   Recent Labs   Lab 05/21/24  1508   LACTATE 0.7     Magnesium:   Recent Labs   Lab 05/21/24  1508   MG 2.1     Troponin:   Recent Labs   Lab 05/21/24  1508   TROPONINI <0.006       Significant Imaging: I have reviewed all pertinent imaging results/findings within the past 24 hours.  EXAMINATION:  XR CHEST AP PORTABLE     CLINICAL HISTORY:  Fever, unspecified     TECHNIQUE:  Single frontal view of the chest was performed.     COMPARISON:  04/02/2024     FINDINGS:  Multifocal airspace opacities noted in the right lung, new since the prior study.  Left lung relatively clear with question minimal interstitial opacities at the base.  Faint nodule left upper lobe better demonstrated on previous CT.  Heart size normal.  No pleural effusion or acute bony abnormality.     Impression:     Findings concerning for multi lobar pneumonia in the right lung with possible involvement of the left lung base as well.        Electronically signed by:Rain Beckwith  Date:                                            05/21/2024  Time:                                           16:13  Assessment/Plan:     * Pneumonia involving right lung  Patient has a diagnosis of pneumonia. The cause of the pneumonia is suspected to be bacterial in etiology but organism is not known. The pneumonia is stable. The patient has the following signs/symptoms of pneumonia: cough, sputum production, and shortness of breath. The patient does not have a  current oxygen requirement and the patient does not have a home oxygen requirement. I have reviewed the pertinent imaging. The following cultures have been collected: Blood cultures and Sputum culture The culture results are listed below.     Current antimicrobial regimen consists of the antibiotics listed below. Will monitor patient closely and continue current treatment plan unchanged.    Antibiotics (From admission, onward)      Start     Stop Route Frequency Ordered    05/23/24 1530  levoFLOXacin 750 mg/150 mL IVPB 750 mg         -- IV Every 48 hours (non-standard times) 05/21/24 1708            Microbiology Results (last 7 days)       Procedure Component Value Units Date/Time    Culture, Respiratory with Gram Stain [[id number]]     Order Status: No result Specimen: Sputum, Expectorated     Blood culture #2 **CANNOT BE ORDERED STAT** [[id number]] Collected: 05/21/24 1526    Order Status: Sent Specimen: Blood from Peripheral, Antecubital, Left     Blood culture #1 **CANNOT BE ORDERED STAT** [[id number]] Collected: 05/21/24 1516    Order Status: Sent Specimen: Blood from Peripheral, Antecubital, Right             Hypertension, essential  Chronic, controlled. Latest blood pressure and vitals reviewed-     Temp:  [99.9 °F (37.7 °C)-100 °F (37.8 °C)]   Pulse:  []   Resp:  [17-22]   BP: (100-157)/(55-84)   SpO2:  [92 %-99 %] .   Home meds for hypertension were reviewed and noted below.   Hypertension Medications               losartan (COZAAR) 50 MG tablet Take 1 tablet (50 mg total) by mouth once daily.            While in the hospital, will manage blood pressure as follows; Continue home antihypertensive regimen    Will utilize p.r.n. blood pressure medication only if patient's blood pressure greater than 160/100 and she develops symptoms such as worsening chest pain or shortness of breath.    Opioid dependence with current use  Chronic condition    -Home pain medications continued      Dependence on nicotine  from cigarettes  Dangers of cigarette smoking were reviewed with patient in detail. Patient was Counseled for 3-10 minutes. Nicotine replacement options were discussed. Nicotine replacement was discussed- prescribed Nicotine patch PRN    Gastroesophageal reflux disease without esophagitis  -PPI continued      Moderate malnutrition    -IP consult to dietician         VTE Risk Mitigation (From admission, onward)           Ordered     enoxaparin injection 40 mg  Daily         05/21/24 1708     IP VTE HIGH RISK PATIENT  Once         05/21/24 1708     Place sequential compression device  Until discontinued         05/21/24 1708                         On 05/21/2024, patient should be placed in hospital observation services under my care in collaboration with  Dr. Alirio Go MD.      Pharmacist Renal Dose Adjustment Note    Floresita Martin is a 64 y.o. female being treated with the medication Levofloxacin    Patient Data:    Vital Signs (Most Recent):  Temp: 100 °F (37.8 °C) (05/21/24 1418)  Pulse: 85 (05/21/24 1709)  Resp: 20 (05/21/24 1709)  BP: 121/61 (05/21/24 1702)  SpO2: 98 % (05/21/24 1709) Vital Signs (72h Range):  Temp:  [99.9 °F (37.7 °C)-100 °F (37.8 °C)]   Pulse:  []   Resp:  [17-22]   BP: (100-157)/(55-84)   SpO2:  [92 %-99 %]      Recent Labs   Lab 05/21/24  1508   CREATININE 0.9     Serum creatinine: 0.9 mg/dL 05/21/24 1508  Estimated creatinine clearance: 46.6 mL/min    Levofloxacin 750 mg Q24H will be changed to Levofloxacin 750 mg Q48H  Due to CrCl < 50    Pharmacist's Name: Lorena Pitts  Pharmacist's Extension: [phone]      Nicole Guerrero NP  Department of [facility] Medicine  [facility]

## 2024-05-22 PROBLEM — G89.4 CHRONIC PAIN SYNDROME: Status: ACTIVE | Noted: 2023-06-28

## 2024-05-22 PROBLEM — J44.0 COPD WITH ACUTE LOWER RESPIRATORY INFECTION: Status: ACTIVE | Noted: 2024-05-22

## 2024-05-22 PROBLEM — E44.0 MODERATE PROTEIN-CALORIE MALNUTRITION: Status: ACTIVE | Noted: 2024-05-22

## 2024-05-22 LAB
ACINETOBACTER CALCOACETICUS/BAUMANNII COMPLEX: NOT DETECTED
ALBUMIN SERPL BCP-MCNC: 2.3 G/DL (ref 3.5–5.2)
ALP SERPL-CCNC: 142 U/L (ref 55–135)
ALT SERPL W/O P-5'-P-CCNC: 18 U/L (ref 10–44)
ANION GAP SERPL CALC-SCNC: 11 MMOL/L (ref 8–16)
AST SERPL-CCNC: 22 U/L (ref 10–40)
BACTEROIDES FRAGILIS: NOT DETECTED
BASOPHILS # BLD AUTO: 0.02 K/UL (ref 0–0.2)
BASOPHILS NFR BLD: 0.2 % (ref 0–1.9)
BILIRUB SERPL-MCNC: 0.5 MG/DL (ref 0.1–1)
BUN SERPL-MCNC: 11 MG/DL (ref 8–23)
CALCIUM SERPL-MCNC: 9.1 MG/DL (ref 8.7–10.5)
CANDIDA ALBICANS: NOT DETECTED
CANDIDA AURIS: NOT DETECTED
CANDIDA GLABRATA: NOT DETECTED
CANDIDA KRUSEI: NOT DETECTED
CANDIDA PARAPSILOSIS: NOT DETECTED
CANDIDA TROPICALIS: NOT DETECTED
CHLORIDE SERPL-SCNC: 100 MMOL/L (ref 95–110)
CO2 SERPL-SCNC: 21 MMOL/L (ref 23–29)
CREAT SERPL-MCNC: 0.6 MG/DL (ref 0.5–1.4)
CRYPTOCOCCUS NEOFORMANS/GATTII: NOT DETECTED
CTX-M GENE (ESBL PRODUCER): ABNORMAL
DIFFERENTIAL METHOD BLD: ABNORMAL
ENTEROBACTER CLOACAE COMPLEX: NOT DETECTED
ENTEROBACTERALES: NOT DETECTED
ENTEROCOCCUS FAECALIS: NOT DETECTED
ENTEROCOCCUS FAECIUM: NOT DETECTED
EOSINOPHIL # BLD AUTO: 0.1 K/UL (ref 0–0.5)
EOSINOPHIL NFR BLD: 0.5 % (ref 0–8)
ERYTHROCYTE [DISTWIDTH] IN BLOOD BY AUTOMATED COUNT: 16.7 % (ref 11.5–14.5)
ESCHERICHIA COLI: NOT DETECTED
EST. GFR  (NO RACE VARIABLE): >60 ML/MIN/1.73 M^2
GLUCOSE SERPL-MCNC: 84 MG/DL (ref 70–110)
HAEMOPHILUS INFLUENZAE: NOT DETECTED
HCT VFR BLD AUTO: 29.3 % (ref 37–48.5)
HGB BLD-MCNC: 9.9 G/DL (ref 12–16)
IMM GRANULOCYTES # BLD AUTO: 0.05 K/UL (ref 0–0.04)
IMM GRANULOCYTES NFR BLD AUTO: 0.5 % (ref 0–0.5)
IMP GENE (CARBAPENEM RESISTANT): ABNORMAL
KLEBSIELLA AEROGENES: NOT DETECTED
KLEBSIELLA OXYTOCA: NOT DETECTED
KLEBSIELLA PNEUMONIAE GROUP: NOT DETECTED
KPC RESISTANCE GENE (CARBAPENEM): ABNORMAL
LISTERIA MONOCYTOGENES: NOT DETECTED
LYMPHOCYTES # BLD AUTO: 1.9 K/UL (ref 1–4.8)
LYMPHOCYTES NFR BLD: 17.1 % (ref 18–48)
MAGNESIUM SERPL-MCNC: 1.6 MG/DL (ref 1.6–2.6)
MCH RBC QN AUTO: 29.5 PG (ref 27–31)
MCHC RBC AUTO-ENTMCNC: 33.8 G/DL (ref 32–36)
MCR-1: ABNORMAL
MCV RBC AUTO: 87 FL (ref 82–98)
MEC A/C AND MREJ (MRSA): NOT DETECTED
MEC A/C: ABNORMAL
MONOCYTES # BLD AUTO: 1.1 K/UL (ref 0.3–1)
MONOCYTES NFR BLD: 9.6 % (ref 4–15)
NDM GENE (CARBAPENEM RESISTANT): ABNORMAL
NEISSERIA MENINGITIDIS: NOT DETECTED
NEUTROPHILS # BLD AUTO: 8 K/UL (ref 1.8–7.7)
NEUTROPHILS NFR BLD: 72.1 % (ref 38–73)
NRBC BLD-RTO: 0 /100 WBC
OXA-48-LIKE (CARBAPENEM RESISTANT): ABNORMAL
PHOSPHATE SERPL-MCNC: 2.6 MG/DL (ref 2.7–4.5)
PLATELET # BLD AUTO: 284 K/UL (ref 150–450)
PMV BLD AUTO: 8.8 FL (ref 9.2–12.9)
POTASSIUM SERPL-SCNC: 4 MMOL/L (ref 3.5–5.1)
PROT SERPL-MCNC: 5.9 G/DL (ref 6–8.4)
PROTEUS SPECIES: NOT DETECTED
PSEUDOMONAS AERUGINOSA: NOT DETECTED
RBC # BLD AUTO: 3.36 M/UL (ref 4–5.4)
SALMONELLA SP: NOT DETECTED
SERRATIA MARCESCENS: NOT DETECTED
SODIUM SERPL-SCNC: 132 MMOL/L (ref 136–145)
STAPHYLOCOCCUS AUREUS: DETECTED
STAPHYLOCOCCUS EPIDERMIDIS: NOT DETECTED
STAPHYLOCOCCUS LUGDUNESIS: NOT DETECTED
STAPHYLOCOCCUS SPECIES: ABNORMAL
STENOTROPHOMONAS MALTOPHILIA: NOT DETECTED
STREPTOCOCCUS AGALACTIAE: NOT DETECTED
STREPTOCOCCUS PNEUMONIAE: NOT DETECTED
STREPTOCOCCUS PYOGENES: NOT DETECTED
STREPTOCOCCUS SPECIES: NOT DETECTED
VAN A/B (VRE GENE): ABNORMAL
VIM GENE (CARBAPENEM RESISTANT): ABNORMAL
WBC # BLD AUTO: 11.06 K/UL (ref 3.9–12.7)

## 2024-05-22 PROCEDURE — 99900031 HC PATIENT EDUCATION (STAT)

## 2024-05-22 PROCEDURE — 25000242 PHARM REV CODE 250 ALT 637 W/ HCPCS: Performed by: NURSE PRACTITIONER

## 2024-05-22 PROCEDURE — 84100 ASSAY OF PHOSPHORUS: CPT

## 2024-05-22 PROCEDURE — S4991 NICOTINE PATCH NONLEGEND: HCPCS

## 2024-05-22 PROCEDURE — 80053 COMPREHEN METABOLIC PANEL: CPT

## 2024-05-22 PROCEDURE — 25000242 PHARM REV CODE 250 ALT 637 W/ HCPCS

## 2024-05-22 PROCEDURE — 94640 AIRWAY INHALATION TREATMENT: CPT | Mod: XB

## 2024-05-22 PROCEDURE — 25000003 PHARM REV CODE 250: Performed by: NURSE PRACTITIONER

## 2024-05-22 PROCEDURE — 25000242 PHARM REV CODE 250 ALT 637 W/ HCPCS: Performed by: STUDENT IN AN ORGANIZED HEALTH CARE EDUCATION/TRAINING PROGRAM

## 2024-05-22 PROCEDURE — 94761 N-INVAS EAR/PLS OXIMETRY MLT: CPT

## 2024-05-22 PROCEDURE — 85025 COMPLETE CBC W/AUTO DIFF WBC: CPT

## 2024-05-22 PROCEDURE — 83735 ASSAY OF MAGNESIUM: CPT

## 2024-05-22 PROCEDURE — 99406 BEHAV CHNG SMOKING 3-10 MIN: CPT

## 2024-05-22 PROCEDURE — 36415 COLL VENOUS BLD VENIPUNCTURE: CPT

## 2024-05-22 PROCEDURE — 63600175 PHARM REV CODE 636 W HCPCS

## 2024-05-22 PROCEDURE — 25000003 PHARM REV CODE 250

## 2024-05-22 PROCEDURE — 11000001 HC ACUTE MED/SURG PRIVATE ROOM

## 2024-05-22 RX ORDER — CYCLOBENZAPRINE HCL 5 MG
10 TABLET ORAL 2 TIMES DAILY PRN
Status: DISCONTINUED | OUTPATIENT
Start: 2024-05-22 | End: 2024-05-28 | Stop reason: HOSPADM

## 2024-05-22 RX ORDER — LEVALBUTEROL 1.25 MG/.5ML
1.25 SOLUTION, CONCENTRATE RESPIRATORY (INHALATION) EVERY 6 HOURS
Status: DISCONTINUED | OUTPATIENT
Start: 2024-05-22 | End: 2024-05-24

## 2024-05-22 RX ORDER — LANOLIN ALCOHOL/MO/W.PET/CERES
800 CREAM (GRAM) TOPICAL
Status: DISCONTINUED | OUTPATIENT
Start: 2024-05-22 | End: 2024-05-28 | Stop reason: HOSPADM

## 2024-05-22 RX ORDER — GUAIFENESIN 600 MG/1
1200 TABLET, EXTENDED RELEASE ORAL 2 TIMES DAILY
Status: DISCONTINUED | OUTPATIENT
Start: 2024-05-22 | End: 2024-05-28 | Stop reason: HOSPADM

## 2024-05-22 RX ORDER — SODIUM,POTASSIUM PHOSPHATES 280-250MG
2 POWDER IN PACKET (EA) ORAL
Status: DISCONTINUED | OUTPATIENT
Start: 2024-05-22 | End: 2024-05-28 | Stop reason: HOSPADM

## 2024-05-22 RX ORDER — SODIUM CHLORIDE 9 MG/ML
INJECTION, SOLUTION INTRAVENOUS CONTINUOUS
Status: DISCONTINUED | OUTPATIENT
Start: 2024-05-22 | End: 2024-05-24

## 2024-05-22 RX ORDER — CETIRIZINE HYDROCHLORIDE 10 MG/1
10 TABLET ORAL DAILY
Status: DISCONTINUED | OUTPATIENT
Start: 2024-05-23 | End: 2024-05-28 | Stop reason: HOSPADM

## 2024-05-22 RX ORDER — LACTULOSE 10 G/15ML
10 SOLUTION ORAL 2 TIMES DAILY PRN
Status: DISCONTINUED | OUTPATIENT
Start: 2024-05-22 | End: 2024-05-28 | Stop reason: HOSPADM

## 2024-05-22 RX ADMIN — MORPHINE SULFATE 30 MG: 15 TABLET, EXTENDED RELEASE ORAL at 08:05

## 2024-05-22 RX ADMIN — PREGABALIN 75 MG: 75 CAPSULE ORAL at 08:05

## 2024-05-22 RX ADMIN — PANTOPRAZOLE SODIUM 40 MG: 40 TABLET, DELAYED RELEASE ORAL at 08:05

## 2024-05-22 RX ADMIN — ENOXAPARIN SODIUM 40 MG: 40 INJECTION SUBCUTANEOUS at 04:05

## 2024-05-22 RX ADMIN — OXYCODONE 20 MG: 5 TABLET ORAL at 04:05

## 2024-05-22 RX ADMIN — IPRATROPIUM BROMIDE AND ALBUTEROL SULFATE 3 ML: 2.5; .5 SOLUTION RESPIRATORY (INHALATION) at 07:05

## 2024-05-22 RX ADMIN — ARFORMOTEROL TARTRATE 15 MCG: 15 SOLUTION RESPIRATORY (INHALATION) at 07:05

## 2024-05-22 RX ADMIN — LOSARTAN POTASSIUM 50 MG: 25 TABLET, FILM COATED ORAL at 08:05

## 2024-05-22 RX ADMIN — SODIUM CHLORIDE: 9 INJECTION, SOLUTION INTRAVENOUS at 11:05

## 2024-05-22 RX ADMIN — PREGABALIN 75 MG: 75 CAPSULE ORAL at 02:05

## 2024-05-22 RX ADMIN — Medication 1 PATCH: at 08:05

## 2024-05-22 RX ADMIN — CYCLOBENZAPRINE HYDROCHLORIDE 10 MG: 5 TABLET, FILM COATED ORAL at 11:05

## 2024-05-22 RX ADMIN — OXYCODONE 20 MG: 5 TABLET ORAL at 10:05

## 2024-05-22 RX ADMIN — OXYCODONE 20 MG: 5 TABLET ORAL at 03:05

## 2024-05-22 RX ADMIN — LACTULOSE 15 G: 20 SOLUTION ORAL at 08:05

## 2024-05-22 RX ADMIN — ATORVASTATIN CALCIUM 40 MG: 40 TABLET, FILM COATED ORAL at 08:05

## 2024-05-22 RX ADMIN — ACETAMINOPHEN 650 MG: 325 TABLET ORAL at 08:05

## 2024-05-22 RX ADMIN — LEVALBUTEROL 1.25 MG: 1.25 SOLUTION, CONCENTRATE RESPIRATORY (INHALATION) at 07:05

## 2024-05-22 RX ADMIN — BUDESONIDE INHALATION 1 MG: 0.5 SUSPENSION RESPIRATORY (INHALATION) at 07:05

## 2024-05-22 RX ADMIN — SODIUM CHLORIDE, POTASSIUM CHLORIDE, SODIUM LACTATE AND CALCIUM CHLORIDE: 600; 310; 30; 20 INJECTION, SOLUTION INTRAVENOUS at 06:05

## 2024-05-22 RX ADMIN — GUAIFENESIN 1200 MG: 600 TABLET, EXTENDED RELEASE ORAL at 10:05

## 2024-05-22 RX ADMIN — GUAIFENESIN 1200 MG: 600 TABLET, EXTENDED RELEASE ORAL at 08:05

## 2024-05-22 RX ADMIN — SODIUM CHLORIDE: 9 INJECTION, SOLUTION INTRAVENOUS at 01:05

## 2024-05-22 RX ADMIN — AMITRIPTYLINE HYDROCHLORIDE 25 MG: 25 TABLET, FILM COATED ORAL at 11:05

## 2024-05-22 RX ADMIN — LEVALBUTEROL 1.25 MG: 1.25 SOLUTION, CONCENTRATE RESPIRATORY (INHALATION) at 12:05

## 2024-05-22 NOTE — ASSESSMENT & PLAN NOTE
Patient's COPD is with exacerbation noted by continued dyspnea and worsening of baseline hypoxia currently due to pneumonia.  Patient is currently off COPD Pathway. Continue scheduled inhalers Antibiotics and Supplemental oxygen and monitor respiratory status closely.   -----------------------------------------   Seen pneumonia A/P

## 2024-05-22 NOTE — NURSING
Nurses Note -- 4 Eyes      5/21/2024   7:15 PM      Skin assessed during: Admit      [x] No Altered Skin Integrity Present    []Prevention Measures Documented      [] Yes- Altered Skin Integrity Present or Discovered   [] LDA Added if Not in Epic (Describe Wound)   [] New Altered Skin Integrity was Present on Admit and Documented in LDA   [] Wound Image Taken    Wound Care Consulted? No    Attending Nurse:  Mounika Allred RN/Staff Member:   Jodi PITTMAN

## 2024-05-22 NOTE — PLAN OF CARE
BARTON explained to pt. Pt verbalized understanding and signed form.     05/22/24 1214   BARTON Message   Medicare Outpatient and Observation Notification regarding financial responsibility Given to patient/caregiver;Explained to patient/caregiver;Signed/date by patient/caregiver   Date BARTON was signed 05/22/24   Time BARTON was signed 1214

## 2024-05-22 NOTE — CONSULTS
[facility]/Surg  Adult Nutrition  Consult Note    SUMMARY     Recommendations  Encourage intake of Cardiac diet  Added Boost Plus wit all meals  Suggest daily multivitamin  Monitor intake and need for appetite stimulant  Monitor weight and labs  Collaborate with health care providers    Goal: Intake to improve > 75% upon RD F/U visit  Comments:  pt denies N/V but c/o decreased appetite d/t pneumonia; she has lost wt recently as well; last BM on 5/19; no difficulty chewing or swallowing.    Nutrition Goal Status: new  Communication of RD Recs: reviewed with physician  Nutrition Related Social Determinants of Health: SDOH: Unable to assess at this time.     Assessment and Plan  Nutrition Problem  Moderate Acute Malnutrition    Related to (etiology):   Decreased intake; social/environmental issues    Signs and Symptoms (as evidenced by):   5% wt loss; fat and muscle mass depletion     Interventions/Recommendations (treatment strategy):  Provide high protein nutritional supplemetns with meals; monitor need for appetite stimulant    Nutrition Diagnosis Status:   New       Malnutrition Assessment  Malnutrition Context: acute illness or injury  Malnutrition Level: moderate  Skin (Micronutrient): none  Neck/Chest (Micronutrient): bony prominence, muscle wasting       Weight Loss (Malnutrition): 5% in 1 month  Energy Intake (Malnutrition): less than 75% for greater than or equal to 1 month  Subcutaneous Fat (Malnutrition): mild depletion  Muscle Mass (Malnutrition): mild depletion   Orbital Region (Subcutaneous Fat Loss): mild depletion  Upper Arm Region (Subcutaneous Fat Loss): mild depletion   [place] Region (Muscle Loss): mild depletion  Clavicle Bone Region (Muscle Loss): mild depletion  Clavicle and Acromion Bone Region (Muscle Loss): mild depletion           Reason for Assessment  Dx:  Malnutrition; Pneumonia; chronic pain syndrome; anemia  PMH:  Opioid dependence; GERD; HTN; HLD; COPD  Reason For  Assessment: consult  Diagnosis: other (see comments)  Nutrition Discharge Planning: pending    Nutrition Risk Screen    Nutrition Risk Screen: reduced oral intake over the last month, unintentional loss of 10 lbs or more in the past 2 months    Nutrition/Diet History    Patient Reported Diet/Restrictions/Preferences: general  Spiritual, Cultural Beliefs, Sikh Practices, Values that Affect Care: no  Food Allergies: NKFA  Factors Affecting Nutritional Intake: decreased appetite    Anthropometrics  Usual body weight is 116#  Temp: 98.4 °F (36.9 °C)  Height Method: Stated  Height: 5' (152.4 cm)  Height (inches): 60 in  Weight Method: Bed Scale  Weight: 49.2 kg (108 lb 7.5 oz)  Weight (lb): 108.47 lb  Ideal Body Weight (IBW), Female: 100 lb  % Ideal Body Weight, Female (lb): 108.47 %  BMI (Calculated): 21.2  BMI Grade: 18.5-24.9 - normal     Lab/Procedures/Meds   Latest Reference Range & Units Most Recent   Hemoglobin 12.0 - 16.0 g/dL 9.9 (L)  5/22/24 04:26   Hematocrit 37.0 - 48.5 % 29.3 (L)  5/22/24 04:26   (L): Data is abnormally low   Latest Reference Range & Units Most Recent   Albumin 3.5 - 5.2 g/dL 2.3 (L)  5/22/24 04:26   (L): Data is abnormally low  Pertinent Labs Reviewed: reviewed  Pertinent Medications Reviewed: reviewed; IV levofloxacin; nicotine patch; protonix; lactulose; guaifenesin; nebuilizer; morphine; NaCl infusion @ 100 m l/hr    Physical Findings/Assessment  No skin integrity issues documented  Arnav score = 20       Estimated/Assessed Needs    Weight Used For Calorie Calculations: 49.2 kg (108 lb 7.5 oz)  Energy Calorie Requirements (kcal): 1800 calories daily;  35/kg  Energy Need Method: Kcal/kg  Protein Requirements: 75 gm protein daily;  1.5/kg  Weight Used For Protein Calculations: 49.2 kg (108 lb 7.5 oz)     Estimated Fluid Requirement Method: RDA Method  RDA Method (mL): 1800     Nutrition Prescription Ordered    Current Diet Order: cardiac  Oral Nutrition Supplement: Boost  Plus    Evaluation of Received Nutrient/Fluid Intake    Intake/Output Summary (Last 24 hours) at 5/22/2024 1316  Last data filed at 5/22/2024 1200  Gross per 24 hour   Intake 2745.78 ml   Output 1100 ml   Net 1645.78 ml     Energy Calories Required: meeting needs  Protein Required: meeting needs  Fluid Required: meeting needs  Tolerance: tolerating  % Intake of Estimated Energy Needs: 50 - 75 %  % Meal Intake: 50 - 75 %    Nutrition Risk    Level of Risk/Frequency of Follow-up: high 2 x week    Monitor and Evaluation    Food and Nutrient Intake: food and beverage intake  Food and Nutrient Adminstration: diet order  Knowledge/Beliefs/Attitudes: beliefs and attitudes, food and nutrition knowledge/skill  Physical Activity and Function: nutrition-related ADLs and IADLs  Anthropometric Measurements: weight change  Biochemical Data, Medical Tests and Procedures: electrolyte and renal panel, inflammatory profile, glucose/endocrine profile, gastrointestinal profile, other (specify)  Nutrition-Focused Physical Findings: overall appearance     Nutrition Follow-Up  yes

## 2024-05-22 NOTE — PLAN OF CARE
POC/Meds reviewed, pt verbalized understanding. Vitals stable.  Afebrile. IVF infusing. UA and sputum sent this shift. Prn medication given. Up with x1 assist. Repositions self. Hourly/Q2hr rounding performed, safety maintained. Bed in lowest position, wheels locked, SR up x2, call light in easy reach. No  complaints at this time.

## 2024-05-22 NOTE — PLAN OF CARE
Recommendations  Encourage intake of Cardiac diet  Added Boost Plus wit all meals  Suggest daily multivitamin  Monitor intake and need for appetite stimulant  Monitor weight and labs  Collaborate with health care providers     Goal: Intake to improve > 75% upon RD F/U visit  Comments:  pt denies N/V but c/o decreased appetite d/t pneumonia; she has lost wt recently as well; last BM on 5/19; no difficulty chewing or swallowing.     Nutrition Goal Status: new  Communication of RD Recs: reviewed with physician  Nutrition Related Social Determinants of Health: SDOH: Unable to assess at this time.      Assessment and Plan  Nutrition Problem  Moderate Acute Malnutrition     Related to (etiology):   Decreased intake; social/environmental issues     Signs and Symptoms (as evidenced by):   5% wt loss; fat and muscle mass depletion      Interventions/Recommendations (treatment strategy):  Provide high protein nutritional supplemetns with meals; monitor need for appetite stimulant     Nutrition Diagnosis Status:   New        Malnutrition Assessment  Malnutrition Context: acute illness or injury  Malnutrition Level: moderate  Skin (Micronutrient): none  Neck/Chest (Micronutrient): bony prominence, muscle wasting       Weight Loss (Malnutrition): 5% in 1 month  Energy Intake (Malnutrition): less than 75% for greater than or equal to 1 month  Subcutaneous Fat (Malnutrition): mild depletion  Muscle Mass (Malnutrition): mild depletion   Orbital Region (Subcutaneous Fat Loss): mild depletion  Upper Arm Region (Subcutaneous Fat Loss): mild depletion   [place] Region (Muscle Loss): mild depletion  Clavicle Bone Region (Muscle Loss): mild depletion  Clavicle and Acromion Bone Region (Muscle Loss): mild depletion

## 2024-05-22 NOTE — SUBJECTIVE & OBJECTIVE
Interval History:   Sitting up in bed, awake and alert.  Reports breathing better today.  Still requiring supplemental O2.  Febrile this morning with T-max 101.0°.  Blood cultures remain negative so far.  Sputum and urine cultures are pending.  Noted to have tachycardia with heart rate 120s on telemetry.    Review of Systems   Respiratory:  Positive for cough, chest tightness and shortness of breath.    Cardiovascular:  Positive for chest pain (pleuritic, with coughing).   Musculoskeletal:  Positive for arthralgias.   All other systems reviewed and are negative.    Objective:     Vital Signs (Most Recent):  Temp: 98.4 °F (36.9 °C) (05/22/24 1135)  Pulse: 105 (05/22/24 1244)  Resp: 18 (05/22/24 1244)  BP: 111/69 (05/22/24 1135)  SpO2: 95 % (05/22/24 1244) Vital Signs (24h Range):  Temp:  [97 °F (36.1 °C)-101 °F (38.3 °C)] 98.4 °F (36.9 °C)  Pulse:  [] 105  Resp:  [16-22] 18  SpO2:  [89 %-99 %] 95 %  BP: (100-161)/(55-91) 111/69     Weight: 49.2 kg (108 lb 7.5 oz)  Body mass index is 21.18 kg/m².    Intake/Output Summary (Last 24 hours) at 5/22/2024 1256  Last data filed at 5/22/2024 1200  Gross per 24 hour   Intake 2745.78 ml   Output 1100 ml   Net 1645.78 ml         Physical Exam  Constitutional:       General: She is not in acute distress.  HENT:      Head: Normocephalic and atraumatic.   Eyes:      Conjunctiva/sclera: Conjunctivae normal.      Pupils: Pupils are equal, round, and reactive to light.   Cardiovascular:      Rate and Rhythm: Normal rate and regular rhythm.      Pulses: Normal pulses.      Heart sounds: Normal heart sounds.   Pulmonary:      Effort: Pulmonary effort is normal. No respiratory distress.      Breath sounds: Normal breath sounds.      Comments:  O2 via NC in progress  Abdominal:      General: Bowel sounds are normal. There is no distension.      Palpations: Abdomen is soft.      Tenderness: There is no abdominal tenderness.   Musculoskeletal:         General: No swelling. Normal  range of motion.      Cervical back: Normal range of motion and neck supple.   Skin:     General: Skin is warm and dry.      Capillary Refill: Capillary refill takes less than 2 seconds.   Neurological:      General: No focal deficit present.      Mental Status: She is alert and oriented to person, place, and time.      Cranial Nerves: No cranial nerve deficit.   Psychiatric:         Attention and Perception: Attention normal.         Mood and Affect: Mood and affect normal.             Significant Labs: All pertinent labs within the past 24 hours have been reviewed.  CBC:   Recent Labs   Lab 05/21/24  1508 05/22/24  0426   WBC 11.76 11.06   HGB 11.1* 9.9*   HCT 33.3* 29.3*    284     CMP:   Recent Labs   Lab 05/21/24  1508 05/22/24  0426   * 132*   K 3.6 4.0   CL 98 100   CO2 21* 21*   GLU 91 84   BUN 24* 11   CREATININE 0.9 0.6   CALCIUM 10.5 9.1   PROT 7.0 5.9*   ALBUMIN 2.7* 2.3*   BILITOT 0.6 0.5   ALKPHOS 159* 142*   AST 26 22   ALT 17 18   ANIONGAP 13 11     Magnesium:   Recent Labs   Lab 05/21/24  1508 05/22/24  0426   MG 2.1 1.6     Urine Studies:   Recent Labs   Lab 05/21/24 2044   COLORU Yellow   APPEARANCEUA Clear   PHUR 6.0   SPECGRAV 1.015   PROTEINUA 1+*   GLUCUA Negative   KETONESU Negative   BILIRUBINUA Negative   OCCULTUA Negative   NITRITE Negative   UROBILINOGEN Negative   LEUKOCYTESUR 1+*   RBCUA 0   WBCUA 14*   BACTERIA Few*   SQUAMEPITHEL 1   HYALINECASTS 1     Microbiology Results (last 7 days)       Procedure Component Value Units Date/Time    Culture, Respiratory with Gram Stain [[id number]] Collected: 05/21/24 2044    Order Status: Sent Specimen: Sputum, Expectorated Updated: 05/22/24 0917    Blood culture #2 **CANNOT BE ORDERED STAT** [[id number]] Collected: 05/21/24 1526    Order Status: Completed Specimen: Blood from Peripheral, Antecubital, Left Updated: 05/22/24 0317     Blood Culture, Routine No Growth to date    Blood culture #1 **CANNOT BE ORDERED STAT** [[id number]]  Collected: 05/21/24 1516    Order Status: Completed Specimen: Blood from Peripheral, Antecubital, Right Updated: 05/22/24 0317     Blood Culture, Routine No Growth to date    Urine culture [[id number]] Collected: 05/21/24 2044    Order Status: No result Specimen: Urine Updated: 05/21/24 2111          X-Ray Chest AP Portable  Narrative: EXAMINATION:  XR CHEST AP PORTABLE    CLINICAL HISTORY:  Fever, unspecified    TECHNIQUE:  Single frontal view of the chest was performed.    COMPARISON:  04/02/2024    FINDINGS:  Multifocal airspace opacities noted in the right lung, new since the prior study.  Left lung relatively clear with question minimal interstitial opacities at the base.  Faint nodule left upper lobe better demonstrated on previous CT.  Heart size normal.  No pleural effusion or acute bony abnormality.  Impression: Findings concerning for multi lobar pneumonia in the right lung with possible involvement of the left lung base as well.    Electronically signed by: Rain Beckwith  Date:    05/21/2024  Time:    16:13        Significant Imaging: I have reviewed all pertinent imaging results/findings within the past 24 hours.

## 2024-05-22 NOTE — PROGRESS NOTES
"[facility] Medicine  Progress Note    Patient Name: Floresita Martin  MRN: [id number]  Patient Class: IP- Inpatient   Admission Date: 5/21/2024  Length of Stay: 0 days  Attending Physician: Alirio Go MD  Primary Care Provider: oTni Hughes MD        Subjective:     Principal Problem:Pneumonia involving right lung        HPI:  Floresita Martin is a 64 year old female with a previous medical history of HTN, HLD, GERD, opioid dependence with current use, metabolic encephalopathy, pneumonia, and COPD who was sent from PCP office for fever, cough, and shortness of breath. Patient is poor historian and unable to exactly recall when symptoms began but endorses fever, cough, exertional shortness of breath and loss of voice over the past two weeks. Reports that her  could provide more information but he was not at bedside. ED note reports that patient endorsed intermittent confusion. Initial ED workup included a chest xray that reads: "Findings concerning for multi lobar pneumonia in the right lung with possible involvement of the left lung base as well". CBC shows stable anemia, magnesium and lactic acid normal, cmp unremarkable. Patient vitals are stable and she is maintaining an oxygen saturation of 96% on room air. She is having productive cough with moderate amount of brown/yellow sputum. Most recent hospital admission from 3/26 was for pseudomonas bacteremia. Patient initiated on Levaquin IV in ED which has been continued. Upon admit patient is AAOx4 and does not appear in any respiratory distress. She is positive for a voice change which she reports has worsened over the last two weeks and has "lost her voice" but at baseline it is raspy. Patient admitted by hospital medicine for further evaluation and management.    Overview/Hospital Course:  No notes on file    Interval History:   Sitting up in bed, awake and alert.  Reports breathing better today.  Still " requiring supplemental O2.  Febrile this morning with T-max 101.0°.  Blood cultures remain negative so far.  Sputum and urine cultures are pending.  Noted to have tachycardia with heart rate 120s on telemetry.    Review of Systems   Respiratory:  Positive for cough, chest tightness and shortness of breath.    Cardiovascular:  Positive for chest pain (pleuritic, with coughing).   Musculoskeletal:  Positive for arthralgias.   All other systems reviewed and are negative.    Objective:     Vital Signs (Most Recent):  Temp: 98.4 °F (36.9 °C) (05/22/24 1135)  Pulse: 105 (05/22/24 1244)  Resp: 18 (05/22/24 1244)  BP: 111/69 (05/22/24 1135)  SpO2: 95 % (05/22/24 1244) Vital Signs (24h Range):  Temp:  [97 °F (36.1 °C)-101 °F (38.3 °C)] 98.4 °F (36.9 °C)  Pulse:  [] 105  Resp:  [16-22] 18  SpO2:  [89 %-99 %] 95 %  BP: (100-161)/(55-91) 111/69     Weight: 49.2 kg (108 lb 7.5 oz)  Body mass index is 21.18 kg/m².    Intake/Output Summary (Last 24 hours) at 5/22/2024 1256  Last data filed at 5/22/2024 1200  Gross per 24 hour   Intake 2745.78 ml   Output 1100 ml   Net 1645.78 ml         Physical Exam  Constitutional:       General: She is not in acute distress.  HENT:      Head: Normocephalic and atraumatic.   Eyes:      Conjunctiva/sclera: Conjunctivae normal.      Pupils: Pupils are equal, round, and reactive to light.   Cardiovascular:      Rate and Rhythm: Normal rate and regular rhythm.      Pulses: Normal pulses.      Heart sounds: Normal heart sounds.   Pulmonary:      Effort: Pulmonary effort is normal. No respiratory distress.      Breath sounds: Normal breath sounds.      Comments:  O2 via NC in progress  Abdominal:      General: Bowel sounds are normal. There is no distension.      Palpations: Abdomen is soft.      Tenderness: There is no abdominal tenderness.   Musculoskeletal:         General: No swelling. Normal range of motion.      Cervical back: Normal range of motion and neck supple.   Skin:     General:  Skin is warm and dry.      Capillary Refill: Capillary refill takes less than 2 seconds.   Neurological:      General: No focal deficit present.      Mental Status: She is alert and oriented to person, place, and time.      Cranial Nerves: No cranial nerve deficit.   Psychiatric:         Attention and Perception: Attention normal.         Mood and Affect: Mood and affect normal.             Significant Labs: All pertinent labs within the past 24 hours have been reviewed.  CBC:   Recent Labs   Lab 05/21/24  1508 05/22/24  0426   WBC 11.76 11.06   HGB 11.1* 9.9*   HCT 33.3* 29.3*    284     CMP:   Recent Labs   Lab 05/21/24  1508 05/22/24  0426   * 132*   K 3.6 4.0   CL 98 100   CO2 21* 21*   GLU 91 84   BUN 24* 11   CREATININE 0.9 0.6   CALCIUM 10.5 9.1   PROT 7.0 5.9*   ALBUMIN 2.7* 2.3*   BILITOT 0.6 0.5   ALKPHOS 159* 142*   AST 26 22   ALT 17 18   ANIONGAP 13 11     Magnesium:   Recent Labs   Lab 05/21/24  1508 05/22/24  0426   MG 2.1 1.6     Urine Studies:   Recent Labs   Lab 05/21/24 2044   COLORU Yellow   APPEARANCEUA Clear   PHUR 6.0   SPECGRAV 1.015   PROTEINUA 1+*   GLUCUA Negative   KETONESU Negative   BILIRUBINUA Negative   OCCULTUA Negative   NITRITE Negative   UROBILINOGEN Negative   LEUKOCYTESUR 1+*   RBCUA 0   WBCUA 14*   BACTERIA Few*   SQUAMEPITHEL 1   HYALINECASTS 1     Microbiology Results (last 7 days)       Procedure Component Value Units Date/Time    Culture, Respiratory with Gram Stain [[id number]] Collected: 05/21/24 2044    Order Status: Sent Specimen: Sputum, Expectorated Updated: 05/22/24 0917    Blood culture #2 **CANNOT BE ORDERED STAT** [[id number]] Collected: 05/21/24 1526    Order Status: Completed Specimen: Blood from Peripheral, Antecubital, Left Updated: 05/22/24 0317     Blood Culture, Routine No Growth to date    Blood culture #1 **CANNOT BE ORDERED STAT** [[id number]] Collected: 05/21/24 1516    Order Status: Completed Specimen: Blood from Peripheral, Antecubital,  Right Updated: 05/22/24 0317     Blood Culture, Routine No Growth to date    Urine culture [[id number]] Collected: 05/21/24 2044    Order Status: No result Specimen: Urine Updated: 05/21/24 2111          X-Ray Chest AP Portable  Narrative: EXAMINATION:  XR CHEST AP PORTABLE    CLINICAL HISTORY:  Fever, unspecified    TECHNIQUE:  Single frontal view of the chest was performed.    COMPARISON:  04/02/2024    FINDINGS:  Multifocal airspace opacities noted in the right lung, new since the prior study.  Left lung relatively clear with question minimal interstitial opacities at the base.  Faint nodule left upper lobe better demonstrated on previous CT.  Heart size normal.  No pleural effusion or acute bony abnormality.  Impression: Findings concerning for multi lobar pneumonia in the right lung with possible involvement of the left lung base as well.    Electronically signed by: Rain Beckwith  Date:    05/21/2024  Time:    16:13        Significant Imaging: I have reviewed all pertinent imaging results/findings within the past 24 hours.    Assessment/Plan:      * Pneumonia involving right lung  Patient has a diagnosis of pneumonia. The cause of the pneumonia is suspected to be bacterial in etiology but organism is not known. The pneumonia is stable. The patient has the following signs/symptoms of pneumonia: cough, sputum production, and shortness of breath. The patient does not have a current oxygen requirement and the patient does not have a home oxygen requirement. I have reviewed the pertinent imaging. The following cultures have been collected: Blood cultures and Sputum culture The culture results are listed below.     Current antimicrobial regimen consists of the antibiotics listed below. Will monitor patient closely and Adjust treatment plan as follows start Mucinex 1200 mg b.I.d. and change to Xopenex nebulizers due to tachycardia   Sputum culture pending    Antibiotics (From admission, onward)      Start      Stop Route Frequency Ordered    05/23/24 1530  levoFLOXacin 750 mg/150 mL IVPB 750 mg         -- IV Every 48 hours (non-standard times) 05/21/24 1708            Microbiology Results (last 7 days)       Procedure Component Value Units Date/Time    Culture, Respiratory with Gram Stain [[id number]] Collected: 05/21/24 2044    Order Status: Sent Specimen: Sputum, Expectorated Updated: 05/22/24 0917    Blood culture #2 **CANNOT BE ORDERED STAT** [[id number]] Collected: 05/21/24 1526    Order Status: Completed Specimen: Blood from Peripheral, Antecubital, Left Updated: 05/22/24 0317     Blood Culture, Routine No Growth to date    Blood culture #1 **CANNOT BE ORDERED STAT** [[id number]] Collected: 05/21/24 1516    Order Status: Completed Specimen: Blood from Peripheral, Antecubital, Right Updated: 05/22/24 0317     Blood Culture, Routine No Growth to date    Urine culture [[id number]] Collected: 05/21/24 2044    Order Status: No result Specimen: Urine Updated: 05/21/24 2111            Acute hypoxic respiratory failure  Patient with Hypoxic Respiratory failure which is Acute.  she is not on home oxygen. Supplemental oxygen was provided and noted-      .   Signs/symptoms of respiratory failure include- tachypnea and increased work of breathing. Contributing diagnoses includes - COPD and Pneumonia Labs and images were reviewed. Will treat underlying causes and adjust management of respiratory failure as follows-  continue weaning supplemental O2 as tolerated     We will need desat study prior to discharge    COPD with acute lower respiratory infection  Patient's COPD is with exacerbation noted by continued dyspnea and worsening of baseline hypoxia currently due to pneumonia.  Patient is currently off COPD Pathway. Continue scheduled inhalers Antibiotics and Supplemental oxygen and monitor respiratory status closely.   -----------------------------------------   Seen pneumonia A/P    Hypertension, essential  Chronic,  controlled. Latest blood pressure and vitals reviewed-     Temp:  [97 °F (36.1 °C)-101 °F (38.3 °C)]   Pulse:  []   Resp:  [16-22]   BP: (100-161)/(55-91)   SpO2:  [89 %-99 %] .   Home meds for hypertension were reviewed and noted below.   Hypertension Medications               losartan (COZAAR) 50 MG tablet Take 1 tablet (50 mg total) by mouth once daily.            While in the hospital, will manage blood pressure as follows; Continue home antihypertensive regimen    Will utilize p.r.n. blood pressure medication only if patient's blood pressure greater than 160/100 and she develops symptoms such as worsening chest pain or shortness of breath.    Normocytic anemia  Patient's anemia is currently controlled. Has not received any PRBCs to date. Etiology likely d/t  possible chronic blood loss  Current CBC reviewed-   Lab Results   Component Value Date    HGB 9.9 (L) 05/22/2024    HCT 29.3 (L) 05/22/2024     Monitor serial CBC and transfuse if patient becomes hemodynamically unstable, symptomatic or H/H drops below 7/21.  -----------------------------------------------------------   Had anemia workup 3/26/24:   Lab Results   Component Value Date    IRON 31 03/26/2024    TRANSFERRIN 185 (L) 03/26/2024    TIBC 259 03/26/2024    FESATURATED 12 (L) 03/26/2024      Component Ref Range & Units 1 mo ago   Occult Blood Negative Positive Abnormal       Anemia stable/asymptomatic.  Will need to follow up with GI when discharged.      Hyponatremia  Patient has hyponatremia which is recurrent uncontrolled,We will aim to correct the sodium by 4-6mEq in 24 hours. We will monitor sodium Daily. The hyponatremia is due to Dehydration/hypovolemia. We will treat the hyponatremia with IV fluids as follows:  isotonic NS. The patient's sodium results have been reviewed and are listed below.  Recent Labs   Lab 05/22/24  0426   *   ----------------------------------------------------------    DC LR   Start isotonic NS    Chronic  pain syndrome    Managed outpatient by pain management. Continue home pain regimen   Per : Morphine ER 30mg and   Oxy IR 20 mg      Dependence on nicotine from cigarettes  Dangers of cigarette smoking were reviewed with patient in detail. Patient was Counseled for 3-10 minutes. Nicotine replacement options were discussed. Nicotine replacement was discussed- prescribed Nicotine patch PRN    Gastroesophageal reflux disease without esophagitis    Chronic/stable.  Continue PPI        VTE Risk Mitigation (From admission, onward)           Ordered     enoxaparin injection 40 mg  Daily         05/21/24 1708     IP VTE HIGH RISK PATIENT  Once         05/21/24 1708     Place sequential compression device  Until discontinued         05/21/24 1708                    Discharge Planning   GRACE: 5/23/2024     Code Status: Full Code   Is the patient medically ready for discharge?:     Reason for patient still in hospital (select all that apply): Patient trending condition and Treatment                     Paula Pierce NP  Department of Hospital Medicine   [facility]/Surg

## 2024-05-22 NOTE — ASSESSMENT & PLAN NOTE
Patient with Hypoxic Respiratory failure which is Acute.  she is not on home oxygen. Supplemental oxygen was provided and noted-      .   Signs/symptoms of respiratory failure include- tachypnea and increased work of breathing. Contributing diagnoses includes - COPD and Pneumonia Labs and images were reviewed. Will treat underlying causes and adjust management of respiratory failure as follows-  continue weaning supplemental O2 as tolerated     We will need desat study prior to discharge

## 2024-05-22 NOTE — ASSESSMENT & PLAN NOTE
Chronic, controlled. Latest blood pressure and vitals reviewed-     Temp:  [97 °F (36.1 °C)-101 °F (38.3 °C)]   Pulse:  []   Resp:  [16-22]   BP: (100-161)/(55-91)   SpO2:  [89 %-99 %] .   Home meds for hypertension were reviewed and noted below.   Hypertension Medications               losartan (COZAAR) 50 MG tablet Take 1 tablet (50 mg total) by mouth once daily.            While in the hospital, will manage blood pressure as follows; Continue home antihypertensive regimen    Will utilize p.r.n. blood pressure medication only if patient's blood pressure greater than 160/100 and she develops symptoms such as worsening chest pain or shortness of breath.

## 2024-05-22 NOTE — CARE UPDATE
05/21/24 1927   Patient Assessment/Suction   Level of Consciousness (AVPU) alert   Respiratory Effort Normal;Unlabored   Expansion/Accessory Muscles/Retractions expansion symmetric   All Lung Fields Breath Sounds Anterior:;Lateral:   BELEN Breath Sounds clear   LLL Breath Sounds diminished   RUL Breath Sounds clear   RML Breath Sounds diminished   RLL Breath Sounds crackles, fine;diminished   Rhythm/Pattern, Respiratory pattern regular   Cough Frequency no cough   Cough Type no productive sputum   PRE-TX-O2   Device (Oxygen Therapy) room air   SpO2 (!) 94 %   Pulse Oximetry Type Intermittent   $ Pulse Oximetry - Multiple Charge Pulse Oximetry - Multiple   Pulse 96   Resp 16   Positioning HOB elevated 45 degrees   Aerosol Therapy   $ Aerosol Therapy Charges Aerosol Treatment   Respiratory Treatment Status (SVN) given   Treatment Route (SVN) mask;oxygen   Patient Position HOB elevated   Post Treatment Assessment (SVN) increased aeration   Signs of Intolerance (SVN) none   Breath Sounds Post-Respiratory Treatment   Throughout All Fields Post-Treatment All Fields   Throughout All Fields Post-Treatment aeration increased   Post-treatment Heart Rate (beats/min) 95   Post-treatment Resp Rate (breaths/min) 18   Respiratory Evaluation   $ Care Plan Tech Time 15 min   Evaluation For New Orders   Admitting Diagnosis SOB   Pulmonary Diagnosis COPD   Home Oxygen   Has Home Oxygen? No   Home Aerosol, MDI, DPI, and Other Treatments/Therapies   Home Respiratory Therapy Per Patient/Review of Chart Yes   Aerosol Home Meds/Freq prn   MDI Home Meds/Freq Qday   Oxygen Care Plan   Oxygen Care Plan Per Protocol   SPO2 Goal (%) MD order   Rationale SpO2 is <92% (Non-cardiac Pt.)

## 2024-05-22 NOTE — PLAN OF CARE
Patient transferred to room from ED. Oriented to room. Plan of care reviewed with patient, verbalized understanding. Vitals stable, room air.  Call light within reach.

## 2024-05-22 NOTE — NURSING
Hat and specimen cup placed in bathroom for urine specimen. Pt notified of order for urine. Also informed pt of need for sputum specimen. Cup placed on bedside table for sputum collection.

## 2024-05-22 NOTE — ASSESSMENT & PLAN NOTE
Patient's anemia is currently controlled. Has not received any PRBCs to date. Etiology likely d/t  possible chronic blood loss  Current CBC reviewed-   Lab Results   Component Value Date    HGB 9.9 (L) 05/22/2024    HCT 29.3 (L) 05/22/2024     Monitor serial CBC and transfuse if patient becomes hemodynamically unstable, symptomatic or H/H drops below 7/21.  -----------------------------------------------------------   Had anemia workup 3/26/24:   Lab Results   Component Value Date    IRON 31 03/26/2024    TRANSFERRIN 185 (L) 03/26/2024    TIBC 259 03/26/2024    FESATURATED 12 (L) 03/26/2024      Component Ref Range & Units 1 mo ago   Occult Blood Negative Positive Abnormal       Anemia stable/asymptomatic.  Will need to follow up with GI when discharged.

## 2024-05-22 NOTE — CARE UPDATE
05/22/24 1715   Tobacco Cessation Intervention   Do you use any type of tobacco product? Yes   Are you interested in quitting use of tobacco products? Ready to quit   $ Smoking Cessation Charges Smoking Cessation - Intermediate (CTTS)     Pt was educated on smoking cessation.  Pt states she is ready to quit, but wants to try to quit on her own.  Smoking cessation information also given to Pt.

## 2024-05-22 NOTE — ASSESSMENT & PLAN NOTE
Patient has hyponatremia which is recurrent uncontrolled,We will aim to correct the sodium by 4-6mEq in 24 hours. We will monitor sodium Daily. The hyponatremia is due to Dehydration/hypovolemia. We will treat the hyponatremia with IV fluids as follows:  isotonic NS. The patient's sodium results have been reviewed and are listed below.  Recent Labs   Lab 05/22/24  0426   *   ----------------------------------------------------------    DC LR   Start isotonic NS

## 2024-05-22 NOTE — PLAN OF CARE
POC/Meds reviewed, pt verbalized understanding. Vitals stable. Afebrile. Remains on 2L O2. IVPB abx administered. Up with x1 assist to bathroom. IS at bedside, instructed on use and return demonstration performed. PRN pain meds administered. Repositions self. Hourly/Q2hr rounding performed, safety maintained. Bed in lowest position, wheels locked, SR up x2, call light in easy reach. No complaints at this time. Will continue to monitor.

## 2024-05-22 NOTE — PLAN OF CARE
[facility] - Med/Surg  Initial Discharge Assessment       Primary Care Provider: Toni Hughes MD    Admission Diagnosis: Pneumonia of right lung due to infectious organism, unspecified part of lung [J18.9]    Admission Date: 5/21/2024  Expected Discharge Date: 5/23/2024    Transition of Care Barriers: None    Payor: HUMA[employer] MANAGED MEDICARE / Plan: HUMANA SNP HMO PPO SPECIAL NEEDS / Product Type: Medicare Advantage /     Extended Emergency Contact Information  Primary Emergency Contact: Lilly Cabrera  Address: pt unknown   [place] [facility]  Mobile Phone: [phone]  Relation: Sister  Preferred language: English   needed? No  Secondary Emergency Contact: Rohan Fernandes  Mobile Phone: [phone]  Relation: Spouse    Discharge Plan A: Home with family  Discharge Plan B: Home      [facility]  [address]  [place] [address]  Phone: [phone] Fax: [phone]    [id number] - [place] - [address] AT [facility] [address]  [address] [address]  Phone: [phone] Fax: [phone]    DC assessment completed with patient at bedside. Verified information on facesheet as correct. Pt lives at listed address with AGUSTÍN Madrigal. Reports she has help if needed from family. Denies AMBROSIO THOMPSON sister Lilly  PCP is Dr. Hughes- reports last apt was couple months ago. Pharmacy is [facility]. Denies hh/hd/outpt services/blood thinners. DME- States has a wc and nebulizer. Reports being independent with activities. Drives herself to apts. Reports Rohan will provide transportation home upon DC. Reports taking home medications as prescribed and can currently afford them. Verified insurance on file. Denies recent inpt stay in last 30 days.  DC plan is home.    Initial Assessment (most recent)       Adult Discharge Assessment - 05/22/24 1304          Discharge Assessment    Assessment Type Discharge  Planning Assessment     Confirmed/corrected address, phone number and insurance Yes     Confirmed Demographics Correct on Facesheet     Source of Information patient     Does patient/caregiver understand observation status Yes     Communicated GRACE with patient/caregiver Yes     People in Home significant other     Do you expect to return to your current living situation? Yes     Do you have help at home or someone to help you manage your care at home? No     Prior to hospitilization cognitive status: Alert/Oriented     Current cognitive status: Alert/Oriented     Walking or Climbing Stairs Difficulty no     Dressing/Bathing Difficulty no     Equipment Currently Used at Home wheelchair;nebulizer     Readmission within 30 days? Yes     Patient currently being followed by outpatient case management? No     Do you currently have service(s) that help you manage your care at home? No     Do you take prescription medications? Yes     Do you have prescription coverage? Yes     Do you have any problems affording any of your prescribed medications? No     Is the patient taking medications as prescribed? yes     Who is going to help you get home at discharge? Rohan [phone]     How do you get to doctors appointments? car, drives self     Are you on dialysis? No     Do you take coumadin? No     Discharge Plan A Home with family     Discharge Plan B Home     DME Needed Upon Discharge  none     Discharge Plan discussed with: Patient     Transition of Care Barriers None

## 2024-05-22 NOTE — ASSESSMENT & PLAN NOTE
Patient has a diagnosis of pneumonia. The cause of the pneumonia is suspected to be bacterial in etiology but organism is not known. The pneumonia is stable. The patient has the following signs/symptoms of pneumonia: cough, sputum production, and shortness of breath. The patient does not have a current oxygen requirement and the patient does not have a home oxygen requirement. I have reviewed the pertinent imaging. The following cultures have been collected: Blood cultures and Sputum culture The culture results are listed below.     Current antimicrobial regimen consists of the antibiotics listed below. Will monitor patient closely and Adjust treatment plan as follows start Mucinex 1200 mg b.I.d. and change to Xopenex nebulizers due to tachycardia   Sputum culture pending    Antibiotics (From admission, onward)      Start     Stop Route Frequency Ordered    05/23/24 1530  levoFLOXacin 750 mg/150 mL IVPB 750 mg         -- IV Every 48 hours (non-standard times) 05/21/24 1708            Microbiology Results (last 7 days)       Procedure Component Value Units Date/Time    Culture, Respiratory with Gram Stain [[id number]] Collected: 05/21/24 2044    Order Status: Sent Specimen: Sputum, Expectorated Updated: 05/22/24 0917    Blood culture #2 **CANNOT BE ORDERED STAT** [[id number]] Collected: 05/21/24 1526    Order Status: Completed Specimen: Blood from Peripheral, Antecubital, Left Updated: 05/22/24 0317     Blood Culture, Routine No Growth to date    Blood culture #1 **CANNOT BE ORDERED STAT** [[id number]] Collected: 05/21/24 1516    Order Status: Completed Specimen: Blood from Peripheral, Antecubital, Right Updated: 05/22/24 0317     Blood Culture, Routine No Growth to date    Urine culture [[id number]] Collected: 05/21/24 2044    Order Status: No result Specimen: Urine Updated: 05/21/24 2111

## 2024-05-22 NOTE — CARE UPDATE
05/22/24 0754   Patient Assessment/Suction   Level of Consciousness (AVPU) alert   Respiratory Effort Short of breath   Expansion/Accessory Muscles/Retractions no use of accessory muscles   All Lung Fields Breath Sounds clear;diminished   BELEN Breath Sounds clear   LLL Breath Sounds diminished   RUL Breath Sounds clear   RML Breath Sounds crackles, fine   RLL Breath Sounds crackles, fine   Rhythm/Pattern, Respiratory shortness of breath   Cough Frequency infrequent   Cough Type productive   PRE-TX-O2   Device (Oxygen Therapy) nasal cannula   Flow (L/min) (Oxygen Therapy) 0.5   SpO2 (!) 93 %   Pulse Oximetry Type Intermittent   $ Pulse Oximetry - Multiple Charge Pulse Oximetry - Multiple   Pulse (!) 120   Resp 20   Aerosol Therapy   $ Aerosol Therapy Charges Aerosol Treatment   Respiratory Treatment Status (SVN) given   Treatment Route (SVN) mask;oxygen   Patient Position HOB elevated   Post Treatment Assessment (SVN) increased aeration   Signs of Intolerance (SVN) none   Breath Sounds Post-Respiratory Treatment   Throughout All Fields Post-Treatment All Fields   Throughout All Fields Post-Treatment aeration increased   Post-treatment Heart Rate (beats/min) 120   Post-treatment Resp Rate (breaths/min) 20

## 2024-05-22 NOTE — ASSESSMENT & PLAN NOTE
Managed outpatient by pain management. Continue home pain regimen   Per : Morphine ER 30mg and   Oxy IR 20 mg

## 2024-05-23 PROBLEM — B37.0 THRUSH: Status: ACTIVE | Noted: 2024-05-23

## 2024-05-23 LAB
ADENOVIRUS: NOT DETECTED
ALBUMIN SERPL BCP-MCNC: 2.1 G/DL (ref 3.5–5.2)
ALP SERPL-CCNC: 137 U/L (ref 55–135)
ALT SERPL W/O P-5'-P-CCNC: 15 U/L (ref 10–44)
AMPHET+METHAMPHET UR QL: NEGATIVE
ANION GAP SERPL CALC-SCNC: 11 MMOL/L (ref 8–16)
AST SERPL-CCNC: 26 U/L (ref 10–40)
BARBITURATES UR QL SCN>200 NG/ML: NEGATIVE
BASOPHILS # BLD AUTO: 0.03 K/UL (ref 0–0.2)
BASOPHILS NFR BLD: 0.3 % (ref 0–1.9)
BENZODIAZ UR QL SCN>200 NG/ML: NEGATIVE
BILIRUB SERPL-MCNC: 0.5 MG/DL (ref 0.1–1)
BORDETELLA PARAPERTUSSIS (IS1001): NOT DETECTED
BORDETELLA PERTUSSIS (PTXP): NOT DETECTED
BSA FOR ECHO PROCEDURE: 1.44 M2
BUN SERPL-MCNC: 5 MG/DL (ref 8–23)
BZE UR QL SCN: NEGATIVE
CALCIUM SERPL-MCNC: 9.2 MG/DL (ref 8.7–10.5)
CANNABINOIDS UR QL SCN: NEGATIVE
CHLAMYDIA PNEUMONIAE: NOT DETECTED
CHLORIDE SERPL-SCNC: 99 MMOL/L (ref 95–110)
CO2 SERPL-SCNC: 22 MMOL/L (ref 23–29)
CORONAVIRUS 229E, COMMON COLD VIRUS: NOT DETECTED
CORONAVIRUS HKU1, COMMON COLD VIRUS: NOT DETECTED
CORONAVIRUS NL63, COMMON COLD VIRUS: NOT DETECTED
CORONAVIRUS OC43, COMMON COLD VIRUS: NOT DETECTED
CREAT SERPL-MCNC: 0.5 MG/DL (ref 0.5–1.4)
CREAT UR-MCNC: 20.7 MG/DL (ref 15–325)
CRP SERPL-MCNC: 218.6 MG/L (ref 0–8.2)
CV ECHO LV RWT: 0.45 CM
DIFFERENTIAL METHOD BLD: ABNORMAL
ECHO LV POSTERIOR WALL: 1 CM (ref 0.6–1.1)
EOSINOPHIL # BLD AUTO: 0 K/UL (ref 0–0.5)
EOSINOPHIL NFR BLD: 0.2 % (ref 0–8)
ERYTHROCYTE [DISTWIDTH] IN BLOOD BY AUTOMATED COUNT: 16.8 % (ref 11.5–14.5)
ERYTHROCYTE [SEDIMENTATION RATE] IN BLOOD BY WESTERGREN METHOD: 118 MM/HR (ref 0–20)
EST. GFR  (NO RACE VARIABLE): >60 ML/MIN/1.73 M^2
FLUBV RNA NPH QL NAA+NON-PROBE: NOT DETECTED
FRACTIONAL SHORTENING: 33 % (ref 28–44)
GLUCOSE SERPL-MCNC: 88 MG/DL (ref 70–110)
HCT VFR BLD AUTO: 30.2 % (ref 37–48.5)
HGB BLD-MCNC: 10.3 G/DL (ref 12–16)
HPIV1 RNA NPH QL NAA+NON-PROBE: NOT DETECTED
HPIV2 RNA NPH QL NAA+NON-PROBE: NOT DETECTED
HPIV3 RNA NPH QL NAA+NON-PROBE: NOT DETECTED
HPIV4 RNA NPH QL NAA+NON-PROBE: NOT DETECTED
HUMAN METAPNEUMOVIRUS: NOT DETECTED
IMM GRANULOCYTES # BLD AUTO: 0.07 K/UL (ref 0–0.04)
IMM GRANULOCYTES NFR BLD AUTO: 0.7 % (ref 0–0.5)
INFLUENZA A (SUBTYPES H1,H1-2009,H3): NOT DETECTED
INTERVENTRICULAR SEPTUM: 0.99 CM (ref 0.6–1.1)
LEFT INTERNAL DIMENSION IN SYSTOLE: 2.95 CM (ref 2.1–4)
LEFT VENTRICLE DIASTOLIC VOLUME INDEX: 61.55 ML/M2
LEFT VENTRICLE DIASTOLIC VOLUME: 88.63 ML
LEFT VENTRICLE MASS INDEX: 103 G/M2
LEFT VENTRICLE SYSTOLIC VOLUME INDEX: 23.3 ML/M2
LEFT VENTRICLE SYSTOLIC VOLUME: 33.62 ML
LEFT VENTRICULAR INTERNAL DIMENSION IN DIASTOLE: 4.42 CM (ref 3.5–6)
LEFT VENTRICULAR MASS: 147.88 G
LYMPHOCYTES # BLD AUTO: 2.6 K/UL (ref 1–4.8)
LYMPHOCYTES NFR BLD: 27.1 % (ref 18–48)
MAGNESIUM SERPL-MCNC: 1.4 MG/DL (ref 1.6–2.6)
MCH RBC QN AUTO: 29.2 PG (ref 27–31)
MCHC RBC AUTO-ENTMCNC: 34.1 G/DL (ref 32–36)
MCV RBC AUTO: 86 FL (ref 82–98)
METHADONE UR QL SCN>300 NG/ML: NEGATIVE
MONOCYTES # BLD AUTO: 1.4 K/UL (ref 0.3–1)
MONOCYTES NFR BLD: 14.4 % (ref 4–15)
MYCOPLASMA PNEUMONIAE: NOT DETECTED
NEUTROPHILS # BLD AUTO: 5.4 K/UL (ref 1.8–7.7)
NEUTROPHILS NFR BLD: 57.3 % (ref 38–73)
NRBC BLD-RTO: 0 /100 WBC
OHS LV EJECTION FRACTION SIMPSONS BIPLANE MOD: 44 %
OPIATES UR QL SCN: ABNORMAL
PCP UR QL SCN>25 NG/ML: NEGATIVE
PHOSPHATE SERPL-MCNC: 3 MG/DL (ref 2.7–4.5)
PLATELET # BLD AUTO: 331 K/UL (ref 150–450)
PMV BLD AUTO: 8.9 FL (ref 9.2–12.9)
POTASSIUM SERPL-SCNC: 3.4 MMOL/L (ref 3.5–5.1)
PROT SERPL-MCNC: 5.9 G/DL (ref 6–8.4)
RA PRESSURE ESTIMATED: 3 MMHG
RBC # BLD AUTO: 3.53 M/UL (ref 4–5.4)
RESPIRATORY INFECTION PANEL SOURCE: NORMAL
RSV RNA NPH QL NAA+NON-PROBE: NOT DETECTED
RV+EV RNA NPH QL NAA+NON-PROBE: NOT DETECTED
SARS-COV-2 RNA RESP QL NAA+PROBE: NOT DETECTED
SODIUM SERPL-SCNC: 132 MMOL/L (ref 136–145)
TOXICOLOGY INFORMATION: ABNORMAL
WBC # BLD AUTO: 9.51 K/UL (ref 3.9–12.7)
Z-SCORE OF LEFT VENTRICULAR DIMENSION IN END DIASTOLE: 0.1
Z-SCORE OF LEFT VENTRICULAR DIMENSION IN END SYSTOLE: 0.65

## 2024-05-23 PROCEDURE — 11000001 HC ACUTE MED/SURG PRIVATE ROOM

## 2024-05-23 PROCEDURE — 36415 COLL VENOUS BLD VENIPUNCTURE: CPT

## 2024-05-23 PROCEDURE — 87798 DETECT AGENT NOS DNA AMP: CPT | Performed by: INTERNAL MEDICINE

## 2024-05-23 PROCEDURE — 86706 HEP B SURFACE ANTIBODY: CPT | Performed by: INTERNAL MEDICINE

## 2024-05-23 PROCEDURE — 99900031 HC PATIENT EDUCATION (STAT)

## 2024-05-23 PROCEDURE — 85025 COMPLETE CBC W/AUTO DIFF WBC: CPT

## 2024-05-23 PROCEDURE — 63600175 PHARM REV CODE 636 W HCPCS: Performed by: NURSE PRACTITIONER

## 2024-05-23 PROCEDURE — 63600175 PHARM REV CODE 636 W HCPCS: Performed by: STUDENT IN AN ORGANIZED HEALTH CARE EDUCATION/TRAINING PROGRAM

## 2024-05-23 PROCEDURE — 25000242 PHARM REV CODE 250 ALT 637 W/ HCPCS: Performed by: STUDENT IN AN ORGANIZED HEALTH CARE EDUCATION/TRAINING PROGRAM

## 2024-05-23 PROCEDURE — 94760 N-INVAS EAR/PLS OXIMETRY 1: CPT

## 2024-05-23 PROCEDURE — 94761 N-INVAS EAR/PLS OXIMETRY MLT: CPT

## 2024-05-23 PROCEDURE — 84100 ASSAY OF PHOSPHORUS: CPT

## 2024-05-23 PROCEDURE — 99223 1ST HOSP IP/OBS HIGH 75: CPT | Mod: ,,, | Performed by: INTERNAL MEDICINE

## 2024-05-23 PROCEDURE — 36415 COLL VENOUS BLD VENIPUNCTURE: CPT | Performed by: INTERNAL MEDICINE

## 2024-05-23 PROCEDURE — 25000242 PHARM REV CODE 250 ALT 637 W/ HCPCS: Performed by: NURSE PRACTITIONER

## 2024-05-23 PROCEDURE — 83735 ASSAY OF MAGNESIUM: CPT

## 2024-05-23 PROCEDURE — 80307 DRUG TEST PRSMV CHEM ANLYZR: CPT | Performed by: INTERNAL MEDICINE

## 2024-05-23 PROCEDURE — 36415 COLL VENOUS BLD VENIPUNCTURE: CPT | Performed by: NURSE PRACTITIONER

## 2024-05-23 PROCEDURE — 27000221 HC OXYGEN, UP TO 24 HOURS

## 2024-05-23 PROCEDURE — 25000003 PHARM REV CODE 250

## 2024-05-23 PROCEDURE — 94640 AIRWAY INHALATION TREATMENT: CPT

## 2024-05-23 PROCEDURE — 85651 RBC SED RATE NONAUTOMATED: CPT | Performed by: INTERNAL MEDICINE

## 2024-05-23 PROCEDURE — 87340 HEPATITIS B SURFACE AG IA: CPT | Performed by: INTERNAL MEDICINE

## 2024-05-23 PROCEDURE — 25000003 PHARM REV CODE 250: Performed by: NURSE PRACTITIONER

## 2024-05-23 PROCEDURE — 87522 HEPATITIS C REVRS TRNSCRPJ: CPT | Performed by: INTERNAL MEDICINE

## 2024-05-23 PROCEDURE — 99900035 HC TECH TIME PER 15 MIN (STAT)

## 2024-05-23 PROCEDURE — 86140 C-REACTIVE PROTEIN: CPT | Performed by: INTERNAL MEDICINE

## 2024-05-23 PROCEDURE — 87040 BLOOD CULTURE FOR BACTERIA: CPT | Performed by: NURSE PRACTITIONER

## 2024-05-23 PROCEDURE — 87389 HIV-1 AG W/HIV-1&-2 AB AG IA: CPT | Performed by: INTERNAL MEDICINE

## 2024-05-23 PROCEDURE — 63600175 PHARM REV CODE 636 W HCPCS

## 2024-05-23 PROCEDURE — S4991 NICOTINE PATCH NONLEGEND: HCPCS

## 2024-05-23 PROCEDURE — 80053 COMPREHEN METABOLIC PANEL: CPT

## 2024-05-23 PROCEDURE — 94618 PULMONARY STRESS TESTING: CPT

## 2024-05-23 RX ORDER — SODIUM CHLORIDE 9 MG/ML
INJECTION, SOLUTION INTRAVENOUS CONTINUOUS
Status: DISCONTINUED | OUTPATIENT
Start: 2024-05-23 | End: 2024-05-27

## 2024-05-23 RX ORDER — LANOLIN ALCOHOL/MO/W.PET/CERES
400 CREAM (GRAM) TOPICAL 2 TIMES DAILY
Qty: 30 TABLET | Refills: 0 | Status: CANCELLED | OUTPATIENT
Start: 2024-05-23 | End: 2024-06-07

## 2024-05-23 RX ORDER — LEVOFLOXACIN 5 MG/ML
750 INJECTION, SOLUTION INTRAVENOUS
Status: DISCONTINUED | OUTPATIENT
Start: 2024-05-23 | End: 2024-05-24

## 2024-05-23 RX ORDER — IBUPROFEN 200 MG
1 TABLET ORAL DAILY
Qty: 30 PATCH | Refills: 0 | Status: CANCELLED | OUTPATIENT
Start: 2024-05-23 | End: 2024-06-22

## 2024-05-23 RX ORDER — NYSTATIN 100000 [USP'U]/ML
500000 SUSPENSION ORAL
Status: DISCONTINUED | OUTPATIENT
Start: 2024-05-23 | End: 2024-05-28 | Stop reason: HOSPADM

## 2024-05-23 RX ORDER — LEVOFLOXACIN 750 MG/1
750 TABLET ORAL DAILY
Qty: 5 TABLET | Refills: 0 | Status: CANCELLED | OUTPATIENT
Start: 2024-05-23 | End: 2024-05-28

## 2024-05-23 RX ORDER — GUAIFENESIN 600 MG/1
1200 TABLET, EXTENDED RELEASE ORAL 2 TIMES DAILY
Qty: 40 TABLET | Refills: 0 | Status: CANCELLED | OUTPATIENT
Start: 2024-05-23 | End: 2024-06-02

## 2024-05-23 RX ORDER — LANOLIN ALCOHOL/MO/W.PET/CERES
400 CREAM (GRAM) TOPICAL 2 TIMES DAILY
Status: DISCONTINUED | OUTPATIENT
Start: 2024-05-23 | End: 2024-05-28 | Stop reason: HOSPADM

## 2024-05-23 RX ADMIN — MORPHINE SULFATE 30 MG: 15 TABLET, EXTENDED RELEASE ORAL at 08:05

## 2024-05-23 RX ADMIN — PREGABALIN 75 MG: 75 CAPSULE ORAL at 03:05

## 2024-05-23 RX ADMIN — GUAIFENESIN 1200 MG: 600 TABLET, EXTENDED RELEASE ORAL at 09:05

## 2024-05-23 RX ADMIN — CYCLOBENZAPRINE HYDROCHLORIDE 10 MG: 5 TABLET, FILM COATED ORAL at 10:05

## 2024-05-23 RX ADMIN — Medication 400 MG: at 09:05

## 2024-05-23 RX ADMIN — CETIRIZINE HYDROCHLORIDE 10 MG: 10 TABLET, FILM COATED ORAL at 08:05

## 2024-05-23 RX ADMIN — LEVALBUTEROL 1.25 MG: 1.25 SOLUTION, CONCENTRATE RESPIRATORY (INHALATION) at 07:05

## 2024-05-23 RX ADMIN — ARFORMOTEROL TARTRATE 15 MCG: 15 SOLUTION RESPIRATORY (INHALATION) at 07:05

## 2024-05-23 RX ADMIN — OXYCODONE 20 MG: 5 TABLET ORAL at 12:05

## 2024-05-23 RX ADMIN — ENOXAPARIN SODIUM 40 MG: 40 INJECTION SUBCUTANEOUS at 04:05

## 2024-05-23 RX ADMIN — Medication 1 PATCH: at 08:05

## 2024-05-23 RX ADMIN — BUDESONIDE INHALATION 1 MG: 0.5 SUSPENSION RESPIRATORY (INHALATION) at 07:05

## 2024-05-23 RX ADMIN — POTASSIUM PHOSPHATE, MONOBASIC POTASSIUM PHOSPHATE, DIBASIC 15 MMOL: 224; 236 INJECTION, SOLUTION, CONCENTRATE INTRAVENOUS at 10:05

## 2024-05-23 RX ADMIN — NYSTATIN 500000 UNITS: 100000 SUSPENSION ORAL at 12:05

## 2024-05-23 RX ADMIN — LOSARTAN POTASSIUM 50 MG: 25 TABLET, FILM COATED ORAL at 08:05

## 2024-05-23 RX ADMIN — ATORVASTATIN CALCIUM 40 MG: 40 TABLET, FILM COATED ORAL at 09:05

## 2024-05-23 RX ADMIN — PANTOPRAZOLE SODIUM 40 MG: 40 TABLET, DELAYED RELEASE ORAL at 08:05

## 2024-05-23 RX ADMIN — NYSTATIN 500000 UNITS: 100000 SUSPENSION ORAL at 09:05

## 2024-05-23 RX ADMIN — OXYCODONE 20 MG: 5 TABLET ORAL at 07:05

## 2024-05-23 RX ADMIN — LEVALBUTEROL 1.25 MG: 1.25 SOLUTION, CONCENTRATE RESPIRATORY (INHALATION) at 02:05

## 2024-05-23 RX ADMIN — OXYCODONE 20 MG: 5 TABLET ORAL at 05:05

## 2024-05-23 RX ADMIN — Medication 400 MG: at 08:05

## 2024-05-23 RX ADMIN — PREGABALIN 75 MG: 75 CAPSULE ORAL at 08:05

## 2024-05-23 RX ADMIN — MORPHINE SULFATE 30 MG: 15 TABLET, EXTENDED RELEASE ORAL at 09:05

## 2024-05-23 RX ADMIN — NYSTATIN 500000 UNITS: 100000 SUSPENSION ORAL at 04:05

## 2024-05-23 RX ADMIN — LEVOFLOXACIN 750 MG: 750 INJECTION, SOLUTION INTRAVENOUS at 08:05

## 2024-05-23 RX ADMIN — GUAIFENESIN 1200 MG: 600 TABLET, EXTENDED RELEASE ORAL at 08:05

## 2024-05-23 RX ADMIN — ACETAMINOPHEN 650 MG: 325 TABLET ORAL at 03:05

## 2024-05-23 RX ADMIN — PREGABALIN 75 MG: 75 CAPSULE ORAL at 09:05

## 2024-05-23 RX ADMIN — SODIUM CHLORIDE: 9 INJECTION, SOLUTION INTRAVENOUS at 09:05

## 2024-05-23 RX ADMIN — CEFAZOLIN 2 G: 2 INJECTION, POWDER, FOR SOLUTION INTRAMUSCULAR; INTRAVENOUS at 09:05

## 2024-05-23 RX ADMIN — LEVALBUTEROL 1.25 MG: 1.25 SOLUTION, CONCENTRATE RESPIRATORY (INHALATION) at 01:05

## 2024-05-23 NOTE — ASSESSMENT & PLAN NOTE
Patient has a diagnosis of pneumonia. The cause of the pneumonia is suspected to be bacterial in etiology but organism is not known. The pneumonia is stable. The patient has the following signs/symptoms of pneumonia: cough, sputum production, and shortness of breath. The patient does not have a current oxygen requirement and the patient does not have a home oxygen requirement. I have reviewed the pertinent imaging. The following cultures have been collected: Blood cultures and Sputum culture The culture results are listed below.     Current antimicrobial regimen consists of the antibiotics listed below. Will monitor patient closely and Adjust treatment plan as follows start Mucinex 1200 mg b.I.d. and change to Xopenex nebulizers due to tachycardia   Sputum culture pending    Antibiotics (From admission, onward)      Start     Stop Route Frequency Ordered    05/23/24 0900  levoFLOXacin 750 mg/150 mL IVPB 750 mg         -- IV Every 24 hours (non-standard times) 05/23/24 0747     Patient Given Cefazolin 2gm IV awaiting ID recomendations       Microbiology Results (last 7 days)       Procedure Component Value Units Date/Time    Respiratory Infection Panel (PCR), Nasopharyngeal [[id number]]     Order Status: No result Specimen: Nasopharyngeal Swab     Blood culture [[id number]] Collected: 05/23/24 0927    Order Status: Sent Specimen: Blood from Peripheral, Antecubital, Left     Blood culture [[id number]] Collected: 05/23/24 0925    Order Status: Sent Specimen: Blood from Peripheral, Hand, Right     Culture, Respiratory with Gram Stain [[id number]]  (Abnormal) Collected: 05/21/24 2044    Order Status: Completed Specimen: Sputum, Expectorated Updated: 05/23/24 0848     Respiratory Culture STAPHYLOCOCCUS AUREUS  Moderate  Susceptibility pending       Gram Stain (Respiratory) <10 epithelial cells per low power field.     Gram Stain (Respiratory) Many WBC's     Gram Stain (Respiratory) Few Gram positive cocci    Urine culture  [[id number]] Collected: 05/21/24 2044    Order Status: Completed Specimen: Urine Updated: 05/23/24 0800     Urine Culture, Routine No growth to date    Narrative:      Specimen Source->Urine    Blood culture #1 **CANNOT BE ORDERED STAT** [[id number]]  (Abnormal) Collected: 05/21/24 1516    Order Status: Completed Specimen: Blood from Peripheral, Antecubital, Right Updated: 05/23/24 0702     Blood Culture, Routine Gram stain codey bottle: Gram positive cocci      Results called to and read back by: Mounika Schuster LPN at [facility]      05/22/2024  21:40 KS3      STAPHYLOCOCCUS AUREUS  ID consult required for Staph aureus bacteremia.  susceptibility pending      Rapid Organism ID by PCR (from Blood culture) [[id number]]  (Abnormal) Collected: 05/21/24 1516    Order Status: Completed Updated: 05/22/24 2256     Enterococcus faecalis Not Detected     Enterococcus faecium Not Detected     Listeria monocytogenes Not Detected     Staphylococcus spp. See species for ID     Staphylococcus aureus Detected     Staphylococcus epidermidis Not Detected     Staphylococcus lugdunensis Not Detected     Streptococcus species Not Detected     Streptococcus agalactiae Not Detected     Streptococcus pneumoniae Not Detected     Streptococcus pyogenes Not Detected     Acinetobacter calcoaceticus/baumannii complex Not Detected     Bacteroides fragilis Not Detected     Enterobacterales Not Detected     Enterobacter cloacae complex Not Detected     Escherichia coli Not Detected     Klebsiella aerogenes Not Detected     Klebsiella oxytoca Not Detected     Klebsiella pneumoniae group Not Detected     Proteus Not Detected     Salmonella sp Not Detected     Serratia marcescens Not Detected     Haemophilus influenzae Not Detected     Neisseria meningtidis Not Detected     Pseudomonas aeruginosa Not Detected     Stenotrophomonas maltophilia Not Detected     Candida albicans Not Detected     Candida auris Not Detected     Candida glabrata Not Detected      Sridevi krusei Not Detected     Candida parapsilosis Not Detected     Candida tropicalis Not Detected     Cryptococcus neoformans/gattii Not Detected     CTX-M (ESBL ) Test not applicable     IMP (Carbapenem resistant) Test not applicable     KPC resistance gene (Carbapenem resistant) Test not applicable     mcr-1  Test not applicable     mec A/C  Test not applicable     mec A/C and MREJ (MRSA) gene Not Detected     NDM (Carbapenem resistant) Test not applicable     OXA-48-like (Carbapenem resistant) Test not applicable     van A/B (VRE gene) Test not applicable     VIM (Carbapenem resistant) Test not applicable    Blood culture #2 **CANNOT BE ORDERED STAT** [[id number]] Collected: 05/21/24 1526    Order Status: Completed Specimen: Blood from Peripheral, Antecubital, Left Updated: 05/22/24 2032     Blood Culture, Routine No Growth to date      No Growth to date

## 2024-05-23 NOTE — CARE UPDATE
05/22/24 1947   Patient Assessment/Suction   Level of Consciousness (AVPU) alert   Respiratory Effort Short of breath   Expansion/Accessory Muscles/Retractions expansion symmetric;no retractions;no use of accessory muscles   All Lung Fields Breath Sounds Anterior:;Lateral:;clear   Rhythm/Pattern, Respiratory shortness of breath   Cough Frequency frequent   Cough Type nonproductive   PRE-TX-O2   Device (Oxygen Therapy) nasal cannula   Flow (L/min) (Oxygen Therapy) 1   SpO2 99 %   Pulse Oximetry Type Intermittent   $ Pulse Oximetry - Multiple Charge Pulse Oximetry - Multiple   Pulse (!) 112   Resp 19   Positioning HOB elevated 45 degrees   Aerosol Therapy   $ Aerosol Therapy Charges Aerosol Treatment   Respiratory Treatment Status (SVN) given   Treatment Route (SVN) mask;oxygen   Patient Position HOB elevated   Post Treatment Assessment (SVN) congestion decreased;increased aeration   Signs of Intolerance (SVN) none   Breath Sounds Post-Respiratory Treatment   Throughout All Fields Post-Treatment All Fields   Throughout All Fields Post-Treatment aeration increased   Post-treatment Heart Rate (beats/min) 110   Post-treatment Resp Rate (breaths/min) 18   Education   $ Education Bronchodilator;15 min  (budesonide/xopenex)

## 2024-05-23 NOTE — PLAN OF CARE
POC/Meds reviewed, pt verbalized understanding. Vitals stable.  Afebrile. IVF infusing. Prn medication given. Up with x1 assist. Repositions self. Hourly/Q2hr rounding performed, safety maintained. Bed in lowest position, wheels locked, SR up x2, call light in easy reach. No  complaints at this time.

## 2024-05-23 NOTE — PLAN OF CARE
POC/Meds reviewed, pt verbalized understanding. Vitals stable. Afebrile. Remains on room air. IVPB abx administered. Up with x1 assist to bathroom. IS at bedside, instructed on use and return demonstration performed. PRN pain meds administered. Repositions self. Hourly/Q2hr rounding performed, safety maintained. Bed in lowest position, wheels locked, SR up x2, call light in easy reach. No complaints at this time. Will continue to monitor.

## 2024-05-23 NOTE — ASSESSMENT & PLAN NOTE
Blood culture #1 positive for staph aureus  Consult ID  Consider Midline  Add Cefazolin 2 gm dose x 1 await ID recs for additional doses  Blood cultures x 2 repeated  Echocardiogram ordered  NS at 125 ml/hr x 2 L

## 2024-05-23 NOTE — ASSESSMENT & PLAN NOTE
Dangers of cigarette smoking were reviewed with patient in detail. Patient was Counseled for 3-10 minutes. Nicotine replacement options were discussed. Nicotine replacement was discussed- prescribed Nicotine patch PRN  Patient reports smoking cessation 1 week prior.

## 2024-05-23 NOTE — CONSULTS
[facility]   Department of Infectious Disease  Consult Note        PATIENT NAME: Floresita Martin  MRN: [id number]  TODAY'S DATE: 05/23/2024  ADMIT DATE: 5/21/2024  LOS: 1 days    CHIEF COMPLAINT: Altered Mental Status (Cough, fever, sob and has had history of pneumonia with admission last month )      PRINCIPLE PROBLEM: Pneumonia involving right lung    REASON FOR CONSULT:     ASSESSMENT and PLAN      1. Multifocal pneumonia involving primarily the right lung.  Sputum culture growing Staphylococcus aureus.  Check CT chest without contrast, influenza a and SARS-COVID-2 assay.  Continue cefazolin.    2. Complicated MSSA bacteremia.  This appears to be from pneumonia.  She does have cervical and lumbar spine hardware.  Repeat blood culture x2 sets today.  Check TTE.  Also check UDS, CRP, ESR.    3. Alcohol abuse.  Management as per hospitalist.      4.  URI symptoms on admission.  Check influenza a and SARS-COVID-2 assay.    5. Oral thrush.  Secondary to recent antibiotics.  HIV screen was negative in 2017.  Repeat HIV screen.  Mycelex troches for now.      RECOMMENDATIONS:   Continue cefazolin and levofloxacin for now   Check CT chest, CRP, ESR, UDS  Check TTE    Thank you for this consult. Please send Directly secure chat with any questions.    Antibiotics (From admission, onward)      Start     Stop Route Frequency Ordered    05/23/24 0900  levoFLOXacin 750 mg/150 mL IVPB 750 mg         -- IV Every 24 hours (non-standard times) 05/23/24 0747          Antifungals (From admission, onward)      None           Antivirals (From admission, onward)      None            HPI      Floresita Martin is a 64 y.o. female with history of chronic pain syndrome and alcohol abuse.  She is known to me from previous encounter.  She was hospitalized 03/23/2024 with Pseudomonas bacteremia associated with abnormal chest x-ray with reticulonodular infiltrates.  She received antibiotics while in the hospital.  She  ultimately left the hospital AMA on 2024.  Apparently received of pneumonia by her PCP post discharge.  Also was in at an urgent care about 2 weeks ago and received a course of antibiotics.    She was visiting family in [place] and became ill with confusion.  Family brought her back to [place].  She was seen by her PCP in the office 2024 and sent to the ER for evaluation.  She had chills and rigors and shortness of breath.  Chest x-ray was abnormal with right side infiltrate.  She was admitted and commenced on levofloxacin.  Blood culture has now grown MSSA.  Antibiotics modified to cefazolin.    Antibiotics   Levofloxacin:  2024-  Cefazolin: 24-    Microbiology   Blood culture 2024:  MSSA 1/2 anaerobic bottles   Urine culture 2024: NGTD    Outdoor activities:  Recently returned from [place]  Travel:  Recently in [place] to visit family  Implants:  Cervical and lumbar spine hardware  Antibiotic history:  Reviewed    Social History  Marital Status: Single  Alcohol History:  reports current alcohol use.  Tobacco History:  reports that she has been smoking cigarettes. She started smoking about 46 years ago. She has a 23.2 pack-year smoking history. She has been exposed to tobacco smoke. She has never used smokeless tobacco.  Drug History:  reports no history of drug use.    Review of patient's allergies indicates:   Allergen Reactions    Codeine Nausea And Vomiting    Tylenol-codeine [acetaminophen-codeine] Nausea And Vomiting     Past Medical History:   Diagnosis Date    Alcohol abuse     Cellulitis of toe of right foot 2019    Headache     Hearing loss     HLD (hyperlipidemia)      Past Surgical History:   Procedure Laterality Date    BACK SURGERY       SECTION      2    COLONOSCOPY N/A 11/15/2017    Procedure: COLONOSCOPY;  Surgeon: Peg Powers MD;  Location: [facility];  Service: Endoscopy;  Laterality: N/A;    EPIDURAL STEROID INJECTION INTO CERVICAL SPINE       EPIDURAL STEROID INJECTION INTO LUMBAR SPINE      JOINT REPLACEMENT  5/2018    LIPOMA RESECTION Right 01/23/2023    Procedure: EXCISION, LIPOMA;  Surgeon: Ruben Brooks Jr., MD;  [facility]: [facility];  Service: General;  Laterality: Right;    LUMBAR FUSION  2019    with cage    LUNG BIOPSY      NECK SURGERY  05/2018    with hardware    RADIOFREQUENCY ABLATION      cervical    RADIOFREQUENCY ABLATION      lumbar    SPINE SURGERY  05/2018    STAPEDECTOMY Right 05/28/2015    STAPEDECTOMY Left     TUBAL LIGATION  1/1990     Family History   Problem Relation Name Age of Onset    Arthritis Mother      Diabetes Mother      Hypertension Father Jose Brenning     Cancer Sister Lilly     Breast cancer Sister Lilly     No Known Problems Sister kristen     Cancer Sister Melissa     Cancer Sister Mercy     Cancer Sister Ana        SUBJECTIVE     Review of systems:  Systems reviewed.  As in HPI     OBJECTIVE   Temp:  [97.9 °F (36.6 °C)-99.4 °F (37.4 °C)] 98.2 °F (36.8 °C)  Pulse:  [101-118] 105  Resp:  [18-20] 18  SpO2:  [93 %-99 %] 98 %  BP: (109-136)/(64-70) 130/64  Temp:  [97.9 °F (36.6 °C)-99.4 °F (37.4 °C)]   Temp: 98.2 °F (36.8 °C) (05/23/24 0730)  Pulse: 105 (05/23/24 0730)  Resp: 18 (05/23/24 0820)  BP: 130/64 (05/23/24 0730)  SpO2: 98 % (05/23/24 0730)    Intake/Output Summary (Last 24 hours) at 5/23/2024 1103  Last data filed at 5/23/2024 0524  Gross per 24 hour   Intake 1461.86 ml   Output 2250 ml   Net -788.14 ml       Physical Exam  General:  Thin middle-aged woman lying quietly in bed in mild respiratory distress   HEENT:  Oral thrush.  Upper set of dentures.  CVS:  S1 and 2 heard  Respiratory:  Mildly dyspneic.  Coarse Crackles right mid to lower zone and also left lung.  Abdomen: Full, soft, nontender, no palpable organomegaly   Skin:  No rash appreciated   CNS:  No focal deficits   Musculoskeletal system:  No joint or bony abnormalities appreciated    VAD:  None  ISOLATION:  None    Wounds:  "    Significant Labs: All pertinent labs within the past 24 hours have been reviewed.    CBC LAST 7  Recent Labs   Lab 05/21/24  1508 05/22/24  0426 05/23/24  0357   WBC 11.76 11.06 9.51   RBC 3.79* 3.36* 3.53*   HGB 11.1* 9.9* 10.3*   HCT 33.3* 29.3* 30.2*   MCV 88 87 86   MCH 29.3 29.5 29.2   MCHC 33.3 33.8 34.1   RDW 17.0* 16.7* 16.8*    284 331   MPV 8.9* 8.8* 8.9*   GRAN 76.7*  9.0* 72.1  8.0* 57.3  5.4   LYMPH 15.8*  1.9 17.1*  1.9 27.1  2.6   MONO 6.5  0.8 9.6  1.1* 14.4  1.4*   BASO 0.03 0.02 0.03   NRBC 0 0 0       CHEMISTRY LAST 7  Recent Labs   Lab 05/21/24  1508 05/22/24  0426 05/23/24  0357   * 132* 132*   K 3.6 4.0 3.4*   CL 98 100 99   CO2 21* 21* 22*   ANIONGAP 13 11 11   BUN 24* 11 5*   CREATININE 0.9 0.6 0.5   GLU 91 84 88   CALCIUM 10.5 9.1 9.2   MG 2.1 1.6 1.4*   ALBUMIN 2.7* 2.3* 2.1*   PROT 7.0 5.9* 5.9*   ALKPHOS 159* 142* 137*   ALT 17 18 15   AST 26 22 26   BILITOT 0.6 0.5 0.5       Estimated Creatinine Clearance: 83.8 mL/min (based on SCr of 0.5 mg/dL).    INFLAMMATORY/PROCAL  LAST 7  No results for input(s): "PROCAL", "ESR", "CRP" in the last 168 hours.  No results found for: "ESR"  CRP   Date Value Ref Range Status   01/11/2023 5.3 0.0 - 8.2 mg/L Final       PRIOR  MICROBIOLOGY:  Reviewed    Susceptibility data from last 90 days.  Collected Specimen Info Organism Cefepime Ciprofloxacin Levofloxacin Meropenem PIPERACILLIN/TAZO SUSCEPTIBILITY   05/21/24 Sputum, Expectorated Staphylococcus aureus        05/21/24 Blood from Peripheral, Antecubital, Right Staphylococcus aureus        03/26/24 Blood No growth after 5 days.        03/26/24 Blood No growth after 5 days.        03/24/24 Blood Pseudomonas aeruginosa  S  S  S  S  S   03/24/24 Blood Pseudomonas aeruginosa              LAST 7 DAYS MICROBIOLOGY   Microbiology Results (last 7 days)       Procedure Component Value Units Date/Time    Blood culture [[id number]] Collected: 05/23/24 0927    Order Status: Sent Specimen: " Blood from Peripheral, Antecubital, Left     Blood culture [[id number]] Collected: 05/23/24 0925    Order Status: Sent Specimen: Blood from Peripheral, Hand, Right     Culture, Respiratory with Gram Stain [[id number]]  (Abnormal) Collected: 05/21/24 2044    Order Status: Completed Specimen: Sputum, Expectorated Updated: 05/23/24 0848     Respiratory Culture STAPHYLOCOCCUS AUREUS  Moderate  Susceptibility pending       Gram Stain (Respiratory) <10 epithelial cells per low power field.     Gram Stain (Respiratory) Many WBC's     Gram Stain (Respiratory) Few Gram positive cocci    Urine culture [[id number]] Collected: 05/21/24 2044    Order Status: Completed Specimen: Urine Updated: 05/23/24 0800     Urine Culture, Routine No growth to date    Narrative:      Specimen Source->Urine    Blood culture #1 **CANNOT BE ORDERED STAT** [[id number]]  (Abnormal) Collected: 05/21/24 1516    Order Status: Completed Specimen: Blood from Peripheral, Antecubital, Right Updated: 05/23/24 0702     Blood Culture, Routine Gram stain codey bottle: Gram positive cocci      Results called to and read back by: Mounika Schuster LPN at [facility]      05/22/2024  21:40 KS3      STAPHYLOCOCCUS AUREUS  ID consult required for Staph aureus bacteremia.  susceptibility pending      Rapid Organism ID by PCR (from Blood culture) [[id number]]  (Abnormal) Collected: 05/21/24 1516    Order Status: Completed Updated: 05/22/24 2256     Enterococcus faecalis Not Detected     Enterococcus faecium Not Detected     Listeria monocytogenes Not Detected     Staphylococcus spp. See species for ID     Staphylococcus aureus Detected     Staphylococcus epidermidis Not Detected     Staphylococcus lugdunensis Not Detected     Streptococcus species Not Detected     Streptococcus agalactiae Not Detected     Streptococcus pneumoniae Not Detected     Streptococcus pyogenes Not Detected     Acinetobacter calcoaceticus/baumannii complex Not Detected     Bacteroides fragilis Not  Detected     Enterobacterales Not Detected     Enterobacter cloacae complex Not Detected     Escherichia coli Not Detected     Klebsiella aerogenes Not Detected     Klebsiella oxytoca Not Detected     Klebsiella pneumoniae group Not Detected     Proteus Not Detected     Salmonella sp Not Detected     Serratia marcescens Not Detected     Haemophilus influenzae Not Detected     Neisseria meningtidis Not Detected     Pseudomonas aeruginosa Not Detected     Stenotrophomonas maltophilia Not Detected     Candida albicans Not Detected     Candida auris Not Detected     Candida glabrata Not Detected     Candida krusei Not Detected     Candida parapsilosis Not Detected     Candida tropicalis Not Detected     Cryptococcus neoformans/gattii Not Detected     CTX-M (ESBL ) Test not applicable     IMP (Carbapenem resistant) Test not applicable     KPC resistance gene (Carbapenem resistant) Test not applicable     mcr-1  Test not applicable     mec A/C  Test not applicable     mec A/C and MREJ (MRSA) gene Not Detected     NDM (Carbapenem resistant) Test not applicable     OXA-48-like (Carbapenem resistant) Test not applicable     van A/B (VRE gene) Test not applicable     VIM (Carbapenem resistant) Test not applicable    Blood culture #2 **CANNOT BE ORDERED STAT** [[id number]] Collected: 05/21/24 1526    Order Status: Completed Specimen: Blood from Peripheral, Antecubital, Left Updated: 05/22/24 2032     Blood Culture, Routine No Growth to date      No Growth to date            CURRENT/PREVIOUS VISIT EKG  Results for orders placed or performed during the hospital encounter of 01/17/23   EKG 12-lead    Collection Time: 01/17/23  2:53 PM    Narrative    Test Reason : D17.0,    Vent. Rate : 087 BPM     Atrial Rate : 087 BPM     P-R Int : 150 ms          QRS Dur : 092 ms      QT Int : 384 ms       P-R-T Axes : 052 077 037 degrees     QTc Int : 462 ms    Normal sinus rhythm  Low voltage QRS  Borderline Abnormal ECG  When  compared with ECG of 13-APR-2021 09:04,  Previous ECG has undetermined rhythm, needs review  Minimal criteria for Anterior infarct are no longer Present  Confirmed by Emmanuel PEDERSON, Viet GARCIA ([id number]) on 1/20/2023 9:53:12 AM    Referred By:             Confirmed By:Viet Benitez MD       Significant Imaging: I have reviewed all relevant and available imaging results/findings within the past 24 hours.      Pola Rodriguez MD  Date of Service: 05/23/2024      This note was created using [employer] voice recognition software that occasionally misinterpreted phrases or words.

## 2024-05-23 NOTE — PROGRESS NOTES
Pharmacist Renal Dose Adjustment Note    Floresita Martin is a 64 y.o. female being treated with the medication Levofloxacin    Patient Data:    Vital Signs (Most Recent):  Temp: 97.9 °F (36.6 °C) (05/23/24 0327)  Pulse: 105 (05/23/24 0730)  Resp: 20 (05/23/24 0730)  BP: 136/67 (05/23/24 0327)  SpO2: 97 % (05/23/24 0730) Vital Signs (72h Range):  Temp:  [97 °F (36.1 °C)-101 °F (38.3 °C)]   Pulse:  []   Resp:  [16-22]   BP: (100-161)/(55-91)   SpO2:  [89 %-99 %]      Recent Labs   Lab 05/21/24  1508 05/22/24  0426 05/23/24  0357   CREATININE 0.9 0.6 0.5     Serum creatinine: 0.5 mg/dL 05/23/24 0357  Estimated creatinine clearance: 83.8 mL/min    Medication:Levofloxacin dose: 750 mg  frequency every 48 hours will be changed to medication:Levofloxacin dose:750 mg frequency:every 24 hours    Pharmacist's Name: Bienvenido Morris  Pharmacist's Extension: [id number]

## 2024-05-23 NOTE — PROGRESS NOTES
"[facility]  Progress Note    Patient Name: Floresita Martin  MRN: [id number]  Patient Class: IP- Inpatient   Admission Date: 5/21/2024  Length of Stay: 1 days  Attending Physician: Alirio Go MD  Primary Care Provider: Toni Hughes MD        Subjective:     Principal Problem:Bacteremia        HPI:  Floresita Martin is a 64 year old female with a previous medical history of HTN, HLD, GERD, opioid dependence with current use, metabolic encephalopathy, pneumonia, and COPD who was sent from PCP office for fever, cough, and shortness of breath. Patient is poor historian and unable to exactly recall when symptoms began but endorses fever, cough, exertional shortness of breath and loss of voice over the past two weeks. Reports that her  could provide more information but he was not at bedside. ED note reports that patient endorsed intermittent confusion. Initial ED workup included a chest xray that reads: "Findings concerning for multi lobar pneumonia in the right lung with possible involvement of the left lung base as well". CBC shows stable anemia, magnesium and lactic acid normal, cmp unremarkable. Patient vitals are stable and she is maintaining an oxygen saturation of 96% on room air. She is having productive cough with moderate amount of brown/yellow sputum. Most recent hospital admission from 3/26 was for pseudomonas bacteremia. Patient initiated on Levaquin IV in ED which has been continued. Upon admit patient is AAOx4 and does not appear in any respiratory distress. She is positive for a voice change which she reports has worsened over the last two weeks and has "lost her voice" but at baseline it is raspy. Patient admitted by hospital medicine for further evaluation and management.    Overview/Hospital Course:  Ms Martin was admitted after her PCP sent her for fever, cough, and SOB. Patient was found to have intermittent confusion w/ multilobar pneumonia " in her right lung w/ left lung base involvement. She was started on Levaquin with breathing treatments with SPO2 of 96% on RA in ED. Last admission 3/26 for Pseudomonas Bacteremia. Patient placed on supplemental Oxygen with last documented fever on 5/22 with blood, sputum, and urine cultures pending. Today, blood culture #1  and sputum + for staph aureus. Cefazolin 2 gram given with ID consult. On rounds patient with c/o mouth pain, thrush present started on nystatin. Will await additional recommendations from ID.    Interval History:   Sitting up in bed, awake and alert.  Reports breathing better today.  No longer requiring supplemental oxygen while in bed. Patient admits that she has not gotten out of bed so is unsure if she needs the supplemental O2 on ambulation.  Afebrile this morning.  Blood culture #1 positive for staph aureus, sputum positive for staph aureus, urine remains negative. Noted to have tachycardia with heart rate 111 on telemetry.    Review of Systems   HENT:  Positive for mouth sores (started on nystatin).    Respiratory:  Positive for cough and shortness of breath.    Cardiovascular:  Positive for chest pain.   Musculoskeletal:  Positive for arthralgias.   All other systems reviewed and are negative.    Objective:     Vital Signs (Most Recent):  Temp: 98.2 °F (36.8 °C) (05/23/24 0730)  Pulse: 105 (05/23/24 0730)  Resp: 18 (05/23/24 0820)  BP: 130/64 (05/23/24 0730)  SpO2: 98 % (05/23/24 0730) Vital Signs (24h Range):  Temp:  [97.9 °F (36.6 °C)-99.4 °F (37.4 °C)] 98.2 °F (36.8 °C)  Pulse:  [105-118] 105  Resp:  [18-20] 18  SpO2:  [93 %-99 %] 98 %  BP: (109-136)/(64-70) 130/64     Weight: 49.2 kg (108 lb 7.5 oz)  Body mass index is 21.18 kg/m².    Intake/Output Summary (Last 24 hours) at 5/23/2024 1148  Last data filed at 5/23/2024 0524  Gross per 24 hour   Intake 1461.86 ml   Output 2250 ml   Net -788.14 ml         Physical Exam  Constitutional:       General: She is not in acute  distress.  HENT:      Head: Normocephalic and atraumatic.   Eyes:      Conjunctiva/sclera: Conjunctivae normal.      Pupils: Pupils are equal, round, and reactive to light.   Cardiovascular:      Rate and Rhythm: Normal rate and regular rhythm.      Pulses: Normal pulses.      Heart sounds: Normal heart sounds.   Pulmonary:      Effort: Pulmonary effort is normal. No respiratory distress.      Breath sounds: Normal breath sounds.   Abdominal:      General: Bowel sounds are normal. There is no distension.      Palpations: Abdomen is soft.      Tenderness: There is no abdominal tenderness.   Musculoskeletal:         General: No swelling. Normal range of motion.      Cervical back: Normal range of motion and neck supple.   Skin:     General: Skin is warm and dry.      Capillary Refill: Capillary refill takes less than 2 seconds.   Neurological:      General: No focal deficit present.      Mental Status: She is alert and oriented to person, place, and time.      Cranial Nerves: No cranial nerve deficit.   Psychiatric:         Attention and Perception: Attention normal.         Mood and Affect: Mood and affect normal.             Significant Labs: All pertinent labs within the past 24 hours have been reviewed.  CBC:   Recent Labs   Lab 05/21/24  1508 05/22/24  0426 05/23/24  0357   WBC 11.76 11.06 9.51   HGB 11.1* 9.9* 10.3*   HCT 33.3* 29.3* 30.2*    284 331     CMP:   Recent Labs   Lab 05/21/24  1508 05/22/24  0426 05/23/24  0357   * 132* 132*   K 3.6 4.0 3.4*   CL 98 100 99   CO2 21* 21* 22*   GLU 91 84 88   BUN 24* 11 5*   CREATININE 0.9 0.6 0.5   CALCIUM 10.5 9.1 9.2   PROT 7.0 5.9* 5.9*   ALBUMIN 2.7* 2.3* 2.1*   BILITOT 0.6 0.5 0.5   ALKPHOS 159* 142* 137*   AST 26 22 26   ALT 17 18 15   ANIONGAP 13 11 11     Magnesium:   Recent Labs   Lab 05/21/24  1508 05/22/24  0426 05/23/24  0357   MG 2.1 1.6 1.4*     Urine Studies:   Recent Labs   Lab 05/21/24 2044   COLORU Yellow   APPEARANCEUA Clear   PHUR 6.0    SPECGRAV 1.015   PROTEINUA 1+*   GLUCUA Negative   KETONESU Negative   BILIRUBINUA Negative   OCCULTUA Negative   NITRITE Negative   UROBILINOGEN Negative   LEUKOCYTESUR 1+*   RBCUA 0   WBCUA 14*   BACTERIA Few*   SQUAMEPITHEL 1   HYALINECASTS 1     Microbiology Results (last 7 days)       Procedure Component Value Units Date/Time    Respiratory Infection Panel (PCR), Nasopharyngeal [[id number]]     Order Status: No result Specimen: Nasopharyngeal Swab     Blood culture [[id number]] Collected: 05/23/24 0927    Order Status: Sent Specimen: Blood from Peripheral, Antecubital, Left     Blood culture [[id number]] Collected: 05/23/24 0925    Order Status: Sent Specimen: Blood from Peripheral, Hand, Right     Culture, Respiratory with Gram Stain [[id number]]  (Abnormal) Collected: 05/21/24 2044    Order Status: Completed Specimen: Sputum, Expectorated Updated: 05/23/24 0848     Respiratory Culture STAPHYLOCOCCUS AUREUS  Moderate  Susceptibility pending       Gram Stain (Respiratory) <10 epithelial cells per low power field.     Gram Stain (Respiratory) Many WBC's     Gram Stain (Respiratory) Few Gram positive cocci    Urine culture [[id number]] Collected: 05/21/24 2044    Order Status: Completed Specimen: Urine Updated: 05/23/24 0800     Urine Culture, Routine No growth to date    Narrative:      Specimen Source->Urine    Blood culture #1 **CANNOT BE ORDERED STAT** [[id number]]  (Abnormal) Collected: 05/21/24 1516    Order Status: Completed Specimen: Blood from Peripheral, Antecubital, Right Updated: 05/23/24 0702     Blood Culture, Routine Gram stain codey bottle: Gram positive cocci      Results called to and read back by: Mounika Schuster LPN at [facility]      05/22/2024  21:40 KS3      STAPHYLOCOCCUS AUREUS  ID consult required for Staph aureus bacteremia.  susceptibility pending      Rapid Organism ID by PCR (from Blood culture) [[id number]]  (Abnormal) Collected: 05/21/24 1516    Order Status: Completed Updated: 05/22/24  2256     Enterococcus faecalis Not Detected     Enterococcus faecium Not Detected     Listeria monocytogenes Not Detected     Staphylococcus spp. See species for ID     Staphylococcus aureus Detected     Staphylococcus epidermidis Not Detected     Staphylococcus lugdunensis Not Detected     Streptococcus species Not Detected     Streptococcus agalactiae Not Detected     Streptococcus pneumoniae Not Detected     Streptococcus pyogenes Not Detected     Acinetobacter calcoaceticus/baumannii complex Not Detected     Bacteroides fragilis Not Detected     Enterobacterales Not Detected     Enterobacter cloacae complex Not Detected     Escherichia coli Not Detected     Klebsiella aerogenes Not Detected     Klebsiella oxytoca Not Detected     Klebsiella pneumoniae group Not Detected     Proteus Not Detected     Salmonella sp Not Detected     Serratia marcescens Not Detected     Haemophilus influenzae Not Detected     Neisseria meningtidis Not Detected     Pseudomonas aeruginosa Not Detected     Stenotrophomonas maltophilia Not Detected     Candida albicans Not Detected     Candida auris Not Detected     Candida glabrata Not Detected     Candida krusei Not Detected     Candida parapsilosis Not Detected     Candida tropicalis Not Detected     Cryptococcus neoformans/gattii Not Detected     CTX-M (ESBL ) Test not applicable     IMP (Carbapenem resistant) Test not applicable     KPC resistance gene (Carbapenem resistant) Test not applicable     mcr-1  Test not applicable     mec A/C  Test not applicable     mec A/C and MREJ (MRSA) gene Not Detected     NDM (Carbapenem resistant) Test not applicable     OXA-48-like (Carbapenem resistant) Test not applicable     van A/B (VRE gene) Test not applicable     VIM (Carbapenem resistant) Test not applicable    Blood culture #2 **CANNOT BE ORDERED STAT** [[id number]] Collected: 05/21/24 1526    Order Status: Completed Specimen: Blood from Peripheral, Antecubital, Left Updated:  05/22/24 2032     Blood Culture, Routine No Growth to date      No Growth to date          X-Ray Chest AP Portable  Narrative: EXAMINATION:  XR CHEST AP PORTABLE    CLINICAL HISTORY:  Fever, unspecified    TECHNIQUE:  Single frontal view of the chest was performed.    COMPARISON:  04/02/2024    FINDINGS:  Multifocal airspace opacities noted in the right lung, new since the prior study.  Left lung relatively clear with question minimal interstitial opacities at the base.  Faint nodule left upper lobe better demonstrated on previous CT.  Heart size normal.  No pleural effusion or acute bony abnormality.  Impression: Findings concerning for multi lobar pneumonia in the right lung with possible involvement of the left lung base as well.    Electronically signed by: Rain Beckwith  Date:    05/21/2024  Time:    16:13        Significant Imaging: I have reviewed all pertinent imaging results/findings within the past 24 hours.    Assessment/Plan:      * Bacteremia  Blood culture #1 positive for staph aureus  Consult ID  Consider Midline  Add Cefazolin 2 gm dose x 1 await ID recs for additional doses  Blood cultures x 2 repeated  Echocardiogram ordered  NS at 125 ml/hr x 2 L        Pneumonia involving right lung  Patient has a diagnosis of pneumonia. The cause of the pneumonia is suspected to be bacterial in etiology but organism is not known. The pneumonia is stable. The patient has the following signs/symptoms of pneumonia: cough, sputum production, and shortness of breath. The patient does not have a current oxygen requirement and the patient does not have a home oxygen requirement. I have reviewed the pertinent imaging. The following cultures have been collected: Blood cultures and Sputum culture The culture results are listed below.     Current antimicrobial regimen consists of the antibiotics listed below. Will monitor patient closely and Adjust treatment plan as follows start Mucinex 1200 mg b.I.d. and change  to Xopenex nebulizers due to tachycardia   Sputum culture pending    Antibiotics (From admission, onward)      Start     Stop Route Frequency Ordered    05/23/24 0900  levoFLOXacin 750 mg/150 mL IVPB 750 mg         -- IV Every 24 hours (non-standard times) 05/23/24 0747     Patient Given Cefazolin 2gm IV awaiting ID recomendations       Microbiology Results (last 7 days)       Procedure Component Value Units Date/Time    Respiratory Infection Panel (PCR), Nasopharyngeal [[id number]]     Order Status: No result Specimen: Nasopharyngeal Swab     Blood culture [[id number]] Collected: 05/23/24 0927    Order Status: Sent Specimen: Blood from Peripheral, Antecubital, Left     Blood culture [[id number]] Collected: 05/23/24 0925    Order Status: Sent Specimen: Blood from Peripheral, Hand, Right     Culture, Respiratory with Gram Stain [[id number]]  (Abnormal) Collected: 05/21/24 2044    Order Status: Completed Specimen: Sputum, Expectorated Updated: 05/23/24 0848     Respiratory Culture STAPHYLOCOCCUS AUREUS  Moderate  Susceptibility pending       Gram Stain (Respiratory) <10 epithelial cells per low power field.     Gram Stain (Respiratory) Many WBC's     Gram Stain (Respiratory) Few Gram positive cocci    Urine culture [[id number]] Collected: 05/21/24 2044    Order Status: Completed Specimen: Urine Updated: 05/23/24 0800     Urine Culture, Routine No growth to date    Narrative:      Specimen Source->Urine    Blood culture #1 **CANNOT BE ORDERED STAT** [[id number]]  (Abnormal) Collected: 05/21/24 1516    Order Status: Completed Specimen: Blood from Peripheral, Antecubital, Right Updated: 05/23/24 0702     Blood Culture, Routine Gram stain codey bottle: Gram positive cocci      Results called to and read back by: Mounika Schuster LPN at [facility]      05/22/2024  21:40 KS3      STAPHYLOCOCCUS AUREUS  ID consult required for Staph aureus bacteremia.  susceptibility pending      Rapid Organism ID by PCR (from Blood culture)  [[id number]]  (Abnormal) Collected: 05/21/24 1516    Order Status: Completed Updated: 05/22/24 2255     Enterococcus faecalis Not Detected     Enterococcus faecium Not Detected     Listeria monocytogenes Not Detected     Staphylococcus spp. See species for ID     Staphylococcus aureus Detected     Staphylococcus epidermidis Not Detected     Staphylococcus lugdunensis Not Detected     Streptococcus species Not Detected     Streptococcus agalactiae Not Detected     Streptococcus pneumoniae Not Detected     Streptococcus pyogenes Not Detected     Acinetobacter calcoaceticus/baumannii complex Not Detected     Bacteroides fragilis Not Detected     Enterobacterales Not Detected     Enterobacter cloacae complex Not Detected     Escherichia coli Not Detected     Klebsiella aerogenes Not Detected     Klebsiella oxytoca Not Detected     Klebsiella pneumoniae group Not Detected     Proteus Not Detected     Salmonella sp Not Detected     Serratia marcescens Not Detected     Haemophilus influenzae Not Detected     Neisseria meningtidis Not Detected     Pseudomonas aeruginosa Not Detected     Stenotrophomonas maltophilia Not Detected     Candida albicans Not Detected     Candida auris Not Detected     Candida glabrata Not Detected     Candida krusei Not Detected     Candida parapsilosis Not Detected     Candida tropicalis Not Detected     Cryptococcus neoformans/gattii Not Detected     CTX-M (ESBL ) Test not applicable     IMP (Carbapenem resistant) Test not applicable     KPC resistance gene (Carbapenem resistant) Test not applicable     mcr-1  Test not applicable     mec A/C  Test not applicable     mec A/C and MREJ (MRSA) gene Not Detected     NDM (Carbapenem resistant) Test not applicable     OXA-48-like (Carbapenem resistant) Test not applicable     van A/B (VRE gene) Test not applicable     VIM (Carbapenem resistant) Test not applicable    Blood culture #2 **CANNOT BE ORDERED STAT** [[id number]] Collected: 05/21/24  1526    Order Status: Completed Specimen: Blood from Peripheral, Antecubital, Left Updated: 05/22/24 2032     Blood Culture, Routine No Growth to date      No Growth to date            Thrush  Started patient on Nystatin swish and spit w/ swish and swallow only on bedtime dose      COPD with acute lower respiratory infection  Patient's COPD is with exacerbation noted by continued dyspnea and worsening of baseline hypoxia currently due to pneumonia.  Patient is currently off COPD Pathway. Continue scheduled inhalers Antibiotics and Supplemental oxygen and monitor respiratory status closely.   -----------------------------------------   Seen pneumonia A/P    Hypertension, essential  Chronic, controlled. Latest blood pressure and vitals reviewed-     Temp:  [97 °F (36.1 °C)-101 °F (38.3 °C)]   Pulse:  []   Resp:  [16-22]   BP: (100-161)/(55-91)   SpO2:  [89 %-99 %] .   Home meds for hypertension were reviewed and noted below.   Hypertension Medications               losartan (COZAAR) 50 MG tablet Take 1 tablet (50 mg total) by mouth once daily.            While in the hospital, will manage blood pressure as follows; Continue home antihypertensive regimen    Will utilize p.r.n. blood pressure medication only if patient's blood pressure greater than 160/100 and she develops symptoms such as worsening chest pain or shortness of breath.    Normocytic anemia  Patient's anemia is currently controlled. Has not received any PRBCs to date. Etiology likely d/t  possible chronic blood loss  Current CBC reviewed-   Lab Results   Component Value Date    HGB 9.9 (L) 05/22/2024    HCT 29.3 (L) 05/22/2024     Monitor serial CBC and transfuse if patient becomes hemodynamically unstable, symptomatic or H/H drops below 7/21.  -----------------------------------------------------------   Had anemia workup 3/26/24:   Lab Results   Component Value Date    IRON 31 03/26/2024    TRANSFERRIN 185 (L) 03/26/2024    TIBC 259 03/26/2024     FESATURATED 12 (L) 03/26/2024      Component Ref Range & Units 1 mo ago   Occult Blood Negative Positive Abnormal       Anemia stable/asymptomatic.  Will need to follow up with GI when discharged.      Acute hypoxic respiratory failure  Patient with Hypoxic Respiratory failure which is Acute.  she is not on home oxygen. Supplemental oxygen was provided and noted-      .   Signs/symptoms of respiratory failure include- tachypnea and increased work of breathing. Contributing diagnoses includes - COPD and Pneumonia Labs and images were reviewed. Will treat underlying causes and adjust management of respiratory failure as follows-  continue weaning supplemental O2 as tolerated     We will need desat study prior to discharge    Hyponatremia  Patient has hyponatremia which is recurrent uncontrolled,We will aim to correct the sodium by 4-6mEq in 24 hours. We will monitor sodium Daily. The hyponatremia is due to Dehydration/hypovolemia. We will treat the hyponatremia with IV fluids as follows:  isotonic NS. The patient's sodium results have been reviewed and are listed below.  Recent Labs   Lab 05/22/24  0426   *   ----------------------------------------------------------    DC LR   Start isotonic NS    Opioid dependence with current use  Chronic condition    -Home pain medications continued      Chronic pain syndrome    Managed outpatient by pain management. Continue home pain regimen   Per : Morphine ER 30mg and   Oxy IR 20 mg      Dependence on nicotine from cigarettes  Dangers of cigarette smoking were reviewed with patient in detail. Patient was Counseled for 3-10 minutes. Nicotine replacement options were discussed. Nicotine replacement was discussed- prescribed Nicotine patch PRN  Patient reports smoking cessation 1 week prior.    Gastroesophageal reflux disease without esophagitis    Chronic/stable.  Continue PPI        VTE Risk Mitigation (From admission, onward)           Ordered     enoxaparin injection  40 mg  Daily         05/21/24 1708     IP VTE HIGH RISK PATIENT  Once         05/21/24 1708     Place sequential compression device  Until discontinued         05/21/24 1708                    Discharge Planning   GRACE: 5/26/2024     Code Status: Full Code   Is the patient medically ready for discharge?:     Reason for patient still in hospital (select all that apply): Patient new problem, Patient trending condition, Treatment, and Consult recommendations  Discharge Plan A: Home with family                  Daayn Meek NP  Department of Hospital Medicine   [facility]/Surg

## 2024-05-23 NOTE — CARE UPDATE
05/23/24 0714   Patient Assessment/Suction   Level of Consciousness (AVPU) alert   Respiratory Effort Unlabored;Normal   Expansion/Accessory Muscles/Retractions no use of accessory muscles;no retractions;expansion symmetric   All Lung Fields Breath Sounds Anterior:;Lateral:;clear;diminished   Rhythm/Pattern, Respiratory unlabored;pattern regular;depth regular;no shortness of breath reported   PRE-TX-O2   Device (Oxygen Therapy) nasal cannula   $ Is the patient on Low Flow Oxygen? Yes   Flow (L/min) (Oxygen Therapy) 1   SpO2 (!) 94 %   Pulse Oximetry Type Intermittent   $ Pulse Oximetry - Multiple Charge Pulse Oximetry - Multiple   Pulse (!) 111   Resp 20   Aerosol Therapy   $ Aerosol Therapy Charges Aerosol Treatment   Respiratory Treatment Status (SVN) given   Treatment Route (SVN) mask;oxygen   Patient Position HOB elevated   Post Treatment Assessment (SVN) breath sounds unchanged   Signs of Intolerance (SVN) none   Breath Sounds Post-Respiratory Treatment   Throughout All Fields Post-Treatment All Fields   Post-treatment Heart Rate (beats/min) 108   Post-treatment Resp Rate (breaths/min) 20     Xopenex, pulmicort and brovana neb tx's given.

## 2024-05-23 NOTE — CARE UPDATE
05/23/24 0830   Patient Assessment/Suction   Level of Consciousness (AVPU) alert   PRE-TX-O2   Device (Oxygen Therapy) room air   Home Oxygen Qualification   $ Home O2 Qualification Pulmonary Stress Test/6 min walk;Tech time 15 minutes   Room Air SpO2 At Rest 92 %   Room Air SpO2 During Ambulation 93 %   SpO2 Post Ambulation 94 %   Post Ambulation Heart Rate 118 bpm

## 2024-05-23 NOTE — HOSPITAL COURSE
Admitted with sepsis, bilateral pneumonia, bacteremia, and encephalopathy. Respiratory and blood cultures grew MSSA. She was given abx and breathing treatments. She required minimal oxygen support later weaned off. ID was consulted. Her repeat blood cultures were negative. TTE showed no valvular concerns. FRANCO was recommended by ID due to their concern for septic emboli on CT chest but was recommended against by cardiology and was not performed. Given nystatin for oral thrush. ID recommended cefazolin for a total 6 week course. PICC line placed. Set up with home health and home infusion service. By time of discharge the patient was tolerating a regular diet with improving admission symptoms. Patient seen and examined on day of discharge.    Physical exam on day of discharge:  Constitutional:       General: She is not in acute distress.  HENT:      Head: Normocephalic and atraumatic.   Cardiovascular:      Rate and Rhythm: Normal rate and regular rhythm.   Pulmonary:      Effort: Pulmonary effort is normal. No respiratory distress.   Neurological:      General: No focal deficit present.      Mental Status: She is alert and oriented to person, place, and time. Mental status is at baseline.   Psychiatric:         Mood and Affect: Affect normal.         Behavior: Behavior normal.         Thought Content: Thought content normal.

## 2024-05-23 NOTE — PROGRESS NOTES
Limited echo for EF and valve evaluation was done per Dayan Meek NP. Pt had a full echo on 3-26-24

## 2024-05-23 NOTE — SUBJECTIVE & OBJECTIVE
Interval History:   Sitting up in bed, awake and alert.  Reports breathing better today.  No longer requiring supplemental oxygen while in bed. Patient admits that she has not gotten out of bed so is unsure if she needs the supplemental O2 on ambulation.  Afebrile this morning.  Blood culture #1 positive for staph aureus, sputum positive for staph aureus, urine remains negative. Noted to have tachycardia with heart rate 111 on telemetry.    Review of Systems   HENT:  Positive for mouth sores (started on nystatin).    Respiratory:  Positive for cough and shortness of breath.    Cardiovascular:  Positive for chest pain.   Musculoskeletal:  Positive for arthralgias.   All other systems reviewed and are negative.    Objective:     Vital Signs (Most Recent):  Temp: 98.2 °F (36.8 °C) (05/23/24 0730)  Pulse: 105 (05/23/24 0730)  Resp: 18 (05/23/24 0820)  BP: 130/64 (05/23/24 0730)  SpO2: 98 % (05/23/24 0730) Vital Signs (24h Range):  Temp:  [97.9 °F (36.6 °C)-99.4 °F (37.4 °C)] 98.2 °F (36.8 °C)  Pulse:  [105-118] 105  Resp:  [18-20] 18  SpO2:  [93 %-99 %] 98 %  BP: (109-136)/(64-70) 130/64     Weight: 49.2 kg (108 lb 7.5 oz)  Body mass index is 21.18 kg/m².    Intake/Output Summary (Last 24 hours) at 5/23/2024 1148  Last data filed at 5/23/2024 0524  Gross per 24 hour   Intake 1461.86 ml   Output 2250 ml   Net -788.14 ml         Physical Exam  Constitutional:       General: She is not in acute distress.  HENT:      Head: Normocephalic and atraumatic.   Eyes:      Conjunctiva/sclera: Conjunctivae normal.      Pupils: Pupils are equal, round, and reactive to light.   Cardiovascular:      Rate and Rhythm: Normal rate and regular rhythm.      Pulses: Normal pulses.      Heart sounds: Normal heart sounds.   Pulmonary:      Effort: Pulmonary effort is normal. No respiratory distress.      Breath sounds: Normal breath sounds.   Abdominal:      General: Bowel sounds are normal. There is no distension.      Palpations: Abdomen is  soft.      Tenderness: There is no abdominal tenderness.   Musculoskeletal:         General: No swelling. Normal range of motion.      Cervical back: Normal range of motion and neck supple.   Skin:     General: Skin is warm and dry.      Capillary Refill: Capillary refill takes less than 2 seconds.   Neurological:      General: No focal deficit present.      Mental Status: She is alert and oriented to person, place, and time.      Cranial Nerves: No cranial nerve deficit.   Psychiatric:         Attention and Perception: Attention normal.         Mood and Affect: Mood and affect normal.             Significant Labs: All pertinent labs within the past 24 hours have been reviewed.  CBC:   Recent Labs   Lab 05/21/24  1508 05/22/24  0426 05/23/24  0357   WBC 11.76 11.06 9.51   HGB 11.1* 9.9* 10.3*   HCT 33.3* 29.3* 30.2*    284 331     CMP:   Recent Labs   Lab 05/21/24  1508 05/22/24  0426 05/23/24  0357   * 132* 132*   K 3.6 4.0 3.4*   CL 98 100 99   CO2 21* 21* 22*   GLU 91 84 88   BUN 24* 11 5*   CREATININE 0.9 0.6 0.5   CALCIUM 10.5 9.1 9.2   PROT 7.0 5.9* 5.9*   ALBUMIN 2.7* 2.3* 2.1*   BILITOT 0.6 0.5 0.5   ALKPHOS 159* 142* 137*   AST 26 22 26   ALT 17 18 15   ANIONGAP 13 11 11     Magnesium:   Recent Labs   Lab 05/21/24  1508 05/22/24  0426 05/23/24  0357   MG 2.1 1.6 1.4*     Urine Studies:   Recent Labs   Lab 05/21/24 2044   COLORU Yellow   APPEARANCEUA Clear   PHUR 6.0   SPECGRAV 1.015   PROTEINUA 1+*   GLUCUA Negative   KETONESU Negative   BILIRUBINUA Negative   OCCULTUA Negative   NITRITE Negative   UROBILINOGEN Negative   LEUKOCYTESUR 1+*   RBCUA 0   WBCUA 14*   BACTERIA Few*   SQUAMEPITHEL 1   HYALINECASTS 1     Microbiology Results (last 7 days)       Procedure Component Value Units Date/Time    Respiratory Infection Panel (PCR), Nasopharyngeal [[id number]]     Order Status: No result Specimen: Nasopharyngeal Swab     Blood culture [[id number]] Collected: 05/23/24 0927    Order Status: Sent  Specimen: Blood from Peripheral, Antecubital, Left     Blood culture [[id number]] Collected: 05/23/24 0925    Order Status: Sent Specimen: Blood from Peripheral, Hand, Right     Culture, Respiratory with Gram Stain [[id number]]  (Abnormal) Collected: 05/21/24 2044    Order Status: Completed Specimen: Sputum, Expectorated Updated: 05/23/24 0848     Respiratory Culture STAPHYLOCOCCUS AUREUS  Moderate  Susceptibility pending       Gram Stain (Respiratory) <10 epithelial cells per low power field.     Gram Stain (Respiratory) Many WBC's     Gram Stain (Respiratory) Few Gram positive cocci    Urine culture [[id number]] Collected: 05/21/24 2044    Order Status: Completed Specimen: Urine Updated: 05/23/24 0800     Urine Culture, Routine No growth to date    Narrative:      Specimen Source->Urine    Blood culture #1 **CANNOT BE ORDERED STAT** [[id number]]  (Abnormal) Collected: 05/21/24 1516    Order Status: Completed Specimen: Blood from Peripheral, Antecubital, Right Updated: 05/23/24 0702     Blood Culture, Routine Gram stain codey bottle: Gram positive cocci      Results called to and read back by: Mounika Schuster LPN at [facility]      05/22/2024  21:40 KS3      STAPHYLOCOCCUS AUREUS  ID consult required for Staph aureus bacteremia.  susceptibility pending      Rapid Organism ID by PCR (from Blood culture) [[id number]]  (Abnormal) Collected: 05/21/24 1516    Order Status: Completed Updated: 05/22/24 2256     Enterococcus faecalis Not Detected     Enterococcus faecium Not Detected     Listeria monocytogenes Not Detected     Staphylococcus spp. See species for ID     Staphylococcus aureus Detected     Staphylococcus epidermidis Not Detected     Staphylococcus lugdunensis Not Detected     Streptococcus species Not Detected     Streptococcus agalactiae Not Detected     Streptococcus pneumoniae Not Detected     Streptococcus pyogenes Not Detected     Acinetobacter calcoaceticus/baumannii complex Not Detected     Bacteroides fragilis  Not Detected     Enterobacterales Not Detected     Enterobacter cloacae complex Not Detected     Escherichia coli Not Detected     Klebsiella aerogenes Not Detected     Klebsiella oxytoca Not Detected     Klebsiella pneumoniae group Not Detected     Proteus Not Detected     Salmonella sp Not Detected     Serratia marcescens Not Detected     Haemophilus influenzae Not Detected     Neisseria meningtidis Not Detected     Pseudomonas aeruginosa Not Detected     Stenotrophomonas maltophilia Not Detected     Candida albicans Not Detected     Candida auris Not Detected     Candida glabrata Not Detected     Candida krusei Not Detected     Candida parapsilosis Not Detected     Candida tropicalis Not Detected     Cryptococcus neoformans/gattii Not Detected     CTX-M (ESBL ) Test not applicable     IMP (Carbapenem resistant) Test not applicable     KPC resistance gene (Carbapenem resistant) Test not applicable     mcr-1  Test not applicable     mec A/C  Test not applicable     mec A/C and MREJ (MRSA) gene Not Detected     NDM (Carbapenem resistant) Test not applicable     OXA-48-like (Carbapenem resistant) Test not applicable     van A/B (VRE gene) Test not applicable     VIM (Carbapenem resistant) Test not applicable    Blood culture #2 **CANNOT BE ORDERED STAT** [[id number]] Collected: 05/21/24 1526    Order Status: Completed Specimen: Blood from Peripheral, Antecubital, Left Updated: 05/22/24 2032     Blood Culture, Routine No Growth to date      No Growth to date          X-Ray Chest AP Portable  Narrative: EXAMINATION:  XR CHEST AP PORTABLE    CLINICAL HISTORY:  Fever, unspecified    TECHNIQUE:  Single frontal view of the chest was performed.    COMPARISON:  04/02/2024    FINDINGS:  Multifocal airspace opacities noted in the right lung, new since the prior study.  Left lung relatively clear with question minimal interstitial opacities at the base.  Faint nodule left upper lobe better demonstrated on previous CT.   Heart size normal.  No pleural effusion or acute bony abnormality.  Impression: Findings concerning for multi lobar pneumonia in the right lung with possible involvement of the left lung base as well.    Electronically signed by: Rain Beckwith  Date:    05/21/2024  Time:    16:13        Significant Imaging: I have reviewed all pertinent imaging results/findings within the past 24 hours.

## 2024-05-24 PROBLEM — J15.211 PNEUMONIA DUE TO METHICILLIN SUSCEPTIBLE STAPHYLOCOCCUS AUREUS (MSSA): Status: ACTIVE | Noted: 2024-05-21

## 2024-05-24 PROBLEM — A41.9 SEPSIS: Status: ACTIVE | Noted: 2024-05-24

## 2024-05-24 LAB
ALBUMIN SERPL BCP-MCNC: 1.8 G/DL (ref 3.5–5.2)
ALP SERPL-CCNC: 120 U/L (ref 55–135)
ALT SERPL W/O P-5'-P-CCNC: 18 U/L (ref 10–44)
ANION GAP SERPL CALC-SCNC: 9 MMOL/L (ref 8–16)
AST SERPL-CCNC: 22 U/L (ref 10–40)
BACTERIA BLD CULT: ABNORMAL
BACTERIA SPEC AEROBE CULT: ABNORMAL
BASOPHILS # BLD AUTO: 0.02 K/UL (ref 0–0.2)
BASOPHILS NFR BLD: 0.2 % (ref 0–1.9)
BILIRUB SERPL-MCNC: 0.3 MG/DL (ref 0.1–1)
BUN SERPL-MCNC: 10 MG/DL (ref 8–23)
CALCIUM SERPL-MCNC: 8.6 MG/DL (ref 8.7–10.5)
CHLORIDE SERPL-SCNC: 102 MMOL/L (ref 95–110)
CO2 SERPL-SCNC: 23 MMOL/L (ref 23–29)
CREAT SERPL-MCNC: 0.5 MG/DL (ref 0.5–1.4)
DIFFERENTIAL METHOD BLD: ABNORMAL
EOSINOPHIL # BLD AUTO: 0 K/UL (ref 0–0.5)
EOSINOPHIL NFR BLD: 0.4 % (ref 0–8)
ERYTHROCYTE [DISTWIDTH] IN BLOOD BY AUTOMATED COUNT: 17 % (ref 11.5–14.5)
EST. GFR  (NO RACE VARIABLE): >60 ML/MIN/1.73 M^2
GLUCOSE SERPL-MCNC: 110 MG/DL (ref 70–110)
GRAM STN SPEC: ABNORMAL
HCT VFR BLD AUTO: 26.7 % (ref 37–48.5)
HGB BLD-MCNC: 9.1 G/DL (ref 12–16)
IMM GRANULOCYTES # BLD AUTO: 0.09 K/UL (ref 0–0.04)
IMM GRANULOCYTES NFR BLD AUTO: 1 % (ref 0–0.5)
LYMPHOCYTES # BLD AUTO: 2.1 K/UL (ref 1–4.8)
LYMPHOCYTES NFR BLD: 22 % (ref 18–48)
MAGNESIUM SERPL-MCNC: 1.5 MG/DL (ref 1.6–2.6)
MCH RBC QN AUTO: 29.1 PG (ref 27–31)
MCHC RBC AUTO-ENTMCNC: 34.1 G/DL (ref 32–36)
MCV RBC AUTO: 85 FL (ref 82–98)
MONOCYTES # BLD AUTO: 1.3 K/UL (ref 0.3–1)
MONOCYTES NFR BLD: 13.5 % (ref 4–15)
NEUTROPHILS # BLD AUTO: 5.9 K/UL (ref 1.8–7.7)
NEUTROPHILS NFR BLD: 62.9 % (ref 38–73)
NRBC BLD-RTO: 0 /100 WBC
OHS QRS DURATION: 96 MS
OHS QTC CALCULATION: 459 MS
PHOSPHATE SERPL-MCNC: 3.5 MG/DL (ref 2.7–4.5)
PLATELET # BLD AUTO: 380 K/UL (ref 150–450)
PMV BLD AUTO: 8.8 FL (ref 9.2–12.9)
POTASSIUM SERPL-SCNC: 3.1 MMOL/L (ref 3.5–5.1)
PROT SERPL-MCNC: 5.3 G/DL (ref 6–8.4)
RBC # BLD AUTO: 3.13 M/UL (ref 4–5.4)
SODIUM SERPL-SCNC: 134 MMOL/L (ref 136–145)
WBC # BLD AUTO: 9.45 K/UL (ref 3.9–12.7)

## 2024-05-24 PROCEDURE — 85025 COMPLETE CBC W/AUTO DIFF WBC: CPT

## 2024-05-24 PROCEDURE — 94640 AIRWAY INHALATION TREATMENT: CPT

## 2024-05-24 PROCEDURE — 25000242 PHARM REV CODE 250 ALT 637 W/ HCPCS: Performed by: STUDENT IN AN ORGANIZED HEALTH CARE EDUCATION/TRAINING PROGRAM

## 2024-05-24 PROCEDURE — 25000242 PHARM REV CODE 250 ALT 637 W/ HCPCS

## 2024-05-24 PROCEDURE — 25000003 PHARM REV CODE 250

## 2024-05-24 PROCEDURE — 25000003 PHARM REV CODE 250: Performed by: NURSE PRACTITIONER

## 2024-05-24 PROCEDURE — 94799 UNLISTED PULMONARY SVC/PX: CPT

## 2024-05-24 PROCEDURE — 11000001 HC ACUTE MED/SURG PRIVATE ROOM

## 2024-05-24 PROCEDURE — 80053 COMPREHEN METABOLIC PANEL: CPT

## 2024-05-24 PROCEDURE — 99233 SBSQ HOSP IP/OBS HIGH 50: CPT | Mod: ,,, | Performed by: INTERNAL MEDICINE

## 2024-05-24 PROCEDURE — 83735 ASSAY OF MAGNESIUM: CPT

## 2024-05-24 PROCEDURE — 94761 N-INVAS EAR/PLS OXIMETRY MLT: CPT

## 2024-05-24 PROCEDURE — 63600175 PHARM REV CODE 636 W HCPCS

## 2024-05-24 PROCEDURE — 84100 ASSAY OF PHOSPHORUS: CPT

## 2024-05-24 PROCEDURE — 36415 COLL VENOUS BLD VENIPUNCTURE: CPT

## 2024-05-24 PROCEDURE — 63600175 PHARM REV CODE 636 W HCPCS: Performed by: NURSE PRACTITIONER

## 2024-05-24 PROCEDURE — S4991 NICOTINE PATCH NONLEGEND: HCPCS

## 2024-05-24 PROCEDURE — 63600175 PHARM REV CODE 636 W HCPCS: Performed by: STUDENT IN AN ORGANIZED HEALTH CARE EDUCATION/TRAINING PROGRAM

## 2024-05-24 RX ORDER — HYDROCODONE POLISTIREX AND CHLORPHENIRAMINE POLISTIREX 10; 8 MG/5ML; MG/5ML
5 SUSPENSION, EXTENDED RELEASE ORAL EVERY 12 HOURS PRN
Status: DISCONTINUED | OUTPATIENT
Start: 2024-05-24 | End: 2024-05-28 | Stop reason: HOSPADM

## 2024-05-24 RX ORDER — LEVALBUTEROL 1.25 MG/.5ML
1.25 SOLUTION, CONCENTRATE RESPIRATORY (INHALATION)
Status: DISCONTINUED | OUTPATIENT
Start: 2024-05-24 | End: 2024-05-28 | Stop reason: HOSPADM

## 2024-05-24 RX ORDER — LEVALBUTEROL 1.25 MG/.5ML
1.25 SOLUTION, CONCENTRATE RESPIRATORY (INHALATION) EVERY 6 HOURS PRN
Status: DISCONTINUED | OUTPATIENT
Start: 2024-05-24 | End: 2024-05-28 | Stop reason: HOSPADM

## 2024-05-24 RX ORDER — LEVOFLOXACIN 250 MG/1
750 TABLET ORAL DAILY
Status: COMPLETED | OUTPATIENT
Start: 2024-05-25 | End: 2024-05-28

## 2024-05-24 RX ADMIN — CYCLOBENZAPRINE HYDROCHLORIDE 10 MG: 5 TABLET, FILM COATED ORAL at 12:05

## 2024-05-24 RX ADMIN — BUDESONIDE INHALATION 1 MG: 0.5 SUSPENSION RESPIRATORY (INHALATION) at 07:05

## 2024-05-24 RX ADMIN — POTASSIUM BICARBONATE 35 MEQ: 391 TABLET, EFFERVESCENT ORAL at 05:05

## 2024-05-24 RX ADMIN — PREGABALIN 75 MG: 75 CAPSULE ORAL at 09:05

## 2024-05-24 RX ADMIN — OXYCODONE 20 MG: 5 TABLET ORAL at 09:05

## 2024-05-24 RX ADMIN — Medication 400 MG: at 09:05

## 2024-05-24 RX ADMIN — MORPHINE SULFATE 30 MG: 15 TABLET, EXTENDED RELEASE ORAL at 09:05

## 2024-05-24 RX ADMIN — LEVALBUTEROL 1.25 MG: 1.25 SOLUTION, CONCENTRATE RESPIRATORY (INHALATION) at 08:05

## 2024-05-24 RX ADMIN — ENOXAPARIN SODIUM 40 MG: 40 INJECTION SUBCUTANEOUS at 05:05

## 2024-05-24 RX ADMIN — LACTULOSE 15 G: 20 SOLUTION ORAL at 09:05

## 2024-05-24 RX ADMIN — ATORVASTATIN CALCIUM 40 MG: 40 TABLET, FILM COATED ORAL at 09:05

## 2024-05-24 RX ADMIN — CYCLOBENZAPRINE HYDROCHLORIDE 10 MG: 5 TABLET, FILM COATED ORAL at 10:05

## 2024-05-24 RX ADMIN — ARFORMOTEROL TARTRATE 15 MCG: 15 SOLUTION RESPIRATORY (INHALATION) at 09:05

## 2024-05-24 RX ADMIN — CEFAZOLIN 2 G: 2 INJECTION, POWDER, FOR SOLUTION INTRAMUSCULAR; INTRAVENOUS at 05:05

## 2024-05-24 RX ADMIN — ACETAMINOPHEN 650 MG: 325 TABLET ORAL at 12:05

## 2024-05-24 RX ADMIN — POTASSIUM BICARBONATE 35 MEQ: 391 TABLET, EFFERVESCENT ORAL at 06:05

## 2024-05-24 RX ADMIN — AMITRIPTYLINE HYDROCHLORIDE 25 MG: 25 TABLET, FILM COATED ORAL at 10:05

## 2024-05-24 RX ADMIN — BUDESONIDE INHALATION 1 MG: 0.5 SUSPENSION RESPIRATORY (INHALATION) at 09:05

## 2024-05-24 RX ADMIN — MORPHINE SULFATE 30 MG: 15 TABLET, EXTENDED RELEASE ORAL at 10:05

## 2024-05-24 RX ADMIN — LACTULOSE 10 G: 20 SOLUTION ORAL at 09:05

## 2024-05-24 RX ADMIN — GUAIFENESIN 1200 MG: 600 TABLET, EXTENDED RELEASE ORAL at 09:05

## 2024-05-24 RX ADMIN — AMITRIPTYLINE HYDROCHLORIDE 25 MG: 25 TABLET, FILM COATED ORAL at 12:05

## 2024-05-24 RX ADMIN — NYSTATIN 500000 UNITS: 100000 SUSPENSION ORAL at 05:05

## 2024-05-24 RX ADMIN — LOSARTAN POTASSIUM 50 MG: 25 TABLET, FILM COATED ORAL at 09:05

## 2024-05-24 RX ADMIN — LEVALBUTEROL 1.25 MG: 1.25 SOLUTION, CONCENTRATE RESPIRATORY (INHALATION) at 01:05

## 2024-05-24 RX ADMIN — SODIUM CHLORIDE: 9 INJECTION, SOLUTION INTRAVENOUS at 09:05

## 2024-05-24 RX ADMIN — PREGABALIN 75 MG: 75 CAPSULE ORAL at 03:05

## 2024-05-24 RX ADMIN — PANTOPRAZOLE SODIUM 40 MG: 40 TABLET, DELAYED RELEASE ORAL at 09:05

## 2024-05-24 RX ADMIN — ONDANSETRON 4 MG: 2 INJECTION INTRAMUSCULAR; INTRAVENOUS at 08:05

## 2024-05-24 RX ADMIN — NYSTATIN 500000 UNITS: 100000 SUSPENSION ORAL at 09:05

## 2024-05-24 RX ADMIN — CEFAZOLIN 2 G: 2 INJECTION, POWDER, FOR SOLUTION INTRAMUSCULAR; INTRAVENOUS at 09:05

## 2024-05-24 RX ADMIN — LEVOFLOXACIN 750 MG: 750 INJECTION, SOLUTION INTRAVENOUS at 09:05

## 2024-05-24 RX ADMIN — ARFORMOTEROL TARTRATE 15 MCG: 15 SOLUTION RESPIRATORY (INHALATION) at 07:05

## 2024-05-24 RX ADMIN — LEVALBUTEROL 1.25 MG: 1.25 SOLUTION, CONCENTRATE RESPIRATORY (INHALATION) at 07:05

## 2024-05-24 RX ADMIN — OXYCODONE 20 MG: 5 TABLET ORAL at 03:05

## 2024-05-24 RX ADMIN — Medication 1 PATCH: at 09:05

## 2024-05-24 RX ADMIN — OXYCODONE 20 MG: 5 TABLET ORAL at 06:05

## 2024-05-24 RX ADMIN — NYSTATIN 500000 UNITS: 100000 SUSPENSION ORAL at 11:05

## 2024-05-24 RX ADMIN — SODIUM CHLORIDE: 9 INJECTION, SOLUTION INTRAVENOUS at 05:05

## 2024-05-24 RX ADMIN — CETIRIZINE HYDROCHLORIDE 10 MG: 10 TABLET, FILM COATED ORAL at 09:05

## 2024-05-24 NOTE — ASSESSMENT & PLAN NOTE
Chronic, controlled. Latest blood pressure and vitals reviewed-     Temp:  [98 °F (36.7 °C)-100.9 °F (38.3 °C)]   Pulse:  []   Resp:  [18-20]   BP: (113-142)/(66-83)   SpO2:  [92 %-100 %] .   Home meds for hypertension were reviewed and noted below.   Hypertension Medications               losartan (COZAAR) 50 MG tablet Take 1 tablet (50 mg total) by mouth once daily.            While in the hospital, will manage blood pressure as follows; Continue home antihypertensive regimen    Will utilize p.r.n. blood pressure medication only if patient's blood pressure greater than 160/100 and she develops symptoms such as worsening chest pain or shortness of breath.

## 2024-05-24 NOTE — ASSESSMENT & PLAN NOTE
Blood culture 5/21:  MSSA 1 set, NGTD 1 set  Blood culture 5/23:  NGTD x2 sets  Echo negative for vegetation  Id following  Continue IV Levaquin and cefazolin   Obtain FRANCO per ID recommendation

## 2024-05-24 NOTE — ASSESSMENT & PLAN NOTE
Patient has a diagnosis of pneumonia. The cause of the pneumonia is bacterial in etiology due to MSSA. The pneumonia is stable. The patient has the following signs/symptoms of pneumonia: cough, sputum production, and shortness of breath. The patient does not have a current oxygen requirement and the patient does not have a home oxygen requirement. I have reviewed the pertinent imaging. The following cultures have been collected: Blood cultures and Sputum culture The culture results are listed below.     Current antimicrobial regimen consists of the antibiotics listed below. Will monitor patient closely and Adjust treatment plan as follows add Tussionex q.12 hours PRN cough  Antibiotics (From admission, onward)       Start     Stop Route Frequency Ordered     05/25/24 1000   levoFLOXacin tablet 750 mg         05/29/24 0959 Oral Daily 05/24/24 1011     05/24/24 0930   ceFAZolin 2 g in dextrose 5 % in water (D5W) 50 mL IVPB (MB+)         -- IV Every 8 hours (non-standard times) 05/24/24 0820           Sputum culture positive MSSA   Respiratory panel negative   Id following  Continue IV Ancef and Levaquin, Mucinex 1200 mg b.I.d., nebulizers, supplemental O2  Order incentive spirometry

## 2024-05-24 NOTE — MED STUDENT SUBJECTIVE & OBJECTIVE
"Medical Student Subjective & Objective     Principal Problem: Bacteremia    Chief Complaint:   Chief Complaint   Patient presents with    Altered Mental Status     Cough, fever, sob and has had history of pneumonia with admission last month        HPI: Floresita Martin is a 64 year old female with a previous medical history of HTN, HLD, GERD, opioid dependence with current use, metabolic encephalopathy, pneumonia, and COPD who was sent from PCP office for fever, cough, and shortness of breath. Patient is poor historian and unable to exactly recall when symptoms began but endorses fever, cough, exertional shortness of breath and loss of voice over the past two weeks. Reports that her  could provide more information but he was not at bedside. ED note reports that patient endorsed intermittent confusion. Initial ED workup included a chest xray that reads: "Findings concerning for multi lobar pneumonia in the right lung with possible involvement of the left lung base as well". CBC shows stable anemia, magnesium and lactic acid normal, cmp unremarkable. Patient vitals are stable and she is maintaining an oxygen saturation of 96% on room air. She is having productive cough with moderate amount of brown/yellow sputum. Most recent hospital admission from 3/26 was for pseudomonas bacteremia. Patient initiated on Levaquin IV in ED which has been continued. Upon admit patient is AAOx4 and does not appear in any respiratory distress. She is positive for a voice change which she reports has worsened over the last two weeks and has "lost her voice" but at baseline it is raspy. Patient admitted by hospital medicine for further evaluation and management.    Hospital Course: Ms Martin was admitted after her PCP sent her for fever, cough, and SOB. Patient was found to have intermittent confusion w/ multilobar pneumonia in her right lung w/ left lung base involvement. She was started on Levaquin with breathing treatments with " SPO2 of 96% on RA in ED. Last admission 3/26 for Pseudomonas Bacteremia. Patient placed on supplemental Oxygen with last documented fever on  with blood, sputum, and urine cultures pending. Today, blood culture #1  and sputum + for staph aureus. Cefazolin 2 gram given with ID consult. On rounds patient with c/o mouth pain, thrush present started on nystatin. Will await additional recommendations from ID.    Interval History: Mrs. Martin's CT showed diffuse consolidation seen in right greater than left lungs consistent with bilateral pneumonia. There are bilateral pleural effusions greater on the right than left and mediastinal lymphadenopathy. She had a max temp of 100.9F over night and was given tylenol. She was afebrile this morning but she stated that she was feeling warm again. Ms. Martin has been getting levalbuterol q6h, arformoterol and budesonide nebulizer q2h. She has been maintaining SpO2 % RA. She was started on mucinex yesterday which she claims has started to help break up the mucus in her lungs. She still has a very wet productive cough present, but had very diminished breath sounds on physical exam. Pt was started on nystatin yesterday for her thrush which she says helps with the pain in her mouth and made eating more tolerable.       Past Medical History:   Diagnosis Date    Alcohol abuse     Cellulitis of toe of right foot 2019    Headache     Hearing loss     HLD (hyperlipidemia)        Past Surgical History:   Procedure Laterality Date    BACK SURGERY       SECTION      2    COLONOSCOPY N/A 11/15/2017    Procedure: COLONOSCOPY;  Surgeon: Peg Powers MD;  Location: [facility];  Service: Endoscopy;  Laterality: N/A;    EPIDURAL STEROID INJECTION INTO CERVICAL SPINE      EPIDURAL STEROID INJECTION INTO LUMBAR SPINE      JOINT REPLACEMENT  2018    LIPOMA RESECTION Right 2023    Procedure: EXCISION, LIPOMA;  Surgeon: Ruben Brooks Jr., MD;  Location: [facility] OR;   Service: General;  Laterality: Right;    LUMBAR FUSION  2019    with cage    LUNG BIOPSY      NECK SURGERY  05/2018    with hardware    RADIOFREQUENCY ABLATION      cervical    RADIOFREQUENCY ABLATION      lumbar    SPINE SURGERY  05/2018    STAPEDECTOMY Right 05/28/2015    STAPEDECTOMY Left     TUBAL LIGATION  1/1990       Review of patient's allergies indicates:   Allergen Reactions    Codeine Nausea And Vomiting    Tylenol-codeine [acetaminophen-codeine] Nausea And Vomiting       No current facility-administered medications on file prior to encounter.     Current Outpatient Medications on File Prior to Encounter   Medication Sig    albuterol (PROVENTIL/VENTOLIN HFA) 90 mcg/actuation inhaler Inhale 2 puffs into the lungs every 6 (six) hours as needed for Wheezing or Shortness of Breath. Rescue    albuterol-ipratropium (DUO-NEB) 2.5 mg-0.5 mg/3 mL nebulizer solution Take 3 mLs by nebulization every 6 (six) hours as needed for Wheezing or Shortness of Breath. Rescue    amitriptyline (ELAVIL) 25 MG tablet Take 1-2 tablet by mouth every night at bedtime    atorvastatin (LIPITOR) 40 MG tablet TAKE 1 TABLET(40 MG) BY MOUTH DAILY    co-enzyme Q-10 30 mg capsule Take 30 mg by mouth once daily.    cyclobenzaprine (FLEXERIL) 10 MG tablet Take 1 tablet by mouth twice a day as needed    fluticasone propionate (FLONASE) 50 mcg/actuation nasal spray 1 spray (50 mcg total) by Each Nostril route once daily.    fluticasone-umeclidin-vilanter (TRELEGY ELLIPTA) 200-62.5-25 mcg inhaler Inhale 1 puff into the lungs once daily.    ibuprofen (ADVIL,MOTRIN) 800 MG tablet Take 1 tablet by mouth three times a day as needed for pain    lactulose (CHRONULAC) 10 gram/15 mL solution Take 15 ml twice a day as needeed    lactulose (CHRONULAC) 10 gram/15 mL solution 15 ml twice a day    loratadine (CLARITIN) 10 mg tablet Take 1 tablet (10 mg total) by mouth once daily.    losartan (COZAAR) 50 MG tablet Take 1 tablet (50 mg total) by mouth once  daily.    lubiprostone (AMITIZA) 24 MCG Cap Take 1 capsule by mouth twice a day as needed for pain    morphine (MS CONTIN) 30 MG 12 hr tablet Take 1 tablet by mouth every twelve hours More than 7 days supply is medically necessary    morphine (MS CONTIN) 30 MG 12 hr tablet Take 1 tablet by mouth every twelve hours More than 7 days supply is medically necessary (Patient taking differently: Take 30 mg by mouth 2 (two) times daily.)    multivitamin capsule Take 1 capsule by mouth once daily.    omeprazole (PRILOSEC) 40 MG capsule TAKE 1 CAPSULE BY MOUTH EVERY MORNING    ondansetron (ZOFRAN-ODT) 4 MG TbDL Take 1 tablet (4 mg total) by mouth every 6 (six) hours as needed (nausea).    oxyCODONE (ROXICODONE) 20 mg Tab immediate release tablet Take 1 tablet by mouth five times a day as needed for pain More than 7 days supply is medically necessary (Patient taking differently: Take 20 mg by mouth 5 (five) times daily.)    pregabalin (LYRICA) 75 MG capsule Take 1 capsule by mouth three times a day More than 7 days supply is medically necessary    RESTASIS 0.05 % ophthalmic emulsion Place 1 drop into both eyes 2 (two) times daily.    naloxone (NARCAN) 4 mg/actuation Spry Spray 1 spray into one nostril single dose may repeat every 2-3 minutes until responsive or EMS arrives (Patient taking differently: 1 spray by Nasal route once.)    nystatin (MYCOSTATIN) 100,000 unit/mL suspension Take 10 mLs by mouth 3 (three) times daily.    oxyCODONE (ROXICODONE) 20 mg Tab immediate release tablet Take 1 tablet by mouth five times a day as needed for pain More than 7 days supply is medically necessary    [START ON 6/7/2024] oxyCODONE (ROXICODONE) 20 mg Tab immediate release tablet Take 1 tablet by mouth five times a day as needed for pain More than a 7 day supply is medically necessary    oxyCODONE (ROXICODONE) 20 mg Tab immediate release tablet Take 1 tablet by mouth five times a day as needed for pain More than a 7 day supply is  medically necessary     Family History       Problem Relation (Age of Onset)    Arthritis Mother    Breast cancer Sister    Cancer Sister, Sister, Sister, Sister    Diabetes Mother    Hypertension Father    No Known Problems Sister          Tobacco Use    Smoking status: Every Day     Current packs/day: 0.50     Average packs/day: 0.5 packs/day for 46.4 years (23.2 ttl pk-yrs)     Types: Cigarettes     Start date: 1/1/1978     Passive exposure: Past    Smokeless tobacco: Never    Tobacco comments:     Patient has smoked since a teenager, states that she has cut down from 2pk/day to .5pk/day. Can quit on her own when she's ready. Information and education on outpatient smoking cessation when ready. 3/26/24   Substance and Sexual Activity    Alcohol use: Yes     Comment: social     Drug use: No    Sexual activity: Not Currently     Partners: Male     Birth control/protection: Post-menopausal     Review of Systems   Respiratory:  Positive for cough and shortness of breath.      Objective:     Vital Signs (Most Recent):  Temp: 98.2 °F (36.8 °C) (05/24/24 0751)  Pulse: 110 (05/24/24 0914)  Resp: 18 (05/24/24 0914)  BP: (!) 140/82 (05/24/24 0751)  SpO2: 100 % (05/24/24 0914) Vital Signs (24h Range):  Temp:  [98 °F (36.7 °C)-100.9 °F (38.3 °C)] 98.2 °F (36.8 °C)  Pulse:  [] 110  Resp:  [18-20] 18  SpO2:  [92 %-100 %] 100 %  BP: (113-142)/(66-83) 140/82     Weight: 49 kg (108 lb)  Body mass index is 21.09 kg/m².    Intake/Output Summary (Last 24 hours) at 5/24/2024 1131  Last data filed at 5/24/2024 0640  Gross per 24 hour   Intake 2260.54 ml   Output 2250 ml   Net 10.54 ml      Physical Exam  HENT:      Head: Normocephalic and atraumatic.   Pulmonary:      Breath sounds: Decreased breath sounds present.   Neurological:      General: No focal deficit present.      Mental Status: She is alert and oriented to person, place, and time.         Significant Labs: All pertinent labs within the past 24 hours have been  reviewed.      CBC:   Recent Labs   Lab 05/23/24  0357 05/24/24  0411   WBC 9.51 9.45   HGB 10.3* 9.1*   HCT 30.2* 26.7*    380     CMP:   Recent Labs   Lab 05/23/24  0357 05/24/24  0409   * 134*   K 3.4* 3.1*   CL 99 102   CO2 22* 23   GLU 88 110   BUN 5* 10   CREATININE 0.5 0.5   CALCIUM 9.2 8.6*   PROT 5.9* 5.3*   ALBUMIN 2.1* 1.8*   BILITOT 0.5 0.3   ALKPHOS 137* 120   AST 26 22   ALT 15 18   ANIONGAP 11 9     Component Ref Range & Units 05/23/24 1152   CRP 0.0 - 8.2 mg/L 218.6 High      Significant Imaging: I have reviewed all pertinent imaging results/findings within the past 24 hours.    Medical Student Subjective & Objective

## 2024-05-24 NOTE — NURSING
Message sent to Paula NP to clarify continuous fluid ordered, Np stated to infuse at rate that was ordered last, infusion pump set to 125ml/hr

## 2024-05-24 NOTE — ASSESSMENT & PLAN NOTE
Nutrition consulted. Most recent weight and BMI monitored-     Measurements:  Wt Readings from Last 1 Encounters:   05/23/24 49 kg (108 lb)   Body mass index is 21.09 kg/m².    Patient has been screened and assessed by RD.    Malnutrition Type:  Context: acute illness or injury  Level: moderate    Malnutrition Characteristic Summary:  Weight Loss (Malnutrition): 5% in 1 month  Energy Intake (Malnutrition): less than 75% for greater than or equal to 1 month  Subcutaneous Fat (Malnutrition): mild depletion  Muscle Mass (Malnutrition): mild depletion    Interventions/Recommendations (treatment strategy):   Encourage intake of Cardiac diet  Added Boost Plus wit all meals  Suggest daily multivitamin  Monitor intake and need for appetite stimulant  Monitor weight and labs  Collaborate with health care providers

## 2024-05-24 NOTE — ASSESSMENT & PLAN NOTE
This patient does have evidence of infective focus  My overall impression is sepsis.  Source: Respiratory and bacteremia  Antibiotics given-   Antibiotics (72h ago, onward)      Start     Stop Route Frequency Ordered    05/25/24 1000  levoFLOXacin tablet 750 mg         05/29/24 0959 Oral Daily 05/24/24 1011    05/24/24 0930  ceFAZolin 2 g in dextrose 5 % in water (D5W) 50 mL IVPB (MB+)         -- IV Every 8 hours (non-standard times) 05/24/24 0820          Latest lactate reviewed-  Recent Labs   Lab 05/21/24  1508   LACTATE 0.7     Organ dysfunction indicated by  none    Fluid challenge Not needed - patient is not hypotensive      Post- resuscitation assessment No - Post resuscitation assessment not needed       Will Not start Pressors- Levophed for MAP of 65  Source control achieved by:  IV antibiotics  ---------------------------------------------------------  , .6  Urine culture negative  Blood culture 5/21:  MSSA 1 set, NGTD 1 set  Blood culture 5/23:  NGTD x2 sets  Sputum culture (+)  staph aureus  Id following  Continue IV Levaquin and Ancef

## 2024-05-24 NOTE — MED STUDENT ASSESSMENT & PLAN
Continue IVF  Pt will switch levofloxacin IV to PO tablet  Continue cefazolin    Continue mucinex  Added hydrocodone-chlorpheniramine suspension 5mL po q12 prn for cough    Continue acetaminophen for mild pain 1-3/10 pain scale,Headaches,Temperature greater than 101     Continue nystatin for oral thrush    FRANCO ordered per ID recs    Monitor and replenish electrolytes as needed    Patient to start ambulating hallways with assistance.    Incentive spirometer ordered to maintain lung strength

## 2024-05-24 NOTE — ASSESSMENT & PLAN NOTE
Patient has hyponatremia which is recurrent uncontrolled,We will aim to correct the sodium by 4-6mEq in 24 hours. We will monitor sodium Daily. The hyponatremia is due to Dehydration/hypovolemia. We will treat the hyponatremia with IV fluids as follows:  isotonic NS. The patient's sodium results have been reviewed and are listed below.  Recent Labs   Lab 05/24/24  0409   *     ----------------------------------------------------------    Improving.  Continue isotonic NS

## 2024-05-24 NOTE — ASSESSMENT & PLAN NOTE
Patient with Hypoxic Respiratory failure which is Acute.  she is not on home oxygen. Supplemental oxygen was provided and noted-      Signs/symptoms of respiratory failure include- tachypnea and increased work of breathing. Contributing diagnoses includes - COPD and Pneumonia Labs and images were reviewed. Will treat underlying causes and adjust management of respiratory failure as follows-  continue weaning supplemental O2 as tolerated  ------------------------------------------------------------   Not requiring as much supplemental O2. Desat study normal   Order Incentive spirometer

## 2024-05-24 NOTE — PLAN OF CARE
Problem: Adult Inpatient Plan of Care  Goal: Plan of Care Review  Outcome: Progressing  Goal: Patient-Specific Goal (Individualized)  Outcome: Progressing  Goal: Absence of Hospital-Acquired Illness or Injury  Outcome: Progressing  Goal: Optimal Comfort and Wellbeing  Outcome: Progressing  Goal: Readiness for Transition of Care  Outcome: Progressing     Problem: Pneumonia  Goal: Fluid Balance  Outcome: Progressing  Goal: Resolution of Infection Signs and Symptoms  Outcome: Progressing  Goal: Effective Oxygenation and Ventilation  Outcome: Progressing     Problem: Sepsis/Septic Shock  Goal: Optimal Coping  Outcome: Progressing  Goal: Absence of Bleeding  Outcome: Progressing  Goal: Blood Glucose Level Within Targeted Range  Outcome: Progressing  Goal: Absence of Infection Signs and Symptoms  Outcome: Progressing  Goal: Optimal Nutrition Intake  Outcome: Progressing     Problem: Sepsis/Septic Shock  Goal: Optimal Coping  Outcome: Progressing     Problem: Pneumonia  Goal: Fluid Balance  Outcome: Progressing     Problem: Pain Acute  Goal: Optimal Pain Control and Function  Outcome: Progressing

## 2024-05-24 NOTE — PROGRESS NOTES
"[facility]  Progress Note    Patient Name: Floresita Martin  MRN: [id number]  Patient Class: IP- Inpatient   Admission Date: 5/21/2024  Length of Stay: 2 days  Attending Physician: Alirio Go MD  Primary Care Provider: Toni Hughes MD        Subjective:     Principal Problem:Sepsis        HPI:  Floresita Martin is a 64 year old female with a previous medical history of HTN, HLD, GERD, opioid dependence with current use, metabolic encephalopathy, pneumonia, and COPD who was sent from PCP office for fever, cough, and shortness of breath. Patient is poor historian and unable to exactly recall when symptoms began but endorses fever, cough, exertional shortness of breath and loss of voice over the past two weeks. Reports that her  could provide more information but he was not at bedside. ED note reports that patient endorsed intermittent confusion. Initial ED workup included a chest xray that reads: "Findings concerning for multi lobar pneumonia in the right lung with possible involvement of the left lung base as well". CBC shows stable anemia, magnesium and lactic acid normal, cmp unremarkable. Patient vitals are stable and she is maintaining an oxygen saturation of 96% on room air. She is having productive cough with moderate amount of brown/yellow sputum. Most recent hospital admission from 3/26 was for pseudomonas bacteremia. Patient initiated on Levaquin IV in ED which has been continued. Upon admit patient is AAOx4 and does not appear in any respiratory distress. She is positive for a voice change which she reports has worsened over the last two weeks and has "lost her voice" but at baseline it is raspy. Patient admitted by hospital medicine for further evaluation and management.    Overview/Hospital Course:  Ms Martin was admitted after her PCP sent her for fever, cough, and SOB. Patient was found to have intermittent confusion w/ multilobar pneumonia in " her right lung w/ left lung base involvement. She was started on Levaquin with breathing treatments with SPO2 of 96% on RA in ED. Last admission 3/26 for Pseudomonas Bacteremia. Patient placed on supplemental Oxygen with last documented fever on 5/22 with blood, sputum, and urine cultures pending. Today, blood culture #1  and sputum + for staph aureus. Cefazolin 2 gram given with ID consult. On rounds patient with c/o mouth pain, thrush present started on nystatin. Will await additional recommendations from ID.    Interval History:   Sitting up in bed, awake and alert.  Complains of persistent wet cough and reports that she feels warm currently.  Echocardiogram negative for vegetation.  T-max 100.9°.    Review of Systems   HENT:  Positive for mouth sores (started on nystatin).    Respiratory:  Positive for cough and shortness of breath.    Cardiovascular:  Positive for chest pain ( pleuritic).   Musculoskeletal:  Positive for arthralgias.   All other systems reviewed and are negative.    Objective:     Vital Signs (Most Recent):  Temp: 98.2 °F (36.8 °C) (05/24/24 0751)  Pulse: 110 (05/24/24 0914)  Resp: 18 (05/24/24 0914)  BP: (!) 140/82 (05/24/24 0751)  SpO2: 100 % (05/24/24 0914) Vital Signs (24h Range):  Temp:  [98 °F (36.7 °C)-100.9 °F (38.3 °C)] 98.2 °F (36.8 °C)  Pulse:  [] 110  Resp:  [18-20] 18  SpO2:  [92 %-100 %] 100 %  BP: (113-142)/(66-83) 140/82     Weight: 49 kg (108 lb)  Body mass index is 21.09 kg/m².    Intake/Output Summary (Last 24 hours) at 5/24/2024 1143  Last data filed at 5/24/2024 0640  Gross per 24 hour   Intake 2260.54 ml   Output 2250 ml   Net 10.54 ml         Physical Exam  Constitutional:       General: She is not in acute distress.  HENT:      Head: Normocephalic and atraumatic.   Eyes:      Conjunctiva/sclera: Conjunctivae normal.      Pupils: Pupils are equal, round, and reactive to light.   Cardiovascular:      Rate and Rhythm: Normal rate and regular rhythm.      Pulses:  Normal pulses.      Heart sounds: Normal heart sounds.   Pulmonary:      Effort: Pulmonary effort is normal. No respiratory distress.      Breath sounds:  diminished at the bases. (+)   Wet cough.  Not currently wearing supplemental O2.  Abdominal:      General: Bowel sounds are normal. There is no distension.      Palpations: Abdomen is soft.      Tenderness: There is no abdominal tenderness.   Musculoskeletal:         General: No swelling. Normal range of motion.      Cervical back: Normal range of motion and neck supple.   Skin:     General: Skin is warm and dry.      Capillary Refill: Capillary refill takes less than 2 seconds.   Neurological:      General: No focal deficit present.      Mental Status: She is alert and oriented to person, place, and time.      Cranial Nerves: No cranial nerve deficit.   Psychiatric:         Attention and Perception: Attention normal.         Mood and Affect: Mood and affect normal.             Significant Labs: All pertinent labs within the past 24 hours have been reviewed.  CBC:   Recent Labs   Lab 05/23/24  0357 05/24/24  0411   WBC 9.51 9.45   HGB 10.3* 9.1*   HCT 30.2* 26.7*    380     CMP:   Recent Labs   Lab 05/23/24  0357 05/24/24  0409   * 134*   K 3.4* 3.1*   CL 99 102   CO2 22* 23   GLU 88 110   BUN 5* 10   CREATININE 0.5 0.5   CALCIUM 9.2 8.6*   PROT 5.9* 5.3*   ALBUMIN 2.1* 1.8*   BILITOT 0.5 0.3   ALKPHOS 137* 120   AST 26 22   ALT 15 18   ANIONGAP 11 9     Magnesium:   Recent Labs   Lab 05/23/24  0357 05/24/24  0409   MG 1.4* 1.5*         Microbiology Results (last 7 days)       Procedure Component Value Units Date/Time    Urine culture [[id number]] Collected: 05/21/24 2044    Order Status: Completed Specimen: Urine Updated: 05/24/24 0825     Urine Culture, Routine No growth to date    Narrative:      Specimen Source->Urine    Culture, Respiratory with Gram Stain [[id number]]  (Abnormal)  (Susceptibility) Collected: 05/21/24 2044    Order Status:  Completed Specimen: Sputum, Expectorated Updated: 05/24/24 0812     Respiratory Culture STAPHYLOCOCCUS AUREUS  Moderate       Gram Stain (Respiratory) <10 epithelial cells per low power field.     Gram Stain (Respiratory) Many WBC's     Gram Stain (Respiratory) Few Gram positive cocci    Blood culture #1 **CANNOT BE ORDERED STAT** [[id number]]  (Abnormal)  (Susceptibility) Collected: 05/21/24 1516    Order Status: Completed Specimen: Blood from Peripheral, Antecubital, Right Updated: 05/24/24 0719     Blood Culture, Routine Gram stain codey bottle: Gram positive cocci      Results called to and read back by: Mounika Schuster LPN at [facility]      05/22/2024  21:40 KS3      STAPHYLOCOCCUS AUREUS  ID consult required for Staph aureus bacteremia.      Blood culture [[id number]] Collected: 05/23/24 0925    Order Status: Completed Specimen: Blood from Peripheral, Hand, Right Updated: 05/23/24 2117     Blood Culture, Routine No Growth to date    Blood culture [[id number]] Collected: 05/23/24 0927    Order Status: Completed Specimen: Blood from Peripheral, Antecubital, Left Updated: 05/23/24 2117     Blood Culture, Routine No Growth to date    Respiratory Infection Panel (PCR), Nasopharyngeal [[id number]] Collected: 05/23/24 1406    Order Status: Completed Specimen: Nasopharyngeal Swab Updated: 05/23/24 2042     Respiratory Infection Panel Source NP Swab     Adenovirus Not Detected     Coronavirus 229E, Common Cold Virus Not Detected     Coronavirus HKU1, Common Cold Virus Not Detected     Coronavirus NL63, Common Cold Virus Not Detected     Coronavirus OC43, Common Cold Virus Not Detected     Comment: Coronavirus strains 229E, HKU1, NL63, and OC43 can cause the common   cold   and are not associated with the respiratory disease outbreak caused   by  the COVID-19 (SARS-CoV-2 novel Coronavirus) strain.           SARS-CoV2 (COVID-19) Qualitative PCR Not Detected     Human Metapneumovirus Not Detected     Human Rhinovirus/Enterovirus  Not Detected     Influenza A (subtypes H1, H1-2009,H3) Not Detected     Influenza B Not Detected     Parainfluenza Virus 1 Not Detected     Parainfluenza Virus 2 Not Detected     Parainfluenza Virus 3 Not Detected     Parainfluenza Virus 4 Not Detected     Respiratory Syncytial Virus Not Detected     Bordetella Parapertussis (BL9473) Not Detected     Bordetella pertussis (ptxP) Not Detected     Chlamydia pneumoniae Not Detected     Mycoplasma pneumoniae Not Detected     Comment: Respiratory Infection Panel testing performed by Multiplex PCR.       Blood culture #2 **CANNOT BE ORDERED STAT** [[id number]] Collected: 05/21/24 1526    Order Status: Completed Specimen: Blood from Peripheral, Antecubital, Left Updated: 05/23/24 2032     Blood Culture, Routine No Growth to date      No Growth to date      No Growth to date    Rapid Organism ID by PCR (from Blood culture) [[id number]]  (Abnormal) Collected: 05/21/24 1516    Order Status: Completed Updated: 05/22/24 2256     Enterococcus faecalis Not Detected     Enterococcus faecium Not Detected     Listeria monocytogenes Not Detected     Staphylococcus spp. See species for ID     Staphylococcus aureus Detected     Staphylococcus epidermidis Not Detected     Staphylococcus lugdunensis Not Detected     Streptococcus species Not Detected     Streptococcus agalactiae Not Detected     Streptococcus pneumoniae Not Detected     Streptococcus pyogenes Not Detected     Acinetobacter calcoaceticus/baumannii complex Not Detected     Bacteroides fragilis Not Detected     Enterobacterales Not Detected     Enterobacter cloacae complex Not Detected     Escherichia coli Not Detected     Klebsiella aerogenes Not Detected     Klebsiella oxytoca Not Detected     Klebsiella pneumoniae group Not Detected     Proteus Not Detected     Salmonella sp Not Detected     Serratia marcescens Not Detected     Haemophilus influenzae Not Detected     Neisseria meningtidis Not Detected     Pseudomonas  aeruginosa Not Detected     Stenotrophomonas maltophilia Not Detected     Candida albicans Not Detected     Candida auris Not Detected     Candida glabrata Not Detected     Candida krusei Not Detected     Candida parapsilosis Not Detected     Candida tropicalis Not Detected     Cryptococcus neoformans/gattii Not Detected     CTX-M (ESBL ) Test not applicable     IMP (Carbapenem resistant) Test not applicable     KPC resistance gene (Carbapenem resistant) Test not applicable     mcr-1  Test not applicable     mec A/C  Test not applicable     mec A/C and MREJ (MRSA) gene Not Detected     NDM (Carbapenem resistant) Test not applicable     OXA-48-like (Carbapenem resistant) Test not applicable     van A/B (VRE gene) Test not applicable     VIM (Carbapenem resistant) Test not applicable          Echo    Left Ventricle: The left ventricle is normal in size. Normal wall   thickness. There is concentric hypertrophy. Moderate global hypokinesis   present. There is mildly reduced systolic function with a visually   estimated ejection fraction of 40 - 45%. Biplane (2D) method of discs   ejection fraction is 44%. There is normal diastolic function.    Right Ventricle: Normal right ventricular cavity size. Wall thickness   is normal. Systolic function is normal.    IVC/SVC: Normal venous pressure at 3 mmHg.  CT Chest Without Contrast  Narrative: EXAMINATION:  CT CHEST WITHOUT CONTRAST    CLINICAL HISTORY:  Pneumonia, unresolved;Pneumonia on chest x-ray.  Evaluate for evidence of septic emboli;    TECHNIQUE:  Low dose axial images, sagittal and coronal reformations were obtained from the thoracic inlet to the lung bases. Contrast was not administered.    COMPARISON:  None.    FINDINGS:  There are diffuse regions of consolidation seen within the right greater than left pulmonary parenchyma.  There is no pneumothorax.  There are bilateral pleural effusions greater on the right than left.  There is mediastinal lymph  adenopathy with the largest node seen in the pre tracheal region measuring 1.6 cm in short axis.  There is no pericardial effusion.    Visualized portions of the superior abdomen are unremarkable.  The osseous structures show degenerative change.  Impression: There is diffuse consolidation seen in right greater than left lungs concerning for pneumonia.  There are bilateral pleural effusions greater on the right than left and mediastinal lymphadenopathy thought to be related to pneumonia.    Electronically signed by: Gabriela Gan MD  Date:    05/23/2024  Time:    14:35        Significant Imaging: I have reviewed all pertinent imaging results/findings within the past 24 hours.    Assessment/Plan:      * Sepsis  This patient does have evidence of infective focus  My overall impression is sepsis.  Source: Respiratory and bacteremia  Antibiotics given-   Antibiotics (72h ago, onward)      Start     Stop Route Frequency Ordered    05/25/24 1000  levoFLOXacin tablet 750 mg         05/29/24 0959 Oral Daily 05/24/24 1011    05/24/24 0930  ceFAZolin 2 g in dextrose 5 % in water (D5W) 50 mL IVPB (MB+)         -- IV Every 8 hours (non-standard times) 05/24/24 0820          Latest lactate reviewed-  Recent Labs   Lab 05/21/24  1508   LACTATE 0.7     Organ dysfunction indicated by  none    Fluid challenge Not needed - patient is not hypotensive      Post- resuscitation assessment No - Post resuscitation assessment not needed       Will Not start Pressors- Levophed for MAP of 65  Source control achieved by:  IV antibiotics  ---------------------------------------------------------  , .6  Urine culture negative  Blood culture 5/21:  MSSA 1 set, NGTD 1 set  Blood culture 5/23:  NGTD x2 sets  Sputum culture (+)  staph aureus  Id following  Continue IV Levaquin and Ancef    Pneumonia due to methicillin susceptible Staphylococcus aureus (MSSA)  Patient has a diagnosis of pneumonia. The cause of the pneumonia is  bacterial in etiology due to MSSA. The pneumonia is stable. The patient has the following signs/symptoms of pneumonia: cough, sputum production, and shortness of breath. The patient does not have a current oxygen requirement and the patient does not have a home oxygen requirement. I have reviewed the pertinent imaging. The following cultures have been collected: Blood cultures and Sputum culture The culture results are listed below.     Current antimicrobial regimen consists of the antibiotics listed below. Will monitor patient closely and Adjust treatment plan as follows add Tussionex q.12 hours PRN cough  Antibiotics (From admission, onward)       Start     Stop Route Frequency Ordered     05/25/24 1000   levoFLOXacin tablet 750 mg         05/29/24 0959 Oral Daily 05/24/24 1011     05/24/24 0930   ceFAZolin 2 g in dextrose 5 % in water (D5W) 50 mL IVPB (MB+)         -- IV Every 8 hours (non-standard times) 05/24/24 0820           Sputum culture positive MSSA   Respiratory panel negative   Id following  Continue IV Ancef and Levaquin, Mucinex 1200 mg b.I.d., nebulizers, supplemental O2  Order incentive spirometry            Bacteremia  Blood culture 5/21:  MSSA 1 set, NGTD 1 set  Blood culture 5/23:  NGTD x2 sets  Echo negative for vegetation  Id following  Continue IV Levaquin and cefazolin   Obtain FRANCO per ID recommendation          Acute hypoxic respiratory failure  Patient with Hypoxic Respiratory failure which is Acute.  she is not on home oxygen. Supplemental oxygen was provided and noted-      Signs/symptoms of respiratory failure include- tachypnea and increased work of breathing. Contributing diagnoses includes - COPD and Pneumonia Labs and images were reviewed. Will treat underlying causes and adjust management of respiratory failure as follows-  continue weaning supplemental O2 as tolerated  ------------------------------------------------------------   Not requiring as much supplemental O2. Desat study  normal   Order Incentive spirometer    COPD with acute lower respiratory infection  Patient's COPD is with exacerbation noted by continued dyspnea and worsening of baseline hypoxia currently due to pneumonia.  Patient is currently off COPD Pathway. Continue scheduled inhalers Antibiotics and Supplemental oxygen and monitor respiratory status closely.   -----------------------------------------   Seen pneumonia A/P    Thrush   Continue nystatin      Moderate protein-calorie malnutrition  Nutrition consulted. Most recent weight and BMI monitored-     Measurements:  Wt Readings from Last 1 Encounters:   05/23/24 49 kg (108 lb)   Body mass index is 21.09 kg/m².    Patient has been screened and assessed by RD.    Malnutrition Type:  Context: acute illness or injury  Level: moderate    Malnutrition Characteristic Summary:  Weight Loss (Malnutrition): 5% in 1 month  Energy Intake (Malnutrition): less than 75% for greater than or equal to 1 month  Subcutaneous Fat (Malnutrition): mild depletion  Muscle Mass (Malnutrition): mild depletion    Interventions/Recommendations (treatment strategy):   Encourage intake of Cardiac diet  Added Boost Plus wit all meals  Suggest daily multivitamin  Monitor intake and need for appetite stimulant  Monitor weight and labs  Collaborate with health care providers        Hypertension, essential  Chronic, controlled. Latest blood pressure and vitals reviewed-     Temp:  [98 °F (36.7 °C)-100.9 °F (38.3 °C)]   Pulse:  []   Resp:  [18-20]   BP: (113-142)/(66-83)   SpO2:  [92 %-100 %] .   Home meds for hypertension were reviewed and noted below.   Hypertension Medications               losartan (COZAAR) 50 MG tablet Take 1 tablet (50 mg total) by mouth once daily.            While in the hospital, will manage blood pressure as follows; Continue home antihypertensive regimen    Will utilize p.r.n. blood pressure medication only if patient's blood pressure greater than 160/100 and she develops  symptoms such as worsening chest pain or shortness of breath.    Normocytic anemia  Patient's anemia is currently controlled. Has not received any PRBCs to date. Etiology likely d/t  possible chronic blood loss  Current CBC reviewed-   Lab Results   Component Value Date    HGB 9.1 (L) 05/24/2024    HCT 26.7 (L) 05/24/2024     Monitor serial CBC and transfuse if patient becomes hemodynamically unstable, symptomatic or H/H drops below 7/21.  -----------------------------------------------------------   Had anemia workup 3/26/24:   Lab Results   Component Value Date    IRON 31 03/26/2024    TRANSFERRIN 185 (L) 03/26/2024    TIBC 259 03/26/2024    FESATURATED 12 (L) 03/26/2024      Component Ref Range & Units 1 mo ago   Occult Blood Negative Positive Abnormal       Anemia stable/asymptomatic.  Will need to follow up with GI when discharged.      Hyponatremia  Patient has hyponatremia which is recurrent uncontrolled,We will aim to correct the sodium by 4-6mEq in 24 hours. We will monitor sodium Daily. The hyponatremia is due to Dehydration/hypovolemia. We will treat the hyponatremia with IV fluids as follows:  isotonic NS. The patient's sodium results have been reviewed and are listed below.  Recent Labs   Lab 05/24/24  0409   *     ----------------------------------------------------------    Improving.  Continue isotonic NS    Chronic pain syndrome   Managed outpatient by pain management. Continue home pain regimen   Per : Morphine ER 30mg and Oxy IR 20 mg      Dependence on nicotine from cigarettes  Dangers of cigarette smoking were reviewed with patient in detail. Patient was Counseled for 3-10 minutes. Nicotine replacement options were discussed. Nicotine replacement was discussed- prescribed Nicotine patch PRN  Patient reports smoking cessation 1 week prior.    Gastroesophageal reflux disease without esophagitis  Chronic/stable.  Continue PPI        VTE Risk Mitigation (From admission, onward)            Ordered     enoxaparin injection 40 mg  Daily         05/21/24 1708     IP VTE HIGH RISK PATIENT  Once         05/21/24 1708     Place sequential compression device  Until discontinued         05/21/24 1708                    Discharge Planning   GRACE: 5/27/2024     Code Status: Full Code   Is the patient medically ready for discharge?:     Reason for patient still in hospital (select all that apply): Patient unstable, Patient trending condition, Treatment, and Consult recommendations  Discharge Plan A: Home with family                  Paula Pierce NP  Department of Hospital Medicine   [place]/[facility]

## 2024-05-24 NOTE — ASSESSMENT & PLAN NOTE
Patient's anemia is currently controlled. Has not received any PRBCs to date. Etiology likely d/t  possible chronic blood loss  Current CBC reviewed-   Lab Results   Component Value Date    HGB 9.1 (L) 05/24/2024    HCT 26.7 (L) 05/24/2024     Monitor serial CBC and transfuse if patient becomes hemodynamically unstable, symptomatic or H/H drops below 7/21.  -----------------------------------------------------------   Had anemia workup 3/26/24:   Lab Results   Component Value Date    IRON 31 03/26/2024    TRANSFERRIN 185 (L) 03/26/2024    TIBC 259 03/26/2024    FESATURATED 12 (L) 03/26/2024      Component Ref Range & Units 1 mo ago   Occult Blood Negative Positive Abnormal       Anemia stable/asymptomatic.  Will need to follow up with GI when discharged.

## 2024-05-24 NOTE — PLAN OF CARE
POC/Meds reviewed, pt verbalized understanding. Vitals stable. PRN medication given this shift. IVF infusing. Prn medication given. Up with x1 assist. Repositions self. Hourly/Q2hr rounding performed, safety maintained. Bed in lowest position, wheels locked, SR up x2, call light in easy reach. No  complaints at this time.

## 2024-05-24 NOTE — PROGRESS NOTES
[facility]   Department of Infectious Disease  Progress Note        PATIENT NAME: Floresita Martin  MRN: [id number]  TODAY'S DATE: 05/24/2024  ADMIT DATE: 5/21/2024  LOS: 2 days    CHIEF COMPLAINT: Altered Mental Status (Cough, fever, sob and has had history of pneumonia with admission last month )      PRINCIPLE PROBLEM: Bacteremia    INTERVAL HISTORY      05/24/2024:  CT chest confirmed bilateral pneumonia with patchy infiltrates.  Repeat blood culture 05/26/2024 so far negative.      Antibiotics (From admission, onward)      Start     Stop Route Frequency Ordered    05/24/24 0930  ceFAZolin 2 g in dextrose 5 % in water (D5W) 50 mL IVPB (MB+)         -- IV Every 8 hours (non-standard times) 05/24/24 0820    05/23/24 0900  levoFLOXacin 750 mg/150 mL IVPB 750 mg         -- IV Every 24 hours (non-standard times) 05/23/24 0747          Antifungals (From admission, onward)      Start     Stop Route Frequency Ordered    05/23/24 1215  nystatin 100,000 unit/mL suspension 500,000 Units         -- MT 4 times daily with meals & nightly 05/23/24 1107           Antivirals (From admission, onward)      None            ASSESSMENT and PLAN     1. MSSA Multifocal pneumonia involving primarily the right lung.  CT chest concerning for septic pulmonary emboli.  Continue cefazolin.  Switch levofloxacin to p.o. to complete 7 day course.     2. Complicated MSSA bacteremia. She does have cervical and lumbar spine hardware.  TTE with no vegetation.  Given concern for septic pulmonary emboli we will recommend FRANCO.  She will need minimum of 4 week course of antibiotics for complicated bacteremia      3. Alcohol abuse.  Management as per hospitalist.       4. Oral thrush.  Secondary to recent antibiotics.  HIV screen was negative in 2017.  Repeat HIV screen in progress.  Continue nystatin swish and swallow for 10-14 days    RECOMMENDATIONS:    Consult cardiology for FRANCO   Continue cefazolin and treat for at least 4  weeks.  Extend to 6 weeks if vegetation on FRANCO   Change levofloxacin to p.o. to complete 7 day course    Please send Epic secure chat with any questions.    SUBJECTIVE    Floresita Martin is a 64 y.o. female with history of chronic pain syndrome and alcohol abuse.  She is known to me from previous encounter.  She was hospitalized 03/23/2024 with Pseudomonas bacteremia associated with abnormal chest x-ray with reticulonodular infiltrates.  She received antibiotics while in the hospital.  She ultimately left the hospital AMA on 04/03/2024.  Apparently received of pneumonia by her PCP post discharge.  Also was in at an urgent care about 2 weeks ago and received a course of antibiotics.     She was visiting family in [place] and became ill with confusion.  Family brought her back to [place].  She was seen by her PCP in the office 05/21/2024 and sent to the ER for evaluation.  She had chills and rigors and shortness of breath.  Chest x-ray was abnormal with right side infiltrate.  She was admitted and commenced on levofloxacin.  Blood culture has now grown MSSA.  Antibiotics modified to cefazolin.     Antibiotics   Levofloxacin:  05/21/2024-  Cefazolin: 5/23/24-  Nystatin swish and swallow     Microbiology   Blood culture 05/21/2024:  MSSA 1/2 anaerobic bottles   Urine culture 05/21/2024: NGTD    Review of Systems  Negative except as stated above in Interval History     OBJECTIVE   Temp:  [98 °F (36.7 °C)-100.9 °F (38.3 °C)] 98.2 °F (36.8 °C)  Pulse:  [] 110  Resp:  [18-20] 18  SpO2:  [92 %-100 %] 100 %  BP: (113-142)/(66-83) 140/82  Temp:  [98 °F (36.7 °C)-100.9 °F (38.3 °C)]   Temp: 98.2 °F (36.8 °C) (05/24/24 0751)  Pulse: 110 (05/24/24 0914)  Resp: 18 (05/24/24 0914)  BP: (!) 140/82 (05/24/24 0751)  SpO2: 100 % (05/24/24 0914)    Intake/Output Summary (Last 24 hours) at 5/24/2024 1005  Last data filed at 5/24/2024 0640  Gross per 24 hour   Intake 2260.54 ml   Output 2250 ml   Net 10.54 ml       Physical  "Exam  General:  Middle-aged man lying quietly in bed.  In moderate respiratory difficulty   Respiratory:  Crackles right mid and lower zone posteriorly   CVS:  S1 and 2 heard   Abdomen:  Full, soft, nontender, no palpable organomegaly   Skin: No rash appreciated  CNS:  No focal deficits   Musculoskeletal system: No joint or bony abnormalities appreciated  HEENT: No oral thrush      VAD:  None  ISOLATION:  None    WOUNDS:  Not    Significant Labs: All pertinent labs within the past 24 hours have been reviewed.    CBC LAST 7 DAYS  Recent Labs   Lab 05/21/24  1508 05/22/24  0426 05/23/24  0357 05/24/24  0411   WBC 11.76 11.06 9.51 9.45   RBC 3.79* 3.36* 3.53* 3.13*   HGB 11.1* 9.9* 10.3* 9.1*   HCT 33.3* 29.3* 30.2* 26.7*   MCV 88 87 86 85   MCH 29.3 29.5 29.2 29.1   MCHC 33.3 33.8 34.1 34.1   RDW 17.0* 16.7* 16.8* 17.0*    284 331 380   MPV 8.9* 8.8* 8.9* 8.8*   GRAN 76.7*  9.0* 72.1  8.0* 57.3  5.4 62.9  5.9   LYMPH 15.8*  1.9 17.1*  1.9 27.1  2.6 22.0  2.1   MONO 6.5  0.8 9.6  1.1* 14.4  1.4* 13.5  1.3*   BASO 0.03 0.02 0.03 0.02   NRBC 0 0 0 0       CHEMISTRY LAST 7 DAYS  Recent Labs   Lab 05/21/24  1508 05/22/24  0426 05/23/24  0357 05/24/24  0409   * 132* 132* 134*   K 3.6 4.0 3.4* 3.1*   CL 98 100 99 102   CO2 21* 21* 22* 23   ANIONGAP 13 11 11 9   BUN 24* 11 5* 10   CREATININE 0.9 0.6 0.5 0.5   GLU 91 84 88 110   CALCIUM 10.5 9.1 9.2 8.6*   MG 2.1 1.6 1.4* 1.5*   ALBUMIN 2.7* 2.3* 2.1* 1.8*   PROT 7.0 5.9* 5.9* 5.3*   ALKPHOS 159* 142* 137* 120   ALT 17 18 15 18   AST 26 22 26 22   BILITOT 0.6 0.5 0.5 0.3       Estimated Creatinine Clearance: 83.8 mL/min (based on SCr of 0.5 mg/dL).    INFLAMMATORY/PROCAL  LAST 7 DAYS  Recent Labs   Lab 05/23/24  1152   .6*     No results found for: "ESR"  CRP   Date Value Ref Range Status   05/23/2024 218.6 (H) 0.0 - 8.2 mg/L Final   01/11/2023 5.3 0.0 - 8.2 mg/L Final       PRIOR  MICROBIOLOGY:    Susceptibility data from last 90 " days.  Collected Specimen Info Organism Cefepime Ceftriaxone Ciprofloxacin Clindamycin Erythromycin Levofloxacin Meropenem Oxacillin Penicillin PIPERACILLIN/TAZO SUSCEPTIBILITY Tetracycline Trimeth/Sulfa Vancomycin   05/21/24 Sputum, Expectorated Staphylococcus aureus   S   S  S    S  R   S  S  S   05/21/24 Blood from Peripheral, Antecubital, Right Staphylococcus aureus   S   S  S    S  R   S  S  S   03/26/24 Blood No growth after 5 days.                03/26/24 Blood No growth after 5 days.                03/24/24 Blood Pseudomonas aeruginosa  S   S    S  S    S      03/24/24 Blood Pseudomonas aeruginosa                    LAST 7 DAYS MICROBIOLOGY   Microbiology Results (last 7 days)       Procedure Component Value Units Date/Time    Urine culture [[id number]] Collected: 05/21/24 2044    Order Status: Completed Specimen: Urine Updated: 05/24/24 0825     Urine Culture, Routine No growth to date    Narrative:      Specimen Source->Urine    Culture, Respiratory with Gram Stain [[id number]]  (Abnormal)  (Susceptibility) Collected: 05/21/24 2044    Order Status: Completed Specimen: Sputum, Expectorated Updated: 05/24/24 0812     Respiratory Culture STAPHYLOCOCCUS AUREUS  Moderate       Gram Stain (Respiratory) <10 epithelial cells per low power field.     Gram Stain (Respiratory) Many WBC's     Gram Stain (Respiratory) Few Gram positive cocci    Blood culture #1 **CANNOT BE ORDERED STAT** [[id number]]  (Abnormal)  (Susceptibility) Collected: 05/21/24 1516    Order Status: Completed Specimen: Blood from Peripheral, Antecubital, Right Updated: 05/24/24 0719     Blood Culture, Routine Gram stain codey bottle: Gram positive cocci      Results called to and read back by: Mounika Schuster LPN at [facility]      05/22/2024  21:40 KS3      STAPHYLOCOCCUS AUREUS  ID consult required for Staph aureus bacteremia.      Blood culture [[id number]] Collected: 05/23/24 0925    Order Status: Completed Specimen: Blood from Peripheral, Hand, Right  Updated: 05/23/24 2117     Blood Culture, Routine No Growth to date    Blood culture [[id number]] Collected: 05/23/24 0927    Order Status: Completed Specimen: Blood from Peripheral, Antecubital, Left Updated: 05/23/24 2117     Blood Culture, Routine No Growth to date    Respiratory Infection Panel (PCR), Nasopharyngeal [[id number]] Collected: 05/23/24 1406    Order Status: Completed Specimen: Nasopharyngeal Swab Updated: 05/23/24 2042     Respiratory Infection Panel Source NP Swab     Adenovirus Not Detected     Coronavirus 229E, Common Cold Virus Not Detected     Coronavirus HKU1, Common Cold Virus Not Detected     Coronavirus NL63, Common Cold Virus Not Detected     Coronavirus OC43, Common Cold Virus Not Detected     Comment: Coronavirus strains 229E, HKU1, NL63, and OC43 can cause the common   cold   and are not associated with the respiratory disease outbreak caused   by  the COVID-19 (SARS-CoV-2 novel Coronavirus) strain.           SARS-CoV2 (COVID-19) Qualitative PCR Not Detected     Human Metapneumovirus Not Detected     Human Rhinovirus/Enterovirus Not Detected     Influenza A (subtypes H1, H1-2009,H3) Not Detected     Influenza B Not Detected     Parainfluenza Virus 1 Not Detected     Parainfluenza Virus 2 Not Detected     Parainfluenza Virus 3 Not Detected     Parainfluenza Virus 4 Not Detected     Respiratory Syncytial Virus Not Detected     Bordetella Parapertussis (UP3139) Not Detected     Bordetella pertussis (ptxP) Not Detected     Chlamydia pneumoniae Not Detected     Mycoplasma pneumoniae Not Detected     Comment: Respiratory Infection Panel testing performed by Multiplex PCR.       Blood culture #2 **CANNOT BE ORDERED STAT** [[id number]] Collected: 05/21/24 1526    Order Status: Completed Specimen: Blood from Peripheral, Antecubital, Left Updated: 05/23/24 2032     Blood Culture, Routine No Growth to date      No Growth to date      No Growth to date    Rapid Organism ID by PCR (from Blood  culture) [[id number]]  (Abnormal) Collected: 05/21/24 1516    Order Status: Completed Updated: 05/22/24 2250     Enterococcus faecalis Not Detected     Enterococcus faecium Not Detected     Listeria monocytogenes Not Detected     Staphylococcus spp. See species for ID     Staphylococcus aureus Detected     Staphylococcus epidermidis Not Detected     Staphylococcus lugdunensis Not Detected     Streptococcus species Not Detected     Streptococcus agalactiae Not Detected     Streptococcus pneumoniae Not Detected     Streptococcus pyogenes Not Detected     Acinetobacter calcoaceticus/baumannii complex Not Detected     Bacteroides fragilis Not Detected     Enterobacterales Not Detected     Enterobacter cloacae complex Not Detected     Escherichia coli Not Detected     Klebsiella aerogenes Not Detected     Klebsiella oxytoca Not Detected     Klebsiella pneumoniae group Not Detected     Proteus Not Detected     Salmonella sp Not Detected     Serratia marcescens Not Detected     Haemophilus influenzae Not Detected     Neisseria meningtidis Not Detected     Pseudomonas aeruginosa Not Detected     Stenotrophomonas maltophilia Not Detected     Candida albicans Not Detected     Candida auris Not Detected     Candida glabrata Not Detected     Candida krusei Not Detected     Candida parapsilosis Not Detected     Candida tropicalis Not Detected     Cryptococcus neoformans/gattii Not Detected     CTX-M (ESBL ) Test not applicable     IMP (Carbapenem resistant) Test not applicable     KPC resistance gene (Carbapenem resistant) Test not applicable     mcr-1  Test not applicable     mec A/C  Test not applicable     mec A/C and MREJ (MRSA) gene Not Detected     NDM (Carbapenem resistant) Test not applicable     OXA-48-like (Carbapenem resistant) Test not applicable     van A/B (VRE gene) Test not applicable     VIM (Carbapenem resistant) Test not applicable          CURRENT/PREVIOUS VISIT EKG  Results for orders placed or  performed during the hospital encounter of 01/17/23   EKG 12-lead    Collection Time: 01/17/23  2:53 PM    Narrative    Test Reason : D17.0,    Vent. Rate : 087 BPM     Atrial Rate : 087 BPM     P-R Int : 150 ms          QRS Dur : 092 ms      QT Int : 384 ms       P-R-T Axes : 052 077 037 degrees     QTc Int : 462 ms    Normal sinus rhythm  Low voltage QRS  Borderline Abnormal ECG  When compared with ECG of 13-APR-2021 09:04,  Previous ECG has undetermined rhythm, needs review  Minimal criteria for Anterior infarct are no longer Present  Confirmed by Emmanuel PEDERSON, Viet GARCIA ([id number]) on 1/20/2023 9:53:12 AM    Referred By:             Confirmed By:Viet Benitez MD      Significant Imaging: I have reviewed all relevant and available imaging results/findings within the past 24 hours.    Pola Rodriguez MD  Date of Service: 05/24/2024      This note was created using [employer] voice recognition software that occasionally misinterpreted phrases or words.

## 2024-05-24 NOTE — SUBJECTIVE & OBJECTIVE
Interval History:   Sitting up in bed, awake and alert.  Complains of persistent wet cough and reports that she feels warm currently.  Echocardiogram negative for vegetation.  T-max 100.9°.    Review of Systems   HENT:  Positive for mouth sores (started on nystatin).    Respiratory:  Positive for cough and shortness of breath.    Cardiovascular:  Positive for chest pain.   Musculoskeletal:  Positive for arthralgias.   All other systems reviewed and are negative.    Objective:     Vital Signs (Most Recent):  Temp: 98.2 °F (36.8 °C) (05/24/24 0751)  Pulse: 110 (05/24/24 0914)  Resp: 18 (05/24/24 0914)  BP: (!) 140/82 (05/24/24 0751)  SpO2: 100 % (05/24/24 0914) Vital Signs (24h Range):  Temp:  [98 °F (36.7 °C)-100.9 °F (38.3 °C)] 98.2 °F (36.8 °C)  Pulse:  [] 110  Resp:  [18-20] 18  SpO2:  [92 %-100 %] 100 %  BP: (113-142)/(66-83) 140/82     Weight: 49 kg (108 lb)  Body mass index is 21.09 kg/m².    Intake/Output Summary (Last 24 hours) at 5/24/2024 1143  Last data filed at 5/24/2024 0640  Gross per 24 hour   Intake 2260.54 ml   Output 2250 ml   Net 10.54 ml         Physical Exam  Constitutional:       General: She is not in acute distress.  HENT:      Head: Normocephalic and atraumatic.   Eyes:      Conjunctiva/sclera: Conjunctivae normal.      Pupils: Pupils are equal, round, and reactive to light.   Cardiovascular:      Rate and Rhythm: Normal rate and regular rhythm.      Pulses: Normal pulses.      Heart sounds: Normal heart sounds.   Pulmonary:      Effort: Pulmonary effort is normal. No respiratory distress.      Breath sounds: Normal breath sounds.   Abdominal:      General: Bowel sounds are normal. There is no distension.      Palpations: Abdomen is soft.      Tenderness: There is no abdominal tenderness.   Musculoskeletal:         General: No swelling. Normal range of motion.      Cervical back: Normal range of motion and neck supple.   Skin:     General: Skin is warm and dry.      Capillary Refill:  Capillary refill takes less than 2 seconds.   Neurological:      General: No focal deficit present.      Mental Status: She is alert and oriented to person, place, and time.      Cranial Nerves: No cranial nerve deficit.   Psychiatric:         Attention and Perception: Attention normal.         Mood and Affect: Mood and affect normal.             Significant Labs: All pertinent labs within the past 24 hours have been reviewed.  CBC:   Recent Labs   Lab 05/23/24  0357 05/24/24  0411   WBC 9.51 9.45   HGB 10.3* 9.1*   HCT 30.2* 26.7*    380     CMP:   Recent Labs   Lab 05/23/24  0357 05/24/24  0409   * 134*   K 3.4* 3.1*   CL 99 102   CO2 22* 23   GLU 88 110   BUN 5* 10   CREATININE 0.5 0.5   CALCIUM 9.2 8.6*   PROT 5.9* 5.3*   ALBUMIN 2.1* 1.8*   BILITOT 0.5 0.3   ALKPHOS 137* 120   AST 26 22   ALT 15 18   ANIONGAP 11 9     Magnesium:   Recent Labs   Lab 05/23/24  0357 05/24/24  0409   MG 1.4* 1.5*         Microbiology Results (last 7 days)       Procedure Component Value Units Date/Time    Urine culture [[id number]] Collected: 05/21/24 2044    Order Status: Completed Specimen: Urine Updated: 05/24/24 0825     Urine Culture, Routine No growth to date    Narrative:      Specimen Source->Urine    Culture, Respiratory with Gram Stain [[id number]]  (Abnormal)  (Susceptibility) Collected: 05/21/24 2044    Order Status: Completed Specimen: Sputum, Expectorated Updated: 05/24/24 0812     Respiratory Culture STAPHYLOCOCCUS AUREUS  Moderate       Gram Stain (Respiratory) <10 epithelial cells per low power field.     Gram Stain (Respiratory) Many WBC's     Gram Stain (Respiratory) Few Gram positive cocci    Blood culture #1 **CANNOT BE ORDERED STAT** [[id number]]  (Abnormal)  (Susceptibility) Collected: 05/21/24 1516    Order Status: Completed Specimen: Blood from Peripheral, Antecubital, Right Updated: 05/24/24 0719     Blood Culture, Routine Gram stain codey bottle: Gram positive cocci      Results called to and  read back by: Mounika Schuster LPN at [facility]      05/22/2024  21:40 KS3      STAPHYLOCOCCUS AUREUS  ID consult required for Staph aureus bacteremia.      Blood culture [[id number]] Collected: 05/23/24 0925    Order Status: Completed Specimen: Blood from Peripheral, Hand, Right Updated: 05/23/24 2117     Blood Culture, Routine No Growth to date    Blood culture [[id number]] Collected: 05/23/24 0927    Order Status: Completed Specimen: Blood from Peripheral, Antecubital, Left Updated: 05/23/24 2117     Blood Culture, Routine No Growth to date    Respiratory Infection Panel (PCR), Nasopharyngeal [[id number]] Collected: 05/23/24 1406    Order Status: Completed Specimen: Nasopharyngeal Swab Updated: 05/23/24 2042     Respiratory Infection Panel Source NP Swab     Adenovirus Not Detected     Coronavirus 229E, Common Cold Virus Not Detected     Coronavirus HKU1, Common Cold Virus Not Detected     Coronavirus NL63, Common Cold Virus Not Detected     Coronavirus OC43, Common Cold Virus Not Detected     Comment: Coronavirus strains 229E, HKU1, NL63, and OC43 can cause the common   cold   and are not associated with the respiratory disease outbreak caused   by  the COVID-19 (SARS-CoV-2 novel Coronavirus) strain.           SARS-CoV2 (COVID-19) Qualitative PCR Not Detected     Human Metapneumovirus Not Detected     Human Rhinovirus/Enterovirus Not Detected     Influenza A (subtypes H1, H1-2009,H3) Not Detected     Influenza B Not Detected     Parainfluenza Virus 1 Not Detected     Parainfluenza Virus 2 Not Detected     Parainfluenza Virus 3 Not Detected     Parainfluenza Virus 4 Not Detected     Respiratory Syncytial Virus Not Detected     Bordetella Parapertussis (LO9821) Not Detected     Bordetella pertussis (ptxP) Not Detected     Chlamydia pneumoniae Not Detected     Mycoplasma pneumoniae Not Detected     Comment: Respiratory Infection Panel testing performed by Multiplex PCR.       Blood culture #2 **CANNOT BE ORDERED STAT**  [[id number]] Collected: 05/21/24 1526    Order Status: Completed Specimen: Blood from Peripheral, Antecubital, Left Updated: 05/23/24 2032     Blood Culture, Routine No Growth to date      No Growth to date      No Growth to date    Rapid Organism ID by PCR (from Blood culture) [[id number]]  (Abnormal) Collected: 05/21/24 1516    Order Status: Completed Updated: 05/22/24 2256     Enterococcus faecalis Not Detected     Enterococcus faecium Not Detected     Listeria monocytogenes Not Detected     Staphylococcus spp. See species for ID     Staphylococcus aureus Detected     Staphylococcus epidermidis Not Detected     Staphylococcus lugdunensis Not Detected     Streptococcus species Not Detected     Streptococcus agalactiae Not Detected     Streptococcus pneumoniae Not Detected     Streptococcus pyogenes Not Detected     Acinetobacter calcoaceticus/baumannii complex Not Detected     Bacteroides fragilis Not Detected     Enterobacterales Not Detected     Enterobacter cloacae complex Not Detected     Escherichia coli Not Detected     Klebsiella aerogenes Not Detected     Klebsiella oxytoca Not Detected     Klebsiella pneumoniae group Not Detected     Proteus Not Detected     Salmonella sp Not Detected     Serratia marcescens Not Detected     Haemophilus influenzae Not Detected     Neisseria meningtidis Not Detected     Pseudomonas aeruginosa Not Detected     Stenotrophomonas maltophilia Not Detected     Candida albicans Not Detected     Candida auris Not Detected     Candida glabrata Not Detected     Candida krusei Not Detected     Candida parapsilosis Not Detected     Candida tropicalis Not Detected     Cryptococcus neoformans/gattii Not Detected     CTX-M (ESBL ) Test not applicable     IMP (Carbapenem resistant) Test not applicable     KPC resistance gene (Carbapenem resistant) Test not applicable     mcr-1  Test not applicable     mec A/C  Test not applicable     mec A/C and MREJ (MRSA) gene Not Detected      NDM (Carbapenem resistant) Test not applicable     OXA-48-like (Carbapenem resistant) Test not applicable     van A/B (VRE gene) Test not applicable     VIM (Carbapenem resistant) Test not applicable          Echo    Left Ventricle: The left ventricle is normal in size. Normal wall   thickness. There is concentric hypertrophy. Moderate global hypokinesis   present. There is mildly reduced systolic function with a visually   estimated ejection fraction of 40 - 45%. Biplane (2D) method of discs   ejection fraction is 44%. There is normal diastolic function.    Right Ventricle: Normal right ventricular cavity size. Wall thickness   is normal. Systolic function is normal.    IVC/SVC: Normal venous pressure at 3 mmHg.  CT Chest Without Contrast  Narrative: EXAMINATION:  CT CHEST WITHOUT CONTRAST    CLINICAL HISTORY:  Pneumonia, unresolved;Pneumonia on chest x-ray.  Evaluate for evidence of septic emboli;    TECHNIQUE:  Low dose axial images, sagittal and coronal reformations were obtained from the thoracic inlet to the lung bases. Contrast was not administered.    COMPARISON:  None.    FINDINGS:  There are diffuse regions of consolidation seen within the right greater than left pulmonary parenchyma.  There is no pneumothorax.  There are bilateral pleural effusions greater on the right than left.  There is mediastinal lymph adenopathy with the largest node seen in the pre tracheal region measuring 1.6 cm in short axis.  There is no pericardial effusion.    Visualized portions of the superior abdomen are unremarkable.  The osseous structures show degenerative change.  Impression: There is diffuse consolidation seen in right greater than left lungs concerning for pneumonia.  There are bilateral pleural effusions greater on the right than left and mediastinal lymphadenopathy thought to be related to pneumonia.    Electronically signed by: Gabriela Gan MD  Date:    05/23/2024  Time:    14:35        Significant Imaging: I  have reviewed all pertinent imaging results/findings within the past 24 hours.

## 2024-05-25 PROBLEM — J15.211 PNEUMONIA DUE TO METHICILLIN SUSCEPTIBLE STAPHYLOCOCCUS AUREUS (MSSA): Chronic | Status: ACTIVE | Noted: 2024-05-21

## 2024-05-25 LAB
ALBUMIN SERPL BCP-MCNC: 1.9 G/DL (ref 3.5–5.2)
ALP SERPL-CCNC: 138 U/L (ref 55–135)
ALT SERPL W/O P-5'-P-CCNC: 24 U/L (ref 10–44)
ANION GAP SERPL CALC-SCNC: 14 MMOL/L (ref 8–16)
AST SERPL-CCNC: 32 U/L (ref 10–40)
BACTERIA UR CULT: NO GROWTH
BASOPHILS # BLD AUTO: 0.03 K/UL (ref 0–0.2)
BASOPHILS NFR BLD: 0.2 % (ref 0–1.9)
BILIRUB SERPL-MCNC: 0.3 MG/DL (ref 0.1–1)
BUN SERPL-MCNC: 5 MG/DL (ref 8–23)
CALCIUM SERPL-MCNC: 9 MG/DL (ref 8.7–10.5)
CHLORIDE SERPL-SCNC: 99 MMOL/L (ref 95–110)
CO2 SERPL-SCNC: 25 MMOL/L (ref 23–29)
CREAT SERPL-MCNC: 0.6 MG/DL (ref 0.5–1.4)
DIFFERENTIAL METHOD BLD: ABNORMAL
EOSINOPHIL # BLD AUTO: 0.1 K/UL (ref 0–0.5)
EOSINOPHIL NFR BLD: 0.3 % (ref 0–8)
ERYTHROCYTE [DISTWIDTH] IN BLOOD BY AUTOMATED COUNT: 17.2 % (ref 11.5–14.5)
EST. GFR  (NO RACE VARIABLE): >60 ML/MIN/1.73 M^2
GLUCOSE SERPL-MCNC: 102 MG/DL (ref 70–110)
HBV SURFACE AB SER QL IA: POSITIVE
HBV SURFACE AB SERPL IA-ACNC: >1000 MIU/ML
HBV SURFACE AG SERPL QL IA: NEGATIVE
HCT VFR BLD AUTO: 29.6 % (ref 37–48.5)
HCV IGG SERPL QL IA: NEGATIVE
HGB BLD-MCNC: 9.9 G/DL (ref 12–16)
HIV 1+2 AB+HIV1 P24 AG SERPL QL IA: NEGATIVE
IMM GRANULOCYTES # BLD AUTO: 0.12 K/UL (ref 0–0.04)
IMM GRANULOCYTES NFR BLD AUTO: 0.8 % (ref 0–0.5)
LYMPHOCYTES # BLD AUTO: 2 K/UL (ref 1–4.8)
LYMPHOCYTES NFR BLD: 13.2 % (ref 18–48)
MAGNESIUM SERPL-MCNC: 1.7 MG/DL (ref 1.6–2.6)
MCH RBC QN AUTO: 29.2 PG (ref 27–31)
MCHC RBC AUTO-ENTMCNC: 33.4 G/DL (ref 32–36)
MCV RBC AUTO: 87 FL (ref 82–98)
MONOCYTES # BLD AUTO: 1 K/UL (ref 0.3–1)
MONOCYTES NFR BLD: 6.6 % (ref 4–15)
NEUTROPHILS # BLD AUTO: 11.8 K/UL (ref 1.8–7.7)
NEUTROPHILS NFR BLD: 78.9 % (ref 38–73)
NRBC BLD-RTO: 0 /100 WBC
PLATELET # BLD AUTO: 444 K/UL (ref 150–450)
PMV BLD AUTO: 8.2 FL (ref 9.2–12.9)
POTASSIUM SERPL-SCNC: 4.6 MMOL/L (ref 3.5–5.1)
PROT SERPL-MCNC: 5.9 G/DL (ref 6–8.4)
RBC # BLD AUTO: 3.39 M/UL (ref 4–5.4)
SODIUM SERPL-SCNC: 138 MMOL/L (ref 136–145)
WBC # BLD AUTO: 14.95 K/UL (ref 3.9–12.7)

## 2024-05-25 PROCEDURE — 25000242 PHARM REV CODE 250 ALT 637 W/ HCPCS: Performed by: STUDENT IN AN ORGANIZED HEALTH CARE EDUCATION/TRAINING PROGRAM

## 2024-05-25 PROCEDURE — 25000003 PHARM REV CODE 250: Performed by: NURSE PRACTITIONER

## 2024-05-25 PROCEDURE — 25000003 PHARM REV CODE 250

## 2024-05-25 PROCEDURE — S4991 NICOTINE PATCH NONLEGEND: HCPCS

## 2024-05-25 PROCEDURE — 94799 UNLISTED PULMONARY SVC/PX: CPT

## 2024-05-25 PROCEDURE — 87040 BLOOD CULTURE FOR BACTERIA: CPT | Performed by: INTERNAL MEDICINE

## 2024-05-25 PROCEDURE — 11000001 HC ACUTE MED/SURG PRIVATE ROOM

## 2024-05-25 PROCEDURE — 94640 AIRWAY INHALATION TREATMENT: CPT

## 2024-05-25 PROCEDURE — 63600175 PHARM REV CODE 636 W HCPCS: Performed by: NURSE PRACTITIONER

## 2024-05-25 PROCEDURE — 94761 N-INVAS EAR/PLS OXIMETRY MLT: CPT

## 2024-05-25 PROCEDURE — 25000242 PHARM REV CODE 250 ALT 637 W/ HCPCS

## 2024-05-25 PROCEDURE — 80053 COMPREHEN METABOLIC PANEL: CPT | Performed by: NURSE PRACTITIONER

## 2024-05-25 PROCEDURE — 36410 VNPNXR 3YR/> PHY/QHP DX/THER: CPT

## 2024-05-25 PROCEDURE — 36415 COLL VENOUS BLD VENIPUNCTURE: CPT | Performed by: NURSE PRACTITIONER

## 2024-05-25 PROCEDURE — 85025 COMPLETE CBC W/AUTO DIFF WBC: CPT | Performed by: NURSE PRACTITIONER

## 2024-05-25 PROCEDURE — 99231 SBSQ HOSP IP/OBS SF/LOW 25: CPT | Mod: ,,, | Performed by: INTERNAL MEDICINE

## 2024-05-25 PROCEDURE — C1751 CATH, INF, PER/CENT/MIDLINE: HCPCS

## 2024-05-25 PROCEDURE — 63600175 PHARM REV CODE 636 W HCPCS

## 2024-05-25 PROCEDURE — 36415 COLL VENOUS BLD VENIPUNCTURE: CPT | Performed by: INTERNAL MEDICINE

## 2024-05-25 PROCEDURE — 83735 ASSAY OF MAGNESIUM: CPT | Performed by: NURSE PRACTITIONER

## 2024-05-25 PROCEDURE — 25000003 PHARM REV CODE 250: Performed by: INTERNAL MEDICINE

## 2024-05-25 RX ORDER — SODIUM CHLORIDE 0.9 % (FLUSH) 0.9 %
10 SYRINGE (ML) INJECTION EVERY 6 HOURS
Status: DISCONTINUED | OUTPATIENT
Start: 2024-05-26 | End: 2024-05-28 | Stop reason: HOSPADM

## 2024-05-25 RX ORDER — SODIUM CHLORIDE 0.9 % (FLUSH) 0.9 %
10 SYRINGE (ML) INJECTION
Status: DISCONTINUED | OUTPATIENT
Start: 2024-05-25 | End: 2024-05-28 | Stop reason: HOSPADM

## 2024-05-25 RX ADMIN — NYSTATIN 500000 UNITS: 100000 SUSPENSION ORAL at 08:05

## 2024-05-25 RX ADMIN — LACTULOSE 15 G: 20 SOLUTION ORAL at 08:05

## 2024-05-25 RX ADMIN — PREGABALIN 75 MG: 75 CAPSULE ORAL at 08:05

## 2024-05-25 RX ADMIN — GUAIFENESIN 1200 MG: 600 TABLET, EXTENDED RELEASE ORAL at 08:05

## 2024-05-25 RX ADMIN — OXYCODONE 20 MG: 5 TABLET ORAL at 09:05

## 2024-05-25 RX ADMIN — MORPHINE SULFATE 30 MG: 15 TABLET, EXTENDED RELEASE ORAL at 08:05

## 2024-05-25 RX ADMIN — Medication 400 MG: at 08:05

## 2024-05-25 RX ADMIN — LEVALBUTEROL 1.25 MG: 1.25 SOLUTION, CONCENTRATE RESPIRATORY (INHALATION) at 01:05

## 2024-05-25 RX ADMIN — NYSTATIN 500000 UNITS: 100000 SUSPENSION ORAL at 05:05

## 2024-05-25 RX ADMIN — BUDESONIDE INHALATION 1 MG: 0.5 SUSPENSION RESPIRATORY (INHALATION) at 07:05

## 2024-05-25 RX ADMIN — PREGABALIN 75 MG: 75 CAPSULE ORAL at 02:05

## 2024-05-25 RX ADMIN — LEVALBUTEROL 1.25 MG: 1.25 SOLUTION, CONCENTRATE RESPIRATORY (INHALATION) at 07:05

## 2024-05-25 RX ADMIN — CYCLOBENZAPRINE HYDROCHLORIDE 10 MG: 5 TABLET, FILM COATED ORAL at 08:05

## 2024-05-25 RX ADMIN — CEFAZOLIN 2 G: 2 INJECTION, POWDER, FOR SOLUTION INTRAMUSCULAR; INTRAVENOUS at 05:05

## 2024-05-25 RX ADMIN — ARFORMOTEROL TARTRATE 15 MCG: 15 SOLUTION RESPIRATORY (INHALATION) at 07:05

## 2024-05-25 RX ADMIN — OXYCODONE 20 MG: 5 TABLET ORAL at 03:05

## 2024-05-25 RX ADMIN — PANTOPRAZOLE SODIUM 40 MG: 40 TABLET, DELAYED RELEASE ORAL at 08:05

## 2024-05-25 RX ADMIN — LOSARTAN POTASSIUM 50 MG: 25 TABLET, FILM COATED ORAL at 08:05

## 2024-05-25 RX ADMIN — NYSTATIN 500000 UNITS: 100000 SUSPENSION ORAL at 12:05

## 2024-05-25 RX ADMIN — ATORVASTATIN CALCIUM 40 MG: 40 TABLET, FILM COATED ORAL at 08:05

## 2024-05-25 RX ADMIN — SODIUM CHLORIDE: 9 INJECTION, SOLUTION INTRAVENOUS at 12:05

## 2024-05-25 RX ADMIN — CEFAZOLIN 2 G: 2 INJECTION, POWDER, FOR SOLUTION INTRAMUSCULAR; INTRAVENOUS at 09:05

## 2024-05-25 RX ADMIN — Medication 1 PATCH: at 08:05

## 2024-05-25 RX ADMIN — AMITRIPTYLINE HYDROCHLORIDE 25 MG: 25 TABLET, FILM COATED ORAL at 08:05

## 2024-05-25 RX ADMIN — ENOXAPARIN SODIUM 40 MG: 40 INJECTION SUBCUTANEOUS at 05:05

## 2024-05-25 RX ADMIN — CETIRIZINE HYDROCHLORIDE 10 MG: 10 TABLET, FILM COATED ORAL at 08:05

## 2024-05-25 RX ADMIN — LEVOFLOXACIN 750 MG: 250 TABLET, FILM COATED ORAL at 09:05

## 2024-05-25 RX ADMIN — CEFAZOLIN 2 G: 2 INJECTION, POWDER, FOR SOLUTION INTRAMUSCULAR; INTRAVENOUS at 01:05

## 2024-05-25 NOTE — CARE UPDATE
05/25/24 0710   Patient Assessment/Suction   Level of Consciousness (AVPU) alert   Respiratory Effort Normal   Expansion/Accessory Muscles/Retractions expansion symmetric   BELEN Breath Sounds clear   LLL Breath Sounds diminished   RUL Breath Sounds diminished   RML Breath Sounds diminished   RLL Breath Sounds diminished   Rhythm/Pattern, Respiratory pattern regular   Cough Frequency infrequent   Cough Type good;productive   Secretions Amount moderate   Secretions Color yellow   Secretions Characteristics thick   PRE-TX-O2   Device (Oxygen Therapy) room air   SpO2 (!) 92 %   Pulse Oximetry Type Intermittent   $ Pulse Oximetry - Multiple Charge Pulse Oximetry - Multiple   Pulse (!) 115   Resp 18   Aerosol Therapy   $ Aerosol Therapy Charges Aerosol Treatment  (Xopenex and Pulmicort)   Daily Review of Necessity (SVN) completed   Respiratory Treatment Status (SVN) given   Treatment Route (SVN) mask   Patient Position semi-Lee's   Signs of Intolerance (SVN) none   Breath Sounds Post-Respiratory Treatment   Throughout All Fields Post-Treatment LLL;RLL   Throughout All Fields Post-Treatment aeration increased   LLL Post-Treatment crackles   RLL Post-Treatment crackles   Post-treatment Heart Rate (beats/min) 111   Post-treatment Resp Rate (breaths/min) 18   Incentive Spirometer   $ Incentive Spirometer Charges done with encouragement   Incentive Spirometer Predicted Level (mL) 1360   Administration (IS) proper technique demonstrated   Number of Repetitions (IS) 5   Level Incentive Spirometer (mL) 750   Patient Tolerance (IS) good

## 2024-05-25 NOTE — PROGRESS NOTES
[facility]   Department of Infectious Disease  Progress Note        PATIENT NAME: Floresita Martin  MRN: [id number]  TODAY'S DATE: 05/25/2024  ADMIT DATE: 5/21/2024  LOS: 3 days    CHIEF COMPLAINT: Altered Mental Status (Cough, fever, sob and has had history of pneumonia with admission last month )      PRINCIPLE PROBLEM: Sepsis    INTERVAL HISTORY      05/24/2024:  CT chest confirmed bilateral pneumonia with patchy infiltrates.  Repeat blood culture 05/26/2024 so far negative.      05/25/2024 Dr Contreras    Looks and feels well.   + cough with clear sputum.   Tmax is improving 101--100.9--100.4  No pain over her spine. Admits to picking on her skin. Denies IVDU (drug screen positive for opiates)    Antibiotics (From admission, onward)      Start     Stop Route Frequency Ordered    05/25/24 1000  levoFLOXacin tablet 750 mg         05/29/24 0959 Oral Daily 05/24/24 1011    05/24/24 0930  ceFAZolin 2 g in dextrose 5 % in water (D5W) 50 mL IVPB (MB+)         -- IV Every 8 hours (non-standard times) 05/24/24 0820          Antifungals (From admission, onward)      Start     Stop Route Frequency Ordered    05/23/24 1215  nystatin 100,000 unit/mL suspension 500,000 Units         -- MT 4 times daily with meals & nightly 05/23/24 1107           Antivirals (From admission, onward)      None            ASSESSMENT and PLAN     1. MSSA Multifocal pneumonia involving primarily the right lung.  CT chest concerning for septic pulmonary emboli.  Continue cefazolin. Levofloxacin  p.o. to complete 7 day course.     2. Complicated MSSA bacteremia. She does have cervical and lumbar spine hardware(no new pain).  TTE with no vegetation.  Given concern for septic pulmonary emboli we will recommend FRANCO.  She will need minimum of 4 week course of antibiotics for complicated bacteremia      3. Alcohol abuse.  Management as per hospitalist.  NO withdrawals so far     4. Oral thrush.  Secondary to recent antibiotic use.  HIV  screen negative.  Repeat HIV screen in progress.  Continue nystatin swish and swallow for 10-14 days    RECOMMENDATIONS:    Consult cardiology for FRANCO   Continue cefazolin and treat for at least 4 weeks.  Extend to 6 weeks if vegetation on FRANCO   Levofloxacin 750 mg p.o. d3/7    Please send Epic secure chat with any questions.    SUBJECTIVE    Floresita Martin is a 64 y.o. female with history of chronic pain syndrome and alcohol abuse.  She is known to me from previous encounter.  She was hospitalized 03/23/2024 with Pseudomonas bacteremia associated with abnormal chest x-ray with reticulonodular infiltrates.  She received antibiotics while in the hospital.  She ultimately left the hospital AMA on 04/03/2024.  Apparently received of pneumonia by her PCP post discharge.  Also was in at an urgent care about 2 weeks ago and received a course of antibiotics.     She was visiting family in [place] and became ill with confusion.  Family brought her back to [place].  She was seen by her PCP in the office 05/21/2024 and sent to the ER for evaluation.  She had chills and rigors and shortness of breath.  Chest x-ray was abnormal with right side infiltrate.  She was admitted and commenced on levofloxacin.  Blood culture has now grown MSSA.  Antibiotics modified to cefazolin.     Antibiotics   Levofloxacin:  05/21/2024-  Cefazolin: 5/23/24-  Nystatin swish and swallow     Microbiology   Blood culture 05/21/2024:  MSSA 1/2 anaerobic bottles   Urine culture 05/21/2024: NGTD    Review of Systems  Negative except as stated above in Interval History     OBJECTIVE   Temp:  [98 °F (36.7 °C)-100.4 °F (38 °C)] 98 °F (36.7 °C)  Pulse:  [] 102  Resp:  [18-20] 18  SpO2:  [90 %-100 %] 90 %  BP: (101-140)/(58-83) 101/67  Temp:  [98 °F (36.7 °C)-100.4 °F (38 °C)]   Temp: 98 °F (36.7 °C) (05/25/24 0325)  Pulse: 102 (05/25/24 0325)  Resp: 18 (05/25/24 0325)  BP: 101/67 (05/25/24 0325)  SpO2: (!) 90 % (05/25/24 2526)    Intake/Output  "Summary (Last 24 hours) at 5/25/2024 0709  Last data filed at 5/25/2024 0549  Gross per 24 hour   Intake 2747.11 ml   Output 950 ml   Net 1797.11 ml       Physical Exam  General:  Middle-aged man lying quietly in bed.  In moderate respiratory difficulty   Respiratory:  Crackles right mid and lower zone posteriorly   CVS:  S1 and 2 heard   Abdomen:  Full, soft, nontender, no palpable organomegaly   Skin: No rash appreciated  CNS:  No focal deficits   Musculoskeletal system: No joint or bony abnormalities appreciated  HEENT: No oral thrush      VAD:  None  ISOLATION:  None    WOUNDS:  Not    Significant Labs: All pertinent labs within the past 24 hours have been reviewed.    CBC LAST 7 DAYS  Recent Labs   Lab 05/21/24  1508 05/22/24  0426 05/23/24  0357 05/24/24  0411   WBC 11.76 11.06 9.51 9.45   RBC 3.79* 3.36* 3.53* 3.13*   HGB 11.1* 9.9* 10.3* 9.1*   HCT 33.3* 29.3* 30.2* 26.7*   MCV 88 87 86 85   MCH 29.3 29.5 29.2 29.1   MCHC 33.3 33.8 34.1 34.1   RDW 17.0* 16.7* 16.8* 17.0*    284 331 380   MPV 8.9* 8.8* 8.9* 8.8*   GRAN 76.7*  9.0* 72.1  8.0* 57.3  5.4 62.9  5.9   LYMPH 15.8*  1.9 17.1*  1.9 27.1  2.6 22.0  2.1   MONO 6.5  0.8 9.6  1.1* 14.4  1.4* 13.5  1.3*   BASO 0.03 0.02 0.03 0.02   NRBC 0 0 0 0       CHEMISTRY LAST 7 DAYS  Recent Labs   Lab 05/21/24  1508 05/22/24  0426 05/23/24  0357 05/24/24  0409   * 132* 132* 134*   K 3.6 4.0 3.4* 3.1*   CL 98 100 99 102   CO2 21* 21* 22* 23   ANIONGAP 13 11 11 9   BUN 24* 11 5* 10   CREATININE 0.9 0.6 0.5 0.5   GLU 91 84 88 110   CALCIUM 10.5 9.1 9.2 8.6*   MG 2.1 1.6 1.4* 1.5*   ALBUMIN 2.7* 2.3* 2.1* 1.8*   PROT 7.0 5.9* 5.9* 5.3*   ALKPHOS 159* 142* 137* 120   ALT 17 18 15 18   AST 26 22 26 22   BILITOT 0.6 0.5 0.5 0.3       Estimated Creatinine Clearance: 83.8 mL/min (based on SCr of 0.5 mg/dL).    INFLAMMATORY/PROCAL  LAST 7 DAYS  Recent Labs   Lab 05/23/24  1152   .6*     No results found for: "ESR"  CRP   Date Value Ref Range " Status   05/23/2024 218.6 (H) 0.0 - 8.2 mg/L Final   01/11/2023 5.3 0.0 - 8.2 mg/L Final       PRIOR  MICROBIOLOGY:    Susceptibility data from last 90 days.  Collected Specimen Info Organism Cefepime Ceftriaxone Ciprofloxacin Clindamycin Erythromycin Levofloxacin Meropenem Oxacillin Penicillin PIPERACILLIN/TAZO SUSCEPTIBILITY Tetracycline Trimeth/Sulfa Vancomycin   05/21/24 Sputum, Expectorated Staphylococcus aureus   S   S  S    S  R   S  S  S   05/21/24 Blood from Peripheral, Antecubital, Right Staphylococcus aureus   S   S  S    S  R   S  S  S   03/26/24 Blood No growth after 5 days.                03/26/24 Blood No growth after 5 days.                03/24/24 Blood Pseudomonas aeruginosa  S   S    S  S    S      03/24/24 Blood Pseudomonas aeruginosa                    LAST 7 DAYS MICROBIOLOGY   Microbiology Results (last 7 days)       Procedure Component Value Units Date/Time    Urine culture [[id number]] Collected: 05/21/24 2044    Order Status: Completed Specimen: Urine Updated: 05/25/24 0641     Urine Culture, Routine No growth    Narrative:      Specimen Source->Urine    Blood culture [[id number]] Collected: 05/25/24 0422    Order Status: Sent Specimen: Blood from Peripheral, Antecubital, Right     Blood culture #2 **CANNOT BE ORDERED STAT** [[id number]] Collected: 05/21/24 1526    Order Status: Completed Specimen: Blood from Peripheral, Antecubital, Left Updated: 05/24/24 2032     Blood Culture, Routine No Growth to date      No Growth to date      No Growth to date      No Growth to date    Blood culture [[id number]] Collected: 05/23/24 0925    Order Status: Completed Specimen: Blood from Peripheral, Hand, Right Updated: 05/24/24 1432     Blood Culture, Routine No Growth to date      No Growth to date    Blood culture [[id number]] Collected: 05/23/24 0927    Order Status: Completed Specimen: Blood from Peripheral, Antecubital, Left Updated: 05/24/24 1432     Blood Culture, Routine No Growth to date       No Growth to date    Culture, Respiratory with Gram Stain [[id number]]  (Abnormal)  (Susceptibility) Collected: 05/21/24 2044    Order Status: Completed Specimen: Sputum, Expectorated Updated: 05/24/24 0812     Respiratory Culture STAPHYLOCOCCUS AUREUS  Moderate       Gram Stain (Respiratory) <10 epithelial cells per low power field.     Gram Stain (Respiratory) Many WBC's     Gram Stain (Respiratory) Few Gram positive cocci    Blood culture #1 **CANNOT BE ORDERED STAT** [[id number]]  (Abnormal)  (Susceptibility) Collected: 05/21/24 1516    Order Status: Completed Specimen: Blood from Peripheral, Antecubital, Right Updated: 05/24/24 0719     Blood Culture, Routine Gram stain codey bottle: Gram positive cocci      Results called to and read back by: Mounika Schuster LPN at [facility]      05/22/2024  21:40 KS3      STAPHYLOCOCCUS AUREUS  ID consult required for Staph aureus bacteremia.      Respiratory Infection Panel (PCR), Nasopharyngeal [[id number]] Collected: 05/23/24 1406    Order Status: Completed Specimen: Nasopharyngeal Swab Updated: 05/23/24 2042     Respiratory Infection Panel Source NP Swab     Adenovirus Not Detected     Coronavirus 229E, Common Cold Virus Not Detected     Coronavirus HKU1, Common Cold Virus Not Detected     Coronavirus NL63, Common Cold Virus Not Detected     Coronavirus OC43, Common Cold Virus Not Detected     Comment: Coronavirus strains 229E, HKU1, NL63, and OC43 can cause the common   cold   and are not associated with the respiratory disease outbreak caused   by  the COVID-19 (SARS-CoV-2 novel Coronavirus) strain.           SARS-CoV2 (COVID-19) Qualitative PCR Not Detected     Human Metapneumovirus Not Detected     Human Rhinovirus/Enterovirus Not Detected     Influenza A (subtypes H1, H1-2009,H3) Not Detected     Influenza B Not Detected     Parainfluenza Virus 1 Not Detected     Parainfluenza Virus 2 Not Detected     Parainfluenza Virus 3 Not Detected     Parainfluenza Virus 4 Not Detected      Respiratory Syncytial Virus Not Detected     Bordetella Parapertussis (TQ2528) Not Detected     Bordetella pertussis (ptxP) Not Detected     Chlamydia pneumoniae Not Detected     Mycoplasma pneumoniae Not Detected     Comment: Respiratory Infection Panel testing performed by Multiplex PCR.       Rapid Organism ID by PCR (from Blood culture) [[id number]]  (Abnormal) Collected: 05/21/24 1516    Order Status: Completed Updated: 05/22/24 2256     Enterococcus faecalis Not Detected     Enterococcus faecium Not Detected     Listeria monocytogenes Not Detected     Staphylococcus spp. See species for ID     Staphylococcus aureus Detected     Staphylococcus epidermidis Not Detected     Staphylococcus lugdunensis Not Detected     Streptococcus species Not Detected     Streptococcus agalactiae Not Detected     Streptococcus pneumoniae Not Detected     Streptococcus pyogenes Not Detected     Acinetobacter calcoaceticus/baumannii complex Not Detected     Bacteroides fragilis Not Detected     Enterobacterales Not Detected     Enterobacter cloacae complex Not Detected     Escherichia coli Not Detected     Klebsiella aerogenes Not Detected     Klebsiella oxytoca Not Detected     Klebsiella pneumoniae group Not Detected     Proteus Not Detected     Salmonella sp Not Detected     Serratia marcescens Not Detected     Haemophilus influenzae Not Detected     Neisseria meningtidis Not Detected     Pseudomonas aeruginosa Not Detected     Stenotrophomonas maltophilia Not Detected     Candida albicans Not Detected     Candida auris Not Detected     Candida glabrata Not Detected     Candida krusei Not Detected     Candida parapsilosis Not Detected     Candida tropicalis Not Detected     Cryptococcus neoformans/gattii Not Detected     CTX-M (ESBL ) Test not applicable     IMP (Carbapenem resistant) Test not applicable     KPC resistance gene (Carbapenem resistant) Test not applicable     mcr-1  Test not applicable     mec A/C   Test not applicable     mec A/C and MREJ (MRSA) gene Not Detected     NDM (Carbapenem resistant) Test not applicable     OXA-48-like (Carbapenem resistant) Test not applicable     van A/B (VRE gene) Test not applicable     VIM (Carbapenem resistant) Test not applicable          CURRENT/PREVIOUS VISIT EKG  Results for orders placed or performed during the hospital encounter of 05/21/24   EKG 12-lead    Collection Time: 05/21/24  3:30 PM   Result Value Ref Range    QRS Duration 96 ms    [facility] QTC Calculation 459 ms    Narrative    Test Reason : R06.02,    Vent. Rate : 089 BPM     Atrial Rate : 089 BPM     P-R Int : 172 ms          QRS Dur : 096 ms      QT Int : 378 ms       P-R-T Axes : 029 085 038 degrees     QTc Int : 459 ms    Normal sinus rhythm  Low voltage QRS  Borderline Abnormal ECG  When compared with ECG of 17-JAN-2023 14:53,  No significant change was found  Confirmed by Trevor Salvador MD ([id number]) on 5/24/2024 12:09:59 PM    Referred By: MORGAN PARADA           Confirmed By:Trevor Salvador MD      Significant Imaging: I have reviewed all relevant and available imaging results/findings within the past 24 hours.    Cleopatra Contreras MD  Date of Service: 05/25/2024      This note was created using M Modal voice recognition software that occasionally misinterpreted phrases or words.

## 2024-05-25 NOTE — ASSESSMENT & PLAN NOTE
Patient has hyponatremia which is recurrent controlled,We will aim to correct the sodium by 4-6mEq in 24 hours. We will monitor sodium Daily. The hyponatremia is due to Dehydration/hypovolemia. We will treat the hyponatremia with IV fluids as follows:  isotonic NS. The patient's sodium results have been reviewed and are listed below.  Recent Labs   Lab 05/25/24  0827        ----------------------------------------------------------  Continue isotonic NS

## 2024-05-25 NOTE — SUBJECTIVE & OBJECTIVE
Interval History:   Sitting up in bed, awake and alert.   Reports having acute right chest wall pain with coughing and has since resolved. Still coughing up moderate amount of sputum.  T-max 100.4.  Repeat blood cultures negative so far.  FRANCO pending.    Review of Systems   HENT:  Positive for mouth sores (started on nystatin for thrush).    Respiratory:  Positive for cough (productive) and shortness of breath.    Cardiovascular:  Positive for chest pain (chest wall, with coughing).   Musculoskeletal:  Positive for arthralgias.   All other systems reviewed and are negative.    Objective:     Vital Signs (Most Recent):  Temp: 98.6 °F (37 °C) (05/25/24 1133)  Pulse: (!) 115 (05/25/24 1133)  Resp: 17 (05/25/24 1133)  BP: (!) 99/59 (05/25/24 1133)  SpO2: 96 % (05/25/24 1133) Vital Signs (24h Range):  Temp:  [98 °F (36.7 °C)-100.4 °F (38 °C)] 98.6 °F (37 °C)  Pulse:  [102-118] 115  Resp:  [17-20] 17  SpO2:  [90 %-100 %] 96 %  BP: ()/(58-81) 99/59     Weight: 49 kg (108 lb)  Body mass index is 21.09 kg/m².    Intake/Output Summary (Last 24 hours) at 5/25/2024 1223  Last data filed at 5/25/2024 0800  Gross per 24 hour   Intake 2307.11 ml   Output 1650 ml   Net 657.11 ml         Physical Exam  Constitutional:       General: She is not in acute distress.  HENT:      Head: Normocephalic and atraumatic.   Eyes:      Conjunctiva/sclera: Conjunctivae normal.      Pupils: Pupils are equal, round, and reactive to light.   Cardiovascular:      Rate and Rhythm: Normal rate and regular rhythm.      Pulses: Normal pulses.      Heart sounds: Normal heart sounds.   Pulmonary:      Effort: Pulmonary effort is normal. No respiratory distress.      Breath sounds: Normal breath sounds.   Abdominal:      General: Bowel sounds are normal. There is no distension.      Palpations: Abdomen is soft.      Tenderness: There is no abdominal tenderness.   Musculoskeletal:         General: No swelling. Normal range of motion.      Cervical back:  Normal range of motion and neck supple.   Skin:     General: Skin is warm and dry.      Capillary Refill: Capillary refill takes less than 2 seconds.   Neurological:      General: No focal deficit present.      Mental Status: She is alert and oriented to person, place, and time.      Cranial Nerves: No cranial nerve deficit.   Psychiatric:         Attention and Perception: Attention normal.         Mood and Affect: Mood and affect normal.             Significant Labs: All pertinent labs within the past 24 hours have been reviewed.  CBC:   Recent Labs   Lab 05/24/24  0411 05/25/24  0827   WBC 9.45 14.95*   HGB 9.1* 9.9*   HCT 26.7* 29.6*    444     CMP:   Recent Labs   Lab 05/24/24  0409 05/25/24  0827   * 138   K 3.1* 4.6    99   CO2 23 25    102   BUN 10 5*   CREATININE 0.5 0.6   CALCIUM 8.6* 9.0   PROT 5.3* 5.9*   ALBUMIN 1.8* 1.9*   BILITOT 0.3 0.3   ALKPHOS 120 138*   AST 22 32   ALT 18 24   ANIONGAP 9 14     Magnesium:   Recent Labs   Lab 05/24/24  0409 05/25/24  0827   MG 1.5* 1.7         Microbiology Results (last 7 days)       Procedure Component Value Units Date/Time    Blood culture [[id number]] Collected: 05/25/24 0422    Order Status: Sent Specimen: Blood from Peripheral, Antecubital, Right Updated: 05/25/24 0856    Urine culture [[id number]] Collected: 05/21/24 2044    Order Status: Completed Specimen: Urine Updated: 05/25/24 0641     Urine Culture, Routine No growth    Narrative:      Specimen Source->Urine    Blood culture #2 **CANNOT BE ORDERED STAT** [[id number]] Collected: 05/21/24 1526    Order Status: Completed Specimen: Blood from Peripheral, Antecubital, Left Updated: 05/24/24 2032     Blood Culture, Routine No Growth to date      No Growth to date      No Growth to date      No Growth to date    Blood culture [[id number]] Collected: 05/23/24 0925    Order Status: Completed Specimen: Blood from Peripheral, Hand, Right Updated: 05/24/24 1432     Blood Culture, Routine  No Growth to date      No Growth to date    Blood culture [[id number]] Collected: 05/23/24 0927    Order Status: Completed Specimen: Blood from Peripheral, Antecubital, Left Updated: 05/24/24 1432     Blood Culture, Routine No Growth to date      No Growth to date    Culture, Respiratory with Gram Stain [[id number]]  (Abnormal)  (Susceptibility) Collected: 05/21/24 2044    Order Status: Completed Specimen: Sputum, Expectorated Updated: 05/24/24 0812     Respiratory Culture STAPHYLOCOCCUS AUREUS  Moderate       Gram Stain (Respiratory) <10 epithelial cells per low power field.     Gram Stain (Respiratory) Many WBC's     Gram Stain (Respiratory) Few Gram positive cocci    Blood culture #1 **CANNOT BE ORDERED STAT** [[id number]]  (Abnormal)  (Susceptibility) Collected: 05/21/24 1516    Order Status: Completed Specimen: Blood from Peripheral, Antecubital, Right Updated: 05/24/24 0719     Blood Culture, Routine Gram stain codey bottle: Gram positive cocci      Results called to and read back by: Mounika Schuster LPN at [facility]      05/22/2024  21:40 KS3      STAPHYLOCOCCUS AUREUS  ID consult required for Staph aureus bacteremia.      Respiratory Infection Panel (PCR), Nasopharyngeal [[id number]] Collected: 05/23/24 1406    Order Status: Completed Specimen: Nasopharyngeal Swab Updated: 05/23/24 2042     Respiratory Infection Panel Source NP Swab     Adenovirus Not Detected     Coronavirus 229E, Common Cold Virus Not Detected     Coronavirus HKU1, Common Cold Virus Not Detected     Coronavirus NL63, Common Cold Virus Not Detected     Coronavirus OC43, Common Cold Virus Not Detected     Comment: Coronavirus strains 229E, HKU1, NL63, and OC43 can cause the common   cold   and are not associated with the respiratory disease outbreak caused   by  the COVID-19 (SARS-CoV-2 novel Coronavirus) strain.           SARS-CoV2 (COVID-19) Qualitative PCR Not Detected     Human Metapneumovirus Not Detected     Human Rhinovirus/Enterovirus Not  Detected     Influenza A (subtypes H1, H1-2009,H3) Not Detected     Influenza B Not Detected     Parainfluenza Virus 1 Not Detected     Parainfluenza Virus 2 Not Detected     Parainfluenza Virus 3 Not Detected     Parainfluenza Virus 4 Not Detected     Respiratory Syncytial Virus Not Detected     Bordetella Parapertussis ([id number]) Not Detected     Bordetella pertussis (ptxP) Not Detected     Chlamydia pneumoniae Not Detected     Mycoplasma pneumoniae Not Detected     Comment: Respiratory Infection Panel testing performed by Multiplex PCR.       Rapid Organism ID by PCR (from Blood culture) [[id number]]  (Abnormal) Collected: 05/21/24 1516    Order Status: Completed Updated: 05/22/24 2253     Enterococcus faecalis Not Detected     Enterococcus faecium Not Detected     Listeria monocytogenes Not Detected     Staphylococcus spp. See species for ID     Staphylococcus aureus Detected     Staphylococcus epidermidis Not Detected     Staphylococcus lugdunensis Not Detected     Streptococcus species Not Detected     Streptococcus agalactiae Not Detected     Streptococcus pneumoniae Not Detected     Streptococcus pyogenes Not Detected     Acinetobacter calcoaceticus/baumannii complex Not Detected     Bacteroides fragilis Not Detected     Enterobacterales Not Detected     Enterobacter cloacae complex Not Detected     Escherichia coli Not Detected     Klebsiella aerogenes Not Detected     Klebsiella oxytoca Not Detected     Klebsiella pneumoniae group Not Detected     Proteus Not Detected     Salmonella sp Not Detected     Serratia marcescens Not Detected     Haemophilus influenzae Not Detected     Neisseria meningtidis Not Detected     Pseudomonas aeruginosa Not Detected     Stenotrophomonas maltophilia Not Detected     Candida albicans Not Detected     Candida auris Not Detected     Candida glabrata Not Detected     Candida krusei Not Detected     Candida parapsilosis Not Detected     Candida tropicalis Not Detected      Cryptococcus neoformans/gattii Not Detected     CTX-M (ESBL ) Test not applicable     IMP (Carbapenem resistant) Test not applicable     KPC resistance gene (Carbapenem resistant) Test not applicable     mcr-1  Test not applicable     mec A/C  Test not applicable     mec A/C and MREJ (MRSA) gene Not Detected     NDM (Carbapenem resistant) Test not applicable     OXA-48-like (Carbapenem resistant) Test not applicable     van A/B (VRE gene) Test not applicable     VIM (Carbapenem resistant) Test not applicable                Significant Imaging:  None today

## 2024-05-25 NOTE — ASSESSMENT & PLAN NOTE
Patient has a diagnosis of pneumonia. The cause of the pneumonia is bacterial in etiology due to MSSA. The pneumonia is stable. The patient has the following signs/symptoms of pneumonia: cough, sputum production, and shortness of breath. The patient does not have a current oxygen requirement and the patient does not have a home oxygen requirement. I have reviewed the pertinent imaging. The following cultures have been collected: Blood cultures and Sputum culture The culture results are listed below.     Current antimicrobial regimen consists of the antibiotics listed below. Will monitor patient closely and Adjust treatment plan as follows add Tussionex q.12 hours PRN cough  Antibiotics (From admission, onward)       Start     Stop Route Frequency Ordered     05/25/24 1000   levoFLOXacin tablet 750 mg         05/29/24 0959 Oral Daily 05/24/24 1011     05/24/24 0930   ceFAZolin 2 g in dextrose 5 % in water (D5W) 50 mL IVPB (MB+)         -- IV Every 8 hours (non-standard times) 05/24/24 0820          Sputum culture positive MSSA  Respiratory panel negative  Id following  Continue IV Ancef and p.o. Levaquin, Mucinex 1200 mg b.I.d., nebulizers, supplemental O2, and incentive spirometry

## 2024-05-25 NOTE — ASSESSMENT & PLAN NOTE
Patient's anemia is currently controlled. Has not received any PRBCs to date. Etiology likely d/t  possible chronic blood loss  Current CBC reviewed-   Lab Results   Component Value Date    HGB 9.9 (L) 05/25/2024    HCT 29.6 (L) 05/25/2024     Monitor serial CBC and transfuse if patient becomes hemodynamically unstable, symptomatic or H/H drops below 7/21.  -----------------------------------------------------------   Had anemia workup 3/26/24:   Lab Results   Component Value Date    IRON 31 03/26/2024    TRANSFERRIN 185 (L) 03/26/2024    TIBC 259 03/26/2024    FESATURATED 12 (L) 03/26/2024      Component Ref Range & Units 1 mo ago   Occult Blood Negative Positive Abnormal       Anemia stable/asymptomatic.  Will need to follow up with GI when discharged.

## 2024-05-25 NOTE — ASSESSMENT & PLAN NOTE
Chronic, controlled. Latest blood pressure and vitals reviewed-     Temp:  [98 °F (36.7 °C)-100.4 °F (38 °C)]   Pulse:  [102-118]   Resp:  [17-20]   BP: ()/(58-81)   SpO2:  [90 %-100 %] .   Home meds for hypertension were reviewed and noted below.   Hypertension Medications               losartan (COZAAR) 50 MG tablet Take 1 tablet (50 mg total) by mouth once daily.            While in the hospital, will manage blood pressure as follows; Continue home antihypertensive regimen    Will utilize p.r.n. blood pressure medication only if patient's blood pressure greater than 160/100 and she develops symptoms such as worsening chest pain or shortness of breath.  -----------------------------------------------   Having mild hypotension   Stop losartan   Continue IV fluid hydration to maintain perfusion

## 2024-05-25 NOTE — ASSESSMENT & PLAN NOTE
"This patient does have evidence of infective focus  My overall impression is sepsis.  Source: Respiratory and bacteremia  Antibiotics given-   Antibiotics (72h ago, onward)      Start     Stop Route Frequency Ordered    05/25/24 1000  levoFLOXacin tablet 750 mg         05/29/24 0959 Oral Daily 05/24/24 1011    05/24/24 0930  ceFAZolin 2 g in dextrose 5 % in water (D5W) 50 mL IVPB (MB+)         -- IV Every 8 hours (non-standard times) 05/24/24 0820          Latest lactate reviewed-  No results for input(s): "LACTATE", "POCLAC" in the last 72 hours.    Organ dysfunction indicated by  none    Fluid challenge Not needed - patient is not hypotensive      Post- resuscitation assessment No - Post resuscitation assessment not needed       Will Not start Pressors- Levophed for MAP of 65  Source control achieved by:  IV antibiotics  ---------------------------------------------------------  , .6  Urine culture negative  Blood culture 5/21:  MSSA 1 set, NGTD 1 set  Blood culture 5/23:  NGTD x2 sets  Blood cultures today are pending  Sputum culture (+)  staph aureus  Echocardiogram negative for vegetation  Id following  Continue po Levaquin and IV Ancef  FRANCO pending  "

## 2024-05-25 NOTE — ASSESSMENT & PLAN NOTE
Patient with Hypoxic Respiratory failure which is Acute.  she is not on home oxygen. Supplemental oxygen was provided and noted-      Signs/symptoms of respiratory failure include- tachypnea and increased work of breathing. Contributing diagnoses includes - COPD and Pneumonia Labs and images were reviewed. Will treat underlying causes and adjust management of respiratory failure as follows-  continue weaning supplemental O2 as tolerated  ------------------------------------------------------------   No currently requiring supplemental O2. Desat study normal   Continue Incentive spirometer

## 2024-05-25 NOTE — PROGRESS NOTES
"[facility] Medicine  Progress Note    Patient Name: Floresita Martin  MRN: [id number]  Patient Class: IP- Inpatient   Admission Date: 5/21/2024  Length of Stay: 3 days  Attending Physician: Michael Navarrete MD  Primary Care Provider: Toni Hughes MD        Subjective:     Principal Problem:Sepsis        HPI:  Floresita Martin is a 64 year old female with a previous medical history of HTN, HLD, GERD, opioid dependence with current use, metabolic encephalopathy, pneumonia, and COPD who was sent from PCP office for fever, cough, and shortness of breath. Patient is poor historian and unable to exactly recall when symptoms began but endorses fever, cough, exertional shortness of breath and loss of voice over the past two weeks. Reports that her  could provide more information but he was not at bedside. ED note reports that patient endorsed intermittent confusion. Initial ED workup included a chest xray that reads: "Findings concerning for multi lobar pneumonia in the right lung with possible involvement of the left lung base as well". CBC shows stable anemia, magnesium and lactic acid normal, cmp unremarkable. Patient vitals are stable and she is maintaining an oxygen saturation of 96% on room air. She is having productive cough with moderate amount of brown/yellow sputum. Most recent hospital admission from 3/26 was for pseudomonas bacteremia. Patient initiated on Levaquin IV in ED which has been continued. Upon admit patient is AAOx4 and does not appear in any respiratory distress. She is positive for a voice change which she reports has worsened over the last two weeks and has "lost her voice" but at baseline it is raspy. Patient admitted by hospital medicine for further evaluation and management.    Overview/Hospital Course:  Ms Martin was admitted after her PCP sent her for fever, cough, and SOB. Patient was found to have intermittent confusion w/ multilobar pneumonia in " her right lung w/ left lung base involvement. She was started on Levaquin with breathing treatments with SPO2 of 96% on RA in ED. Last admission 3/26 for Pseudomonas Bacteremia. Patient placed on supplemental Oxygen with last documented fever on 5/22 with blood, sputum, and urine cultures pending. Today, blood culture #1  and sputum + for staph aureus. Cefazolin 2 gram given with ID consult. On rounds patient with c/o mouth pain, thrush present started on nystatin. Will await additional recommendations from ID.    Interval History:   Sitting up in bed, awake and alert.   Reports having acute right chest wall pain with coughing and has since resolved. Still coughing up moderate amount of sputum.  T-max 100.4.  Repeat blood cultures negative so far.  FRANCO pending.    Review of Systems   HENT:  Positive for mouth sores (started on nystatin for thrush).    Respiratory:  Positive for cough (productive) and shortness of breath.    Cardiovascular:  Positive for chest pain (chest wall, with coughing).   Musculoskeletal:  Positive for arthralgias.   All other systems reviewed and are negative.    Objective:     Vital Signs (Most Recent):  Temp: 98.6 °F (37 °C) (05/25/24 1133)  Pulse: (!) 115 (05/25/24 1133)  Resp: 17 (05/25/24 1133)  BP: (!) 99/59 (05/25/24 1133)  SpO2: 96 % (05/25/24 1133) Vital Signs (24h Range):  Temp:  [98 °F (36.7 °C)-100.4 °F (38 °C)] 98.6 °F (37 °C)  Pulse:  [102-118] 115  Resp:  [17-20] 17  SpO2:  [90 %-100 %] 96 %  BP: ()/(58-81) 99/59     Weight: 49 kg (108 lb)  Body mass index is 21.09 kg/m².    Intake/Output Summary (Last 24 hours) at 5/25/2024 1223  Last data filed at 5/25/2024 0800  Gross per 24 hour   Intake 2307.11 ml   Output 1650 ml   Net 657.11 ml         Physical Exam  Constitutional:       General: She is not in acute distress.  HENT:      Head: Normocephalic and atraumatic.   Eyes:      Conjunctiva/sclera: Conjunctivae normal.      Pupils: Pupils are equal, round, and reactive to  light.   Cardiovascular:      Rate and Rhythm: Normal rate and regular rhythm.      Pulses: Normal pulses.      Heart sounds: Normal heart sounds.   Pulmonary:      Effort: Pulmonary effort is normal. No respiratory distress.      Breath sounds: Normal breath sounds.   Abdominal:      General: Bowel sounds are normal. There is no distension.      Palpations: Abdomen is soft.      Tenderness: There is no abdominal tenderness.   Musculoskeletal:         General: No swelling. Normal range of motion.      Cervical back: Normal range of motion and neck supple.   Skin:     General: Skin is warm and dry.      Capillary Refill: Capillary refill takes less than 2 seconds.   Neurological:      General: No focal deficit present.      Mental Status: She is alert and oriented to person, place, and time.      Cranial Nerves: No cranial nerve deficit.   Psychiatric:         Attention and Perception: Attention normal.         Mood and Affect: Mood and affect normal.             Significant Labs: All pertinent labs within the past 24 hours have been reviewed.  CBC:   Recent Labs   Lab 05/24/24  0411 05/25/24  0827   WBC 9.45 14.95*   HGB 9.1* 9.9*   HCT 26.7* 29.6*    444     CMP:   Recent Labs   Lab 05/24/24  0409 05/25/24  0827   * 138   K 3.1* 4.6    99   CO2 23 25    102   BUN 10 5*   CREATININE 0.5 0.6   CALCIUM 8.6* 9.0   PROT 5.3* 5.9*   ALBUMIN 1.8* 1.9*   BILITOT 0.3 0.3   ALKPHOS 120 138*   AST 22 32   ALT 18 24   ANIONGAP 9 14     Magnesium:   Recent Labs   Lab 05/24/24  0409 05/25/24  0827   MG 1.5* 1.7         Microbiology Results (last 7 days)       Procedure Component Value Units Date/Time    Blood culture [[id number]] Collected: 05/25/24 0422    Order Status: Sent Specimen: Blood from Peripheral, Antecubital, Right Updated: 05/25/24 0856    Urine culture [[id number]] Collected: 05/21/24 2044    Order Status: Completed Specimen: Urine Updated: 05/25/24 0641     Urine Culture, Routine No  growth    Narrative:      Specimen Source->Urine    Blood culture #2 **CANNOT BE ORDERED STAT** [[id number]] Collected: 05/21/24 1526    Order Status: Completed Specimen: Blood from Peripheral, Antecubital, Left Updated: 05/24/24 2032     Blood Culture, Routine No Growth to date      No Growth to date      No Growth to date      No Growth to date    Blood culture [[id number]] Collected: 05/23/24 0925    Order Status: Completed Specimen: Blood from Peripheral, Hand, Right Updated: 05/24/24 1432     Blood Culture, Routine No Growth to date      No Growth to date    Blood culture [[id number]] Collected: 05/23/24 0927    Order Status: Completed Specimen: Blood from Peripheral, Antecubital, Left Updated: 05/24/24 1432     Blood Culture, Routine No Growth to date      No Growth to date    Culture, Respiratory with Gram Stain [[id number]]  (Abnormal)  (Susceptibility) Collected: 05/21/24 2044    Order Status: Completed Specimen: Sputum, Expectorated Updated: 05/24/24 0812     Respiratory Culture STAPHYLOCOCCUS AUREUS  Moderate       Gram Stain (Respiratory) <10 epithelial cells per low power field.     Gram Stain (Respiratory) Many WBC's     Gram Stain (Respiratory) Few Gram positive cocci    Blood culture #1 **CANNOT BE ORDERED STAT** [[id number]]  (Abnormal)  (Susceptibility) Collected: 05/21/24 1516    Order Status: Completed Specimen: Blood from Peripheral, Antecubital, Right Updated: 05/24/24 0719     Blood Culture, Routine Gram stain codey bottle: Gram positive cocci      Results called to and read back by: Mounika Schuster LPN at [facility]      05/22/2024  21:40 KS3      STAPHYLOCOCCUS AUREUS  ID consult required for Staph aureus bacteremia.      Respiratory Infection Panel (PCR), Nasopharyngeal [[id number]] Collected: 05/23/24 1406    Order Status: Completed Specimen: Nasopharyngeal Swab Updated: 05/23/24 2042     Respiratory Infection Panel Source NP Swab     Adenovirus Not Detected     Coronavirus 229E, Common Cold Virus  Not Detected     Coronavirus HKU1, Common Cold Virus Not Detected     Coronavirus NL63, Common Cold Virus Not Detected     Coronavirus OC43, Common Cold Virus Not Detected     Comment: Coronavirus strains 229E, HKU1, NL63, and OC43 can cause the common   cold   and are not associated with the respiratory disease outbreak caused   by  the COVID-19 (SARS-CoV-2 novel Coronavirus) strain.           SARS-CoV2 (COVID-19) Qualitative PCR Not Detected     Human Metapneumovirus Not Detected     Human Rhinovirus/Enterovirus Not Detected     Influenza A (subtypes H1, H1-2009,H3) Not Detected     Influenza B Not Detected     Parainfluenza Virus 1 Not Detected     Parainfluenza Virus 2 Not Detected     Parainfluenza Virus 3 Not Detected     Parainfluenza Virus 4 Not Detected     Respiratory Syncytial Virus Not Detected     Bordetella Parapertussis (AO2187) Not Detected     Bordetella pertussis (ptxP) Not Detected     Chlamydia pneumoniae Not Detected     Mycoplasma pneumoniae Not Detected     Comment: Respiratory Infection Panel testing performed by Multiplex PCR.       Rapid Organism ID by PCR (from Blood culture) [[id number]]  (Abnormal) Collected: 05/21/24 1516    Order Status: Completed Updated: 05/22/24 2256     Enterococcus faecalis Not Detected     Enterococcus faecium Not Detected     Listeria monocytogenes Not Detected     Staphylococcus spp. See species for ID     Staphylococcus aureus Detected     Staphylococcus epidermidis Not Detected     Staphylococcus lugdunensis Not Detected     Streptococcus species Not Detected     Streptococcus agalactiae Not Detected     Streptococcus pneumoniae Not Detected     Streptococcus pyogenes Not Detected     Acinetobacter calcoaceticus/baumannii complex Not Detected     Bacteroides fragilis Not Detected     Enterobacterales Not Detected     Enterobacter cloacae complex Not Detected     Escherichia coli Not Detected     Klebsiella aerogenes Not Detected     Klebsiella oxytoca Not  "Detected     Klebsiella pneumoniae group Not Detected     Proteus Not Detected     Salmonella sp Not Detected     Serratia marcescens Not Detected     Haemophilus influenzae Not Detected     Neisseria meningtidis Not Detected     Pseudomonas aeruginosa Not Detected     Stenotrophomonas maltophilia Not Detected     Candida albicans Not Detected     Candida auris Not Detected     Candida glabrata Not Detected     Candida krusei Not Detected     Candida parapsilosis Not Detected     Candida tropicalis Not Detected     Cryptococcus neoformans/gattii Not Detected     CTX-M (ESBL ) Test not applicable     IMP (Carbapenem resistant) Test not applicable     KPC resistance gene (Carbapenem resistant) Test not applicable     mcr-1  Test not applicable     mec A/C  Test not applicable     mec A/C and MREJ (MRSA) gene Not Detected     NDM (Carbapenem resistant) Test not applicable     OXA-48-like (Carbapenem resistant) Test not applicable     van A/B (VRE gene) Test not applicable     VIM (Carbapenem resistant) Test not applicable                Significant Imaging:  None today    Assessment/Plan:      * Sepsis  This patient does have evidence of infective focus  My overall impression is sepsis.  Source: Respiratory and bacteremia  Antibiotics given-   Antibiotics (72h ago, onward)      Start     Stop Route Frequency Ordered    05/25/24 1000  levoFLOXacin tablet 750 mg         05/29/24 0959 Oral Daily 05/24/24 1011    05/24/24 0930  ceFAZolin 2 g in dextrose 5 % in water (D5W) 50 mL IVPB (MB+)         -- IV Every 8 hours (non-standard times) 05/24/24 0820          Latest lactate reviewed-  No results for input(s): "LACTATE", "POCLAC" in the last 72 hours.    Organ dysfunction indicated by  none    Fluid challenge Not needed - patient is not hypotensive      Post- resuscitation assessment No - Post resuscitation assessment not needed       Will Not start Pressors- Levophed for MAP of 65  Source control achieved by:  IV " antibiotics  ---------------------------------------------------------  , .6  Urine culture negative  Blood culture 5/21:  MSSA 1 set, NGTD 1 set  Blood culture 5/23:  NGTD x2 sets  Blood cultures today are pending  Sputum culture (+)  staph aureus  Echocardiogram negative for vegetation  Id following  Continue po Levaquin and IV Ancef  FRANCO pending    Pneumonia due to methicillin susceptible Staphylococcus aureus (MSSA)  Patient has a diagnosis of pneumonia. The cause of the pneumonia is bacterial in etiology due to MSSA. The pneumonia is stable. The patient has the following signs/symptoms of pneumonia: cough, sputum production, and shortness of breath. The patient does not have a current oxygen requirement and the patient does not have a home oxygen requirement. I have reviewed the pertinent imaging. The following cultures have been collected: Blood cultures and Sputum culture The culture results are listed below.     Current antimicrobial regimen consists of the antibiotics listed below. Will monitor patient closely and Adjust treatment plan as follows add Tussionex q.12 hours PRN cough  Antibiotics (From admission, onward)       Start     Stop Route Frequency Ordered     05/25/24 1000   levoFLOXacin tablet 750 mg         05/29/24 0959 Oral Daily 05/24/24 1011     05/24/24 0930   ceFAZolin 2 g in dextrose 5 % in water (D5W) 50 mL IVPB (MB+)         -- IV Every 8 hours (non-standard times) 05/24/24 0820          Sputum culture positive MSSA  Respiratory panel negative  Id following  Continue IV Ancef and p.o. Levaquin, Mucinex 1200 mg b.I.d., nebulizers, supplemental O2, and incentive spirometry        Bacteremia  Blood culture 5/21:  MSSA 1 set, NGTD 1 set  Blood culture 5/23:  NGTD x2 sets  Blood cultures collected today are pending  Echo negative for vegetation  Id following  Continue  p.o. Levaquin and IV cefazolin  Obtain FRANCO per ID recommendation: Pending        Acute hypoxic respiratory  failure  Patient with Hypoxic Respiratory failure which is Acute.  she is not on home oxygen. Supplemental oxygen was provided and noted-      Signs/symptoms of respiratory failure include- tachypnea and increased work of breathing. Contributing diagnoses includes - COPD and Pneumonia Labs and images were reviewed. Will treat underlying causes and adjust management of respiratory failure as follows-  continue weaning supplemental O2 as tolerated  ------------------------------------------------------------   No currently requiring supplemental O2. Desat study normal   Continue Incentive spirometer    COPD with acute lower respiratory infection  Patient's COPD is with exacerbation noted by continued dyspnea and worsening of baseline hypoxia currently due to pneumonia.  Patient is currently off COPD Pathway. Continue scheduled inhalers Antibiotics and Supplemental oxygen and monitor respiratory status closely.   -----------------------------------------   Seen pneumonia A/P    Thrush   Stable. Continue nystatin      Moderate protein-calorie malnutrition  Nutrition consulted. Most recent weight and BMI monitored-     Measurements:  Wt Readings from Last 1 Encounters:   05/23/24 49 kg (108 lb)   Body mass index is 21.09 kg/m².    Patient has been screened and assessed by RD.    Malnutrition Type:  Context: acute illness or injury  Level: moderate    Malnutrition Characteristic Summary:  Weight Loss (Malnutrition): 5% in 1 month  Energy Intake (Malnutrition): less than 75% for greater than or equal to 1 month  Subcutaneous Fat (Malnutrition): mild depletion  Muscle Mass (Malnutrition): mild depletion    Interventions/Recommendations (treatment strategy):   Encourage intake of Cardiac diet  Added Boost Plus wit all meals  Suggest daily multivitamin  Monitor intake and need for appetite stimulant  Monitor weight and labs  Collaborate with health care providers        Hypertension, essential  Chronic, controlled. Latest  blood pressure and vitals reviewed-     Temp:  [98 °F (36.7 °C)-100.4 °F (38 °C)]   Pulse:  [102-118]   Resp:  [17-20]   BP: ()/(58-81)   SpO2:  [90 %-100 %] .   Home meds for hypertension were reviewed and noted below.   Hypertension Medications               losartan (COZAAR) 50 MG tablet Take 1 tablet (50 mg total) by mouth once daily.            While in the hospital, will manage blood pressure as follows; Continue home antihypertensive regimen    Will utilize p.r.n. blood pressure medication only if patient's blood pressure greater than 160/100 and she develops symptoms such as worsening chest pain or shortness of breath.  -----------------------------------------------   Having mild hypotension   Stop losartan   Continue IV fluid hydration to maintain perfusion    Normocytic anemia  Patient's anemia is currently controlled. Has not received any PRBCs to date. Etiology likely d/t  possible chronic blood loss  Current CBC reviewed-   Lab Results   Component Value Date    HGB 9.9 (L) 05/25/2024    HCT 29.6 (L) 05/25/2024     Monitor serial CBC and transfuse if patient becomes hemodynamically unstable, symptomatic or H/H drops below 7/21.  -----------------------------------------------------------   Had anemia workup 3/26/24:   Lab Results   Component Value Date    IRON 31 03/26/2024    TRANSFERRIN 185 (L) 03/26/2024    TIBC 259 03/26/2024    FESATURATED 12 (L) 03/26/2024      Component Ref Range & Units 1 mo ago   Occult Blood Negative Positive Abnormal       Anemia stable/asymptomatic.  Will need to follow up with GI when discharged.      Hyponatremia  Patient has hyponatremia which is recurrent controlled,We will aim to correct the sodium by 4-6mEq in 24 hours. We will monitor sodium Daily. The hyponatremia is due to Dehydration/hypovolemia. We will treat the hyponatremia with IV fluids as follows:  isotonic NS. The patient's sodium results have been reviewed and are listed below.  Recent Labs   Lab  05/25/24  0827        ----------------------------------------------------------  Continue isotonic NS      Chronic pain syndrome  Managed outpatient by pain management. Continue home pain regimen  Per : Morphine ER 30mg and Oxy IR 20 mg      Dependence on nicotine from cigarettes  Dangers of cigarette smoking were reviewed with patient in detail. Patient was Counseled for 3-10 minutes. Nicotine replacement options were discussed. Nicotine replacement was discussed- prescribed Nicotine patch PRN  Patient reports smoking cessation 1 week prior.    Gastroesophageal reflux disease without esophagitis  Chronic/stable.  Continue PPI        VTE Risk Mitigation (From admission, onward)           Ordered     enoxaparin injection 40 mg  Daily         05/21/24 1708     IP VTE HIGH RISK PATIENT  Once         05/21/24 1708     Place sequential compression device  Until discontinued         05/21/24 1708                    Discharge Planning   GRACE: 5/27/2024     Code Status: Full Code   Is the patient medically ready for discharge?:     Reason for patient still in hospital (select all that apply): Patient unstable, Patient trending condition, and Treatment  Discharge Plan A: Home with family                  Paula Pierce NP  Department of Hospital Medicine   [facility]/Surg

## 2024-05-25 NOTE — ASSESSMENT & PLAN NOTE
Blood culture 5/21:  MSSA 1 set, NGTD 1 set  Blood culture 5/23:  NGTD x2 sets  Blood cultures collected today are pending  Echo negative for vegetation  Id following  Continue  p.o. Levaquin and IV cefazolin  Obtain FRANCO per ID recommendation: Pending

## 2024-05-26 LAB
ALBUMIN SERPL BCP-MCNC: 1.7 G/DL (ref 3.5–5.2)
ALP SERPL-CCNC: 115 U/L (ref 55–135)
ALT SERPL W/O P-5'-P-CCNC: 24 U/L (ref 10–44)
ANION GAP SERPL CALC-SCNC: 10 MMOL/L (ref 8–16)
AST SERPL-CCNC: 49 U/L (ref 10–40)
BACTERIA BLD CULT: NORMAL
BASOPHILS # BLD AUTO: 0.02 K/UL (ref 0–0.2)
BASOPHILS NFR BLD: 0.1 % (ref 0–1.9)
BILIRUB SERPL-MCNC: 0.2 MG/DL (ref 0.1–1)
BUN SERPL-MCNC: 5 MG/DL (ref 8–23)
CALCIUM SERPL-MCNC: 8.5 MG/DL (ref 8.7–10.5)
CHLORIDE SERPL-SCNC: 101 MMOL/L (ref 95–110)
CO2 SERPL-SCNC: 23 MMOL/L (ref 23–29)
CREAT SERPL-MCNC: 0.5 MG/DL (ref 0.5–1.4)
CRP SERPL-MCNC: 189.7 MG/L (ref 0–8.2)
DIFFERENTIAL METHOD BLD: ABNORMAL
EOSINOPHIL # BLD AUTO: 0.1 K/UL (ref 0–0.5)
EOSINOPHIL NFR BLD: 0.4 % (ref 0–8)
ERYTHROCYTE [DISTWIDTH] IN BLOOD BY AUTOMATED COUNT: 17.2 % (ref 11.5–14.5)
ERYTHROCYTE [SEDIMENTATION RATE] IN BLOOD BY WESTERGREN METHOD: 138 MM/HR (ref 0–20)
EST. GFR  (NO RACE VARIABLE): >60 ML/MIN/1.73 M^2
GLUCOSE SERPL-MCNC: 109 MG/DL (ref 70–110)
HCT VFR BLD AUTO: 24.5 % (ref 37–48.5)
HGB BLD-MCNC: 8.3 G/DL (ref 12–16)
IMM GRANULOCYTES # BLD AUTO: 0.12 K/UL (ref 0–0.04)
IMM GRANULOCYTES NFR BLD AUTO: 0.9 % (ref 0–0.5)
LYMPHOCYTES # BLD AUTO: 1.6 K/UL (ref 1–4.8)
LYMPHOCYTES NFR BLD: 11.7 % (ref 18–48)
MCH RBC QN AUTO: 29.1 PG (ref 27–31)
MCHC RBC AUTO-ENTMCNC: 33.9 G/DL (ref 32–36)
MCV RBC AUTO: 86 FL (ref 82–98)
MONOCYTES # BLD AUTO: 0.8 K/UL (ref 0.3–1)
MONOCYTES NFR BLD: 5.6 % (ref 4–15)
NEUTROPHILS # BLD AUTO: 11.1 K/UL (ref 1.8–7.7)
NEUTROPHILS NFR BLD: 81.3 % (ref 38–73)
NRBC BLD-RTO: 0 /100 WBC
PLATELET # BLD AUTO: 460 K/UL (ref 150–450)
PMV BLD AUTO: 8.4 FL (ref 9.2–12.9)
POTASSIUM SERPL-SCNC: 3.9 MMOL/L (ref 3.5–5.1)
PROCALCITONIN SERPL IA-MCNC: 0.12 NG/ML (ref 0–0.5)
PROT SERPL-MCNC: 5.2 G/DL (ref 6–8.4)
RBC # BLD AUTO: 2.85 M/UL (ref 4–5.4)
SODIUM SERPL-SCNC: 134 MMOL/L (ref 136–145)
WBC # BLD AUTO: 13.68 K/UL (ref 3.9–12.7)

## 2024-05-26 PROCEDURE — 63600175 PHARM REV CODE 636 W HCPCS: Performed by: NURSE PRACTITIONER

## 2024-05-26 PROCEDURE — 94799 UNLISTED PULMONARY SVC/PX: CPT | Mod: XB

## 2024-05-26 PROCEDURE — 85651 RBC SED RATE NONAUTOMATED: CPT | Performed by: INTERNAL MEDICINE

## 2024-05-26 PROCEDURE — 25000242 PHARM REV CODE 250 ALT 637 W/ HCPCS

## 2024-05-26 PROCEDURE — 84145 PROCALCITONIN (PCT): CPT | Performed by: INTERNAL MEDICINE

## 2024-05-26 PROCEDURE — 94761 N-INVAS EAR/PLS OXIMETRY MLT: CPT

## 2024-05-26 PROCEDURE — 25000003 PHARM REV CODE 250

## 2024-05-26 PROCEDURE — 25000242 PHARM REV CODE 250 ALT 637 W/ HCPCS: Performed by: STUDENT IN AN ORGANIZED HEALTH CARE EDUCATION/TRAINING PROGRAM

## 2024-05-26 PROCEDURE — 87040 BLOOD CULTURE FOR BACTERIA: CPT | Performed by: INTERNAL MEDICINE

## 2024-05-26 PROCEDURE — 25000003 PHARM REV CODE 250: Performed by: NURSE PRACTITIONER

## 2024-05-26 PROCEDURE — 94640 AIRWAY INHALATION TREATMENT: CPT

## 2024-05-26 PROCEDURE — S4991 NICOTINE PATCH NONLEGEND: HCPCS

## 2024-05-26 PROCEDURE — 11000001 HC ACUTE MED/SURG PRIVATE ROOM

## 2024-05-26 PROCEDURE — 99231 SBSQ HOSP IP/OBS SF/LOW 25: CPT | Mod: ,,, | Performed by: INTERNAL MEDICINE

## 2024-05-26 PROCEDURE — 80053 COMPREHEN METABOLIC PANEL: CPT | Performed by: NURSE PRACTITIONER

## 2024-05-26 PROCEDURE — 85025 COMPLETE CBC W/AUTO DIFF WBC: CPT | Performed by: NURSE PRACTITIONER

## 2024-05-26 PROCEDURE — 86140 C-REACTIVE PROTEIN: CPT | Performed by: INTERNAL MEDICINE

## 2024-05-26 PROCEDURE — 99223 1ST HOSP IP/OBS HIGH 75: CPT | Mod: ,,, | Performed by: INTERNAL MEDICINE

## 2024-05-26 PROCEDURE — 25000003 PHARM REV CODE 250: Performed by: INTERNAL MEDICINE

## 2024-05-26 PROCEDURE — 63600175 PHARM REV CODE 636 W HCPCS

## 2024-05-26 RX ADMIN — LEVALBUTEROL 1.25 MG: 1.25 SOLUTION, CONCENTRATE RESPIRATORY (INHALATION) at 01:05

## 2024-05-26 RX ADMIN — CYCLOBENZAPRINE HYDROCHLORIDE 10 MG: 5 TABLET, FILM COATED ORAL at 08:05

## 2024-05-26 RX ADMIN — MORPHINE SULFATE 30 MG: 15 TABLET, EXTENDED RELEASE ORAL at 08:05

## 2024-05-26 RX ADMIN — LEVOFLOXACIN 750 MG: 250 TABLET, FILM COATED ORAL at 09:05

## 2024-05-26 RX ADMIN — ENOXAPARIN SODIUM 40 MG: 40 INJECTION SUBCUTANEOUS at 04:05

## 2024-05-26 RX ADMIN — ATORVASTATIN CALCIUM 40 MG: 40 TABLET, FILM COATED ORAL at 08:05

## 2024-05-26 RX ADMIN — NYSTATIN 500000 UNITS: 100000 SUSPENSION ORAL at 09:05

## 2024-05-26 RX ADMIN — PANTOPRAZOLE SODIUM 40 MG: 40 TABLET, DELAYED RELEASE ORAL at 09:05

## 2024-05-26 RX ADMIN — Medication 400 MG: at 08:05

## 2024-05-26 RX ADMIN — Medication 1 PATCH: at 09:05

## 2024-05-26 RX ADMIN — BUDESONIDE INHALATION 1 MG: 0.5 SUSPENSION RESPIRATORY (INHALATION) at 07:05

## 2024-05-26 RX ADMIN — OXYCODONE 20 MG: 5 TABLET ORAL at 10:05

## 2024-05-26 RX ADMIN — LEVALBUTEROL 1.25 MG: 1.25 SOLUTION, CONCENTRATE RESPIRATORY (INHALATION) at 07:05

## 2024-05-26 RX ADMIN — PREGABALIN 75 MG: 75 CAPSULE ORAL at 08:05

## 2024-05-26 RX ADMIN — GUAIFENESIN 1200 MG: 600 TABLET, EXTENDED RELEASE ORAL at 08:05

## 2024-05-26 RX ADMIN — ACETAMINOPHEN 650 MG: 325 TABLET ORAL at 04:05

## 2024-05-26 RX ADMIN — CEFAZOLIN 2 G: 2 INJECTION, POWDER, FOR SOLUTION INTRAMUSCULAR; INTRAVENOUS at 09:05

## 2024-05-26 RX ADMIN — MORPHINE SULFATE 30 MG: 15 TABLET, EXTENDED RELEASE ORAL at 09:05

## 2024-05-26 RX ADMIN — NYSTATIN 500000 UNITS: 100000 SUSPENSION ORAL at 12:05

## 2024-05-26 RX ADMIN — NYSTATIN 500000 UNITS: 100000 SUSPENSION ORAL at 04:05

## 2024-05-26 RX ADMIN — GUAIFENESIN 1200 MG: 600 TABLET, EXTENDED RELEASE ORAL at 09:05

## 2024-05-26 RX ADMIN — OXYCODONE 20 MG: 5 TABLET ORAL at 11:05

## 2024-05-26 RX ADMIN — Medication 400 MG: at 09:05

## 2024-05-26 RX ADMIN — NYSTATIN 500000 UNITS: 100000 SUSPENSION ORAL at 08:05

## 2024-05-26 RX ADMIN — AMITRIPTYLINE HYDROCHLORIDE 25 MG: 25 TABLET, FILM COATED ORAL at 08:05

## 2024-05-26 RX ADMIN — CEFAZOLIN 2 G: 2 INJECTION, POWDER, FOR SOLUTION INTRAMUSCULAR; INTRAVENOUS at 05:05

## 2024-05-26 RX ADMIN — OXYCODONE 20 MG: 5 TABLET ORAL at 04:05

## 2024-05-26 RX ADMIN — ARFORMOTEROL TARTRATE 15 MCG: 15 SOLUTION RESPIRATORY (INHALATION) at 07:05

## 2024-05-26 RX ADMIN — PREGABALIN 75 MG: 75 CAPSULE ORAL at 09:05

## 2024-05-26 RX ADMIN — CETIRIZINE HYDROCHLORIDE 10 MG: 10 TABLET, FILM COATED ORAL at 09:05

## 2024-05-26 RX ADMIN — CEFAZOLIN 2 G: 2 INJECTION, POWDER, FOR SOLUTION INTRAMUSCULAR; INTRAVENOUS at 01:05

## 2024-05-26 RX ADMIN — PREGABALIN 75 MG: 75 CAPSULE ORAL at 03:05

## 2024-05-26 NOTE — CARE UPDATE
05/26/24 0705   Patient Assessment/Suction   Level of Consciousness (AVPU) alert   Respiratory Effort Normal   Expansion/Accessory Muscles/Retractions expansion symmetric   BELEN Breath Sounds clear   LLL Breath Sounds crackles;diminished   RUL Breath Sounds clear   RML Breath Sounds crackles   RLL Breath Sounds crackles;diminished   Rhythm/Pattern, Respiratory pattern regular   Cough Frequency infrequent   Cough Type good;loose;productive   Secretions Amount moderate   Secretions Color yellow   Secretions Characteristics thick   PRE-TX-O2   Device (Oxygen Therapy) room air   SpO2 (!) 94 %   Pulse Oximetry Type Intermittent   $ Pulse Oximetry - Multiple Charge Pulse Oximetry - Multiple   Pulse 77   Resp 18   Aerosol Therapy   $ Aerosol Therapy Charges Aerosol Treatment  (Xopenex and Pulmicort)   Daily Review of Necessity (SVN) completed   Respiratory Treatment Status (SVN) given   Treatment Route (SVN) mask   Patient Position semi-Lee's   Signs of Intolerance (SVN) none   Breath Sounds Post-Respiratory Treatment   Throughout All Fields Post-Treatment RML   Throughout All Fields Post-Treatment aeration increased   LLL Post-Treatment crackles   RML Post-Treatment wheezes, expiratory;crackles   RLL Post-Treatment crackles;wheezes, expiratory   Post-treatment Heart Rate (beats/min) 85   Post-treatment Resp Rate (breaths/min) 18   Incentive Spirometer   $ Incentive Spirometer Charges done with encouragement   Incentive Spirometer Predicted Level (mL) 1360   Administration (IS) proper technique demonstrated   Number of Repetitions (IS) 5   Level Incentive Spirometer (mL) 1000   Patient Tolerance (IS) good

## 2024-05-26 NOTE — ASSESSMENT & PLAN NOTE
"This patient does have evidence of infective focus  My overall impression is sepsis.  Source: Respiratory and bacteremia  Antibiotics given-   Antibiotics (72h ago, onward)      Start     Stop Route Frequency Ordered    05/25/24 1000  levoFLOXacin tablet 750 mg         05/29/24 0959 Oral Daily 05/24/24 1011    05/24/24 0930  ceFAZolin 2 g in dextrose 5 % in water (D5W) 50 mL IVPB (MB+)         -- IV Every 8 hours (non-standard times) 05/24/24 0820          Latest lactate reviewed-  No results for input(s): "LACTATE", "POCLAC" in the last 72 hours.    Organ dysfunction indicated by  none    Fluid challenge Not needed - patient is not hypotensive      Post- resuscitation assessment No - Post resuscitation assessment not needed       Will Not start Pressors- Levophed for MAP of 65  Source control achieved by:  IV antibiotics  ---------------------------------------------------------   WBC inflammatory markers are trending down.  T-max 101.3°  Urine culture negative  Blood culture 5/21:  MSSA 1 set, NGTD 1 set  Blood culture 5/23:  NGTD   Blood cultures 5/25: NGTD  Blood cultures 5/26: pending  Sputum culture (+)  staph aureus  Echocardiogram negative for vegetation  Id following  Continue po Levaquin and IV Ancef  FRANCO pending  "

## 2024-05-26 NOTE — ASSESSMENT & PLAN NOTE
Blood culture 5/21:  MSSA 1 set, NGTD 1 set  Blood culture 5/23:  NGTD  Blood culture 5/25: NGTD  Blood culture 5/26: pending  Echo negative for vegetation  Id following  Continue  p.o. Levaquin and IV cefazolin  Obtain FRANCO per ID recommendation: Pending  NPO after midnight for possible FARNCO tomorrow

## 2024-05-26 NOTE — NURSING
SN called in Cardio referral to [facility] and spoke to Agent Monica. Updated [facility] nurse Kareem TRAN

## 2024-05-26 NOTE — SUBJECTIVE & OBJECTIVE
Interval History:     Lying in bed resting, easily awakens.  Feels better today.  No overnight events.  Pain controlled.  White blood cells and inflammatory markers trending down.  Repeat blood cultures negative so far.   T-max 101.3. Complains of having a lip laceration from biting it while eating.  FRANCO is pending.    Review of Systems   HENT:  Positive for mouth sores (started on nystatin for thrush).    Respiratory:  Positive for cough (productive) and shortness of breath.    Cardiovascular:  Positive for chest pain (chest wall, with coughing).   Musculoskeletal:  Positive for arthralgias.   All other systems reviewed and are negative.    Objective:     Vital Signs (Most Recent):  Temp: 98.7 °F (37.1 °C) (05/26/24 0515)  Pulse: 77 (05/26/24 0705)  Resp: 18 (05/26/24 0705)  BP: (!) 151/80 (05/26/24 0332)  SpO2: (!) 94 % (05/26/24 0705) Vital Signs (24h Range):  Temp:  [97.9 °F (36.6 °C)-101.3 °F (38.5 °C)] 98.7 °F (37.1 °C)  Pulse:  [] 77  Resp:  [16-20] 18  SpO2:  [90 %-100 %] 94 %  BP: ()/(59-80) 151/80     Weight: 49 kg (108 lb)  Body mass index is 21.09 kg/m².    Intake/Output Summary (Last 24 hours) at 5/26/2024 0758  Last data filed at 5/26/2024 0500  Gross per 24 hour   Intake 3324.28 ml   Output 3825 ml   Net -500.72 ml         Physical Exam  Constitutional:       General: She is not in acute distress.  Eyes:      Conjunctiva/sclera: Conjunctivae normal.      Pupils: Pupils are equal, round, and reactive to light.   Cardiovascular:      Rate and Rhythm: Normal rate and regular rhythm.      Pulses: Normal pulses.      Heart sounds: Normal heart sounds.   Pulmonary:      Effort: Pulmonary effort is normal. No respiratory distress.      Breath sounds: Examination of the right-lower field reveals decreased breath sounds. Examination of the left-lower field reveals decreased breath sounds. Decreased breath sounds present.      Comments:   Cough not noted currently  Abdominal:      General: Bowel  sounds are normal. There is no distension.      Palpations: Abdomen is soft.      Tenderness: There is no abdominal tenderness.   Musculoskeletal:         General: No swelling. Normal range of motion.      Cervical back: Normal range of motion and neck supple.   Skin:     General: Skin is warm and dry.      Capillary Refill: Capillary refill takes less than 2 seconds.   Neurological:      General: No focal deficit present.      Mental Status: She is alert and oriented to person, place, and time.      Cranial Nerves: No cranial nerve deficit.   Psychiatric:         Attention and Perception: Attention normal.         Mood and Affect: Mood and affect normal.             Significant Labs: All pertinent labs within the past 24 hours have been reviewed.  CBC:   Recent Labs   Lab 05/25/24  0827 05/26/24  0555   WBC 14.95* 13.68*   HGB 9.9* 8.3*   HCT 29.6* 24.5*    460*     CMP:   Recent Labs   Lab 05/25/24  0827 05/26/24  0555    134*   K 4.6 3.9   CL 99 101   CO2 25 23    109   BUN 5* 5*   CREATININE 0.6 0.5   CALCIUM 9.0 8.5*   PROT 5.9* 5.2*   ALBUMIN 1.9* 1.7*   BILITOT 0.3 0.2   ALKPHOS 138* 115   AST 32 49*   ALT 24 24   ANIONGAP 14 10     Magnesium:   Recent Labs   Lab 05/25/24  0827   MG 1.7         Microbiology Results (last 7 days)       Procedure Component Value Units Date/Time    Blood culture [[id number]] Collected: 05/26/24 0556    Order Status: Sent Specimen: Blood from Peripheral, Antecubital, Left     Blood culture #2 **CANNOT BE ORDERED STAT** [[id number]] Collected: 05/21/24 1526    Order Status: Completed Specimen: Blood from Peripheral, Antecubital, Left Updated: 05/25/24 2032     Blood Culture, Routine No Growth to date      No Growth to date      No Growth to date      No Growth to date      No Growth to date    Blood culture [[id number]] Collected: 05/25/24 0422    Order Status: Completed Specimen: Blood from Peripheral, Antecubital, Right Updated: 05/25/24 1558     Blood  Culture, Routine No Growth to date    Blood culture [[id number]] Collected: 05/23/24 0925    Order Status: Completed Specimen: Blood from Peripheral, Hand, Right Updated: 05/25/24 1432     Blood Culture, Routine No Growth to date      No Growth to date      No Growth to date    Blood culture [[id number]] Collected: 05/23/24 0927    Order Status: Completed Specimen: Blood from Peripheral, Antecubital, Left Updated: 05/25/24 1432     Blood Culture, Routine No Growth to date      No Growth to date      No Growth to date    Urine culture [[id number]] Collected: 05/21/24 2044    Order Status: Completed Specimen: Urine Updated: 05/25/24 0641     Urine Culture, Routine No growth    Narrative:      Specimen Source->Urine    Culture, Respiratory with Gram Stain [[id number]]  (Abnormal)  (Susceptibility) Collected: 05/21/24 2044    Order Status: Completed Specimen: Sputum, Expectorated Updated: 05/24/24 0812     Respiratory Culture STAPHYLOCOCCUS AUREUS  Moderate       Gram Stain (Respiratory) <10 epithelial cells per low power field.     Gram Stain (Respiratory) Many WBC's     Gram Stain (Respiratory) Few Gram positive cocci    Blood culture #1 **CANNOT BE ORDERED STAT** [[id number]]  (Abnormal)  (Susceptibility) Collected: 05/21/24 1516    Order Status: Completed Specimen: Blood from Peripheral, Antecubital, Right Updated: 05/24/24 0719     Blood Culture, Routine Gram stain codey bottle: Gram positive cocci      Results called to and read back by: Mounika Schuster LPN at [facility]      05/22/2024  21:40 KS3      STAPHYLOCOCCUS AUREUS  ID consult required for Staph aureus bacteremia.      Respiratory Infection Panel (PCR), Nasopharyngeal [[id number]] Collected: 05/23/24 1406    Order Status: Completed Specimen: Nasopharyngeal Swab Updated: 05/23/24 2042     Respiratory Infection Panel Source NP Swab     Adenovirus Not Detected     Coronavirus 229E, Common Cold Virus Not Detected     Coronavirus HKU1, Common Cold Virus Not Detected      Coronavirus NL63, Common Cold Virus Not Detected     Coronavirus OC43, Common Cold Virus Not Detected     Comment: Coronavirus strains 229E, HKU1, NL63, and OC43 can cause the common   cold   and are not associated with the respiratory disease outbreak caused   by  the COVID-19 (SARS-CoV-2 novel Coronavirus) strain.           SARS-CoV2 (COVID-19) Qualitative PCR Not Detected     Human Metapneumovirus Not Detected     Human Rhinovirus/Enterovirus Not Detected     Influenza A (subtypes H1, H1-2009,H3) Not Detected     Influenza B Not Detected     Parainfluenza Virus 1 Not Detected     Parainfluenza Virus 2 Not Detected     Parainfluenza Virus 3 Not Detected     Parainfluenza Virus 4 Not Detected     Respiratory Syncytial Virus Not Detected     Bordetella Parapertussis (GC5844) Not Detected     Bordetella pertussis (ptxP) Not Detected     Chlamydia pneumoniae Not Detected     Mycoplasma pneumoniae Not Detected     Comment: Respiratory Infection Panel testing performed by Multiplex PCR.       Rapid Organism ID by PCR (from Blood culture) [[id number]]  (Abnormal) Collected: 05/21/24 1516    Order Status: Completed Updated: 05/22/24 2256     Enterococcus faecalis Not Detected     Enterococcus faecium Not Detected     Listeria monocytogenes Not Detected     Staphylococcus spp. See species for ID     Staphylococcus aureus Detected     Staphylococcus epidermidis Not Detected     Staphylococcus lugdunensis Not Detected     Streptococcus species Not Detected     Streptococcus agalactiae Not Detected     Streptococcus pneumoniae Not Detected     Streptococcus pyogenes Not Detected     Acinetobacter calcoaceticus/baumannii complex Not Detected     Bacteroides fragilis Not Detected     Enterobacterales Not Detected     Enterobacter cloacae complex Not Detected     Escherichia coli Not Detected     Klebsiella aerogenes Not Detected     Klebsiella oxytoca Not Detected     Klebsiella pneumoniae group Not Detected     Proteus Not  Detected     Salmonella sp Not Detected     Serratia marcescens Not Detected     Haemophilus influenzae Not Detected     Neisseria meningtidis Not Detected     Pseudomonas aeruginosa Not Detected     Stenotrophomonas maltophilia Not Detected     Candida albicans Not Detected     Candida auris Not Detected     Candida glabrata Not Detected     Candida krusei Not Detected     Candida parapsilosis Not Detected     Candida tropicalis Not Detected     Cryptococcus neoformans/gattii Not Detected     CTX-M (ESBL ) Test not applicable     IMP (Carbapenem resistant) Test not applicable     KPC resistance gene (Carbapenem resistant) Test not applicable     mcr-1  Test not applicable     mec A/C  Test not applicable     mec A/C and MREJ (MRSA) gene Not Detected     NDM (Carbapenem resistant) Test not applicable     OXA-48-like (Carbapenem resistant) Test not applicable     van A/B (VRE gene) Test not applicable     VIM (Carbapenem resistant) Test not applicable                Significant Imaging:  None today

## 2024-05-26 NOTE — CONSULTS
"[facility]  Department of Cardiology  Consult Note      PATIENT NAME: Floresita Martin    MRN: [id number]  TODAY'S DATE: 05/26/2024  ADMIT DATE: 5/21/2024                          CONSULT REQUESTED BY: Michael Navarrete MD    SUBJECTIVE     PRINCIPAL PROBLEM: Sepsis    Floresita Martin is a 64 year old female with a previous medical history of HTN, HLD, GERD, opioid dependence with current use, metabolic encephalopathy, pneumonia, and COPD who was sent from PCP office for fever, cough, and shortness of breath. Patient is poor historian and unable to exactly recall when symptoms began but endorses fever, cough, exertional shortness of breath and loss of voice over the past two weeks. Reports that her  could provide more information but he was not at bedside. ED note reports that patient endorsed intermittent confusion. Initial ED workup included a chest xray that reads: "Findings concerning for multi lobar pneumonia in the right lung with possible involvement of the left lung base as well". CBC shows stable anemia, magnesium and lactic acid normal, cmp unremarkable. Patient vitals are stable and she is maintaining an oxygen saturation of 96% on room air. She is having productive cough with moderate amount of brown/yellow sputum. Most recent hospital admission from 3/26 was for pseudomonas bacteremia. Patient initiated on Levaquin IV in ED which has been continued. Upon admit patient is AAOx4 and does not appear in any respiratory distress. She is positive for a voice change which she reports has worsened over the last two weeks and has "lost her voice" but at baseline it is raspy. Patient admitted by hospital medicine for further evaluation and management.     Overview/Hospital Course:  Ms Martin was admitted after her PCP sent her for fever, cough, and SOB. Patient was found to have intermittent confusion w/ multilobar pneumonia in her right lung w/ left lung base involvement. She was " started on Levaquin with breathing treatments with SPO2 of 96% on RA in ED. Last admission 3/26 for Pseudomonas Bacteremia. Patient placed on supplemental Oxygen with last documented fever on  with blood, sputum, and urine cultures pending. Today, blood culture #1  and sputum + for staph aureus. Cefazolin 2 gram given with ID consult. On rounds patient with c/o mouth pain, thrush present started on nystatin. Will await additional recommendations from ID.     Interval History:   Sitting up in bed, awake and alert.   Reports having acute right chest wall pain with coughing and has since resolved. Still coughing up moderate amount of sputum.  T-max 100.4.  Repeat blood cultures negative so far.  [place] pending.    REASON FOR CONSULT:    [place]      HPI:   Patient is a 64-year-old lady with known history of hypertension hyperlipidemia presented with metabolic encephalopathy and pneumonia and COPD has been having fever cough and shortness breath and currently being treated for pneumonia.  And she does have history of Pseudomonas bacteremia diagnosed in March and her blood cultures were also positive on this admission.   And currently being treated for pneumonia.  Patient still has recurrent cough and she does smoke in the room.  Patient stated that she has had neck surgery and can not hyperextend the neck and her neurosurgeon had told her that she has a very small esophagus and she does have dysphagia to solids  And difficulty in swallowing..    Review of patient's allergies indicates:   Allergen Reactions    Codeine Nausea And Vomiting    Tylenol-codeine [acetaminophen-codeine] Nausea And Vomiting       Past Medical History:   Diagnosis Date    Alcohol abuse     Cellulitis of toe of right foot 2019    Headache     Hearing loss     HLD (hyperlipidemia)      Past Surgical History:   Procedure Laterality Date    BACK SURGERY       SECTION      2    COLONOSCOPY N/A 11/15/2017    Procedure: COLONOSCOPY;  Surgeon:  Peg Powers MD;  Location: [facility] ENDO;  Service: Endoscopy;  Laterality: N/A;    EPIDURAL STEROID INJECTION INTO CERVICAL SPINE      EPIDURAL STEROID INJECTION INTO LUMBAR SPINE      JOINT REPLACEMENT  5/2018    LIPOMA RESECTION Right 01/23/2023    Procedure: EXCISION, LIPOMA;  Surgeon: Ruben Brooks Jr., MD;  Location: [facility] OR;  Service: General;  Laterality: Right;    LUMBAR FUSION  2019    with cage    LUNG BIOPSY      NECK SURGERY  05/2018    with hardware    RADIOFREQUENCY ABLATION      cervical    RADIOFREQUENCY ABLATION      lumbar    SPINE SURGERY  05/2018    STAPEDECTOMY Right 05/28/2015    STAPEDECTOMY Left     TUBAL LIGATION  1/1990     Social History     Tobacco Use    Smoking status: Every Day     Current packs/day: 0.50     Average packs/day: 0.5 packs/day for 46.4 years (23.2 ttl pk-yrs)     Types: Cigarettes     Start date: 1/1/1978     Passive exposure: Past    Smokeless tobacco: Never    Tobacco comments:     Patient has smoked since a teenager, states that she has cut down from 2pk/day to .5pk/day. Can quit on her own when she's ready. Information and education on outpatient smoking cessation when ready. 3/26/24   Substance Use Topics    Alcohol use: Yes     Comment: social     Drug use: No        REVIEW OF SYSTEMS  CONSTITUTIONAL: Negative for chills, fatigue and fever.   EYES: No double vision, No blurred vision  NEURO: No headaches, No dizziness .  Has had neck surgery with diskectomy.  RESPIRATORY:   Positive for cough, shortness of breath and wheezing.   Recurrent coughing shortness a breath and pneumonia.  CARDIOVASCULAR: Negative for chest pain. Negative for palpitations and leg swelling.   GI: Negative for abdominal pain, No melena, diarrhea, nausea and vomiting.   Dysphagia to solids and difficulty in swallowing and oropharyngeal issues.  : Negative for dysuria and frequency, Negative for hematuria  SKIN: Negative for bruising, Negative for edema or discoloration noted.    ENDOCRINE: Negative for polyphagia, Negative for heat intolerance, Negative for cold intolerance  PSYCHIATRIC: Negative for depression, Negative for anxiety, Negative for memory loss  MUSCULOSKELETAL: Negative for neck pain, Negative for muscle weakness, Negative for back pain     OBJECTIVE     VITAL SIGNS (Most Recent)  Temp: 98 °F (36.7 °C) (05/26/24 0829)  Pulse: 99 (05/26/24 0829)  Resp: 20 (05/26/24 1029)  BP: 132/68 (05/26/24 0829)  SpO2: (!) 94 % (05/26/24 0829)    VENTILATION STATUS  Resp: 20 (05/26/24 1029)  SpO2: (!) 94 % (05/26/24 0829)           I & O (Last 24H):  Intake/Output Summary (Last 24 hours) at 5/26/2024 1047  Last data filed at 5/26/2024 1031  Gross per 24 hour   Intake 3174.28 ml   Output 3725 ml   Net -550.72 ml       WEIGHTS  Wt Readings from Last 1 Encounters:   05/23/24 0820 49 kg (108 lb)   05/22/24 1305 49.2 kg (108 lb 7.5 oz)   05/21/24 1933 49.2 kg (108 lb 7.5 oz)   05/21/24 1418 48.1 kg (106 lb)       PHYSICAL EXAM  GENERAL:   In adequately nourished with recurrent coughing in no apparent distress alert and oriented.   HEENT: Normocephalic. Pupils normal and conjunctivae normal.  ..   NECK: No JVD. No bruit..   THYROID: Thyroid not  evaluated.   CARDIAC: Regular rate and rhythm. S1 is normal.S2 is normal. Systolic murmur audible.  CHEST ANATOMY: normal.   LUNGS:  bilateral decreased breath sounds in both lung fields with scattered rhonchii..   ABDOMEN: Soft .   URINARY: No ferrell catheter   EXTREMITIES: No cyanosis, clubbing or edema noted at this time., no calf tenderness bilaterally.   PERIPHERAL VASCULAR SYSTEM:  decreased palpable distal pulses.   CENTRAL NERVOUS SYSTEM:  not evaluated.   SKIN: Skin without lesions, moist, well perfused.   MUSCLE STRENGTH & TONE:  not evaluated.     HOME MEDICATIONS:  No current facility-administered medications on file prior to encounter.     Current Outpatient Medications on File Prior to Encounter   Medication Sig Dispense Refill    albuterol  (PROVENTIL/VENTOLIN HFA) 90 mcg/actuation inhaler Inhale 2 puffs into the lungs every 6 (six) hours as needed for Wheezing or Shortness of Breath. Rescue 18 g 11    albuterol-ipratropium (DUO-NEB) 2.5 mg-0.5 mg/3 mL nebulizer solution Take 3 mLs by nebulization every 6 (six) hours as needed for Wheezing or Shortness of Breath. Rescue 75 mL 11    amitriptyline (ELAVIL) 25 MG tablet Take 1-2 tablet by mouth every night at bedtime 60 tablet 1    atorvastatin (LIPITOR) 40 MG tablet TAKE 1 TABLET(40 MG) BY MOUTH DAILY 30 tablet 0    co-enzyme Q-10 30 mg capsule Take 30 mg by mouth once daily.      cyclobenzaprine (FLEXERIL) 10 MG tablet Take 1 tablet by mouth twice a day as needed 60 tablet 2    fluticasone propionate (FLONASE) 50 mcg/actuation nasal spray 1 spray (50 mcg total) by Each Nostril route once daily. 16 g 11    fluticasone-umeclidin-vilanter (TRELEGY ELLIPTA) 200-62.5-25 mcg inhaler Inhale 1 puff into the lungs once daily. 60 each 11    ibuprofen (ADVIL,MOTRIN) 800 MG tablet Take 1 tablet by mouth three times a day as needed for pain 90 tablet 1    lactulose (CHRONULAC) 10 gram/15 mL solution Take 15 ml twice a day as needeed 946 mL 0    lactulose (CHRONULAC) 10 gram/15 mL solution 15 ml twice a day 946 mL 1    loratadine (CLARITIN) 10 mg tablet Take 1 tablet (10 mg total) by mouth once daily. 90 tablet 3    losartan (COZAAR) 50 MG tablet Take 1 tablet (50 mg total) by mouth once daily. 90 tablet 3    lubiprostone (AMITIZA) 24 MCG Cap Take 1 capsule by mouth twice a day as needed for pain 60 capsule 1    morphine (MS CONTIN) 30 MG 12 hr tablet Take 1 tablet by mouth every twelve hours More than 7 days supply is medically necessary 60 tablet 0    morphine (MS CONTIN) 30 MG 12 hr tablet Take 1 tablet by mouth every twelve hours More than 7 days supply is medically necessary (Patient taking differently: Take 30 mg by mouth 2 (two) times daily.) 60 tablet 0    multivitamin capsule Take 1 capsule by mouth once  daily.      omeprazole (PRILOSEC) 40 MG capsule TAKE 1 CAPSULE BY MOUTH EVERY MORNING 90 capsule 3    ondansetron (ZOFRAN-ODT) 4 MG TbDL Take 1 tablet (4 mg total) by mouth every 6 (six) hours as needed (nausea). 8 tablet 0    oxyCODONE (ROXICODONE) 20 mg Tab immediate release tablet Take 1 tablet by mouth five times a day as needed for pain More than 7 days supply is medically necessary (Patient taking differently: Take 20 mg by mouth 5 (five) times daily.) 150 tablet 0    pregabalin (LYRICA) 75 MG capsule Take 1 capsule by mouth three times a day More than 7 days supply is medically necessary 90 capsule 1    RESTASIS 0.05 % ophthalmic emulsion Place 1 drop into both eyes 2 (two) times daily.      naloxone (NARCAN) 4 mg/actuation Spry Spray 1 spray into one nostril single dose may repeat every 2-3 minutes until responsive or EMS arrives (Patient taking differently: 1 spray by Nasal route once.) 1 each 1    nystatin (MYCOSTATIN) 100,000 unit/mL suspension Take 10 mLs by mouth 3 (three) times daily.      oxyCODONE (ROXICODONE) 20 mg Tab immediate release tablet Take 1 tablet by mouth five times a day as needed for pain More than 7 days supply is medically necessary 150 tablet 0    [START ON 6/7/2024] oxyCODONE (ROXICODONE) 20 mg Tab immediate release tablet Take 1 tablet by mouth five times a day as needed for pain More than a 7 day supply is medically necessary 150 tablet 0    oxyCODONE (ROXICODONE) 20 mg Tab immediate release tablet Take 1 tablet by mouth five times a day as needed for pain More than a 7 day supply is medically necessary 150 tablet 0       SCHEDULED MEDS:   budesonide  1 mg Nebulization BID    And    arformoteroL  15 mcg Nebulization BID    atorvastatin  40 mg Oral QHS    ceFAZolin (Ancef) IV (PEDS and ADULTS)  2 g Intravenous Q8H    cetirizine  10 mg Oral Daily    enoxparin  40 mg Subcutaneous Daily    guaiFENesin  1,200 mg Oral BID    lactulose  15 g Oral BID    levalbuterol  1.25 mg Nebulization  Q6H WAKE    levoFLOXacin  750 mg Oral Daily    magnesium oxide  400 mg Oral BID    morphine  30 mg Oral BID    nicotine  1 patch Transdermal Daily    nystatin  500,000 Units Mouth/Throat QID (WM & HS)    pantoprazole  40 mg Oral Daily    pregabalin  75 mg Oral TID    sodium chloride 0.9%  10 mL Intravenous Q6H       CONTINUOUS INFUSIONS:   sodium chloride 0.9%   Intravenous Continuous 125 mL/hr at 05/26/24 0500 Rate Verify at 05/26/24 0500       PRN MEDS:  Current Facility-Administered Medications:     acetaminophen, 650 mg, Oral, Q4H PRN    aluminum-magnesium hydroxide-simethicone, 30 mL, Oral, QID PRN    amitriptyline, 25 mg, Oral, Nightly PRN    benzocaine, , Mouth/Throat, PRN    cyclobenzaprine, 10 mg, Oral, BID PRN    dextrose 10%, 12.5 g, Intravenous, PRN    dextrose 10%, 25 g, Intravenous, PRN    glucagon (human recombinant), 1 mg, Intramuscular, PRN    glucose, 16 g, Oral, PRN    glucose, 24 g, Oral, PRN    hydrocodone-chlorpheniramine, 5 mL, Oral, Q12H PRN    lactulose, 10 g, Oral, BID PRN    levalbuterol, 1.25 mg, Nebulization, Q6H PRN    lubiprostone, 24 mcg, Oral, BID PRN    magnesium oxide, 800 mg, Oral, PRN    magnesium oxide, 800 mg, Oral, PRN    naloxone, 0.02 mg, Intravenous, PRN    ondansetron, 4 mg, Intravenous, Q8H PRN    oxyCODONE, 20 mg, Oral, Q6H PRN    potassium bicarbonate, 35 mEq, Oral, PRN    potassium bicarbonate, 50 mEq, Oral, PRN    potassium bicarbonate, 60 mEq, Oral, PRN    potassium, sodium phosphates, 2 packet, Oral, PRN    potassium, sodium phosphates, 2 packet, Oral, PRN    potassium, sodium phosphates, 2 packet, Oral, PRN    prochlorperazine, 5 mg, Intravenous, Q6H PRN    simethicone, 1 tablet, Oral, QID PRN    sodium chloride 0.9%, 10 mL, Intravenous, Q12H PRN    Flushing PICC/Midline Protocol, , , Until Discontinued **AND** sodium chloride 0.9%, 10 mL, Intravenous, Q6H **AND** sodium chloride 0.9%, 10 mL, Intravenous, PRN    LABS AND DIAGNOSTICS     CBC LAST 3 DAYS  Recent Labs  "  Lab 05/24/24  0411 05/25/24  0827 05/26/24  0555   WBC 9.45 14.95* 13.68*   RBC 3.13* 3.39* 2.85*   HGB 9.1* 9.9* 8.3*   HCT 26.7* 29.6* 24.5*   MCV 85 87 86   MCH 29.1 29.2 29.1   MCHC 34.1 33.4 33.9   RDW 17.0* 17.2* 17.2*    444 460*   MPV 8.8* 8.2* 8.4*   GRAN 62.9  5.9 78.9*  11.8* 81.3*  11.1*   LYMPH 22.0  2.1 13.2*  2.0 11.7*  1.6   MONO 13.5  1.3* 6.6  1.0 5.6  0.8   BASO 0.02 0.03 0.02   NRBC 0 0 0       COAGULATION LAST 3 DAYS  No results for input(s): "LABPT", "INR", "APTT" in the last 168 hours.    CHEMISTRY LAST 3 DAYS  Recent Labs   Lab 05/23/24  0357 05/24/24  0409 05/25/24  0827 05/26/24  0555   * 134* 138 134*   K 3.4* 3.1* 4.6 3.9   CL 99 102 99 101   CO2 22* 23 25 23   ANIONGAP 11 9 14 10   BUN 5* 10 5* 5*   CREATININE 0.5 0.5 0.6 0.5   GLU 88 110 102 109   CALCIUM 9.2 8.6* 9.0 8.5*   MG 1.4* 1.5* 1.7  --    ALBUMIN 2.1* 1.8* 1.9* 1.7*   PROT 5.9* 5.3* 5.9* 5.2*   ALKPHOS 137* 120 138* 115   ALT 15 18 24 24   AST 26 22 32 49*   BILITOT 0.5 0.3 0.3 0.2       CARDIAC PROFILE LAST 3 DAYS  Recent Labs   Lab 05/21/24  1508   BNP 49   TROPONINI <0.006       ENDOCRINE LAST 3 DAYS  Recent Labs   Lab 05/26/24  0555   PROCAL 0.12       LAST ARTERIAL BLOOD GAS  ABG  No results for input(s): "PH", "PO2", "PCO2", "HCO3", "BE" in the last 168 hours.    LAST 7 DAYS MICROBIOLOGY   Microbiology Results (last 7 days)       Procedure Component Value Units Date/Time    Blood culture [[id number]] Collected: 05/25/24 0422    Order Status: Completed Specimen: Blood from Peripheral, Antecubital, Right Updated: 05/26/24 1032     Blood Culture, Routine No Growth to date      No Growth to date    Blood culture [[id number]] Collected: 05/26/24 0556    Order Status: Sent Specimen: Blood from Peripheral, Antecubital, Left Updated: 05/26/24 0840    Blood culture #2 **CANNOT BE ORDERED STAT** [[id number]] Collected: 05/21/24 1526    Order Status: Completed Specimen: Blood from Peripheral, Antecubital, " Left Updated: 05/25/24 2032     Blood Culture, Routine No Growth to date      No Growth to date      No Growth to date      No Growth to date      No Growth to date    Blood culture [[id number]] Collected: 05/23/24 0925    Order Status: Completed Specimen: Blood from Peripheral, Hand, Right Updated: 05/25/24 1432     Blood Culture, Routine No Growth to date      No Growth to date      No Growth to date    Blood culture [[id number]] Collected: 05/23/24 0927    Order Status: Completed Specimen: Blood from Peripheral, Antecubital, Left Updated: 05/25/24 1432     Blood Culture, Routine No Growth to date      No Growth to date      No Growth to date    Urine culture [[id number]] Collected: 05/21/24 2044    Order Status: Completed Specimen: Urine Updated: 05/25/24 0641     Urine Culture, Routine No growth    Narrative:      Specimen Source->Urine    Culture, Respiratory with Gram Stain [[id number]]  (Abnormal)  (Susceptibility) Collected: 05/21/24 2044    Order Status: Completed Specimen: Sputum, Expectorated Updated: 05/24/24 0812     Respiratory Culture STAPHYLOCOCCUS AUREUS  Moderate       Gram Stain (Respiratory) <10 epithelial cells per low power field.     Gram Stain (Respiratory) Many WBC's     Gram Stain (Respiratory) Few Gram positive cocci    Blood culture #1 **CANNOT BE ORDERED STAT** [[id number]]  (Abnormal)  (Susceptibility) Collected: 05/21/24 1516    Order Status: Completed Specimen: Blood from Peripheral, Antecubital, Right Updated: 05/24/24 0719     Blood Culture, Routine Gram stain codey bottle: Gram positive cocci      Results called to and read back by: Mounika Schuster LPN at [facility]      05/22/2024  21:40 KS3      STAPHYLOCOCCUS AUREUS  ID consult required for Staph aureus bacteremia.      Respiratory Infection Panel (PCR), Nasopharyngeal [[id number]] Collected: 05/23/24 1406    Order Status: Completed Specimen: Nasopharyngeal Swab Updated: 05/23/24 2042     Respiratory Infection Panel Source NP Swab      Adenovirus Not Detected     Coronavirus 229E, Common Cold Virus Not Detected     Coronavirus HKU1, Common Cold Virus Not Detected     Coronavirus NL63, Common Cold Virus Not Detected     Coronavirus OC43, Common Cold Virus Not Detected     Comment: Coronavirus strains 229E, HKU1, NL63, and OC43 can cause the common   cold   and are not associated with the respiratory disease outbreak caused   by  the COVID-19 (SARS-CoV-2 novel Coronavirus) strain.           SARS-CoV2 (COVID-19) Qualitative PCR Not Detected     Human Metapneumovirus Not Detected     Human Rhinovirus/Enterovirus Not Detected     Influenza A (subtypes H1, H1-2009,H3) Not Detected     Influenza B Not Detected     Parainfluenza Virus 1 Not Detected     Parainfluenza Virus 2 Not Detected     Parainfluenza Virus 3 Not Detected     Parainfluenza Virus 4 Not Detected     Respiratory Syncytial Virus Not Detected     Bordetella Parapertussis (DO5354) Not Detected     Bordetella pertussis (ptxP) Not Detected     Chlamydia pneumoniae Not Detected     Mycoplasma pneumoniae Not Detected     Comment: Respiratory Infection Panel testing performed by Multiplex PCR.       Rapid Organism ID by PCR (from Blood culture) [[id number]]  (Abnormal) Collected: 05/21/24 1516    Order Status: Completed Updated: 05/22/24 2256     Enterococcus faecalis Not Detected     Enterococcus faecium Not Detected     Listeria monocytogenes Not Detected     Staphylococcus spp. See species for ID     Staphylococcus aureus Detected     Staphylococcus epidermidis Not Detected     Staphylococcus lugdunensis Not Detected     Streptococcus species Not Detected     Streptococcus agalactiae Not Detected     Streptococcus pneumoniae Not Detected     Streptococcus pyogenes Not Detected     Acinetobacter calcoaceticus/baumannii complex Not Detected     Bacteroides fragilis Not Detected     Enterobacterales Not Detected     Enterobacter cloacae complex Not Detected     Escherichia coli Not Detected      Klebsiella aerogenes Not Detected     Klebsiella oxytoca Not Detected     Klebsiella pneumoniae group Not Detected     Proteus Not Detected     Salmonella sp Not Detected     Serratia marcescens Not Detected     Haemophilus influenzae Not Detected     Neisseria meningtidis Not Detected     Pseudomonas aeruginosa Not Detected     Stenotrophomonas maltophilia Not Detected     Candida albicans Not Detected     Candida auris Not Detected     Candida glabrata Not Detected     Candida krusei Not Detected     Candida parapsilosis Not Detected     Candida tropicalis Not Detected     Cryptococcus neoformans/gattii Not Detected     CTX-M (ESBL ) Test not applicable     IMP (Carbapenem resistant) Test not applicable     KPC resistance gene (Carbapenem resistant) Test not applicable     mcr-1  Test not applicable     mec A/C  Test not applicable     mec A/C and MREJ (MRSA) gene Not Detected     NDM (Carbapenem resistant) Test not applicable     OXA-48-like (Carbapenem resistant) Test not applicable     van A/B (VRE gene) Test not applicable     VIM (Carbapenem resistant) Test not applicable            MOST RECENT IMAGING  Echo    Left Ventricle: The left ventricle is normal in size. Normal wall   thickness. There is concentric hypertrophy. Moderate global hypokinesis   present. There is mildly reduced systolic function with a visually   estimated ejection fraction of 40 - 45%. Biplane (2D) method of discs   ejection fraction is 44%. There is normal diastolic function.    Right Ventricle: Normal right ventricular cavity size. Wall thickness   is normal. Systolic function is normal.    IVC/SVC: Normal venous pressure at 3 mmHg.  CT Chest Without Contrast  Narrative: EXAMINATION:  CT CHEST WITHOUT CONTRAST    CLINICAL HISTORY:  Pneumonia, unresolved;Pneumonia on chest x-ray.  Evaluate for evidence of septic emboli;    TECHNIQUE:  Low dose axial images, sagittal and coronal reformations were obtained from the thoracic inlet  to the lung bases. Contrast was not administered.    COMPARISON:  None.    FINDINGS:  There are diffuse regions of consolidation seen within the right greater than left pulmonary parenchyma.  There is no pneumothorax.  There are bilateral pleural effusions greater on the right than left.  There is mediastinal lymph adenopathy with the largest node seen in the pre tracheal region measuring 1.6 cm in short axis.  There is no pericardial effusion.    Visualized portions of the superior abdomen are unremarkable.  The osseous structures show degenerative change.  Impression: There is diffuse consolidation seen in right greater than left lungs concerning for pneumonia.  There are bilateral pleural effusions greater on the right than left and mediastinal lymphadenopathy thought to be related to pneumonia.    Electronically signed by: Gabriela Gan MD  Date:    05/23/2024  Time:    14:35      ECHOCARDIOGRAM RESULTS (last 5)  Results for orders placed during the hospital encounter of 05/21/24    Echo    Interpretation Summary    Left Ventricle: The left ventricle is normal in size. Normal wall thickness. There is concentric hypertrophy. Moderate global hypokinesis present. There is mildly reduced systolic function with a visually estimated ejection fraction of 40 - 45%. Biplane (2D) method of discs ejection fraction is 44%. There is normal diastolic function.    Right Ventricle: Normal right ventricular cavity size. Wall thickness is normal. Systolic function is normal.    IVC/SVC: Normal venous pressure at 3 mmHg.      Results for orders placed during the hospital encounter of 03/24/24    Echo    Interpretation Summary    Left Ventricle: There is normal systolic function with a visually estimated ejection fraction of 55 - 60%. There is normal diastolic function.    Right Ventricle: Normal right ventricular cavity size. Wall thickness is normal. Right ventricle wall motion  is normal. Systolic function is normal.     IVC/SVC: Normal venous pressure at 3 mmHg.      Results for orders placed in visit on 04/13/21    Echo Color Flow Doppler? Yes    Interpretation Summary  · The left ventricle is normal in size with normal systolic function.  · The estimated ejection fraction is 60%.  · Normal left ventricular diastolic function.  · Normal right ventricular size with normal right ventricular systolic function.  · Tricuspid Valve There is trace regurgitation  · Normal central venous pressure (3 mmHg).  · The estimated PA systolic pressure is 30 mmHg.      Results for orders placed in visit on 11/09/17    Exercise stress echo with color flow    Narrative  Date of Procedure: 11/09/2017    PRE-TEST DATA  EKG: Resting electrocardiogram reveals sinus rhythm at a rate of 67 bpm.    TEST DESCRIPTION  The patient exercised for 8.0 minutes on a High Ramp protocol, corresponding to a functional capacity of 15 estimated METS, achieving a peak heart rate of 148 bpm, which is 94% of the age predicted maximum heart rate.    There were no significant electrocardiographic changes throughout the protocol suggesting ischemia.    EKG Conclusions:    1. The EKG portion of this study is negative for ischemia at a high workload, and peak heart rate of 148 bpm (94% of predicted).  2. Blood pressure response to exercise was normal (Presenting BP: 117/74 Peak BP: 178/66).  3. No significant arrhythmias were present.  4. There were no symptoms of chest discomfort or significant dyspnea throughout the protocol.  5. The Martin treadmill score was 8 suggesting a low probability for future cardiovascular events.    Echocardiographic Description:      Aorta: The aortic root is normal in size, measuring 2.4 cm at sinotubular junction and 2.6 cm at Sinuses of Valsalva. The proximal ascending aorta is normal in size, measuring 2.7 cm across.    Left Atrium: The left atrial volume index is normal, measuring 16.41 cc/m2.    Left Ventricle: The left ventricle is normal in  size, with an end-diastolic diameter of 4.3 cm, and an end-systolic diameter of 2.9 cm. LV wall thickness is normal, with the septum measuring 0.7 cm and the posterior wall measuring 0.6 cm across. Relative  wall thickness was normal at 0.28, and the LV mass index was 58.0 g/m2 consistent with normal left ventricular mass. There are no regional wall motion abnormalities. Left ventricular systolic function appears normal. Visually estimated ejection fraction  is 55-60%. The LV Doppler derived stroke volume equals 60.0 ccs.    Diastolic indices: E wave velocity 0.6 m/s, E/A ratio 1.0,  msec., E/e' ratio(sep) 5. Diastolic function is normal.    Right Atrium: The right atrium is normal in size, measuring 2.9 cm in length and 2.2 cm in width in the apical view.    Right Ventricle: The right ventricle is normal in size measuring 1.7 cm at the base in the apical right ventricle-focused view. Global right ventricular systolic function appears normal. Tricuspid annular plane systolic excursion (TAPSE) is 1.9 cm.  Tissue Doppler-derived tricuspid annular peak systolic velocity (S prime) is .1 cm/s. The estimated PA systolic pressure is greater than 14 mmHg.    Aortic Valve:  The aortic valve is mildly sclerotic. The aortic valve is tri-leaflet in structure.    Mitral Valve:  The mitral valve is mildly sclerotic. There is trivial mitral regurgitation.    Tricuspid Valve:  The tricuspid valve is not well seen.    Pulmonary Valve:  The pulmonic valve is not well seen.    Intracavitary: There is no evidence of pericardial effusion, intracavity mass, thrombi, or vegetation.    Post Exercise Imaging:    Immediate post exercise images demonstrate left ventricular function augmenting.    The left ventricle is globally hyperdynamic and no exercise induced wall motion abnormalities were identified.      CONCLUSIONS  1 - Normal left ventricular systolic function (EF 55-60%).  2 - The estimated PA systolic pressure is greater than  14 mmHg.  3 - Trivial mitral regurgitation.  4 - No wall motion abnormalities.    No evidence of stress induced myocardial ischemia.        This document has been electronically  SIGNED BY: Crow Curiel MD On: 11/14/2017 17:49      CURRENT/PREVIOUS VISIT EKG  Results for orders placed or performed during the hospital encounter of 05/21/24   EKG 12-lead    Collection Time: 05/21/24  3:30 PM   Result Value Ref Range    QRS Duration 96 ms    OHS QTC Calculation 459 ms    Narrative    Test Reason : R06.02,    Vent. Rate : 089 BPM     Atrial Rate : 089 BPM     P-R Int : 172 ms          QRS Dur : 096 ms      QT Int : 378 ms       P-R-T Axes : 029 085 038 degrees     QTc Int : 459 ms    Normal sinus rhythm  Low voltage QRS  Borderline Abnormal ECG  When compared with ECG of 17-JAN-2023 14:53,  No significant change was found  Confirmed by Trevor Salvador MD [id number]) on 5/24/2024 12:09:59 PM    Referred By: MORGAN PARADA           Confirmed By:Trevor Salvador MD           ASSESSMENT/PLAN:     Active Hospital Problems    Diagnosis    *Sepsis    Thrush    COPD with acute lower respiratory infection    Moderate protein-calorie malnutrition    Pneumonia due to methicillin susceptible Staphylococcus aureus (MSSA)    Hypertension, essential    Normocytic anemia    Bacteremia    Acute hypoxic respiratory failure    Hyponatremia    Chronic pain syndrome    Dependence on nicotine from cigarettes    Gastroesophageal reflux disease without esophagitis       ASSESSMENT & PLAN:    1.  Positive blood cultures and sepsis  With pneumonia  2.  Dysphagia and difficulty in swallowing  3.   Small and narrowed esophagus  4.  Acute hypoxic respiratory failure  5.  Moderate protein calorie mild nutrition  6. GERD      RECOMMENDATIONS:   1.  Patient has small and narrow esophagus and does have dysphagia and does have swallowing problems with food at times and patient has also had neck surgery /diskectomy with limited range of motion.  2.  Doing  transesophageal echocardiography would be challenging and risk of oropharyngeal trauma and esophageal trauma is significant.  3.  Transthoracic echocardiography has shown that there is no significant valvular dysfunction and no gross vegetation on the valves.    4. Consider medical management and possibly treat her aggressively for 3 weeks of IV antibiotics.  In my view risks outweigh the benefit at this point in time.  5.  Will reconsider FRANCO if clinical situation changes.  6.  Thank you for the consultation and will be available as needed.        Trevor Salvador MD  Date of Service: 05/26/2024  10:47 AM

## 2024-05-26 NOTE — ASSESSMENT & PLAN NOTE
Chronic, controlled. Latest blood pressure and vitals reviewed-     Temp:  [97.9 °F (36.6 °C)-101.3 °F (38.5 °C)]   Pulse:  []   Resp:  [16-20]   BP: ()/(59-80)   SpO2:  [90 %-100 %] .   Home meds for hypertension were reviewed and noted below.   Hypertension Medications               losartan (COZAAR) 50 MG tablet Take 1 tablet (50 mg total) by mouth once daily.            While in the hospital, will manage blood pressure as follows; Adjust home antihypertensive regimen as follows- holding losartan    Will utilize p.r.n. blood pressure medication only if patient's blood pressure greater than 160/100 and she develops symptoms such as worsening chest pain or shortness of breath.  -----------------------------------------------  Continue holding losartan due to risk for hypotension  Continue IV fluid hydration to maintain perfusion

## 2024-05-26 NOTE — ASSESSMENT & PLAN NOTE
Patient has hyponatremia which is recurrent controlled,We will aim to correct the sodium by 4-6mEq in 24 hours. We will monitor sodium Daily. The hyponatremia is due to Dehydration/hypovolemia. We will treat the hyponatremia with IV fluids as follows:  isotonic NS. The patient's sodium results have been reviewed and are listed below.  Recent Labs   Lab 05/26/24  0555   *     ----------------------------------------------------------  Continue isotonic NS

## 2024-05-26 NOTE — ASSESSMENT & PLAN NOTE
Controlled. Managed outpatient by pain management. Continue home pain regimen  Per : Morphine ER 30mg and Oxy IR 20 mg

## 2024-05-26 NOTE — ASSESSMENT & PLAN NOTE
Patient's anemia is currently controlled. Has not received any PRBCs to date. Etiology likely d/t  possible chronic blood loss  Current CBC reviewed-   Lab Results   Component Value Date    HGB 8.3 (L) 05/26/2024    HCT 24.5 (L) 05/26/2024     Monitor serial CBC and transfuse if patient becomes hemodynamically unstable, symptomatic or H/H drops below 7/21.  -----------------------------------------------------------   Had anemia workup 3/26/24:   Lab Results   Component Value Date    IRON 31 03/26/2024    TRANSFERRIN 185 (L) 03/26/2024    TIBC 259 03/26/2024    FESATURATED 12 (L) 03/26/2024      Component Ref Range & Units 1 mo ago   Occult Blood Negative Positive Abnormal       Anemia stable/asymptomatic.  Will need to follow up with GI when discharged.

## 2024-05-26 NOTE — PLAN OF CARE
Problem: Adult Inpatient Plan of Care  Goal: Plan of Care Review  Outcome: Progressing     Problem: Adult Inpatient Plan of Care  Goal: Absence of Hospital-Acquired Illness or Injury  Outcome: Progressing     Problem: Adult Inpatient Plan of Care  Goal: Readiness for Transition of Care  Outcome: Progressing     Problem: Pneumonia  Goal: Fluid Balance  Outcome: Progressing     Problem: Pneumonia  Goal: Resolution of Infection Signs and Symptoms  Outcome: Progressing     Problem: Pneumonia  Goal: Effective Oxygenation and Ventilation  Outcome: Progressing     Problem: Sepsis/Septic Shock  Goal: Optimal Coping  Outcome: Progressing     Problem: Pain Acute  Goal: Optimal Pain Control and Function  Outcome: Progressing     Problem: Infection  Goal: Absence of Infection Signs and Symptoms  Outcome: Progressing      (0) No visual loss

## 2024-05-26 NOTE — PROGRESS NOTES
.[facility]   Department of Infectious Disease  Progress Note        PATIENT NAME: Floresita Martin  MRN: [id number]  TODAY'S DATE: 05/26/2024  ADMIT DATE: 5/21/2024  LOS: 4 days    CHIEF COMPLAINT: Altered Mental Status (Cough, fever, sob and has had history of pneumonia with admission last month )      PRINCIPLE PROBLEM: Sepsis    INTERVAL HISTORY      05/24/2024:  CT chest confirmed bilateral pneumonia with patchy infiltrates.  Repeat blood culture 05/26/2024 so far negative.      05/25/2024 Dr Contreras    Looks and feels well.   + cough with clear sputum.   Tmax is improving 101--100.9--100.4  No pain over her spine. Admits to picking on her skin. Denies IVDU (drug screen positive for opiates)    05/26/2024 NO new complaints. Watching tv. Plan for shin    Antibiotics (From admission, onward)      Start     Stop Route Frequency Ordered    05/25/24 1000  levoFLOXacin tablet 750 mg         05/29/24 0959 Oral Daily 05/24/24 1011    05/24/24 0930  ceFAZolin 2 g in dextrose 5 % in water (D5W) 50 mL IVPB (MB+)         -- IV Every 8 hours (non-standard times) 05/24/24 0820          Antifungals (From admission, onward)      Start     Stop Route Frequency Ordered    05/23/24 1215  nystatin 100,000 unit/mL suspension 500,000 Units         -- MT 4 times daily with meals & nightly 05/23/24 1107           Antivirals (From admission, onward)      None            ASSESSMENT and PLAN     1. MSSA Multifocal pneumonia involving primarily the right lung.  CT chest concerning for septic pulmonary emboli.  Continue cefazolin. Levofloxacin  p.o. to complete 7 day course.     2. Complicated MSSA bacteremia. She does have cervical and lumbar spine hardware (no new pain).  TTE with no vegetation.  Given concern for septic pulmonary emboli,  recommend SHIN.  She will need minimum of 4 week course of antibiotics for complicated bacteremia      3. Alcohol abuse.  Management as per hospitalist.  NO withdrawals so far     4.  Oral thrush.  Secondary to recent antibiotic use.  HIV screen negative.  Repeat HIV screen in progress.  Continue nystatin swish and swallow for 10-14 days    RECOMMENDATIONS:    Consult cardiology for FRANCO   Continue cefazolin and treat for at least 4 weeks.  Extend to 6 weeks if vegetation on FRANCO   Levofloxacin 750 mg p.o. x 7     Please send Epic secure chat with any questions.    SUBJECTIVE    Floresita Martin is a 64 y.o. female with history of chronic pain syndrome and alcohol abuse.  She is known to me from previous encounter.  She was hospitalized 03/23/2024 with Pseudomonas bacteremia associated with abnormal chest x-ray with reticulonodular infiltrates.  She received antibiotics while in the hospital.  She ultimately left the hospital AMA on 04/03/2024.  Apparently received of pneumonia by her PCP post discharge.  Also was in at an urgent care about 2 weeks ago and received a course of antibiotics.     She was visiting family in [place] and became ill with confusion.  Family brought her back to [place].  She was seen by her PCP in the office 05/21/2024 and sent to the ER for evaluation.  She had chills and rigors and shortness of breath.  Chest x-ray was abnormal with right side infiltrate.  She was admitted and commenced on levofloxacin.  Blood culture has now grown MSSA.  Antibiotics modified to cefazolin.     Antibiotics   Levofloxacin:  05/21/2024-  Cefazolin: 5/23/24-  Nystatin swish and swallow     Microbiology   Blood culture 05/21/2024:  MSSA 1/2 anaerobic bottles   Urine culture 05/21/2024: NGTD    Review of Systems  Negative except as stated above in Interval History     OBJECTIVE   Temp:  [97.9 °F (36.6 °C)-101.3 °F (38.5 °C)] 98.7 °F (37.1 °C)  Pulse:  [] 77  Resp:  [16-20] 18  SpO2:  [90 %-100 %] 94 %  BP: ()/(59-80) 151/80  Temp:  [97.9 °F (36.6 °C)-101.3 °F (38.5 °C)]   Temp: 98.7 °F (37.1 °C) (05/26/24 0515)  Pulse: 77 (05/26/24 0705)  Resp: 18 (05/26/24 0705)  BP: (!) 151/80  (05/26/24 0332)  SpO2: (!) 94 % (05/26/24 0705)    Intake/Output Summary (Last 24 hours) at 5/26/2024 0805  Last data filed at 5/26/2024 0500  Gross per 24 hour   Intake 3174.28 ml   Output 2825 ml   Net 349.28 ml       Physical Exam  General:  Middle-aged man lying quietly in bed.  In no  respiratory difficulty   Respiratory:  Crackles right mid and lower zone posteriorly   CVS:  S1 and 2 heard   Abdomen:  Full, soft, nontender, no palpable organomegaly   Skin: No rash appreciated  CNS:  No focal deficits   Musculoskeletal system: No joint or bony abnormalities appreciated  HEENT: No oral thrush      VAD:  None  ISOLATION:  None    WOUNDS:  Not    Significant Labs: All pertinent labs within the past 24 hours have been reviewed.    CBC LAST 7 DAYS  Recent Labs   Lab 05/21/24  1508 05/22/24  0426 05/23/24  0357 05/24/24  0411 05/25/24  0827 05/26/24  0555   WBC 11.76 11.06 9.51 9.45 14.95* 13.68*   RBC 3.79* 3.36* 3.53* 3.13* 3.39* 2.85*   HGB 11.1* 9.9* 10.3* 9.1* 9.9* 8.3*   HCT 33.3* 29.3* 30.2* 26.7* 29.6* 24.5*   MCV 88 87 86 85 87 86   MCH 29.3 29.5 29.2 29.1 29.2 29.1   MCHC 33.3 33.8 34.1 34.1 33.4 33.9   RDW 17.0* 16.7* 16.8* 17.0* 17.2* 17.2*    284 331 380 444 460*   MPV 8.9* 8.8* 8.9* 8.8* 8.2* 8.4*   GRAN 76.7*  9.0* 72.1  8.0* 57.3  5.4 62.9  5.9 78.9*  11.8* 81.3*  11.1*   LYMPH 15.8*  1.9 17.1*  1.9 27.1  2.6 22.0  2.1 13.2*  2.0 11.7*  1.6   MONO 6.5  0.8 9.6  1.1* 14.4  1.4* 13.5  1.3* 6.6  1.0 5.6  0.8   BASO 0.03 0.02 0.03 0.02 0.03 0.02   NRBC 0 0 0 0 0 0       CHEMISTRY LAST 7 DAYS  Recent Labs   Lab 05/21/24  1508 05/22/24  0426 05/23/24  0357 05/24/24  0409 05/25/24  0827 05/26/24  0555   * 132* 132* 134* 138 134*   K 3.6 4.0 3.4* 3.1* 4.6 3.9   CL 98 100 99 102 99 101   CO2 21* 21* 22* 23 25 23   ANIONGAP 13 11 11 9 14 10   BUN 24* 11 5* 10 5* 5*   CREATININE 0.9 0.6 0.5 0.5 0.6 0.5   GLU 91 84 88 110 102 109   CALCIUM 10.5 9.1 9.2 8.6* 9.0 8.5*   MG 2.1 1.6  "1.4* 1.5* 1.7  --    ALBUMIN 2.7* 2.3* 2.1* 1.8* 1.9* 1.7*   PROT 7.0 5.9* 5.9* 5.3* 5.9* 5.2*   ALKPHOS 159* 142* 137* 120 138* 115   ALT 17 18 15 18 24 24   AST 26 22 26 22 32 49*   BILITOT 0.6 0.5 0.5 0.3 0.3 0.2       Estimated Creatinine Clearance: 83.8 mL/min (based on SCr of 0.5 mg/dL).    INFLAMMATORY/PROCAL  LAST 7 DAYS  Recent Labs   Lab 05/23/24  1152 05/26/24  0555   PROCAL  --  0.12   .6* 189.7*     No results found for: "ESR"  CRP   Date Value Ref Range Status   05/26/2024 189.7 (H) 0.0 - 8.2 mg/L Final   05/23/2024 218.6 (H) 0.0 - 8.2 mg/L Final   01/11/2023 5.3 0.0 - 8.2 mg/L Final       PRIOR  MICROBIOLOGY:    Susceptibility data from last 90 days.  Collected Specimen Info Organism Cefepime Ceftriaxone Ciprofloxacin Clindamycin Erythromycin Levofloxacin Meropenem Oxacillin Penicillin PIPERACILLIN/TAZO SUSCEPTIBILITY Tetracycline Trimeth/Sulfa Vancomycin   05/21/24 Sputum, Expectorated Staphylococcus aureus   S   S  S    S  R   S  S  S   05/21/24 Blood from Peripheral, Antecubital, Right Staphylococcus aureus   S   S  S    S  R   S  S  S   03/26/24 Blood No growth after 5 days.                03/26/24 Blood No growth after 5 days.                03/24/24 Blood Pseudomonas aeruginosa  S   S    S  S    S      03/24/24 Blood Pseudomonas aeruginosa                    LAST 7 DAYS MICROBIOLOGY   Microbiology Results (last 7 days)       Procedure Component Value Units Date/Time    Blood culture [[id number]] Collected: 05/26/24 0556    Order Status: Sent Specimen: Blood from Peripheral, Antecubital, Left     Blood culture #2 **CANNOT BE ORDERED STAT** [[id number]] Collected: 05/21/24 1526    Order Status: Completed Specimen: Blood from Peripheral, Antecubital, Left Updated: 05/25/24 2032     Blood Culture, Routine No Growth to date      No Growth to date      No Growth to date      No Growth to date      No Growth to date    Blood culture [[id number]] Collected: 05/25/24 0422    Order Status: " Completed Specimen: Blood from Peripheral, Antecubital, Right Updated: 05/25/24 1558     Blood Culture, Routine No Growth to date    Blood culture [[id number]] Collected: 05/23/24 0925    Order Status: Completed Specimen: Blood from Peripheral, Hand, Right Updated: 05/25/24 1432     Blood Culture, Routine No Growth to date      No Growth to date      No Growth to date    Blood culture [[id number]] Collected: 05/23/24 0927    Order Status: Completed Specimen: Blood from Peripheral, Antecubital, Left Updated: 05/25/24 1432     Blood Culture, Routine No Growth to date      No Growth to date      No Growth to date    Urine culture [[id number]] Collected: 05/21/24 2044    Order Status: Completed Specimen: Urine Updated: 05/25/24 0641     Urine Culture, Routine No growth    Narrative:      Specimen Source->Urine    Culture, Respiratory with Gram Stain [[id number]]  (Abnormal)  (Susceptibility) Collected: 05/21/24 2044    Order Status: Completed Specimen: Sputum, Expectorated Updated: 05/24/24 0812     Respiratory Culture STAPHYLOCOCCUS AUREUS  Moderate       Gram Stain (Respiratory) <10 epithelial cells per low power field.     Gram Stain (Respiratory) Many WBC's     Gram Stain (Respiratory) Few Gram positive cocci    Blood culture #1 **CANNOT BE ORDERED STAT** [[id number]]  (Abnormal)  (Susceptibility) Collected: 05/21/24 1516    Order Status: Completed Specimen: Blood from Peripheral, Antecubital, Right Updated: 05/24/24 0719     Blood Culture, Routine Gram stain codey bottle: Gram positive cocci      Results called to and read back by: Mounika Schuster LPN at [facility]      05/22/2024  21:40 KS3      STAPHYLOCOCCUS AUREUS  ID consult required for Staph aureus bacteremia.      Respiratory Infection Panel (PCR), Nasopharyngeal [[id number]] Collected: 05/23/24 1406    Order Status: Completed Specimen: Nasopharyngeal Swab Updated: 05/23/24 2042     Respiratory Infection Panel Source NP Swab     Adenovirus Not Detected      Coronavirus 229E, Common Cold Virus Not Detected     Coronavirus HKU1, Common Cold Virus Not Detected     Coronavirus NL63, Common Cold Virus Not Detected     Coronavirus OC43, Common Cold Virus Not Detected     Comment: Coronavirus strains 229E, HKU1, NL63, and OC43 can cause the common   cold   and are not associated with the respiratory disease outbreak caused   by  the COVID-19 (SARS-CoV-2 novel Coronavirus) strain.           SARS-CoV2 (COVID-19) Qualitative PCR Not Detected     Human Metapneumovirus Not Detected     Human Rhinovirus/Enterovirus Not Detected     Influenza A (subtypes H1, H1-2009,H3) Not Detected     Influenza B Not Detected     Parainfluenza Virus 1 Not Detected     Parainfluenza Virus 2 Not Detected     Parainfluenza Virus 3 Not Detected     Parainfluenza Virus 4 Not Detected     Respiratory Syncytial Virus Not Detected     Bordetella Parapertussis (UB4821) Not Detected     Bordetella pertussis (ptxP) Not Detected     Chlamydia pneumoniae Not Detected     Mycoplasma pneumoniae Not Detected     Comment: Respiratory Infection Panel testing performed by Multiplex PCR.       Rapid Organism ID by PCR (from Blood culture) [[id number]]  (Abnormal) Collected: 05/21/24 1516    Order Status: Completed Updated: 05/22/24 2256     Enterococcus faecalis Not Detected     Enterococcus faecium Not Detected     Listeria monocytogenes Not Detected     Staphylococcus spp. See species for ID     Staphylococcus aureus Detected     Staphylococcus epidermidis Not Detected     Staphylococcus lugdunensis Not Detected     Streptococcus species Not Detected     Streptococcus agalactiae Not Detected     Streptococcus pneumoniae Not Detected     Streptococcus pyogenes Not Detected     Acinetobacter calcoaceticus/baumannii complex Not Detected     Bacteroides fragilis Not Detected     Enterobacterales Not Detected     Enterobacter cloacae complex Not Detected     Escherichia coli Not Detected     Klebsiella aerogenes Not  Detected     Klebsiella oxytoca Not Detected     Klebsiella pneumoniae group Not Detected     Proteus Not Detected     Salmonella sp Not Detected     Serratia marcescens Not Detected     Haemophilus influenzae Not Detected     Neisseria meningtidis Not Detected     Pseudomonas aeruginosa Not Detected     Stenotrophomonas maltophilia Not Detected     Candida albicans Not Detected     Candida auris Not Detected     Candida glabrata Not Detected     Candida krusei Not Detected     Candida parapsilosis Not Detected     Candida tropicalis Not Detected     Cryptococcus neoformans/gattii Not Detected     CTX-M (ESBL ) Test not applicable     IMP (Carbapenem resistant) Test not applicable     KPC resistance gene (Carbapenem resistant) Test not applicable     mcr-1  Test not applicable     mec A/C  Test not applicable     mec A/C and MREJ (MRSA) gene Not Detected     NDM (Carbapenem resistant) Test not applicable     OXA-48-like (Carbapenem resistant) Test not applicable     van A/B (VRE gene) Test not applicable     VIM (Carbapenem resistant) Test not applicable          CURRENT/PREVIOUS VISIT EKG  Results for orders placed or performed during the hospital encounter of 05/21/24   EKG 12-lead    Collection Time: 05/21/24  3:30 PM   Result Value Ref Range    QRS Duration 96 ms    [facility]HS QTC Calculation 459 ms    Narrative    Test Reason : R06.02,    Vent. Rate : 089 BPM     Atrial Rate : 089 BPM     P-R Int : 172 ms          QRS Dur : 096 ms      QT Int : 378 ms       P-R-T Axes : 029 085 038 degrees     QTc Int : 459 ms    Normal sinus rhythm  Low voltage QRS  Borderline Abnormal ECG  When compared with ECG of 17-JAN-2023 14:53,  No significant change was found  Confirmed by Trevor Salvador MD ([id number]) on 5/24/2024 12:09:59 PM    Referred By: MORGAN PARADA           Confirmed By:Trevor Salvador MD      Significant Imaging: I have reviewed all relevant and available imaging results/findings within the past 24 hours.    Cleopatra  MD Diane  Date of Service: 05/26/2024      This note was created using [employer] voice recognition software that occasionally misinterpreted phrases or words.

## 2024-05-26 NOTE — PROGRESS NOTES
"[facility] Medicine  Progress Note    Patient Name: Floresita Martin  MRN: [id number]  Patient Class: IP- Inpatient   Admission Date: 5/21/2024  Length of Stay: 4 days  Attending Physician: Michael Navarrete MD  Primary Care Provider: Toni Hughes MD        Subjective:     Principal Problem:Sepsis        HPI:  Floresita Martin is a 64 year old female with a previous medical history of HTN, HLD, GERD, opioid dependence with current use, metabolic encephalopathy, pneumonia, and COPD who was sent from PCP office for fever, cough, and shortness of breath. Patient is poor historian and unable to exactly recall when symptoms began but endorses fever, cough, exertional shortness of breath and loss of voice over the past two weeks. Reports that her  could provide more information but he was not at bedside. ED note reports that patient endorsed intermittent confusion. Initial ED workup included a chest xray that reads: "Findings concerning for multi lobar pneumonia in the right lung with possible involvement of the left lung base as well". CBC shows stable anemia, magnesium and lactic acid normal, cmp unremarkable. Patient vitals are stable and she is maintaining an oxygen saturation of 96% on room air. She is having productive cough with moderate amount of brown/yellow sputum. Most recent hospital admission from 3/26 was for pseudomonas bacteremia. Patient initiated on Levaquin IV in ED which has been continued. Upon admit patient is AAOx4 and does not appear in any respiratory distress. She is positive for a voice change which she reports has worsened over the last two weeks and has "lost her voice" but at baseline it is raspy. Patient admitted by hospital medicine for further evaluation and management.    Overview/Hospital Course:  Ms Martin was admitted after her PCP sent her for fever, cough, and SOB. Patient was found to have intermittent confusion w/ multilobar pneumonia in " her right lung w/ left lung base involvement. She was started on Levaquin with breathing treatments with SPO2 of 96% on RA in ED. Last admission 3/26 for Pseudomonas Bacteremia. Patient placed on supplemental Oxygen with last documented fever on 5/22 with blood, sputum, and urine cultures pending. Today, blood culture #1  and sputum + for staph aureus. Cefazolin 2 gram given with ID consult. On rounds patient with c/o mouth pain, thrush present started on nystatin. Will await additional recommendations from ID.    Interval History:     Lying in bed resting, easily awakens.  Feels better today.  No overnight events.  Pain controlled.  White blood cells and inflammatory markers trending down.  Repeat blood cultures negative so far.   T-max 101.3. Complains of having a lip laceration from biting it while eating.  FRANCO is pending.    Review of Systems   HENT:  Positive for mouth sores (started on nystatin for thrush).    Respiratory:  Positive for cough (productive) and shortness of breath.    Cardiovascular:  Positive for chest pain (chest wall, with coughing).   Musculoskeletal:  Positive for arthralgias.   All other systems reviewed and are negative.    Objective:     Vital Signs (Most Recent):  Temp: 98.7 °F (37.1 °C) (05/26/24 0515)  Pulse: 77 (05/26/24 0705)  Resp: 18 (05/26/24 0705)  BP: (!) 151/80 (05/26/24 0332)  SpO2: (!) 94 % (05/26/24 0705) Vital Signs (24h Range):  Temp:  [97.9 °F (36.6 °C)-101.3 °F (38.5 °C)] 98.7 °F (37.1 °C)  Pulse:  [] 77  Resp:  [16-20] 18  SpO2:  [90 %-100 %] 94 %  BP: ()/(59-80) 151/80     Weight: 49 kg (108 lb)  Body mass index is 21.09 kg/m².    Intake/Output Summary (Last 24 hours) at 5/26/2024 0758  Last data filed at 5/26/2024 0500  Gross per 24 hour   Intake 3324.28 ml   Output 3825 ml   Net -500.72 ml         Physical Exam  Constitutional:       General: She is not in acute distress.  Eyes:      Conjunctiva/sclera: Conjunctivae normal.      Pupils: Pupils are equal,  round, and reactive to light.   Cardiovascular:      Rate and Rhythm: Normal rate and regular rhythm.      Pulses: Normal pulses.      Heart sounds: Normal heart sounds.   Pulmonary:      Effort: Pulmonary effort is normal. No respiratory distress.      Breath sounds: Examination of the right-lower field reveals decreased breath sounds. Examination of the left-lower field reveals decreased breath sounds. Decreased breath sounds present.      Comments:   Cough not noted currently  Abdominal:      General: Bowel sounds are normal. There is no distension.      Palpations: Abdomen is soft.      Tenderness: There is no abdominal tenderness.   Musculoskeletal:         General: No swelling. Normal range of motion.      Cervical back: Normal range of motion and neck supple.   Skin:     General: Skin is warm and dry.      Capillary Refill: Capillary refill takes less than 2 seconds.   Neurological:      General: No focal deficit present.      Mental Status: She is alert and oriented to person, place, and time.      Cranial Nerves: No cranial nerve deficit.   Psychiatric:         Attention and Perception: Attention normal.         Mood and Affect: Mood and affect normal.             Significant Labs: All pertinent labs within the past 24 hours have been reviewed.  CBC:   Recent Labs   Lab 05/25/24  0827 05/26/24  0555   WBC 14.95* 13.68*   HGB 9.9* 8.3*   HCT 29.6* 24.5*    460*     CMP:   Recent Labs   Lab 05/25/24  0827 05/26/24  0555    134*   K 4.6 3.9   CL 99 101   CO2 25 23    109   BUN 5* 5*   CREATININE 0.6 0.5   CALCIUM 9.0 8.5*   PROT 5.9* 5.2*   ALBUMIN 1.9* 1.7*   BILITOT 0.3 0.2   ALKPHOS 138* 115   AST 32 49*   ALT 24 24   ANIONGAP 14 10     Magnesium:   Recent Labs   Lab 05/25/24  0827   MG 1.7         Microbiology Results (last 7 days)       Procedure Component Value Units Date/Time    Blood culture [[id number]] Collected: 05/26/24 0556    Order Status: Sent Specimen: Blood from Peripheral,  Antecubital, Left     Blood culture #2 **CANNOT BE ORDERED STAT** [[id number]] Collected: 05/21/24 1526    Order Status: Completed Specimen: Blood from Peripheral, Antecubital, Left Updated: 05/25/24 2032     Blood Culture, Routine No Growth to date      No Growth to date      No Growth to date      No Growth to date      No Growth to date    Blood culture [[id number]] Collected: 05/25/24 0422    Order Status: Completed Specimen: Blood from Peripheral, Antecubital, Right Updated: 05/25/24 1558     Blood Culture, Routine No Growth to date    Blood culture [[id number]] Collected: 05/23/24 0925    Order Status: Completed Specimen: Blood from Peripheral, Hand, Right Updated: 05/25/24 1432     Blood Culture, Routine No Growth to date      No Growth to date      No Growth to date    Blood culture [[id number]] Collected: 05/23/24 0927    Order Status: Completed Specimen: Blood from Peripheral, Antecubital, Left Updated: 05/25/24 1432     Blood Culture, Routine No Growth to date      No Growth to date      No Growth to date    Urine culture [[id number]] Collected: 05/21/24 2044    Order Status: Completed Specimen: Urine Updated: 05/25/24 0641     Urine Culture, Routine No growth    Narrative:      Specimen Source->Urine    Culture, Respiratory with Gram Stain [[id number]]  (Abnormal)  (Susceptibility) Collected: 05/21/24 2044    Order Status: Completed Specimen: Sputum, Expectorated Updated: 05/24/24 0812     Respiratory Culture STAPHYLOCOCCUS AUREUS  Moderate       Gram Stain (Respiratory) <10 epithelial cells per low power field.     Gram Stain (Respiratory) Many WBC's     Gram Stain (Respiratory) Few Gram positive cocci    Blood culture #1 **CANNOT BE ORDERED STAT** [[id number]]  (Abnormal)  (Susceptibility) Collected: 05/21/24 1516    Order Status: Completed Specimen: Blood from Peripheral, Antecubital, Right Updated: 05/24/24 0719     Blood Culture, Routine Gram stain codey bottle: Gram positive cocci      Results called  to and read back by: Mounika Schuster LPN at [facility]      05/22/2024  21:40 KS3      STAPHYLOCOCCUS AUREUS  ID consult required for Staph aureus bacteremia.      Respiratory Infection Panel (PCR), Nasopharyngeal [[id number]] Collected: 05/23/24 1406    Order Status: Completed Specimen: Nasopharyngeal Swab Updated: 05/23/24 2042     Respiratory Infection Panel Source NP Swab     Adenovirus Not Detected     Coronavirus 229E, Common Cold Virus Not Detected     Coronavirus HKU1, Common Cold Virus Not Detected     Coronavirus NL63, Common Cold Virus Not Detected     Coronavirus OC43, Common Cold Virus Not Detected     Comment: Coronavirus strains 229E, HKU1, NL63, and OC43 can cause the common   cold   and are not associated with the respiratory disease outbreak caused   by  the COVID-19 (SARS-CoV-2 novel Coronavirus) strain.           SARS-CoV2 (COVID-19) Qualitative PCR Not Detected     Human Metapneumovirus Not Detected     Human Rhinovirus/Enterovirus Not Detected     Influenza A (subtypes H1, H1-2009,H3) Not Detected     Influenza B Not Detected     Parainfluenza Virus 1 Not Detected     Parainfluenza Virus 2 Not Detected     Parainfluenza Virus 3 Not Detected     Parainfluenza Virus 4 Not Detected     Respiratory Syncytial Virus Not Detected     Bordetella Parapertussis (WR6451) Not Detected     Bordetella pertussis (ptxP) Not Detected     Chlamydia pneumoniae Not Detected     Mycoplasma pneumoniae Not Detected     Comment: Respiratory Infection Panel testing performed by Multiplex PCR.       Rapid Organism ID by PCR (from Blood culture) [[id number]]  (Abnormal) Collected: 05/21/24 1516    Order Status: Completed Updated: 05/22/24 2256     Enterococcus faecalis Not Detected     Enterococcus faecium Not Detected     Listeria monocytogenes Not Detected     Staphylococcus spp. See species for ID     Staphylococcus aureus Detected     Staphylococcus epidermidis Not Detected     Staphylococcus lugdunensis Not Detected      Streptococcus species Not Detected     Streptococcus agalactiae Not Detected     Streptococcus pneumoniae Not Detected     Streptococcus pyogenes Not Detected     Acinetobacter calcoaceticus/baumannii complex Not Detected     Bacteroides fragilis Not Detected     Enterobacterales Not Detected     Enterobacter cloacae complex Not Detected     Escherichia coli Not Detected     Klebsiella aerogenes Not Detected     Klebsiella oxytoca Not Detected     Klebsiella pneumoniae group Not Detected     Proteus Not Detected     Salmonella sp Not Detected     Serratia marcescens Not Detected     Haemophilus influenzae Not Detected     Neisseria meningtidis Not Detected     Pseudomonas aeruginosa Not Detected     Stenotrophomonas maltophilia Not Detected     Candida albicans Not Detected     Candida auris Not Detected     Candida glabrata Not Detected     Candida krusei Not Detected     Candida parapsilosis Not Detected     Candida tropicalis Not Detected     Cryptococcus neoformans/gattii Not Detected     CTX-M (ESBL ) Test not applicable     IMP (Carbapenem resistant) Test not applicable     KPC resistance gene (Carbapenem resistant) Test not applicable     mcr-1  Test not applicable     mec A/C  Test not applicable     mec A/C and MREJ (MRSA) gene Not Detected     NDM (Carbapenem resistant) Test not applicable     OXA-48-like (Carbapenem resistant) Test not applicable     van A/B (VRE gene) Test not applicable     VIM (Carbapenem resistant) Test not applicable                Significant Imaging:  None today    Assessment/Plan:      * Sepsis  This patient does have evidence of infective focus  My overall impression is sepsis.  Source: Respiratory and bacteremia  Antibiotics given-   Antibiotics (72h ago, onward)      Start     Stop Route Frequency Ordered    05/25/24 1000  levoFLOXacin tablet 750 mg         05/29/24 0959 Oral Daily 05/24/24 1011    05/24/24 0930  ceFAZolin 2 g in dextrose 5 % in water (D5W) 50 mL IVPB  "(MB+)         -- IV Every 8 hours (non-standard times) 05/24/24 0820          Latest lactate reviewed-  No results for input(s): "LACTATE", "POCLAC" in the last 72 hours.    Organ dysfunction indicated by  none    Fluid challenge Not needed - patient is not hypotensive      Post- resuscitation assessment No - Post resuscitation assessment not needed       Will Not start Pressors- Levophed for MAP of 65  Source control achieved by:  IV antibiotics  ---------------------------------------------------------   WBC inflammatory markers are trending down.  T-max 101.3°  Urine culture negative  Blood culture 5/21:  MSSA 1 set, NGTD 1 set  Blood culture 5/23:  NGTD   Blood cultures 5/25: NGTD  Blood cultures 5/26: pending  Sputum culture (+)  staph aureus  Echocardiogram negative for vegetation  Id following  Continue po Levaquin and IV Ancef  FRANCO pending    Pneumonia due to methicillin susceptible Staphylococcus aureus (MSSA)  Patient has a diagnosis of pneumonia. The cause of the pneumonia is bacterial in etiology due to MSSA. The pneumonia is stable. The patient has the following signs/symptoms of pneumonia: cough, sputum production, and shortness of breath. The patient does not have a current oxygen requirement and the patient does not have a home oxygen requirement. I have reviewed the pertinent imaging. The following cultures have been collected: Blood cultures and Sputum culture The culture results are listed below.     Current antimicrobial regimen consists of the antibiotics listed below. Will monitor patient closely and Adjust treatment plan as follows add Tussionex q.12 hours PRN cough  Antibiotics (From admission, onward)       Start     Stop Route Frequency Ordered     05/25/24 1000   levoFLOXacin tablet 750 mg         05/29/24 0959 Oral Daily 05/24/24 1011     05/24/24 0930   ceFAZolin 2 g in dextrose 5 % in water (D5W) 50 mL IVPB (MB+)         -- IV Every 8 hours (non-standard times) 05/24/24 0820        "   Sputum culture positive MSSA  Respiratory panel negative  Id following  Continue IV Ancef and p.o. Levaquin, Mucinex 1200 mg b.I.d., nebulizers, supplemental O2, and incentive spirometry            Bacteremia  Blood culture 5/21:  MSSA 1 set, NGTD 1 set  Blood culture 5/23:  NGTD  Blood culture 5/25: NGTD  Blood culture 5/26: pending  Echo negative for vegetation  Id following  Continue  p.o. Levaquin and IV cefazolin  Obtain FRANCO per ID recommendation: Pending  NPO after midnight for possible FRANCO tomorrow          Acute hypoxic respiratory failure  Patient with Hypoxic Respiratory failure which is Acute.  she is not on home oxygen. Supplemental oxygen was provided and noted-      Signs/symptoms of respiratory failure include- tachypnea and increased work of breathing. Contributing diagnoses includes - COPD and Pneumonia Labs and images were reviewed. Will treat underlying causes and adjust management of respiratory failure as follows-  continue weaning supplemental O2 as tolerated  ------------------------------------------------------------   No currently requiring supplemental O2. Desat study normal   Continue Incentive spirometer    COPD with acute lower respiratory infection  Patient's COPD is with exacerbation noted by continued dyspnea and worsening of baseline hypoxia currently due to pneumonia.  Patient is currently off COPD Pathway. Continue scheduled inhalers Antibiotics and Supplemental oxygen and monitor respiratory status closely.   -----------------------------------------   Seen pneumonia A/P    Thrush  Stable. Continue nystatin      Moderate protein-calorie malnutrition  Nutrition consulted. Most recent weight and BMI monitored-     Measurements:  Wt Readings from Last 1 Encounters:   05/23/24 49 kg (108 lb)   Body mass index is 21.09 kg/m².    Patient has been screened and assessed by RD.    Malnutrition Type:  Context: acute illness or injury  Level: moderate    Malnutrition Characteristic  Summary:  Weight Loss (Malnutrition): 5% in 1 month  Energy Intake (Malnutrition): less than 75% for greater than or equal to 1 month  Subcutaneous Fat (Malnutrition): mild depletion  Muscle Mass (Malnutrition): mild depletion    Interventions/Recommendations (treatment strategy):   Encourage intake of Cardiac diet  Added Boost Plus wit all meals  Suggest daily multivitamin  Monitor intake and need for appetite stimulant  Monitor weight and labs  Collaborate with health care providers        Hypertension, essential  Chronic, controlled. Latest blood pressure and vitals reviewed-     Temp:  [97.9 °F (36.6 °C)-101.3 °F (38.5 °C)]   Pulse:  []   Resp:  [16-20]   BP: ()/(59-80)   SpO2:  [90 %-100 %] .   Home meds for hypertension were reviewed and noted below.   Hypertension Medications               losartan (COZAAR) 50 MG tablet Take 1 tablet (50 mg total) by mouth once daily.            While in the hospital, will manage blood pressure as follows; Adjust home antihypertensive regimen as follows- holding losartan    Will utilize p.r.n. blood pressure medication only if patient's blood pressure greater than 160/100 and she develops symptoms such as worsening chest pain or shortness of breath.  -----------------------------------------------  Continue holding losartan due to risk for hypotension  Continue IV fluid hydration to maintain perfusion    Normocytic anemia  Patient's anemia is currently controlled. Has not received any PRBCs to date. Etiology likely d/t  possible chronic blood loss  Current CBC reviewed-   Lab Results   Component Value Date    HGB 8.3 (L) 05/26/2024    HCT 24.5 (L) 05/26/2024     Monitor serial CBC and transfuse if patient becomes hemodynamically unstable, symptomatic or H/H drops below 7/21.  -----------------------------------------------------------   Had anemia workup 3/26/24:   Lab Results   Component Value Date    IRON 31 03/26/2024    TRANSFERRIN 185 (L) 03/26/2024    TIBC  259 03/26/2024    FESATURATED 12 (L) 03/26/2024      Component Ref Range & Units 1 mo ago   Occult Blood Negative Positive Abnormal       Anemia stable/asymptomatic.  Will need to follow up with GI when discharged.      Hyponatremia  Patient has hyponatremia which is recurrent controlled,We will aim to correct the sodium by 4-6mEq in 24 hours. We will monitor sodium Daily. The hyponatremia is due to Dehydration/hypovolemia. We will treat the hyponatremia with IV fluids as follows:  isotonic NS. The patient's sodium results have been reviewed and are listed below.  Recent Labs   Lab 05/26/24  0555   *     ----------------------------------------------------------  Continue isotonic NS      Chronic pain syndrome   Controlled. Managed outpatient by pain management. Continue home pain regimen  Per : Morphine ER 30mg and Oxy IR 20 mg      Dependence on nicotine from cigarettes  Dangers of cigarette smoking were reviewed with patient in detail. Patient was Counseled for 3-10 minutes. Nicotine replacement options were discussed. Nicotine replacement was discussed- prescribed Nicotine patch PRN  Patient reports smoking cessation 1 week prior.    Gastroesophageal reflux disease without esophagitis  Chronic/stable.  Continue PPI        VTE Risk Mitigation (From admission, onward)           Ordered     enoxaparin injection 40 mg  Daily         05/21/24 1708     IP VTE HIGH RISK PATIENT  Once         05/21/24 1708     Place sequential compression device  Until discontinued         05/21/24 1708                    Discharge Planning   GRACE: 5/27/2024     Code Status: Full Code   Is the patient medically ready for discharge?:     Reason for patient still in hospital (select all that apply): Patient trending condition, Treatment, Consult recommendations, and Other (specify) Awaitng FRANCO and repeat cultures  Discharge Plan A: Home with family                  Paula Pierce NP  Department of Hospital Medicine   [place]  [facility]/Surg

## 2024-05-27 PROBLEM — B95.61 MSSA BACTEREMIA: Status: ACTIVE | Noted: 2024-03-26

## 2024-05-27 PROBLEM — J96.01 ACUTE HYPOXIC RESPIRATORY FAILURE: Status: RESOLVED | Noted: 2024-03-25 | Resolved: 2024-05-27

## 2024-05-27 LAB
ANION GAP SERPL CALC-SCNC: 10 MMOL/L (ref 8–16)
BUN SERPL-MCNC: 4 MG/DL (ref 8–23)
CALCIUM SERPL-MCNC: 8.4 MG/DL (ref 8.7–10.5)
CHLORIDE SERPL-SCNC: 103 MMOL/L (ref 95–110)
CO2 SERPL-SCNC: 22 MMOL/L (ref 23–29)
CREAT SERPL-MCNC: 0.5 MG/DL (ref 0.5–1.4)
CRP SERPL-MCNC: 180.1 MG/L (ref 0–8.2)
ERYTHROCYTE [DISTWIDTH] IN BLOOD BY AUTOMATED COUNT: 17.6 % (ref 11.5–14.5)
EST. GFR  (NO RACE VARIABLE): >60 ML/MIN/1.73 M^2
GLUCOSE SERPL-MCNC: 105 MG/DL (ref 70–110)
HCT VFR BLD AUTO: 25.5 % (ref 37–48.5)
HGB BLD-MCNC: 8.5 G/DL (ref 12–16)
MCH RBC QN AUTO: 29.1 PG (ref 27–31)
MCHC RBC AUTO-ENTMCNC: 33.3 G/DL (ref 32–36)
MCV RBC AUTO: 87 FL (ref 82–98)
PLATELET # BLD AUTO: 602 K/UL (ref 150–450)
PMV BLD AUTO: 8.6 FL (ref 9.2–12.9)
POTASSIUM SERPL-SCNC: 3.6 MMOL/L (ref 3.5–5.1)
PROCALCITONIN SERPL IA-MCNC: 0.08 NG/ML (ref 0–0.5)
RBC # BLD AUTO: 2.92 M/UL (ref 4–5.4)
SODIUM SERPL-SCNC: 135 MMOL/L (ref 136–145)
WBC # BLD AUTO: 11.26 K/UL (ref 3.9–12.7)

## 2024-05-27 PROCEDURE — 94799 UNLISTED PULMONARY SVC/PX: CPT

## 2024-05-27 PROCEDURE — 63600175 PHARM REV CODE 636 W HCPCS: Performed by: NURSE PRACTITIONER

## 2024-05-27 PROCEDURE — 86140 C-REACTIVE PROTEIN: CPT | Performed by: INTERNAL MEDICINE

## 2024-05-27 PROCEDURE — 25000003 PHARM REV CODE 250: Performed by: STUDENT IN AN ORGANIZED HEALTH CARE EDUCATION/TRAINING PROGRAM

## 2024-05-27 PROCEDURE — 63600175 PHARM REV CODE 636 W HCPCS

## 2024-05-27 PROCEDURE — 25000003 PHARM REV CODE 250: Performed by: NURSE PRACTITIONER

## 2024-05-27 PROCEDURE — 36415 COLL VENOUS BLD VENIPUNCTURE: CPT | Performed by: INTERNAL MEDICINE

## 2024-05-27 PROCEDURE — 36415 COLL VENOUS BLD VENIPUNCTURE: CPT | Performed by: STUDENT IN AN ORGANIZED HEALTH CARE EDUCATION/TRAINING PROGRAM

## 2024-05-27 PROCEDURE — 02HV33Z INSERTION OF INFUSION DEVICE INTO SUPERIOR VENA CAVA, PERCUTANEOUS APPROACH: ICD-10-PCS | Performed by: STUDENT IN AN ORGANIZED HEALTH CARE EDUCATION/TRAINING PROGRAM

## 2024-05-27 PROCEDURE — 25000242 PHARM REV CODE 250 ALT 637 W/ HCPCS

## 2024-05-27 PROCEDURE — A4216 STERILE WATER/SALINE, 10 ML: HCPCS | Performed by: STUDENT IN AN ORGANIZED HEALTH CARE EDUCATION/TRAINING PROGRAM

## 2024-05-27 PROCEDURE — 76937 US GUIDE VASCULAR ACCESS: CPT

## 2024-05-27 PROCEDURE — S4991 NICOTINE PATCH NONLEGEND: HCPCS

## 2024-05-27 PROCEDURE — 11000001 HC ACUTE MED/SURG PRIVATE ROOM

## 2024-05-27 PROCEDURE — 25000242 PHARM REV CODE 250 ALT 637 W/ HCPCS: Performed by: STUDENT IN AN ORGANIZED HEALTH CARE EDUCATION/TRAINING PROGRAM

## 2024-05-27 PROCEDURE — 36573 INSJ PICC RS&I 5 YR+: CPT

## 2024-05-27 PROCEDURE — C1751 CATH, INF, PER/CENT/MIDLINE: HCPCS

## 2024-05-27 PROCEDURE — 99231 SBSQ HOSP IP/OBS SF/LOW 25: CPT | Mod: ,,, | Performed by: INTERNAL MEDICINE

## 2024-05-27 PROCEDURE — 25000003 PHARM REV CODE 250

## 2024-05-27 PROCEDURE — 25000003 PHARM REV CODE 250: Performed by: INTERNAL MEDICINE

## 2024-05-27 PROCEDURE — 80048 BASIC METABOLIC PNL TOTAL CA: CPT | Performed by: STUDENT IN AN ORGANIZED HEALTH CARE EDUCATION/TRAINING PROGRAM

## 2024-05-27 PROCEDURE — 94761 N-INVAS EAR/PLS OXIMETRY MLT: CPT

## 2024-05-27 PROCEDURE — 84145 PROCALCITONIN (PCT): CPT | Performed by: INTERNAL MEDICINE

## 2024-05-27 PROCEDURE — 94640 AIRWAY INHALATION TREATMENT: CPT

## 2024-05-27 PROCEDURE — 85027 COMPLETE CBC AUTOMATED: CPT | Performed by: STUDENT IN AN ORGANIZED HEALTH CARE EDUCATION/TRAINING PROGRAM

## 2024-05-27 RX ORDER — MUPIROCIN 20 MG/G
OINTMENT TOPICAL 2 TIMES DAILY
Status: DISCONTINUED | OUTPATIENT
Start: 2024-05-27 | End: 2024-05-28 | Stop reason: HOSPADM

## 2024-05-27 RX ORDER — LOSARTAN POTASSIUM 25 MG/1
50 TABLET ORAL DAILY
Status: DISCONTINUED | OUTPATIENT
Start: 2024-05-28 | End: 2024-05-28 | Stop reason: HOSPADM

## 2024-05-27 RX ADMIN — Medication 1 PATCH: at 08:05

## 2024-05-27 RX ADMIN — CEFAZOLIN 2 G: 2 INJECTION, POWDER, FOR SOLUTION INTRAMUSCULAR; INTRAVENOUS at 08:05

## 2024-05-27 RX ADMIN — GUAIFENESIN 1200 MG: 600 TABLET, EXTENDED RELEASE ORAL at 08:05

## 2024-05-27 RX ADMIN — NYSTATIN 500000 UNITS: 100000 SUSPENSION ORAL at 11:05

## 2024-05-27 RX ADMIN — BUDESONIDE INHALATION 1 MG: 0.5 SUSPENSION RESPIRATORY (INHALATION) at 07:05

## 2024-05-27 RX ADMIN — MUPIROCIN 1 G: 20 OINTMENT TOPICAL at 08:05

## 2024-05-27 RX ADMIN — OXYCODONE 20 MG: 5 TABLET ORAL at 05:05

## 2024-05-27 RX ADMIN — NYSTATIN 500000 UNITS: 100000 SUSPENSION ORAL at 08:05

## 2024-05-27 RX ADMIN — ARFORMOTEROL TARTRATE 15 MCG: 15 SOLUTION RESPIRATORY (INHALATION) at 07:05

## 2024-05-27 RX ADMIN — OXYCODONE 20 MG: 5 TABLET ORAL at 04:05

## 2024-05-27 RX ADMIN — PANTOPRAZOLE SODIUM 40 MG: 40 TABLET, DELAYED RELEASE ORAL at 08:05

## 2024-05-27 RX ADMIN — SODIUM CHLORIDE, PRESERVATIVE FREE 10 ML: 5 INJECTION INTRAVENOUS at 06:05

## 2024-05-27 RX ADMIN — ENOXAPARIN SODIUM 40 MG: 40 INJECTION SUBCUTANEOUS at 05:05

## 2024-05-27 RX ADMIN — AMITRIPTYLINE HYDROCHLORIDE 25 MG: 25 TABLET, FILM COATED ORAL at 11:05

## 2024-05-27 RX ADMIN — Medication 400 MG: at 08:05

## 2024-05-27 RX ADMIN — PREGABALIN 75 MG: 75 CAPSULE ORAL at 08:05

## 2024-05-27 RX ADMIN — SODIUM CHLORIDE, PRESERVATIVE FREE 10 ML: 5 INJECTION INTRAVENOUS at 12:05

## 2024-05-27 RX ADMIN — CETIRIZINE HYDROCHLORIDE 10 MG: 10 TABLET, FILM COATED ORAL at 08:05

## 2024-05-27 RX ADMIN — ATORVASTATIN CALCIUM 40 MG: 40 TABLET, FILM COATED ORAL at 08:05

## 2024-05-27 RX ADMIN — OXYCODONE 20 MG: 5 TABLET ORAL at 11:05

## 2024-05-27 RX ADMIN — CEFAZOLIN 2 G: 2 INJECTION, POWDER, FOR SOLUTION INTRAMUSCULAR; INTRAVENOUS at 05:05

## 2024-05-27 RX ADMIN — ACETAMINOPHEN 650 MG: 325 TABLET ORAL at 03:05

## 2024-05-27 RX ADMIN — NYSTATIN 500000 UNITS: 100000 SUSPENSION ORAL at 05:05

## 2024-05-27 RX ADMIN — MORPHINE SULFATE 30 MG: 15 TABLET, EXTENDED RELEASE ORAL at 08:05

## 2024-05-27 RX ADMIN — LEVALBUTEROL 1.25 MG: 1.25 SOLUTION, CONCENTRATE RESPIRATORY (INHALATION) at 07:05

## 2024-05-27 RX ADMIN — LACTULOSE 15 G: 20 SOLUTION ORAL at 08:05

## 2024-05-27 RX ADMIN — CEFAZOLIN 2 G: 2 INJECTION, POWDER, FOR SOLUTION INTRAMUSCULAR; INTRAVENOUS at 12:05

## 2024-05-27 RX ADMIN — PREGABALIN 75 MG: 75 CAPSULE ORAL at 03:05

## 2024-05-27 RX ADMIN — LEVOFLOXACIN 750 MG: 250 TABLET, FILM COATED ORAL at 10:05

## 2024-05-27 RX ADMIN — LEVALBUTEROL 1.25 MG: 1.25 SOLUTION, CONCENTRATE RESPIRATORY (INHALATION) at 01:05

## 2024-05-27 NOTE — PLAN OF CARE
Problem: Adult Inpatient Plan of Care  Goal: Plan of Care Review  Outcome: Progressing  Goal: Patient-Specific Goal (Individualized)  Outcome: Progressing  Goal: Absence of Hospital-Acquired Illness or Injury  Outcome: Progressing  Goal: Optimal Comfort and Wellbeing  Outcome: Progressing  Goal: Readiness for Transition of Care  Outcome: Progressing     Problem: Pneumonia  Goal: Fluid Balance  Outcome: Progressing  Goal: Resolution of Infection Signs and Symptoms  Outcome: Progressing  Goal: Effective Oxygenation and Ventilation  Outcome: Progressing     Problem: Sepsis/Septic Shock  Goal: Optimal Coping  Outcome: Progressing  Goal: Absence of Bleeding  Outcome: Progressing  Goal: Blood Glucose Level Within Targeted Range  Outcome: Progressing  Goal: Absence of Infection Signs and Symptoms  Outcome: Progressing  Goal: Optimal Nutrition Intake  Outcome: Progressing     Problem: Pain Acute  Goal: Optimal Pain Control and Function  Outcome: Progressing     Problem: Infection  Goal: Absence of Infection Signs and Symptoms  Outcome: Progressing   Plan of care reviewed with patient. Patient verbalized complete understanding. Two hour patient rounding maintained throughout shift. All fall precautions maintained. Bed in lowest position, locked, call light within reach. Side rails up x 2. Slip resistant socks maintained. Needs attended to.

## 2024-05-27 NOTE — ASSESSMENT & PLAN NOTE
Controlled. Managed outpatient by pain management. Per : Morphine ER 30mg, Oxy IR 20 mg, pregabalin  - Continue home pain regimen

## 2024-05-27 NOTE — ASSESSMENT & PLAN NOTE
From MSSA pneumonia with bacteremia  Improving   - ID following  - completing 7 days levaquin (last dose 5/28)  - to complete 6 weeks ancef per ID

## 2024-05-27 NOTE — ASSESSMENT & PLAN NOTE
Patient's anemia is currently controlled. Has not received any PRBCs to date. Etiology likely d/t unknown. No active bleeding  Current CBC reviewed-   Lab Results   Component Value Date    HGB 8.5 (L) 05/27/2024    HCT 25.5 (L) 05/27/2024     Monitor serial CBC and transfuse if patient becomes hemodynamically unstable, symptomatic or H/H drops below 7/21.  -----------------------------------------------------------   Had anemia workup 3/26/24:   Lab Results   Component Value Date    IRON 31 03/26/2024    TRANSFERRIN 185 (L) 03/26/2024    TIBC 259 03/26/2024    FESATURATED 12 (L) 03/26/2024      Component Ref Range & Units 1 mo ago   Occult Blood Negative Positive Abnormal       Anemia stable/asymptomatic.  Will need to follow up with GI when discharged.

## 2024-05-27 NOTE — PHYSICIAN QUERY
Due to the conflicting clinical picture, please clinically validate the SEPSIS. If validated, please provide additional clinical support for the SEPSIS..   The condition is confirmed. Please specify clinical support (signs, symptoms, and treatment) for the confirmed diagnosis: Admitted with heart rate over 90 and respiratory rate over 20 meeting SIRS criteria and with source of infection: pneumonia. Febrile during hospital course as well.

## 2024-05-27 NOTE — PROGRESS NOTES
..,[facility]/Surg   Department of Infectious Disease  Progress Note        PATIENT NAME: Floresita Martin  MRN: [id number]  TODAY'S DATE: 05/27/2024  ADMIT DATE: 5/21/2024  LOS: 5 days    CHIEF COMPLAINT: Altered Mental Status (Cough, fever, sob and has had history of pneumonia with admission last month )      PRINCIPLE PROBLEM: Sepsis    INTERVAL HISTORY      05/24/2024:  CT chest confirmed bilateral pneumonia with patchy infiltrates.  Repeat blood culture 05/26/2024 so far negative.      05/25/2024 Dr Contreras    Looks and feels well.   + cough with clear sputum.   Tmax is improving 101--100.9--100.4  No pain over her spine. Admits to picking on her skin. Denies IVDU (drug screen positive for opiates)    05/26/2024 NO new complaints. Watching tv. Plan for shin    05/27/2024 I came and saw patient.  She has no new complaints.  No fever no chills.  Cough is improving.  Less phlegm CRP is still elevated.  She was supposed to have a SHIN today but cardiologist is concerned about complications from SHIN in setting of history of esophageal issues.  I discussed the importance of maintaining a PICC, taking care of it,.  Patient denies IV drug use and promises proper follow-up and compliance    Antibiotics (From admission, onward)      Start     Stop Route Frequency Ordered    05/25/24 1000  levoFLOXacin tablet 750 mg         05/29/24 0959 Oral Daily 05/24/24 1011    05/24/24 0930  ceFAZolin 2 g in dextrose 5 % in water (D5W) 50 mL IVPB (MB+)         -- IV Every 8 hours (non-standard times) 05/24/24 0820          Antifungals (From admission, onward)      Start     Stop Route Frequency Ordered    05/23/24 1215  nystatin 100,000 unit/mL suspension 500,000 Units         -- MT 4 times daily with meals & nightly 05/23/24 1107           Antivirals (From admission, onward)      None            ASSESSMENT and PLAN     1. MSSA , Multifocal pneumonia involving primarily the right lung.  CT chest concerning for septic  pulmonary emboli.  Continue cefazolin IV and Levofloxacin  p.o      2. Complicated MSSA bacteremia. She does have cervical and lumbar spine hardware (no new pain).  TTE with no vegetation.  Given concern for septic pulmonary emboli,  recommend FRANCO, it was planned for today but it is now canceled.      3. Alcohol abuse.  Management as per hospitalist.  NO withdrawals so far     4. Oral thrush--improved to resolved.  Secondary to recent antibiotic use.  HIV screen negative.   Continue nystatin swish and swallow for 10-14 days    RECOMMENDATIONS:    Consult cardiology for FRANCO   Continue cefazolin and treat for at least 4 weeks.  Extend to 6 weeks, if vegetation on FRANCO, or if FRANCO can not be done  Weekly labs while on IV antibiotics, ESR, CRP, CMP, CBC with diff.  PICC dressing change and weekly labs by home health   Levofloxacin 750 mg p.o. x 7 --she is almost done      Will follow from periphery      Please send Epic secure chat with any questions.    SUBJECTIVE    Floresita Martin is a 64 y.o. female with history of chronic pain syndrome and alcohol abuse.  She is known to me from previous encounter.  She was hospitalized 03/23/2024 with Pseudomonas bacteremia associated with abnormal chest x-ray with reticulonodular infiltrates.  She received antibiotics while in the hospital.  She ultimately left the hospital AMA on 04/03/2024.  Apparently received of pneumonia by her PCP post discharge.  Also was in at an urgent care about 2 weeks ago and received a course of antibiotics.     She was visiting family in [place] and became ill with confusion.  Family brought her back to [place].  She was seen by her PCP in the office 05/21/2024 and sent to the ER for evaluation.  She had chills and rigors and shortness of breath.  Chest x-ray was abnormal with right side infiltrate.  She was admitted and commenced on levofloxacin.  Blood culture has now grown MSSA.  Antibiotics modified to cefazolin.     Antibiotics   Levofloxacin:   05/21/2024-  Cefazolin: 5/23/24-  Nystatin swish and swallow     Microbiology   Blood culture 05/21/2024:  MSSA 1/2 anaerobic bottles   Urine culture 05/21/2024: NGTD    Review of Systems  Negative except as stated above in Interval History     OBJECTIVE   Temp:  [97.9 °F (36.6 °C)-100.2 °F (37.9 °C)] 99.2 °F (37.3 °C)  Pulse:  [] 114  Resp:  [16-20] 16  SpO2:  [93 %-100 %] 95 %  BP: (119-173)/(68-86) 140/86  Temp:  [97.9 °F (36.6 °C)-100.2 °F (37.9 °C)]   Temp: 99.2 °F (37.3 °C) (05/27/24 0348)  Pulse: (!) 114 (05/27/24 0752)  Resp: 16 (05/27/24 0752)  BP: (!) 140/86 (05/27/24 0348)  SpO2: 95 % (05/27/24 0752)    Intake/Output Summary (Last 24 hours) at 5/27/2024 0759  Last data filed at 5/27/2024 0454  Gross per 24 hour   Intake 3644.07 ml   Output 3300 ml   Net 344.07 ml       Physical Exam  General:  Middle-aged man lying quietly in bed.  In no  respiratory difficulty   Respiratory:  Crackles right mid and lower zone posteriorly--markedly improved   CVS:  S1 and 2 heard   Abdomen:  Full, soft, nontender, no palpable organomegaly   Skin: No rash appreciated  CNS:  No focal deficits   Musculoskeletal system: No joint or bony abnormalities appreciated  HEENT: No oral thrush      VAD:  None  ISOLATION:  None    WOUNDS:  Not    Significant Labs: All pertinent labs within the past 24 hours have been reviewed.    CBC LAST 7 DAYS  Recent Labs   Lab 05/21/24  1508 05/22/24  0426 05/23/24  0357 05/24/24  0411 05/25/24  0827 05/26/24  0555   WBC 11.76 11.06 9.51 9.45 14.95* 13.68*   RBC 3.79* 3.36* 3.53* 3.13* 3.39* 2.85*   HGB 11.1* 9.9* 10.3* 9.1* 9.9* 8.3*   HCT 33.3* 29.3* 30.2* 26.7* 29.6* 24.5*   MCV 88 87 86 85 87 86   MCH 29.3 29.5 29.2 29.1 29.2 29.1   MCHC 33.3 33.8 34.1 34.1 33.4 33.9   RDW 17.0* 16.7* 16.8* 17.0* 17.2* 17.2*    284 331 380 444 460*   MPV 8.9* 8.8* 8.9* 8.8* 8.2* 8.4*   GRAN 76.7*  9.0* 72.1  8.0* 57.3  5.4 62.9  5.9 78.9*  11.8* 81.3*  11.1*   LYMPH 15.8*  1.9 17.1*  1.9  "27.1  2.6 22.0  2.1 13.2*  2.0 11.7*  1.6   MONO 6.5  0.8 9.6  1.1* 14.4  1.4* 13.5  1.3* 6.6  1.0 5.6  0.8   BASO 0.03 0.02 0.03 0.02 0.03 0.02   NRBC 0 0 0 0 0 0       CHEMISTRY LAST 7 DAYS  Recent Labs   Lab 05/21/24  1508 05/22/24  0426 05/23/24  0357 05/24/24  0409 05/25/24  0827 05/26/24  0555   * 132* 132* 134* 138 134*   K 3.6 4.0 3.4* 3.1* 4.6 3.9   CL 98 100 99 102 99 101   CO2 21* 21* 22* 23 25 23   ANIONGAP 13 11 11 9 14 10   BUN 24* 11 5* 10 5* 5*   CREATININE 0.9 0.6 0.5 0.5 0.6 0.5   GLU 91 84 88 110 102 109   CALCIUM 10.5 9.1 9.2 8.6* 9.0 8.5*   MG 2.1 1.6 1.4* 1.5* 1.7  --    ALBUMIN 2.7* 2.3* 2.1* 1.8* 1.9* 1.7*   PROT 7.0 5.9* 5.9* 5.3* 5.9* 5.2*   ALKPHOS 159* 142* 137* 120 138* 115   ALT 17 18 15 18 24 24   AST 26 22 26 22 32 49*   BILITOT 0.6 0.5 0.5 0.3 0.3 0.2       Estimated Creatinine Clearance: 83.8 mL/min (based on SCr of 0.5 mg/dL).    INFLAMMATORY/PROCAL  LAST 7 DAYS  Recent Labs   Lab 05/23/24  1152 05/26/24  0555 05/27/24  0440   PROCAL  --  0.12 0.08   .6* 189.7* 180.1*     No results found for: "ESR"  CRP   Date Value Ref Range Status   05/27/2024 180.1 (H) 0.0 - 8.2 mg/L Final   05/26/2024 189.7 (H) 0.0 - 8.2 mg/L Final   05/23/2024 218.6 (H) 0.0 - 8.2 mg/L Final   01/11/2023 5.3 0.0 - 8.2 mg/L Final       PRIOR  MICROBIOLOGY:    Susceptibility data from last 90 days.  Collected Specimen Info Organism Cefepime Ceftriaxone Ciprofloxacin Clindamycin Erythromycin Levofloxacin Meropenem Oxacillin Penicillin PIPERACILLIN/TAZO SUSCEPTIBILITY Tetracycline Trimeth/Sulfa Vancomycin   05/21/24 Sputum, Expectorated Staphylococcus aureus   S   S  S    S  R   S  S  S   05/21/24 Blood from Peripheral, Antecubital, Left No growth after 5 days.                05/21/24 Blood from Peripheral, Antecubital, Right Staphylococcus aureus   S   S  S    S  R   S  S  S   03/26/24 Blood No growth after 5 days.                03/26/24 Blood No growth after 5 days.              "   03/24/24 Blood Pseudomonas aeruginosa  S   S    S  S    S      03/24/24 Blood Pseudomonas aeruginosa                    LAST 7 DAYS MICROBIOLOGY   Microbiology Results (last 7 days)       Procedure Component Value Units Date/Time    Blood culture #2 **CANNOT BE ORDERED STAT** [[id number]] Collected: 05/21/24 1526    Order Status: Completed Specimen: Blood from Peripheral, Antecubital, Left Updated: 05/26/24 2032     Blood Culture, Routine No growth after 5 days.    Blood culture [[id number]] Collected: 05/26/24 0556    Order Status: Completed Specimen: Blood from Peripheral, Antecubital, Left Updated: 05/26/24 1517     Blood Culture, Routine No Growth to date    Blood culture [[id number]] Collected: 05/23/24 0925    Order Status: Completed Specimen: Blood from Peripheral, Hand, Right Updated: 05/26/24 1432     Blood Culture, Routine No Growth to date      No Growth to date      No Growth to date      No Growth to date    Blood culture [[id number]] Collected: 05/23/24 0927    Order Status: Completed Specimen: Blood from Peripheral, Antecubital, Left Updated: 05/26/24 1432     Blood Culture, Routine No Growth to date      No Growth to date      No Growth to date      No Growth to date    Blood culture [[id number]] Collected: 05/25/24 0422    Order Status: Completed Specimen: Blood from Peripheral, Antecubital, Right Updated: 05/26/24 1032     Blood Culture, Routine No Growth to date      No Growth to date    Urine culture [[id number]] Collected: 05/21/24 2044    Order Status: Completed Specimen: Urine Updated: 05/25/24 0641     Urine Culture, Routine No growth    Narrative:      Specimen Source->Urine    Culture, Respiratory with Gram Stain [[id number]]  (Abnormal)  (Susceptibility) Collected: 05/21/24 2044    Order Status: Completed Specimen: Sputum, Expectorated Updated: 05/24/24 0812     Respiratory Culture STAPHYLOCOCCUS AUREUS  Moderate       Gram Stain (Respiratory) <10 epithelial cells per low power field.      Gram Stain (Respiratory) Many WBC's     Gram Stain (Respiratory) Few Gram positive cocci    Blood culture #1 **CANNOT BE ORDERED STAT** [[id number]]  (Abnormal)  (Susceptibility) Collected: 05/21/24 1516    Order Status: Completed Specimen: Blood from Peripheral, Antecubital, Right Updated: 05/24/24 0719     Blood Culture, Routine Gram stain codey bottle: Gram positive cocci      Results called to and read back by: Mounika Schuster LPN at [facility]      05/22/2024  21:40 KS3      STAPHYLOCOCCUS AUREUS  ID consult required for Staph aureus bacteremia.      Respiratory Infection Panel (PCR), Nasopharyngeal [[id number]] Collected: 05/23/24 1406    Order Status: Completed Specimen: Nasopharyngeal Swab Updated: 05/23/24 2042     Respiratory Infection Panel Source NP Swab     Adenovirus Not Detected     Coronavirus 229E, Common Cold Virus Not Detected     Coronavirus HKU1, Common Cold Virus Not Detected     Coronavirus NL63, Common Cold Virus Not Detected     Coronavirus OC43, Common Cold Virus Not Detected     Comment: Coronavirus strains 229E, HKU1, NL63, and OC43 can cause the common   cold   and are not associated with the respiratory disease outbreak caused   by  the COVID-19 (SARS-CoV-2 novel Coronavirus) strain.           SARS-CoV2 (COVID-19) Qualitative PCR Not Detected     Human Metapneumovirus Not Detected     Human Rhinovirus/Enterovirus Not Detected     Influenza A (subtypes H1, H1-2009,H3) Not Detected     Influenza B Not Detected     Parainfluenza Virus 1 Not Detected     Parainfluenza Virus 2 Not Detected     Parainfluenza Virus 3 Not Detected     Parainfluenza Virus 4 Not Detected     Respiratory Syncytial Virus Not Detected     Bordetella Parapertussis (DV8005) Not Detected     Bordetella pertussis (ptxP) Not Detected     Chlamydia pneumoniae Not Detected     Mycoplasma pneumoniae Not Detected     Comment: Respiratory Infection Panel testing performed by Multiplex PCR.       Rapid Organism ID by PCR (from Blood  culture) [[id number]]  (Abnormal) Collected: 05/21/24 1516    Order Status: Completed Updated: 05/22/24 2250     Enterococcus faecalis Not Detected     Enterococcus faecium Not Detected     Listeria monocytogenes Not Detected     Staphylococcus spp. See species for ID     Staphylococcus aureus Detected     Staphylococcus epidermidis Not Detected     Staphylococcus lugdunensis Not Detected     Streptococcus species Not Detected     Streptococcus agalactiae Not Detected     Streptococcus pneumoniae Not Detected     Streptococcus pyogenes Not Detected     Acinetobacter calcoaceticus/baumannii complex Not Detected     Bacteroides fragilis Not Detected     Enterobacterales Not Detected     Enterobacter cloacae complex Not Detected     Escherichia coli Not Detected     Klebsiella aerogenes Not Detected     Klebsiella oxytoca Not Detected     Klebsiella pneumoniae group Not Detected     Proteus Not Detected     Salmonella sp Not Detected     Serratia marcescens Not Detected     Haemophilus influenzae Not Detected     Neisseria meningtidis Not Detected     Pseudomonas aeruginosa Not Detected     Stenotrophomonas maltophilia Not Detected     Candida albicans Not Detected     Candida auris Not Detected     Candida glabrata Not Detected     Candida krusei Not Detected     Candida parapsilosis Not Detected     Candida tropicalis Not Detected     Cryptococcus neoformans/gattii Not Detected     CTX-M (ESBL ) Test not applicable     IMP (Carbapenem resistant) Test not applicable     KPC resistance gene (Carbapenem resistant) Test not applicable     mcr-1  Test not applicable     mec A/C  Test not applicable     mec A/C and MREJ (MRSA) gene Not Detected     NDM (Carbapenem resistant) Test not applicable     OXA-48-like (Carbapenem resistant) Test not applicable     van A/B (VRE gene) Test not applicable     VIM (Carbapenem resistant) Test not applicable          CURRENT/PREVIOUS VISIT EKG  Results for orders placed or  performed during the hospital encounter of 05/21/24   EKG 12-lead    Collection Time: 05/21/24  3:30 PM   Result Value Ref Range    QRS Duration 96 ms    [facility] QTC Calculation 459 ms    Narrative    Test Reason : R06.02,    Vent. Rate : 089 BPM     Atrial Rate : 089 BPM     P-R Int : 172 ms          QRS Dur : 096 ms      QT Int : 378 ms       P-R-T Axes : 029 085 038 degrees     QTc Int : 459 ms    Normal sinus rhythm  Low voltage QRS  Borderline Abnormal ECG  When compared with ECG of 17-JAN-2023 14:53,  No significant change was found  Confirmed by Trevor Salvador MD ([id number]) on 5/24/2024 12:09:59 PM    Referred By: MORGAN PARADA           Confirmed By:Trevor Salvador MD      Significant Imaging: I have reviewed all relevant and available imaging results/findings within the past 24 hours.    Cleopatra Contreras MD  Date of Service: 05/27/2024      This note was created using M Modal voice recognition software that occasionally misinterpreted phrases or words.

## 2024-05-27 NOTE — SUBJECTIVE & OBJECTIVE
Interval History: Patient seen and examined. AMYON. She's feeling okay. FRANCO was cancelled by cardiology recommendation.       Objective:     Vital Signs (Most Recent):  Temp: 98.3 °F (36.8 °C) (05/27/24 1120)  Pulse: 97 (05/27/24 1341)  Resp: 18 (05/27/24 1341)  BP: 137/77 (05/27/24 1120)  SpO2: 97 % (05/27/24 1341) Vital Signs (24h Range):  Temp:  [97.9 °F (36.6 °C)-100.2 °F (37.9 °C)] 98.3 °F (36.8 °C)  Pulse:  [] 97  Resp:  [16-20] 18  SpO2:  [93 %-100 %] 97 %  BP: (132-173)/(69-86) 137/77     Weight: 49 kg (108 lb)  Body mass index is 21.09 kg/m².    Intake/Output Summary (Last 24 hours) at 5/27/2024 1508  Last data filed at 5/27/2024 1038  Gross per 24 hour   Intake 3404.07 ml   Output 2200 ml   Net 1204.07 ml         Physical Exam  Vitals reviewed.   Constitutional:       General: She is not in acute distress.  HENT:      Head: Normocephalic and atraumatic.   Cardiovascular:      Rate and Rhythm: Normal rate and regular rhythm.   Pulmonary:      Effort: Pulmonary effort is normal. No respiratory distress.   Neurological:      General: No focal deficit present.      Mental Status: She is alert and oriented to person, place, and time. Mental status is at baseline.   Psychiatric:         Mood and Affect: Affect normal.         Behavior: Behavior normal.         Thought Content: Thought content normal.             Significant Labs: All pertinent labs within the past 24 hours have been reviewed.    Significant Imaging: I have reviewed all pertinent imaging results/findings within the past 24 hours.

## 2024-05-27 NOTE — PROCEDURES
Floresita Martin is a 64 y.o. female patient.    Temp: 98.3 °F (36.8 °C) (05/27/24 1120)  Pulse: 100 (05/27/24 1120)  Resp: 18 (05/27/24 1144)  BP: 137/77 (05/27/24 1120)  SpO2: 100 % (05/27/24 1120)  Weight: 49 kg (108 lb) (05/23/24 0820)  Height: 5' (152.4 cm) (05/23/24 0820)    PICC  Date/Time: 5/27/2024 12:45 PM  Performed by: Kari Jefferson, RN  Consent Done: Yes  Time out: Immediately prior to procedure a time out was called to verify the correct patient, procedure, equipment, support staff and site/side marked as required  Indications: med administration  Anesthesia: local infiltration  Local anesthetic: lidocaine 1% without epinephrine  Anesthetic Total (mL): 5  Preparation: skin prepped with ChloraPrep  Skin prep agent dried: skin prep agent completely dried prior to procedure  Sterile barriers: all five maximum sterile barriers used - cap, mask, sterile gown, sterile gloves, and large sterile sheet  Hand hygiene: hand hygiene performed prior to central venous catheter insertion  Location details: right basilic  Catheter type: double lumen  Catheter size: 5 Fr  Catheter Length: 29cm    Ultrasound guidance: yes  Vessel Caliber: large and patent, compressibility normal  Needle advanced into vessel with real time Ultrasound guidance.  Guidewire confirmed in vessel.  Image recorded and saved.  Sterile sheath used.  no esophageal manometryNumber of attempts: 2  Post-procedure: blood return through all ports, chlorhexidine patch and sterile dressing applied  Technical procedures used: JUANPABLO Henry 3CG  Estimated blood loss (mL): 1    Assessment: placement verified by x-ray, tip termination and successful placement          Name Kari Jefferson RN  5/27/2024

## 2024-05-27 NOTE — PLAN OF CARE
[facility]     Referral and orders sent to [facility]  for review.        05/27/24 1421   Post-Acute Status   Post-Acute Authorization Home Health;IV Infusion   Home Health Status Referrals Sent   IV Infusion Status Referral(s) sent   Patient choice form signed by patient/caregiver List from System Post-Acute Care

## 2024-05-27 NOTE — CARE UPDATE
05/27/24 0731 05/27/24 0733 05/27/24 0752   Patient Assessment/Suction   Level of Consciousness (AVPU) alert alert alert   Respiratory Effort Normal;Unlabored  --   --    Expansion/Accessory Muscles/Retractions no use of accessory muscles;no retractions;expansion symmetric  --   --    All Lung Fields Breath Sounds Anterior:;Lateral:;clear  --   --    Rhythm/Pattern, Respiratory unlabored  --   --    PRE-TX-O2   Device (Oxygen Therapy) room air aerosol mask aerosol mask   SpO2 (!) 93 % (!) 94 % 95 %   Pulse Oximetry Type Intermittent Intermittent Intermittent   $ Pulse Oximetry - Multiple Charge Pulse Oximetry - Multiple Pulse Oximetry - Multiple Pulse Oximetry - Multiple   Pulse 110 (!) 112 (!) 114   Resp 16 16 16   Aerosol Therapy   $ Aerosol Therapy Charges Aerosol Treatment Aerosol Treatment Aerosol Treatment   Respiratory Treatment Status (SVN) given given given   Treatment Route (SVN) mask;oxygen mask;oxygen mask;oxygen   Patient Position sitting on edge of bed sitting on edge of bed sitting on edge of bed   Signs of Intolerance (SVN) none none none   Breath Sounds Post-Respiratory Treatment   Throughout All Fields Post-Treatment All Fields All Fields All Fields   RLL Post-Treatment  --   --  Anterior:;Posterior:;no change   Post-treatment Heart Rate (beats/min)  --   --  110   Post-treatment Resp Rate (breaths/min)  --   --  16

## 2024-05-27 NOTE — PROGRESS NOTES
"[facility]  Progress Note    Patient Name: Floresita Martin  MRN: [id number]  Patient Class: IP- Inpatient   Admission Date: 5/21/2024  Length of Stay: 5 days  Attending Physician: Michael Navarrete MD  Primary Care Provider: Toni Hughes MD        Subjective:     Principal Problem:Sepsis        HPI:  Floresita Martin is a 64 year old female with a previous medical history of HTN, HLD, GERD, opioid dependence with current use, metabolic encephalopathy, pneumonia, and COPD who was sent from PCP office for fever, cough, and shortness of breath. Patient is poor historian and unable to exactly recall when symptoms began but endorses fever, cough, exertional shortness of breath and loss of voice over the past two weeks. Reports that her  could provide more information but he was not at bedside. ED note reports that patient endorsed intermittent confusion. Initial ED workup included a chest xray that reads: "Findings concerning for multi lobar pneumonia in the right lung with possible involvement of the left lung base as well". CBC shows stable anemia, magnesium and lactic acid normal, cmp unremarkable. Patient vitals are stable and she is maintaining an oxygen saturation of 96% on room air. She is having productive cough with moderate amount of brown/yellow sputum. Most recent hospital admission from 3/26 was for pseudomonas bacteremia. Patient initiated on Levaquin IV in ED which has been continued. Upon admit patient is AAOx4 and does not appear in any respiratory distress. She is positive for a voice change which she reports has worsened over the last two weeks and has "lost her voice" but at baseline it is raspy. Patient admitted by hospital medicine for further evaluation and management.    Overview/Hospital Course:  Admitted with sepsis, bilateral pneumonia, bacteremia, and encephalopathy. Respiratory and blood cultures grew MSSA. She was given abx and breathing " treatments. She required minimal oxygen support later weaned off. ID was consulted. Her repeat blood cultures were negative. TTE showed no valvular concerns. FRANCO was recommended by ID due to their concern for septic emboli on CT chest but was recommended against by cardiology and was not performed. Given nystatin for oral thrush. ID recommended cefazolin for a total 6 week course. PICC line placed. Set up with home health and home infusion service.    Interval History: Patient seen and examined. NAEON. She's feeling okay. FRANCO was cancelled by cardiology recommendation.       Objective:     Vital Signs (Most Recent):  Temp: 98.3 °F (36.8 °C) (05/27/24 1120)  Pulse: 97 (05/27/24 1341)  Resp: 18 (05/27/24 1341)  BP: 137/77 (05/27/24 1120)  SpO2: 97 % (05/27/24 1341) Vital Signs (24h Range):  Temp:  [97.9 °F (36.6 °C)-100.2 °F (37.9 °C)] 98.3 °F (36.8 °C)  Pulse:  [] 97  Resp:  [16-20] 18  SpO2:  [93 %-100 %] 97 %  BP: (132-173)/(69-86) 137/77     Weight: 49 kg (108 lb)  Body mass index is 21.09 kg/m².    Intake/Output Summary (Last 24 hours) at 5/27/2024 1508  Last data filed at 5/27/2024 1038  Gross per 24 hour   Intake 3404.07 ml   Output 2200 ml   Net 1204.07 ml         Physical Exam  Vitals reviewed.   Constitutional:       General: She is not in acute distress.  HENT:      Head: Normocephalic and atraumatic.   Cardiovascular:      Rate and Rhythm: Normal rate and regular rhythm.   Pulmonary:      Effort: Pulmonary effort is normal. No respiratory distress.   Neurological:      General: No focal deficit present.      Mental Status: She is alert and oriented to person, place, and time. Mental status is at baseline.   Psychiatric:         Mood and Affect: Affect normal.         Behavior: Behavior normal.         Thought Content: Thought content normal.             Significant Labs: All pertinent labs within the past 24 hours have been reviewed.    Significant Imaging: I have reviewed all pertinent imaging  results/findings within the past 24 hours.    Assessment/Plan:      * Sepsis  From MSSA pneumonia with bacteremia  Improving   - ID following  - completing 7 days levaquin (last dose 5/28)  - to complete 6 weeks ancef per ID    Thrush  - continue nystatin    Moderate protein-calorie malnutrition  Nutrition consulted. Most recent weight and BMI monitored-     Measurements:  Wt Readings from Last 1 Encounters:   05/23/24 49 kg (108 lb)   Body mass index is 21.09 kg/m².    Patient has been screened and assessed by RD.    Malnutrition Type:  Context: acute illness or injury  Level: moderate    Malnutrition Characteristic Summary:  Weight Loss (Malnutrition): 5% in 1 month  Energy Intake (Malnutrition): less than 75% for greater than or equal to 1 month  Subcutaneous Fat (Malnutrition): mild depletion  Muscle Mass (Malnutrition): mild depletion    Interventions/Recommendations (treatment strategy):   Encourage intake of Cardiac diet  Added Boost Plus wit all meals  Suggest daily multivitamin  Monitor intake and need for appetite stimulant  Monitor weight and labs  Collaborate with health care providers        COPD with acute lower respiratory infection  Patient's COPD is with exacerbation noted by continued dyspnea and worsening of baseline hypoxia currently due to pneumonia.  Patient is currently off COPD Pathway. Continue scheduled inhalers Antibiotics and Supplemental oxygen and monitor respiratory status closely.   -----------------------------------------   Seen pneumonia A/P    Pneumonia due to methicillin susceptible Staphylococcus aureus (MSSA)  Improved  - completing 7 days levaquin (last dose 5/28)  - to complete 6 weeks ancef per ID  - ID following    Hypertension, essential  Chronic, controlled  - resume losartan    Normocytic anemia  Patient's anemia is currently controlled. Has not received any PRBCs to date. Etiology likely d/t unknown. No active bleeding  Current CBC reviewed-   Lab Results   Component  Value Date    HGB 8.5 (L) 05/27/2024    HCT 25.5 (L) 05/27/2024     Monitor serial CBC and transfuse if patient becomes hemodynamically unstable, symptomatic or H/H drops below 7/21.  -----------------------------------------------------------   Had anemia workup 3/26/24:   Lab Results   Component Value Date    IRON 31 03/26/2024    TRANSFERRIN 185 (L) 03/26/2024    TIBC 259 03/26/2024    FESATURATED 12 (L) 03/26/2024      Component Ref Range & Units 1 mo ago   Occult Blood Negative Positive Abnormal       Anemia stable/asymptomatic.  Will need to follow up with GI when discharged.      MSSA bacteremia  Cleared   - abx per ID  - FRANCO cancelled    Hyponatremia  Very mild  - DC NS IVF  - trend sodium daily    Opioid dependence with current use  Chronic condition    -Home pain medications continued      Chronic pain syndrome  Controlled. Managed outpatient by pain management. Per : Morphine ER 30mg, Oxy IR 20 mg, pregabalin  - Continue home pain regimen    Dependence on nicotine from cigarettes  Counseled earlier in hospital course.   - Nicotine patch PRN    Gastroesophageal reflux disease without esophagitis  Chronic/stable.    - Continue PPI      VTE Risk Mitigation (From admission, onward)           Ordered     enoxaparin injection 40 mg  Daily         05/21/24 1708     IP VTE HIGH RISK PATIENT  Once         05/21/24 1708     Place sequential compression device  Until discontinued         05/21/24 1708                    Discharge Planning   GRACE: 5/28/2024     Code Status: Full Code   Is the patient medically ready for discharge?:     Reason for patient still in hospital (select all that apply): Patient trending condition and Other (specify) getting IV infusions at home set up  Discharge Plan A: Home with family                  Michael Navarrete MD  Department of Hospital Medicine   [facility]/Surg

## 2024-05-28 VITALS
HEIGHT: 60 IN | RESPIRATION RATE: 20 BRPM | BODY MASS INDEX: 21.2 KG/M2 | SYSTOLIC BLOOD PRESSURE: 127 MMHG | TEMPERATURE: 98 F | HEART RATE: 111 BPM | DIASTOLIC BLOOD PRESSURE: 73 MMHG | OXYGEN SATURATION: 97 % | WEIGHT: 108 LBS

## 2024-05-28 LAB
ANION GAP SERPL CALC-SCNC: 12 MMOL/L (ref 8–16)
BACTERIA BLD CULT: NORMAL
BACTERIA BLD CULT: NORMAL
BUN SERPL-MCNC: 3 MG/DL (ref 8–23)
CALCIUM SERPL-MCNC: 9 MG/DL (ref 8.7–10.5)
CHLORIDE SERPL-SCNC: 100 MMOL/L (ref 95–110)
CO2 SERPL-SCNC: 24 MMOL/L (ref 23–29)
CREAT SERPL-MCNC: 0.5 MG/DL (ref 0.5–1.4)
CRP SERPL-MCNC: 159.6 MG/L (ref 0–8.2)
ERYTHROCYTE [DISTWIDTH] IN BLOOD BY AUTOMATED COUNT: 17.2 % (ref 11.5–14.5)
EST. GFR  (NO RACE VARIABLE): >60 ML/MIN/1.73 M^2
GLUCOSE SERPL-MCNC: 92 MG/DL (ref 70–110)
HCT VFR BLD AUTO: 28.3 % (ref 37–48.5)
HGB BLD-MCNC: 9.4 G/DL (ref 12–16)
MCH RBC QN AUTO: 28.7 PG (ref 27–31)
MCHC RBC AUTO-ENTMCNC: 33.2 G/DL (ref 32–36)
MCV RBC AUTO: 86 FL (ref 82–98)
PLATELET # BLD AUTO: 657 K/UL (ref 150–450)
PMV BLD AUTO: 8.6 FL (ref 9.2–12.9)
POTASSIUM SERPL-SCNC: 3.4 MMOL/L (ref 3.5–5.1)
PROCALCITONIN SERPL IA-MCNC: 0.07 NG/ML (ref 0–0.5)
RBC # BLD AUTO: 3.28 M/UL (ref 4–5.4)
SODIUM SERPL-SCNC: 136 MMOL/L (ref 136–145)
WBC # BLD AUTO: 13.96 K/UL (ref 3.9–12.7)

## 2024-05-28 PROCEDURE — 25000003 PHARM REV CODE 250: Performed by: NURSE PRACTITIONER

## 2024-05-28 PROCEDURE — 27000221 HC OXYGEN, UP TO 24 HOURS

## 2024-05-28 PROCEDURE — 80048 BASIC METABOLIC PNL TOTAL CA: CPT | Performed by: STUDENT IN AN ORGANIZED HEALTH CARE EDUCATION/TRAINING PROGRAM

## 2024-05-28 PROCEDURE — 99900035 HC TECH TIME PER 15 MIN (STAT)

## 2024-05-28 PROCEDURE — 94640 AIRWAY INHALATION TREATMENT: CPT

## 2024-05-28 PROCEDURE — 25000003 PHARM REV CODE 250: Performed by: INTERNAL MEDICINE

## 2024-05-28 PROCEDURE — 25000242 PHARM REV CODE 250 ALT 637 W/ HCPCS: Performed by: STUDENT IN AN ORGANIZED HEALTH CARE EDUCATION/TRAINING PROGRAM

## 2024-05-28 PROCEDURE — 25000003 PHARM REV CODE 250: Performed by: STUDENT IN AN ORGANIZED HEALTH CARE EDUCATION/TRAINING PROGRAM

## 2024-05-28 PROCEDURE — 94761 N-INVAS EAR/PLS OXIMETRY MLT: CPT

## 2024-05-28 PROCEDURE — 63600175 PHARM REV CODE 636 W HCPCS: Performed by: NURSE PRACTITIONER

## 2024-05-28 PROCEDURE — 85027 COMPLETE CBC AUTOMATED: CPT | Performed by: STUDENT IN AN ORGANIZED HEALTH CARE EDUCATION/TRAINING PROGRAM

## 2024-05-28 PROCEDURE — 99233 SBSQ HOSP IP/OBS HIGH 50: CPT | Mod: ,,, | Performed by: NURSE PRACTITIONER

## 2024-05-28 PROCEDURE — 86140 C-REACTIVE PROTEIN: CPT | Performed by: INTERNAL MEDICINE

## 2024-05-28 PROCEDURE — S4991 NICOTINE PATCH NONLEGEND: HCPCS

## 2024-05-28 PROCEDURE — 94760 N-INVAS EAR/PLS OXIMETRY 1: CPT

## 2024-05-28 PROCEDURE — 84145 PROCALCITONIN (PCT): CPT | Performed by: INTERNAL MEDICINE

## 2024-05-28 PROCEDURE — 36415 COLL VENOUS BLD VENIPUNCTURE: CPT | Performed by: INTERNAL MEDICINE

## 2024-05-28 PROCEDURE — 25000242 PHARM REV CODE 250 ALT 637 W/ HCPCS

## 2024-05-28 PROCEDURE — 25000003 PHARM REV CODE 250

## 2024-05-28 RX ORDER — NYSTATIN 100000 [USP'U]/ML
500000 SUSPENSION ORAL
Qty: 100 ML | Refills: 0 | Status: SHIPPED | OUTPATIENT
Start: 2024-05-28 | End: 2024-06-02

## 2024-05-28 RX ORDER — IBUPROFEN 200 MG
1 TABLET ORAL DAILY
Qty: 30 PATCH | Refills: 2 | Status: SHIPPED | OUTPATIENT
Start: 2024-05-28

## 2024-05-28 RX ORDER — LANOLIN ALCOHOL/MO/W.PET/CERES
400 CREAM (GRAM) TOPICAL DAILY
Qty: 90 TABLET | Refills: 1 | Status: SHIPPED | OUTPATIENT
Start: 2024-05-28

## 2024-05-28 RX ADMIN — PANTOPRAZOLE SODIUM 40 MG: 40 TABLET, DELAYED RELEASE ORAL at 08:05

## 2024-05-28 RX ADMIN — GUAIFENESIN 1200 MG: 600 TABLET, EXTENDED RELEASE ORAL at 08:05

## 2024-05-28 RX ADMIN — Medication 1 PATCH: at 08:05

## 2024-05-28 RX ADMIN — MORPHINE SULFATE 30 MG: 15 TABLET, EXTENDED RELEASE ORAL at 08:05

## 2024-05-28 RX ADMIN — CETIRIZINE HYDROCHLORIDE 10 MG: 10 TABLET, FILM COATED ORAL at 08:05

## 2024-05-28 RX ADMIN — CYCLOBENZAPRINE HYDROCHLORIDE 10 MG: 5 TABLET, FILM COATED ORAL at 11:05

## 2024-05-28 RX ADMIN — OXYCODONE 20 MG: 5 TABLET ORAL at 12:05

## 2024-05-28 RX ADMIN — Medication 400 MG: at 08:05

## 2024-05-28 RX ADMIN — LACTULOSE 15 G: 20 SOLUTION ORAL at 08:05

## 2024-05-28 RX ADMIN — NYSTATIN 500000 UNITS: 100000 SUSPENSION ORAL at 08:05

## 2024-05-28 RX ADMIN — LOSARTAN POTASSIUM 50 MG: 25 TABLET, FILM COATED ORAL at 08:05

## 2024-05-28 RX ADMIN — CEFAZOLIN 2 G: 2 INJECTION, POWDER, FOR SOLUTION INTRAMUSCULAR; INTRAVENOUS at 08:05

## 2024-05-28 RX ADMIN — OXYCODONE 20 MG: 5 TABLET ORAL at 06:05

## 2024-05-28 RX ADMIN — PREGABALIN 75 MG: 75 CAPSULE ORAL at 08:05

## 2024-05-28 RX ADMIN — LEVALBUTEROL 1.25 MG: 1.25 SOLUTION, CONCENTRATE RESPIRATORY (INHALATION) at 06:05

## 2024-05-28 RX ADMIN — ARFORMOTEROL TARTRATE 15 MCG: 15 SOLUTION RESPIRATORY (INHALATION) at 07:05

## 2024-05-28 RX ADMIN — LEVOFLOXACIN 750 MG: 250 TABLET, FILM COATED ORAL at 09:05

## 2024-05-28 RX ADMIN — POTASSIUM BICARBONATE 35 MEQ: 391 TABLET, EFFERVESCENT ORAL at 06:05

## 2024-05-28 RX ADMIN — CEFAZOLIN 2 G: 2 INJECTION, POWDER, FOR SOLUTION INTRAMUSCULAR; INTRAVENOUS at 01:05

## 2024-05-28 RX ADMIN — BUDESONIDE INHALATION 1 MG: 0.5 SUSPENSION RESPIRATORY (INHALATION) at 06:05

## 2024-05-28 RX ADMIN — LEVALBUTEROL 1.25 MG: 1.25 SOLUTION, CONCENTRATE RESPIRATORY (INHALATION) at 01:05

## 2024-05-28 RX ADMIN — NYSTATIN 500000 UNITS: 100000 SUSPENSION ORAL at 11:05

## 2024-05-28 NOTE — CARE UPDATE
05/27/24 1910   Patient Assessment/Suction   Level of Consciousness (AVPU) alert   Respiratory Effort Normal;Unlabored   Expansion/Accessory Muscles/Retractions expansion symmetric;no retractions;no use of accessory muscles   All Lung Fields Breath Sounds diminished   Cough Frequency no cough   PRE-TX-O2   Device (Oxygen Therapy) room air   SpO2 95 %   Pulse Oximetry Type Intermittent   Pulse 96   Resp 20   Aerosol Therapy   $ Aerosol Therapy Charges Aerosol Treatment   Respiratory Treatment Status (SVN) given   Treatment Route (SVN) mask;oxygen   Patient Position HOB elevated   Signs of Intolerance (SVN) none   Breath Sounds Post-Respiratory Treatment   Throughout All Fields Post-Treatment All Fields   Throughout All Fields Post-Treatment no change   Post-treatment Heart Rate (beats/min) 98   Post-treatment Resp Rate (breaths/min) 18   Incentive Spirometer   $ Incentive Spirometer Charges done with encouragement   Administration (IS) proper technique demonstrated   Number of Repetitions (IS) 6   Level Incentive Spirometer (mL) 1500   Patient Tolerance (IS) good

## 2024-05-28 NOTE — PLAN OF CARE
Plan of care reviewed with patient. Verbalized understanding. PICC line to right upper arm intact and patent. IV to left hand also intact and patent. Patient ambulatory in room. Pain managed with scheduled and prn medications. Patient alert and able to make needs known. Safety maintained. Call light in reach and instructed to call for assistance. Will continue to monitor.

## 2024-05-28 NOTE — CARE UPDATE
05/28/24 1335   Home Oxygen Qualification   $ Home O2 Qualification Tech time 15 minutes   Room Air SpO2 At Rest 93 %   Room Air SpO2 During Ambulation 91 %   SpO2 Post Ambulation 94 %   Post Ambulation Heart Rate 117 bpm

## 2024-05-28 NOTE — NURSING
D/c education provided, pt verbalized understanding. PIV removed, PICC line remains in place, belongings in hand, pt left floor safely via wheelchair.

## 2024-05-28 NOTE — CARE UPDATE
05/28/24 1335   Patient Assessment/Suction   Level of Consciousness (AVPU) alert   Respiratory Effort Normal;Unlabored   PRE-TX-O2   Device (Oxygen Therapy) room air   SpO2 97 %   Pulse Oximetry Type Intermittent   $ Pulse Oximetry - Single Charge Pulse Oximetry - Single   Pulse (!) 111   Resp 20   Positioning Sitting on edge of bed (dangling)   Aerosol Therapy   $ Aerosol Therapy Charges Aerosol Treatment   Daily Review of Necessity (SVN) completed   Respiratory Treatment Status (SVN) given   Treatment Route (SVN) mask   Patient Position sitting on edge of bed   Signs of Intolerance (SVN) none   Breath Sounds Post-Respiratory Treatment   Throughout All Fields Post-Treatment All Fields   Throughout All Fields Post-Treatment no change   LLL Post-Treatment no change   RML Post-Treatment no change   RLL Post-Treatment no change   Post-treatment Heart Rate (beats/min) 117   Post-treatment Resp Rate (breaths/min) 20   Peak Flow   PEFR PREtreatment (L/Min) 97   Home Oxygen Qualification   $ Home O2 Qualification Tech time 15 minutes   Room Air SpO2 At Rest 93 %   Room Air SpO2 During Ambulation 91 %   SpO2 Post Ambulation 94 %   Post Ambulation Heart Rate 117 bpm

## 2024-05-28 NOTE — PLAN OF CARE
Pt was accepted with [facility] for home infusion. Will have teaching today.  Pt was accepted with [facility]- 1st visit 5/30 05/28/24 0903   Post-Acute Status   Post-Acute Authorization Home Health;IV Infusion

## 2024-05-28 NOTE — PROGRESS NOTES
..,[place] [facility]   Department of Infectious Disease  Progress Note        PATIENT NAME: Floresita Martin  MRN: [id number]  TODAY'S DATE: 05/28/2024  ADMIT DATE: 5/21/2024  LOS: 6 days    CHIEF COMPLAINT: Altered Mental Status (Cough, fever, sob and has had history of pneumonia with admission last month )      PRINCIPLE PROBLEM: Sepsis    INTERVAL HISTORY      5/28/24@0717 (Denette):  Patient states she was feeling much better today in his ready to go home.  She still has a cough with some clear sputum.  She states appetite is good.  She denies fevers, chills, nausea vomiting.  T-max 98.6° in last 24 hours.  PICC line was placed on 02/27.  5/26 blood cultures no growth to date, RVP negative, 5/23 blood cultures no growth to date, 5/21 sputum culture with staph aureus, 5/21 urine culture negative, 5/21 blood cultures with 1 set negative and 1 set with 1 bottle positive for staph aureus.  Echocardiogram with mildly reduced ejection fraction normal diastolic dysfunction, normal valvular function.  No pericardial effusion.  SHIN was not performed.  Chest x-ray from yesterday shows appropriately position PICC line on the right and patchy airspace disease throughout the right lung.  WBC 13.96, platelets 657 elevated, no differential, BUN/CR 3/0.5, .6 trending down.  Procalcitonin negative.    05/24/2024:  CT chest confirmed bilateral pneumonia with patchy infiltrates.  Repeat blood culture 05/26/2024 so far negative.      05/25/2024 Dr Contreras    Looks and feels well.   + cough with clear sputum.   Tmax is improving 101--100.9--100.4  No pain over her spine. Admits to picking on her skin. Denies IVDU (drug screen positive for opiates)    05/26/2024 NO new complaints. Watching tv. Plan for shin    05/27/2024 I came and saw patient.  She has no new complaints.  No fever no chills.  Cough is improving.  Less phlegm CRP is still elevated.  She was supposed to have a SHIN today but cardiologist is concerned  about complications from FRANCO in setting of history of esophageal issues.  I discussed the importance of maintaining a PICC, taking care of it,.  Patient denies IV drug use and promises proper follow-up and compliance    Antibiotics (From admission, onward)      Start     Stop Route Frequency Ordered    05/27/24 2100  mupirocin 2 % ointment         06/01/24 2059 Nasl 2 times daily 05/27/24 1530    05/24/24 0930  ceFAZolin 2 g in dextrose 5 % in water (D5W) 50 mL IVPB (MB+)         -- IV Every 8 hours (non-standard times) 05/24/24 0820          Antifungals (From admission, onward)      Start     Stop Route Frequency Ordered    05/23/24 1215  nystatin 100,000 unit/mL suspension 500,000 Units         -- MT 4 times daily with meals & nightly 05/23/24 1107           Antivirals (From admission, onward)      None            ASSESSMENT and PLAN     1. MSSA , Multifocal pneumonia involving primarily the right lung.  CT chest concerning for septic pulmonary emboli.  Continue cefazolin IV and Levofloxacin  p.o      2. Complicated MSSA bacteremia. She does have cervical and lumbar spine hardware (no new pain).  TTE with no vegetation.  Given concern for septic pulmonary emboli,  recommend FRANCO, it was planned for today but it is now canceled.      3. Alcohol abuse.  Management as per hospitalist.  NO withdrawals so far     4. Oral thrush--improved to resolved.  Secondary to recent antibiotic use.  HIV screen negative.   Continue nystatin swish and swallow for 10-14 days    RECOMMENDATIONS:    FRANCO was not performed because of previous esophageal issues  Continue cefazolin 2 g IV every 8 hours for 6 weeks   Weekly labs while on IV antibiotics, ESR, CRP, CMP, CBC with diff.  PICC dressing change and weekly labs by home health   Levofloxacin 750 mg p.o. x 7 completed today 5/28  Follow-up with Infectious Disease in 3-4 weeks - message sent to office to make appointment for follow-up      D/W Dr Rodriguez      Please send Epic secure chat  with any questions.    SUBJECTIVE    Floresita Martin is a 64 y.o. female with history of chronic pain syndrome and alcohol abuse.  She is known to me from previous encounter.  She was hospitalized 03/23/2024 with Pseudomonas bacteremia associated with abnormal chest x-ray with reticulonodular infiltrates.  She received antibiotics while in the hospital.  She ultimately left the hospital AMA on 04/03/2024.  Apparently received of pneumonia by her PCP post discharge.  Also was in at an urgent care about 2 weeks ago and received a course of antibiotics.     She was visiting family in [place] and became ill with confusion.  Family brought her back to [place].  She was seen by her PCP in the office 05/21/2024 and sent to the ER for evaluation.  She had chills and rigors and shortness of breath.  Chest x-ray was abnormal with right side infiltrate.  She was admitted and commenced on levofloxacin.  Blood culture has now grown MSSA.  Antibiotics modified to cefazolin.     Antibiotics   Levofloxacin:  05/21/2024-5/28/2024  Cefazolin: 5/23/24-  Nystatin swish and swallow     Microbiology   Blood culture 05/21/2024:  MSSA 1/2 anaerobic bottles   Urine culture 05/21/2024: NGTD    Review of Systems  Negative except as stated above in Interval History     OBJECTIVE   Temp:  [97.7 °F (36.5 °C)-98.7 °F (37.1 °C)] 98 °F (36.7 °C)  Pulse:  [] 111  Resp:  [18-20] 20  SpO2:  [9 %-97 %] 97 %  BP: (127-148)/(73-81) 127/73  Temp:  [97.7 °F (36.5 °C)-98.7 °F (37.1 °C)]   Temp: 98 °F (36.7 °C) (05/28/24 1143)  Pulse: (!) 111 (05/28/24 1335)  Resp: 20 (05/28/24 1335)  BP: 127/73 (05/28/24 1143)  SpO2: 97 % (05/28/24 1335)    Intake/Output Summary (Last 24 hours) at 5/28/2024 1532  Last data filed at 5/28/2024 1530  Gross per 24 hour   Intake 1097.02 ml   Output 2250 ml   Net -1152.98 ml       Physical Exam  General:  Thin, older female, sitting up in bed awake and alert, she has in no acute distress.  Eyes: Eyes with no icterus or  injection. Vision grossly normal  Ears: Hearing grossly normal.  Nose: Nares patent  Mouth: Moist mucous membranes, minimal erythema, improved dryness.  Neck: Supple, no tenderness to palpation.  Cardiovascular: Regular rate and rhythm, no murmurs, no edema.    Respiratory:  On room air, bilateral breath sounds essentially clear, no wheezing, no increased work of breathing or tachypnea.  Gastrointestinal:  Soft with active bowel sounds, no tenderness to palpation, no distention.  Musculoskeletal:  Moves all extremities with good strength.  Able to ambulate in room independently.  Skin:  Pale, warm and dry, no obvious rashes.    Neuro:   Oriented, conversant, follows commands.  Psych: Good mood, normal affect.   VAD:  PICC line placed on 05/27 right upper extremity  Isolation: None    WOUNDS:  None    Significant Labs: All pertinent labs within the past 24 hours have been reviewed.    CBC LAST 7 DAYS  Recent Labs   Lab 05/22/24  0426 05/23/24  0357 05/24/24  0411 05/25/24  0827 05/26/24  0555 05/27/24  0440 05/28/24  0445   WBC 11.06 9.51 9.45 14.95* 13.68* 11.26 13.96*   RBC 3.36* 3.53* 3.13* 3.39* 2.85* 2.92* 3.28*   HGB 9.9* 10.3* 9.1* 9.9* 8.3* 8.5* 9.4*   HCT 29.3* 30.2* 26.7* 29.6* 24.5* 25.5* 28.3*   MCV 87 86 85 87 86 87 86   MCH 29.5 29.2 29.1 29.2 29.1 29.1 28.7   MCHC 33.8 34.1 34.1 33.4 33.9 33.3 33.2   RDW 16.7* 16.8* 17.0* 17.2* 17.2* 17.6* 17.2*    331 380 444 460* 602* 657*   MPV 8.8* 8.9* 8.8* 8.2* 8.4* 8.6* 8.6*   GRAN 72.1  8.0* 57.3  5.4 62.9  5.9 78.9*  11.8* 81.3*  11.1*  --   --    LYMPH 17.1*  1.9 27.1  2.6 22.0  2.1 13.2*  2.0 11.7*  1.6  --   --    MONO 9.6  1.1* 14.4  1.4* 13.5  1.3* 6.6  1.0 5.6  0.8  --   --    BASO 0.02 0.03 0.02 0.03 0.02  --   --    NRBC 0 0 0 0 0  --   --        CHEMISTRY LAST 7 DAYS  Recent Labs   Lab 05/22/24  0426 05/23/24  0357 05/24/24  0409 05/25/24  0827 05/26/24  0555 05/27/24  0440 05/28/24  0445   * 132* 134* 138 134* 135* 136   K  "4.0 3.4* 3.1* 4.6 3.9 3.6 3.4*    99 102 99 101 103 100   CO2 21* 22* 23 25 23 22* 24   ANIONGAP 11 11 9 14 10 10 12   BUN 11 5* 10 5* 5* 4* 3*   CREATININE 0.6 0.5 0.5 0.6 0.5 0.5 0.5   GLU 84 88 110 102 109 105 92   CALCIUM 9.1 9.2 8.6* 9.0 8.5* 8.4* 9.0   MG 1.6 1.4* 1.5* 1.7  --   --   --    ALBUMIN 2.3* 2.1* 1.8* 1.9* 1.7*  --   --    PROT 5.9* 5.9* 5.3* 5.9* 5.2*  --   --    ALKPHOS 142* 137* 120 138* 115  --   --    ALT 18 15 18 24 24  --   --    AST 22 26 22 32 49*  --   --    BILITOT 0.5 0.5 0.3 0.3 0.2  --   --        Estimated Creatinine Clearance: 83.8 mL/min (based on SCr of 0.5 mg/dL).    INFLAMMATORY/PROCAL  LAST 7 DAYS  Recent Labs   Lab 05/23/24  1152 05/26/24  0555 05/27/24  0440 05/28/24  0445   PROCAL  --  0.12 0.08 0.07   .6* 189.7* 180.1* 159.6*     No results found for: "ESR"  CRP   Date Value Ref Range Status   05/28/2024 159.6 (H) 0.0 - 8.2 mg/L Final   05/27/2024 180.1 (H) 0.0 - 8.2 mg/L Final   05/26/2024 189.7 (H) 0.0 - 8.2 mg/L Final   05/23/2024 218.6 (H) 0.0 - 8.2 mg/L Final   01/11/2023 5.3 0.0 - 8.2 mg/L Final       PRIOR  MICROBIOLOGY:    Susceptibility data from last 90 days.  Collected Specimen Info Organism Cefepime Ceftriaxone Ciprofloxacin Clindamycin Erythromycin Levofloxacin Meropenem Oxacillin Penicillin PIPERACILLIN/TAZO SUSCEPTIBILITY Tetracycline Trimeth/Sulfa Vancomycin   05/23/24 Blood from Peripheral, Antecubital, Left No growth after 5 days.                05/23/24 Blood from Peripheral, Hand, Right No growth after 5 days.                05/21/24 Sputum, Expectorated Staphylococcus aureus   S   S  S    S  R   S  S  S   05/21/24 Blood from Peripheral, Antecubital, Left No growth after 5 days.                05/21/24 Blood from Peripheral, Antecubital, Right Staphylococcus aureus   S   S  S    S  R   S  S  S   03/26/24 Blood No growth after 5 days.                03/26/24 Blood No growth after 5 days.                03/24/24 Blood Pseudomonas aeruginosa  S  "  S    S  S    S      03/24/24 Blood Pseudomonas aeruginosa                    LAST 7 DAYS MICROBIOLOGY   Microbiology Results (last 7 days)       Procedure Component Value Units Date/Time    Blood culture [[id number]] Collected: 05/23/24 0927    Order Status: Completed Specimen: Blood from Peripheral, Antecubital, Left Updated: 05/28/24 1432     Blood Culture, Routine No growth after 5 days.    Blood culture [[id number]] Collected: 05/23/24 0925    Order Status: Completed Specimen: Blood from Peripheral, Hand, Right Updated: 05/28/24 1432     Blood Culture, Routine No growth after 5 days.    Blood culture [[id number]] Collected: 05/26/24 0556    Order Status: Completed Specimen: Blood from Peripheral, Antecubital, Left Updated: 05/28/24 1032     Blood Culture, Routine No Growth to date      No Growth to date      No Growth to date    Blood culture [[id number]] Collected: 05/25/24 0422    Order Status: Completed Specimen: Blood from Peripheral, Antecubital, Right Updated: 05/28/24 1032     Blood Culture, Routine No Growth to date      No Growth to date      No Growth to date      No Growth to date    Blood culture #2 **CANNOT BE ORDERED STAT** [[id number]] Collected: 05/21/24 1526    Order Status: Completed Specimen: Blood from Peripheral, Antecubital, Left Updated: 05/26/24 2032     Blood Culture, Routine No growth after 5 days.    Urine culture [[id number]] Collected: 05/21/24 2044    Order Status: Completed Specimen: Urine Updated: 05/25/24 0641     Urine Culture, Routine No growth    Narrative:      Specimen Source->Urine    Culture, Respiratory with Gram Stain [[id number]]  (Abnormal)  (Susceptibility) Collected: 05/21/24 2044    Order Status: Completed Specimen: Sputum, Expectorated Updated: 05/24/24 0812     Respiratory Culture STAPHYLOCOCCUS AUREUS  Moderate       Gram Stain (Respiratory) <10 epithelial cells per low power field.     Gram Stain (Respiratory) Many WBC's     Gram Stain (Respiratory) Few Gram  positive cocci    Blood culture #1 **CANNOT BE ORDERED STAT** [[id number]]  (Abnormal)  (Susceptibility) Collected: 05/21/24 1516    Order Status: Completed Specimen: Blood from Peripheral, Antecubital, Right Updated: 05/24/24 0719     Blood Culture, Routine Gram stain codey bottle: Gram positive cocci      Results called to and read back by: Mounika Schuster LPN at [facility]      05/22/2024  21:40 KS3      STAPHYLOCOCCUS AUREUS  ID consult required for Staph aureus bacteremia.      Respiratory Infection Panel (PCR), Nasopharyngeal [[id number]] Collected: 05/23/24 1406    Order Status: Completed Specimen: Nasopharyngeal Swab Updated: 05/23/24 2042     Respiratory Infection Panel Source NP Swab     Adenovirus Not Detected     Coronavirus 229E, Common Cold Virus Not Detected     Coronavirus HKU1, Common Cold Virus Not Detected     Coronavirus NL63, Common Cold Virus Not Detected     Coronavirus OC43, Common Cold Virus Not Detected     Comment: Coronavirus strains 229E, HKU1, NL63, and OC43 can cause the common   cold   and are not associated with the respiratory disease outbreak caused   by  the COVID-19 (SARS-CoV-2 novel Coronavirus) strain.           SARS-CoV2 (COVID-19) Qualitative PCR Not Detected     Human Metapneumovirus Not Detected     Human Rhinovirus/Enterovirus Not Detected     Influenza A (subtypes H1, H1-2009,H3) Not Detected     Influenza B Not Detected     Parainfluenza Virus 1 Not Detected     Parainfluenza Virus 2 Not Detected     Parainfluenza Virus 3 Not Detected     Parainfluenza Virus 4 Not Detected     Respiratory Syncytial Virus Not Detected     Bordetella Parapertussis (DL6487) Not Detected     Bordetella pertussis (ptxP) Not Detected     Chlamydia pneumoniae Not Detected     Mycoplasma pneumoniae Not Detected     Comment: Respiratory Infection Panel testing performed by Multiplex PCR.       Rapid Organism ID by PCR (from Blood culture) [[id number]]  (Abnormal) Collected: 05/21/24 1516    Order Status:  Completed Updated: 05/22/24 2252     Enterococcus faecalis Not Detected     Enterococcus faecium Not Detected     Listeria monocytogenes Not Detected     Staphylococcus spp. See species for ID     Staphylococcus aureus Detected     Staphylococcus epidermidis Not Detected     Staphylococcus lugdunensis Not Detected     Streptococcus species Not Detected     Streptococcus agalactiae Not Detected     Streptococcus pneumoniae Not Detected     Streptococcus pyogenes Not Detected     Acinetobacter calcoaceticus/baumannii complex Not Detected     Bacteroides fragilis Not Detected     Enterobacterales Not Detected     Enterobacter cloacae complex Not Detected     Escherichia coli Not Detected     Klebsiella aerogenes Not Detected     Klebsiella oxytoca Not Detected     Klebsiella pneumoniae group Not Detected     Proteus Not Detected     Salmonella sp Not Detected     Serratia marcescens Not Detected     Haemophilus influenzae Not Detected     Neisseria meningtidis Not Detected     Pseudomonas aeruginosa Not Detected     Stenotrophomonas maltophilia Not Detected     Candida albicans Not Detected     Candida auris Not Detected     Candida glabrata Not Detected     Candida krusei Not Detected     Candida parapsilosis Not Detected     Candida tropicalis Not Detected     Cryptococcus neoformans/gattii Not Detected     CTX-M (ESBL ) Test not applicable     IMP (Carbapenem resistant) Test not applicable     KPC resistance gene (Carbapenem resistant) Test not applicable     mcr-1  Test not applicable     mec A/C  Test not applicable     mec A/C and MREJ (MRSA) gene Not Detected     NDM (Carbapenem resistant) Test not applicable     OXA-48-like (Carbapenem resistant) Test not applicable     van A/B (VRE gene) Test not applicable     VIM (Carbapenem resistant) Test not applicable          CURRENT/PREVIOUS VISIT EKG  Results for orders placed or performed during the hospital encounter of 05/21/24   EKG 12-lead    Collection  Time: 05/21/24  3:30 PM   Result Value Ref Range    QRS Duration 96 ms    [facility] QTC Calculation 459 ms    Narrative    Test Reason : R06.02,    Vent. Rate : 089 BPM     Atrial Rate : 089 BPM     P-R Int : 172 ms          QRS Dur : 096 ms      QT Int : 378 ms       P-R-T Axes : 029 085 038 degrees     QTc Int : 459 ms    Normal sinus rhythm  Low voltage QRS  Borderline Abnormal ECG  When compared with ECG of 17-JAN-2023 14:53,  No significant change was found  Confirmed by Trevor Salvador MD [id number]) on 5/24/2024 12:09:59 PM    Referred By: MORGAN PARADA           Confirmed By:Trevor Salvador MD      Echocardiography Findings    Left Ventricle The left ventricle is normal in size. Normal wall thickness. There is concentric hypertrophy. Moderate global hypokinesis present. There is mildly reduced systolic function with a visually estimated ejection fraction of 40 - 45%. Biplane (2D) method of discs ejection fraction is 44%. There is normal diastolic function.   Right Ventricle Normal right ventricular cavity size. Wall thickness is normal. Right ventricle wall motion  is normal. Systolic function is normal.   Left Atrium Normal left atrial size.   Right Atrium Normal right atrial size.   Aortic Valve The aortic valve is structurally normal. There is normal leaflet mobility.   Mitral Valve The mitral valve is structurally normal. There is normal leaflet mobility.   Tricuspid Valve The tricuspid valve is structurally normal. There is normal leaflet mobility.   Pulmonic Valve The pulmonic valve is structurally normal. There is normal leaflet mobility.   IVC/SVC Normal venous pressure at 3 mmHg.   Ascending Aorta Aortic root is normal in size. Ascending aorta is normal.   Pericardium and Other Findings There is no pericardial effusion.       Significant Imaging: I have reviewed all relevant and available imaging results/findings within the past 24 hours.    CT Chest Without Contrast 05/23/24 1467  There is diffuse  consolidation seen in right greater than left lungs concerning for pneumonia.  There are bilateral pleural effusions greater on the right than left and mediastinal lymphadenopathy thought to be related to pneumonia.    X-Ray Chest 1 View S/P PICC Line by Nursing 05/27/24 1453   Unchanged extent of right airspace disease and pleural effusion.  Appropriately positioned PICC line post retraction.    CT Head Without Contrast [03/24/24 1540   No acute intracranial process    X-Ray Chest AP Portable 03/24/24 1623  IMPRESSION:   Interstitial infiltration in the lungs has developed since March 10, 2023. The changes are suggestive of pneumonia.    Renita Hayden NP  Date of Service: 05/28/2024      This note was created using [employer] voice recognition software that occasionally misinterpreted phrases or words.

## 2024-05-28 NOTE — PLAN OF CARE
Pt clear for DC from CM standpoint. Discharging home with HH.     SOC with [facility] is 5/30.    Teaching and auth completed with Arlene.     05/28/24 5007   Final Note   Assessment Type Final Discharge Note   Anticipated Discharge Disposition Home-Health   Post-Acute Status   Post-Acute Authorization Home Health;IV Infusion   Home Health Status Set-up Complete/Auth obtained   IV Infusion Status Set-up Complete/Auth obtained

## 2024-05-28 NOTE — DISCHARGE SUMMARY
"[facility] Medicine  Discharge Summary      Patient Name: Floresita Martin  MRN: [id number]  OMAR: [id number]  Patient Class: IP- Inpatient  Admission Date: 5/21/2024  Hospital Length of Stay: 6 days  Discharge Date and Time:  05/28/2024 12:06 PM  Attending Physician: Michael Navarrete MD   Discharging Provider: Michael Navarrete MD  Primary Care Provider: Toni Hughes MD    Primary Care Team: Networked reference to record PCT     HPI:   Floresita Martin is a 64 year old female with a previous medical history of HTN, HLD, GERD, opioid dependence with current use, metabolic encephalopathy, pneumonia, and COPD who was sent from PCP office for fever, cough, and shortness of breath. Patient is poor historian and unable to exactly recall when symptoms began but endorses fever, cough, exertional shortness of breath and loss of voice over the past two weeks. Reports that her  could provide more information but he was not at bedside. ED note reports that patient endorsed intermittent confusion. Initial ED workup included a chest xray that reads: "Findings concerning for multi lobar pneumonia in the right lung with possible involvement of the left lung base as well". CBC shows stable anemia, magnesium and lactic acid normal, cmp unremarkable. Patient vitals are stable and she is maintaining an oxygen saturation of 96% on room air. She is having productive cough with moderate amount of brown/yellow sputum. Most recent hospital admission from 3/26 was for pseudomonas bacteremia. Patient initiated on Levaquin IV in ED which has been continued. Upon admit patient is AAOx4 and does not appear in any respiratory distress. She is positive for a voice change which she reports has worsened over the last two weeks and has "lost her voice" but at baseline it is raspy. Patient admitted by hospital medicine for further evaluation and management.    * No surgery found *      Hospital Course: "   Admitted with sepsis, bilateral pneumonia, bacteremia, and encephalopathy. Respiratory and blood cultures grew MSSA. She was given abx and breathing treatments. She required minimal oxygen support later weaned off. ID was consulted. Her repeat blood cultures were negative. TTE showed no valvular concerns. FRANCO was recommended by ID due to their concern for septic emboli on CT chest but was recommended against by cardiology and was not performed. Given nystatin for oral thrush. ID recommended cefazolin for a total 6 week course. PICC line placed. Set up with home health and home infusion service. By time of discharge the patient was tolerating a regular diet with improving admission symptoms. Patient seen and examined on day of discharge.    Physical exam on day of discharge:  Constitutional:       General: She is not in acute distress.  HENT:      Head: Normocephalic and atraumatic.   Cardiovascular:      Rate and Rhythm: Normal rate and regular rhythm.   Pulmonary:      Effort: Pulmonary effort is normal. No respiratory distress.   Neurological:      General: No focal deficit present.      Mental Status: She is alert and oriented to person, place, and time. Mental status is at baseline.   Psychiatric:         Mood and Affect: Affect normal.         Behavior: Behavior normal.         Thought Content: Thought content normal.        Goals of Care Treatment Preferences:  Code Status: Full Code      Consults:   Consults (From admission, onward)          Status Ordering Provider     Inpatient consult to PICC Line Nurse  Once        Provider:  (Not yet assigned)    Completed JESSICA MILLER     Inpatient consult to Social Work/Case Management  Once        Provider:  (Not yet assigned)    Acknowledged JESSICA MILLER     Inpatient consult to Cardiology  Once        Provider:  Trevor Salvador MD    Acknowledged JESSICA MILLER     Inpatient consult to Midline team  Once        Provider:  (Not yet assigned)    Completed TIFFANIE  SCOTT CARPENTER     Inpatient consult to Infectious Diseases  Once        Provider:  Pola Rodriguez MD    Completed ANA CRISTINA DUQUE     Inpatient consult to Registered Dietitian/Nutritionist  Once        Provider:  (Not yet assigned)    Completed ANNA MARIE ORDAZ     Case Management/  Once        Provider:  (Not yet assigned)    Completed ANNA MARIE ORDAZ            No new Assessment & Plan notes have been filed under this hospital service since the last note was generated.  Service: Hospital Medicine    Final Active Diagnoses:    Diagnosis Date Noted POA    PRINCIPAL PROBLEM:  Sepsis [A41.9] 05/24/2024 No    Thrush [B37.0] 05/23/2024 No    COPD with acute lower respiratory infection [J44.0] 05/22/2024 Yes    Moderate protein-calorie malnutrition [E44.0] 05/22/2024 Yes    Pneumonia due to methicillin susceptible Staphylococcus aureus (MSSA) [J15.211] 05/21/2024 Yes    Hypertension, essential [I10] 03/26/2024 Yes    Normocytic anemia [D64.9] 03/26/2024 Yes    MSSA bacteremia [R78.81, B95.61] 03/26/2024 Yes    Hyponatremia [E87.1] 03/24/2024 Yes    Chronic pain syndrome [G89.4] 06/28/2023 Yes    Dependence on nicotine from cigarettes [F17.210] 02/15/2019 Yes    Gastroesophageal reflux disease without esophagitis [K21.9] 01/22/2018 Yes      Problems Resolved During this Admission:    Diagnosis Date Noted Date Resolved POA    Acute hypoxic respiratory failure [J96.01] 03/25/2024 05/27/2024 Yes       Discharged Condition: good    Disposition: Home-Health Care [facility]    Follow Up:   Follow-up Information       [facility]. Go on 6/5/2024.    Specialty: Primary Care  Why: Hospital follow up scheduled at 9:00 a.m. on 6/5.  Contact information:  [address], [address] [address] [phone]  Additional information:  [facility] [address]             [facility] Follow up.    Contact information:  [address], [facility] [address]  [facility]  [address]  [phone]             [facility] Follow up.    Specialties: Home Health Services, Home Therapy Services, Home Living Aide Services  Contact information:  [address] [facility] [address]  [phone]             Cleopatra Contreras MD. Schedule an appointment as soon as possible for a visit in 3 week(s).    Specialty: Infectious Diseases  Contact information:  [address]  [address]  [facility] [address]  [phone]                           Patient Instructions:      Ambulatory referral/consult to Home Health   Standing Status: Future   Referral Priority: Routine Referral Type: Home Health   Referral Reason: Specialty Services Required   Requested Specialty: Home Health Services   Number of Visits Requested: 1     Ambulatory referral/consult to Smoking Cessation Program   Standing Status: Future   Referral Priority: Routine Referral Type: Consultation   Referral Reason: Specialty Services Required   Requested Specialty: CTTS   Number of Visits Requested: 1     Diet Adult Regular     Notify your health care provider if you experience any of the following:  temperature >100.4     Notify your health care provider if you experience any of the following:  persistent nausea and vomiting or diarrhea     Notify your health care provider if you experience any of the following:  severe uncontrolled pain     Notify your health care provider if you experience any of the following:  redness, tenderness, or signs of infection (pain, swelling, redness, odor or green/yellow discharge around incision site)     Notify your health care provider if you experience any of the following:  difficulty breathing or increased cough     Notify your health care provider if you experience any of the following:  severe persistent headache     Notify your health care provider if you experience any of the following:  worsening rash     Notify your health care provider if you experience any of the  following:  persistent dizziness, light-headedness, or visual disturbances     Notify your health care provider if you experience any of the following:  increased confusion or weakness     Activity as tolerated       Significant Diagnostic Studies:     Lab Results   Component Value Date    WBC 13.96 (H) 05/28/2024    HGB 9.4 (L) 05/28/2024    HCT 28.3 (L) 05/28/2024    MCV 86 05/28/2024     (H) 05/28/2024       BMP  Lab Results   Component Value Date     05/28/2024    K 3.4 (L) 05/28/2024     05/28/2024    CO2 24 05/28/2024    BUN 3 (L) 05/28/2024    CREATININE 0.5 05/28/2024    CALCIUM 9.0 05/28/2024    ANIONGAP 12 05/28/2024    EGFRNORACEVR >60 05/28/2024       Microbiology Results (last 7 days)       Procedure Component Value Units Date/Time    Blood culture [[id number]] Collected: 05/26/24 0556    Order Status: Completed Specimen: Blood from Peripheral, Antecubital, Left Updated: 05/28/24 1032     Blood Culture, Routine No Growth to date      No Growth to date      No Growth to date    Blood culture [[id number]] Collected: 05/25/24 0422    Order Status: Completed Specimen: Blood from Peripheral, Antecubital, Right Updated: 05/28/24 1032     Blood Culture, Routine No Growth to date      No Growth to date      No Growth to date      No Growth to date    Blood culture [[id number]] Collected: 05/23/24 0925    Order Status: Completed Specimen: Blood from Peripheral, Hand, Right Updated: 05/27/24 1432     Blood Culture, Routine No Growth to date      No Growth to date      No Growth to date      No Growth to date      No Growth to date    Blood culture [[id number]] Collected: 05/23/24 0927    Order Status: Completed Specimen: Blood from Peripheral, Antecubital, Left Updated: 05/27/24 1432     Blood Culture, Routine No Growth to date      No Growth to date      No Growth to date      No Growth to date      No Growth to date    Blood culture #2 **CANNOT BE ORDERED STAT** [[id number]] Collected:  05/21/24 1526    Order Status: Completed Specimen: Blood from Peripheral, Antecubital, Left Updated: 05/26/24 2032     Blood Culture, Routine No growth after 5 days.    Urine culture [[id number]] Collected: 05/21/24 2044    Order Status: Completed Specimen: Urine Updated: 05/25/24 0641     Urine Culture, Routine No growth    Narrative:      Specimen Source->Urine    Culture, Respiratory with Gram Stain [[id number]]  (Abnormal)  (Susceptibility) Collected: 05/21/24 2044    Order Status: Completed Specimen: Sputum, Expectorated Updated: 05/24/24 0812     Respiratory Culture STAPHYLOCOCCUS AUREUS  Moderate       Gram Stain (Respiratory) <10 epithelial cells per low power field.     Gram Stain (Respiratory) Many WBC's     Gram Stain (Respiratory) Few Gram positive cocci    Blood culture #1 **CANNOT BE ORDERED STAT** [[id number]]  (Abnormal)  (Susceptibility) Collected: 05/21/24 1516    Order Status: Completed Specimen: Blood from Peripheral, Antecubital, Right Updated: 05/24/24 0719     Blood Culture, Routine Gram stain cdoey bottle: Gram positive cocci      Results called to and read back by: Mounika Schuster LPN at [facility]      05/22/2024  21:40 KS3      STAPHYLOCOCCUS AUREUS  ID consult required for Staph aureus bacteremia.      Respiratory Infection Panel (PCR), Nasopharyngeal [[id number]] Collected: 05/23/24 1406    Order Status: Completed Specimen: Nasopharyngeal Swab Updated: 05/23/24 2042     Respiratory Infection Panel Source NP Swab     Adenovirus Not Detected     Coronavirus 229E, Common Cold Virus Not Detected     Coronavirus HKU1, Common Cold Virus Not Detected     Coronavirus NL63, Common Cold Virus Not Detected     Coronavirus OC43, Common Cold Virus Not Detected     Comment: Coronavirus strains 229E, HKU1, NL63, and OC43 can cause the common   cold   and are not associated with the respiratory disease outbreak caused   by  the COVID-19 (SARS-CoV-2 novel Coronavirus) strain.           SARS-CoV2 (COVID-19)  Qualitative PCR Not Detected     Human Metapneumovirus Not Detected     Human Rhinovirus/Enterovirus Not Detected     Influenza A (subtypes H1, H1-2009,H3) Not Detected     Influenza B Not Detected     Parainfluenza Virus 1 Not Detected     Parainfluenza Virus 2 Not Detected     Parainfluenza Virus 3 Not Detected     Parainfluenza Virus 4 Not Detected     Respiratory Syncytial Virus Not Detected     Bordetella Parapertussis (PN3367) Not Detected     Bordetella pertussis (ptxP) Not Detected     Chlamydia pneumoniae Not Detected     Mycoplasma pneumoniae Not Detected     Comment: Respiratory Infection Panel testing performed by Multiplex PCR.       Rapid Organism ID by PCR (from Blood culture) [[id number]]  (Abnormal) Collected: 05/21/24 1516    Order Status: Completed Updated: 05/22/24 2727     Enterococcus faecalis Not Detected     Enterococcus faecium Not Detected     Listeria monocytogenes Not Detected     Staphylococcus spp. See species for ID     Staphylococcus aureus Detected     Staphylococcus epidermidis Not Detected     Staphylococcus lugdunensis Not Detected     Streptococcus species Not Detected     Streptococcus agalactiae Not Detected     Streptococcus pneumoniae Not Detected     Streptococcus pyogenes Not Detected     Acinetobacter calcoaceticus/baumannii complex Not Detected     Bacteroides fragilis Not Detected     Enterobacterales Not Detected     Enterobacter cloacae complex Not Detected     Escherichia coli Not Detected     Klebsiella aerogenes Not Detected     Klebsiella oxytoca Not Detected     Klebsiella pneumoniae group Not Detected     Proteus Not Detected     Salmonella sp Not Detected     Serratia marcescens Not Detected     Haemophilus influenzae Not Detected     Neisseria meningtidis Not Detected     Pseudomonas aeruginosa Not Detected     Stenotrophomonas maltophilia Not Detected     Candida albicans Not Detected     Candida auris Not Detected     Candida glabrata Not Detected     Candida  krusei Not Detected     Candida parapsilosis Not Detected     Candida tropicalis Not Detected     Cryptococcus neoformans/gattii Not Detected     CTX-M (ESBL ) Test not applicable     IMP (Carbapenem resistant) Test not applicable     KPC resistance gene (Carbapenem resistant) Test not applicable     mcr-1  Test not applicable     mec A/C  Test not applicable     mec A/C and MREJ (MRSA) gene Not Detected     NDM (Carbapenem resistant) Test not applicable     OXA-48-like (Carbapenem resistant) Test not applicable     van A/B (VRE gene) Test not applicable     VIM (Carbapenem resistant) Test not applicable              X-Ray Chest 1 View S/P PICC Line by Nursing    Result Date: 5/27/2024  EXAMINATION: XR CHEST 1 VIEW S/P PICC LINE BY NURSING CLINICAL HISTORY: picc placement;  Sepsis, unspecified organism TECHNIQUE: Frontal view chest COMPARISON: Radiograph earlier same date FINDINGS: There is patchy airspace disease throughout the right lung.  Heart size unchanged.  Right-sided PICC line is been retracted with tip now at the distal SVC.  Blunted right costophrenic angle.  No pneumothorax.  Interbody hardware near the cervicothoracic junction.     Unchanged extent of right airspace disease and pleural effusion.  Appropriately positioned PICC line post retraction. Electronically signed by: Trip Richard Date:    05/27/2024 Time:    14:53    X-Ray Chest 1 View S/P PICC Line by Nursing    Result Date: 5/27/2024  EXAMINATION: XR CHEST 1 VIEW S/P PICC LINE BY NURSING CLINICAL HISTORY: post PICC Placement; TECHNIQUE: Single AP chest COMPARISON: 05/21/2024 FINDINGS: A right upper extremity PICC line has been placed and has its tip within the right atrium.  There is abundant right perihilar airspace disease which has increased.  There is a small right pleural effusion.  No pneumothorax.     Right PICC line with tip in the right atrium.  Retraction by 9 cm is recommended. Increased right lung infiltrate.  Small right  pleural effusion. Electronically signed by: Rafal Sheikh MD Date:    05/27/2024 Time:    13:39    Echo    Result Date: 5/23/2024    Left Ventricle: The left ventricle is normal in size. Normal wall thickness. There is concentric hypertrophy. Moderate global hypokinesis present. There is mildly reduced systolic function with a visually estimated ejection fraction of 40 - 45%. Biplane (2D) method of discs ejection fraction is 44%. There is normal diastolic function.   Right Ventricle: Normal right ventricular cavity size. Wall thickness is normal. Systolic function is normal.   IVC/SVC: Normal venous pressure at 3 mmHg.     CT Chest Without Contrast    Result Date: 5/23/2024  EXAMINATION: CT CHEST WITHOUT CONTRAST CLINICAL HISTORY: Pneumonia, unresolved;Pneumonia on chest x-ray.  Evaluate for evidence of septic emboli; TECHNIQUE: Low dose axial images, sagittal and coronal reformations were obtained from the thoracic inlet to the lung bases. Contrast was not administered. COMPARISON: None. FINDINGS: There are diffuse regions of consolidation seen within the right greater than left pulmonary parenchyma.  There is no pneumothorax.  There are bilateral pleural effusions greater on the right than left.  There is mediastinal lymph adenopathy with the largest node seen in the pre tracheal region measuring 1.6 cm in short axis.  There is no pericardial effusion. Visualized portions of the superior abdomen are unremarkable.  The osseous structures show degenerative change.     There is diffuse consolidation seen in right greater than left lungs concerning for pneumonia.  There are bilateral pleural effusions greater on the right than left and mediastinal lymphadenopathy thought to be related to pneumonia. Electronically signed by: Gabriela Gan MD Date:    05/23/2024 Time:    14:35    X-Ray Chest AP Portable    Result Date: 5/21/2024  EXAMINATION: XR CHEST AP PORTABLE CLINICAL HISTORY: Fever, unspecified TECHNIQUE:  Single frontal view of the chest was performed. COMPARISON: 04/02/2024 FINDINGS: Multifocal airspace opacities noted in the right lung, new since the prior study.  Left lung relatively clear with question minimal interstitial opacities at the base.  Faint nodule left upper lobe better demonstrated on previous CT.  Heart size normal.  No pleural effusion or acute bony abnormality.     Findings concerning for multi lobar pneumonia in the right lung with possible involvement of the left lung base as well. Electronically signed by: Rain Beckwith Date:    05/21/2024 Time:    16:13         Pending Diagnostic Studies:       Procedure Component Value Units Date/Time    Transesophageal echo (FRANCO) [[id number]]     Order Status: Sent Lab Status: No result            Medications:  Reconciled Home Medications:      Medication List        START taking these medications      dextrose 5 % in water (D5W) PgBk 50 mL with ceFAZolin 2 gram SolR 2 g  Inject 2 g into the vein every 8 (eight) hours. Last dose July 4 2024     magnesium oxide 400 mg (241.3 mg magnesium) tablet  Commonly known as: MAG-OX  Take 1 tablet (400 mg total) by mouth once daily.     nicotine 14 mg/24 hr  Commonly known as: NICODERM CQ  Place 1 patch onto the skin once daily.            CHANGE how you take these medications      nystatin 100,000 unit/mL suspension  Commonly known as: MYCOSTATIN  Take 5 mLs (500,000 Units total) by mouth 4 (four) times daily with meals and nightly. for 5 days  What changed:   how much to take  when to take this            CONTINUE taking these medications      albuterol 90 mcg/actuation inhaler  Commonly known as: PROVENTIL/VENTOLIN HFA  Inhale 2 puffs into the lungs every 6 (six) hours as needed for Wheezing or Shortness of Breath. Rescue     albuterol-ipratropium 2.5 mg-0.5 mg/3 mL nebulizer solution  Commonly known as: DUO-NEB  Take 3 mLs by nebulization every 6 (six) hours as needed for Wheezing or Shortness of Breath.  Rescue     amitriptyline 25 MG tablet  Commonly known as: ELAVIL  Take 1-2 tablet by mouth every night at bedtime     atorvastatin 40 MG tablet  Commonly known as: LIPITOR  TAKE 1 TABLET(40 MG) BY MOUTH DAILY     co-enzyme Q-10 30 mg capsule  Take 30 mg by mouth once daily.     cyclobenzaprine 10 MG tablet  Commonly known as: FLEXERIL  Take 1 tablet by mouth twice a day as needed     fluticasone propionate 50 mcg/actuation nasal spray  Commonly known as: FLONASE  1 spray (50 mcg total) by Each Nostril route once daily.     ibuprofen 800 MG tablet  Commonly known as: ADVIL,MOTRIN  Take 1 tablet by mouth three times a day as needed for pain     * lactulose 10 gram/15 mL solution  Commonly known as: CHRONULAC  Take 15 ml twice a day as needeed     * lactulose 10 gram/15 mL solution  Commonly known as: CHRONULAC  15 ml twice a day     loratadine 10 mg tablet  Commonly known as: CLARITIN  Take 1 tablet (10 mg total) by mouth once daily.     losartan 50 MG tablet  Commonly known as: COZAAR  Take 1 tablet (50 mg total) by mouth once daily.     lubiprostone 24 MCG Cap  Commonly known as: AMITIZA  Take 1 capsule by mouth twice a day as needed for pain     * morphine 30 MG 12 hr tablet  Commonly known as: MS CONTIN  Take 1 tablet by mouth every twelve hours More than 7 days supply is medically necessary     * morphine 30 MG 12 hr tablet  Commonly known as: MS CONTIN  Take 1 tablet by mouth every twelve hours More than 7 days supply is medically necessary     multivitamin capsule  Take 1 capsule by mouth once daily.     naloxone 4 mg/actuation Spry  Commonly known as: NARCAN  Spray 1 spray into one nostril single dose may repeat every 2-3 minutes until responsive or EMS arrives     omeprazole 40 MG capsule  Commonly known as: PRILOSEC  TAKE 1 CAPSULE BY MOUTH EVERY MORNING     ondansetron 4 MG Tbdl  Commonly known as: ZOFRAN-ODT  Take 1 tablet (4 mg total) by mouth every 6 (six) hours as needed (nausea).     * oxyCODONE 20 mg  Tab immediate release tablet  Commonly known as: ROXICODONE  Take 1 tablet by mouth five times a day as needed for pain More than 7 days supply is medically necessary     * oxyCODONE 20 mg Tab immediate release tablet  Commonly known as: ROXICODONE  Take 1 tablet by mouth five times a day as needed for pain More than 7 days supply is medically necessary     * oxyCODONE 20 mg Tab immediate release tablet  Commonly known as: ROXICODONE  Take 1 tablet by mouth five times a day as needed for pain More than a 7 day supply is medically necessary     * oxyCODONE 20 mg Tab immediate release tablet  Commonly known as: ROXICODONE  Take 1 tablet by mouth five times a day as needed for pain More than a 7 day supply is medically necessary  Start taking on: June 7, 2024     pregabalin 75 MG capsule  Commonly known as: LYRICA  Take 1 capsule by mouth three times a day More than 7 days supply is medically necessary     RESTASIS 0.05 % ophthalmic emulsion  Generic drug: cycloSPORINE  Place 1 drop into both eyes 2 (two) times daily.     TRELEGY ELLIPTA 200-62.5-25 mcg inhaler  Generic drug: fluticasone-umeclidin-vilanter  Inhale 1 puff into the lungs once daily.           * This list has 8 medication(s) that are the same as other medications prescribed for you. Read the directions carefully, and ask your doctor or other care provider to review them with you.                  Indwelling Lines/Drains at time of discharge:   Lines/Drains/Airways       Peripherally Inserted Central Catheter Line  Duration             PICC Double Lumen 05/27/24 1450 right basilic <1 day                    Time spent on the discharge of patient: 38 minutes         Michael Navarrete MD  Department of Hospital Medicine  [facility]/Surg

## 2024-05-29 ENCOUNTER — PATIENT OUTREACH (OUTPATIENT)
Dept: ADMINISTRATIVE | Facility: CLINIC | Age: 64
End: 2024-05-29
Payer: MEDICARE

## 2024-05-29 NOTE — PROGRESS NOTES
C3 nurse attempted to contact Floresita Martin for a TCC post hospital discharge follow up call. The patient is unable to conduct the call @ this time. The patient requested a callback.    The patient has a scheduled HOSFU appointment with [facility] on 06/05/2024 @ 0900.

## 2024-05-30 LAB — BACTERIA BLD CULT: NORMAL

## 2024-05-31 LAB — BACTERIA BLD CULT: NORMAL

## 2024-06-04 ENCOUNTER — TELEPHONE (OUTPATIENT)
Dept: INFECTIOUS DISEASES | Facility: CLINIC | Age: 64
End: 2024-06-04

## 2024-06-04 ENCOUNTER — LAB VISIT (OUTPATIENT)
Dept: LAB | Facility: HOSPITAL | Age: 64
End: 2024-06-04
Attending: INTERNAL MEDICINE
Payer: MEDICARE

## 2024-06-04 DIAGNOSIS — A41.9 SYSTEMIC INFECTION: Primary | ICD-10-CM

## 2024-06-04 LAB
ALBUMIN SERPL BCP-MCNC: 2.4 G/DL (ref 3.5–5.2)
ALP SERPL-CCNC: 131 U/L (ref 55–135)
ALT SERPL W/O P-5'-P-CCNC: <5 U/L (ref 10–44)
ANION GAP SERPL CALC-SCNC: 14 MMOL/L (ref 8–16)
AST SERPL-CCNC: 18 U/L (ref 10–40)
BASOPHILS # BLD AUTO: 0.02 K/UL (ref 0–0.2)
BASOPHILS NFR BLD: 0.2 % (ref 0–1.9)
BILIRUB SERPL-MCNC: 0.1 MG/DL (ref 0.1–1)
BUN SERPL-MCNC: 15 MG/DL (ref 8–23)
CALCIUM SERPL-MCNC: 8.7 MG/DL (ref 8.7–10.5)
CHLORIDE SERPL-SCNC: 100 MMOL/L (ref 95–110)
CO2 SERPL-SCNC: 21 MMOL/L (ref 23–29)
CREAT SERPL-MCNC: 0.8 MG/DL (ref 0.5–1.4)
CRP SERPL-MCNC: 87.9 MG/L (ref 0–8.2)
DIFFERENTIAL METHOD BLD: ABNORMAL
EOSINOPHIL # BLD AUTO: 0.2 K/UL (ref 0–0.5)
EOSINOPHIL NFR BLD: 1.9 % (ref 0–8)
ERYTHROCYTE [DISTWIDTH] IN BLOOD BY AUTOMATED COUNT: 17.7 % (ref 11.5–14.5)
ERYTHROCYTE [SEDIMENTATION RATE] IN BLOOD BY WESTERGREN METHOD: 124 MM/HR (ref 0–20)
EST. GFR  (NO RACE VARIABLE): >60 ML/MIN/1.73 M^2
GLUCOSE SERPL-MCNC: 54 MG/DL (ref 70–110)
HCT VFR BLD AUTO: 27.2 % (ref 37–48.5)
HGB BLD-MCNC: 8.7 G/DL (ref 12–16)
IMM GRANULOCYTES # BLD AUTO: 0.04 K/UL (ref 0–0.04)
IMM GRANULOCYTES NFR BLD AUTO: 0.4 % (ref 0–0.5)
LYMPHOCYTES # BLD AUTO: 2.4 K/UL (ref 1–4.8)
LYMPHOCYTES NFR BLD: 25.5 % (ref 18–48)
MCH RBC QN AUTO: 28.2 PG (ref 27–31)
MCHC RBC AUTO-ENTMCNC: 32 G/DL (ref 32–36)
MCV RBC AUTO: 88 FL (ref 82–98)
MONOCYTES # BLD AUTO: 0.8 K/UL (ref 0.3–1)
MONOCYTES NFR BLD: 8.9 % (ref 4–15)
NEUTROPHILS # BLD AUTO: 5.9 K/UL (ref 1.8–7.7)
NEUTROPHILS NFR BLD: 63.1 % (ref 38–73)
NRBC BLD-RTO: 0 /100 WBC
PLATELET # BLD AUTO: 732 K/UL (ref 150–450)
PMV BLD AUTO: 9.5 FL (ref 9.2–12.9)
POTASSIUM SERPL-SCNC: 5.2 MMOL/L (ref 3.5–5.1)
PROT SERPL-MCNC: 6 G/DL (ref 6–8.4)
RBC # BLD AUTO: 3.08 M/UL (ref 4–5.4)
SODIUM SERPL-SCNC: 135 MMOL/L (ref 136–145)
WBC # BLD AUTO: 9.3 K/UL (ref 3.9–12.7)

## 2024-06-04 PROCEDURE — 36415 COLL VENOUS BLD VENIPUNCTURE: CPT | Performed by: INTERNAL MEDICINE

## 2024-06-04 PROCEDURE — 85651 RBC SED RATE NONAUTOMATED: CPT | Performed by: INTERNAL MEDICINE

## 2024-06-04 PROCEDURE — 80053 COMPREHEN METABOLIC PANEL: CPT | Performed by: INTERNAL MEDICINE

## 2024-06-04 PROCEDURE — 86140 C-REACTIVE PROTEIN: CPT | Performed by: INTERNAL MEDICINE

## 2024-06-04 PROCEDURE — 85025 COMPLETE CBC W/AUTO DIFF WBC: CPT | Performed by: INTERNAL MEDICINE

## 2024-06-04 NOTE — TELEPHONE ENCOUNTER
Aminata with [facility] called [phone]    She is reporting a critical glucose of 54 ; however,  She spoke with patient who stated she had eaten  After the labs were drawn and she felt fine.    TYREE Bush [facility]  6/04/24

## 2024-06-05 ENCOUNTER — OFFICE VISIT (OUTPATIENT)
Dept: PRIMARY CARE CLINIC | Facility: CLINIC | Age: 64
End: 2024-06-05
Payer: MEDICARE

## 2024-06-05 VITALS
BODY MASS INDEX: 20.97 KG/M2 | DIASTOLIC BLOOD PRESSURE: 60 MMHG | HEIGHT: 60 IN | HEART RATE: 64 BPM | TEMPERATURE: 99 F | WEIGHT: 106.81 LBS | OXYGEN SATURATION: 94 % | SYSTOLIC BLOOD PRESSURE: 100 MMHG

## 2024-06-05 DIAGNOSIS — E16.2 HYPOGLYCEMIA: ICD-10-CM

## 2024-06-05 DIAGNOSIS — J15.211 PNEUMONIA DUE TO METHICILLIN SUSCEPTIBLE STAPHYLOCOCCUS AUREUS (MSSA), UNSPECIFIED LATERALITY, UNSPECIFIED PART OF LUNG: Primary | ICD-10-CM

## 2024-06-05 LAB — GLUCOSE SERPL-MCNC: 108 MG/DL (ref 70–110)

## 2024-06-05 PROCEDURE — 3078F DIAST BP <80 MM HG: CPT | Mod: HCNC,CPTII,S$GLB, | Performed by: NURSE PRACTITIONER

## 2024-06-05 PROCEDURE — 4010F ACE/ARB THERAPY RXD/TAKEN: CPT | Mod: HCNC,CPTII,S$GLB, | Performed by: NURSE PRACTITIONER

## 2024-06-05 PROCEDURE — 3008F BODY MASS INDEX DOCD: CPT | Mod: HCNC,CPTII,S$GLB, | Performed by: NURSE PRACTITIONER

## 2024-06-05 PROCEDURE — 99213 OFFICE O/P EST LOW 20 MIN: CPT | Mod: HCNC,S$GLB,, | Performed by: NURSE PRACTITIONER

## 2024-06-05 PROCEDURE — 99999 PR PBB SHADOW E&M-EST. PATIENT-LVL III: CPT | Mod: PBBFAC,HCNC,, | Performed by: NURSE PRACTITIONER

## 2024-06-05 PROCEDURE — 1160F RVW MEDS BY RX/DR IN RCRD: CPT | Mod: HCNC,CPTII,S$GLB, | Performed by: NURSE PRACTITIONER

## 2024-06-05 PROCEDURE — 1111F DSCHRG MED/CURRENT MED MERGE: CPT | Mod: HCNC,CPTII,S$GLB, | Performed by: NURSE PRACTITIONER

## 2024-06-05 PROCEDURE — 1159F MED LIST DOCD IN RCRD: CPT | Mod: HCNC,CPTII,S$GLB, | Performed by: NURSE PRACTITIONER

## 2024-06-05 PROCEDURE — 82962 GLUCOSE BLOOD TEST: CPT | Mod: HCNC,S$GLB,, | Performed by: NURSE PRACTITIONER

## 2024-06-05 PROCEDURE — 3074F SYST BP LT 130 MM HG: CPT | Mod: HCNC,CPTII,S$GLB, | Performed by: NURSE PRACTITIONER

## 2024-06-05 RX ORDER — CEFAZOLIN SODIUM 10 G/1
INJECTION, POWDER, FOR SOLUTION INTRAVENOUS
COMMUNITY
Start: 2024-06-03

## 2024-06-05 NOTE — PROGRESS NOTES
I spoke with patient to advise of Dr Contreras's orders :   please tell patient to take frequent complete meals, 5 x / day,  to avoid hypoglycemia, and she must call and schedule with her PCP  Patient stated she sees her PCP on  July 1st. And that time just got   Away from her the other day and she  Forgot to eat .   She is being mindful  Of sugars.  TYREE GRADY   6/05/24

## 2024-06-05 NOTE — PROGRESS NOTES
Transitional Care Note  Subjective:       Patient ID: Floresita Martin is a 64 y.o. female.  Chief Complaint: Hospital Follow Up    Family and/or Caretaker present at visit?  Yes. present  Diagnostic tests reviewed/disposition: No diagnosic tests pending after this hospitalization.  Disease/illness education:  continue to avoid smoking, monitor PICC site for signs of infection.   Home health/community services discussion/referrals: Patient has home health established at [facility] .   Establishment or re-establishment of referral orders for community resources: No other necessary community resources.   Discussion with other health care providers: No discussion with other health care providers necessary.   Floresita Martin is a 64 year old female with a previous medical history of HTN, HLD, GERD, opioid dependence with current use, metabolic encephalopathy, pneumonia, and COPD who presents for hospital follow up. Patient recently admitted with sepsis secondary to bilateral pneumonia. Patient evaluated by ID and discharged to complete 6 week course of IV cefazolin. Patient states she is feeling  better and has not had been smoking cigarettes for the past two weeks.       Review of Systems   All other systems reviewed and are negative.      Objective:      Physical Exam  Vitals reviewed.   Constitutional:       Appearance: Normal appearance. She is normal weight.   HENT:      Head: Normocephalic.      Mouth/Throat:      Mouth: Mucous membranes are moist.      Pharynx: Oropharynx is clear.   Eyes:      General: Lids are normal. Gaze aligned appropriately.      Extraocular Movements: Extraocular movements intact.   Cardiovascular:      Rate and Rhythm: Normal rate and regular rhythm.      Pulses: Normal pulses.   Pulmonary:      Effort: Pulmonary effort is normal.      Breath sounds: Normal breath sounds.   Abdominal:      Palpations: Abdomen is soft.   Musculoskeletal:         General: Normal range of motion.       Cervical back: Normal range of motion.      Comments: Right picc line present, no surrounding erythema. Dressing clean and intact- changed yesterday per HH nurse. Currently infusing abx   Skin:     General: Skin is warm and dry.   Neurological:      Mental Status: She is alert and oriented to person, place, and time. Mental status is at baseline.   Psychiatric:         Mood and Affect: Mood normal.         Assessment:       1. Pneumonia due to methicillin susceptible Staphylococcus aureus (MSSA), unspecified laterality, unspecified part of lung        Plan:       -Recent lab work showed low glucose, patient states she did not eat prior to lab work. CRP & sed rate trending downward.       Repeat glucose 108    -follow up with ID scheduled

## 2024-06-11 ENCOUNTER — TELEPHONE (OUTPATIENT)
Dept: PULMONOLOGY | Facility: CLINIC | Age: 64
End: 2024-06-11
Payer: MEDICARE

## 2024-06-11 ENCOUNTER — LAB VISIT (OUTPATIENT)
Dept: LAB | Facility: HOSPITAL | Age: 64
End: 2024-06-11
Attending: INTERNAL MEDICINE
Payer: MEDICARE

## 2024-06-11 DIAGNOSIS — A41.9 SEPTICEMIA DURING LABOR: Primary | ICD-10-CM

## 2024-06-11 LAB
ALBUMIN SERPL BCP-MCNC: 2.7 G/DL (ref 3.5–5.2)
ALP SERPL-CCNC: 149 U/L (ref 55–135)
ALT SERPL W/O P-5'-P-CCNC: <5 U/L (ref 10–44)
ANION GAP SERPL CALC-SCNC: 12 MMOL/L (ref 8–16)
AST SERPL-CCNC: 20 U/L (ref 10–40)
BASOPHILS # BLD AUTO: 0.06 K/UL (ref 0–0.2)
BASOPHILS NFR BLD: 0.7 % (ref 0–1.9)
BILIRUB SERPL-MCNC: 0.1 MG/DL (ref 0.1–1)
BUN SERPL-MCNC: 11 MG/DL (ref 8–23)
CALCIUM SERPL-MCNC: 9.2 MG/DL (ref 8.7–10.5)
CHLORIDE SERPL-SCNC: 100 MMOL/L (ref 95–110)
CO2 SERPL-SCNC: 24 MMOL/L (ref 23–29)
CREAT SERPL-MCNC: 0.8 MG/DL (ref 0.5–1.4)
CRP SERPL-MCNC: 61.6 MG/L (ref 0–8.2)
DIFFERENTIAL METHOD BLD: ABNORMAL
EOSINOPHIL # BLD AUTO: 0.2 K/UL (ref 0–0.5)
EOSINOPHIL NFR BLD: 2.6 % (ref 0–8)
ERYTHROCYTE [DISTWIDTH] IN BLOOD BY AUTOMATED COUNT: 17.2 % (ref 11.5–14.5)
ERYTHROCYTE [SEDIMENTATION RATE] IN BLOOD BY WESTERGREN METHOD: 108 MM/HR (ref 0–20)
EST. GFR  (NO RACE VARIABLE): >60 ML/MIN/1.73 M^2
GLUCOSE SERPL-MCNC: 94 MG/DL (ref 70–110)
HCT VFR BLD AUTO: 31.5 % (ref 37–48.5)
HGB BLD-MCNC: 10.1 G/DL (ref 12–16)
IMM GRANULOCYTES # BLD AUTO: 0.06 K/UL (ref 0–0.04)
IMM GRANULOCYTES NFR BLD AUTO: 0.7 % (ref 0–0.5)
LYMPHOCYTES # BLD AUTO: 2.1 K/UL (ref 1–4.8)
LYMPHOCYTES NFR BLD: 23.8 % (ref 18–48)
MCH RBC QN AUTO: 27.7 PG (ref 27–31)
MCHC RBC AUTO-ENTMCNC: 32.1 G/DL (ref 32–36)
MCV RBC AUTO: 87 FL (ref 82–98)
MONOCYTES # BLD AUTO: 0.6 K/UL (ref 0.3–1)
MONOCYTES NFR BLD: 6.6 % (ref 4–15)
NEUTROPHILS # BLD AUTO: 5.9 K/UL (ref 1.8–7.7)
NEUTROPHILS NFR BLD: 65.6 % (ref 38–73)
NRBC BLD-RTO: 0 /100 WBC
PLATELET # BLD AUTO: 697 K/UL (ref 150–450)
PMV BLD AUTO: 8.4 FL (ref 9.2–12.9)
POTASSIUM SERPL-SCNC: 5.4 MMOL/L (ref 3.5–5.1)
PROT SERPL-MCNC: 6.5 G/DL (ref 6–8.4)
RBC # BLD AUTO: 3.64 M/UL (ref 4–5.4)
SODIUM SERPL-SCNC: 136 MMOL/L (ref 136–145)
WBC # BLD AUTO: 8.99 K/UL (ref 3.9–12.7)

## 2024-06-11 PROCEDURE — 85651 RBC SED RATE NONAUTOMATED: CPT | Performed by: INTERNAL MEDICINE

## 2024-06-11 PROCEDURE — 86140 C-REACTIVE PROTEIN: CPT | Performed by: INTERNAL MEDICINE

## 2024-06-11 PROCEDURE — 80053 COMPREHEN METABOLIC PANEL: CPT | Performed by: INTERNAL MEDICINE

## 2024-06-11 PROCEDURE — 85025 COMPLETE CBC W/AUTO DIFF WBC: CPT | Performed by: INTERNAL MEDICINE

## 2024-06-11 PROCEDURE — 36415 COLL VENOUS BLD VENIPUNCTURE: CPT | Performed by: INTERNAL MEDICINE

## 2024-06-11 NOTE — TELEPHONE ENCOUNTER
Called no answer left voicemail     ----- Message from Yg Treviño sent at 6/11/2024 10:50 AM CDT -----  Type:  Sooner Appointment Request    Caller is requesting a sooner appointment.  Caller declined first available appointment listed below.  Caller will not accept being placed on the waitlist and is requesting a message be sent to doctor.    Name of Caller:  Ronda henley home health nurse  When is the first available appointment? 10/9--said she need to be seen sooner--please call and advise  Symptoms:  hospital f/u R lower lung pain  Would the patient rather a call back or a response via [facility]? call  Best Call Back Number:  [phone]--said please call her  Additional Information:  thank you

## 2024-06-12 ENCOUNTER — OFFICE VISIT (OUTPATIENT)
Dept: PULMONOLOGY | Facility: CLINIC | Age: 64
End: 2024-06-12
Payer: MEDICARE

## 2024-06-12 ENCOUNTER — HOSPITAL ENCOUNTER (OUTPATIENT)
Dept: RADIOLOGY | Facility: HOSPITAL | Age: 64
Discharge: HOME | End: 2024-06-12
Attending: NURSE PRACTITIONER
Payer: MEDICARE

## 2024-06-12 VITALS
DIASTOLIC BLOOD PRESSURE: 60 MMHG | SYSTOLIC BLOOD PRESSURE: 110 MMHG | BODY MASS INDEX: 20.62 KG/M2 | WEIGHT: 105 LBS | OXYGEN SATURATION: 100 % | HEART RATE: 60 BPM | HEIGHT: 60 IN

## 2024-06-12 DIAGNOSIS — J18.9 PNEUMONIA DUE TO INFECTIOUS ORGANISM, UNSPECIFIED LATERALITY, UNSPECIFIED PART OF LUNG: ICD-10-CM

## 2024-06-12 DIAGNOSIS — J18.9 PNEUMONIA DUE TO INFECTIOUS ORGANISM, UNSPECIFIED LATERALITY, UNSPECIFIED PART OF LUNG: Primary | ICD-10-CM

## 2024-06-12 PROCEDURE — 71046 X-RAY EXAM CHEST 2 VIEWS: CPT | Mod: TC

## 2024-06-12 PROCEDURE — 71046 X-RAY EXAM CHEST 2 VIEWS: CPT | Mod: 26,,, | Performed by: RADIOLOGY

## 2024-06-12 PROCEDURE — 99999 PR PBB SHADOW E&M-EST. PATIENT-LVL III: CPT | Mod: PBBFAC,HCNC,, | Performed by: NURSE PRACTITIONER

## 2024-06-12 NOTE — PROGRESS NOTES
SUBJECTIVE:    Patient ID: Floresita Martin is a 64 y.o. female.    Chief Complaint: Follow-up and COPD    HPI  Patient here today for hospital follow up for pneumonia. She is a patient of Freya Swartz.  The patient was admitted last month with diffuse consolidation seen in right greater than left lungs concerning for pneumonia. There are bilateral pleural effusions greater on the right than left and mediastinal lymphadenopathy.  She was discharged with IV antibiotics which she is still receiving. She has hospital follow up with ID next week.  She states she is not coughing but she is having tenderness to her right lateral lower chest at times. The pain is reproducible to touch.  She denies fever, no cough anymore .  She has been off cigarettes since she went in the hospital.   Past Medical History:   Diagnosis Date    Alcohol abuse     Cellulitis of toe of right foot 2019    Headache     Hearing loss     HLD (hyperlipidemia)      Past Surgical History:   Procedure Laterality Date    BACK SURGERY       SECTION      2    COLONOSCOPY N/A 11/15/2017    Procedure: COLONOSCOPY;  Surgeon: Peg Powers MD;  Location: [facility];  Service: Endoscopy;  Laterality: N/A;    EPIDURAL STEROID INJECTION INTO CERVICAL SPINE      EPIDURAL STEROID INJECTION INTO LUMBAR SPINE      JOINT REPLACEMENT  2018    LIPOMA RESECTION Right 2023    Procedure: EXCISION, LIPOMA;  Surgeon: Ruben Brooks Jr., MD;  Location: [facility];  Service: General;  Laterality: Right;    LUMBAR FUSION      with cage    LUNG BIOPSY      NECK SURGERY  2018    with hardware    RADIOFREQUENCY ABLATION      cervical    RADIOFREQUENCY ABLATION      lumbar    SPINE SURGERY  2018    STAPEDECTOMY Right 2015    STAPEDECTOMY Left     TUBAL LIGATION  1990     Family History   Problem Relation Name Age of Onset    Arthritis Mother      Diabetes Mother      Hypertension Father Joseterrell Hartleynando     Cancer Sister Lilly      Breast cancer Sister Lilly     No Known Problems Sister kristen     Cancer Sister Melissa     Cancer Sister Mercy     Cancer Sister Ana         Social History:   Marital Status: Single  Occupation: Data Unavailable  Alcohol History:  reports current alcohol use.  Tobacco History:  reports that she quit smoking about 3 weeks ago. Her smoking use included cigarettes. She started smoking about 46 years ago. She has a 23.2 pack-year smoking history. She has been exposed to tobacco smoke. She has never used smokeless tobacco.  Drug History:  reports no history of drug use.    Review of patient's allergies indicates:   Allergen Reactions    Codeine Nausea And Vomiting    Tylenol-codeine [acetaminophen-codeine] Nausea And Vomiting       Current Outpatient Medications   Medication Sig Dispense Refill    albuterol (PROVENTIL/VENTOLIN HFA) 90 mcg/actuation inhaler Inhale 2 puffs into the lungs every 6 (six) hours as needed for Wheezing or Shortness of Breath. Rescue 18 g 11    amitriptyline (ELAVIL) 25 MG tablet Take 1-2 tablet by mouth every night at bedtime 60 tablet 1    atorvastatin (LIPITOR) 40 MG tablet TAKE 1 TABLET(40 MG) BY MOUTH DAILY 30 tablet 0    ceFAZolin (ANCEF) 10 gram injection Inject into the vein.      co-enzyme Q-10 30 mg capsule Take 30 mg by mouth once daily.      cyclobenzaprine (FLEXERIL) 10 MG tablet Take 1 tablet by mouth twice a day as needed 60 tablet 2    dextrose 5 % in water (D5W) PgBk 50 mL with ceFAZolin 2 gram SolR 2 g Inject 2 g into the vein every 8 (eight) hours. Last dose July 4 2024      fluticasone propionate (FLONASE) 50 mcg/actuation nasal spray 1 spray (50 mcg total) by Each Nostril route once daily. 16 g 11    fluticasone-umeclidin-vilanter (TRELEGY ELLIPTA) 200-62.5-25 mcg inhaler Inhale 1 puff into the lungs once daily. 60 each 11    ibuprofen (ADVIL,MOTRIN) 800 MG tablet Take 1 tablet by mouth three times a day as needed for pain 90 tablet 1    lactulose (CHRONULAC) 10 gram/15  mL solution Take 15 ml twice a day as needeed 946 mL 0    loratadine (CLARITIN) 10 mg tablet Take 1 tablet (10 mg total) by mouth once daily. 90 tablet 3    magnesium oxide (MAG-OX) 400 mg (241.3 mg magnesium) tablet Take 1 tablet (400 mg total) by mouth once daily. 90 tablet 1    morphine (MS CONTIN) 30 MG 12 hr tablet Take 1 tablet by mouth every twelve hours More than 7 days supply is medically necessary 60 tablet 0    morphine (MS CONTIN) 30 MG 12 hr tablet Take 1 tablet by mouth every twelve hours More than 7 days supply is medically necessary (Patient taking differently: Take 30 mg by mouth 2 (two) times daily.) 60 tablet 0    multivitamin capsule Take 1 capsule by mouth once daily.      omeprazole (PRILOSEC) 40 MG capsule TAKE 1 CAPSULE BY MOUTH EVERY MORNING 90 capsule 3    oxyCODONE (ROXICODONE) 20 mg Tab immediate release tablet Take 1 tablet by mouth five times a day as needed for pain More than 7 days supply is medically necessary 150 tablet 0    oxyCODONE (ROXICODONE) 20 mg Tab immediate release tablet Take 1 tablet by mouth five times a day as needed for pain More than 7 days supply is medically necessary (Patient taking differently: Take 20 mg by mouth 5 (five) times daily.) 150 tablet 0    oxyCODONE (ROXICODONE) 20 mg Tab immediate release tablet Take 1 tablet by mouth five times a day as needed for pain More than a 7 day supply is medically necessary 150 tablet 0    oxyCODONE (ROXICODONE) 20 mg Tab immediate release tablet Take 1 tablet by mouth five times a day as needed for pain More than a 7 day supply is medically necessary 150 tablet 0    pregabalin (LYRICA) 75 MG capsule Take 1 capsule by mouth three times a day More than 7 days supply is medically necessary 90 capsule 1    albuterol-ipratropium (DUO-NEB) 2.5 mg-0.5 mg/3 mL nebulizer solution Take 3 mLs by nebulization every 6 (six) hours as needed for Wheezing or Shortness of Breath. Rescue 75 mL 11    losartan (COZAAR) 50 MG tablet Take 1  tablet (50 mg total) by mouth once daily. (Patient not taking: Reported on 5/29/2024) 90 tablet 3    lubiprostone (AMITIZA) 24 MCG Cap Take 1 capsule by mouth twice a day as needed for pain (Patient not taking: Reported on 5/29/2024) 60 capsule 1    naloxone (NARCAN) 4 mg/actuation Spry Spray 1 spray into one nostril single dose may repeat every 2-3 minutes until responsive or EMS arrives (Patient not taking: Reported on 6/5/2024) 1 each 1    nicotine (NICODERM CQ) 14 mg/24 hr Place 1 patch onto the skin once daily. 30 patch 2    RESTASIS 0.05 % ophthalmic emulsion Place 1 drop into both eyes 2 (two) times daily. (Patient not taking: Reported on 5/29/2024)       No current facility-administered medications for this visit.   Ct chest 05/2024  mpression:     There is diffuse consolidation seen in right greater than left lungs concerning for pneumonia.  There are bilateral pleural effusions greater on the right than left and mediastinal lymphadenopathy thought to be related to pneumonia      Last Chest xray 05/2024  Impression:     Unchanged extent of right airspace disease and pleural effusion.  Appropriately positioned PICC line post retraction.    Review of Systems  General: Feeling Well. No fever  Eyes: Vision is good.  ENT:  No sinusitis or pharyngitis.   Heart:: No chest pain or palpitations.  Lungs: No cough, sputum, or wheezing. Tenderness to right lateral chest lower  GI: appetite back .  : No dysuria, hesitancy, or nocturia.  Musculoskeletal: No joint pain or myalgias.  Skin: No lesions or rashes.  Neuro: No headaches or neuropathy.  Lymph: No edema or adenopathy.  Psych: No anxiety or depression.  Endo: No weight change.    OBJECTIVE:      /60 (BP Location: Left arm, Patient Position: Sitting, BP Method: Small (Manual))   Pulse 60   Ht 5' (1.524 m)   Wt 47.6 kg (105 lb)   LMP  (LMP Unknown)   SpO2 100%   BMI 20.51 kg/m²     Physical Exam  GENERAL: Older patient in no distress.  HEENT: Pupils  equal and reactive. Extraocular movements intact. Nose intact.      Pharynx moist.  NECK: Supple.   HEART: Regular rate and rhythm. No murmur or gallop auscultated.  LUNGS: Clear to auscultation and percussion. Lung excursion symmetrical. No change in fremitus. No adventitial noises.  ABDOMEN: Bowel sounds present. Non-tender, no masses palpated.  EXTREMITIES: Normal muscle tone and joint movement, no cyanosis or clubbing.   LYMPHATICS: No adenopathy palpated, no edema.  SKIN: Dry, intact, no lesions.   NEURO: Cranial nerves II-XII intact. Motor strength 5/5 bilaterally, upper and lower extremities.  PSYCH: Appropriate affect.    Assessment:       1. Pneumonia due to infectious organism, unspecified laterality, unspecified part of lung          Plan:       Pneumonia due to infectious organism, unspecified laterality, unspecified part of lung       Chest xray today     No follow-ups on file.

## 2024-06-13 ENCOUNTER — TELEPHONE (OUTPATIENT)
Dept: PULMONOLOGY | Facility: CLINIC | Age: 64
End: 2024-06-13
Payer: MEDICARE

## 2024-06-13 NOTE — TELEPHONE ENCOUNTER
Patient aware. Will follow up with her regular pulmonary NP but will put a remind me in the system to repeat CT in August

## 2024-06-13 NOTE — TELEPHONE ENCOUNTER
Chest xray  Impression:     Improving right lung opacities suggesting improving pneumonia, with unchanged tiny right pleural effusion.

## 2024-06-18 ENCOUNTER — LAB VISIT (OUTPATIENT)
Dept: LAB | Facility: HOSPITAL | Age: 64
End: 2024-06-18
Attending: INTERNAL MEDICINE
Payer: MEDICARE

## 2024-06-18 DIAGNOSIS — A41.01 METHICILLIN SUSCEPTIBLE STAPHYLOCOCCUS AUREUS SEPTICEMIA: Primary | ICD-10-CM

## 2024-06-18 LAB
ALBUMIN SERPL BCP-MCNC: 2.7 G/DL (ref 3.5–5.2)
ALP SERPL-CCNC: 144 U/L (ref 55–135)
ALT SERPL W/O P-5'-P-CCNC: <5 U/L (ref 10–44)
ANION GAP SERPL CALC-SCNC: 11 MMOL/L (ref 8–16)
AST SERPL-CCNC: 20 U/L (ref 10–40)
BASOPHILS # BLD AUTO: 0.03 K/UL (ref 0–0.2)
BASOPHILS NFR BLD: 0.5 % (ref 0–1.9)
BILIRUB SERPL-MCNC: 0.1 MG/DL (ref 0.1–1)
BUN SERPL-MCNC: 9 MG/DL (ref 8–23)
CALCIUM SERPL-MCNC: 9 MG/DL (ref 8.7–10.5)
CHLORIDE SERPL-SCNC: 98 MMOL/L (ref 95–110)
CO2 SERPL-SCNC: 23 MMOL/L (ref 23–29)
CREAT SERPL-MCNC: 0.7 MG/DL (ref 0.5–1.4)
CRP SERPL-MCNC: 19.8 MG/L (ref 0–8.2)
DIFFERENTIAL METHOD BLD: ABNORMAL
EOSINOPHIL # BLD AUTO: 0.4 K/UL (ref 0–0.5)
EOSINOPHIL NFR BLD: 6.2 % (ref 0–8)
ERYTHROCYTE [DISTWIDTH] IN BLOOD BY AUTOMATED COUNT: 16.8 % (ref 11.5–14.5)
ERYTHROCYTE [SEDIMENTATION RATE] IN BLOOD BY WESTERGREN METHOD: 88 MM/HR (ref 0–20)
EST. GFR  (NO RACE VARIABLE): >60 ML/MIN/1.73 M^2
GLUCOSE SERPL-MCNC: 84 MG/DL (ref 70–110)
HCT VFR BLD AUTO: 29.3 % (ref 37–48.5)
HGB BLD-MCNC: 9.7 G/DL (ref 12–16)
IMM GRANULOCYTES # BLD AUTO: 0.01 K/UL (ref 0–0.04)
IMM GRANULOCYTES NFR BLD AUTO: 0.2 % (ref 0–0.5)
LYMPHOCYTES # BLD AUTO: 2.5 K/UL (ref 1–4.8)
LYMPHOCYTES NFR BLD: 40.1 % (ref 18–48)
MCH RBC QN AUTO: 27.9 PG (ref 27–31)
MCHC RBC AUTO-ENTMCNC: 33.1 G/DL (ref 32–36)
MCV RBC AUTO: 84 FL (ref 82–98)
MONOCYTES # BLD AUTO: 0.4 K/UL (ref 0.3–1)
MONOCYTES NFR BLD: 6.2 % (ref 4–15)
NEUTROPHILS # BLD AUTO: 3 K/UL (ref 1.8–7.7)
NEUTROPHILS NFR BLD: 46.8 % (ref 38–73)
NRBC BLD-RTO: 0 /100 WBC
PLATELET # BLD AUTO: 499 K/UL (ref 150–450)
PMV BLD AUTO: 8.2 FL (ref 9.2–12.9)
POTASSIUM SERPL-SCNC: 4.5 MMOL/L (ref 3.5–5.1)
PROT SERPL-MCNC: 6.3 G/DL (ref 6–8.4)
RBC # BLD AUTO: 3.48 M/UL (ref 4–5.4)
SODIUM SERPL-SCNC: 132 MMOL/L (ref 136–145)
WBC # BLD AUTO: 6.33 K/UL (ref 3.9–12.7)

## 2024-06-18 PROCEDURE — 85651 RBC SED RATE NONAUTOMATED: CPT | Performed by: INTERNAL MEDICINE

## 2024-06-18 PROCEDURE — 36415 COLL VENOUS BLD VENIPUNCTURE: CPT | Performed by: INTERNAL MEDICINE

## 2024-06-18 PROCEDURE — 86140 C-REACTIVE PROTEIN: CPT | Performed by: INTERNAL MEDICINE

## 2024-06-18 PROCEDURE — 80053 COMPREHEN METABOLIC PANEL: CPT | Performed by: INTERNAL MEDICINE

## 2024-06-18 PROCEDURE — 85025 COMPLETE CBC W/AUTO DIFF WBC: CPT | Performed by: INTERNAL MEDICINE

## 2024-06-20 ENCOUNTER — OFFICE VISIT (OUTPATIENT)
Dept: INFECTIOUS DISEASES | Facility: CLINIC | Age: 64
End: 2024-06-20
Payer: MEDICARE

## 2024-06-20 VITALS
TEMPERATURE: 97 F | WEIGHT: 105.38 LBS | SYSTOLIC BLOOD PRESSURE: 108 MMHG | HEIGHT: 60 IN | BODY MASS INDEX: 20.69 KG/M2 | DIASTOLIC BLOOD PRESSURE: 64 MMHG

## 2024-06-20 DIAGNOSIS — I26.90 ACUTE SEPTIC PULMONARY EMBOLISM WITHOUT ACUTE COR PULMONALE: ICD-10-CM

## 2024-06-20 DIAGNOSIS — B95.61 MSSA BACTEREMIA: Primary | ICD-10-CM

## 2024-06-20 DIAGNOSIS — J44.9 CHRONIC OBSTRUCTIVE PULMONARY DISEASE, UNSPECIFIED COPD TYPE: ICD-10-CM

## 2024-06-20 DIAGNOSIS — R78.81 MSSA BACTEREMIA: Primary | ICD-10-CM

## 2024-06-20 DIAGNOSIS — G89.4 CHRONIC PAIN SYNDROME: ICD-10-CM

## 2024-06-20 PROCEDURE — 99999 PR PBB SHADOW E&M-EST. PATIENT-LVL III: CPT | Mod: PBBFAC,HCNC,, | Performed by: INTERNAL MEDICINE

## 2024-06-20 NOTE — PATIENT INSTRUCTIONS
Complete antibiotics 7/4/24 and pull PICC  Need follow up CXR in about 4-6 weeks. Pulmonologist to arrange

## 2024-06-20 NOTE — PROGRESS NOTES
Subjective:      Reason for consult:  Hospital follow-up    HPI: Floresita Martin is a 64 y.o. female who was recently hospitalized in 2024 with Pseudomonas bacteremia associated with abnormal chest x-ray.  She left hospital AMA after about 10 days.  Later seen at an urgent care and received a course of antibiotics readmitted 2024 and was diagnosed with MSSA bacteremia with CT chest that initially showed patchy infiltrate concerning for septic pulmonary emboli that later got worse.  Sputum culture also grew MSSA.  TTE was unremarkable.  [place] not done because she had some esophageal issues with risk of complications.  Ultimately discharged on IV cefazolin to complete 6 week course through 2024.  She has continued to improve.      She was seen by Pulmonary service for follow-up on a known pulmonary nodule that was biopsied in 2023.  Chest x-ray on 2024 still show patchy infiltrate on the right but improved.    Review of patient's allergies indicates:   Allergen Reactions    Codeine Nausea And Vomiting    Tylenol-codeine [acetaminophen-codeine] Nausea And Vomiting     Past Medical History:   Diagnosis Date    Alcohol abuse     Cellulitis of toe of right foot 2019    Headache     Hearing loss     HLD (hyperlipidemia)      Past Surgical History:   Procedure Laterality Date    BACK SURGERY       SECTION      2    COLONOSCOPY N/A 11/15/2017    Procedure: COLONOSCOPY;  Surgeon: Peg Powers MD;  Location: [facility];  Service: Endoscopy;  Laterality: N/A;    EPIDURAL STEROID INJECTION INTO CERVICAL SPINE      EPIDURAL STEROID INJECTION INTO LUMBAR SPINE      JOINT REPLACEMENT  2018    LIPOMA RESECTION Right 2023    Procedure: EXCISION, LIPOMA;  Surgeon: Ruben Brooks Jr., MD;  Location: [facility];  Service: General;  Laterality: Right;    LUMBAR FUSION  2019    with cage    LUNG BIOPSY      NECK SURGERY  2018    with hardware    RADIOFREQUENCY ABLATION       cervical    RADIOFREQUENCY ABLATION      lumbar    SPINE SURGERY  2018    STAPEDECTOMY Right 2015    STAPEDECTOMY Left     TUBAL LIGATION  1990      Social History     Tobacco Use    Smoking status: Former     Current packs/day: 0.00     Average packs/day: 0.5 packs/day for 46.4 years (23.2 ttl pk-yrs)     Types: Cigarettes     Start date: 1978     Quit date: 2024     Years since quittin.0     Passive exposure: Past    Smokeless tobacco: Never    Tobacco comments:     Patient has smoked since a teenager, states that she has cut down from 2pk/day to .5pk/day. Can quit on her own when she's ready. Information and education on outpatient smoking cessation when ready. 3/26/24   Substance Use Topics    Alcohol use: Yes     Comment: social      Family History   Problem Relation Name Age of Onset    Arthritis Mother      Diabetes Mother      Hypertension Father Jose Gutierrezing     Cancer Sister Lilly     Breast cancer Sister Lilly     No Known Problems Sister kristen     Cancer Sister Melissa     Cancer Sister [facility]     Cancer Sister Ana        Pertinent medications noted:     Review of Systems  10 system review unremarkable.    Outdoor activities:  Lives with the .  Cut back on smoking.  Travel:  No recent travel.  Implants:  None.  Antibiotic History:  As in HPI.      Objective:      Blood pressure 108/64, temperature 97.4 °F (36.3 °C), height 5' (1.524 m), weight 47.8 kg (105 lb 6.4 oz). Body mass index is 20.58 kg/m².  Physical Exam      General:  Thin middle-aged woman sitting in chair in no acute distress at this time   HEENT:  Upper set of dentures.  No oral lesions or thrush appreciated   CVS: S1 and 2 heard, no murmurs appreciated   Respiratory: Clear to auscultation   Abdomen:  Full, soft, nontender, no palpable organomegaly   Skin: No rash appreciated   CNS: No focal deficits   Right arm: PICC site with no abnormality     Wound:  None    VAD:  Right arm PICC      General  Labs reviewed:  Lab Results   Component Value Date    WBC 6.33 06/18/2024    RBC 3.48 (L) 06/18/2024    HGB 9.7 (L) 06/18/2024    HCT 29.3 (L) 06/18/2024    MCV 84 06/18/2024    MCH 27.9 06/18/2024    MCHC 33.1 06/18/2024    RDW 16.8 (H) 06/18/2024     (H) 06/18/2024    MPV 8.2 (L) 06/18/2024    GRAN 3.0 06/18/2024    GRAN 46.8 06/18/2024    LYMPH 2.5 06/18/2024    LYMPH 40.1 06/18/2024    MONO 0.4 06/18/2024    MONO 6.2 06/18/2024    EOS 0.4 06/18/2024    BASO 0.03 06/18/2024    EOSINOPHIL 6.2 06/18/2024    BASOPHIL 0.5 06/18/2024     CMP  Sodium   Date Value Ref Range Status   06/18/2024 132 (L) 136 - 145 mmol/L Final     Potassium   Date Value Ref Range Status   06/18/2024 4.5 3.5 - 5.1 mmol/L Final     Chloride   Date Value Ref Range Status   06/18/2024 98 95 - 110 mmol/L Final     CO2   Date Value Ref Range Status   06/18/2024 23 23 - 29 mmol/L Final     Glucose   Date Value Ref Range Status   06/18/2024 84 70 - 110 mg/dL Final     BUN   Date Value Ref Range Status   06/18/2024 9 8 - 23 mg/dL Final     Creatinine   Date Value Ref Range Status   06/18/2024 0.7 0.5 - 1.4 mg/dL Final     Calcium   Date Value Ref Range Status   06/18/2024 9.0 8.7 - 10.5 mg/dL Final     Total Protein   Date Value Ref Range Status   06/18/2024 6.3 6.0 - 8.4 g/dL Final     Albumin   Date Value Ref Range Status   06/18/2024 2.7 (L) 3.5 - 5.2 g/dL Final     Total Bilirubin   Date Value Ref Range Status   06/18/2024 0.1 0.1 - 1.0 mg/dL Final     Comment:     For infants and newborns, interpretation of results should be based  on gestational age, weight and in agreement with clinical  observations.    Premature Infant recommended reference ranges:  Up to 24 hours.............<8.0 mg/dL  Up to 48 hours............<12.0 mg/dL  3-5 days..................<15.0 mg/dL  6-29 days.................<15.0 mg/dL       Alkaline Phosphatase   Date Value Ref Range Status   06/18/2024 144 (H) 55 - 135 U/L Final     AST   Date Value Ref Range  Status   06/18/2024 20 10 - 40 U/L Final     ALT   Date Value Ref Range Status   06/18/2024 <5 (L) 10 - 44 U/L Final     Anion Gap   Date Value Ref Range Status   06/18/2024 11 8 - 16 mmol/L Final     eGFR   Date Value Ref Range Status   06/18/2024 >60 >60 mL/min/1.73 m^2 Final           Micro:  Microbiology Results (last 7 days)       ** No results found for the last 168 hours. **          Imaging Reviewed:  Previous CT scans were reviewed with patient.  Chest x-ray 06/12/2024 was also reviewed          Assessment:     1. MSSA bacteremia with clinical septic pulmonary emboli.  Decision was made to treat as probable endocarditis.  She will complete 6 week course on 07/04/2024 and PICC pulled.    2. Bilateral MSSA pneumonia with septic pulmonary emboli.  Antibiotics as above.  Follow up chest x-ray 06/12/2024 was better.  She will need repeat chest x-ray in about 4-6 weeks.    3. Chronic pain syndrome.    4. Recurrent bacteremia.  Initially had Pseudomonas bacteremia in March 20, 2024 followed by MSSA bacteremia in May 20, 2024.  She denied IVDU.  HIV, HCV screen negative.  Hepatitis-B surface antibody reactive.  Check hepatitis-B core antibody with her next labs.    4. COPD.  Management as per Pulmonary service.      5. History of pulmonary nodule status post biopsy in 2023.  Follow up with Pulmonary service.      Problem List Items Addressed This Visit          Neuro    Chronic pain syndrome       Pulmonary    Chronic obstructive pulmonary disease, unspecified COPD type       ID    MSSA bacteremia - Primary     Other Visit Diagnoses       Acute septic pulmonary embolism without acute cor pulmonale                 Plan:     As above.  Follow up in 3-4 weeks.  Can be virtual.      MSSA bacteremia    Acute septic pulmonary embolism without acute cor pulmonale    Chronic pain syndrome    Chronic obstructive pulmonary disease, unspecified COPD type        This note was created using Dragon voice recognition software  that occasionally misinterpreted phrases or words.

## 2024-06-21 ENCOUNTER — EXTERNAL HOME HEALTH (OUTPATIENT)
Dept: HOME HEALTH SERVICES | Facility: HOSPITAL | Age: 64
End: 2024-06-21
Payer: MEDICARE

## 2024-06-25 ENCOUNTER — LAB VISIT (OUTPATIENT)
Dept: LAB | Facility: HOSPITAL | Age: 64
End: 2024-06-25
Attending: INTERNAL MEDICINE
Payer: MEDICARE

## 2024-06-25 DIAGNOSIS — A41.01 METHICILLIN SUSCEPTIBLE STAPHYLOCOCCUS AUREUS SEPTICEMIA: Primary | ICD-10-CM

## 2024-06-25 LAB
ALBUMIN SERPL BCP-MCNC: 3 G/DL (ref 3.5–5.2)
ALP SERPL-CCNC: 153 U/L (ref 55–135)
ALT SERPL W/O P-5'-P-CCNC: <5 U/L (ref 10–44)
ANION GAP SERPL CALC-SCNC: 13 MMOL/L (ref 8–16)
AST SERPL-CCNC: 25 U/L (ref 10–40)
BASOPHILS # BLD AUTO: 0.05 K/UL (ref 0–0.2)
BASOPHILS NFR BLD: 0.6 % (ref 0–1.9)
BILIRUB SERPL-MCNC: 0.1 MG/DL (ref 0.1–1)
BUN SERPL-MCNC: 12 MG/DL (ref 8–23)
CALCIUM SERPL-MCNC: 8.9 MG/DL (ref 8.7–10.5)
CHLORIDE SERPL-SCNC: 100 MMOL/L (ref 95–110)
CO2 SERPL-SCNC: 24 MMOL/L (ref 23–29)
CREAT SERPL-MCNC: 0.7 MG/DL (ref 0.5–1.4)
CRP SERPL-MCNC: 7 MG/L (ref 0–8.2)
DIFFERENTIAL METHOD BLD: ABNORMAL
EOSINOPHIL # BLD AUTO: 0.6 K/UL (ref 0–0.5)
EOSINOPHIL NFR BLD: 6.8 % (ref 0–8)
ERYTHROCYTE [DISTWIDTH] IN BLOOD BY AUTOMATED COUNT: 17.8 % (ref 11.5–14.5)
ERYTHROCYTE [SEDIMENTATION RATE] IN BLOOD BY WESTERGREN METHOD: 11 MM/HR (ref 0–20)
EST. GFR  (NO RACE VARIABLE): >60 ML/MIN/1.73 M^2
GLUCOSE SERPL-MCNC: 69 MG/DL (ref 70–110)
HCT VFR BLD AUTO: 33.4 % (ref 37–48.5)
HGB BLD-MCNC: 10.4 G/DL (ref 12–16)
IMM GRANULOCYTES # BLD AUTO: 0.02 K/UL (ref 0–0.04)
IMM GRANULOCYTES NFR BLD AUTO: 0.2 % (ref 0–0.5)
LYMPHOCYTES # BLD AUTO: 2.7 K/UL (ref 1–4.8)
LYMPHOCYTES NFR BLD: 33.3 % (ref 18–48)
MCH RBC QN AUTO: 27.2 PG (ref 27–31)
MCHC RBC AUTO-ENTMCNC: 31.1 G/DL (ref 32–36)
MCV RBC AUTO: 87 FL (ref 82–98)
MONOCYTES # BLD AUTO: 0.5 K/UL (ref 0.3–1)
MONOCYTES NFR BLD: 6.2 % (ref 4–15)
NEUTROPHILS # BLD AUTO: 4.3 K/UL (ref 1.8–7.7)
NEUTROPHILS NFR BLD: 52.9 % (ref 38–73)
NRBC BLD-RTO: 0 /100 WBC
PLATELET # BLD AUTO: 528 K/UL (ref 150–450)
PMV BLD AUTO: 8.2 FL (ref 9.2–12.9)
POTASSIUM SERPL-SCNC: 4.7 MMOL/L (ref 3.5–5.1)
PROT SERPL-MCNC: 6 G/DL (ref 6–8.4)
RBC # BLD AUTO: 3.82 M/UL (ref 4–5.4)
SODIUM SERPL-SCNC: 137 MMOL/L (ref 136–145)
WBC # BLD AUTO: 8.1 K/UL (ref 3.9–12.7)

## 2024-06-25 PROCEDURE — 86140 C-REACTIVE PROTEIN: CPT | Performed by: INTERNAL MEDICINE

## 2024-06-25 PROCEDURE — 80053 COMPREHEN METABOLIC PANEL: CPT | Performed by: INTERNAL MEDICINE

## 2024-06-25 PROCEDURE — 36415 COLL VENOUS BLD VENIPUNCTURE: CPT | Performed by: INTERNAL MEDICINE

## 2024-06-25 PROCEDURE — 85025 COMPLETE CBC W/AUTO DIFF WBC: CPT | Performed by: INTERNAL MEDICINE

## 2024-06-25 PROCEDURE — 85651 RBC SED RATE NONAUTOMATED: CPT | Performed by: INTERNAL MEDICINE

## 2024-06-28 ENCOUNTER — TELEPHONE (OUTPATIENT)
Dept: INFECTIOUS DISEASES | Facility: CLINIC | Age: 64
End: 2024-06-28
Payer: MEDICARE

## 2024-06-28 NOTE — TELEPHONE ENCOUNTER
I spoke with Rodna ( [facility]  )   She did change the dressing today and used Tegaderm this round  She cannot place a cream on skin, as PICC covering will not adhere if a cream is applied.  She will send a picture.  She will check back with the patient this afternoon ( via phone ) to check on her.  TYREE GRADY   6/28/24

## 2024-06-28 NOTE — TELEPHONE ENCOUNTER
Ronda with [facility]  called  [phone]    She stated patient has Yeast under PICC dressing    She is requesting a PO antifungal be sent to pharmacy   As patient has a history of thrush and yeast .    Her end of care for IV abx is in a few days.    Please advise     TYREE GRADY  6/28/24

## 2024-06-30 DIAGNOSIS — E78.49 OTHER HYPERLIPIDEMIA: ICD-10-CM

## 2024-06-30 NOTE — TELEPHONE ENCOUNTER
Care Due:                  Date            Visit Type   Department     Provider  --------------------------------------------------------------------------------    Last Visit: None Found      None         None Found  Next Visit: None Scheduled  None         None Found                                                            Last  Test          Frequency    Reason                     Performed    Due Date  --------------------------------------------------------------------------------    Office Visit  15 months..  atorvastatin, loratadine.  Not Found    Overdue    [employer] Embedded Care Due Messages. Reference number: [id number].   6/30/2024 7:26:22 AM JOHNT

## 2024-07-01 ENCOUNTER — OFFICE VISIT (OUTPATIENT)
Dept: FAMILY MEDICINE | Facility: CLINIC | Age: 64
End: 2024-07-01
Payer: MEDICARE

## 2024-07-01 VITALS
HEART RATE: 74 BPM | BODY MASS INDEX: 21.39 KG/M2 | DIASTOLIC BLOOD PRESSURE: 90 MMHG | TEMPERATURE: 98 F | SYSTOLIC BLOOD PRESSURE: 140 MMHG | RESPIRATION RATE: 16 BRPM | HEIGHT: 60 IN | WEIGHT: 108.94 LBS | OXYGEN SATURATION: 99 %

## 2024-07-01 DIAGNOSIS — E78.49 OTHER HYPERLIPIDEMIA: ICD-10-CM

## 2024-07-01 DIAGNOSIS — I10 HYPERTENSION, ESSENTIAL: Primary | ICD-10-CM

## 2024-07-01 DIAGNOSIS — R79.9 ABNORMAL FINDING OF BLOOD CHEMISTRY, UNSPECIFIED: ICD-10-CM

## 2024-07-01 PROCEDURE — G2211 COMPLEX E/M VISIT ADD ON: HCPCS | Mod: HCNC,S$GLB,, | Performed by: FAMILY MEDICINE

## 2024-07-01 PROCEDURE — 3008F BODY MASS INDEX DOCD: CPT | Mod: HCNC,CPTII,S$GLB, | Performed by: FAMILY MEDICINE

## 2024-07-01 PROCEDURE — 99999 PR PBB SHADOW E&M-EST. PATIENT-LVL III: CPT | Mod: PBBFAC,HCNC,, | Performed by: FAMILY MEDICINE

## 2024-07-01 PROCEDURE — 99214 OFFICE O/P EST MOD 30 MIN: CPT | Mod: HCNC,S$GLB,, | Performed by: FAMILY MEDICINE

## 2024-07-01 PROCEDURE — 3077F SYST BP >= 140 MM HG: CPT | Mod: HCNC,CPTII,S$GLB, | Performed by: FAMILY MEDICINE

## 2024-07-01 PROCEDURE — 4010F ACE/ARB THERAPY RXD/TAKEN: CPT | Mod: HCNC,CPTII,S$GLB, | Performed by: FAMILY MEDICINE

## 2024-07-01 PROCEDURE — 1159F MED LIST DOCD IN RCRD: CPT | Mod: HCNC,CPTII,S$GLB, | Performed by: FAMILY MEDICINE

## 2024-07-01 PROCEDURE — 3080F DIAST BP >= 90 MM HG: CPT | Mod: HCNC,CPTII,S$GLB, | Performed by: FAMILY MEDICINE

## 2024-07-01 RX ORDER — ATORVASTATIN CALCIUM 40 MG/1
40 TABLET, FILM COATED ORAL
Qty: 30 TABLET | Refills: 0 | Status: SHIPPED | OUTPATIENT
Start: 2024-07-01

## 2024-07-01 NOTE — PATIENT INSTRUCTIONS
"Boaz Canas,     If you are due for any health screening(s) below please notify me so we can arrange them to be ordered and scheduled to maintain your health. Most healthy patients complete it. Don't lose out on improving your health.     All of your core healthy metrics are met.                          Patient Education       Checking Your Blood Pressure at Home   The Basics   Written by the doctors and editors at [facility]   How is blood pressure measured? -- Blood pressure is usually measured with a device that goes around your upper arm. This is often done in a doctor's office. But some people also check their blood pressure themselves, at home or at work.  Blood pressure is explained with 2 numbers. For instance, your blood pressure might be "140 over 90." The first (top) number is the pressure inside your arteries when your heart is david. The second (bottom) number is the pressure inside your arteries when your heart is relaxed. The table shows how doctors and nurses define high and normal blood pressure (table 1).  If your blood pressure gets too high, it puts you at risk for heart attack, stroke, and kidney disease. High blood pressure does not usually cause symptoms. But it can be serious.  What is a home blood pressure meter? -- A home blood pressure meter (or "monitor") is a device you can use to check your blood pressure yourself. It has a cuff that goes around your upper arm (figure 1). Some devices have a cuff that goes around your wrist instead. But doctors aren't sure if these work as well. The meter also has a small screen, or dial, that shows your blood pressure numbers.  There are also special meters you can wear for a day or 2. These are different because they automatically check your blood pressure throughout the day and night, even while you are sleeping. If your doctor thinks you should use one of these devices, they will talk to you about how to wear it.  Why do I need to check my blood " pressure at home? -- If your doctor knows or suspects that you have high blood pressure, they might want you to check it at home. There are a few reasons for this. Your doctor might want to look at:  Whether your blood pressure measures the same at home as it did in the doctor's office  How well your blood pressure medicines are working  Changes in your blood pressure, for example, if it goes up and down  People who check their own blood pressure at home usually do better at keeping it low.  How do I choose a home blood pressure meter? -- When choosing a home blood pressure meter, you will probably want to think about:  Cost - Some devices cost more than others. You should also check to see if your insurance will help pay for your device.  Size - It's important to make sure the cuff fits your arm comfortably. Your doctor or nurse can help you with this.  How easy it is to use - You should make sure you understand how to use the device. You also need to be able to read the numbers on the screen.  You do not need a prescription to buy a home blood pressure meter. You can buy them at most pharmacies or over the internet. Your doctor or nurse can help you choose the right device for you.  How do I check my blood pressure at home? -- Once you have a home blood pressure meter, your doctor or nurse should check it to make sure it fits you and works correctly.  When it's time to check your blood pressure:  Go to the bathroom and empty your bladder first. Having a full bladder can temporarily increase your blood pressure, making the results inaccurate.  Sit in a chair with your feet flat on the ground.  Try to breathe normally and stay calm.  Attach the cuff to your arm. Place the cuff directly on your skin, not over your clothing. The cuff should be tight enough to not slip down, but not uncomfortably tight.  Sit and relax for about 3 to 5 minutes with the cuff on.  Follow the directions that came with your device to start  measuring your blood pressure. This might involve squeezing the bulb at the end of the tube to inflate the cuff (fill it with air). With some monitors, you just need to press a button to inflate the cuff. When the cuff fills with air, it feels like someone is squeezing your arm, but it should not hurt. Then you will slowly deflate the cuff (let the air out of it), or it will deflate by itself. The screen or dial will show your blood pressure numbers.  Stay seated and relax for 1 minute, then measure your blood pressure again.  How often should I check my blood pressure? -- It depends. Different people need to follow different schedules. Your doctor or nurse will tell you how often to check your blood pressure, and when. Some people need to check their blood pressure twice a day, in the morning and evening.  Your doctor or nurse will probably tell you to keep track of your blood pressure for at least a few days (table 2). Then they will look at the numbers. The reason for this is that it's normal for your blood pressure to change a bit from day to day. For example, the numbers might change depending on whether you recently had caffeine, just exercised, or feel stressed. Checking your blood pressure over several days - or longer - will give your doctor or nurse a better idea of what is average for you.  How should I keep track of my blood pressure? -- Some blood pressure meters will record your numbers for you, or send them to your computer or smartphone. If yours does not do this, you will need to write them down. Your doctor or nurse can help you figure out the best way to keep track of the numbers.  What if my blood pressure is high? -- Your doctor or nurse will tell you what to do if your blood pressure is high when you check it at home. If you get a number that is higher than normal, measure it again to see if it is still high. If it is very high (above a certain number, which your doctor or nurse will tell you  "to watch out for), you should call your doctor right away.  If your blood pressure is only a little high, your doctor or nurse might tell you to keep checking it for a few more days or weeks, and then call if it does not go back down. Then they can help you decide what to do next.  All topics are updated as new evidence becomes available and our peer review process is complete.  This topic retrieved from [employer] on: Sep 21, 2021.  Topic 984417 Version 4.0  Release: 29.4.2 - C29.263  © 2021 UpToDate, Inc. and/or its affiliates. All rights reserved.  table 1: Definition of normal and high blood pressure  Level  Top number  Bottom number    High 130 or above 80 or above   Elevated 120 to 129 79 or below   Normal 119 or below 79 or below   These definitions are from the American College of Cardiology/American Heart Association. Other expert groups might use slightly different definitions.  "Elevated blood pressure" is a term doctor or nurses use as a warning. It means you do not yet have high blood pressure, but your blood pressure is not as low as it should be for good health.  Graphic 29214 Version 6.0  figure 1: Using a home blood pressure meter     This is an example of a person using a home blood pressure meter.  Graphic 469792 Version 1.0    table 2: 7-day diary for checking blood pressure at home  Day 1  Day 2  Day 3  Day 4  Day 5  Day 6  Day 7    Morning  1st read Morning  1st read Morning  1st read Morning  1st read Morning  1st read Morning  1st read Morning  1st read   Systolic: __________ Systolic: __________ Systolic: __________ Systolic: __________ Systolic: __________ Systolic: __________ Systolic: __________   Diastolic: __________ Diastolic: __________ Diastolic: __________ Diastolic: __________ Diastolic: __________ Diastolic: __________ Diastolic: __________   Pulse: __________ Pulse: __________ Pulse: __________ Pulse: __________ Pulse: __________ Pulse: __________ Pulse: __________   Morning  2nd " read Morning  2nd read Morning  2nd read Morning  2nd read Morning  2nd read Morning  2nd read Morning  2nd read   Systolic: __________ Systolic: __________ Systolic: __________ Systolic: __________ Systolic: __________ Systolic: __________ Systolic: __________   Diastolic: __________ Diastolic: __________ Diastolic: __________ Diastolic: __________ Diastolic: __________ Diastolic: __________ Diastolic: __________   Pulse: __________ Pulse: __________ Pulse: __________ Pulse: __________ Pulse: __________ Pulse: __________ Pulse: __________   Evening  1st read Evening  1st read Evening  1st read Evening  1st read Evening  1st read Evening  1st read Evening  1st read   Systolic: __________ Systolic: __________ Systolic: __________ Systolic: __________ Systolic: __________ Systolic: __________ Systolic: __________   Diastolic: __________ Diastolic: __________ Diastolic: __________ Diastolic: __________ Diastolic: __________ Diastolic: __________ Diastolic: __________   Pulse: __________ Pulse: __________ Pulse: __________ Pulse: __________ Pulse: __________ Pulse: __________ Pulse: __________   Evening  2nd read Evening  2nd read Evening  2nd read Evening  2nd read Evening  2nd read Evening  2nd read Evening  2nd read   Systolic: __________ Systolic: __________ Systolic: __________ Systolic: __________ Systolic: __________ Systolic: __________ Systolic: __________   Diastolic: __________ Diastolic: __________ Diastolic: __________ Diastolic: __________ Diastolic: __________ Diastolic: __________ Diastolic: __________   Pulse: __________ Pulse: __________ Pulse: __________ Pulse: __________ Pulse: __________ Pulse: __________ Pulse: __________   Notes    Notes    Notes    Notes    Notes    Notes    Notes      ____________________ ____________________ ____________________ ____________________ ____________________ ____________________ ____________________   ____________________ ____________________ ____________________  ____________________ ____________________ ____________________ ____________________   ____________________ ____________________ ____________________ ____________________ ____________________ ____________________ ____________________   Patient name: ______________________________     Patient ID: ________________________________    Primary care provider: _______________________    Average BP: _______________________________    [facility] 057289 Version 1.0  Consumer Information Use and Disclaimer   This information is not specific medical advice and does not replace information you receive from your health care provider. This is only a brief summary of general information. It does NOT include all information about conditions, illnesses, injuries, tests, procedures, treatments, therapies, discharge instructions or life-style choices that may apply to you. You must talk with your health care provider for complete information about your health and treatment options. This information should not be used to decide whether or not to accept your health care provider's advice, instructions or recommendations. Only your health care provider has the knowledge and training to provide advice that is right for you. The use of this information is governed by the [employer] End User License Agreement, available at https://www.[employer].com/en/solutions/lexicomp/about/xiomy.The use of [employer] content is governed by the [employer] Terms of Use. ©2021 [employer] All rights reserved.  Copyright   © 2021 UpToDate, Inc. and/or its affiliates. All rights reserved.

## 2024-07-01 NOTE — PROGRESS NOTES
Subjective:   Patient ID: Floresita Martin is a 64 y.o. female     Chief Complaint:Annual Exam      Here for checkup      Review of Systems   Constitutional:  Negative for chills and fever.   HENT:  Negative for sore throat and trouble swallowing.    Respiratory:  Negative for cough and shortness of breath.    Cardiovascular:  Negative for chest pain and leg swelling.   Gastrointestinal:  Negative for abdominal distention and abdominal pain.   Genitourinary:  Negative for dysuria and flank pain.   Musculoskeletal:  Negative for arthralgias and back pain.   Skin:  Negative for color change and pallor.   Neurological:  Negative for weakness and headaches.   Psychiatric/Behavioral:  Negative for agitation and confusion.      Past Medical History:   Diagnosis Date    Alcohol abuse     Cellulitis of toe of right foot 2019    Headache     Hearing loss     HLD (hyperlipidemia)      Past Surgical History:   Procedure Laterality Date    BACK SURGERY       SECTION      2    COLONOSCOPY N/A 11/15/2017    Procedure: COLONOSCOPY;  Surgeon: Peg Powers MD;  Location: [facility];  Service: Endoscopy;  Laterality: N/A;    EPIDURAL STEROID INJECTION INTO CERVICAL SPINE      EPIDURAL STEROID INJECTION INTO LUMBAR SPINE      JOINT REPLACEMENT  2018    LIPOMA RESECTION Right 2023    Procedure: EXCISION, LIPOMA;  Surgeon: Ruben Brooks Jr., MD;  Location: [facility];  Service: General;  Laterality: Right;    LUMBAR FUSION      with cage    LUNG BIOPSY      NECK SURGERY  2018    with hardware    RADIOFREQUENCY ABLATION      cervical    RADIOFREQUENCY ABLATION      lumbar    SPINE SURGERY  2018    STAPEDECTOMY Right 2015    STAPEDECTOMY Left     TUBAL LIGATION  1990     Objective:     Vitals:    24 1018   BP: (!) 140/90   Pulse: 74   Resp: 16   Temp: 98.2 °F (36.8 °C)     Body mass index is 21.27 kg/m².  Physical Exam  Vitals and nursing note reviewed.   Constitutional:        Appearance: She is well-developed.   HENT:      Head: Normocephalic and atraumatic.   Eyes:      General: No scleral icterus.     Conjunctiva/sclera: Conjunctivae normal.   Cardiovascular:      Heart sounds: No murmur heard.  Pulmonary:      Effort: Pulmonary effort is normal. No respiratory distress.   Musculoskeletal:         General: No deformity. Normal range of motion.      Cervical back: Normal range of motion and neck supple.   Skin:     Coloration: Skin is not pale.      Findings: No rash.   Neurological:      Mental Status: She is alert and oriented to person, place, and time.   Psychiatric:         Behavior: Behavior normal.         Thought Content: Thought content normal.         Judgment: Judgment normal.       Assessment:     1. Hypertension, essential    2. Other hyperlipidemia    3. Abnormal finding of blood chemistry, unspecified      Plan:   Hypertension, essential  -     Comprehensive Metabolic Panel; Future; Expected date: 07/01/2024  -     Hemoglobin A1C; Future; Expected date: 07/01/2024  -     Lipid Panel; Future; Expected date: 07/01/2024    Other hyperlipidemia  -     Comprehensive Metabolic Panel; Future; Expected date: 07/01/2024  -     Hemoglobin A1C; Future; Expected date: 07/01/2024  -     Lipid Panel; Future; Expected date: 07/01/2024    Abnormal finding of blood chemistry, unspecified  -     Hemoglobin A1C; Future; Expected date: 07/01/2024      Doing well since discharge for sepsis last month. Following with ID and about complete abx on 7/4. Here with spouse who reports returning to baseline.      Total time spent of Greater than 30 minutes minutes on the day of the visit.This includes face to face time and preparing to see the patient, obtaining and reviewing separately obtained history, documenting clinical information in the electronic or other health record, independently interpreting results and communicating results to the patient/family/caregiver, or care  coordinator.    Established patient with me has been instructed that must see me at least 1 time yearly (every 365 days) for refills of medications. Seeing other providers in this clinic is fine but expectation is to see me yearly.    Toni Hughes MD  07/01/2024    Portions of this note have been dictated with NARGIS Reyes

## 2024-07-02 ENCOUNTER — LAB VISIT (OUTPATIENT)
Dept: LAB | Facility: HOSPITAL | Age: 64
End: 2024-07-02
Attending: INTERNAL MEDICINE
Payer: MEDICARE

## 2024-07-02 DIAGNOSIS — J15.211 S. AUREUS PNEUMONIA: ICD-10-CM

## 2024-07-02 DIAGNOSIS — A41.01 METHICILLIN SUSCEPTIBLE STAPHYLOCOCCUS AUREUS SEPTICEMIA: Primary | ICD-10-CM

## 2024-07-02 LAB
ANION GAP SERPL CALC-SCNC: 9 MMOL/L (ref 8–16)
BASOPHILS # BLD AUTO: 0.05 K/UL (ref 0–0.2)
BASOPHILS NFR BLD: 0.5 % (ref 0–1.9)
BUN SERPL-MCNC: 18 MG/DL (ref 8–23)
CALCIUM SERPL-MCNC: 9.9 MG/DL (ref 8.7–10.5)
CHLORIDE SERPL-SCNC: 103 MMOL/L (ref 95–110)
CO2 SERPL-SCNC: 26 MMOL/L (ref 23–29)
CREAT SERPL-MCNC: 0.8 MG/DL (ref 0.5–1.4)
CRP SERPL-MCNC: 1.8 MG/L (ref 0–8.2)
DIFFERENTIAL METHOD BLD: ABNORMAL
EOSINOPHIL # BLD AUTO: 0.7 K/UL (ref 0–0.5)
EOSINOPHIL NFR BLD: 7 % (ref 0–8)
ERYTHROCYTE [DISTWIDTH] IN BLOOD BY AUTOMATED COUNT: 18.7 % (ref 11.5–14.5)
ERYTHROCYTE [SEDIMENTATION RATE] IN BLOOD BY WESTERGREN METHOD: 34 MM/HR (ref 0–20)
EST. GFR  (NO RACE VARIABLE): >60 ML/MIN/1.73 M^2
GLUCOSE SERPL-MCNC: 59 MG/DL (ref 70–110)
HCT VFR BLD AUTO: 33.3 % (ref 37–48.5)
HGB BLD-MCNC: 10.7 G/DL (ref 12–16)
IMM GRANULOCYTES # BLD AUTO: 0.03 K/UL (ref 0–0.04)
IMM GRANULOCYTES NFR BLD AUTO: 0.3 % (ref 0–0.5)
LYMPHOCYTES # BLD AUTO: 2.5 K/UL (ref 1–4.8)
LYMPHOCYTES NFR BLD: 25.2 % (ref 18–48)
MCH RBC QN AUTO: 27.6 PG (ref 27–31)
MCHC RBC AUTO-ENTMCNC: 32.1 G/DL (ref 32–36)
MCV RBC AUTO: 86 FL (ref 82–98)
MONOCYTES # BLD AUTO: 0.6 K/UL (ref 0.3–1)
MONOCYTES NFR BLD: 5.7 % (ref 4–15)
NEUTROPHILS # BLD AUTO: 6.2 K/UL (ref 1.8–7.7)
NEUTROPHILS NFR BLD: 61.3 % (ref 38–73)
NRBC BLD-RTO: 0 /100 WBC
PLATELET # BLD AUTO: 480 K/UL (ref 150–450)
PMV BLD AUTO: 8.3 FL (ref 9.2–12.9)
POTASSIUM SERPL-SCNC: 4.3 MMOL/L (ref 3.5–5.1)
RBC # BLD AUTO: 3.87 M/UL (ref 4–5.4)
SODIUM SERPL-SCNC: 138 MMOL/L (ref 136–145)
WBC # BLD AUTO: 10.09 K/UL (ref 3.9–12.7)

## 2024-07-02 PROCEDURE — 80048 BASIC METABOLIC PNL TOTAL CA: CPT | Performed by: INTERNAL MEDICINE

## 2024-07-02 PROCEDURE — 85651 RBC SED RATE NONAUTOMATED: CPT | Performed by: INTERNAL MEDICINE

## 2024-07-02 PROCEDURE — 86140 C-REACTIVE PROTEIN: CPT | Performed by: INTERNAL MEDICINE

## 2024-07-02 PROCEDURE — 85025 COMPLETE CBC W/AUTO DIFF WBC: CPT | Performed by: INTERNAL MEDICINE

## 2024-07-02 NOTE — PROGRESS NOTES
I spoke with Ester (  at [facility] )  she stated she spoke with patient who stated she had not eaten prior to lab draw and has since eaten and is Asymptomatic .  Ester will also send labs addressed to Dr Rodriguez from now on   [facility]  7/02/24

## 2024-07-10 DIAGNOSIS — K21.9 GASTROESOPHAGEAL REFLUX DISEASE WITHOUT ESOPHAGITIS: ICD-10-CM

## 2024-07-10 RX ORDER — OMEPRAZOLE 40 MG/1
40 CAPSULE, DELAYED RELEASE ORAL
Qty: 90 CAPSULE | Refills: 3 | Status: SHIPPED | OUTPATIENT
Start: 2024-07-10

## 2024-07-10 NOTE — TELEPHONE ENCOUNTER
No care due was identified.  [facility] [place] Embedded Care Due Messages. Reference number: [id number].   7/10/2024 3:32:55 AM CDT

## 2024-07-10 NOTE — TELEPHONE ENCOUNTER
Refill Routing Note   Medication(s) are not appropriate for processing by [facility] for the following reason(s):        No active prescription written by provider    [facility] action(s):  Defer               Appointments  past 12m or future 3m with PCP    Date Provider   Last Visit   7/1/2024 Toni Hughes MD   Next Visit   Visit date not found Toni Hughes MD   ED visits in past 90 days: 0        Note composed:4:11 AM 07/10/2024

## 2024-07-23 ENCOUNTER — DOCUMENT SCAN (OUTPATIENT)
Dept: HOME HEALTH SERVICES | Facility: HOSPITAL | Age: 64
End: 2024-07-23
Payer: MEDICARE

## 2024-07-24 ENCOUNTER — DOCUMENT SCAN (OUTPATIENT)
Dept: HOME HEALTH SERVICES | Facility: HOSPITAL | Age: 64
End: 2024-07-24
Payer: MEDICARE

## 2024-07-30 DIAGNOSIS — E78.49 OTHER HYPERLIPIDEMIA: ICD-10-CM

## 2024-07-30 PROBLEM — J44.9 CHRONIC OBSTRUCTIVE PULMONARY DISEASE, UNSPECIFIED COPD TYPE: Chronic | Status: ACTIVE | Noted: 2022-01-05

## 2024-07-30 RX ORDER — ATORVASTATIN CALCIUM 40 MG/1
40 TABLET, FILM COATED ORAL
Qty: 90 TABLET | Refills: 1 | Status: SHIPPED | OUTPATIENT
Start: 2024-07-30

## 2024-07-30 NOTE — TELEPHONE ENCOUNTER
Refill Decision Note   Floresita Veronica  is requesting a refill authorization.  Brief Assessment and Rationale for Refill:  Approve     Medication Therapy Plan:         Comments:     Note composed:6:37 PM 07/30/2024

## 2024-07-30 NOTE — TELEPHONE ENCOUNTER
No care due was identified.  [facility] Embedded Care Due Messages. Reference number: [id number].   7/30/2024 11:10:57 AM CDT

## 2024-08-01 ENCOUNTER — TELEPHONE (OUTPATIENT)
Dept: INFECTIOUS DISEASES | Facility: CLINIC | Age: 64
End: 2024-08-01

## 2024-08-01 ENCOUNTER — OFFICE VISIT (OUTPATIENT)
Dept: INFECTIOUS DISEASES | Facility: CLINIC | Age: 64
End: 2024-08-01
Payer: MEDICARE

## 2024-08-01 ENCOUNTER — HOSPITAL ENCOUNTER (OUTPATIENT)
Dept: RADIOLOGY | Facility: HOSPITAL | Age: 64
Discharge: HOME | End: 2024-08-01
Attending: INTERNAL MEDICINE
Payer: MEDICARE

## 2024-08-01 VITALS
WEIGHT: 107.81 LBS | BODY MASS INDEX: 20.35 KG/M2 | HEART RATE: 102 BPM | DIASTOLIC BLOOD PRESSURE: 70 MMHG | SYSTOLIC BLOOD PRESSURE: 138 MMHG | HEIGHT: 61 IN | OXYGEN SATURATION: 95 %

## 2024-08-01 DIAGNOSIS — R78.81 MSSA BACTEREMIA: Primary | ICD-10-CM

## 2024-08-01 DIAGNOSIS — I26.90 ACUTE SEPTIC PULMONARY EMBOLISM WITHOUT ACUTE COR PULMONALE: ICD-10-CM

## 2024-08-01 DIAGNOSIS — J44.9 CHRONIC OBSTRUCTIVE PULMONARY DISEASE, UNSPECIFIED COPD TYPE: ICD-10-CM

## 2024-08-01 DIAGNOSIS — B95.61 MSSA BACTEREMIA: Primary | ICD-10-CM

## 2024-08-01 PROCEDURE — 99999 PR PBB SHADOW E&M-EST. PATIENT-LVL IV: CPT | Mod: PBBFAC,HCNC,, | Performed by: INTERNAL MEDICINE

## 2024-08-01 PROCEDURE — 71046 X-RAY EXAM CHEST 2 VIEWS: CPT | Mod: 26,,, | Performed by: RADIOLOGY

## 2024-08-01 PROCEDURE — 71046 X-RAY EXAM CHEST 2 VIEWS: CPT | Mod: TC

## 2024-08-01 NOTE — TELEPHONE ENCOUNTER
----- Message from Pola Rodriguez MD sent at 8/1/2024  3:21 PM CDT -----  A informed her the chest x-ray significantly improved.  Almost back to normal

## 2024-08-01 NOTE — PATIENT INSTRUCTIONS
We will check chest x-ray today to continue to monitor the healing of the lungs   Also check 1 blood test for hepatitis-B  I will see you again in 4-6 week virtual visit  We will probably discharge you at that visit if everything continues to look okay

## 2024-08-01 NOTE — PROGRESS NOTES
Subjective:      Reason for consult:  Hospital follow-up    HPI: Floresita Martin is a 64 y.o. female who was recently hospitalized in 2024 with Pseudomonas bacteremia associated with abnormal chest x-ray.  She left hospital AMA after about 10 days.  Later seen at an urgent care and received a course of antibiotics readmitted 2024 and was diagnosed with MSSA bacteremia with CT chest that initially showed patchy infiltrate concerning for septic pulmonary emboli that later got worse.  Sputum culture also grew MSSA.  TTE was unremarkable.  [place] not done because she had some esophageal issues with risk of complications.  Ultimately discharged on IV cefazolin to complete 6 week course through 2024.  She has continued to improve.      She was seen by Pulmonary service for follow-up on a known pulmonary nodule that was biopsied in 2023.  Chest x-ray on 2024 still show patchy infiltrate on the right but improved.    2024: She continues to improve.  No complaints today.  Yet to see a pulmonologist since the last visit.  She does have a CT chest that is scheduled for 2024 for follow up of known pulmonary nodule.  She has completed antibiotics and PICC pulled.    Review of patient's allergies indicates:   Allergen Reactions    Acetaminophen-codeine Nausea And Vomiting    Codeine Nausea And Vomiting     Past Medical History:   Diagnosis Date    Alcohol abuse     Cellulitis of toe of right foot 2019    Headache     Hearing loss     HLD (hyperlipidemia)      Past Surgical History:   Procedure Laterality Date    BACK SURGERY       SECTION      2    COLONOSCOPY N/A 11/15/2017    Procedure: COLONOSCOPY;  Surgeon: Peg Powers MD;  Location: [facility];  Service: Endoscopy;  Laterality: N/A;    EPIDURAL STEROID INJECTION INTO CERVICAL SPINE      EPIDURAL STEROID INJECTION INTO LUMBAR SPINE      JOINT REPLACEMENT  2018    LIPOMA RESECTION Right 2023     "Procedure: EXCISION, LIPOMA;  Surgeon: Ruben Brooks Jr., MD;  Location: [facility];  Service: General;  Laterality: Right;    LUMBAR FUSION  2019    with cage    LUNG BIOPSY      NECK SURGERY  2018    with hardware    RADIOFREQUENCY ABLATION      cervical    RADIOFREQUENCY ABLATION      lumbar    SPINE SURGERY  2018    STAPEDECTOMY Right 2015    STAPEDECTOMY Left     TUBAL LIGATION  1990      Social History     Tobacco Use    Smoking status: Former     Current packs/day: 0.00     Average packs/day: 0.5 packs/day for 46.4 years (23.2 ttl pk-yrs)     Types: Cigarettes     Start date: 1978     Quit date: 2024     Years since quittin.2     Passive exposure: Past    Smokeless tobacco: Never    Tobacco comments:     Patient has smoked since a teenager, states that she has cut down from 2pk/day to .5pk/day. Can quit on her own when she's ready. Information and education on outpatient smoking cessation when ready. 3/26/24     Smokes 10 cigarettes daily 2024   Substance Use Topics    Alcohol use: Yes     Comment: social      Family History   Problem Relation Name Age of Onset    Arthritis Mother      Diabetes Mother      Hypertension Father Jose Johnson     Cancer Sister Lilly     Breast cancer Sister Lilly     No Known Problems Sister kristen     Cancer Sister Melissa     Cancer Sister Mercy     Cancer Sister Ana        Pertinent medications noted:     Review of Systems  10 system review unremarkable.    Outdoor activities:  Lives with the .  Cut back on smoking.  Travel:  No recent travel.  Implants:  None.  Antibiotic History:  As in HPI.      Objective:      Blood pressure 138/70, pulse 102, height 5' 1" (1.549 m), weight 48.9 kg (107 lb 12.8 oz), SpO2 95%. Body mass index is 20.37 kg/m².  Physical Exam      General:  Thin middle-aged woman sitting in chair in no acute distress at this time   HEENT:  Upper set of dentures.  No oral lesions or thrush appreciated   CVS: " S1 and 2 heard, no murmurs appreciated   Respiratory: Clear to auscultation   Abdomen:  Full, soft, nontender, no palpable organomegaly   Skin: No rash appreciated   CNS: No focal deficits   Right arm: PICC site with no abnormality     Wound:  None    VAD:  None      General Labs reviewed:  Lab Results   Component Value Date    WBC 10.09 07/02/2024    RBC 3.87 (L) 07/02/2024    HGB 10.7 (L) 07/02/2024    HCT 33.3 (L) 07/02/2024    MCV 86 07/02/2024    MCH 27.6 07/02/2024    MCHC 32.1 07/02/2024    RDW 18.7 (H) 07/02/2024     (H) 07/02/2024    MPV 8.3 (L) 07/02/2024    GRAN 6.2 07/02/2024    GRAN 61.3 07/02/2024    LYMPH 2.5 07/02/2024    LYMPH 25.2 07/02/2024    MONO 0.6 07/02/2024    MONO 5.7 07/02/2024    EOS 0.7 (H) 07/02/2024    BASO 0.05 07/02/2024    EOSINOPHIL 7.0 07/02/2024    BASOPHIL 0.5 07/02/2024     CMP  Sodium   Date Value Ref Range Status   07/02/2024 138 136 - 145 mmol/L Final     Potassium   Date Value Ref Range Status   07/02/2024 4.3 3.5 - 5.1 mmol/L Final     Chloride   Date Value Ref Range Status   07/02/2024 103 95 - 110 mmol/L Final     CO2   Date Value Ref Range Status   07/02/2024 26 23 - 29 mmol/L Final     Glucose   Date Value Ref Range Status   07/02/2024 59 (LL) 70 - 110 mg/dL Final     Comment:     Glucose critical result(s) called and verbal readback obtained from   Ester Orozco by NADEEM 07/02/2024 13:14       BUN   Date Value Ref Range Status   07/02/2024 18 8 - 23 mg/dL Final     Creatinine   Date Value Ref Range Status   07/02/2024 0.8 0.5 - 1.4 mg/dL Final     Calcium   Date Value Ref Range Status   07/02/2024 9.9 8.7 - 10.5 mg/dL Final     Total Protein   Date Value Ref Range Status   06/25/2024 6.0 6.0 - 8.4 g/dL Final     Albumin   Date Value Ref Range Status   06/25/2024 3.0 (L) 3.5 - 5.2 g/dL Final     Total Bilirubin   Date Value Ref Range Status   06/25/2024 0.1 0.1 - 1.0 mg/dL Final     Comment:     For infants and newborns, interpretation of results should be  based  on gestational age, weight and in agreement with clinical  observations.    Premature Infant recommended reference ranges:  Up to 24 hours.............<8.0 mg/dL  Up to 48 hours............<12.0 mg/dL  3-5 days..................<15.0 mg/dL  6-29 days.................<15.0 mg/dL       Alkaline Phosphatase   Date Value Ref Range Status   06/25/2024 153 (H) 55 - 135 U/L Final     AST   Date Value Ref Range Status   06/25/2024 25 10 - 40 U/L Final     ALT   Date Value Ref Range Status   06/25/2024 <5 (L) 10 - 44 U/L Final     Anion Gap   Date Value Ref Range Status   07/02/2024 9 8 - 16 mmol/L Final     eGFR   Date Value Ref Range Status   07/02/2024 >60 >60 mL/min/1.73 m^2 Final           Micro:  Microbiology Results (last 7 days)       ** No results found for the last 168 hours. **          Imaging Reviewed:  Previous CT scans were reviewed with patient.  Chest x-ray 06/12/2024 was also reviewed          Assessment:     1. MSSA bacteremia with clinical septic pulmonary emboli.  Decision was made to treat as probable endocarditis.  She completed 6 week course on 07/04/2024.  Resolved.    2. Bilateral MSSA pneumonia with septic pulmonary emboli.  Completed antibiotics.  Clinically resolved.  Check follow up chest x-ray today.    3. Chronic pain syndrome.    4. Recurrent bacteremia.  Initially had Pseudomonas bacteremia in March 20, 2024 followed by MSSA bacteremia in May 20, 2024.  She denied IVDU.  HIV, HCV screen negative.  Hepatitis-B surface antibody reactive.  Check hepatitis-B core antibody.    4. COPD.  Management as per Pulmonary service.      5. History of pulmonary nodule status post biopsy in 2023.  She is scheduled for CT chest on 09/24/2024.      Problem List Items Addressed This Visit    None         Plan:     As above.  Follow up in 4-6 weeks, virtual.  Anticipate discharge at that visit if everything remains okay.      There are no diagnoses linked to this encounter.    This note was created using  Dragon voice recognition software that occasionally misinterpreted phrases or words.

## 2024-08-18 NOTE — PROGRESS NOTES
C3 nurse spoke with Floresita Martin for a TCC post hospital discharge follow up call. The patient has a scheduled HOSFU appointment with [facility] on 06/05/2024 @ 0900.        10

## 2024-08-19 ENCOUNTER — DOCUMENT SCAN (OUTPATIENT)
Dept: HOME HEALTH SERVICES | Facility: HOSPITAL | Age: 64
End: 2024-08-19
Payer: MEDICARE

## 2024-08-26 PROBLEM — J15.211 PNEUMONIA DUE TO METHICILLIN SUSCEPTIBLE STAPHYLOCOCCUS AUREUS (MSSA): Status: RESOLVED | Noted: 2024-05-21 | Resolved: 2024-08-26

## 2024-08-26 PROBLEM — A41.9 SEPSIS: Status: RESOLVED | Noted: 2024-05-24 | Resolved: 2024-08-26

## 2024-08-27 ENCOUNTER — OFFICE VISIT (OUTPATIENT)
Dept: INFECTIOUS DISEASES | Facility: CLINIC | Age: 64
End: 2024-08-27
Payer: MEDICARE

## 2024-08-27 DIAGNOSIS — J44.9 CHRONIC OBSTRUCTIVE PULMONARY DISEASE, UNSPECIFIED COPD TYPE: ICD-10-CM

## 2024-08-27 DIAGNOSIS — I26.90 ACUTE SEPTIC PULMONARY EMBOLISM WITHOUT ACUTE COR PULMONALE: ICD-10-CM

## 2024-08-27 DIAGNOSIS — B95.61 MSSA BACTEREMIA: Primary | ICD-10-CM

## 2024-08-27 DIAGNOSIS — R78.81 MSSA BACTEREMIA: Primary | ICD-10-CM

## 2024-08-27 PROCEDURE — 4010F ACE/ARB THERAPY RXD/TAKEN: CPT | Mod: HCNC,CPTII,95, | Performed by: INTERNAL MEDICINE

## 2024-08-27 PROCEDURE — 99213 OFFICE O/P EST LOW 20 MIN: CPT | Mod: HCNC,95,, | Performed by: INTERNAL MEDICINE

## 2024-08-27 NOTE — PATIENT INSTRUCTIONS
1.  You will be discharged from [facility] today   2. Follow up with your primary physician and pulmonologist

## 2024-08-27 NOTE — PROGRESS NOTES
Subjective:      Reason for consult:  Hospital follow-up    HPI: Floresita Martin is a 64 y.o. female who was recently hospitalized in 2024 with Pseudomonas bacteremia associated with abnormal chest x-ray.  She left hospital AMA after about 10 days.  Later seen at an urgent care and received a course of antibiotics readmitted 2024 and was diagnosed with MSSA bacteremia with CT chest that initially showed patchy infiltrate concerning for septic pulmonary emboli that later got worse.  Sputum culture also grew MSSA.  TTE was unremarkable.  [place] not done because she had some esophageal issues with risk of complications.  Ultimately discharged on IV cefazolin to complete 6 week course through 2024.  She has continued to improve.      She was seen by Pulmonary service for follow-up on a known pulmonary nodule that was biopsied in 2023.  Chest x-ray on 2024 still show patchy infiltrate on the right but improved.    2024: She continues to improve.  No complaints today.  Yet to see a pulmonologist since the last visit.  She does have a CT chest that is scheduled for 2024 for follow up of known pulmonary nodule.  She has completed antibiotics and PICC pulled.    2024:  Continues to improve.  No acute issues since last visit.  Repeat chest x-ray 2024 showed continued improvement with near resolution of the infiltrate.  Hepatitis-B core antibody 2024 was positive consistent with previous infection with spontaneous resolution.  Today's visit was a video visit.    Review of patient's allergies indicates:   Allergen Reactions    Acetaminophen-codeine Nausea And Vomiting    Codeine Nausea And Vomiting     Past Medical History:   Diagnosis Date    Alcohol abuse     Cellulitis of toe of right foot 2019    Headache     Hearing loss     HLD (hyperlipidemia)      Past Surgical History:   Procedure Laterality Date    BACK SURGERY       SECTION      2     COLONOSCOPY N/A 11/15/2017    Procedure: COLONOSCOPY;  Surgeon: Peg Powers MD;  Location: [facility];  Service: Endoscopy;  Laterality: N/A;    EPIDURAL STEROID INJECTION INTO CERVICAL SPINE      EPIDURAL STEROID INJECTION INTO LUMBAR SPINE      JOINT REPLACEMENT  2018    LIPOMA RESECTION Right 2023    Procedure: EXCISION, LIPOMA;  Surgeon: Ruben Brooks Jr., MD;  Location: [facility];  Service: General;  Laterality: Right;    LUMBAR FUSION      with cage    LUNG BIOPSY      NECK SURGERY  2018    with hardware    RADIOFREQUENCY ABLATION      cervical    RADIOFREQUENCY ABLATION      lumbar    SPINE SURGERY  2018    STAPEDECTOMY Right 2015    STAPEDECTOMY Left     TUBAL LIGATION  1990      Social History     Tobacco Use    Smoking status: Former     Current packs/day: 0.00     Average packs/day: 0.5 packs/day for 46.4 years (23.2 ttl pk-yrs)     Types: Cigarettes     Start date: 1978     Quit date: 2024     Years since quittin.2     Passive exposure: Past    Smokeless tobacco: Never    Tobacco comments:     Patient has smoked since a teenager, states that she has cut down from 2pk/day to .5pk/day. Can quit on her own when she's ready. Information and education on outpatient smoking cessation when ready. 3/26/24     Smokes 10 cigarettes daily 2024   Substance Use Topics    Alcohol use: Yes     Comment: social      Family History   Problem Relation Name Age of Onset    Arthritis Mother      Diabetes Mother      Hypertension Father Jose Brenning     Cancer Sister Lilly     Breast cancer Sister Lilly     No Known Problems Sister kristen     Cancer Sister Melissa     Cancer Sister Mercy     Cancer Sister Ana        Pertinent medications noted:     Review of Systems  10 system review unremarkable.    Outdoor activities:  Lives with the .  Cut back on smoking.  Travel:  No recent travel.  Implants:  None.  Antibiotic History:  As in HPI.      Objective:       There were no vitals taken for this visit. There is no height or weight on file to calculate BMI.  Physical Exam  only limited exam was done virtually.    General:  Thin middle-aged woman sitting in chair in no acute distress at this time   Respiratory:  Comfortable, in no distress     Wound:  None    VAD:  None      General Labs reviewed:  Lab Results   Component Value Date    WBC 10.09 07/02/2024    RBC 3.87 (L) 07/02/2024    HGB 10.7 (L) 07/02/2024    HCT 33.3 (L) 07/02/2024    MCV 86 07/02/2024    MCH 27.6 07/02/2024    MCHC 32.1 07/02/2024    RDW 18.7 (H) 07/02/2024     (H) 07/02/2024    MPV 8.3 (L) 07/02/2024    GRAN 6.2 07/02/2024    GRAN 61.3 07/02/2024    LYMPH 2.5 07/02/2024    LYMPH 25.2 07/02/2024    MONO 0.6 07/02/2024    MONO 5.7 07/02/2024    EOS 0.7 (H) 07/02/2024    BASO 0.05 07/02/2024    EOSINOPHIL 7.0 07/02/2024    BASOPHIL 0.5 07/02/2024     CMP  Sodium   Date Value Ref Range Status   07/02/2024 138 136 - 145 mmol/L Final     Potassium   Date Value Ref Range Status   07/02/2024 4.3 3.5 - 5.1 mmol/L Final     Chloride   Date Value Ref Range Status   07/02/2024 103 95 - 110 mmol/L Final     CO2   Date Value Ref Range Status   07/02/2024 26 23 - 29 mmol/L Final     Glucose   Date Value Ref Range Status   07/02/2024 59 (LL) 70 - 110 mg/dL Final     Comment:     Glucose critical result(s) called and verbal readback obtained from   Ester Orozco by NADEEM 07/02/2024 13:14       BUN   Date Value Ref Range Status   07/02/2024 18 8 - 23 mg/dL Final     Creatinine   Date Value Ref Range Status   07/02/2024 0.8 0.5 - 1.4 mg/dL Final     Calcium   Date Value Ref Range Status   07/02/2024 9.9 8.7 - 10.5 mg/dL Final     Total Protein   Date Value Ref Range Status   06/25/2024 6.0 6.0 - 8.4 g/dL Final     Albumin   Date Value Ref Range Status   06/25/2024 3.0 (L) 3.5 - 5.2 g/dL Final     Total Bilirubin   Date Value Ref Range Status   06/25/2024 0.1 0.1 - 1.0 mg/dL Final     Comment:     For infants and  newborns, interpretation of results should be based  on gestational age, weight and in agreement with clinical  observations.    Premature Infant recommended reference ranges:  Up to 24 hours.............<8.0 mg/dL  Up to 48 hours............<12.0 mg/dL  3-5 days..................<15.0 mg/dL  6-29 days.................<15.0 mg/dL       Alkaline Phosphatase   Date Value Ref Range Status   06/25/2024 153 (H) 55 - 135 U/L Final     AST   Date Value Ref Range Status   06/25/2024 25 10 - 40 U/L Final     ALT   Date Value Ref Range Status   06/25/2024 <5 (L) 10 - 44 U/L Final     Anion Gap   Date Value Ref Range Status   07/02/2024 9 8 - 16 mmol/L Final     eGFR   Date Value Ref Range Status   07/02/2024 >60 >60 mL/min/1.73 m^2 Final           Micro:  Microbiology Results (last 7 days)       ** No results found for the last 168 hours. **          Imaging Reviewed:  Previous CT scans were reviewed with patient.  Chest x-ray 06/12/2024 was also reviewed          Assessment:     1. MSSA bacteremia with clinical septic pulmonary emboli.  Decision was made to treat as probable endocarditis.  She completed 6 week course on 07/04/2024.  Resolved.    2. Bilateral MSSA pneumonia with septic pulmonary emboli.  Completed antibiotics.  Follow up chest x-ray show near resolution.  Clinically resolved.    3. Chronic pain syndrome.    4. Recurrent bacteremia.  Initially had Pseudomonas bacteremia in March 20, 2024 followed by MSSA bacteremia in May 20, 2024.  She denied IVDU.  HIV, HCV screen negative.  Hepatitis-B core antibody and Hepatitis-B surface antibody reactive consistent with resolved natural infection.  Counseled and educated.    4. COPD.  Management as per Pulmonary service.      5. History of pulmonary nodule status post biopsy in 2023.  She is scheduled for CT chest on 09/24/2024.    DC from [facility] at this time.  Please call with any questions.      Problem List Items Addressed This Visit    None         Plan:     As  above.        There are no diagnoses linked to this encounter.    This note was created using Dragon voice recognition software that occasionally misinterpreted phrases or words.

## 2024-09-06 ENCOUNTER — TELEPHONE (OUTPATIENT)
Dept: PULMONOLOGY | Facility: CLINIC | Age: 64
End: 2024-09-06
Payer: MEDICARE

## 2024-09-24 ENCOUNTER — HOSPITAL ENCOUNTER (OUTPATIENT)
Dept: RADIOLOGY | Facility: HOSPITAL | Age: 64
Discharge: HOME | End: 2024-09-24
Attending: NURSE PRACTITIONER
Payer: MEDICARE

## 2024-09-24 DIAGNOSIS — F17.210 CIGARETTE NICOTINE DEPENDENCE WITHOUT COMPLICATION: ICD-10-CM

## 2024-09-24 PROCEDURE — 71271 CT THORAX LUNG CANCER SCR C-: CPT | Mod: TC

## 2024-09-24 PROCEDURE — 71271 CT THORAX LUNG CANCER SCR C-: CPT | Mod: 26,,, | Performed by: RADIOLOGY

## 2024-10-02 DIAGNOSIS — J30.9 ALLERGIC RHINITIS: ICD-10-CM

## 2024-10-02 RX ORDER — LORATADINE 10 MG/1
10 TABLET ORAL
Qty: 90 TABLET | Refills: 2 | Status: SHIPPED | OUTPATIENT
Start: 2024-10-02

## 2024-10-02 NOTE — TELEPHONE ENCOUNTER
Refill Decision Note   Floresita Veronica  is requesting a refill authorization.  Brief Assessment and Rationale for Refill:  Approve     Medication Therapy Plan:         Comments:     Note composed:1:38 PM 10/02/2024

## 2024-10-02 NOTE — TELEPHONE ENCOUNTER
No care due was identified.  [facility] Embedded Care Due Messages. Reference number: [id number].   10/02/2024 3:34:31 AM CDT

## 2024-10-23 ENCOUNTER — PATIENT MESSAGE (OUTPATIENT)
Dept: ADMINISTRATIVE | Facility: HOSPITAL | Age: 64
End: 2024-10-23
Payer: MEDICARE

## 2024-10-24 ENCOUNTER — PATIENT MESSAGE (OUTPATIENT)
Dept: ADMINISTRATIVE | Facility: CLINIC | Age: 64
End: 2024-10-24
Payer: MEDICARE

## 2024-10-28 ENCOUNTER — OFFICE VISIT (OUTPATIENT)
Dept: PULMONOLOGY | Facility: CLINIC | Age: 64
End: 2024-10-28
Payer: MEDICARE

## 2024-10-28 VITALS
SYSTOLIC BLOOD PRESSURE: 132 MMHG | DIASTOLIC BLOOD PRESSURE: 78 MMHG | BODY MASS INDEX: 20.87 KG/M2 | WEIGHT: 110.56 LBS | HEIGHT: 61 IN | HEART RATE: 102 BPM | OXYGEN SATURATION: 92 %

## 2024-10-28 DIAGNOSIS — J18.9 PNEUMONIA DUE TO INFECTIOUS ORGANISM, UNSPECIFIED LATERALITY, UNSPECIFIED PART OF LUNG: ICD-10-CM

## 2024-10-28 DIAGNOSIS — F17.210 CIGARETTE NICOTINE DEPENDENCE WITHOUT COMPLICATION: ICD-10-CM

## 2024-10-28 DIAGNOSIS — R91.1 SOLITARY PULMONARY NODULE ON LUNG CT: ICD-10-CM

## 2024-10-28 DIAGNOSIS — J98.4 CALCIFIED GRANULOMA OF LUNG: ICD-10-CM

## 2024-10-28 DIAGNOSIS — J43.2 CENTRILOBULAR EMPHYSEMA: Primary | ICD-10-CM

## 2024-10-28 PROCEDURE — 99999 PR PBB SHADOW E&M-EST. PATIENT-LVL III: CPT | Mod: PBBFAC,HCNC,, | Performed by: NURSE PRACTITIONER

## 2024-10-28 PROCEDURE — 3078F DIAST BP <80 MM HG: CPT | Mod: HCNC,CPTII,S$GLB, | Performed by: NURSE PRACTITIONER

## 2024-10-28 PROCEDURE — 3008F BODY MASS INDEX DOCD: CPT | Mod: HCNC,CPTII,S$GLB, | Performed by: NURSE PRACTITIONER

## 2024-10-28 PROCEDURE — 4010F ACE/ARB THERAPY RXD/TAKEN: CPT | Mod: HCNC,CPTII,S$GLB, | Performed by: NURSE PRACTITIONER

## 2024-10-28 PROCEDURE — 99214 OFFICE O/P EST MOD 30 MIN: CPT | Mod: HCNC,S$GLB,, | Performed by: NURSE PRACTITIONER

## 2024-10-28 PROCEDURE — 3075F SYST BP GE 130 - 139MM HG: CPT | Mod: HCNC,CPTII,S$GLB, | Performed by: NURSE PRACTITIONER

## 2024-10-28 RX ORDER — ALBUTEROL SULFATE 90 UG/1
2 INHALANT RESPIRATORY (INHALATION) EVERY 6 HOURS PRN
Qty: 18 G | Refills: 11 | Status: SHIPPED | OUTPATIENT
Start: 2024-10-28

## 2024-11-13 DIAGNOSIS — G62.9 NEUROPATHY: Primary | ICD-10-CM

## 2024-11-13 NOTE — TELEPHONE ENCOUNTER
Care Due:                  Date            Visit Type   Department     Provider  --------------------------------------------------------------------------------                                EP -                              PRIMARY      [facility]  Last Visit: 07-      CARE ([facility])   ADRIANA Hughes                              EP -                              PRIMARY      [facility]  Next Visit: 02-      CARE ([facility])   ADRIANA Hughes                                                            Last  Test          Frequency    Reason                     Performed    Due Date  --------------------------------------------------------------------------------    Lipid Panel.  12 months..  atorvastatin.............  02- 02-    [facility] Embedded Care Due Messages. Reference number: [id number].   11/13/2024 1:21:35 PM CST

## 2024-11-15 RX ORDER — PREGABALIN 75 MG/1
CAPSULE ORAL
Qty: 90 CAPSULE | Refills: 1 | Status: SHIPPED | OUTPATIENT
Start: 2024-11-15

## 2024-11-23 DIAGNOSIS — E78.49 OTHER HYPERLIPIDEMIA: ICD-10-CM

## 2024-11-23 RX ORDER — ATORVASTATIN CALCIUM 40 MG/1
40 TABLET, FILM COATED ORAL
Qty: 90 TABLET | Refills: 0 | Status: SHIPPED | OUTPATIENT
Start: 2024-11-23

## 2024-11-23 NOTE — TELEPHONE ENCOUNTER
No care due was identified.  [facility] Embedded Care Due Messages. Reference number: [id number].   11/23/2024 12:09:57 PM CST

## 2024-11-24 NOTE — TELEPHONE ENCOUNTER
Refill Decision Note   Floresita Veronica  is requesting a refill authorization.  Brief Assessment and Rationale for Refill:  Approve     Medication Therapy Plan:        Comments:     Note composed:11:19 PM 11/23/2024

## 2024-12-27 DIAGNOSIS — J30.9 ALLERGIC RHINITIS: ICD-10-CM

## 2024-12-27 RX ORDER — FLUTICASONE PROPIONATE 50 MCG
SPRAY, SUSPENSION (ML) NASAL
Qty: 48 G | Refills: 1 | Status: SHIPPED | OUTPATIENT
Start: 2024-12-27

## 2024-12-27 NOTE — TELEPHONE ENCOUNTER
Refill Decision Note   Floresita Veronica  is requesting a refill authorization.  Brief Assessment and Rationale for Refill:  Approve     Medication Therapy Plan:        Comments:     Note composed:11:14 AM 12/27/2024

## 2024-12-27 NOTE — TELEPHONE ENCOUNTER
No care due was identified.  [facility] Embedded Care Due Messages. Reference number: [id number].   12/27/2024 10:39:14 AM CST

## 2025-01-14 DIAGNOSIS — Z00.00 ENCOUNTER FOR MEDICARE ANNUAL WELLNESS EXAM: ICD-10-CM

## 2025-01-27 ENCOUNTER — LAB VISIT (OUTPATIENT)
Dept: LAB | Facility: HOSPITAL | Age: 65
End: 2025-01-27
Attending: FAMILY MEDICINE
Payer: MEDICARE

## 2025-01-27 DIAGNOSIS — E78.49 OTHER HYPERLIPIDEMIA: ICD-10-CM

## 2025-01-27 DIAGNOSIS — I10 HYPERTENSION, ESSENTIAL: ICD-10-CM

## 2025-01-27 DIAGNOSIS — R79.9 ABNORMAL FINDING OF BLOOD CHEMISTRY, UNSPECIFIED: ICD-10-CM

## 2025-01-27 LAB
ALBUMIN SERPL BCP-MCNC: 2.5 G/DL (ref 3.5–5.2)
ALP SERPL-CCNC: 176 U/L (ref 40–150)
ALT SERPL W/O P-5'-P-CCNC: 22 U/L (ref 10–44)
ANION GAP SERPL CALC-SCNC: 13 MMOL/L (ref 8–16)
AST SERPL-CCNC: 25 U/L (ref 10–40)
BILIRUB SERPL-MCNC: 0.2 MG/DL (ref 0.1–1)
BUN SERPL-MCNC: 7 MG/DL (ref 8–23)
CALCIUM SERPL-MCNC: 10.2 MG/DL (ref 8.7–10.5)
CHLORIDE SERPL-SCNC: 98 MMOL/L (ref 95–110)
CHOLEST SERPL-MCNC: 117 MG/DL (ref 120–199)
CHOLEST/HDLC SERPL: 2.8 {RATIO} (ref 2–5)
CO2 SERPL-SCNC: 22 MMOL/L (ref 23–29)
CREAT SERPL-MCNC: 0.6 MG/DL (ref 0.5–1.4)
EST. GFR  (NO RACE VARIABLE): >60 ML/MIN/1.73 M^2
ESTIMATED AVG GLUCOSE: 114 MG/DL (ref 68–131)
GLUCOSE SERPL-MCNC: 98 MG/DL (ref 70–110)
HBA1C MFR BLD: 5.6 % (ref 4–5.6)
HDLC SERPL-MCNC: 42 MG/DL (ref 40–75)
HDLC SERPL: 35.9 % (ref 20–50)
LDLC SERPL CALC-MCNC: 64.8 MG/DL (ref 63–159)
NONHDLC SERPL-MCNC: 75 MG/DL
POTASSIUM SERPL-SCNC: 4.9 MMOL/L (ref 3.5–5.1)
PROT SERPL-MCNC: 7.3 G/DL (ref 6–8.4)
SODIUM SERPL-SCNC: 133 MMOL/L (ref 136–145)
TRIGL SERPL-MCNC: 51 MG/DL (ref 30–150)

## 2025-01-27 PROCEDURE — 36415 COLL VENOUS BLD VENIPUNCTURE: CPT | Mod: HCNC,PO | Performed by: FAMILY MEDICINE

## 2025-01-27 PROCEDURE — 80061 LIPID PANEL: CPT | Mod: HCNC | Performed by: FAMILY MEDICINE

## 2025-01-27 PROCEDURE — 83036 HEMOGLOBIN GLYCOSYLATED A1C: CPT | Mod: HCNC | Performed by: FAMILY MEDICINE

## 2025-01-27 PROCEDURE — 80053 COMPREHEN METABOLIC PANEL: CPT | Mod: HCNC | Performed by: FAMILY MEDICINE

## 2025-01-27 RX ORDER — ATORVASTATIN CALCIUM 40 MG/1
40 TABLET, FILM COATED ORAL
Qty: 90 TABLET | Refills: 0 | Status: SHIPPED | OUTPATIENT
Start: 2025-01-27

## 2025-01-27 NOTE — TELEPHONE ENCOUNTER
Provider Staff:  Action required for this patient     Please see care gap opportunities below in Care Due Message.    Thanks!  [facility]     Appointments      Date Provider   Last Visit   7/1/2024 Toni Hughes MD   Next Visit   2/3/2025 Toni Hughes MD      Refill Decision Note   Floresita Martin  is requesting a refill authorization.  Brief Assessment and Rationale for Refill:  Approve     Medication Therapy Plan:         Comments:     Note composed:1:59 PM 01/27/2025

## 2025-01-27 NOTE — TELEPHONE ENCOUNTER
Care Due:                  Date            Visit Type   Department     Provider  --------------------------------------------------------------------------------                                EP -                              PRIMARY      [facility]  Last Visit: 07-      CARE ([facility])   ADRIANA Hughes                              EP -                              PRIMARY      [facility]  Next Visit: 02-      CARE ([facility])   ADRIANA Hughes                                                            Last  Test          Frequency    Reason                     Performed    Due Date  --------------------------------------------------------------------------------    Lipid Panel.  12 months..  atorvastatin.............  02- 02-    [facility] Embedded Care Due Messages. Reference number: [id number].   1/27/2025 5:42:26 AM CST

## 2025-02-03 ENCOUNTER — OFFICE VISIT (OUTPATIENT)
Dept: FAMILY MEDICINE | Facility: CLINIC | Age: 65
End: 2025-02-03
Payer: MEDICARE

## 2025-02-03 VITALS
SYSTOLIC BLOOD PRESSURE: 100 MMHG | WEIGHT: 109.38 LBS | HEIGHT: 61 IN | TEMPERATURE: 99 F | DIASTOLIC BLOOD PRESSURE: 60 MMHG | BODY MASS INDEX: 20.65 KG/M2 | HEART RATE: 94 BPM | RESPIRATION RATE: 16 BRPM | OXYGEN SATURATION: 95 %

## 2025-02-03 DIAGNOSIS — F11.20 OPIOID DEPENDENCE WITH CURRENT USE: ICD-10-CM

## 2025-02-03 DIAGNOSIS — E78.49 OTHER HYPERLIPIDEMIA: ICD-10-CM

## 2025-02-03 DIAGNOSIS — J43.2 CENTRILOBULAR EMPHYSEMA: ICD-10-CM

## 2025-02-03 DIAGNOSIS — R79.9 ABNORMAL BLOOD FINDING: ICD-10-CM

## 2025-02-03 DIAGNOSIS — I10 HYPERTENSION, ESSENTIAL: Primary | ICD-10-CM

## 2025-02-03 PROCEDURE — 99999 PR PBB SHADOW E&M-EST. PATIENT-LVL V: CPT | Mod: PBBFAC,HCNC,, | Performed by: FAMILY MEDICINE

## 2025-02-03 PROCEDURE — G2211 COMPLEX E/M VISIT ADD ON: HCPCS | Mod: HCNC,S$GLB,, | Performed by: FAMILY MEDICINE

## 2025-02-03 PROCEDURE — 3074F SYST BP LT 130 MM HG: CPT | Mod: HCNC,CPTII,S$GLB, | Performed by: FAMILY MEDICINE

## 2025-02-03 PROCEDURE — 3008F BODY MASS INDEX DOCD: CPT | Mod: HCNC,CPTII,S$GLB, | Performed by: FAMILY MEDICINE

## 2025-02-03 PROCEDURE — 3044F HG A1C LEVEL LT 7.0%: CPT | Mod: HCNC,CPTII,S$GLB, | Performed by: FAMILY MEDICINE

## 2025-02-03 PROCEDURE — 99214 OFFICE O/P EST MOD 30 MIN: CPT | Mod: HCNC,S$GLB,, | Performed by: FAMILY MEDICINE

## 2025-02-03 PROCEDURE — 1159F MED LIST DOCD IN RCRD: CPT | Mod: HCNC,CPTII,S$GLB, | Performed by: FAMILY MEDICINE

## 2025-02-03 PROCEDURE — 3078F DIAST BP <80 MM HG: CPT | Mod: HCNC,CPTII,S$GLB, | Performed by: FAMILY MEDICINE

## 2025-02-03 NOTE — PROGRESS NOTES
Subjective:   Patient ID: Floresita Martin is a 65 y.o. female     Chief Complaint:Follow-up      Here for checkup    Follow-up  Pertinent negatives include no abdominal pain or chest pain.     Review of Systems   Respiratory:  Negative for shortness of breath.    Cardiovascular:  Negative for chest pain.   Gastrointestinal:  Negative for abdominal pain.   Genitourinary:  Negative for dysuria.     Past Medical History:   Diagnosis Date    Alcohol abuse     Cellulitis of toe of right foot 2019    Headache     Hearing loss     HLD (hyperlipidemia)      Past Surgical History:   Procedure Laterality Date    BACK SURGERY       SECTION      2    COLONOSCOPY N/A 11/15/2017    Procedure: COLONOSCOPY;  Surgeon: Peg Powers MD;  Location: [facility];  Service: Endoscopy;  Laterality: N/A;    EPIDURAL STEROID INJECTION INTO CERVICAL SPINE      EPIDURAL STEROID INJECTION INTO LUMBAR SPINE      JOINT REPLACEMENT  2018    LIPOMA RESECTION Right 2023    Procedure: EXCISION, LIPOMA;  Surgeon: Ruben Brooks Jr., MD;  Location: [facility] OR;  Service: General;  Laterality: Right;    LUMBAR FUSION      with cage    LUNG BIOPSY      NECK SURGERY  2018    with hardware    RADIOFREQUENCY ABLATION      cervical    RADIOFREQUENCY ABLATION      lumbar    SPINE SURGERY  2018    STAPEDECTOMY Right 2015    STAPEDECTOMY Left     TUBAL LIGATION  1990     Objective:     Vitals:    25 1426   BP: 100/60   Pulse: 94   Resp: 16   Temp: 99.4 °F (37.4 °C)     Body mass index is 20.66 kg/m².  Physical Exam  Vitals and nursing note reviewed.   Constitutional:       Appearance: She is well-developed.   HENT:      Head: Normocephalic and atraumatic.   Eyes:      General: No scleral icterus.     Conjunctiva/sclera: Conjunctivae normal.   Cardiovascular:      Heart sounds: No murmur heard.  Pulmonary:      Effort: Pulmonary effort is normal. No respiratory distress.   Musculoskeletal:          General: No deformity. Normal range of motion.      Cervical back: Normal range of motion and neck supple.   Skin:     Coloration: Skin is not pale.      Findings: No rash.   Neurological:      Mental Status: She is alert and oriented to person, place, and time.   Psychiatric:         Behavior: Behavior normal.         Thought Content: Thought content normal.         Judgment: Judgment normal.       Assessment:     1. Hypertension, essential    2. Other hyperlipidemia    3. Abnormal blood finding    4. Opioid dependence with current use    5. Centrilobular emphysema      Plan:   Hypertension, essential  -     Comprehensive Metabolic Panel; Future; Expected date: 02/03/2025  -     CBC Auto Differential; Future; Expected date: 02/03/2025  -     Hemoglobin A1C; Future; Expected date: 02/03/2025  -     Lipid Panel; Future; Expected date: 02/03/2025    Other hyperlipidemia  -     Comprehensive Metabolic Panel; Future; Expected date: 02/03/2025  -     CBC Auto Differential; Future; Expected date: 02/03/2025  -     Hemoglobin A1C; Future; Expected date: 02/03/2025  -     Lipid Panel; Future; Expected date: 02/03/2025    Abnormal blood finding  -     Hemoglobin A1C; Future; Expected date: 02/03/2025    Opioid dependence with current use  Stable, follows back and spine  Centrilobular emphysema  stable          Total time spent of Greater than 30 minutes minutes on the day of the visit.This includes face to face time and preparing to see the patient, obtaining and reviewing separately obtained history, documenting clinical information in the electronic or other health record, independently interpreting results and communicating results to the patient/family/caregiver, or care coordinator.    Established patient with me has been instructed that must see me at least 1 time yearly (every 365 days) for refills of medications. Seeing other providers in this clinic is fine but expectation is to see me yearly.    Toni Hughes,  MD  02/03/2025    Portions of this note have been dictated with NARGIS Melchor.         Attending and PA/NP shared services statement (NON-critical care): Attending and PA/NP shared services statement (NON-critical care):

## 2025-02-12 ENCOUNTER — PATIENT MESSAGE (OUTPATIENT)
Dept: ADMINISTRATIVE | Facility: HOSPITAL | Age: 65
End: 2025-02-12
Payer: MEDICARE

## 2025-02-13 ENCOUNTER — HOSPITAL ENCOUNTER (OUTPATIENT)
Dept: RADIOLOGY | Facility: CLINIC | Age: 65
Discharge: HOME | End: 2025-02-13
Attending: FAMILY MEDICINE
Payer: MEDICARE

## 2025-02-13 DIAGNOSIS — Z12.31 ENCOUNTER FOR SCREENING MAMMOGRAM FOR BREAST CANCER: ICD-10-CM

## 2025-02-13 PROCEDURE — 77063 BREAST TOMOSYNTHESIS BI: CPT | Mod: 26,HCNC,, | Performed by: RADIOLOGY

## 2025-02-13 PROCEDURE — 77067 SCR MAMMO BI INCL CAD: CPT | Mod: 26,HCNC,, | Performed by: RADIOLOGY

## 2025-02-13 PROCEDURE — 77063 BREAST TOMOSYNTHESIS BI: CPT | Mod: TC,HCNC,PO

## 2025-02-18 ENCOUNTER — OFFICE VISIT (OUTPATIENT)
Dept: FAMILY MEDICINE | Facility: CLINIC | Age: 65
End: 2025-02-18
Payer: MEDICARE

## 2025-02-18 VITALS — HEIGHT: 61 IN | WEIGHT: 108.69 LBS | BODY MASS INDEX: 20.52 KG/M2

## 2025-02-18 DIAGNOSIS — Z00.00 ENCOUNTER FOR MEDICARE ANNUAL WELLNESS EXAM: ICD-10-CM

## 2025-02-18 DIAGNOSIS — D69.2 NONTHROMBOCYTOPENIC PURPURA: ICD-10-CM

## 2025-02-18 DIAGNOSIS — J98.4 CALCIFIED GRANULOMA OF LUNG: ICD-10-CM

## 2025-02-18 DIAGNOSIS — J44.9 CHRONIC OBSTRUCTIVE PULMONARY DISEASE, UNSPECIFIED COPD TYPE: Chronic | ICD-10-CM

## 2025-02-18 DIAGNOSIS — I70.0 ATHEROSCLEROSIS OF AORTA: ICD-10-CM

## 2025-02-18 DIAGNOSIS — Z00.00 ENCOUNTER FOR PREVENTIVE HEALTH EXAMINATION: Primary | ICD-10-CM

## 2025-02-18 DIAGNOSIS — F11.20 OPIOID DEPENDENCE WITH CURRENT USE: ICD-10-CM

## 2025-02-18 NOTE — PATIENT INSTRUCTIONS
Counseling and Referral of Other Preventative  (Italic type indicates deductible and co-insurance are waived)    Patient Name: Floresita Martin  Today's Date: 2/18/2025    Health Maintenance       Date Due Completion Date    TETANUS VACCINE Never done ---    Sign Pain Contract Never done ---    RSV Vaccine (Age 60+ and Pregnant patients) (1 - Risk 60-74 years 1-dose series) Never done ---    Cervical Cancer Screening 02/09/2025 2/9/2022    Urine Drug Screen 05/23/2025 5/23/2024    LDCT Lung Screen 09/24/2025 9/24/2024    Lipid Panel 01/27/2026 1/27/2025    Mammogram 02/13/2026 2/13/2025    Colorectal Cancer Screening 11/15/2027 11/15/2017        No orders of the defined types were placed in this encounter.    The following information is provided to all patients.  This information is to help you find resources for any of the problems found today that may be affecting your health:                  Living healthy guide: www.[facility].[place].[facility]      Understanding Diabetes: www.diabetes.org      Eating healthy: www.cdc.gov/healthyweight      CDC home safety checklist: www.cdc.gov/steadi/patient.html      Agency on Aging: www.goea.[place].gov      Alcoholics anonymous (AA): www.aa.org      Physical Activity: www.cathleen.nih.gov/iw7tguf      Tobacco use: www.quitwithusla.org

## 2025-02-18 NOTE — PROGRESS NOTES
"  Floresita Martin presented for a  Medicare AWV and comprehensive Health Risk Assessment today. The following components were reviewed and updated:    Medical history  Family History  Social history  Allergies and Current Medications  Health Risk Assessment  Health Maintenance  Care Team         ** See Completed Assessments for Annual Wellness Visit within the encounter summary.**         The following assessments were completed:  Living Situation  CAGE  Depression Screening  Timed Get Up and Go  Whisper Test  Cognitive Function Screening  Nutrition Screening  ADL Screening  PAQ Screening    Clock in media   Opioid documentation:      Patient does have a current opioid prescription.      Patient accepted further discussion regarding opioid medication use.      Patient is currently taking oxycodone and morphine narcotic for back and neck pain.        Pain level today is 3/10.       In addition to narcotic pain medications, patient is also using NSAIDs for pain control.       Patient is followed by a specialist currently for their pain and will not be referred today.       Patient's opioid risk potential based on ORT-OUD tool:       Marco Antonio each box that applies   No   Yes     Family history of substance abuse   Alcohol [x] []   Illegal drugs [x] []   Rx drugs [x] []     Personal history of substance abuse   Alcohol [x] []   Illegal drugs [x] []   Rx drugs [x] []     Age between 16-45 years   [x]   []     Patient with ADD, OCD, Bipolar disorder, schizoprenia   [x]   []     Patient with depression   [x]   []                         Scoring total                                                        0         Non-opioid treatment options have been discussed today and added to the patient's after visit summary.        Vitals:    02/18/25 1040   TempSrc: Oral   Weight: 49.3 kg (108 lb 11 oz)   Height: 5' 1" (1.549 m)     Body mass index is 20.54 kg/m².  Physical Exam  Constitutional:       Appearance: She is well-developed. "   HENT:      Head: Normocephalic and atraumatic.      Nose: Nose normal.   Eyes:      General: Lids are normal.      Conjunctiva/sclera: Conjunctivae normal.   Cardiovascular:      Rate and Rhythm: Normal rate.   Pulmonary:      Effort: Pulmonary effort is normal.   Neurological:      Mental Status: She is alert and oriented to person, place, and time.   Psychiatric:         Speech: Speech normal.         Behavior: Behavior normal.               Diagnoses and health risks identified today and associated recommendations/orders:    1. Encounter for preventive health examination  Discussed health maintenance guidelines appropriate for age.        2. Encounter for Medicare annual wellness exam    - Ambulatory Referral/Consult to Enhanced Annual Wellness Visit (eAWV)    3. Opioid dependence with current use  Stable, continue current use and monitoring     4. Chronic obstructive pulmonary disease, unspecified COPD type  Stable, continue current meds  Encourage smoking cessation     5. Atherosclerosis of aorta  Stable, continue to monitor  Followed by pcp     6. Calcified granuloma of lung  Stable, continue to monitor  Followed by pcp     7. Nonthrombocytopenic purpura  Stable, continue to monitor       Provided Floresita with a 5-10 year written screening schedule and personal prevention plan. Recommendations were developed using the USPSTF age appropriate recommendations. Education, counseling, and referrals were provided as needed. After Visit Summary printed and given to patient which includes a list of additional screenings\tests needed.    Follow up for One year for Annual Wellness Visit.    Veronique Oconnor NP    I offered to discuss advanced care planning, including how to pick a person who would make decisions for you if you were unable to make them for yourself, called a health care power of , and what kind of decisions you might make such as use of life sustaining treatments such as ventilators and tube  feeding when faced with a life limiting illness recorded on a living will that they will need to know. (How you want to be cared for as you near the end of your natural life)     X Patient is interested in learning more about how to make advanced directives.  I provided them paperwork and offered to discuss this with them.

## 2025-02-27 ENCOUNTER — OFFICE VISIT (OUTPATIENT)
Dept: OBSTETRICS AND GYNECOLOGY | Facility: CLINIC | Age: 65
End: 2025-02-27
Payer: MEDICARE

## 2025-02-27 VITALS
WEIGHT: 104.25 LBS | HEIGHT: 61 IN | DIASTOLIC BLOOD PRESSURE: 60 MMHG | BODY MASS INDEX: 19.68 KG/M2 | SYSTOLIC BLOOD PRESSURE: 98 MMHG

## 2025-02-27 DIAGNOSIS — Z01.419 WELL WOMAN EXAM WITH ROUTINE GYNECOLOGICAL EXAM: Primary | ICD-10-CM

## 2025-02-27 DIAGNOSIS — Z12.4 PAP SMEAR FOR CERVICAL CANCER SCREENING: ICD-10-CM

## 2025-02-27 PROCEDURE — 87624 HPV HI-RISK TYP POOLED RSLT: CPT | Mod: HCNC | Performed by: FAMILY MEDICINE

## 2025-02-27 PROCEDURE — 88175 CYTOPATH C/V AUTO FLUID REDO: CPT | Mod: HCNC | Performed by: FAMILY MEDICINE

## 2025-02-27 PROCEDURE — 81515 NFCT DS BV&VAGINITIS DNA ALG: CPT | Mod: HCNC | Performed by: FAMILY MEDICINE

## 2025-02-27 PROCEDURE — 99999 PR PBB SHADOW E&M-EST. PATIENT-LVL IV: CPT | Mod: PBBFAC,HCNC,, | Performed by: FAMILY MEDICINE

## 2025-02-27 NOTE — PROGRESS NOTES
HISTORY OF THE PRESENT ILLNESS    2025  Floresita Martin is a 65 y.o. here for Annual Exam  She denies any complaints today.     G'sP's:   LMP: No LMP recorded (lmp unknown). Patient is postmenopausal.  Relationship: single and not having sex  Contraception:    and Tubal Ligation    PAP'S: no h/o abnormals  PAP: PAP neg / Date: 10/4/2017    Last Mammogram: 2025 Results:  negative    HRT: denies   BLEEDING: denies    Last colonoscopy: 11/15/2017     LABS & RADS   Lab Results   Component Value Date    WBC 10.09 2024    HGB 10.7 (L) 2024    HCT 33.3 (L) 2024     (H) 2024    MCV 86 2024      Lab Results   Component Value Date    TSH 1.587 2024      Lab Results   Component Value Date    LABURIN No growth 2024     Lab Results   Component Value Date    HEPCAB Negative 2024    HEPBSAG Negative 2024    CDP55NWJM Negative 2024        GYNECOLOGIC HISTORY    Age of Menarche:14  Age at first pregnancy:    Age at first live birth:28  Age at Menopause:53     OBSTETRIC HISTORY  OB History    Para Term  AB Living   2 2 2      SAB IAB Ectopic Multiple Live Births             # Outcome Date GA Lbr Linus/2nd Weight Sex Type Anes PTL Lv   2 Term            1 Term                PAST MEDICAL HISTORY  -------------------------------------    Alcohol abuse    Cellulitis of toe of right foot    Headache    Hearing loss    HLD (hyperlipidemia)         PAST SURGICAL HISTORY  ----------------------------    Back surgery     section    2    Colonoscopy    Procedure: COLONOSCOPY;  Surgeon: Peg Powers MD;  Location: [facility];  Service: Endoscopy;  Laterality: N/A;    Epidural steroid injection into cervical spine    Epidural steroid injection into lumbar spine    Joint replacement    Lipoma resection    Procedure: EXCISION, LIPOMA;  Surgeon: Ruben Brooks Jr., MD;  Location: [facility];  Service: General;  Laterality:  Right;    Lumbar fusion    with cage    Lung biopsy    Neck surgery    with hardware    Radiofrequency ablation    cervical    Radiofrequency ablation    lumbar    Spine surgery    Stapedectomy    Stapedectomy    Tubal ligation         ALLERGIES  Review of patient's allergies indicates:   Allergen Reactions    Acetaminophen-codeine Nausea And Vomiting    Codeine Nausea And Vomiting       MEDICATIONS  Current Outpatient Medications   Medication Instructions    albuterol (PROVENTIL/VENTOLIN HFA) 90 mcg/actuation inhaler 2 puffs, Inhalation, Every 6 hours PRN, Rescue    albuterol-ipratropium (DUO-NEB) 2.5 mg-0.5 mg/3 mL nebulizer solution 3 mLs, Nebulization, Every 6 hours PRN, Rescue    amitriptyline (ELAVIL) 25 MG tablet Take 1-2 tablet by mouth every night at bedtime    amitriptyline (ELAVIL) 25 MG tablet Take 1-2 tablet by mouth every night at bedtime    amitriptyline (ELAVIL) 25 MG tablet Take 1-2 tablet by mouth every night at bedtime    amitriptyline (ELAVIL) 25 MG tablet Take 1-2 tablet by mouth every night at bedtime    amitriptyline (ELAVIL) 25 MG tablet Take 1-2 tablets (25-50 mg total) by mouth nightly at bedtime.    atorvastatin (LIPITOR) 40 mg, Oral    ceFAZolin (ANCEF) 10 gram injection Inject into the vein.    co-enzyme Q-10 30 mg, Daily    cyclobenzaprine (FLEXERIL) 10 MG tablet Take 1 tablet by mouth twice a day as needed    cyclobenzaprine (FLEXERIL) 10 MG tablet Take 1 tablet by mouth three times a day as needed    cyclobenzaprine (FLEXERIL) 10 mg, Oral, 3 times daily PRN    dextrose 5 % in water (D5W) PgBk 50 mL with ceFAZolin 2 gram SolR 2 g 2 g, Intravenous, Every 8 hours, Last dose July 4 2024    diclofenac sodium (PENNSAID) 20 mg/gram /actuation(2 %) sopm apply a small amount to skin twice a day    fluticasone propionate (FLONASE) 50 mcg/actuation nasal spray SHAKE LIQUID AND USE 1 SPRAY(50 MCG) IN EACH NOSTRIL EVERY DAY    gabapentin (NEURONTIN) 300 MG capsule Take 1 capsule by mouth three  times a day as directed More than a 7 day supply are medically necessary    ibuprofen (ADVIL,MOTRIN) 800 MG tablet Take 1 tablet by mouth three times a day as needed for pain    lactulose (CHRONULAC) 10 gram/15 mL solution Take 15 ml twice a day as needeed    lactulose (CHRONULAC) 10 gram/15 mL solution Take 15 ml twice a day    lactulose (CHRONULAC) 10 gram/15 mL solution Take 15 ml twice a day as needed    lactulose (CHRONULAC) 10 gram/15 mL solution 15 ml twice a day as needed    lactulose (CHRONULAC) 10 g, Oral, 2 times daily PRN    lactulose (CHRONULAC) 10 g, Oral, 2 times daily PRN    LIDOcaine 5 % Crea Apply 2 gram to skin twice a day    loratadine (CLARITIN) 10 mg, Oral    losartan (COZAAR) 50 mg, Oral, Daily    lubiprostone (AMITIZA) 24 MCG Cap Take 1 capsule by mouth twice a day as needed for pain    magnesium oxide (MAG-OX) 400 mg, Oral, Daily    morphine (MS CONTIN) 30 MG 12 hr tablet Take 1 tablet by mouth every twelve hours More than 7 days supply is medically necessary    morphine (MS CONTIN) 30 MG 12 hr tablet Take 1 tablet by mouth every twelve hours More than 7 days supply is medically necessary    morphine (MS CONTIN) 30 MG 12 hr tablet Take 1 tablet by mouth every twelve hours    morphine (MS CONTIN) 30 MG 12 hr tablet Take 1 tablet by mouth every twelve hours    morphine (MS CONTIN) 30 MG 12 hr tablet Take 1 tablet by mouth every twelve hours More than 7 days supply is medically necessary    morphine (MS CONTIN) 30 MG 12 hr tablet Take 1 tablet by mouth every twelve hours More than 7 days supply is medically necessary    morphine (MS CONTIN) 30 MG 12 hr tablet Take 1 tablet by mouth every twelve hours More than 7 days supply is medically necessary    morphine (MS CONTIN) 30 MG 12 hr tablet Take 1 tablet by mouth every twelve hours More than 7 days supply is medically necessary    morphine (MS CONTIN) 30 MG 12 hr tablet Take 1 tablet by mouth every twelve hours More than 7 days supply is  medically necessary    [START ON 2/28/2025] morphine (MS CONTIN) 30 MG 12 hr tablet Take 1 tablet by mouth every twelve hours More than 7 days supply is medically necessary    [START ON 3/28/2025] morphine (MS CONTIN) 30 MG 12 hr tablet Take 1 tablet by mouth every twelve hours More than 7 days supply is medically necessary    multivitamin capsule 1 capsule, Daily    naloxone (NARCAN) 4 mg/actuation Spry Spray 1 spray into one nostril single dose may repeat every 2-3 minutes until responsive or EMS arrives    nicotine (NICODERM CQ) 14 mg/24 hr 1 patch, Transdermal, Daily    omeprazole (PRILOSEC) 40 mg, Oral    oxyCODONE (ROXICODONE) 20 mg Tab immediate release tablet Take 1 tablet by mouth five times a day as needed for pain More than 7 days supply is medically necessary    oxyCODONE (ROXICODONE) 20 mg Tab immediate release tablet Take 1 tablet by mouth five times a day as needed for pain More than 7 days supply is medically necessary    oxyCODONE (ROXICODONE) 20 mg Tab immediate release tablet Take 1 tablet by mouth five times a day as needed for pain More than a 7 day supply is medically necessary    oxyCODONE (ROXICODONE) 20 mg Tab immediate release tablet Take 1 tablet by mouth five times a day as needed for pain More than a 7 day supply is medically necessary    oxyCODONE (ROXICODONE) 20 mg Tab immediate release tablet Take 1 tablet by mouth five times a day as needed for pain More than a 7 day supply are medically necessary    oxyCODONE (ROXICODONE) 20 mg Tab immediate release tablet Take 1 tablet by mouth five times a day as needed for pain More than a 7 day supply are medically necessary    oxyCODONE (ROXICODONE) 20 mg Tab immediate release tablet Take 1 tablet by mouth five times a day as needed for pain More than a 7 day supply are medically necessary    oxyCODONE (ROXICODONE) 20 mg Tab immediate release tablet Take 1 tablet by mouth five times a day as needed for pain More than a 7 day supply are  medically necessary    oxyCODONE (ROXICODONE) 20 mg Tab immediate release tablet Take 1 tablet by mouth five times a day as needed for pain More than a 7 day supply are medically necessary    oxyCODONE (ROXICODONE) 20 mg Tab immediate release tablet Take 1 tablet by mouth five times a day as needed for pain More than a 7 day supply are medically necessary    oxyCODONE (ROXICODONE) 20 mg Tab immediate release tablet Take 1 tablet by mouth five times a day as needed for pain More than a 7 day supply are medically necessary    [START ON 3/18/2025] oxyCODONE (ROXICODONE) 20 mg Tab immediate release tablet Take 1 tablet by mouth five times a day as needed for pain More than a 7 day supply are medically necessary    oxyCODONE (ROXICODONE) 20 mg Tab immediate release tablet Take 1 tablet (20 mg total) by mouth 5 (five) times daily as neede dfor pain.    pregabalin (LYRICA) 100 MG capsule Take 1 capsule by mouth three times a day More than 7 days supply is medically necessary    pregabalin (LYRICA) 75 MG capsule Take 1 capsule by mouth three times a day More than 7 days supply is medically necessary    pregabalin (LYRICA) 75 MG capsule Take 1 capsule by mouth three times daily.    RESTASIS 0.05 % ophthalmic emulsion 1 drop, 2 times daily       SOCIAL HISTORY  Social History[1]   Lives with: friend  Domestic Violence: no  Occupation:  [employer]      FAMILY HISTORY  BLEEDING or  CLOTTING DISORDERS: none  BREAST CA: sister  UTERINE CA: none  OVARIAN CA: none  COLON CA: none    REVIEW OF SYSTEMS  Review of Systems   Constitutional:  Negative for activity change, appetite change and fever.   Respiratory:  Negative for cough and shortness of breath.    Genitourinary:  Negative for dysuria, flank pain, frequency, pelvic pain, urgency and urinary incontinence.   Psychiatric/Behavioral:  Negative for depression.   "    --------------------------------------------------------------------------------------------------------------    PHYSICAL EXAM  VITALS:  Vitals:    02/27/25 1114   BP: 98/60   BP Location: Right arm   Patient Position: Sitting   Weight: 47.3 kg (104 lb 4.4 oz)   Height: 5' 1" (1.549 m)     Exam conducted with a chaperone present.     Physical Exam:   Constitutional: She is oriented to person, place, and time. She appears well-developed and well-nourished.    HENT:   Head: Normocephalic.    Eyes: Pupils are equal, round, and reactive to light. Conjunctivae and EOM are normal.      Pulmonary/Chest: Effort normal.          Genitourinary:    Inguinal canal, vagina, uterus, right adnexa, left adnexa and rectum normal.      Pelvic exam was performed with patient in the lithotomy position.   The external female genitalia was normal.   Genitalia hair distrobution normal .   Cervix is normal. Vagina was moist.   pap smear completedUterus consistancy normal and Uerus contour normal            Musculoskeletal: Normal range of motion and moves all extremeties.       Neurological: She is alert and oriented to person, place, and time.    Skin: Skin is warm and dry.    Psychiatric: She has a normal mood and affect.          ASSESSMENT AND PLAN:  Floresita Rivero Veronica 65 y.o.   Floresita was seen today for annual exam.    Diagnoses and all orders for this visit:    Well woman exam with routine gynecological exam  -     HPV High Risk Genotypes, PCR  -     Liquid-Based Pap Smear, Screening  -     Vaginosis Screen by DNA Probe    Pap smear for cervical cancer screening  -     HPV High Risk Genotypes, PCR  -     Liquid-Based Pap Smear, Screening        Cervical Cancer screening: f/u results of PAP / HPV --> if both negative then next screen in 5 yrs  HPV Vaccine: n/a (>45)    Mammogram: up to date    From a gynecologic standpoint the patient is currently doing well without complaints.     Patient was counseled today on :  Pap " guidelines  Recommend periodic pelvic exams with visual inspection and palpation  mammograms starting annually at age 40  Encouraged self breast awareness; RTC for breast concerns  Colonoscopy after the age of 50  Dexa Bone Scan and calcium and vitamin D supplementation in menopause and to see her PCP for other health maintenance.     RTC for periodic GYN exam, sooner prn      Lisa T Griffing     Follow up if symptoms worsen or fail to improve.   Future Appointments   Date Time Provider Department Center   3/28/2025 11:30 AM [facility] CT2 LIMIT 500 LBS [facility]SCAN [place]   2025 10:30 AM Freya Swartz, NP [facility] [place] [facility]   2025  9:00 AM LAB, [facility] [place]   2025  9:30 AM Tennille Castañeda PA-C [facility] [place]   2/10/2026  1:30 PM Toni Hughes MD [facility] [place]         Tests to Keep You Healthy    Mammogram: Met on 2025  Colon Cancer Screening: Met on 11/15/2017  Cervical Cancer Screening: Met on 2022  Last Blood Pressure <= 139/89 (2/3/2025): Yes  Tobacco Cessation: NO            [1]   Social History  Tobacco Use    Smoking status: Every Day     Current packs/day: 0.00     Average packs/day: 0.5 packs/day for 46.4 years (23.2 ttl pk-yrs)     Types: Cigarettes     Start date: 1978     Last attempt to quit: 2024     Years since quittin.7     Passive exposure: Past    Smokeless tobacco: Never    Tobacco comments:     Patient has smoked since a teenager, states that she has cut down from 2pk/day to .5pk/day. Can quit on her own when she's ready. Information and education on outpatient smoking cessation when ready. 3/26/24     Smokes 10 cigarettes daily 2024   Substance Use Topics    Alcohol use: Not Currently    Drug use: No

## 2025-02-28 LAB
CLINICAL INFO: NORMAL
DATE OF PREVIOUS PAP: NORMAL
DATE PREVIOUS BX: NO
LMP START DATE: NORMAL
SPECIMEN SOURCE CVX/VAG CYTO: NORMAL

## 2025-03-02 ENCOUNTER — HOSPITAL ENCOUNTER (INPATIENT)
Facility: HOSPITAL | Age: 65
LOS: 2 days | Discharge: HOME | DRG: 190 | End: 2025-03-04
Attending: EMERGENCY MEDICINE | Admitting: STUDENT IN AN ORGANIZED HEALTH CARE EDUCATION/TRAINING PROGRAM
Payer: MEDICARE

## 2025-03-02 DIAGNOSIS — J44.0 COPD WITH ACUTE LOWER RESPIRATORY INFECTION: ICD-10-CM

## 2025-03-02 DIAGNOSIS — R79.89 ELEVATED TROPONIN: ICD-10-CM

## 2025-03-02 DIAGNOSIS — J96.91 RESPIRATORY FAILURE WITH HYPOXIA: ICD-10-CM

## 2025-03-02 DIAGNOSIS — J18.9 MULTIFOCAL PNEUMONIA: Primary | ICD-10-CM

## 2025-03-02 DIAGNOSIS — R07.9 CHEST PAIN: ICD-10-CM

## 2025-03-02 DIAGNOSIS — R06.02 SOB (SHORTNESS OF BREATH): ICD-10-CM

## 2025-03-02 DIAGNOSIS — Z13.6 SCREENING FOR CARDIOVASCULAR CONDITION: ICD-10-CM

## 2025-03-02 DIAGNOSIS — J44.1 COPD EXACERBATION: ICD-10-CM

## 2025-03-02 PROBLEM — J96.20 ACUTE ON CHRONIC RESPIRATORY FAILURE: Status: ACTIVE | Noted: 2024-03-25

## 2025-03-02 LAB
ALBUMIN SERPL BCP-MCNC: 2.5 G/DL (ref 3.5–5.2)
ALP SERPL-CCNC: 170 U/L (ref 40–150)
ALT SERPL W/O P-5'-P-CCNC: 25 U/L (ref 10–44)
ANION GAP SERPL CALC-SCNC: 13 MMOL/L (ref 8–16)
AST SERPL-CCNC: 31 U/L (ref 10–40)
BASOPHILS # BLD AUTO: 0.02 K/UL (ref 0–0.2)
BASOPHILS NFR BLD: 0.2 % (ref 0–1.9)
BILIRUB SERPL-MCNC: 0.2 MG/DL (ref 0.1–1)
BNP SERPL-MCNC: 95 PG/ML (ref 0–99)
BUN SERPL-MCNC: 7 MG/DL (ref 8–23)
CALCIUM SERPL-MCNC: 9.5 MG/DL (ref 8.7–10.5)
CHLORIDE SERPL-SCNC: 96 MMOL/L (ref 95–110)
CO2 SERPL-SCNC: 21 MMOL/L (ref 23–29)
CREAT SERPL-MCNC: 0.5 MG/DL (ref 0.5–1.4)
D DIMER PPP IA.FEU-MCNC: 6.46 MG/L FEU
DIFFERENTIAL METHOD BLD: ABNORMAL
EOSINOPHIL # BLD AUTO: 0 K/UL (ref 0–0.5)
EOSINOPHIL NFR BLD: 0.2 % (ref 0–8)
ERYTHROCYTE [DISTWIDTH] IN BLOOD BY AUTOMATED COUNT: 17.9 % (ref 11.5–14.5)
EST. GFR  (NO RACE VARIABLE): >60 ML/MIN/1.73 M^2
GLUCOSE SERPL-MCNC: 123 MG/DL (ref 70–110)
HCT VFR BLD AUTO: 31.1 % (ref 37–48.5)
HCV AB SERPL QL IA: NEGATIVE
HGB BLD-MCNC: 10.3 G/DL (ref 12–16)
HIV 1+2 AB+HIV1 P24 AG SERPL QL IA: NEGATIVE
IMM GRANULOCYTES # BLD AUTO: 0.12 K/UL (ref 0–0.04)
IMM GRANULOCYTES NFR BLD AUTO: 1.4 % (ref 0–0.5)
INFLUENZA A, MOLECULAR: NEGATIVE
INFLUENZA B, MOLECULAR: NEGATIVE
LACTATE SERPL-SCNC: 0.7 MMOL/L (ref 0.5–2.2)
LYMPHOCYTES # BLD AUTO: 2.3 K/UL (ref 1–4.8)
LYMPHOCYTES NFR BLD: 25.5 % (ref 18–48)
MCH RBC QN AUTO: 27.5 PG (ref 27–31)
MCHC RBC AUTO-ENTMCNC: 33.1 G/DL (ref 32–36)
MCV RBC AUTO: 83 FL (ref 82–98)
MONOCYTES # BLD AUTO: 0.8 K/UL (ref 0.3–1)
MONOCYTES NFR BLD: 9.5 % (ref 4–15)
NEUTROPHILS # BLD AUTO: 5.6 K/UL (ref 1.8–7.7)
NEUTROPHILS NFR BLD: 63.2 % (ref 38–73)
NRBC BLD-RTO: 0 /100 WBC
PLATELET # BLD AUTO: 543 K/UL (ref 150–450)
PMV BLD AUTO: 8.4 FL (ref 9.2–12.9)
POTASSIUM SERPL-SCNC: 3.8 MMOL/L (ref 3.5–5.1)
PROT SERPL-MCNC: 7.2 G/DL (ref 6–8.4)
RBC # BLD AUTO: 3.75 M/UL (ref 4–5.4)
SARS-COV-2 RDRP RESP QL NAA+PROBE: NEGATIVE
SODIUM SERPL-SCNC: 130 MMOL/L (ref 136–145)
SPECIMEN SOURCE: NORMAL
TROPONIN I SERPL DL<=0.01 NG/ML-MCNC: 0.36 NG/ML (ref 0–0.03)
TROPONIN I SERPL DL<=0.01 NG/ML-MCNC: 0.45 NG/ML (ref 0–0.03)
WBC # BLD AUTO: 8.88 K/UL (ref 3.9–12.7)

## 2025-03-02 PROCEDURE — 63600175 PHARM REV CODE 636 W HCPCS: Performed by: EMERGENCY MEDICINE

## 2025-03-02 PROCEDURE — 93010 ELECTROCARDIOGRAM REPORT: CPT | Mod: ,,, | Performed by: GENERAL PRACTICE

## 2025-03-02 PROCEDURE — 11000001 HC ACUTE MED/SURG PRIVATE ROOM

## 2025-03-02 PROCEDURE — 87040 BLOOD CULTURE FOR BACTERIA: CPT | Mod: 59 | Performed by: EMERGENCY MEDICINE

## 2025-03-02 PROCEDURE — 25000242 PHARM REV CODE 250 ALT 637 W/ HCPCS: Performed by: NURSE PRACTITIONER

## 2025-03-02 PROCEDURE — 83605 ASSAY OF LACTIC ACID: CPT | Performed by: EMERGENCY MEDICINE

## 2025-03-02 PROCEDURE — 25000003 PHARM REV CODE 250: Performed by: EMERGENCY MEDICINE

## 2025-03-02 PROCEDURE — 87389 HIV-1 AG W/HIV-1&-2 AB AG IA: CPT | Performed by: EMERGENCY MEDICINE

## 2025-03-02 PROCEDURE — 99285 EMERGENCY DEPT VISIT HI MDM: CPT | Mod: 25

## 2025-03-02 PROCEDURE — 96375 TX/PRO/DX INJ NEW DRUG ADDON: CPT

## 2025-03-02 PROCEDURE — 93005 ELECTROCARDIOGRAM TRACING: CPT

## 2025-03-02 PROCEDURE — 63600175 PHARM REV CODE 636 W HCPCS: Mod: JZ,TB | Performed by: NURSE PRACTITIONER

## 2025-03-02 PROCEDURE — 99406 BEHAV CHNG SMOKING 3-10 MIN: CPT

## 2025-03-02 PROCEDURE — 25500020 PHARM REV CODE 255

## 2025-03-02 PROCEDURE — 83880 ASSAY OF NATRIURETIC PEPTIDE: CPT | Performed by: EMERGENCY MEDICINE

## 2025-03-02 PROCEDURE — 84484 ASSAY OF TROPONIN QUANT: CPT | Mod: 91 | Performed by: EMERGENCY MEDICINE

## 2025-03-02 PROCEDURE — 25000003 PHARM REV CODE 250: Performed by: NURSE PRACTITIONER

## 2025-03-02 PROCEDURE — 80053 COMPREHEN METABOLIC PANEL: CPT | Performed by: EMERGENCY MEDICINE

## 2025-03-02 PROCEDURE — 25000242 PHARM REV CODE 250 ALT 637 W/ HCPCS: Performed by: EMERGENCY MEDICINE

## 2025-03-02 PROCEDURE — 96374 THER/PROPH/DIAG INJ IV PUSH: CPT

## 2025-03-02 PROCEDURE — 94760 N-INVAS EAR/PLS OXIMETRY 1: CPT

## 2025-03-02 PROCEDURE — 87635 SARS-COV-2 COVID-19 AMP PRB: CPT | Performed by: EMERGENCY MEDICINE

## 2025-03-02 PROCEDURE — 94799 UNLISTED PULMONARY SVC/PX: CPT

## 2025-03-02 PROCEDURE — 87502 INFLUENZA DNA AMP PROBE: CPT | Performed by: EMERGENCY MEDICINE

## 2025-03-02 PROCEDURE — 99900035 HC TECH TIME PER 15 MIN (STAT)

## 2025-03-02 PROCEDURE — 86803 HEPATITIS C AB TEST: CPT | Performed by: EMERGENCY MEDICINE

## 2025-03-02 PROCEDURE — 36415 COLL VENOUS BLD VENIPUNCTURE: CPT | Performed by: EMERGENCY MEDICINE

## 2025-03-02 PROCEDURE — 94640 AIRWAY INHALATION TREATMENT: CPT

## 2025-03-02 PROCEDURE — 85379 FIBRIN DEGRADATION QUANT: CPT | Performed by: EMERGENCY MEDICINE

## 2025-03-02 PROCEDURE — 85025 COMPLETE CBC W/AUTO DIFF WBC: CPT | Performed by: EMERGENCY MEDICINE

## 2025-03-02 RX ORDER — ALUMINUM HYDROXIDE, MAGNESIUM HYDROXIDE, AND SIMETHICONE 1200; 120; 1200 MG/30ML; MG/30ML; MG/30ML
30 SUSPENSION ORAL 4 TIMES DAILY PRN
Status: DISCONTINUED | OUTPATIENT
Start: 2025-03-02 | End: 2025-03-04 | Stop reason: HOSPADM

## 2025-03-02 RX ORDER — CEFTRIAXONE 2 G/1
2 INJECTION, POWDER, FOR SOLUTION INTRAMUSCULAR; INTRAVENOUS
Status: DISCONTINUED | OUTPATIENT
Start: 2025-03-03 | End: 2025-03-04 | Stop reason: HOSPADM

## 2025-03-02 RX ORDER — ASPIRIN 325 MG
325 TABLET ORAL
Status: COMPLETED | OUTPATIENT
Start: 2025-03-02 | End: 2025-03-02

## 2025-03-02 RX ORDER — DOXYCYCLINE 100 MG/10ML
100 INJECTION, POWDER, LYOPHILIZED, FOR SOLUTION INTRAVENOUS
Status: DISCONTINUED | OUTPATIENT
Start: 2025-03-02 | End: 2025-03-02 | Stop reason: SDUPTHER

## 2025-03-02 RX ORDER — TALC
9 POWDER (GRAM) TOPICAL NIGHTLY PRN
Status: DISCONTINUED | OUTPATIENT
Start: 2025-03-02 | End: 2025-03-04 | Stop reason: HOSPADM

## 2025-03-02 RX ORDER — SODIUM,POTASSIUM PHOSPHATES 280-250MG
2 POWDER IN PACKET (EA) ORAL
Status: DISCONTINUED | OUTPATIENT
Start: 2025-03-02 | End: 2025-03-04 | Stop reason: HOSPADM

## 2025-03-02 RX ORDER — GABAPENTIN 300 MG/1
300 CAPSULE ORAL 3 TIMES DAILY
Status: DISCONTINUED | OUTPATIENT
Start: 2025-03-02 | End: 2025-03-04 | Stop reason: HOSPADM

## 2025-03-02 RX ORDER — ACETAMINOPHEN 325 MG/1
650 TABLET ORAL EVERY 4 HOURS PRN
Status: DISCONTINUED | OUTPATIENT
Start: 2025-03-02 | End: 2025-03-04 | Stop reason: HOSPADM

## 2025-03-02 RX ORDER — IBUPROFEN 200 MG
1 TABLET ORAL DAILY
Status: DISCONTINUED | OUTPATIENT
Start: 2025-03-03 | End: 2025-03-02

## 2025-03-02 RX ORDER — IPRATROPIUM BROMIDE AND ALBUTEROL SULFATE 2.5; .5 MG/3ML; MG/3ML
3 SOLUTION RESPIRATORY (INHALATION)
Status: DISCONTINUED | OUTPATIENT
Start: 2025-03-02 | End: 2025-03-04 | Stop reason: HOSPADM

## 2025-03-02 RX ORDER — SIMETHICONE 80 MG
1 TABLET,CHEWABLE ORAL 4 TIMES DAILY PRN
Status: DISCONTINUED | OUTPATIENT
Start: 2025-03-02 | End: 2025-03-04 | Stop reason: HOSPADM

## 2025-03-02 RX ORDER — ASPIRIN 325 MG
325 TABLET, DELAYED RELEASE (ENTERIC COATED) ORAL
Status: DISCONTINUED | OUTPATIENT
Start: 2025-03-02 | End: 2025-03-02

## 2025-03-02 RX ORDER — LANOLIN ALCOHOL/MO/W.PET/CERES
800 CREAM (GRAM) TOPICAL
Status: DISCONTINUED | OUTPATIENT
Start: 2025-03-02 | End: 2025-03-04 | Stop reason: HOSPADM

## 2025-03-02 RX ORDER — IBUPROFEN 200 MG
16 TABLET ORAL
Status: DISCONTINUED | OUTPATIENT
Start: 2025-03-02 | End: 2025-03-04 | Stop reason: HOSPADM

## 2025-03-02 RX ORDER — IBUPROFEN 200 MG
1 TABLET ORAL DAILY
Status: DISCONTINUED | OUTPATIENT
Start: 2025-03-02 | End: 2025-03-04 | Stop reason: HOSPADM

## 2025-03-02 RX ORDER — SODIUM CHLORIDE 0.9 % (FLUSH) 0.9 %
10 SYRINGE (ML) INJECTION EVERY 8 HOURS PRN
Status: DISCONTINUED | OUTPATIENT
Start: 2025-03-02 | End: 2025-03-04 | Stop reason: HOSPADM

## 2025-03-02 RX ORDER — OXYCODONE HYDROCHLORIDE 10 MG/1
20 TABLET ORAL
Status: DISCONTINUED | OUTPATIENT
Start: 2025-03-02 | End: 2025-03-04 | Stop reason: HOSPADM

## 2025-03-02 RX ORDER — ACETAMINOPHEN 325 MG/1
650 TABLET ORAL EVERY 6 HOURS PRN
Status: DISCONTINUED | OUTPATIENT
Start: 2025-03-02 | End: 2025-03-04 | Stop reason: HOSPADM

## 2025-03-02 RX ORDER — ATORVASTATIN CALCIUM 40 MG/1
40 TABLET, FILM COATED ORAL DAILY
Status: DISCONTINUED | OUTPATIENT
Start: 2025-03-03 | End: 2025-03-04 | Stop reason: HOSPADM

## 2025-03-02 RX ORDER — METHYLPREDNISOLONE SOD SUCC 125 MG
125 VIAL (EA) INJECTION
Status: COMPLETED | OUTPATIENT
Start: 2025-03-02 | End: 2025-03-02

## 2025-03-02 RX ORDER — GLUCAGON 1 MG
1 KIT INJECTION
Status: DISCONTINUED | OUTPATIENT
Start: 2025-03-02 | End: 2025-03-04 | Stop reason: HOSPADM

## 2025-03-02 RX ORDER — NALOXONE HCL 0.4 MG/ML
0.02 VIAL (ML) INJECTION
Status: DISCONTINUED | OUTPATIENT
Start: 2025-03-02 | End: 2025-03-04 | Stop reason: HOSPADM

## 2025-03-02 RX ORDER — IBUPROFEN 200 MG
24 TABLET ORAL
Status: DISCONTINUED | OUTPATIENT
Start: 2025-03-02 | End: 2025-03-04 | Stop reason: HOSPADM

## 2025-03-02 RX ORDER — CEFTRIAXONE 2 G/1
2 INJECTION, POWDER, FOR SOLUTION INTRAMUSCULAR; INTRAVENOUS
Status: COMPLETED | OUTPATIENT
Start: 2025-03-02 | End: 2025-03-02

## 2025-03-02 RX ORDER — AMOXICILLIN 250 MG
1 CAPSULE ORAL 2 TIMES DAILY
Status: DISCONTINUED | OUTPATIENT
Start: 2025-03-02 | End: 2025-03-04 | Stop reason: HOSPADM

## 2025-03-02 RX ORDER — IPRATROPIUM BROMIDE AND ALBUTEROL SULFATE 2.5; .5 MG/3ML; MG/3ML
3 SOLUTION RESPIRATORY (INHALATION)
Status: COMPLETED | OUTPATIENT
Start: 2025-03-02 | End: 2025-03-02

## 2025-03-02 RX ORDER — AMITRIPTYLINE HYDROCHLORIDE 25 MG/1
25 TABLET, FILM COATED ORAL NIGHTLY
Status: DISCONTINUED | OUTPATIENT
Start: 2025-03-02 | End: 2025-03-04 | Stop reason: HOSPADM

## 2025-03-02 RX ORDER — ONDANSETRON HYDROCHLORIDE 2 MG/ML
4 INJECTION, SOLUTION INTRAVENOUS EVERY 8 HOURS PRN
Status: DISCONTINUED | OUTPATIENT
Start: 2025-03-02 | End: 2025-03-04 | Stop reason: HOSPADM

## 2025-03-02 RX ADMIN — METHYLPREDNISOLONE SODIUM SUCCINATE 125 MG: 125 INJECTION, POWDER, FOR SOLUTION INTRAMUSCULAR; INTRAVENOUS at 02:03

## 2025-03-02 RX ADMIN — AMITRIPTYLINE HYDROCHLORIDE 25 MG: 25 TABLET, FILM COATED ORAL at 08:03

## 2025-03-02 RX ADMIN — IPRATROPIUM BROMIDE AND ALBUTEROL SULFATE 3 ML: .5; 3 SOLUTION RESPIRATORY (INHALATION) at 07:03

## 2025-03-02 RX ADMIN — SENNOSIDES AND DOCUSATE SODIUM 1 TABLET: 50; 8.6 TABLET ORAL at 08:03

## 2025-03-02 RX ADMIN — OXYCODONE HYDROCHLORIDE 20 MG: 10 TABLET ORAL at 06:03

## 2025-03-02 RX ADMIN — OXYCODONE HYDROCHLORIDE 20 MG: 10 TABLET ORAL at 11:03

## 2025-03-02 RX ADMIN — CEFTRIAXONE SODIUM 2 G: 2 INJECTION, POWDER, FOR SOLUTION INTRAMUSCULAR; INTRAVENOUS at 03:03

## 2025-03-02 RX ADMIN — METHYLPREDNISOLONE SODIUM SUCCINATE 80 MG: 40 INJECTION, POWDER, FOR SOLUTION INTRAMUSCULAR; INTRAVENOUS at 11:03

## 2025-03-02 RX ADMIN — IOHEXOL 60 ML: 350 INJECTION, SOLUTION INTRAVENOUS at 03:03

## 2025-03-02 RX ADMIN — GABAPENTIN 300 MG: 300 CAPSULE ORAL at 08:03

## 2025-03-02 RX ADMIN — ASPIRIN 325 MG: 325 TABLET ORAL at 02:03

## 2025-03-02 RX ADMIN — IPRATROPIUM BROMIDE AND ALBUTEROL SULFATE 3 ML: .5; 3 SOLUTION RESPIRATORY (INHALATION) at 02:03

## 2025-03-02 RX ADMIN — DOXYCYCLINE 100 MG: 100 INJECTION, POWDER, LYOPHILIZED, FOR SOLUTION INTRAVENOUS at 03:03

## 2025-03-02 NOTE — ED PROVIDER NOTES
Encounter Date: 3/2/2025       History     Chief Complaint   Patient presents with    Shortness of Breath    Cough     Left chest wall pain with cough  , productive yellow mucus     62-year-old female presented emergency department with chest pain and shortness of breath.  Patient also complains of cough.  Patient is a smoker however did cut down on smoking.  Denies dysuria or hematuria weakness or numbness.  Denies nausea vomiting      Review of patient's allergies indicates:   Allergen Reactions    Acetaminophen-codeine Nausea And Vomiting    Codeine Nausea And Vomiting     Past Medical History:   Diagnosis Date    Alcohol abuse     Cellulitis of toe of right foot 2019    Headache     Hearing loss     HLD (hyperlipidemia)      Past Surgical History:   Procedure Laterality Date    BACK SURGERY       SECTION      2    COLONOSCOPY N/A 11/15/2017    Procedure: COLONOSCOPY;  Surgeon: Peg Powers MD;  Location: [facility];  Service: Endoscopy;  Laterality: N/A;    EPIDURAL STEROID INJECTION INTO CERVICAL SPINE      EPIDURAL STEROID INJECTION INTO LUMBAR SPINE      JOINT REPLACEMENT  2018    LIPOMA RESECTION Right 2023    Procedure: EXCISION, LIPOMA;  Surgeon: Ruben Brooks Jr., MD;  Location: [facility] OR;  Service: General;  Laterality: Right;    LUMBAR FUSION      with cage    LUNG BIOPSY      NECK SURGERY  2018    with hardware    RADIOFREQUENCY ABLATION      cervical    RADIOFREQUENCY ABLATION      lumbar    SPINE SURGERY  2018    STAPEDECTOMY Right 2015    STAPEDECTOMY Left     TUBAL LIGATION  1990     Family History   Problem Relation Name Age of Onset    Arthritis Mother      Diabetes Mother      Hypertension Father Jose Brendaing     Cancer Sister Lilly     Breast cancer Sister Lilly     Breast cancer Sister kristen     Breast cancer Sister Melissa     Cancer Sister Melissa     Breast cancer Sister Mercy     Cancer Sister Mercy     Cancer Sister Ana      Social  History[1]  Review of Systems   Constitutional: Negative.    HENT: Negative.     Eyes: Negative.    Respiratory:  Positive for cough and shortness of breath.    Cardiovascular:  Positive for chest pain.   Gastrointestinal: Negative.    Endocrine: Negative.    Genitourinary: Negative.    Musculoskeletal: Negative.    Skin: Negative.    Allergic/Immunologic: Negative.    Neurological: Negative.    Hematological: Negative.    Psychiatric/Behavioral: Negative.     All other systems reviewed and are negative.      Physical Exam     Initial Vitals [03/02/25 1423]   BP Pulse Resp Temp SpO2   132/80 103 20 98 °F (36.7 °C) 100 %      MAP       --         Physical Exam    Nursing note and vitals reviewed.  Constitutional: She appears well-developed and well-nourished.   Anxious   HENT:   Head: Normocephalic and atraumatic.   Nose: Nose normal. Mouth/Throat: Oropharynx is clear and moist.   Eyes: Conjunctivae and EOM are normal. Pupils are equal, round, and reactive to light.   Neck: Neck supple. No thyromegaly present. No tracheal deviation present. No JVD present.   Normal range of motion.  Cardiovascular:  Normal rate, regular rhythm, normal heart sounds and intact distal pulses.           No murmur heard.  Pulmonary/Chest: Breath sounds normal. No stridor. No respiratory distress. She has no wheezes. She has no rales.   Abdominal: Abdomen is soft. Bowel sounds are normal. She exhibits no distension. There is no abdominal tenderness.   Musculoskeletal:         General: No edema. Normal range of motion.      Cervical back: Normal range of motion and neck supple.     Neurological: She is alert and oriented to person, place, and time. She has normal strength. GCS score is 15. GCS eye subscore is 4. GCS verbal subscore is 5. GCS motor subscore is 6.   Skin: Skin is warm. Capillary refill takes less than 2 seconds.   Psychiatric: She has a normal mood and affect. Thought content normal.         ED Course   Procedures  Labs  Reviewed   INFLUENZA A & B BY MOLECULAR   CULTURE, BLOOD   CULTURE, BLOOD   CBC W/ AUTO DIFFERENTIAL   COMPREHENSIVE METABOLIC PANEL   TROPONIN I   B-TYPE NATRIURETIC PEPTIDE   HEPATITIS C ANTIBODY   HIV 1 / 2 ANTIBODY   D DIMER, QUANTITATIVE   SARS-COV-2 RNA AMPLIFICATION, QUAL   URINALYSIS, REFLEX TO URINE CULTURE     EKG Readings: (Independently Interpreted)   Initial Reading: No STEMI. Rhythm: Normal Sinus Rhythm. Ectopy: No Ectopy. Conduction: Normal.       Imaging Results               X-Ray Chest AP Portable (Final result)  Result time 03/02/25 14:56:55      Final result by Toni Blanc MD (03/02/25 14:56:55)                   Impression:      New bilateral airspace disease superimposed upon interstitial changes.  Follow-up to resolution      Electronically signed by: Toni Blanc MD  Date:    03/02/2025  Time:    14:56               Narrative:    EXAMINATION:  XR CHEST AP PORTABLE    CLINICAL HISTORY:  Chest Pain;    TECHNIQUE:  Single frontal view of the chest was performed.    COMPARISON:  September 24, 2024    FINDINGS:  There is evidence of lower cervical spine surgery.  There is peripheral acute airspace disease superimposed upon chronic bilateral interstitial changes.  Areas of pneumonia or asymmetric CHF could produce this appearance.  The heart is not enlarged.  The aorta is mildly tortuous.  Is advised.  This report was flagged in Epic as abnormal.                                       Medications   cefTRIAXone injection 2 g (has no administration in time range)   doxycycline injection 100 mg (has no administration in time range)   aspirin tablet 325 mg (325 mg Oral Given 3/2/25 1455)   albuterol-ipratropium 2.5 mg-0.5 mg/3 mL nebulizer solution 3 mL (3 mLs Nebulization Given 3/2/25 5624)   methylPREDNISolone sodium succinate injection 125 mg (125 mg Intravenous Given 3/2/25 1456)     Medical Decision Making  65-year-old female presented emergency department with chest pain and shortness of  breath.  Patient is very symptomatic and has air hunger.  Patient has history of smoking and this presentation could be consistent with COPD exacerbation.  Patient does have abnormal chest x-ray with bilateral pulmonary infiltrates which could be from pulmonary fibrosis versus infection.  Given patient's presentation and acute onset of symptoms this could be infective in nature.  Broad-spectrum antibiotics started.  Steroids and breathing treatments given.  Aspirin given.  Screening cardiac workup done.  Hospital Medicine consulted for evaluation for further management and treatment    Amount and/or Complexity of Data Reviewed  Labs: ordered. Decision-making details documented in ED Course.  Radiology: ordered. Decision-making details documented in ED Course.  ECG/medicine tests: independent interpretation performed. Decision-making details documented in ED Course.    Risk  OTC drugs.  Prescription drug management.  Decision regarding hospitalization.                                      Clinical Impression:  Final diagnoses:  [R07.9] Chest pain  [Z13.6] Screening for cardiovascular condition  [R06.02] SOB (shortness of breath) (Primary)  [J44.1] COPD exacerbation          ED Disposition Condition    Admit Stable                    [1]   Social History  Tobacco Use    Smoking status: Every Day     Current packs/day: 0.00     Average packs/day: 0.5 packs/day for 46.4 years (23.2 ttl pk-yrs)     Types: Cigarettes     Start date: 1978     Last attempt to quit: 2024     Years since quittin.7     Passive exposure: Past    Smokeless tobacco: Never    Tobacco comments:     Patient has smoked since a teenager, states that she has cut down from 2pk/day to .5pk/day. Can quit on her own when she's ready. Information and education on outpatient smoking cessation when ready. 3/26/24     Smokes 10 cigarettes daily 2024   Substance Use Topics    Alcohol use: Not Currently    Drug use: No        Alexa Barth,  MD  03/02/25 1511

## 2025-03-02 NOTE — ASSESSMENT & PLAN NOTE
Nutrition consulted. Most recent weight and BMI monitored-     Measurements:  Wt Readings from Last 1 Encounters:   03/02/25 45.4 kg (100 lb)   Body mass index is 18.89 kg/m².    Patient will be screened and assessed by [facility].

## 2025-03-02 NOTE — ASSESSMENT & PLAN NOTE
Patient with Hypoxic Respiratory failure which is Acute on chronic.  she is not on home oxygen. Supplemental oxygen was provided and noted-      .   Signs/symptoms of respiratory failure include- tachypnea, increased work of breathing, respiratory distress, and wheezing. Contributing diagnoses includes - COPD and Pneumonia Labs and images were reviewed. Patient Has not had a recent ABG. Will treat underlying causes and adjust management of respiratory failure as follows- oxygen, DuoNebs, doxycycline, Rocephin, Solu-Medrol

## 2025-03-02 NOTE — HPI
Floresita Martin is a 65 year female who presents emergency room complaining of shortness a breath, productive cough, and chest pain.  The symptoms onset approximately 1 week ago and have progressively worsened.  She endorses exertional dyspnea has progressively worsened.  She describes chest tightness  associated with coughing episodes.  She denies any fever or chills.  No known sick contacts or travel.  Previous medical history includes COPD, hypertension, smoker, hyperlipidemia, GERD, and recent hospitalization for Pseudomonas bacteremia.  ER workup:  CBC with thrombocytosis of 543.  CMP with albumin of 2.5 otherwise unremarkable.  Troponin elevated at 0.453.  D-dimer elevated at 6.46.  CTA of the chest was negative for PE but does demonstrates findings consistent with multifocal pneumonia.  Patient was started on Rocephin, IV doxycycline, Solu-Medrol, and blood cultures in the emergency room.  She was also given DuoNeb treatments.  Patient be admitted to Hospital Medicine for treatment and management.

## 2025-03-02 NOTE — H&P
[facility] Medicine  History & Physical    Patient Name: Floresita Martin  MRN: [id number]  Patient Class: IP- Inpatient  Admission Date: 3/2/2025  Attending Physician: Michael Navarrete MD   Primary Care Provider: Toni Hughes MD         Patient information was obtained from patient, past medical records, and ER records.     Subjective:     Principal Problem:Acute on chronic respiratory failure    Chief Complaint:   Chief Complaint   Patient presents with    Shortness of Breath    Cough     Left chest wall pain with cough  , productive yellow mucus        HPI: Floresita Martin is a 65 year female who presents emergency room complaining of shortness a breath, productive cough, and chest pain.  The symptoms onset approximately 1 week ago and have progressively worsened.  She endorses exertional dyspnea has progressively worsened.  She describes chest tightness  associated with coughing episodes.  She denies any fever or chills.  No known sick contacts or travel.  Previous medical history includes COPD, hypertension, smoker, hyperlipidemia, GERD, and recent hospitalization for Pseudomonas bacteremia.  ER workup:  CBC with thrombocytosis of 543.  CMP with albumin of 2.5 otherwise unremarkable.  Troponin elevated at 0.453.  D-dimer elevated at 6.46.  CTA of the chest was negative for PE but does demonstrates findings consistent with multifocal pneumonia.  Patient was started on Rocephin, IV doxycycline, Solu-Medrol, and blood cultures in the emergency room.  She was also given DuoNeb treatments.  Patient be admitted to Hospital Medicine for treatment and management.                  Past Medical History:   Diagnosis Date    Alcohol abuse     Cellulitis of toe of right foot 2019    COPD (chronic obstructive pulmonary disease)     Digestive disorder     Headache     Hearing loss     HLD (hyperlipidemia)        Past Surgical History:   Procedure Laterality Date    BACK SURGERY        SECTION      2    COLONOSCOPY N/A 11/15/2017    Procedure: COLONOSCOPY;  Surgeon: Peg Powers MD;  Location: [facility];  Service: Endoscopy;  Laterality: N/A;    EPIDURAL STEROID INJECTION INTO CERVICAL SPINE      EPIDURAL STEROID INJECTION INTO LUMBAR SPINE      JOINT REPLACEMENT  5/2018    LIPOMA RESECTION Right 01/23/2023    Procedure: EXCISION, LIPOMA;  Surgeon: Ruben Brooks Jr., MD;  Location: [facility] OR;  Service: General;  Laterality: Right;    LUMBAR FUSION  2019    with cage    LUNG BIOPSY      NECK SURGERY  05/2018    with hardware    RADIOFREQUENCY ABLATION      cervical    RADIOFREQUENCY ABLATION      lumbar    SPINE SURGERY  05/2018    STAPEDECTOMY Right 05/28/2015    STAPEDECTOMY Left     TUBAL LIGATION  1/1990       Review of patient's allergies indicates:   Allergen Reactions    Acetaminophen-codeine Nausea And Vomiting    Codeine Nausea And Vomiting       No current facility-administered medications on file prior to encounter.     Current Outpatient Medications on File Prior to Encounter   Medication Sig    albuterol (PROVENTIL/VENTOLIN HFA) 90 mcg/actuation inhaler Inhale 2 puffs into the lungs every 6 (six) hours as needed for Wheezing or Shortness of Breath. Rescue    albuterol-ipratropium (DUO-NEB) 2.5 mg-0.5 mg/3 mL nebulizer solution Take 3 mLs by nebulization every 6 (six) hours as needed for Wheezing or Shortness of Breath. Rescue    amitriptyline (ELAVIL) 25 MG tablet Take 1-2 tablet by mouth every night at bedtime (Patient taking differently: Take 1-2 tablets by mouth every evening.)    atorvastatin (LIPITOR) 40 MG tablet TAKE 1 TABLET BY MOUTH DAILY (Patient taking differently: Take 40 mg by mouth once daily.)    co-enzyme Q-10 30 mg capsule Take 30 mg by mouth once daily.    cyclobenzaprine (FLEXERIL) 10 MG tablet Take 1 tablet by mouth three times a day as needed (Patient taking differently: Take 10 mg by mouth 3 (three) times daily as needed for Muscle spasms.)     fluticasone propionate (FLONASE) 50 mcg/actuation nasal spray SHAKE LIQUID AND USE 1 SPRAY(50 MCG) IN EACH NOSTRIL EVERY DAY (Patient taking differently: 1 spray by Each Nostril route once daily.)    gabapentin (NEURONTIN) 300 MG capsule Take 1 capsule by mouth three times a day as directed More than a 7 day supply are medically necessary (Patient taking differently: Take 300 mg by mouth 3 (three) times daily.)    lactulose (CHRONULAC) 10 gram/15 mL solution 15 ml twice a day as needed (Patient taking differently: Take 10 g by mouth 2 (two) times daily as needed (Constipation).)    morphine (MS CONTIN) 30 MG 12 hr tablet Take 1 tablet by mouth every twelve hours More than 7 days supply is medically necessary (Patient taking differently: Take 30 mg by mouth every 12 (twelve) hours.)    multivitamin capsule Take 1 capsule by mouth once daily.    omeprazole (PRILOSEC) 40 MG capsule TAKE 1 CAPSULE(40 MG) BY MOUTH EVERY DAY (Patient taking differently: Take 40 mg by mouth Daily.)    [START ON 3/18/2025] oxyCODONE (ROXICODONE) 20 mg Tab immediate release tablet Take 1 tablet by mouth five times a day as needed for pain More than a 7 day supply are medically necessary (Patient taking differently: Take 20 mg by mouth As instructed for Pain. Five times daily as needed for pain)    amitriptyline (ELAVIL) 25 MG tablet Take 1-2 tablets (25-50 mg total) by mouth nightly at bedtime.    cyclobenzaprine (FLEXERIL) 10 MG tablet Take 1 tablet (10 mg total) by mouth 3 (three) times daily as needed. (Patient taking differently: Take 10 mg by mouth 3 (three) times daily as needed for Muscle spasms.)    diclofenac sodium (PENNSAID) 20 mg/gram /actuation(2 %) sopm apply a small amount to skin twice a day (Patient not taking: Reported on 3/2/2025)    lactulose (CHRONULAC) 10 gram/15 mL solution Take 15 mLs (10 g total) by mouth 2 (two) times daily as needed.    loratadine (CLARITIN) 10 mg tablet TAKE 1 TABLET BY MOUTH DAILY    losartan  (COZAAR) 50 MG tablet Take 1 tablet (50 mg total) by mouth once daily. (Patient not taking: Reported on 10/28/2024)    magnesium oxide (MAG-OX) 400 mg (241.3 mg magnesium) tablet Take 1 tablet (400 mg total) by mouth once daily. (Patient not taking: Reported on 3/2/2025)    [START ON 3/28/2025] morphine (MS CONTIN) 30 MG 12 hr tablet Take 1 tablet by mouth every twelve hours More than 7 days supply is medically necessary    naloxone (NARCAN) 4 mg/actuation Spry Spray 1 spray into one nostril single dose may repeat every 2-3 minutes until responsive or EMS arrives    nicotine (NICODERM CQ) 14 mg/24 hr Place 1 patch onto the skin once daily. (Patient not taking: Reported on 3/2/2025)    oxyCODONE (ROXICODONE) 20 mg Tab immediate release tablet Take 1 tablet (20 mg total) by mouth 5 (five) times daily as neede dfor pain.    pregabalin (LYRICA) 100 MG capsule Take 1 capsule by mouth three times a day More than 7 days supply is medically necessary (Patient not taking: Reported on 3/2/2025)    pregabalin (LYRICA) 75 MG capsule Take 1 capsule by mouth three times a day More than 7 days supply is medically necessary (Patient not taking: Reported on 3/2/2025)    [DISCONTINUED] amitriptyline (ELAVIL) 25 MG tablet Take 1-2 tablet by mouth every night at bedtime    [DISCONTINUED] amitriptyline (ELAVIL) 25 MG tablet Take 1-2 tablet by mouth every night at bedtime    [DISCONTINUED] amitriptyline (ELAVIL) 25 MG tablet Take 1-2 tablet by mouth every night at bedtime    [DISCONTINUED] ceFAZolin (ANCEF) 10 gram injection Inject into the vein.    [DISCONTINUED] cyclobenzaprine (FLEXERIL) 10 MG tablet Take 1 tablet by mouth twice a day as needed    [DISCONTINUED] dextrose 5 % in water (D5W) PgBk 50 mL with ceFAZolin 2 gram SolR 2 g Inject 2 g into the vein every 8 (eight) hours. Last dose July 4 2024    [DISCONTINUED] ibuprofen (ADVIL,MOTRIN) 800 MG tablet Take 1 tablet by mouth three times a day as needed for pain    [DISCONTINUED]  lactulose (CHRONULAC) 10 gram/15 mL solution Take 15 ml twice a day as needeed    [DISCONTINUED] lactulose (CHRONULAC) 10 gram/15 mL solution Take 15 ml twice a day    [DISCONTINUED] lactulose (CHRONULAC) 10 gram/15 mL solution Take 15 ml twice a day as needed    [DISCONTINUED] lactulose (CHRONULAC) 10 gram/15 mL solution Take 15 mLs (10 g total) by mouth 2 (two) times daily as needed.    [DISCONTINUED] LIDOcaine 5 % Crea Apply 2 gram to skin twice a day    [DISCONTINUED] lubiprostone (AMITIZA) 24 MCG Cap Take 1 capsule by mouth twice a day as needed for pain    [DISCONTINUED] morphine (MS CONTIN) 30 MG 12 hr tablet Take 1 tablet by mouth every twelve hours More than 7 days supply is medically necessary    [DISCONTINUED] morphine (MS CONTIN) 30 MG 12 hr tablet Take 1 tablet by mouth every twelve hours More than 7 days supply is medically necessary    [DISCONTINUED] morphine (MS CONTIN) 30 MG 12 hr tablet Take 1 tablet by mouth every twelve hours    [DISCONTINUED] morphine (MS CONTIN) 30 MG 12 hr tablet Take 1 tablet by mouth every twelve hours    [DISCONTINUED] morphine (MS CONTIN) 30 MG 12 hr tablet Take 1 tablet by mouth every twelve hours More than 7 days supply is medically necessary    [DISCONTINUED] morphine (MS CONTIN) 30 MG 12 hr tablet Take 1 tablet by mouth every twelve hours More than 7 days supply is medically necessary    [DISCONTINUED] morphine (MS CONTIN) 30 MG 12 hr tablet Take 1 tablet by mouth every twelve hours More than 7 days supply is medically necessary    [DISCONTINUED] morphine (MS CONTIN) 30 MG 12 hr tablet Take 1 tablet by mouth every twelve hours More than 7 days supply is medically necessary    [DISCONTINUED] morphine (MS CONTIN) 30 MG 12 hr tablet Take 1 tablet by mouth every twelve hours More than 7 days supply is medically necessary    [DISCONTINUED] oxyCODONE (ROXICODONE) 20 mg Tab immediate release tablet Take 1 tablet by mouth five times a day as needed for pain More than 7 days  supply is medically necessary    [DISCONTINUED] oxyCODONE (ROXICODONE) 20 mg Tab immediate release tablet Take 1 tablet by mouth five times a day as needed for pain More than 7 days supply is medically necessary    [DISCONTINUED] oxyCODONE (ROXICODONE) 20 mg Tab immediate release tablet Take 1 tablet by mouth five times a day as needed for pain More than a 7 day supply is medically necessary    [DISCONTINUED] oxyCODONE (ROXICODONE) 20 mg Tab immediate release tablet Take 1 tablet by mouth five times a day as needed for pain More than a 7 day supply is medically necessary    [DISCONTINUED] oxyCODONE (ROXICODONE) 20 mg Tab immediate release tablet Take 1 tablet by mouth five times a day as needed for pain More than a 7 day supply are medically necessary    [DISCONTINUED] oxyCODONE (ROXICODONE) 20 mg Tab immediate release tablet Take 1 tablet by mouth five times a day as needed for pain More than a 7 day supply are medically necessary    [DISCONTINUED] oxyCODONE (ROXICODONE) 20 mg Tab immediate release tablet Take 1 tablet by mouth five times a day as needed for pain More than a 7 day supply are medically necessary    [DISCONTINUED] oxyCODONE (ROXICODONE) 20 mg Tab immediate release tablet Take 1 tablet by mouth five times a day as needed for pain More than a 7 day supply are medically necessary    [DISCONTINUED] oxyCODONE (ROXICODONE) 20 mg Tab immediate release tablet Take 1 tablet by mouth five times a day as needed for pain More than a 7 day supply are medically necessary    [DISCONTINUED] oxyCODONE (ROXICODONE) 20 mg Tab immediate release tablet Take 1 tablet by mouth five times a day as needed for pain More than a 7 day supply are medically necessary    [DISCONTINUED] oxyCODONE (ROXICODONE) 20 mg Tab immediate release tablet Take 1 tablet by mouth five times a day as needed for pain More than a 7 day supply are medically necessary    [DISCONTINUED] pregabalin (LYRICA) 75 MG capsule Take 1 capsule by mouth three  times daily.    [DISCONTINUED] RESTASIS 0.05 % ophthalmic emulsion Place 1 drop into both eyes 2 (two) times daily.     Family History       Problem Relation (Age of Onset)    Arthritis Mother    Breast cancer Sister, Sister, Sister, Sister    Cancer Sister, Sister, Sister, Sister    Diabetes Mother    Hypertension Father          Tobacco Use    Smoking status: Every Day     Current packs/day: 0.00     Average packs/day: 0.5 packs/day for 46.4 years (23.2 ttl pk-yrs)     Types: Cigarettes     Start date: 1978     Last attempt to quit: 2024     Years since quittin.7     Passive exposure: Past    Smokeless tobacco: Never    Tobacco comments:     Patient has smoked since a teenager, states that she has cut down from 2pk/day to .5pk/day. Can quit on her own when she's ready. Information and education on outpatient smoking cessation when ready. 3/26/24     Smokes 10 cigarettes daily 2024   Substance and Sexual Activity    Alcohol use: Not Currently    Drug use: No    Sexual activity: Not Currently     Partners: Male     Birth control/protection: Post-menopausal     Review of Systems   Constitutional:  Positive for activity change and fatigue. Negative for chills, diaphoresis and fever.   HENT:  Negative for congestion, nosebleeds and tinnitus.    Eyes:  Negative for photophobia and visual disturbance.   Respiratory:  Positive for cough, chest tightness, shortness of breath and wheezing.    Cardiovascular:  Negative for chest pain, palpitations and leg swelling.   Gastrointestinal:  Negative for abdominal distention, abdominal pain, constipation, diarrhea, nausea and vomiting.   Endocrine: Negative for cold intolerance and heat intolerance.   Genitourinary:  Negative for difficulty urinating, dysuria, frequency, hematuria and urgency.   Musculoskeletal:  Negative for arthralgias, back pain and myalgias.   Skin:  Negative for pallor, rash and wound.   Allergic/Immunologic: Negative for immunocompromised  state.   Neurological:  Positive for weakness. Negative for dizziness, tremors, facial asymmetry and speech difficulty.   Hematological:  Negative for adenopathy. Does not bruise/bleed easily.   Psychiatric/Behavioral:  Negative for confusion and sleep disturbance. The patient is not nervous/anxious.      Objective:     Vital Signs (Most Recent):  Temp: 98 °F (36.7 °C) (03/02/25 1423)  Pulse: 98 (03/02/25 1632)  Resp: (P) 18 (03/02/25 1500)  BP: 132/80 (03/02/25 1424)  SpO2: 99 % (03/02/25 1632) Vital Signs (24h Range):  Temp:  [98 °F (36.7 °C)] 98 °F (36.7 °C)  Pulse:  [] 98  Resp:  [18-20] (P) 18  SpO2:  [96 %-100 %] 99 %  BP: (132)/(80) 132/80     Weight: 45.4 kg (100 lb)  Body mass index is 18.89 kg/m².     Physical Exam  Vitals and nursing note reviewed.   Constitutional:       General: She is not in acute distress.     Appearance: She is well-developed. She is ill-appearing. She is not diaphoretic.   HENT:      Head: Normocephalic.      Mouth/Throat:      Mouth: Mucous membranes are moist.      Pharynx: Oropharynx is clear.   Eyes:      General: No scleral icterus.     Conjunctiva/sclera: Conjunctivae normal.      Pupils: Pupils are equal, round, and reactive to light.   Neck:      Vascular: No JVD.   Cardiovascular:      Rate and Rhythm: Normal rate and regular rhythm.      Heart sounds: Normal heart sounds. No murmur heard.     No friction rub. No gallop.   Pulmonary:      Effort: Respiratory distress present.      Breath sounds: Wheezing present. No rales.   Abdominal:      General: Bowel sounds are normal. There is no distension.      Palpations: Abdomen is soft.      Tenderness: There is no abdominal tenderness. There is no guarding or rebound.   Musculoskeletal:         General: No tenderness. Normal range of motion.      Cervical back: Normal range of motion and neck supple.   Lymphadenopathy:      Cervical: No cervical adenopathy.   Skin:     General: Skin is warm and dry.      Capillary Refill:  "Capillary refill takes less than 2 seconds.      Coloration: Skin is not pale.      Findings: No erythema or rash.   Neurological:      Mental Status: She is alert and oriented to person, place, and time.      Cranial Nerves: No cranial nerve deficit.      Sensory: No sensory deficit.      Coordination: Coordination normal.      Deep Tendon Reflexes: Reflexes normal.   Psychiatric:         Behavior: Behavior normal.         Thought Content: Thought content normal.         Judgment: Judgment normal.              CRANIAL NERVES     CN III, IV, VI   Pupils are equal, round, and reactive to light.       Significant Labs: All pertinent labs within the past 24 hours have been reviewed.  Blood Culture: No results for input(s): "LABBLOO" in the last 48 hours.  CBC:   Recent Labs   Lab 03/02/25  1434   WBC 8.88   HGB 10.3*   HCT 31.1*   *     CMP:   Recent Labs   Lab 03/02/25  1434   *   K 3.8   CL 96   CO2 21*   *   BUN 7*   CREATININE 0.5   CALCIUM 9.5   PROT 7.2   ALBUMIN 2.5*   BILITOT 0.2   ALKPHOS 170*   AST 31   ALT 25   ANIONGAP 13     Cardiac Markers:   Recent Labs   Lab 03/02/25  1434   BNP 95     Lactic Acid: No results for input(s): "LACTATE" in the last 48 hours.    Significant Imaging: I have reviewed all pertinent imaging results/findings within the past 24 hours.    Chest x-ray:   FINDINGS:  There is evidence of lower cervical spine surgery.  There is peripheral acute airspace disease superimposed upon chronic bilateral interstitial changes.  Areas of pneumonia or asymmetric CHF could produce this appearance.  The heart is not enlarged.  The aorta is mildly tortuous.  Is advised.  This report was flagged in Epic as abnormal.     Impression:     New bilateral airspace disease superimposed upon interstitial changes.      CTA:   FINDINGS:  There is evidence of prior lower cervical spine surgery.  There is no CT angiography evidence acute pulmonary thromboembolic disease.  There is a 14 mm " short axis right paratracheal lymph node which is enlarged.  There is a low right paratracheal 14 mm short axis lymph node.  There is an enlarged subcarinal lymph node measuring 14 mm.  Hilar lymph nodes are seen bilaterally measuring as large as 9 mm on the right and 9 mm on the left.  There is a small left-sided pleural effusion.  The adrenal glands are unremarkable.  Main pulmonary artery is borderline enlarged at 29 mm.  The ascending aorta is within normal limits at 29 mm.     Multiple nodules are seen including a 4 mm nodule in the left upper lobe.  There are areas of air trapping suggesting centrilobular emphysema.  Patchy airspace disease with a peripheral pattern is seen in the left lower lobe and lingula.  There is fairly extensive somewhat reticular airspace disease throughout much of the right hemithorax with more nodular and confluent foci seen in the right perihilum and in the right upper lobe. This report was flagged in Epic as abnormal.     Impression:     No CT angiography evidence of acute pulmonary thromboembolic disease.multiple areas of nodularity and interstitial scarring which are chronic in this patient with underlying emphysema.     Extensive areas of left upper lobe, left lower lobe and right lung airspace disease and nodularity concerning for multifocal pneumonia.  Follows up is advised to resolution  Assessment/Plan:     * Acute on chronic respiratory failure  Patient with Hypoxic Respiratory failure which is Acute on chronic.  she is not on home oxygen. Supplemental oxygen was provided and noted-      .   Signs/symptoms of respiratory failure include- tachypnea, increased work of breathing, respiratory distress, and wheezing. Contributing diagnoses includes - COPD and Pneumonia Labs and images were reviewed. Patient Has not had a recent ABG. Will treat underlying causes and adjust management of respiratory failure as follows- oxygen, DuoNebs, doxycycline, Rocephin,  Solu-Medrol    Moderate protein-calorie malnutrition  Nutrition consulted. Most recent weight and BMI monitored-     Measurements:  Wt Readings from Last 1 Encounters:   03/02/25 45.4 kg (100 lb)   Body mass index is 18.89 kg/m².    Patient will be screened and assessed by RD.      Multifocal pneumonia  Patient has a diagnosis of pneumonia. The cause of the pneumonia is suspected to be bacterial in etiology but organism is not known. The pneumonia is worsening due to shortness for breath . The patient has the following signs/symptoms of pneumonia: cough, sputum production, and shortness of breath. The patient does have a current oxygen requirement and the patient does not have a home oxygen requirement. I have reviewed the pertinent imaging. The following cultures have been collected: Blood cultures The culture results are listed below.     Current antimicrobial regimen consists of the antibiotics listed below. Will monitor patient closely and continue current treatment plan unchanged.    Antibiotics (From admission, onward)      Start     Stop Route Frequency Ordered    03/03/25 1530  cefTRIAXone injection 2 g         -- IV Every 24 hours (non-standard times) 03/02/25 1706    03/03/25 0330  doxycycline 100 mg in D5W 100 mL IVPB (MB+)         -- IV Every 12 hours (non-standard times) 03/02/25 1706            Microbiology Results (last 7 days)       Procedure Component Value Units Date/Time    Influenza A & B by Molecular [[id number]] Collected: 03/02/25 1452    Order Status: Completed Specimen: Nasopharyngeal Swab Updated: 03/02/25 1620     Influenza A, Molecular Negative     Influenza B, Molecular Negative     Flu A & B Source Nasal swab    Blood culture x two cultures. Draw prior to antibiotics. [[id number]] Collected: 03/02/25 1530    Order Status: Sent Specimen: Blood from Peripheral, Antecubital, Left     Blood culture x two cultures. Draw prior to antibiotics. [[id number]] Collected: 03/02/25 1530    Order  Status: Sent Specimen: Blood from Peripheral, Antecubital, Right             COPD exacerbation  Patient's COPD is with exacerbation noted by continued dyspnea and use of accessory muscles for breathing currently.  Patient is currently off COPD Pathway. Continue scheduled inhalers Steroids, Antibiotics, and Supplemental oxygen and monitor respiratory status closely.     Dependence on nicotine from cigarettes  Dangers of cigarette smoking were reviewed with patient in detail. Patient was Counseled for 3-10 minutes. Nicotine replacement options were discussed. Nicotine replacement was discussed- prescribed      VTE Risk Mitigation (From admission, onward)      None                            Rafal Schuster NP  Department of Hospital Medicine  [facility]

## 2025-03-02 NOTE — SUBJECTIVE & OBJECTIVE
Past Medical History:   Diagnosis Date    Alcohol abuse     Cellulitis of toe of right foot 2019    COPD (chronic obstructive pulmonary disease)     Digestive disorder     Headache     Hearing loss     HLD (hyperlipidemia)        Past Surgical History:   Procedure Laterality Date    BACK SURGERY       SECTION      2    COLONOSCOPY N/A 11/15/2017    Procedure: COLONOSCOPY;  Surgeon: Peg Powers MD;  Location: [facility];  Service: Endoscopy;  Laterality: N/A;    EPIDURAL STEROID INJECTION INTO CERVICAL SPINE      EPIDURAL STEROID INJECTION INTO LUMBAR SPINE      JOINT REPLACEMENT  2018    LIPOMA RESECTION Right 2023    Procedure: EXCISION, LIPOMA;  Surgeon: Ruben Brooks Jr., MD;  Location: [facility] OR;  Service: General;  Laterality: Right;    LUMBAR FUSION  2019    with cage    LUNG BIOPSY      NECK SURGERY  2018    with hardware    RADIOFREQUENCY ABLATION      cervical    RADIOFREQUENCY ABLATION      lumbar    SPINE SURGERY  2018    STAPEDECTOMY Right 2015    STAPEDECTOMY Left     TUBAL LIGATION  1990       Review of patient's allergies indicates:   Allergen Reactions    Acetaminophen-codeine Nausea And Vomiting    Codeine Nausea And Vomiting       No current facility-administered medications on file prior to encounter.     Current Outpatient Medications on File Prior to Encounter   Medication Sig    albuterol (PROVENTIL/VENTOLIN HFA) 90 mcg/actuation inhaler Inhale 2 puffs into the lungs every 6 (six) hours as needed for Wheezing or Shortness of Breath. Rescue    albuterol-ipratropium (DUO-NEB) 2.5 mg-0.5 mg/3 mL nebulizer solution Take 3 mLs by nebulization every 6 (six) hours as needed for Wheezing or Shortness of Breath. Rescue    amitriptyline (ELAVIL) 25 MG tablet Take 1-2 tablet by mouth every night at bedtime (Patient taking differently: Take 1-2 tablets by mouth every evening.)    atorvastatin (LIPITOR) 40 MG tablet TAKE 1 TABLET BY MOUTH DAILY (Patient taking  differently: Take 40 mg by mouth once daily.)    co-enzyme Q-10 30 mg capsule Take 30 mg by mouth once daily.    cyclobenzaprine (FLEXERIL) 10 MG tablet Take 1 tablet by mouth three times a day as needed (Patient taking differently: Take 10 mg by mouth 3 (three) times daily as needed for Muscle spasms.)    fluticasone propionate (FLONASE) 50 mcg/actuation nasal spray SHAKE LIQUID AND USE 1 SPRAY(50 MCG) IN EACH NOSTRIL EVERY DAY (Patient taking differently: 1 spray by Each Nostril route once daily.)    gabapentin (NEURONTIN) 300 MG capsule Take 1 capsule by mouth three times a day as directed More than a 7 day supply are medically necessary (Patient taking differently: Take 300 mg by mouth 3 (three) times daily.)    lactulose (CHRONULAC) 10 gram/15 mL solution 15 ml twice a day as needed (Patient taking differently: Take 10 g by mouth 2 (two) times daily as needed (Constipation).)    morphine (MS CONTIN) 30 MG 12 hr tablet Take 1 tablet by mouth every twelve hours More than 7 days supply is medically necessary (Patient taking differently: Take 30 mg by mouth every 12 (twelve) hours.)    multivitamin capsule Take 1 capsule by mouth once daily.    omeprazole (PRILOSEC) 40 MG capsule TAKE 1 CAPSULE(40 MG) BY MOUTH EVERY DAY (Patient taking differently: Take 40 mg by mouth Daily.)    [START ON 3/18/2025] oxyCODONE (ROXICODONE) 20 mg Tab immediate release tablet Take 1 tablet by mouth five times a day as needed for pain More than a 7 day supply are medically necessary (Patient taking differently: Take 20 mg by mouth As instructed for Pain. Five times daily as needed for pain)    amitriptyline (ELAVIL) 25 MG tablet Take 1-2 tablets (25-50 mg total) by mouth nightly at bedtime.    cyclobenzaprine (FLEXERIL) 10 MG tablet Take 1 tablet (10 mg total) by mouth 3 (three) times daily as needed. (Patient taking differently: Take 10 mg by mouth 3 (three) times daily as needed for Muscle spasms.)    diclofenac sodium (PENNSAID) 20  mg/gram /actuation(2 %) sopm apply a small amount to skin twice a day (Patient not taking: Reported on 3/2/2025)    lactulose (CHRONULAC) 10 gram/15 mL solution Take 15 mLs (10 g total) by mouth 2 (two) times daily as needed.    loratadine (CLARITIN) 10 mg tablet TAKE 1 TABLET BY MOUTH DAILY    losartan (COZAAR) 50 MG tablet Take 1 tablet (50 mg total) by mouth once daily. (Patient not taking: Reported on 10/28/2024)    magnesium oxide (MAG-OX) 400 mg (241.3 mg magnesium) tablet Take 1 tablet (400 mg total) by mouth once daily. (Patient not taking: Reported on 3/2/2025)    [START ON 3/28/2025] morphine (MS CONTIN) 30 MG 12 hr tablet Take 1 tablet by mouth every twelve hours More than 7 days supply is medically necessary    naloxone (NARCAN) 4 mg/actuation Spry Spray 1 spray into one nostril single dose may repeat every 2-3 minutes until responsive or EMS arrives    nicotine (NICODERM CQ) 14 mg/24 hr Place 1 patch onto the skin once daily. (Patient not taking: Reported on 3/2/2025)    oxyCODONE (ROXICODONE) 20 mg Tab immediate release tablet Take 1 tablet (20 mg total) by mouth 5 (five) times daily as neede dfor pain.    pregabalin (LYRICA) 100 MG capsule Take 1 capsule by mouth three times a day More than 7 days supply is medically necessary (Patient not taking: Reported on 3/2/2025)    pregabalin (LYRICA) 75 MG capsule Take 1 capsule by mouth three times a day More than 7 days supply is medically necessary (Patient not taking: Reported on 3/2/2025)    [DISCONTINUED] amitriptyline (ELAVIL) 25 MG tablet Take 1-2 tablet by mouth every night at bedtime    [DISCONTINUED] amitriptyline (ELAVIL) 25 MG tablet Take 1-2 tablet by mouth every night at bedtime    [DISCONTINUED] amitriptyline (ELAVIL) 25 MG tablet Take 1-2 tablet by mouth every night at bedtime    [DISCONTINUED] ceFAZolin (ANCEF) 10 gram injection Inject into the vein.    [DISCONTINUED] cyclobenzaprine (FLEXERIL) 10 MG tablet Take 1 tablet by mouth twice a day  as needed    [DISCONTINUED] dextrose 5 % in water (D5W) PgBk 50 mL with ceFAZolin 2 gram SolR 2 g Inject 2 g into the vein every 8 (eight) hours. Last dose July 4 2024    [DISCONTINUED] ibuprofen (ADVIL,MOTRIN) 800 MG tablet Take 1 tablet by mouth three times a day as needed for pain    [DISCONTINUED] lactulose (CHRONULAC) 10 gram/15 mL solution Take 15 ml twice a day as needeed    [DISCONTINUED] lactulose (CHRONULAC) 10 gram/15 mL solution Take 15 ml twice a day    [DISCONTINUED] lactulose (CHRONULAC) 10 gram/15 mL solution Take 15 ml twice a day as needed    [DISCONTINUED] lactulose (CHRONULAC) 10 gram/15 mL solution Take 15 mLs (10 g total) by mouth 2 (two) times daily as needed.    [DISCONTINUED] LIDOcaine 5 % Crea Apply 2 gram to skin twice a day    [DISCONTINUED] lubiprostone (AMITIZA) 24 MCG Cap Take 1 capsule by mouth twice a day as needed for pain    [DISCONTINUED] morphine (MS CONTIN) 30 MG 12 hr tablet Take 1 tablet by mouth every twelve hours More than 7 days supply is medically necessary    [DISCONTINUED] morphine (MS CONTIN) 30 MG 12 hr tablet Take 1 tablet by mouth every twelve hours More than 7 days supply is medically necessary    [DISCONTINUED] morphine (MS CONTIN) 30 MG 12 hr tablet Take 1 tablet by mouth every twelve hours    [DISCONTINUED] morphine (MS CONTIN) 30 MG 12 hr tablet Take 1 tablet by mouth every twelve hours    [DISCONTINUED] morphine (MS CONTIN) 30 MG 12 hr tablet Take 1 tablet by mouth every twelve hours More than 7 days supply is medically necessary    [DISCONTINUED] morphine (MS CONTIN) 30 MG 12 hr tablet Take 1 tablet by mouth every twelve hours More than 7 days supply is medically necessary    [DISCONTINUED] morphine (MS CONTIN) 30 MG 12 hr tablet Take 1 tablet by mouth every twelve hours More than 7 days supply is medically necessary    [DISCONTINUED] morphine (MS CONTIN) 30 MG 12 hr tablet Take 1 tablet by mouth every twelve hours More than 7 days supply is medically  necessary    [DISCONTINUED] morphine (MS CONTIN) 30 MG 12 hr tablet Take 1 tablet by mouth every twelve hours More than 7 days supply is medically necessary    [DISCONTINUED] oxyCODONE (ROXICODONE) 20 mg Tab immediate release tablet Take 1 tablet by mouth five times a day as needed for pain More than 7 days supply is medically necessary    [DISCONTINUED] oxyCODONE (ROXICODONE) 20 mg Tab immediate release tablet Take 1 tablet by mouth five times a day as needed for pain More than 7 days supply is medically necessary    [DISCONTINUED] oxyCODONE (ROXICODONE) 20 mg Tab immediate release tablet Take 1 tablet by mouth five times a day as needed for pain More than a 7 day supply is medically necessary    [DISCONTINUED] oxyCODONE (ROXICODONE) 20 mg Tab immediate release tablet Take 1 tablet by mouth five times a day as needed for pain More than a 7 day supply is medically necessary    [DISCONTINUED] oxyCODONE (ROXICODONE) 20 mg Tab immediate release tablet Take 1 tablet by mouth five times a day as needed for pain More than a 7 day supply are medically necessary    [DISCONTINUED] oxyCODONE (ROXICODONE) 20 mg Tab immediate release tablet Take 1 tablet by mouth five times a day as needed for pain More than a 7 day supply are medically necessary    [DISCONTINUED] oxyCODONE (ROXICODONE) 20 mg Tab immediate release tablet Take 1 tablet by mouth five times a day as needed for pain More than a 7 day supply are medically necessary    [DISCONTINUED] oxyCODONE (ROXICODONE) 20 mg Tab immediate release tablet Take 1 tablet by mouth five times a day as needed for pain More than a 7 day supply are medically necessary    [DISCONTINUED] oxyCODONE (ROXICODONE) 20 mg Tab immediate release tablet Take 1 tablet by mouth five times a day as needed for pain More than a 7 day supply are medically necessary    [DISCONTINUED] oxyCODONE (ROXICODONE) 20 mg Tab immediate release tablet Take 1 tablet by mouth five times a day as needed for pain More  than a 7 day supply are medically necessary    [DISCONTINUED] oxyCODONE (ROXICODONE) 20 mg Tab immediate release tablet Take 1 tablet by mouth five times a day as needed for pain More than a 7 day supply are medically necessary    [DISCONTINUED] pregabalin (LYRICA) 75 MG capsule Take 1 capsule by mouth three times daily.    [DISCONTINUED] RESTASIS 0.05 % ophthalmic emulsion Place 1 drop into both eyes 2 (two) times daily.     Family History       Problem Relation (Age of Onset)    Arthritis Mother    Breast cancer Sister, Sister, Sister, Sister    Cancer Sister, Sister, Sister, Sister    Diabetes Mother    Hypertension Father          Tobacco Use    Smoking status: Every Day     Current packs/day: 0.00     Average packs/day: 0.5 packs/day for 46.4 years (23.2 ttl pk-yrs)     Types: Cigarettes     Start date: 1978     Last attempt to quit: 2024     Years since quittin.7     Passive exposure: Past    Smokeless tobacco: Never    Tobacco comments:     Patient has smoked since a teenager, states that she has cut down from 2pk/day to .5pk/day. Can quit on her own when she's ready. Information and education on outpatient smoking cessation when ready. 3/26/24     Smokes 10 cigarettes daily 2024   Substance and Sexual Activity    Alcohol use: Not Currently    Drug use: No    Sexual activity: Not Currently     Partners: Male     Birth control/protection: Post-menopausal     Review of Systems   Constitutional:  Positive for activity change and fatigue. Negative for chills, diaphoresis and fever.   HENT:  Negative for congestion, nosebleeds and tinnitus.    Eyes:  Negative for photophobia and visual disturbance.   Respiratory:  Positive for cough, chest tightness, shortness of breath and wheezing.    Cardiovascular:  Negative for chest pain, palpitations and leg swelling.   Gastrointestinal:  Negative for abdominal distention, abdominal pain, constipation, diarrhea, nausea and vomiting.   Endocrine: Negative  for cold intolerance and heat intolerance.   Genitourinary:  Negative for difficulty urinating, dysuria, frequency, hematuria and urgency.   Musculoskeletal:  Negative for arthralgias, back pain and myalgias.   Skin:  Negative for pallor, rash and wound.   Allergic/Immunologic: Negative for immunocompromised state.   Neurological:  Positive for weakness. Negative for dizziness, tremors, facial asymmetry and speech difficulty.   Hematological:  Negative for adenopathy. Does not bruise/bleed easily.   Psychiatric/Behavioral:  Negative for confusion and sleep disturbance. The patient is not nervous/anxious.      Objective:     Vital Signs (Most Recent):  Temp: 98 °F (36.7 °C) (03/02/25 1423)  Pulse: 98 (03/02/25 1632)  Resp: (P) 18 (03/02/25 1500)  BP: 132/80 (03/02/25 1424)  SpO2: 99 % (03/02/25 1632) Vital Signs (24h Range):  Temp:  [98 °F (36.7 °C)] 98 °F (36.7 °C)  Pulse:  [] 98  Resp:  [18-20] (P) 18  SpO2:  [96 %-100 %] 99 %  BP: (132)/(80) 132/80     Weight: 45.4 kg (100 lb)  Body mass index is 18.89 kg/m².     Physical Exam  Vitals and nursing note reviewed.   Constitutional:       General: She is not in acute distress.     Appearance: She is well-developed. She is ill-appearing. She is not diaphoretic.   HENT:      Head: Normocephalic.      Mouth/Throat:      Mouth: Mucous membranes are moist.      Pharynx: Oropharynx is clear.   Eyes:      General: No scleral icterus.     Conjunctiva/sclera: Conjunctivae normal.      Pupils: Pupils are equal, round, and reactive to light.   Neck:      Vascular: No JVD.   Cardiovascular:      Rate and Rhythm: Normal rate and regular rhythm.      Heart sounds: Normal heart sounds. No murmur heard.     No friction rub. No gallop.   Pulmonary:      Effort: Respiratory distress present.      Breath sounds: Wheezing present. No rales.   Abdominal:      General: Bowel sounds are normal. There is no distension.      Palpations: Abdomen is soft.      Tenderness: There is no  "abdominal tenderness. There is no guarding or rebound.   Musculoskeletal:         General: No tenderness. Normal range of motion.      Cervical back: Normal range of motion and neck supple.   Lymphadenopathy:      Cervical: No cervical adenopathy.   Skin:     General: Skin is warm and dry.      Capillary Refill: Capillary refill takes less than 2 seconds.      Coloration: Skin is not pale.      Findings: No erythema or rash.   Neurological:      Mental Status: She is alert and oriented to person, place, and time.      Cranial Nerves: No cranial nerve deficit.      Sensory: No sensory deficit.      Coordination: Coordination normal.      Deep Tendon Reflexes: Reflexes normal.   Psychiatric:         Behavior: Behavior normal.         Thought Content: Thought content normal.         Judgment: Judgment normal.              CRANIAL NERVES     CN III, IV, VI   Pupils are equal, round, and reactive to light.       Significant Labs: All pertinent labs within the past 24 hours have been reviewed.  Blood Culture: No results for input(s): "LABBLOO" in the last 48 hours.  CBC:   Recent Labs   Lab 03/02/25  1434   WBC 8.88   HGB 10.3*   HCT 31.1*   *     CMP:   Recent Labs   Lab 03/02/25  1434   *   K 3.8   CL 96   CO2 21*   *   BUN 7*   CREATININE 0.5   CALCIUM 9.5   PROT 7.2   ALBUMIN 2.5*   BILITOT 0.2   ALKPHOS 170*   AST 31   ALT 25   ANIONGAP 13     Cardiac Markers:   Recent Labs   Lab 03/02/25  1434   BNP 95     Lactic Acid: No results for input(s): "LACTATE" in the last 48 hours.    Significant Imaging: I have reviewed all pertinent imaging results/findings within the past 24 hours.    Chest x-ray:   FINDINGS:  There is evidence of lower cervical spine surgery.  There is peripheral acute airspace disease superimposed upon chronic bilateral interstitial changes.  Areas of pneumonia or asymmetric CHF could produce this appearance.  The heart is not enlarged.  The aorta is mildly tortuous.  Is advised.  " This report was flagged in Epic as abnormal.     Impression:     New bilateral airspace disease superimposed upon interstitial changes.      CTA:   FINDINGS:  There is evidence of prior lower cervical spine surgery.  There is no CT angiography evidence acute pulmonary thromboembolic disease.  There is a 14 mm short axis right paratracheal lymph node which is enlarged.  There is a low right paratracheal 14 mm short axis lymph node.  There is an enlarged subcarinal lymph node measuring 14 mm.  Hilar lymph nodes are seen bilaterally measuring as large as 9 mm on the right and 9 mm on the left.  There is a small left-sided pleural effusion.  The adrenal glands are unremarkable.  Main pulmonary artery is borderline enlarged at 29 mm.  The ascending aorta is within normal limits at 29 mm.     Multiple nodules are seen including a 4 mm nodule in the left upper lobe.  There are areas of air trapping suggesting centrilobular emphysema.  Patchy airspace disease with a peripheral pattern is seen in the left lower lobe and lingula.  There is fairly extensive somewhat reticular airspace disease throughout much of the right hemithorax with more nodular and confluent foci seen in the right perihilum and in the right upper lobe. This report was flagged in Epic as abnormal.     Impression:     No CT angiography evidence of acute pulmonary thromboembolic disease.multiple areas of nodularity and interstitial scarring which are chronic in this patient with underlying emphysema.     Extensive areas of left upper lobe, left lower lobe and right lung airspace disease and nodularity concerning for multifocal pneumonia.  Follows up is advised to resolution

## 2025-03-02 NOTE — ASSESSMENT & PLAN NOTE
Patient has a diagnosis of pneumonia. The cause of the pneumonia is suspected to be bacterial in etiology but organism is not known. The pneumonia is worsening due to shortness for breath . The patient has the following signs/symptoms of pneumonia: cough, sputum production, and shortness of breath. The patient does have a current oxygen requirement and the patient does not have a home oxygen requirement. I have reviewed the pertinent imaging. The following cultures have been collected: Blood cultures The culture results are listed below.     Current antimicrobial regimen consists of the antibiotics listed below. Will monitor patient closely and continue current treatment plan unchanged.    Antibiotics (From admission, onward)      Start     Stop Route Frequency Ordered    03/03/25 1530  cefTRIAXone injection 2 g         -- IV Every 24 hours (non-standard times) 03/02/25 1706 03/03/25 0330  doxycycline 100 mg in D5W 100 mL IVPB (MB+)         -- IV Every 12 hours (non-standard times) 03/02/25 1706            Microbiology Results (last 7 days)       Procedure Component Value Units Date/Time    Influenza A & B by Molecular [[id number]] Collected: 03/02/25 1452    Order Status: Completed Specimen: Nasopharyngeal Swab Updated: 03/02/25 1620     Influenza A, Molecular Negative     Influenza B, Molecular Negative     Flu A & B Source Nasal swab    Blood culture x two cultures. Draw prior to antibiotics. [[id number]] Collected: 03/02/25 1530    Order Status: Sent Specimen: Blood from Peripheral, Antecubital, Left     Blood culture x two cultures. Draw prior to antibiotics. [[id number]] Collected: 03/02/25 1530    Order Status: Sent Specimen: Blood from Peripheral, Antecubital, Right

## 2025-03-03 PROBLEM — G93.41 ACUTE METABOLIC ENCEPHALOPATHY: Status: RESOLVED | Noted: 2024-03-24 | Resolved: 2025-03-03

## 2025-03-03 PROBLEM — R07.9 CHEST PAIN: Status: ACTIVE | Noted: 2025-03-03

## 2025-03-03 LAB
ALBUMIN SERPL BCP-MCNC: 2.3 G/DL (ref 3.5–5.2)
ALP SERPL-CCNC: 162 U/L (ref 40–150)
ALT SERPL W/O P-5'-P-CCNC: 20 U/L (ref 10–44)
ANION GAP SERPL CALC-SCNC: 14 MMOL/L (ref 8–16)
AORTIC ROOT ANNULUS: 2.79 CM
AORTIC VALVE CUSP SEPERATION: 1.38 CM
APICAL FOUR CHAMBER EJECTION FRACTION: 57 %
APICAL TWO CHAMBER EJECTION FRACTION: 15 %
AST SERPL-CCNC: 30 U/L (ref 10–40)
AV INDEX (PROSTH): 1.03
AV MEAN GRADIENT: 3 MMHG
AV PEAK GRADIENT: 6 MMHG
AV VALVE AREA BY VELOCITY RATIO: 3.5 CM²
AV VALVE AREA: 3.9 CM²
AV VELOCITY RATIO: 0.92
BASOPHILS # BLD AUTO: 0.01 K/UL (ref 0–0.2)
BASOPHILS NFR BLD: 0.2 % (ref 0–1.9)
BILIRUB SERPL-MCNC: 0.1 MG/DL (ref 0.1–1)
BILIRUB UR QL STRIP: NEGATIVE
BSA FOR ECHO PROCEDURE: 1.42 M2
BUN SERPL-MCNC: 8 MG/DL (ref 8–23)
CALCIUM SERPL-MCNC: 9.5 MG/DL (ref 8.7–10.5)
CHLORIDE SERPL-SCNC: 95 MMOL/L (ref 95–110)
CLARITY UR: CLEAR
CO2 SERPL-SCNC: 23 MMOL/L (ref 23–29)
COLOR UR: YELLOW
CREAT SERPL-MCNC: 0.6 MG/DL (ref 0.5–1.4)
CV ECHO LV RWT: 0.34 CM
DIFFERENTIAL METHOD BLD: ABNORMAL
DOP CALC AO PEAK VEL: 1.2 M/S
DOP CALC AO VTI: 19.4 CM
DOP CALC LVOT AREA: 3.8 CM2
DOP CALC LVOT DIAMETER: 2.2 CM
DOP CALC LVOT PEAK VEL: 1.1 M/S
DOP CALC LVOT STROKE VOLUME: 76 CM3
DOP CALC MV VTI: 14.2 CM
DOP CALCLVOT PEAK VEL VTI: 20 CM
E WAVE DECELERATION TIME: 265 MSEC
E/A RATIO: 0.61
E/E' RATIO: 9 M/S
ECHO LV POSTERIOR WALL: 0.8 CM (ref 0.6–1.1)
EJECTION FRACTION: 49 %
EOSINOPHIL # BLD AUTO: 0 K/UL (ref 0–0.5)
EOSINOPHIL NFR BLD: 0 % (ref 0–8)
ERYTHROCYTE [DISTWIDTH] IN BLOOD BY AUTOMATED COUNT: 17.8 % (ref 11.5–14.5)
EST. GFR  (NO RACE VARIABLE): >60 ML/MIN/1.73 M^2
FRACTIONAL SHORTENING: 27.7 % (ref 28–44)
GLUCOSE SERPL-MCNC: 124 MG/DL (ref 70–110)
GLUCOSE UR QL STRIP: NEGATIVE
HCT VFR BLD AUTO: 32.2 % (ref 37–48.5)
HGB BLD-MCNC: 10.6 G/DL (ref 12–16)
HGB UR QL STRIP: NEGATIVE
IMM GRANULOCYTES # BLD AUTO: 0.22 K/UL (ref 0–0.04)
IMM GRANULOCYTES NFR BLD AUTO: 4 % (ref 0–0.5)
INTERVENTRICULAR SEPTUM: 0.8 CM (ref 0.6–1.1)
IVRT: 133 MSEC
KETONES UR QL STRIP: ABNORMAL
LA MAJOR: 3.4 CM
LEFT ATRIUM AREA SYSTOLIC (APICAL 2 CHAMBER): 9.31 CM2
LEFT ATRIUM AREA SYSTOLIC (APICAL 4 CHAMBER): 9.92 CM2
LEFT ATRIUM VOLUME INDEX MOD: 14 ML/M2
LEFT ATRIUM VOLUME MOD: 20 ML
LEFT INTERNAL DIMENSION IN SYSTOLE: 3.4 CM (ref 2.1–4)
LEFT VENTRICLE DIASTOLIC VOLUME INDEX: 70.63 ML/M2
LEFT VENTRICLE DIASTOLIC VOLUME: 101 ML
LEFT VENTRICLE END DIASTOLIC VOLUME APICAL 2 CHAMBER: 23.38 ML
LEFT VENTRICLE END DIASTOLIC VOLUME APICAL 4 CHAMBER: 76.57 ML
LEFT VENTRICLE END SYSTOLIC VOLUME APICAL 2 CHAMBER: 16.07 ML
LEFT VENTRICLE END SYSTOLIC VOLUME APICAL 4 CHAMBER: 22.28 ML
LEFT VENTRICLE MASS INDEX: 85.5 G/M2
LEFT VENTRICLE SYSTOLIC VOLUME INDEX: 34.3 ML/M2
LEFT VENTRICLE SYSTOLIC VOLUME: 49 ML
LEFT VENTRICULAR INTERNAL DIMENSION IN DIASTOLE: 4.7 CM (ref 3.5–6)
LEFT VENTRICULAR MASS: 122.3 G
LEUKOCYTE ESTERASE UR QL STRIP: NEGATIVE
LV LATERAL E/E' RATIO: 8 M/S
LV SEPTAL E/E' RATIO: 9.3 M/S
LVED V (TEICH): 101.16 ML
LVES V (TEICH): 48.93 ML
LVOT MG: 2.6 MMHG
LVOT MV: 0.75 CM/S
LYMPHOCYTES # BLD AUTO: 1.5 K/UL (ref 1–4.8)
LYMPHOCYTES NFR BLD: 27.6 % (ref 18–48)
MAGNESIUM SERPL-MCNC: 1.7 MG/DL (ref 1.6–2.6)
MCH RBC QN AUTO: 27.2 PG (ref 27–31)
MCHC RBC AUTO-ENTMCNC: 32.9 G/DL (ref 32–36)
MCV RBC AUTO: 83 FL (ref 82–98)
MONOCYTES # BLD AUTO: 0.1 K/UL (ref 0.3–1)
MONOCYTES NFR BLD: 1.3 % (ref 4–15)
MV MEAN GRADIENT: 2 MMHG
MV PEAK A VEL: 0.92 M/S
MV PEAK E VEL: 0.56 M/S
MV PEAK GRADIENT: 5 MMHG
MV STENOSIS PRESSURE HALF TIME: 65.86 MS
MV VALVE AREA BY CONTINUITY EQUATION: 5.35 CM2
MV VALVE AREA P 1/2 METHOD: 3.34 CM2
NEUTROPHILS # BLD AUTO: 3.7 K/UL (ref 1.8–7.7)
NEUTROPHILS NFR BLD: 66.9 % (ref 38–73)
NITRITE UR QL STRIP: NEGATIVE
NRBC BLD-RTO: 0 /100 WBC
OHS CV RV/LV RATIO: 0.64 CM
OHS LV EJECTION FRACTION SIMPSONS BIPLANE MOD: 49 %
PH UR STRIP: 7 [PH] (ref 5–8)
PHOSPHATE SERPL-MCNC: 4.4 MG/DL (ref 2.7–4.5)
PLATELET # BLD AUTO: 554 K/UL (ref 150–450)
PMV BLD AUTO: 8.1 FL (ref 9.2–12.9)
POTASSIUM SERPL-SCNC: 3.7 MMOL/L (ref 3.5–5.1)
PROT SERPL-MCNC: 7.1 G/DL (ref 6–8.4)
PROT UR QL STRIP: ABNORMAL
PV MV: 0.86 M/S
PV PEAK GRADIENT: 4 MMHG
PV PEAK VELOCITY: 1.05 M/S
RA MAJOR: 3.69 CM
RA PRESSURE ESTIMATED: 3 MMHG
RBC # BLD AUTO: 3.89 M/UL (ref 4–5.4)
RIGHT VENTRICLE DIASTOLIC BASEL DIMENSION: 3 CM
RIGHT VENTRICLE DIASTOLIC LENGTH: 4.3 CM
RIGHT VENTRICULAR END-DIASTOLIC DIMENSION: 3.03 CM
RIGHT VENTRICULAR LENGTH IN DIASTOLE (APICAL 4-CHAMBER VIEW): 4.27 CM
RV TISSUE DOPPLER FREE WALL SYSTOLIC VELOCITY 1 (APICAL 4 CHAMBER VIEW): 18.14 CM/S
SODIUM SERPL-SCNC: 132 MMOL/L (ref 136–145)
SP GR UR STRIP: >1.03 (ref 1–1.03)
STJ: 2.89 CM
TDI LATERAL: 0.07 M/S
TDI SEPTAL: 0.06 M/S
TDI: 0.07 M/S
TRICUSPID ANNULAR PLANE SYSTOLIC EXCURSION: 2.42 CM
TROPONIN I SERPL DL<=0.01 NG/ML-MCNC: 0.28 NG/ML (ref 0–0.03)
URN SPEC COLLECT METH UR: ABNORMAL
UROBILINOGEN UR STRIP-ACNC: NEGATIVE EU/DL
WBC # BLD AUTO: 5.54 K/UL (ref 3.9–12.7)
Z-SCORE OF LEFT VENTRICULAR DIMENSION IN END DIASTOLE: 0.73
Z-SCORE OF LEFT VENTRICULAR DIMENSION IN END SYSTOLE: 1.79

## 2025-03-03 PROCEDURE — 94640 AIRWAY INHALATION TREATMENT: CPT

## 2025-03-03 PROCEDURE — 36415 COLL VENOUS BLD VENIPUNCTURE: CPT

## 2025-03-03 PROCEDURE — 25000003 PHARM REV CODE 250: Performed by: NURSE PRACTITIONER

## 2025-03-03 PROCEDURE — 81003 URINALYSIS AUTO W/O SCOPE: CPT | Performed by: STUDENT IN AN ORGANIZED HEALTH CARE EDUCATION/TRAINING PROGRAM

## 2025-03-03 PROCEDURE — 94761 N-INVAS EAR/PLS OXIMETRY MLT: CPT

## 2025-03-03 PROCEDURE — 80053 COMPREHEN METABOLIC PANEL: CPT | Performed by: NURSE PRACTITIONER

## 2025-03-03 PROCEDURE — 25000242 PHARM REV CODE 250 ALT 637 W/ HCPCS: Performed by: NURSE PRACTITIONER

## 2025-03-03 PROCEDURE — 84484 ASSAY OF TROPONIN QUANT: CPT

## 2025-03-03 PROCEDURE — 25000242 PHARM REV CODE 250 ALT 637 W/ HCPCS

## 2025-03-03 PROCEDURE — 27000221 HC OXYGEN, UP TO 24 HOURS

## 2025-03-03 PROCEDURE — 63600175 PHARM REV CODE 636 W HCPCS: Mod: JZ,TB | Performed by: NURSE PRACTITIONER

## 2025-03-03 PROCEDURE — 93010 ELECTROCARDIOGRAM REPORT: CPT | Mod: ,,, | Performed by: GENERAL PRACTICE

## 2025-03-03 PROCEDURE — 93005 ELECTROCARDIOGRAM TRACING: CPT

## 2025-03-03 PROCEDURE — 85025 COMPLETE CBC W/AUTO DIFF WBC: CPT | Performed by: NURSE PRACTITIONER

## 2025-03-03 PROCEDURE — 99223 1ST HOSP IP/OBS HIGH 75: CPT | Mod: 25,,, | Performed by: INTERNAL MEDICINE

## 2025-03-03 PROCEDURE — 11000001 HC ACUTE MED/SURG PRIVATE ROOM

## 2025-03-03 PROCEDURE — 25000003 PHARM REV CODE 250: Performed by: STUDENT IN AN ORGANIZED HEALTH CARE EDUCATION/TRAINING PROGRAM

## 2025-03-03 PROCEDURE — 63600175 PHARM REV CODE 636 W HCPCS: Mod: JZ,TB | Performed by: STUDENT IN AN ORGANIZED HEALTH CARE EDUCATION/TRAINING PROGRAM

## 2025-03-03 PROCEDURE — S4991 NICOTINE PATCH NONLEGEND: HCPCS | Performed by: NURSE PRACTITIONER

## 2025-03-03 PROCEDURE — 87205 SMEAR GRAM STAIN: CPT | Performed by: STUDENT IN AN ORGANIZED HEALTH CARE EDUCATION/TRAINING PROGRAM

## 2025-03-03 PROCEDURE — 99900035 HC TECH TIME PER 15 MIN (STAT)

## 2025-03-03 PROCEDURE — 83735 ASSAY OF MAGNESIUM: CPT | Performed by: NURSE PRACTITIONER

## 2025-03-03 PROCEDURE — 25000003 PHARM REV CODE 250

## 2025-03-03 PROCEDURE — 97161 PT EVAL LOW COMPLEX 20 MIN: CPT

## 2025-03-03 PROCEDURE — 84100 ASSAY OF PHOSPHORUS: CPT | Performed by: NURSE PRACTITIONER

## 2025-03-03 PROCEDURE — 87070 CULTURE OTHR SPECIMN AEROBIC: CPT | Performed by: STUDENT IN AN ORGANIZED HEALTH CARE EDUCATION/TRAINING PROGRAM

## 2025-03-03 RX ORDER — NITROGLYCERIN 0.4 MG/1
0.4 TABLET SUBLINGUAL EVERY 5 MIN PRN
Status: DISCONTINUED | OUTPATIENT
Start: 2025-03-03 | End: 2025-03-04 | Stop reason: HOSPADM

## 2025-03-03 RX ORDER — NAPROXEN SODIUM 220 MG/1
81 TABLET, FILM COATED ORAL DAILY
Status: DISCONTINUED | OUTPATIENT
Start: 2025-03-03 | End: 2025-03-04 | Stop reason: HOSPADM

## 2025-03-03 RX ADMIN — NICOTINE 1 PATCH: 14 PATCH TRANSDERMAL at 09:03

## 2025-03-03 RX ADMIN — ATORVASTATIN CALCIUM 40 MG: 40 TABLET, FILM COATED ORAL at 09:03

## 2025-03-03 RX ADMIN — OXYCODONE HYDROCHLORIDE 20 MG: 10 TABLET ORAL at 03:03

## 2025-03-03 RX ADMIN — IPRATROPIUM BROMIDE AND ALBUTEROL SULFATE 3 ML: .5; 3 SOLUTION RESPIRATORY (INHALATION) at 07:03

## 2025-03-03 RX ADMIN — IPRATROPIUM BROMIDE AND ALBUTEROL SULFATE 3 ML: .5; 3 SOLUTION RESPIRATORY (INHALATION) at 03:03

## 2025-03-03 RX ADMIN — IPRATROPIUM BROMIDE AND ALBUTEROL SULFATE 3 ML: .5; 3 SOLUTION RESPIRATORY (INHALATION) at 11:03

## 2025-03-03 RX ADMIN — AMITRIPTYLINE HYDROCHLORIDE 25 MG: 25 TABLET, FILM COATED ORAL at 09:03

## 2025-03-03 RX ADMIN — Medication 800 MG: at 06:03

## 2025-03-03 RX ADMIN — OXYCODONE HYDROCHLORIDE 20 MG: 10 TABLET ORAL at 10:03

## 2025-03-03 RX ADMIN — POTASSIUM BICARBONATE 50 MEQ: 977.5 TABLET, EFFERVESCENT ORAL at 06:03

## 2025-03-03 RX ADMIN — SENNOSIDES AND DOCUSATE SODIUM 1 TABLET: 50; 8.6 TABLET ORAL at 09:03

## 2025-03-03 RX ADMIN — GABAPENTIN 300 MG: 300 CAPSULE ORAL at 09:03

## 2025-03-03 RX ADMIN — OXYCODONE HYDROCHLORIDE 20 MG: 10 TABLET ORAL at 06:03

## 2025-03-03 RX ADMIN — NITROGLYCERIN 0.4 MG: 0.4 TABLET SUBLINGUAL at 03:03

## 2025-03-03 RX ADMIN — Medication 800 MG: at 09:03

## 2025-03-03 RX ADMIN — NITROGLYCERIN 0.4 MG: 0.4 TABLET SUBLINGUAL at 02:03

## 2025-03-03 RX ADMIN — METHYLPREDNISOLONE SODIUM SUCCINATE 40 MG: 40 INJECTION, POWDER, FOR SOLUTION INTRAMUSCULAR; INTRAVENOUS at 03:03

## 2025-03-03 RX ADMIN — LIDOCAINE HYDROCHLORIDE 10 ML: 20 SOLUTION ORAL; TOPICAL at 03:03

## 2025-03-03 RX ADMIN — METHYLPREDNISOLONE SODIUM SUCCINATE 80 MG: 40 INJECTION, POWDER, FOR SOLUTION INTRAMUSCULAR; INTRAVENOUS at 06:03

## 2025-03-03 RX ADMIN — DOXYCYCLINE 100 MG: 100 INJECTION, POWDER, LYOPHILIZED, FOR SOLUTION INTRAVENOUS at 03:03

## 2025-03-03 RX ADMIN — DOXYCYCLINE 100 MG: 100 INJECTION, POWDER, LYOPHILIZED, FOR SOLUTION INTRAVENOUS at 02:03

## 2025-03-03 RX ADMIN — ASPIRIN 81 MG CHEWABLE TABLET 81 MG: 81 TABLET CHEWABLE at 09:03

## 2025-03-03 RX ADMIN — CEFTRIAXONE SODIUM 2 G: 2 INJECTION, POWDER, FOR SOLUTION INTRAMUSCULAR; INTRAVENOUS at 03:03

## 2025-03-03 RX ADMIN — METHYLPREDNISOLONE SODIUM SUCCINATE 40 MG: 40 INJECTION, POWDER, FOR SOLUTION INTRAMUSCULAR; INTRAVENOUS at 09:03

## 2025-03-03 RX ADMIN — GABAPENTIN 300 MG: 300 CAPSULE ORAL at 03:03

## 2025-03-03 RX ADMIN — ACETAMINOPHEN 650 MG: 325 TABLET ORAL at 03:03

## 2025-03-03 RX ADMIN — LIDOCAINE HYDROCHLORIDE 10 ML: 20 SOLUTION ORAL; TOPICAL at 04:03

## 2025-03-03 RX ADMIN — LIDOCAINE HYDROCHLORIDE 10 ML: 20 SOLUTION ORAL; TOPICAL at 08:03

## 2025-03-03 NOTE — PLAN OF CARE
Problem: Adult Inpatient Plan of Care  Goal: Plan of Care Review  Outcome: Progressing  Goal: Absence of Hospital-Acquired Illness or Injury  Outcome: Progressing  Goal: Optimal Comfort and Wellbeing  Outcome: Progressing     Problem: Infection  Goal: Absence of Infection Signs and Symptoms  Outcome: Progressing     Problem: Pneumonia  Goal: Fluid Balance  Outcome: Progressing  Goal: Resolution of Infection Signs and Symptoms  Outcome: Progressing  Goal: Effective Oxygenation and Ventilation  Outcome: Progressing

## 2025-03-03 NOTE — PLAN OF CARE
Problem: Physical Therapy  Goal: Physical Therapy Goal  Description: Goals to be met by: 2025     Patient will increase functional independence with mobility by performin. Supine to sit with [place]  2. Sit to stand transfer with [place]  3. Bed to chair transfer with Supervision using No Assistive Device  4. Gait  x over 500 feet with Supervision using No Assistive Device.   5. Lower extremity exercise program x20 reps  Outcome: Progressing   PT eval and treat. Gait 250ft on RA with SAT 91%- no d/c needs

## 2025-03-03 NOTE — ASSESSMENT & PLAN NOTE
Nutrition consulted. Most recent weight and BMI monitored-     Measurements:  Wt Readings from Last 1 Encounters:   03/02/25 47.5 kg (104 lb 11.5 oz)   Body mass index is 19.79 kg/m².    Patient will be screened and assessed by [facility].

## 2025-03-03 NOTE — PLAN OF CARE
Problem: Adult Inpatient Plan of Care  Goal: Plan of Care Review  Outcome: Progressing  Goal: Patient-Specific Goal (Individualized)  Outcome: Progressing  Goal: Absence of Hospital-Acquired Illness or Injury  Outcome: Progressing  Goal: Optimal Comfort and Wellbeing  Outcome: Progressing  Goal: Readiness for Transition of Care  Outcome: Progressing     Problem: Pneumonia  Goal: Effective Oxygenation and Ventilation  Outcome: Progressing

## 2025-03-03 NOTE — CARE UPDATE
"Critical Care Note  [facility] Medicine    Admit Date: 3/2/2025    SUBJECTIVE:     Follow-up For:  Acute on chronic respiratory failure    HPI/Interval history: See H&P    Overnight Events: Patient c/o chest pain 6/10 and shortness of breath. Previous troponin noted to be elevated. Per nurse patient found with oxygen removed out of nose. Arrived at bedside. Patient reports left-sided chest pain that worsening when breathing and radiates to ribs. No diaphoresis, back pain, nausea, or vomiting. ECG performed. She was placed back on supplemental oxygen via nasal cannula. Repeat troponin remains elevated but improving. Chest pain relieved after 3 SL nitro. She remained hemodynamically stable on nasal cannula.  Continuous monitor in place on telemetry. ASA daily ordered. Patient began making request for water and mouth cleanser after episode.    Review of Systems: List if applicable  Pain scale: 6/10  Constitutional- Negative for Fever, Weakness  Neuro- Negative for Confusion  CV- Positive for Chest Pain, Palpitations  Resp- Positive for Cough, SOB  GI- Negative  for Nausea, Vomiting, Diarrhea  Extrem- Negative for Pain, Swelling    OBJECTIVE:     Vital Signs Range (Last 24H):  Temp:  [97.1 °F (36.2 °C)-98 °F (36.7 °C)]   Pulse:  []   Resp:  [16-23]   BP: (115-145)/(67-81)   SpO2:  [96 %-100 %]     I & O (Last 24H):    Intake/Output Summary (Last 24 hours) at 3/3/2025 0600  Last data filed at 3/3/2025 0535  Gross per 24 hour   Intake 120 ml   Output 800 ml   Net -680 ml       Estimated body mass index is 19.79 kg/m² as calculated from the following:    Height as of this encounter: 5' 1" (1.549 m).    Weight as of this encounter: 47.5 kg (104 lb 11.5 oz).        Physical Exam:  General- Patient alert and oriented x3 in NAD  HEENT- PERRLA, EOMI, OP clear, MMM  Neck- No JVD, Lymphadenopathy, Thyromegaly  CV- Regular rate and rhythm, No Murmur/acacia/rubs  Resp- Lungs decrease and wheeze noted to Bilaterally lower lobes " , No increased WOB  Abdomen- Non tender/non-distended, BS normoactive x4 quads, no HSM  Extrem- No cyanosis, clubbing, edema. Pulses 2+ and symmetric  Skin- No rashes, lesions, ulcers  Neuro- Strength 5/5 flexors/extensors, DTRs 2+ and symmetric, Intact sensation to light touch grossly         Laboratory/Diagnostic Data:  Reviewed and noted in plan where applicable- Please see chart for full lab data.    Medications:  Medication list was reviewed and changes noted under Assessment/Plan.    ASSESSMENT/PLAN:     Active Problems:    Active Hospital Problems    Diagnosis  POA    *Acute on chronic respiratory failure [J96.20]  Yes    Moderate protein-calorie malnutrition [E44.0]  Yes    Multifocal pneumonia [J18.9]  Yes    COPD exacerbation [J44.1]  Yes    Dependence on nicotine from cigarettes [F17.210]  Yes      Resolved Hospital Problems   No resolved problems to display.         Disposition- remain on med/tele      Time spent in care of patient (Greater than 1/2 spent in direct face to face contact): 15    Time spent in critical care (in addition to E/M time mentioned above) Time spent to titrate drips, adjust ventilator settings, and provide counseling and coordination to critical care team- 35

## 2025-03-03 NOTE — ASSESSMENT & PLAN NOTE
Associated with elevated troponins which have trended down and pain improved with nitroglycerin  Suspect this is demand related due to her current pneumonia/COPD exacerbation  -keep on telemetry  -check echo  -consult cardiology

## 2025-03-03 NOTE — PT/OT/SLP EVAL
Physical Therapy Evaluation    Patient Name:  Floresita Martin   MRN:  [id number]    Recommendations:     Discharge Recommendations: No Therapy Indicated   Discharge Equipment Recommendations: none   Barriers to discharge: None    Assessment:     Floresita Martin is a 65 y.o. female admitted with a medical diagnosis of Multifocal pneumonia.  She presents with the following impairments/functional limitations: impaired endurance, impaired cardiopulmonary response to activity .    Pt seen supine in bed- no CP. Pt ambulated 250ft with HHA with SAT 91%. OOB chair post PT.   No d/c needs    Rehab Prognosis: Good; patient would benefit from acute skilled PT services to address these deficits and reach maximum level of function.    Recent Surgery: * No surgery found *      Plan:     During this hospitalization, patient to be seen 5 x/week to address the identified rehab impairments via gait training, therapeutic activities, therapeutic exercises and progress toward the following goals:    Plan of Care Expires:  03/30/25    Subjective     Chief Complaint: a little weak and SOB  Patient/Family Comments/goals: get well  Pain/Comfort:  Pain Rating 1: 0/10    Patients cultural, spiritual, Jewish conflicts given the current situation:      Living Environment:  Home with SO  Prior to admission, patients level of function was ambulatory.  Equipment used at home: none.  DME owned (not currently used): none.  Upon discharge, patient will have assistance from family.    Objective:     Communicated with nurse Mcintosh prior to session.  Patient found HOB elevated with telemetry  upon PT entry to room.    General Precautions: Standard, fall  Orthopedic Precautions:N/A   Braces: N/A  Respiratory Status: Room air    Exams:  Postural Exam:  Patient presented with the following abnormalities:    -       Rounded shoulders  -       Forward head  -       BMI 19.65  RLE ROM: WFL  RLE Strength: WFL  LLE ROM: WFL  LLE Strength: WFL    Functional  Tolerated Infusion well. Reviewed discharge instructions, understanding verbalized. Patient discharged home. Down to exit per self. Orders Placed This Encounter   Medications    inFLIXimab (RENFLEXIS) 500 mg in sodium chloride 0.9 % 250 mL IVPB     With verification, confirm documentation of current weight, ordered weight-type, and dose calculation.     sodium chloride flush 0.9 % injection 10 mL Mobility:  Bed Mobility:     Scooting: modified independence  Supine to Sit: contact guard assistance  Transfers:     Sit to Stand:  contact guard assistance with no AD  Bed to Chair: contact guard assistance with  no AD  using  Stand Pivot  Gait: 250ft with HHA with SAT 91% on RA      AM-PAC 6 CLICK MOBILITY  Total Score:17       Treatment & Education:  Patient was educated on the importance of OOB activity and functional mobility to negate negative effects of prolonged bed rest during hospitalization, safe transfers and ambulation, and D/C planning       Patient left up in chair with all lines intact and call button in reach.    GOALS:   Multidisciplinary Problems       Physical Therapy Goals          Problem: Physical Therapy    Goal Priority Disciplines Outcome Interventions   Physical Therapy Goal     PT, PT/OT Progressing    Description: Goals to be met by: 2025     Patient will increase functional independence with mobility by performin. Supine to sit with [place]  2. Sit to stand transfer with [place]  3. Bed to chair transfer with [place] using No Assistive Device  4. Gait  x over 500 feet with [place] using No Assistive Device.   5. Lower extremity exercise program x20 reps                       DME Justifications:  No DME recommended requiring DME justifications    History:     Past Medical History:   Diagnosis Date    Alcohol abuse     Cellulitis of toe of right foot 2019    COPD (chronic obstructive pulmonary disease)     Digestive disorder     Headache     Hearing loss     HLD (hyperlipidemia)        Past Surgical History:   Procedure Laterality Date    BACK SURGERY       SECTION      2    COLONOSCOPY N/A 11/15/2017    Procedure: COLONOSCOPY;  Surgeon: Peg Powers MD;  Location: [facility];  Service: Endoscopy;  Laterality: N/A;    EPIDURAL STEROID INJECTION INTO CERVICAL SPINE      EPIDURAL STEROID INJECTION INTO LUMBAR SPINE      JOINT REPLACEMENT   5/2018    LIPOMA RESECTION Right 01/23/2023    Procedure: EXCISION, LIPOMA;  Surgeon: Ruben Brooks Jr., MD;  Location: [facility];  Service: General;  Laterality: Right;    LUMBAR FUSION  2019    with cage    LUNG BIOPSY      NECK SURGERY  05/2018    with hardware    RADIOFREQUENCY ABLATION      cervical    RADIOFREQUENCY ABLATION      lumbar    SPINE SURGERY  05/2018    STAPEDECTOMY Right 05/28/2015    STAPEDECTOMY Left     TUBAL LIGATION  1/1990       Time Tracking:     PT Received On: 03/03/25  PT Start Time: 1135     PT Stop Time: 1143  PT Total Time (min): 8 min     Billable Minutes: Evaluation 8      03/03/2025

## 2025-03-03 NOTE — NURSING
Nitro was given x 3 doses and pt voiced that chest pain and breathing improved. UA obtained and sent to lab.

## 2025-03-03 NOTE — CARE UPDATE
03/02/25 1918   Patient Assessment/Suction   Level of Consciousness (AVPU) alert   Respiratory Effort Unlabored   Expansion/Accessory Muscles/Retractions no use of accessory muscles;no retractions   All Lung Fields Breath Sounds diminished;Anterior:;Posterior:   BELEN Breath Sounds diminished   LLL Breath Sounds diminished   RUL Breath Sounds diminished   RML Breath Sounds diminished   RLL Breath Sounds diminished   PRE-TX-O2   Device (Oxygen Therapy) room air   SpO2 97 %   Pulse Oximetry Type Intermittent   $ Pulse Oximetry - Single Charge Pulse Oximetry - Single   Pulse 97   Resp 20   Aerosol Therapy   $ Aerosol Therapy Charges Aerosol Treatment   Respiratory Treatment Status (SVN) given   Treatment Route (SVN) mask;air   Patient Position HOB elevated   Post Treatment Assessment (SVN) increased aeration   Signs of Intolerance (SVN) none   Breath Sounds Post-Respiratory Treatment   Throughout All Fields Post-Treatment All Fields   Throughout All Fields Post-Treatment aeration increased;Posterior:;Anterior:;diminished;coarse   Post-treatment Heart Rate (beats/min) 95   Post-treatment Resp Rate (breaths/min) 20

## 2025-03-03 NOTE — NURSING
Pt c/o chest pain that is sharp and constant. Vitals and stat EKG obtained. Hayley Plasencia, PONCHO notified.

## 2025-03-03 NOTE — PROGRESS NOTES
[facility] Medicine  Progress Note    Patient Name: Floresita Martin  MRN: [id number]  Patient Class: IP- Inpatient   Admission Date: 3/2/2025  Length of Stay: 1 days  Attending Physician: Michael Navarrete MD  Primary Care Provider: Toni Hughes MD        Subjective     Principal Problem:Multifocal pneumonia        HPI:  Floresita Martin is a 65 year female who presents emergency room complaining of shortness a breath, productive cough, and chest pain.  The symptoms onset approximately 1 week ago and have progressively worsened.  She endorses exertional dyspnea has progressively worsened.  She describes chest tightness  associated with coughing episodes.  She denies any fever or chills.  No known sick contacts or travel.  Previous medical history includes COPD, hypertension, smoker, hyperlipidemia, GERD, and recent hospitalization for Pseudomonas bacteremia.  ER workup:  CBC with thrombocytosis of 543.  CMP with albumin of 2.5 otherwise unremarkable.  Troponin elevated at 0.453.  D-dimer elevated at 6.46.  CTA of the chest was negative for PE but does demonstrates findings consistent with multifocal pneumonia.  Patient was started on Rocephin, IV doxycycline, Solu-Medrol, and blood cultures in the emergency room.  She was also given DuoNeb treatments.  Patient be admitted to Hospital Medicine for treatment and management.                  Overview/Hospital Course:  65-year-old female with PMH COPD, current smoker, HTN who was admitted for COPD exacerbation with multifocal pneumonia evidenced on CTA chest imaging.  Required minimal nasal cannula oxygen support.  Given IV antibiotics, nebulized breathing treatments corticosteroids.  Also had elevated troponins with associated chest pains given nitroglycerin.  Cardiology consulted.  Echo pending.    Interval History:  Seen on a.m. rounds.  Had chest pain overnight she thinks maybe was improved with nitroglycerin sublingual.  No current  chest pain.  Shortness of breath improved currently.  Admit still smokes cigarettes.      Objective:     Vital Signs (Most Recent):  Temp: 98 °F (36.7 °C) (03/03/25 1120)  Pulse: 105 (03/03/25 1146)  Resp: 20 (03/03/25 1146)  BP: 130/75 (03/03/25 1120)  SpO2: (!) 92 % (03/03/25 1146) Vital Signs (24h Range):  Temp:  [97.1 °F (36.2 °C)-98.2 °F (36.8 °C)] 98 °F (36.7 °C)  Pulse:  [] 105  Resp:  [16-23] 20  SpO2:  [91 %-100 %] 92 %  BP: (110-145)/(58-81) 130/75     Weight: 47.5 kg (104 lb 11.5 oz)  Body mass index is 19.79 kg/m².    Intake/Output Summary (Last 24 hours) at 3/3/2025 1150  Last data filed at 3/3/2025 0832  Gross per 24 hour   Intake 270 ml   Output 800 ml   Net -530 ml         Physical Exam  Vitals reviewed.   Constitutional:       General: She is not in acute distress.  HENT:      Head: Normocephalic and atraumatic.   Cardiovascular:      Rate and Rhythm: Regular rhythm. Tachycardia present.      Heart sounds: Normal heart sounds.   Pulmonary:      Effort: Pulmonary effort is normal. No respiratory distress.      Breath sounds: Rales (Mild, scattered) present. No wheezing or rhonchi.   Neurological:      General: No focal deficit present.      Mental Status: She is alert and oriented to person, place, and time. Mental status is at baseline.   Psychiatric:         Mood and Affect: Affect normal.         Behavior: Behavior normal.         Thought Content: Thought content normal.             Significant Labs: All pertinent labs within the past 24 hours have been reviewed.    Significant Imaging: I have reviewed all pertinent imaging results/findings within the past 24 hours.    Assessment and Plan     * Multifocal pneumonia  CTA chest report reviewed  -continue Rocephin and doxycycline  -follow up blood and respiratory cultures    Chest pain  Associated with elevated troponins which have trended down and pain improved with nitroglycerin  Suspect this is demand related due to her current  pneumonia/COPD exacerbation  -keep on telemetry  -check echo  -consult cardiology    Moderate protein-calorie malnutrition  Nutrition consulted. Most recent weight and BMI monitored-     Measurements:  Wt Readings from Last 1 Encounters:   03/02/25 47.5 kg (104 lb 11.5 oz)   Body mass index is 19.79 kg/m².    Patient will be screened and assessed by RD.      Hyponatremia  Hyponatremia is likely due to pneumonia. The patient's most recent sodium results are listed below.  Recent Labs     03/02/25  1434 03/03/25  0316   * 132*     Plan  - treat infection  - Monitor sodium Daily.   - Patient hyponatremia is improving    COPD exacerbation  Exacerbated from pneumonia   -O2 as needed  -continue antibiotics, nebulized breathing treatments, corticosteroids (decrease Medrol to 40 mg Q 8)    Dependence on nicotine from cigarettes  Counseled earlier in admission   -nicotine patch if requested      VTE Risk Mitigation (From admission, onward)           Ordered     IP VTE HIGH RISK PATIENT  Once         03/02/25 1718     Place sequential compression device  Until discontinued         03/02/25 1718     Place NAZANIN hose  Until discontinued         03/02/25 1718                    Discharge Planning   GRACE: 3/5/2025     Code Status: Full Code   Medical Readiness for Discharge Date:                   Michael Navarrete MD  Department of Hospital Medicine   [facility] [place]/Surg

## 2025-03-03 NOTE — PLAN OF CARE
[facility] - Med/Surg  Initial Discharge Assessment       Primary Care Provider: Toni Hughes MD    Admission Diagnosis: Respiratory failure with hypoxia [J96.91]    Admission Date: 3/2/2025  Expected Discharge Date: 3/5/2025    Transition of Care Barriers: None    Payor: HUMA[employer] MANAGED MEDICARE / Plan: HU[employer]NA SNP HMO PPO SPECIAL NEEDS / Product Type: Medicare Advantage /     Extended Emergency Contact Information  Primary Emergency Contact: Lilly Cabrera [facility]  Home Phone: [phone]  Mobile Phone: [phone]  Relation: Sister  Preferred language: English   needed? No  Secondary Emergency Contact: Rohan Fernandes  Mobile Phone: [phone]  Relation: Spouse    Discharge Plan A: Home with family  Discharge Plan B: Home      [facility]  [address]  [place] [address]  Phone: [phone] Fax: [phone]    [id number] - [place] - [address]  [address] [place] [address]  Phone: [phone] Fax: [phone]    DC assessment completed at bedside with pt, information on facesheet verified. Lives at listed address with significant other, Rohan, who will drive her home. PCP is Dr. Hughes. Pharm is [facility]. Denies blood thinners/hh/hd. Dme- neb. Independent, drives self. Insurance verified. Denies POA. Denies recent admission. Planning to DC Home when clear.     Initial Assessment (most recent)       Adult Discharge Assessment - 03/03/25 1214          Discharge Assessment    Assessment Type Discharge Planning Assessment     Confirmed/corrected address, phone number and insurance Yes     Confirmed Demographics Correct on Facesheet     Source of Information patient     Communicated GRACE with patient/caregiver Yes     People in Home significant other     Facility Arrived From: home     Do you expect to return to your current living situation?  Yes     Do you have help at home or someone to help you manage your care at home? Yes     Who are your caregiver(s) and their phone number(s)? significant other     Prior to hospitilization cognitive status: Alert/Oriented     Current cognitive status: Alert/Oriented     Equipment Currently Used at Home nebulizer     Readmission within 30 days? No     Patient currently being followed by outpatient case management? No     Do you currently have service(s) that help you manage your care at home? No     Do you take prescription medications? Yes     Do you have prescription coverage? Yes     Do you have any problems affording any of your prescribed medications? No     Is the patient taking medications as prescribed? yes     Who is going to help you get home at discharge? significant other, Rohan     How do you get to doctors appointments? car, drives self     Are you on dialysis? No     Do you take coumadin? No     Discharge Plan A Home with family     Discharge Plan B Home     DME Needed Upon Discharge  none     Discharge Plan discussed with: Patient     Transition of Care Barriers None

## 2025-03-03 NOTE — ASSESSMENT & PLAN NOTE
CTA chest report reviewed  -continue Rocephin and doxycycline  -follow up blood and respiratory cultures

## 2025-03-03 NOTE — ASSESSMENT & PLAN NOTE
Exacerbated from pneumonia   -O2 as needed  -continue antibiotics, nebulized breathing treatments, corticosteroids (decrease Medrol to 40 mg Q 8)

## 2025-03-03 NOTE — ASSESSMENT & PLAN NOTE
Hyponatremia is likely due to pneumonia. The patient's most recent sodium results are listed below.  Recent Labs     03/02/25  1434 03/03/25  0316   * 132*     Plan  - treat infection  - Monitor sodium Daily.   - Patient hyponatremia is improving

## 2025-03-03 NOTE — CARE UPDATE
03/03/25 0751   Patient Assessment/Suction   Level of Consciousness (AVPU) alert   Respiratory Effort Unlabored   Expansion/Accessory Muscles/Retractions no use of accessory muscles;no retractions;expansion symmetric   All Lung Fields Breath Sounds Anterior:;Lateral:;Posterior:;diminished   Rhythm/Pattern, Respiratory unlabored;no shortness of breath reported   Cough Frequency infrequent   Cough Type dry;no productive sputum   PRE-TX-O2   Device (Oxygen Therapy) room air   SpO2 99 %   Pulse Oximetry Type Intermittent   $ Pulse Oximetry - Multiple Charge Pulse Oximetry - Multiple   Pulse 96   Resp 16   Aerosol Therapy   $ Aerosol Therapy Charges Aerosol Treatment   Respiratory Treatment Status (SVN) given   Treatment Route (SVN) mask;air   Patient Position HOB elevated   Post Treatment Assessment (SVN) increased aeration   Signs of Intolerance (SVN) none   Breath Sounds Post-Respiratory Treatment   Throughout All Fields Post-Treatment All Fields   Throughout All Fields Post-Treatment aeration increased   Post-treatment Heart Rate (beats/min) 95   Post-treatment Resp Rate (breaths/min) 16

## 2025-03-03 NOTE — CONSULTS
[facility]/Surg  Department of Cardiology  Consult Note      PATIENT NAME: Floresita Martin    MRN: [id number]  TODAY'S DATE: 2025  ADMIT DATE: 3/2/2025                          CONSULT REQUESTED BY: Michael Navarrete MD    SUBJECTIVE     PRINCIPAL PROBLEM: Multifocal pneumonia      REASON FOR CONSULT:  Chest pain elevated troponins      HPI:  Ms. Martin is a 65-year-old female with history of COPD, smoker, hypertension.  She presented to the emergency room complaining of shortness of breath sharp chest pain, gasping for air.  She has been coughing clear sputum she denies any chills any fever.  CTA of the chest revealed multifocal pneumonia.  Her troponins have been mildly elevated.  Her EKG revealed diffuse T-wave inversion.  Upon physical examination she is exquisitely tender to the left side of her chest wall.  Otherwise she has been extremely active without any symptoms of chest pain shortness of breath nor palpitations.        Review of patient's allergies indicates:   Allergen Reactions    Acetaminophen-codeine Nausea And Vomiting    Codeine Nausea And Vomiting       Past Medical History:   Diagnosis Date    Alcohol abuse     Cellulitis of toe of right foot 2019    COPD (chronic obstructive pulmonary disease)     Digestive disorder     Headache     Hearing loss     HLD (hyperlipidemia)      Past Surgical History:   Procedure Laterality Date    BACK SURGERY       SECTION      2    COLONOSCOPY N/A 11/15/2017    Procedure: COLONOSCOPY;  Surgeon: Peg Powers MD;  Location: [facility];  Service: Endoscopy;  Laterality: N/A;    EPIDURAL STEROID INJECTION INTO CERVICAL SPINE      EPIDURAL STEROID INJECTION INTO LUMBAR SPINE      JOINT REPLACEMENT  2018    LIPOMA RESECTION Right 2023    Procedure: EXCISION, LIPOMA;  Surgeon: Ruben Brooks Jr., MD;  Location: [facility] OR;  Service: General;  Laterality: Right;    LUMBAR FUSION  2019    with cage    LUNG BIOPSY      NECK  SURGERY  05/2018    with hardware    RADIOFREQUENCY ABLATION      cervical    RADIOFREQUENCY ABLATION      lumbar    SPINE SURGERY  05/2018    STAPEDECTOMY Right 05/28/2015    STAPEDECTOMY Left     TUBAL LIGATION  1/1990     Social History[1]     REVIEW OF SYSTEMS  CONSTITUTIONAL: Negative for chills, fatigue and fever.   EYES: No double vision, No blurred vision  NEURO: No headaches, No dizziness  RESPIRATORY:  Positive for cough shortness of breath.    CARDIOVASCULAR:  Positive for sharp chest pain  GI: Negative for abdominal pain, No melena, diarrhea, nausea and vomiting.   : Negative for dysuria and frequency, Negative for hematuria  SKIN: Negative for bruising, Negative for edema or discoloration noted.       OBJECTIVE     VITAL SIGNS (Most Recent)  Temp: 98 °F (36.7 °C) (03/03/25 1120)  Pulse: 105 (03/03/25 1146)  Resp: 20 (03/03/25 1146)  BP: 130/75 (03/03/25 1120)  SpO2: (!) 92 % (03/03/25 1146)    VENTILATION STATUS  Resp: 20 (03/03/25 1146)  SpO2: (!) 92 % (03/03/25 1146)           I & O (Last 24H):  Intake/Output Summary (Last 24 hours) at 3/3/2025 1210  Last data filed at 3/3/2025 0832  Gross per 24 hour   Intake 270 ml   Output 800 ml   Net -530 ml       WEIGHTS  Wt Readings from Last 1 Encounters:   03/02/25 1800 47.5 kg (104 lb 11.5 oz)   03/02/25 1423 45.4 kg (100 lb)       PHYSICAL EXAM  GENERAL: well built, well nourished, well-developed in no apparent distress alert and oriented.   HEENT: Normocephalic. Pupils normal and conjunctivae normal.  Mucous membranes normal, no cyanosis or icterus, trachea central,no pallor or icterus is noted..   NECK: No JVD. No bruit..    CARDIAC: Regular rate and rhythm. S1 is normal.S2 is normal.No gallops, clicks or murmurs noted at this time.  CHEST ANATOMY:  Tender to palpation on the left side  LUNGS:  Bilateral crackles and rhonchi eyes  ABDOMEN: Soft no masses or organomegaly.  No abdomen pulsations or bruits.  Normal bowel sounds. No pulsations and no masses  felt, No guarding or rebound.   URINARY: No ferrell catheter   EXTREMITIES: No cyanosis, clubbing or edema noted at this time., no calf tenderness bilaterally.     HOME MEDICATIONS:Medications Ordered Prior to Encounter[2]    SCHEDULED MEDS:   albuterol-ipratropium  3 mL Nebulization Q4H WAKE    amitriptyline  25 mg Oral QHS    aspirin  81 mg Oral Daily    atorvastatin  40 mg Oral Daily    cefTRIAXone (Rocephin) IV (PEDS and ADULTS)  2 g Intravenous Q24H    doxycycline IV (PEDS and ADULTS)  100 mg Intravenous Q12H    gabapentin  300 mg Oral TID    methylPREDNISolone injection (PEDS and ADULTS)  40 mg Intravenous Q8H    nicotine  1 patch Transdermal Daily    oxyCODONE  20 mg Oral 5x Daily    senna-docusate 8.6-50 mg  1 tablet Oral BID       CONTINUOUS INFUSIONS:    PRN MEDS:  Current Facility-Administered Medications:     acetaminophen, 650 mg, Oral, Q4H PRN    acetaminophen, 650 mg, Oral, Q6H PRN    aluminum-magnesium hydroxide-simethicone, 30 mL, Oral, QID PRN    dextrose 10%, 12.5 g, Intravenous, PRN    dextrose 10%, 25 g, Intravenous, PRN    glucagon (human recombinant), 1 mg, Intramuscular, PRN    glucose, 16 g, Oral, PRN    glucose, 24 g, Oral, PRN    magnesium oxide, 800 mg, Oral, PRN    magnesium oxide, 800 mg, Oral, PRN    melatonin, 9 mg, Oral, Nightly PRN    naloxone, 0.02 mg, Intravenous, PRN    nitroGLYCERIN, 0.4 mg, Sublingual, Q5 Min PRN    ondansetron, 4 mg, Intravenous, Q8H PRN    potassium bicarbonate, 35 mEq, Oral, PRN    potassium bicarbonate, 50 mEq, Oral, PRN    potassium bicarbonate, 60 mEq, Oral, PRN    potassium, sodium phosphates, 2 packet, Oral, PRN    potassium, sodium phosphates, 2 packet, Oral, PRN    potassium, sodium phosphates, 2 packet, Oral, PRN    simethicone, 1 tablet, Oral, QID PRN    sodium chloride 0.9%, 10 mL, Intravenous, Q8H PRN    LABS AND DIAGNOSTICS     CBC LAST 3 DAYS  Recent Labs   Lab 03/02/25  1434 03/03/25  0316   WBC 8.88 5.54   RBC 3.75* 3.89*   HGB 10.3* 10.6*   HCT  "31.1* 32.2*   MCV 83 83   MCH 27.5 27.2   MCHC 33.1 32.9   RDW 17.9* 17.8*   * 554*   MPV 8.4* 8.1*   GRAN 63.2  5.6 66.9  3.7   LYMPH 25.5  2.3 27.6  1.5   MONO 9.5  0.8 1.3*  0.1*   BASO 0.02 0.01   NRBC 0 0       COAGULATION LAST 3 DAYS  No results for input(s): "LABPT", "INR", "APTT" in the last 168 hours.    CHEMISTRY LAST 3 DAYS  Recent Labs   Lab 03/02/25  1434 03/03/25  0316   * 132*   K 3.8 3.7   CL 96 95   CO2 21* 23   ANIONGAP 13 14   BUN 7* 8   CREATININE 0.5 0.6   * 124*   CALCIUM 9.5 9.5   MG  --  1.7   ALBUMIN 2.5* 2.3*   PROT 7.2 7.1   ALKPHOS 170* 162*   ALT 25 20   AST 31 30   BILITOT 0.2 0.1       CARDIAC PROFILE LAST 3 DAYS  Recent Labs   Lab 03/02/25  1434 03/02/25  1858 03/03/25  0258   BNP 95  --   --    TROPONINI 0.453* 0.362* 0.275*       ENDOCRINE LAST 3 DAYS  No results for input(s): "TSH", "PROCAL" in the last 168 hours.    LAST ARTERIAL BLOOD GAS  ABG  No results for input(s): "PH", "PO2", "PCO2", "HCO3", "BE" in the last 168 hours.    LAST 7 DAYS MICROBIOLOGY   Microbiology Results (last 7 days)       Procedure Component Value Units Date/Time    Culture, Respiratory with Gram Stain [[id number]]     Order Status: No result Specimen: Respiratory     Blood culture x two cultures. Draw prior to antibiotics. [[id number]] Collected: 03/02/25 1530    Order Status: Completed Specimen: Blood from Peripheral, Antecubital, Left Updated: 03/03/25 0158     Blood Culture, Routine No Growth to date    Narrative:      Aerobic and anaerobic    Blood culture x two cultures. Draw prior to antibiotics. [[id number]] Collected: 03/02/25 1530    Order Status: Completed Specimen: Blood from Peripheral, Antecubital, Right Updated: 03/03/25 0158     Blood Culture, Routine No Growth to date    Narrative:      Aerobic and anaerobic    Influenza A & B by Molecular [[id number]] Collected: 03/02/25 1452    Order Status: Completed Specimen: Nasopharyngeal Swab Updated: 03/02/25 1620     " Influenza A, Molecular Negative     Influenza B, Molecular Negative     Flu A & B Source Nasal swab            MOST RECENT IMAGING  CTA Chest Non-Coronary (PE Studies)  Narrative: EXAMINATION:  CTA CHEST NON CORONARY (PE STUDIES)    CLINICAL HISTORY:  Pulmonary embolism (PE) suspected, high prob;    TECHNIQUE:  Low dose axial images, sagittal and coronal reformations were obtained from the thoracic inlet to the lung bases following the IV administration of 60    ML of Omnipaque 350.  Contrast timing was optimized to evaluate the pulmonary arteries.  MIP images were performed.    COMPARISON:  March 2, 2025    FINDINGS:  There is evidence of prior lower cervical spine surgery.  There is no CT angiography evidence acute pulmonary thromboembolic disease.  There is a 14 mm short axis right paratracheal lymph node which is enlarged.  There is a low right paratracheal 14 mm short axis lymph node.  There is an enlarged subcarinal lymph node measuring 14 mm.  Hilar lymph nodes are seen bilaterally measuring as large as 9 mm on the right and 9 mm on the left.  There is a small left-sided pleural effusion.  The adrenal glands are unremarkable.  Main pulmonary artery is borderline enlarged at 29 mm.  The ascending aorta is within normal limits at 29 mm.    Multiple nodules are seen including a 4 mm nodule in the left upper lobe.  There are areas of air trapping suggesting centrilobular emphysema.  Patchy airspace disease with a peripheral pattern is seen in the left lower lobe and lingula.  There is fairly extensive somewhat reticular airspace disease throughout much of the right hemithorax with more nodular and confluent foci seen in the right perihilum and in the right upper lobe. This report was flagged in Epic as abnormal.  Impression: No CT angiography evidence of acute pulmonary thromboembolic disease.multiple areas of nodularity and interstitial scarring which are chronic in this patient with underlying  emphysema.    Extensive areas of left upper lobe, left lower lobe and right lung airspace disease and nodularity concerning for multifocal pneumonia.  Follows up is advised to resolution    Electronically signed by: Toni Blanc MD  Date:    03/02/2025  Time:    15:49  X-Ray Chest AP Portable  Narrative: EXAMINATION:  XR CHEST AP PORTABLE    CLINICAL HISTORY:  Chest Pain;    TECHNIQUE:  Single frontal view of the chest was performed.    COMPARISON:  September 24, 2024    FINDINGS:  There is evidence of lower cervical spine surgery.  There is peripheral acute airspace disease superimposed upon chronic bilateral interstitial changes.  Areas of pneumonia or asymmetric CHF could produce this appearance.  The heart is not enlarged.  The aorta is mildly tortuous.  Is advised.  This report was flagged in Epic as abnormal.  Impression: New bilateral airspace disease superimposed upon interstitial changes.  Follow-up to resolution    Electronically signed by: Toni Blanc MD  Date:    03/02/2025  Time:    14:56      ECHOCARDIOGRAM RESULTS (last 5)  Results for orders placed during the hospital encounter of 05/21/24    Echo    Interpretation Summary    Left Ventricle: The left ventricle is normal in size. Normal wall thickness. There is concentric hypertrophy. Moderate global hypokinesis present. There is mildly reduced systolic function with a visually estimated ejection fraction of 40 - 45%. Biplane (2D) method of discs ejection fraction is 44%. There is normal diastolic function.    Right Ventricle: Normal right ventricular cavity size. Wall thickness is normal. Systolic function is normal.    IVC/SVC: Normal venous pressure at 3 mmHg.      Results for orders placed during the hospital encounter of 03/24/24    Echo    Interpretation Summary    Left Ventricle: There is normal systolic function with a visually estimated ejection fraction of 55 - 60%. There is normal diastolic function.    Right Ventricle: Normal right  ventricular cavity size. Wall thickness is normal. Right ventricle wall motion  is normal. Systolic function is normal.    IVC/SVC: Normal venous pressure at 3 mmHg.      Results for orders placed in visit on 04/13/21    Echo Color Flow Doppler? Yes    Interpretation Summary  · The left ventricle is normal in size with normal systolic function.  · The estimated ejection fraction is 60%.  · Normal left ventricular diastolic function.  · Normal right ventricular size with normal right ventricular systolic function.  · Tricuspid Valve There is trace regurgitation  · Normal central venous pressure (3 mmHg).  · The estimated PA systolic pressure is 30 mmHg.      Results for orders placed in visit on 11/09/17    Exercise stress echo with color flow    Narrative  Date of Procedure: 11/09/2017    PRE-TEST DATA  EKG: Resting electrocardiogram reveals sinus rhythm at a rate of 67 bpm.    TEST DESCRIPTION  The patient exercised for 8.0 minutes on a High Ramp protocol, corresponding to a functional capacity of 15 estimated METS, achieving a peak heart rate of 148 bpm, which is 94% of the age predicted maximum heart rate.    There were no significant electrocardiographic changes throughout the protocol suggesting ischemia.    EKG Conclusions:    1. The EKG portion of this study is negative for ischemia at a high workload, and peak heart rate of 148 bpm (94% of predicted).  2. Blood pressure response to exercise was normal (Presenting BP: 117/74 Peak BP: 178/66).  3. No significant arrhythmias were present.  4. There were no symptoms of chest discomfort or significant dyspnea throughout the protocol.  5. The Martin treadmill score was 8 suggesting a low probability for future cardiovascular events.    Echocardiographic Description:      Aorta: The aortic root is normal in size, measuring 2.4 cm at sinotubular junction and 2.6 cm at Sinuses of Valsalva. The proximal ascending aorta is normal in size, measuring 2.7 cm across.    Left  Atrium: The left atrial volume index is normal, measuring 16.41 cc/m2.    Left Ventricle: The left ventricle is normal in size, with an end-diastolic diameter of 4.3 cm, and an end-systolic diameter of 2.9 cm. LV wall thickness is normal, with the septum measuring 0.7 cm and the posterior wall measuring 0.6 cm across. Relative  wall thickness was normal at 0.28, and the LV mass index was 58.0 g/m2 consistent with normal left ventricular mass. There are no regional wall motion abnormalities. Left ventricular systolic function appears normal. Visually estimated ejection fraction  is 55-60%. The LV Doppler derived stroke volume equals 60.0 ccs.    Diastolic indices: E wave velocity 0.6 m/s, E/A ratio 1.0,  msec., E/e' ratio(sep) 5. Diastolic function is normal.    Right Atrium: The right atrium is normal in size, measuring 2.9 cm in length and 2.2 cm in width in the apical view.    Right Ventricle: The right ventricle is normal in size measuring 1.7 cm at the base in the apical right ventricle-focused view. Global right ventricular systolic function appears normal. Tricuspid annular plane systolic excursion (TAPSE) is 1.9 cm.  Tissue Doppler-derived tricuspid annular peak systolic velocity (S prime) is .1 cm/s. The estimated PA systolic pressure is greater than 14 mmHg.    Aortic Valve:  The aortic valve is mildly sclerotic. The aortic valve is tri-leaflet in structure.    Mitral Valve:  The mitral valve is mildly sclerotic. There is trivial mitral regurgitation.    Tricuspid Valve:  The tricuspid valve is not well seen.    Pulmonary Valve:  The pulmonic valve is not well seen.    Intracavitary: There is no evidence of pericardial effusion, intracavity mass, thrombi, or vegetation.    Post Exercise Imaging:    Immediate post exercise images demonstrate left ventricular function augmenting.    The left ventricle is globally hyperdynamic and no exercise induced wall motion abnormalities were  identified.      CONCLUSIONS  1 - Normal left ventricular systolic function (EF 55-60%).  2 - The estimated PA systolic pressure is greater than 14 mmHg.  3 - Trivial mitral regurgitation.  4 - No wall motion abnormalities.    No evidence of stress induced myocardial ischemia.        This document has been electronically  SIGNED BY: Crow Curiel MD On: 11/14/2017 17:49      CURRENT/PREVIOUS VISIT EKG  Results for orders placed or performed during the hospital encounter of 05/21/24   EKG 12-lead    Collection Time: 05/21/24  3:30 PM   Result Value Ref Range    QRS Duration 96 ms    [facility] QTC Calculation 459 ms    Narrative    Test Reason : R06.02,    Vent. Rate : 089 BPM     Atrial Rate : 089 BPM     P-R Int : 172 ms          QRS Dur : 096 ms      QT Int : 378 ms       P-R-T Axes : 029 085 038 degrees     QTc Int : 459 ms    Normal sinus rhythm  Low voltage QRS  Borderline Abnormal ECG  When compared with ECG of 17-JAN-2023 14:53,  No significant change was found  Confirmed by Trevor Salvador MD [id number]) on 5/24/2024 12:09:59 PM    Referred By: MORGAN PARADA           Confirmed By:Trevor Salvador MD           ASSESSMENT/PLAN:     Active Hospital Problems    Diagnosis    *Multifocal pneumonia    Chest pain    Moderate protein-calorie malnutrition    Hyponatremia    COPD exacerbation    Dependence on nicotine from cigarettes       ASSESSMENT & PLAN:   Multifocal pneumonia been treated with antibiotics.  Chest pains appear to be chest wall in origin.  Echocardiogram is pending at present.  Mildly elevated troponin possible due to infectious and demand ischemia from her pneumonia  COPD exacerbation  Nicotine dependence      RECOMMENDATIONS:  Obtain an echocardiogram to assess for wall motion abnormalities and potential pericardial effusion.  Continue antibiotic therapy for her multifocal pneumonia      Thank you for the consult. I will continue to follow the patient and provide recommendations as needed.    Jadiel BARILLAS  MD [place]  Date of Service: 2025  12:10 PM       [1]   Social History  Tobacco Use    Smoking status: Every Day     Current packs/day: 0.00     Average packs/day: 0.5 packs/day for 46.4 years (23.2 ttl pk-yrs)     Types: Cigarettes     Start date: 1978     Last attempt to quit: 2024     Years since quittin.7     Passive exposure: Past    Smokeless tobacco: Never    Tobacco comments:     Patient has smoked since a teenager, states that she has cut down from 2pk/day to .5pk/day. Can quit on her own when she's ready. Information and education on outpatient smoking cessation when ready. 3/26/24     Smokes 10 cigarettes daily 2024   Substance Use Topics    Alcohol use: Not Currently    Drug use: No   [2]   No current facility-administered medications on file prior to encounter.     Current Outpatient Medications on File Prior to Encounter   Medication Sig Dispense Refill    albuterol (PROVENTIL/VENTOLIN HFA) 90 mcg/actuation inhaler Inhale 2 puffs into the lungs every 6 (six) hours as needed for Wheezing or Shortness of Breath. Rescue 18 g 11    albuterol-ipratropium (DUO-NEB) 2.5 mg-0.5 mg/3 mL nebulizer solution Take 3 mLs by nebulization every 6 (six) hours as needed for Wheezing or Shortness of Breath. Rescue 75 mL 11    amitriptyline (ELAVIL) 25 MG tablet Take 1-2 tablet by mouth every night at bedtime (Patient taking differently: Take 1-2 tablets by mouth every evening.) 60 tablet 1    atorvastatin (LIPITOR) 40 MG tablet TAKE 1 TABLET BY MOUTH DAILY (Patient taking differently: Take 40 mg by mouth once daily.) 90 tablet 0    co-enzyme Q-10 30 mg capsule Take 30 mg by mouth once daily.      cyclobenzaprine (FLEXERIL) 10 MG tablet Take 1 tablet by mouth three times a day as needed (Patient taking differently: Take 10 mg by mouth 3 (three) times daily as needed for Muscle spasms.) 90 tablet 1    fluticasone propionate (FLONASE) 50 mcg/actuation nasal spray SHAKE LIQUID AND USE 1 SPRAY(50  MCG) IN EACH NOSTRIL EVERY DAY (Patient taking differently: 1 spray by Each Nostril route once daily.) 48 g 1    gabapentin (NEURONTIN) 300 MG capsule Take 1 capsule by mouth three times a day as directed More than a 7 day supply are medically necessary (Patient taking differently: Take 300 mg by mouth 3 (three) times daily.) 90 capsule 1    lactulose (CHRONULAC) 10 gram/15 mL solution 15 ml twice a day as needed (Patient taking differently: Take 10 g by mouth 2 (two) times daily as needed (Constipation).) 946 mL 1    loratadine (CLARITIN) 10 mg tablet TAKE 1 TABLET BY MOUTH DAILY (Patient taking differently: Take 10 mg by mouth daily as needed for Allergies.) 90 tablet 2    morphine (MS CONTIN) 30 MG 12 hr tablet Take 1 tablet by mouth every twelve hours More than 7 days supply is medically necessary (Patient taking differently: Take 30 mg by mouth every 12 (twelve) hours.) 60 tablet 0    multivitamin capsule Take 1 capsule by mouth once daily.      omeprazole (PRILOSEC) 40 MG capsule TAKE 1 CAPSULE(40 MG) BY MOUTH EVERY DAY (Patient taking differently: Take 40 mg by mouth Daily.) 90 capsule 3    [START ON 3/18/2025] oxyCODONE (ROXICODONE) 20 mg Tab immediate release tablet Take 1 tablet by mouth five times a day as needed for pain More than a 7 day supply are medically necessary (Patient taking differently: Take 20 mg by mouth As instructed for Pain. Five times daily as needed for pain) 150 tablet 0    amitriptyline (ELAVIL) 25 MG tablet Take 1-2 tablets (25-50 mg total) by mouth nightly at bedtime. 60 tablet 1    cyclobenzaprine (FLEXERIL) 10 MG tablet Take 1 tablet (10 mg total) by mouth 3 (three) times daily as needed. (Patient taking differently: Take 10 mg by mouth 3 (three) times daily as needed for Muscle spasms.) 90 tablet 1    diclofenac sodium (PENNSAID) 20 mg/gram /actuation(2 %) sopm apply a small amount to skin twice a day (Patient not taking: Reported on 3/2/2025) 224 g 1    lactulose (CHRONULAC) 10  gram/15 mL solution Take 15 mLs (10 g total) by mouth 2 (two) times daily as needed. 900 mL 1    losartan (COZAAR) 50 MG tablet Take 1 tablet (50 mg total) by mouth once daily. (Patient not taking: Reported on 3/2/2025) 90 tablet 3    magnesium oxide (MAG-OX) 400 mg (241.3 mg magnesium) tablet Take 1 tablet (400 mg total) by mouth once daily. (Patient not taking: Reported on 3/2/2025) 90 tablet 1    [START ON 3/28/2025] morphine (MS CONTIN) 30 MG 12 hr tablet Take 1 tablet by mouth every twelve hours More than 7 days supply is medically necessary 60 tablet 0    naloxone (NARCAN) 4 mg/actuation Spry Spray 1 spray into one nostril single dose may repeat every 2-3 minutes until responsive or EMS arrives 1 each 1    nicotine (NICODERM CQ) 14 mg/24 hr Place 1 patch onto the skin once daily. (Patient not taking: Reported on 3/2/2025) 30 patch 2    oxyCODONE (ROXICODONE) 20 mg Tab immediate release tablet Take 1 tablet (20 mg total) by mouth 5 (five) times daily as neede dfor pain. 150 tablet 0    pregabalin (LYRICA) 100 MG capsule Take 1 capsule by mouth three times a day More than 7 days supply is medically necessary (Patient not taking: Reported on 3/2/2025) 90 capsule 1    pregabalin (LYRICA) 75 MG capsule Take 1 capsule by mouth three times a day More than 7 days supply is medically necessary (Patient not taking: Reported on 3/2/2025) 90 capsule 1

## 2025-03-03 NOTE — SUBJECTIVE & OBJECTIVE
Interval History:  Seen on a.m. rounds.  Had chest pain overnight she thinks maybe was improved with nitroglycerin sublingual.  No current chest pain.  Shortness of breath improved currently.  Admit still smokes cigarettes.      Objective:     Vital Signs (Most Recent):  Temp: 98 °F (36.7 °C) (03/03/25 1120)  Pulse: 105 (03/03/25 1146)  Resp: 20 (03/03/25 1146)  BP: 130/75 (03/03/25 1120)  SpO2: (!) 92 % (03/03/25 1146) Vital Signs (24h Range):  Temp:  [97.1 °F (36.2 °C)-98.2 °F (36.8 °C)] 98 °F (36.7 °C)  Pulse:  [] 105  Resp:  [16-23] 20  SpO2:  [91 %-100 %] 92 %  BP: (110-145)/(58-81) 130/75     Weight: 47.5 kg (104 lb 11.5 oz)  Body mass index is 19.79 kg/m².    Intake/Output Summary (Last 24 hours) at 3/3/2025 1150  Last data filed at 3/3/2025 0832  Gross per 24 hour   Intake 270 ml   Output 800 ml   Net -530 ml         Physical Exam  Vitals reviewed.   Constitutional:       General: She is not in acute distress.  HENT:      Head: Normocephalic and atraumatic.   Cardiovascular:      Rate and Rhythm: Regular rhythm. Tachycardia present.      Heart sounds: Normal heart sounds.   Pulmonary:      Effort: Pulmonary effort is normal. No respiratory distress.      Breath sounds: Rales (Mild, scattered) present. No wheezing or rhonchi.   Neurological:      General: No focal deficit present.      Mental Status: She is alert and oriented to person, place, and time. Mental status is at baseline.   Psychiatric:         Mood and Affect: Affect normal.         Behavior: Behavior normal.         Thought Content: Thought content normal.             Significant Labs: All pertinent labs within the past 24 hours have been reviewed.    Significant Imaging: I have reviewed all pertinent imaging results/findings within the past 24 hours.

## 2025-03-03 NOTE — HOSPITAL COURSE
65-year-old female with PMH COPD, current smoker, HTN who was admitted for COPD exacerbation with multifocal pneumonia evidenced on CTA chest imaging.  Required minimal nasal cannula oxygen support.  Given IV antibiotics, nebulized breathing treatments corticosteroids.  Also had elevated troponins with associated chest pains given nitroglycerin.  Cardiology consulted.  Echo showed EF 49% and G1DD.  Condition trended and improved.  Qualified for home O2 with 2 L with activity.  Home O2 set up.  To continue course of antibiotics and prednisone.   Patient seen and examined on day of discharge.    Physical exam on day of discharge:  Constitutional:       General: She is not in acute distress.  HENT:      Head: Normocephalic and atraumatic.   Cardiovascular:      Rate and Rhythm: Regular rhythm. Tachycardia present.      Heart sounds: Normal heart sounds.   Pulmonary:      Effort: Pulmonary effort is normal. No respiratory distress.      Breath sounds: Rales (Mild, scattered) present. No wheezing or rhonchi.   Neurological:      General: No focal deficit present.      Mental Status: She is alert and oriented to person, place, and time. Mental status is at baseline.   Psychiatric:         Mood and Affect: Affect normal.         Behavior: Behavior normal.         Thought Content: Thought content normal.

## 2025-03-04 VITALS
HEIGHT: 61 IN | RESPIRATION RATE: 18 BRPM | WEIGHT: 104 LBS | HEART RATE: 100 BPM | DIASTOLIC BLOOD PRESSURE: 76 MMHG | BODY MASS INDEX: 19.63 KG/M2 | TEMPERATURE: 99 F | OXYGEN SATURATION: 91 % | SYSTOLIC BLOOD PRESSURE: 130 MMHG

## 2025-03-04 LAB
ANION GAP SERPL CALC-SCNC: 8 MMOL/L (ref 8–16)
BUN SERPL-MCNC: 10 MG/DL (ref 8–23)
CALCIUM SERPL-MCNC: 9.3 MG/DL (ref 8.7–10.5)
CHLORIDE SERPL-SCNC: 98 MMOL/L (ref 95–110)
CO2 SERPL-SCNC: 26 MMOL/L (ref 23–29)
CREAT SERPL-MCNC: 0.7 MG/DL (ref 0.5–1.4)
ERYTHROCYTE [DISTWIDTH] IN BLOOD BY AUTOMATED COUNT: 17.8 % (ref 11.5–14.5)
EST. GFR  (NO RACE VARIABLE): >60 ML/MIN/1.73 M^2
GLUCOSE SERPL-MCNC: 135 MG/DL (ref 70–110)
HCT VFR BLD AUTO: 30.4 % (ref 37–48.5)
HGB BLD-MCNC: 10 G/DL (ref 12–16)
MAGNESIUM SERPL-MCNC: 2.2 MG/DL (ref 1.6–2.6)
MCH RBC QN AUTO: 27.2 PG (ref 27–31)
MCHC RBC AUTO-ENTMCNC: 32.9 G/DL (ref 32–36)
MCV RBC AUTO: 83 FL (ref 82–98)
OHS QRS DURATION: 88 MS
OHS QRS DURATION: 90 MS
OHS QTC CALCULATION: 459 MS
OHS QTC CALCULATION: 493 MS
PHOSPHATE SERPL-MCNC: 3.9 MG/DL (ref 2.7–4.5)
PLATELET # BLD AUTO: 606 K/UL (ref 150–450)
PMV BLD AUTO: 8.2 FL (ref 9.2–12.9)
POTASSIUM SERPL-SCNC: 4.7 MMOL/L (ref 3.5–5.1)
RBC # BLD AUTO: 3.68 M/UL (ref 4–5.4)
SODIUM SERPL-SCNC: 132 MMOL/L (ref 136–145)
WBC # BLD AUTO: 9.8 K/UL (ref 3.9–12.7)

## 2025-03-04 PROCEDURE — 94618 PULMONARY STRESS TESTING: CPT

## 2025-03-04 PROCEDURE — 80048 BASIC METABOLIC PNL TOTAL CA: CPT | Performed by: STUDENT IN AN ORGANIZED HEALTH CARE EDUCATION/TRAINING PROGRAM

## 2025-03-04 PROCEDURE — S4991 NICOTINE PATCH NONLEGEND: HCPCS | Performed by: NURSE PRACTITIONER

## 2025-03-04 PROCEDURE — 99900031 HC PATIENT EDUCATION (STAT)

## 2025-03-04 PROCEDURE — 83735 ASSAY OF MAGNESIUM: CPT | Performed by: NURSE PRACTITIONER

## 2025-03-04 PROCEDURE — 27000221 HC OXYGEN, UP TO 24 HOURS

## 2025-03-04 PROCEDURE — 25000003 PHARM REV CODE 250

## 2025-03-04 PROCEDURE — 94640 AIRWAY INHALATION TREATMENT: CPT

## 2025-03-04 PROCEDURE — 94761 N-INVAS EAR/PLS OXIMETRY MLT: CPT

## 2025-03-04 PROCEDURE — 25000242 PHARM REV CODE 250 ALT 637 W/ HCPCS: Performed by: NURSE PRACTITIONER

## 2025-03-04 PROCEDURE — 84100 ASSAY OF PHOSPHORUS: CPT | Performed by: NURSE PRACTITIONER

## 2025-03-04 PROCEDURE — 25000003 PHARM REV CODE 250: Performed by: STUDENT IN AN ORGANIZED HEALTH CARE EDUCATION/TRAINING PROGRAM

## 2025-03-04 PROCEDURE — 25000003 PHARM REV CODE 250: Performed by: NURSE PRACTITIONER

## 2025-03-04 PROCEDURE — 85027 COMPLETE CBC AUTOMATED: CPT | Performed by: STUDENT IN AN ORGANIZED HEALTH CARE EDUCATION/TRAINING PROGRAM

## 2025-03-04 PROCEDURE — 63600175 PHARM REV CODE 636 W HCPCS: Mod: JZ,TB | Performed by: STUDENT IN AN ORGANIZED HEALTH CARE EDUCATION/TRAINING PROGRAM

## 2025-03-04 PROCEDURE — 36415 COLL VENOUS BLD VENIPUNCTURE: CPT | Performed by: NURSE PRACTITIONER

## 2025-03-04 RX ORDER — DOXYCYCLINE 100 MG/1
100 CAPSULE ORAL 2 TIMES DAILY
Qty: 10 CAPSULE | Refills: 0 | Status: SHIPPED | OUTPATIENT
Start: 2025-03-04 | End: 2025-03-09

## 2025-03-04 RX ORDER — CEFDINIR 300 MG/1
300 CAPSULE ORAL 2 TIMES DAILY
Qty: 10 CAPSULE | Refills: 0 | Status: SHIPPED | OUTPATIENT
Start: 2025-03-04 | End: 2025-03-09

## 2025-03-04 RX ORDER — PREDNISONE 20 MG/1
40 TABLET ORAL DAILY
Qty: 6 TABLET | Refills: 0 | Status: SHIPPED | OUTPATIENT
Start: 2025-03-05 | End: 2025-03-08

## 2025-03-04 RX ADMIN — METHYLPREDNISOLONE SODIUM SUCCINATE 40 MG: 40 INJECTION, POWDER, FOR SOLUTION INTRAMUSCULAR; INTRAVENOUS at 06:03

## 2025-03-04 RX ADMIN — GABAPENTIN 300 MG: 300 CAPSULE ORAL at 08:03

## 2025-03-04 RX ADMIN — LIDOCAINE HYDROCHLORIDE 10 ML: 20 SOLUTION ORAL; TOPICAL at 06:03

## 2025-03-04 RX ADMIN — NICOTINE 1 PATCH: 14 PATCH TRANSDERMAL at 08:03

## 2025-03-04 RX ADMIN — DOXYCYCLINE 100 MG: 100 INJECTION, POWDER, LYOPHILIZED, FOR SOLUTION INTRAVENOUS at 03:03

## 2025-03-04 RX ADMIN — ATORVASTATIN CALCIUM 40 MG: 40 TABLET, FILM COATED ORAL at 08:03

## 2025-03-04 RX ADMIN — SENNOSIDES AND DOCUSATE SODIUM 1 TABLET: 50; 8.6 TABLET ORAL at 08:03

## 2025-03-04 RX ADMIN — OXYCODONE HYDROCHLORIDE 20 MG: 10 TABLET ORAL at 01:03

## 2025-03-04 RX ADMIN — ASPIRIN 81 MG CHEWABLE TABLET 81 MG: 81 TABLET CHEWABLE at 08:03

## 2025-03-04 RX ADMIN — OXYCODONE HYDROCHLORIDE 20 MG: 10 TABLET ORAL at 06:03

## 2025-03-04 RX ADMIN — IPRATROPIUM BROMIDE AND ALBUTEROL SULFATE 3 ML: .5; 3 SOLUTION RESPIRATORY (INHALATION) at 07:03

## 2025-03-04 RX ADMIN — IPRATROPIUM BROMIDE AND ALBUTEROL SULFATE 3 ML: .5; 3 SOLUTION RESPIRATORY (INHALATION) at 12:03

## 2025-03-04 RX ADMIN — GABAPENTIN 300 MG: 300 CAPSULE ORAL at 02:03

## 2025-03-04 RX ADMIN — METHYLPREDNISOLONE SODIUM SUCCINATE 40 MG: 40 INJECTION, POWDER, FOR SOLUTION INTRAMUSCULAR; INTRAVENOUS at 01:03

## 2025-03-04 RX ADMIN — OXYCODONE HYDROCHLORIDE 20 MG: 10 TABLET ORAL at 09:03

## 2025-03-04 NOTE — CARE UPDATE
03/04/25 0737   Patient Assessment/Suction   Level of Consciousness (AVPU) alert   Respiratory Effort Unlabored;Normal   Expansion/Accessory Muscles/Retractions no use of accessory muscles;no retractions   All Lung Fields Breath Sounds diminished   Rhythm/Pattern, Respiratory pattern regular;depth regular   Cough Frequency infrequent   Cough Type dry;nonproductive   PRE-TX-O2   Device (Oxygen Therapy) nasal cannula with humidification  (room air)   $ Is the patient on Low Flow Oxygen? Yes   Flow (L/min) (Oxygen Therapy) 2   SpO2 96 %   Pulse Oximetry Type Intermittent   $ Pulse Oximetry - Multiple Charge Pulse Oximetry - Multiple   Pulse 80   Resp 18   Aerosol Therapy   $ Aerosol Therapy Charges Aerosol Treatment   Respiratory Treatment Status (SVN) given   Treatment Route (SVN) mask;air   Patient Position Lee's   Signs of Intolerance (SVN) none   Breath Sounds Post-Respiratory Treatment   Throughout All Fields Post-Treatment All Fields   Throughout All Fields Post-Treatment aeration increased   Post-treatment Heart Rate (beats/min) 85   Post-treatment Resp Rate (breaths/min) 20

## 2025-03-04 NOTE — DISCHARGE SUMMARY
[facility] Medicine  Discharge Summary      Patient Name: Floresita Martin  MRN: [id number]  OMAR: [id number]  Patient Class: IP- Inpatient  Admission Date: 3/2/2025  Hospital Length of Stay: 2 days  Discharge Date and Time:  03/04/2025 2:57 PM  Attending Physician: Michael Navarrete MD   Discharging Provider: Michael Navarrete MD  Primary Care Provider: Toni Hughes MD    Primary Care Team: Networked reference to record PCT     HPI:   Floresita Matrin is a 65 year female who presents emergency room complaining of shortness a breath, productive cough, and chest pain.  The symptoms onset approximately 1 week ago and have progressively worsened.  She endorses exertional dyspnea has progressively worsened.  She describes chest tightness  associated with coughing episodes.  She denies any fever or chills.  No known sick contacts or travel.  Previous medical history includes COPD, hypertension, smoker, hyperlipidemia, GERD, and recent hospitalization for Pseudomonas bacteremia.  ER workup:  CBC with thrombocytosis of 543.  CMP with albumin of 2.5 otherwise unremarkable.  Troponin elevated at 0.453.  D-dimer elevated at 6.46.  CTA of the chest was negative for PE but does demonstrates findings consistent with multifocal pneumonia.  Patient was started on Rocephin, IV doxycycline, Solu-Medrol, and blood cultures in the emergency room.  She was also given DuoNeb treatments.  Patient be admitted to Hospital Medicine for treatment and management.                  * No surgery found *      Hospital Course:   65-year-old female with PMH COPD, current smoker, HTN who was admitted for COPD exacerbation with multifocal pneumonia evidenced on CTA chest imaging.  Required minimal nasal cannula oxygen support.  Given IV antibiotics, nebulized breathing treatments corticosteroids.  Also had elevated troponins with associated chest pains given nitroglycerin.  Cardiology consulted.  Echo showed EF 49% and  G1DD.  Condition trended and improved.  Qualified for home O2 with 2 L with activity.  Home O2 set up.  To continue course of antibiotics and prednisone.   Patient seen and examined on day of discharge.    Physical exam on day of discharge:  Constitutional:       General: She is not in acute distress.  HENT:      Head: Normocephalic and atraumatic.   Cardiovascular:      Rate and Rhythm: Regular rhythm. Tachycardia present.      Heart sounds: Normal heart sounds.   Pulmonary:      Effort: Pulmonary effort is normal. No respiratory distress.      Breath sounds: Rales (Mild, scattered) present. No wheezing or rhonchi.   Neurological:      General: No focal deficit present.      Mental Status: She is alert and oriented to person, place, and time. Mental status is at baseline.   Psychiatric:         Mood and Affect: Affect normal.         Behavior: Behavior normal.         Thought Content: Thought content normal.        Goals of Care Treatment Preferences:  Code Status: Full Code      SDOH Screening:  The patient declined to be screened for utility difficulties, food insecurity, transport difficulties, housing insecurity, and interpersonal safety, so no concerns could be identified this admission.     Consults:   Consults (From admission, onward)          Status Ordering Provider     Inpatient consult to Cardiology  Once        Provider:  Jadiel Jay MD    Acknowledged ISIDRA VALENZUELA     IP consult to dietary  Once        Provider:  (Not yet assigned)    Acknowledged ROBBI HERNANDEZ              Final Active Diagnoses:    Diagnosis Date Noted POA    PRINCIPAL PROBLEM:  Multifocal pneumonia [J18.9] 03/24/2024 Yes    Chest pain [R07.9] 03/03/2025 No    Moderate protein-calorie malnutrition [E44.0] 05/22/2024 Yes    Hyponatremia [E87.1] 03/24/2024 Yes    COPD exacerbation [J44.1] 01/05/2022 Yes    Dependence on nicotine from cigarettes [F17.210] 02/15/2019 Yes      Problems Resolved During this Admission:  "      Discharged Condition: good    Disposition: Home or Self Care    Follow Up:   Follow-up Information       Toni Hughes MD. Schedule an appointment as soon as possible for a visit in 1 week(s).    Specialty: Family Medicine  Contact information:  [address] [facility] [address]  [phone]               Dme, [facility] Follow up.    Specialties: DME Provider, Orthotics  Why: Patient MUST call IMMEDIATELY upon arrival home to have Oxygen Concentrator delivered to home.  Contact information:  [address] [facility] [address]  [facility] [id number] [phone]                           Patient Instructions:      OXYGEN FOR HOME USE     Order Specific Question Answer Comments   Liter Flow 2    Duration With activity    Qualifying Test Performed at: Activity    Oxygen saturation at rest 92    Oxygen saturation with activity 87    Oxygen saturation with activity on oxygen 94    Portable mode: continuous    Route nasal cannula    Device: home concentrator with portable tanks    Length of need (in months): 3 mos    Patient condition with qualifying saturation COPD    Height: 5' 1" (1.549 m)    Weight: 47.2 kg (104 lb)    Alternative treatment measures have been tried or considered and deemed clinically ineffective. Yes      Ambulatory referral/consult to Smoking Cessation Program   Standing Status: Future   Referral Priority: Routine Referral Type: Consultation   Referral Reason: Specialty Services Required   Requested Specialty: CTTS   Number of Visits Requested: 1     Diet Cardiac     Notify your health care provider if you experience any of the following:  temperature >100.4     Notify your health care provider if you experience any of the following:  persistent nausea and vomiting or diarrhea     Notify your health care provider if you experience any of the following:  severe uncontrolled pain     Notify your health care provider if you experience any of the following:  redness, tenderness, or signs of " infection (pain, swelling, redness, odor or green/yellow discharge around incision site)     Notify your health care provider if you experience any of the following:  difficulty breathing or increased cough     Notify your health care provider if you experience any of the following:  severe persistent headache     Notify your health care provider if you experience any of the following:  worsening rash     Notify your health care provider if you experience any of the following:  persistent dizziness, light-headedness, or visual disturbances     Notify your health care provider if you experience any of the following:  increased confusion or weakness     Activity as tolerated       Significant Diagnostic Studies:     Lab Results   Component Value Date    WBC 9.80 03/04/2025    HGB 10.0 (L) 03/04/2025    HCT 30.4 (L) 03/04/2025    MCV 83 03/04/2025     (H) 03/04/2025       BMP  Lab Results   Component Value Date     (L) 03/04/2025    K 4.7 03/04/2025    CL 98 03/04/2025    CO2 26 03/04/2025    BUN 10 03/04/2025    CREATININE 0.7 03/04/2025    CALCIUM 9.3 03/04/2025    ANIONGAP 8 03/04/2025    EGFRNORACEVR >60 03/04/2025       Microbiology Results (last 7 days)       Procedure Component Value Units Date/Time    Culture, Respiratory with Gram Stain [[id number]] Collected: 03/03/25 1540    Order Status: Completed Specimen: Respiratory from Sputum Updated: 03/04/25 1358     Respiratory Culture No Growth     Gram Stain (Respiratory) <10 epithelial cells per low power field.     Gram Stain (Respiratory) Few WBC's     Gram Stain (Respiratory) No organisms seen    Blood culture x two cultures. Draw prior to antibiotics. [[id number]] Collected: 03/02/25 1530    Order Status: Completed Specimen: Blood from Peripheral, Antecubital, Right Updated: 03/03/25 2032     Blood Culture, Routine No Growth to date      No Growth to date    Narrative:      Aerobic and anaerobic    Blood culture x two cultures. Draw prior to  antibiotics. [[id number]] Collected: 03/02/25 1530    Order Status: Completed Specimen: Blood from Peripheral, Antecubital, Left Updated: 03/03/25 2032     Blood Culture, Routine No Growth to date      No Growth to date    Narrative:      Aerobic and anaerobic    Influenza A & B by Molecular [[id number]] Collected: 03/02/25 1452    Order Status: Completed Specimen: Nasopharyngeal Swab Updated: 03/02/25 1620     Influenza A, Molecular Negative     Influenza B, Molecular Negative     Flu A & B Source Nasal swab          Echo  Result Date: 3/3/2025    Left Ventricle: The left ventricle is normal in size. Normal wall thickness. Regional wall motion abnormalities present.  There is mild anterior apical hypokinesis. There is mildly reduced systolic function. Ejection fraction is approximately 49%. Grade I diastolic dysfunction.   Right Ventricle: The right ventricle is normal in size. Wall thickness is normal. Systolic function is normal.   IVC/SVC: Normal venous pressure at 3 mmHg.     CTA Chest Non-Coronary (PE Studies)  Result Date: 3/2/2025  EXAMINATION: CTA CHEST NON CORONARY (PE STUDIES) CLINICAL HISTORY: Pulmonary embolism (PE) suspected, high prob; TECHNIQUE: Low dose axial images, sagittal and coronal reformations were obtained from the thoracic inlet to the lung bases following the IV administration of 60 ML of Omnipaque 350.  Contrast timing was optimized to evaluate the pulmonary arteries.  MIP images were performed. COMPARISON: March 2, 2025 FINDINGS: There is evidence of prior lower cervical spine surgery.  There is no CT angiography evidence acute pulmonary thromboembolic disease.  There is a 14 mm short axis right paratracheal lymph node which is enlarged.  There is a low right paratracheal 14 mm short axis lymph node.  There is an enlarged subcarinal lymph node measuring 14 mm.  Hilar lymph nodes are seen bilaterally measuring as large as 9 mm on the right and 9 mm on the left.  There is a small  left-sided pleural effusion.  The adrenal glands are unremarkable.  Main pulmonary artery is borderline enlarged at 29 mm.  The ascending aorta is within normal limits at 29 mm. Multiple nodules are seen including a 4 mm nodule in the left upper lobe.  There are areas of air trapping suggesting centrilobular emphysema.  Patchy airspace disease with a peripheral pattern is seen in the left lower lobe and lingula.  There is fairly extensive somewhat reticular airspace disease throughout much of the right hemithorax with more nodular and confluent foci seen in the right perihilum and in the right upper lobe. This report was flagged in Epic as abnormal.     No CT angiography evidence of acute pulmonary thromboembolic disease.multiple areas of nodularity and interstitial scarring which are chronic in this patient with underlying emphysema. Extensive areas of left upper lobe, left lower lobe and right lung airspace disease and nodularity concerning for multifocal pneumonia.  Follows up is advised to resolution Electronically signed by: Toni Blanc MD Date:    03/02/2025 Time:    15:49    X-Ray Chest AP Portable  Result Date: 3/2/2025  EXAMINATION: XR CHEST AP PORTABLE CLINICAL HISTORY: Chest Pain; TECHNIQUE: Single frontal view of the chest was performed. COMPARISON: September 24, 2024 FINDINGS: There is evidence of lower cervical spine surgery.  There is peripheral acute airspace disease superimposed upon chronic bilateral interstitial changes.  Areas of pneumonia or asymmetric CHF could produce this appearance.  The heart is not enlarged.  The aorta is mildly tortuous.  Is advised.  This report was flagged in Epic as abnormal.     New bilateral airspace disease superimposed upon interstitial changes.  Follow-up to resolution Electronically signed by: Toni Blanc MD Date:    03/02/2025 Time:    14:56         Pending Diagnostic Studies:       Procedure Component Value Units Date/Time    EKG 12-lead [[id number]]      Order Status: Sent Lab Status: No result            Medications:  Reconciled Home Medications:      Medication List        START taking these medications      cefdinir 300 MG capsule  Commonly known as: OMNICEF  Take 1 capsule (300 mg total) by mouth 2 (two) times daily. for 5 days     doxycycline 100 MG Cap  Commonly known as: VIBRAMYCIN  Take 1 capsule (100 mg total) by mouth 2 (two) times daily. for 5 days     predniSONE 20 MG tablet  Commonly known as: DELTASONE  Take 2 tablets (40 mg total) by mouth once daily. for 3 days  Start taking on: March 5, 2025            CHANGE how you take these medications      fluticasone propionate 50 mcg/actuation nasal spray  Commonly known as: FLONASE  SHAKE LIQUID AND USE 1 SPRAY(50 MCG) IN EACH NOSTRIL EVERY DAY  What changed: See the new instructions.     * morphine 60 MG 12 hr tablet  Commonly known as: MS CONTIN  Take 1 tablet by mouth once a day More than 7 days supply is medically necessary  What changed: You were already taking a medication with the same name, and this prescription was added. Make sure you understand how and when to take each.     * morphine 30 MG 12 hr tablet  Commonly known as: MS CONTIN  Take 1 tablet by mouth every twelve hours More than 7 days supply is medically necessary  Start taking on: March 28, 2025  What changed: Another medication with the same name was added. Make sure you understand how and when to take each.           * This list has 2 medication(s) that are the same as other medications prescribed for you. Read the directions carefully, and ask your doctor or other care provider to review them with you.                CONTINUE taking these medications      albuterol 90 mcg/actuation inhaler  Commonly known as: PROVENTIL/VENTOLIN HFA  Inhale 2 puffs into the lungs every 6 (six) hours as needed for Wheezing or Shortness of Breath. Rescue     albuterol-ipratropium 2.5 mg-0.5 mg/3 mL nebulizer solution  Commonly known as: DUO-NEB  Take 3  mLs by nebulization every 6 (six) hours as needed for Wheezing or Shortness of Breath. Rescue     * amitriptyline 25 MG tablet  Commonly known as: ELAVIL  Take 1-2 tablet by mouth every night at bedtime     * amitriptyline 25 MG tablet  Commonly known as: ELAVIL  Take 1-2 tablets (25-50 mg total) by mouth nightly at bedtime.     atorvastatin 40 MG tablet  Commonly known as: LIPITOR  TAKE 1 TABLET BY MOUTH DAILY     co-enzyme Q-10 30 mg capsule  Take 30 mg by mouth once daily.     * cyclobenzaprine 10 MG tablet  Commonly known as: FLEXERIL  Take 1 tablet by mouth three times a day as needed     * cyclobenzaprine 10 MG tablet  Commonly known as: FLEXERIL  Take 1 tablet (10 mg total) by mouth 3 (three) times daily as needed.     diclofenac sodium 20 mg/gram /actuation(2 %) Sopm  Commonly known as: PENNSAID  apply a small amount to skin twice a day     gabapentin 300 MG capsule  Commonly known as: NEURONTIN  Take 1 capsule by mouth three times a day as directed More than a 7 day supply are medically necessary     * lactulose 10 gram/15 mL solution  Commonly known as: CHRONULAC  15 ml twice a day as needed     * lactulose 10 gram/15 mL solution  Commonly known as: CHRONULAC  Take 15 mLs (10 g total) by mouth 2 (two) times daily as needed.     loratadine 10 mg tablet  Commonly known as: CLARITIN  TAKE 1 TABLET BY MOUTH DAILY     losartan 50 MG tablet  Commonly known as: COZAAR  Take 1 tablet (50 mg total) by mouth once daily.     magnesium oxide 400 mg (241.3 mg magnesium) tablet  Commonly known as: MAG-OX  Take 1 tablet (400 mg total) by mouth once daily.     multivitamin capsule  Take 1 capsule by mouth once daily.     naloxone 4 mg/actuation Spry  Commonly known as: NARCAN  Spray 1 spray into one nostril single dose may repeat every 2-3 minutes until responsive or EMS arrives     nicotine 14 mg/24 hr  Commonly known as: NICODERM CQ  Place 1 patch onto the skin once daily.     omeprazole 40 MG capsule  Commonly known  as: PRILOSEC  TAKE 1 CAPSULE(40 MG) BY MOUTH EVERY DAY     * oxyCODONE 20 mg Tab immediate release tablet  Commonly known as: ROXICODONE  Take 1 tablet (20 mg total) by mouth 5 (five) times daily as neede dfor pain.     * oxyCODONE 20 mg Tab immediate release tablet  Commonly known as: ROXICODONE  Take 1 tablet by mouth five times a day as needed for pain More than a 7 day supply are medically necessary  Start taking on: March 18, 2025           * This list has 8 medication(s) that are the same as other medications prescribed for you. Read the directions carefully, and ask your doctor or other care provider to review them with you.                ASK your doctor about these medications      * pregabalin 75 MG capsule  Commonly known as: LYRICA  Take 1 capsule by mouth three times a day More than 7 days supply is medically necessary     * pregabalin 100 MG capsule  Commonly known as: LYRICA  Take 1 capsule by mouth three times a day More than 7 days supply is medically necessary           * This list has 2 medication(s) that are the same as other medications prescribed for you. Read the directions carefully, and ask your doctor or other care provider to review them with you.                  Indwelling Lines/Drains at time of discharge:   Lines/Drains/Airways       None                   Time spent on the discharge of patient: 33 minutes         Michael Navarrete MD  Department of Hospital Medicine  [facility]/Surg

## 2025-03-04 NOTE — CARE UPDATE
03/04/25 1422   Education   $ Education DME Oxygen     Home oxygen tank, regulator and hazards explained to patient and spouse with great understanding.

## 2025-03-04 NOTE — CARE UPDATE
03/03/25 1942   Patient Assessment/Suction   Level of Consciousness (AVPU) alert   Respiratory Effort Unlabored   Expansion/Accessory Muscles/Retractions no retractions;no use of accessory muscles   All Lung Fields Breath Sounds Anterior:;Posterior:   BELEN Breath Sounds diminished   PRE-TX-O2   Device (Oxygen Therapy) nasal cannula   $ Is the patient on Low Flow Oxygen? Yes   Flow (L/min) (Oxygen Therapy) 2   SpO2 97 %   Pulse Oximetry Type Intermittent   $ Pulse Oximetry - Multiple Charge Pulse Oximetry - Multiple   Pulse 101   Resp 20   Aerosol Therapy   $ Aerosol Therapy Charges Aerosol Treatment   Respiratory Treatment Status (SVN) given   Treatment Route (SVN) mask;air   Patient Position HOB elevated   Post Treatment Assessment (SVN) increased aeration   Signs of Intolerance (SVN) none   Breath Sounds Post-Respiratory Treatment   Throughout All Fields Post-Treatment All Fields   Throughout All Fields Post-Treatment aeration increased   Post-treatment Heart Rate (beats/min) 103   Post-treatment Resp Rate (breaths/min) 20

## 2025-03-04 NOTE — NURSING
Discharge instructions and education reviewed with pt.  Telemetry box and electrodes removed.  Bilateral peripheral Ivs removed with catheter intact.  Pt to  ABX from pharmacy.  Pt educated on use of pulse oximeter to monitor o2 saturation.  Oxygen tanks delivered to room.  Pt escorted to family vehicle for discharge home.

## 2025-03-04 NOTE — PLAN OF CARE
Problem: Adult Inpatient Plan of Care  Goal: Plan of Care Review  Outcome: Met  Goal: Patient-Specific Goal (Individualized)  Outcome: Met  Goal: Absence of Hospital-Acquired Illness or Injury  Outcome: Met  Goal: Optimal Comfort and Wellbeing  Outcome: Met  Goal: Readiness for Transition of Care  Outcome: Met     Problem: Infection  Goal: Absence of Infection Signs and Symptoms  Outcome: Met     Problem: Pneumonia  Goal: Fluid Balance  Outcome: Met  Goal: Resolution of Infection Signs and Symptoms  Outcome: Met  Goal: Effective Oxygenation and Ventilation  Outcome: Met

## 2025-03-04 NOTE — PLAN OF CARE
Problem: Adult Inpatient Plan of Care  Goal: Plan of Care Review  Outcome: Progressing  Goal: Optimal Comfort and Wellbeing  Outcome: Progressing     Problem: Infection  Goal: Absence of Infection Signs and Symptoms  Outcome: Progressing     Problem: Pneumonia  Goal: Fluid Balance  Outcome: Progressing  Goal: Effective Oxygenation and Ventilation  Outcome: Progressing

## 2025-03-04 NOTE — CARE UPDATE
03/04/25 0930   Home Oxygen Qualification   $ Home O2 Qualification Pulmonary Stress Test/6 min walk   Room Air SpO2 At Rest 92 %   Room Air SpO2 During Ambulation (!) 87 %   SpO2 During Ambulation on O2 94 %   Heart Rate on O2 118 bpm   Ambulation O2 LPM 2 LPM   SpO2 Post Ambulation 96 %   Post Ambulation Heart Rate 108 bpm   Post Ambulation O2 LPM 2 LPM   Home O2 Eval Comments pt quailfies for home oxygen

## 2025-03-04 NOTE — PLAN OF CARE
Pt  cleared for discharge from case management, ONCE respiratory does teach with pt  Annamaria at [facility] DME approved O2; SW delivered tank and setup and pt signed for delivery.     03/04/25 1403   Final Note   Assessment Type Final Discharge Note   Anticipated Discharge Disposition Home   Post-Acute Status   Post-Acute Authorization HME   E Status Set-up Complete/Auth obtained   Discharge Delays None known at this time

## 2025-03-06 LAB
BACTERIA SPEC AEROBE CULT: NORMAL
GRAM STN SPEC: NORMAL

## 2025-03-07 ENCOUNTER — PATIENT OUTREACH (OUTPATIENT)
Dept: ADMINISTRATIVE | Facility: CLINIC | Age: 65
End: 2025-03-07
Payer: MEDICARE

## 2025-03-07 LAB
BACTERIA BLD CULT: NORMAL
BACTERIA BLD CULT: NORMAL

## 2025-03-07 NOTE — PROGRESS NOTES
C3 nurse spoke with Floresita Martin  for a TCC post hospital discharge follow up call. The patient does not have a scheduled HOSFU appointment with Toni Hughes MD  within 5-7 days post hospital discharge date 03/04/2025. C3 nurse was unable to schedule HOSFU appointment in [facility].    Message sent to PCP staff requesting they contact patient and schedule follow up appointment.

## 2025-03-10 ENCOUNTER — RESULTS FOLLOW-UP (OUTPATIENT)
Dept: OBSTETRICS AND GYNECOLOGY | Facility: CLINIC | Age: 65
End: 2025-03-10

## 2025-03-10 NOTE — PROGRESS NOTES
Pap smear shows atypical cells changed, HPV testing is positive but is not for the two highest risk viruses , repeat pap smear in 1 year

## 2025-03-13 ENCOUNTER — LAB VISIT (OUTPATIENT)
Dept: LAB | Facility: HOSPITAL | Age: 65
End: 2025-03-13
Payer: MEDICARE

## 2025-03-13 ENCOUNTER — OFFICE VISIT (OUTPATIENT)
Dept: FAMILY MEDICINE | Facility: CLINIC | Age: 65
End: 2025-03-13
Payer: MEDICARE

## 2025-03-13 VITALS
HEIGHT: 61 IN | WEIGHT: 104.94 LBS | HEART RATE: 91 BPM | SYSTOLIC BLOOD PRESSURE: 120 MMHG | BODY MASS INDEX: 19.81 KG/M2 | DIASTOLIC BLOOD PRESSURE: 62 MMHG | TEMPERATURE: 99 F | OXYGEN SATURATION: 99 %

## 2025-03-13 DIAGNOSIS — F17.200 SMOKER: ICD-10-CM

## 2025-03-13 DIAGNOSIS — N89.8 VAGINAL ITCHING: ICD-10-CM

## 2025-03-13 DIAGNOSIS — Z09 HOSPITAL DISCHARGE FOLLOW-UP: Primary | ICD-10-CM

## 2025-03-13 DIAGNOSIS — D75.839 THROMBOCYTOSIS: ICD-10-CM

## 2025-03-13 DIAGNOSIS — E87.1 HYPONATREMIA: ICD-10-CM

## 2025-03-13 DIAGNOSIS — J44.9 CHRONIC OBSTRUCTIVE PULMONARY DISEASE, UNSPECIFIED COPD TYPE: ICD-10-CM

## 2025-03-13 DIAGNOSIS — Z87.01 HISTORY OF PNEUMONIA: ICD-10-CM

## 2025-03-13 PROCEDURE — 81003 URINALYSIS AUTO W/O SCOPE: CPT | Mod: HCNC

## 2025-03-13 PROCEDURE — 99999 PR PBB SHADOW E&M-EST. PATIENT-LVL V: CPT | Mod: PBBFAC,HCNC,,

## 2025-03-13 NOTE — PROGRESS NOTES
Subjective:       Patient ID: Floresita Martin is a 65 y.o. female.    Chief Complaint: hospital follow up     Transitional Care Note    Family and/or Caretaker present at visit?  No.  Diagnostic tests reviewed/disposition: No diagnosic tests pending after this hospitalization.  Disease/illness education: see below  Home health/community services discussion/referrals: Patient does not have home health established from hospital visit.  They do not need home health.  If needed, we will set up home health for the patient.   Establishment or re-establishment of referral orders for community resources: No other necessary community resources.   Discussion with other health care providers: No discussion with other health care providers necessary.        Floresita Martin is a 65 y.o. female with HTN, HLD, COPD who presents to clinic for hospital follow up. Pt admitted to the hospital for management of COPD exacerbation with multifocal pneumonia. She was discharged with Cefdinir, Doxycyline and Prednisone. See hospital discharge summary below.     She has completed the antibiotics and steroid. She states she has not required the O2 she was provided with. Inquires if quitting smoking would help improve her lung function. She is scheduled for CT chest in 2 weeks, and then has follow up with pulmonology following. She states she is also concerned she may have a UTI. Reports vaginal itching and discomfort. Denies dysuria. Seen by OBGYN at the end of February for pap and had vaginosis swab at this visit which was negative.       Discharge note on 3/4/25  HPI:   Floresita Martin is a 65 year female who presents emergency room complaining of shortness a breath, productive cough, and chest pain.  The symptoms onset approximately 1 week ago and have progressively worsened.  She endorses exertional dyspnea has progressively worsened.  She describes chest tightness  associated with coughing episodes.  She denies any fever or chills.  No  known sick contacts or travel.  Previous medical history includes COPD, hypertension, smoker, hyperlipidemia, GERD, and recent hospitalization for Pseudomonas bacteremia.  ER workup:  CBC with thrombocytosis of 543.  CMP with albumin of 2.5 otherwise unremarkable.  Troponin elevated at 0.453.  D-dimer elevated at 6.46.  CTA of the chest was negative for PE but does demonstrates findings consistent with multifocal pneumonia.  Patient was started on Rocephin, IV doxycycline, Solu-Medrol, and blood cultures in the emergency room.  She was also given DuoNeb treatments.  Patient be admitted to Hospital Medicine for treatment and management.     * No surgery found *       Hospital Course:   65-year-old female with PMH COPD, current smoker, HTN who was admitted for COPD exacerbation with multifocal pneumonia evidenced on CTA chest imaging.  Required minimal nasal cannula oxygen support.  Given IV antibiotics, nebulized breathing treatments corticosteroids.  Also had elevated troponins with associated chest pains given nitroglycerin.  Cardiology consulted.  Echo showed EF 49% and G1DD.  Condition trended and improved.  Qualified for home O2 with 2 L with activity.  Home O2 set up.  To continue course of antibiotics and prednisone.   Patient seen and examined on day of discharge.    Review of Systems   Constitutional:  Negative for appetite change and fatigue.   Respiratory:  Negative for shortness of breath.    Cardiovascular:  Negative for chest pain.   Gastrointestinal:  Negative for abdominal pain.   Neurological:  Negative for dizziness, light-headedness and headaches.       Problem List[1]    Objective:      Physical Exam  Vitals reviewed.   Constitutional:       Appearance: Normal appearance.   HENT:      Head: Normocephalic and atraumatic.   Eyes:      Conjunctiva/sclera: Conjunctivae normal.   Cardiovascular:      Rate and Rhythm: Normal rate and regular rhythm.      Heart sounds: Normal heart sounds.   Pulmonary:  "     Effort: Pulmonary effort is normal.      Breath sounds: Normal breath sounds. No wheezing, rhonchi or rales.   Musculoskeletal:         General: Normal range of motion.      Cervical back: Normal range of motion.   Skin:     General: Skin is warm and dry.   Neurological:      Mental Status: She is alert and oriented to person, place, and time.   Psychiatric:         Behavior: Behavior normal.         Lab Results   Component Value Date    WBC 9.80 03/04/2025    HGB 10.0 (L) 03/04/2025    HCT 30.4 (L) 03/04/2025     (H) 03/04/2025    CHOL 117 (L) 01/27/2025    TRIG 51 01/27/2025    HDL 42 01/27/2025    ALT 20 03/03/2025    AST 30 03/03/2025     (L) 03/04/2025    K 4.7 03/04/2025    CL 98 03/04/2025    CREATININE 0.7 03/04/2025    BUN 10 03/04/2025    CO2 26 03/04/2025    TSH 1.587 03/24/2024    INR 0.8 03/10/2023    HGBA1C 5.6 01/27/2025     The ASCVD Risk score (Fabby COLE, et al., 2019) failed to calculate for the following reasons:    The valid total cholesterol range is 130 to 320 mg/dL  Visit Vitals  /62 (BP Location: Right arm, Patient Position: Sitting)   Pulse 91   Temp 98.8 °F (37.1 °C) (Oral)   Ht 5' 1" (1.549 m)   Wt 47.6 kg (104 lb 15 oz)   LMP  (LMP Unknown)   SpO2 99%   BMI 19.83 kg/m²      Assessment:       1. Hospital discharge follow-up    2. Chronic obstructive pulmonary disease, unspecified COPD type    3. Hyponatremia    4. Thrombocytosis    5. History of pneumonia    6. Smoker    7. Vaginal itching        Plan:       1. Hospital discharge follow-up      -    Discussed hospital visit in detail. Declines HH.    2. Chronic obstructive pulmonary disease, unspecified COPD type       -    Continue Albuterol as needed. O2 as needed for shortness of breath. Follow up as scheduled with pulmonology.    3. Hyponatremia  -     Comprehensive Metabolic Panel; Future; Expected date: 03/13/2025    4. Thrombocytosis  -     CBC Auto Differential; Future; Expected date: 03/13/2025  -     " Consider follow up with heme/ onc pending repeat CBC. Pt denies previous evaluation for the thrombocytosis.    5. History of pneumonia         -    Recommend repeat chest imaging as directed by radiology d/t her multifocal pneumonia. Offered CXR. Pt notes she has upcoming CT chest in 2 weeks with pulmonology.    6. Smoker        -    Recommend and discussed smoking cessation.    7. Vaginal itching  -     Cancel: Urinalysis, Reflex to Urine Culture Urine, Clean Catch    Other orders  -     diphenhydrAMINE-aluminum-magnesium hydroxide-simethicone-LIDOcaine viscous HCl 2%; Swish and spit 15 mLs every 4 (four) hours as needed.  Dispense: 1 each; Refill: 2          Future Appointments       Date Provider Specialty Appt Notes    3/13/2025  Lab .    3/13/2025  Lab .    4/28/2025 Freya Swartz NP Pulmonology 6 month f/u    8/1/2025  Lab .    8/5/2025 Tennille Castañeda PA-C Family Medicine 6 mo    2/10/2026 Toni Hughes MD Family Medicine annual               Tests to Keep You Healthy    Mammogram: Met on 2/13/2025  Colon Cancer Screening: Met on 11/15/2017  Cervical Cancer Screening: Met on 2/27/2025  Last Blood Pressure <= 139/89 (3/13/2025): Yes  Tobacco Cessation: NO            [1]   Patient Active Problem List  Diagnosis    Otosclerosis    Melanosis coli    Gastroesophageal reflux disease without esophagitis    Other hyperlipidemia    Dependence on nicotine from cigarettes    COPD exacerbation    Smoker    Atherosclerosis of aorta    Calcified granuloma of lung- petscan 2023    Chronic pain syndrome    Opioid dependence with current use    Hyponatremia    Multifocal pneumonia    Cystitis    MSSA bacteremia    Normocytic anemia    Hypertension, essential    COPD with acute lower respiratory infection    Moderate protein-calorie malnutrition    Thrush    Nonthrombocytopenic purpura    Chest pain

## 2025-03-14 ENCOUNTER — TELEPHONE (OUTPATIENT)
Dept: FAMILY MEDICINE | Facility: CLINIC | Age: 65
End: 2025-03-14
Payer: MEDICARE

## 2025-03-14 ENCOUNTER — RESULTS FOLLOW-UP (OUTPATIENT)
Dept: FAMILY MEDICINE | Facility: CLINIC | Age: 65
End: 2025-03-14

## 2025-03-14 ENCOUNTER — TELEPHONE (OUTPATIENT)
Facility: CLINIC | Age: 65
End: 2025-03-14
Payer: MEDICARE

## 2025-03-14 DIAGNOSIS — D75.839 THROMBOCYTOSIS: Primary | ICD-10-CM

## 2025-03-14 DIAGNOSIS — D64.9 NORMOCYTIC ANEMIA: ICD-10-CM

## 2025-03-14 DIAGNOSIS — E87.1 HYPONATREMIA: Primary | ICD-10-CM

## 2025-03-14 LAB
BILIRUB UR QL STRIP: NEGATIVE
CLARITY UR REFRACT.AUTO: CLEAR
COLOR UR AUTO: COLORLESS
GLUCOSE UR QL STRIP: NEGATIVE
HGB UR QL STRIP: NEGATIVE
KETONES UR QL STRIP: NEGATIVE
LEUKOCYTE ESTERASE UR QL STRIP: NEGATIVE
NITRITE UR QL STRIP: NEGATIVE
PH UR STRIP: 7 [PH] (ref 5–8)
PROT UR QL STRIP: NEGATIVE
SP GR UR STRIP: 1 (ref 1–1.03)
URN SPEC COLLECT METH UR: ABNORMAL

## 2025-03-14 NOTE — PROGRESS NOTES
Spoke to pt via phone, pt verbalized understanding. Pt stated no diarrhea and she drinks 3-4 glasses of water daily

## 2025-03-14 NOTE — TELEPHONE ENCOUNTER
----- Message from Med Assistant Chisholm sent at 3/14/2025 11:28 AM CDT -----  Spoke to pt via phone, pt verbalized understanding. Pt stated no diarrhea and she drinks 3-4 glasses of water daily   ----- Message -----  From: Tennille Castañeda PA-C  Sent: 3/14/2025   7:43 AM CDT  To: Garry Null Staff    Urinalysis negative for signs of infection.   ----- Message -----  From: Rene [facility] Lab Interface  Sent: 3/14/2025   1:45 AM CDT  To: Tennille Castañeda PA-C

## 2025-03-14 NOTE — TELEPHONE ENCOUNTER
Spoke to pt via phone, pt verbalized understanding. Pt had no further questions at this time.  Pt is scheduled for labs

## 2025-03-14 NOTE — NURSING
Oncology Navigation   Intake  Date of Diagnosis: 03/14/25  Cancer Type: Benign hem  Type of Referral: Internal  Date of Referral: 03/14/25  Initial Nurse Navigator Contact: 03/14/25  Referral to Initial Contact Timeline (days): 0     Treatment                              Acuity      Follow Up  No follow-ups on file.     This RN Nurse Navigator called the pt to schedule a new patient Hematology/Oncology clinic appointment as requested from the providers referral. A voicemail message was left with a direct number [phone] for the patient to call back to schedule the appointment

## 2025-03-14 NOTE — TELEPHONE ENCOUNTER
I placed some additional lab orders to evaluate the low sodium. I recommend close follow up with heme/ onc and pulmonology due to her other abnormal labs.

## 2025-03-17 ENCOUNTER — LAB VISIT (OUTPATIENT)
Dept: LAB | Facility: HOSPITAL | Age: 65
End: 2025-03-17
Payer: MEDICARE

## 2025-03-17 ENCOUNTER — TELEPHONE (OUTPATIENT)
Dept: HEMATOLOGY/ONCOLOGY | Facility: CLINIC | Age: 65
End: 2025-03-17
Payer: MEDICARE

## 2025-03-17 DIAGNOSIS — E87.1 HYPONATREMIA: ICD-10-CM

## 2025-03-17 LAB
ANION GAP SERPL CALC-SCNC: 8 MMOL/L (ref 8–16)
BNP SERPL-MCNC: 43 PG/ML (ref 0–99)
BUN SERPL-MCNC: 7 MG/DL (ref 8–23)
CALCIUM SERPL-MCNC: 8.8 MG/DL (ref 8.7–10.5)
CHLORIDE SERPL-SCNC: 100 MMOL/L (ref 95–110)
CO2 SERPL-SCNC: 26 MMOL/L (ref 23–29)
CREAT SERPL-MCNC: 0.5 MG/DL (ref 0.5–1.4)
EST. GFR  (NO RACE VARIABLE): >60 ML/MIN/1.73 M^2
GLUCOSE SERPL-MCNC: 99 MG/DL (ref 70–110)
OSMOLALITY SERPL: 275 MOSM/KG (ref 275–295)
OSMOLALITY UR: 183 MOSM/KG (ref 50–1200)
POTASSIUM SERPL-SCNC: 5 MMOL/L (ref 3.5–5.1)
SODIUM SERPL-SCNC: 134 MMOL/L (ref 136–145)
SODIUM UR-SCNC: 34 MMOL/L (ref 20–250)
TSH SERPL DL<=0.005 MIU/L-ACNC: 0.55 UIU/ML (ref 0.4–4)
URATE SERPL-MCNC: 2.7 MG/DL (ref 2.4–5.7)

## 2025-03-17 PROCEDURE — 36415 COLL VENOUS BLD VENIPUNCTURE: CPT | Mod: HCNC,PO

## 2025-03-17 PROCEDURE — 80048 BASIC METABOLIC PNL TOTAL CA: CPT | Mod: HCNC

## 2025-03-17 PROCEDURE — 84550 ASSAY OF BLOOD/URIC ACID: CPT | Mod: HCNC

## 2025-03-17 PROCEDURE — 84443 ASSAY THYROID STIM HORMONE: CPT | Mod: HCNC

## 2025-03-17 PROCEDURE — 83935 ASSAY OF URINE OSMOLALITY: CPT | Mod: HCNC

## 2025-03-17 PROCEDURE — 83930 ASSAY OF BLOOD OSMOLALITY: CPT | Mod: HCNC

## 2025-03-17 PROCEDURE — 84300 ASSAY OF URINE SODIUM: CPT | Mod: HCNC

## 2025-03-17 PROCEDURE — 83880 ASSAY OF NATRIURETIC PEPTIDE: CPT | Mod: HCNC

## 2025-03-17 NOTE — NURSING
Oncology Navigation   Intake  Date of Diagnosis: 03/14/25  Cancer Type: Benign hem  Type of Referral: Internal  Date of Referral: 03/14/25  Initial Nurse Navigator Contact: 03/14/25  Referral to Initial Contact Timeline (days): 0  First Appointment Available: 03/19/25  Appointment Date: 03/19/25  First Available Date vs. Scheduled Date (days): 0     Treatment                              Acuity      Follow Up  No follow-ups on file.     Pt scheduled for new pt Hematology/Oncology clinic appt as requested in referral received in workqueue. Appt date, time and location reviewed with the pt. No questions at this time.

## 2025-03-18 ENCOUNTER — RESULTS FOLLOW-UP (OUTPATIENT)
Dept: FAMILY MEDICINE | Facility: CLINIC | Age: 65
End: 2025-03-18

## 2025-03-19 ENCOUNTER — OFFICE VISIT (OUTPATIENT)
Dept: HEMATOLOGY/ONCOLOGY | Facility: CLINIC | Age: 65
End: 2025-03-19
Payer: MEDICARE

## 2025-03-19 VITALS
HEART RATE: 95 BPM | RESPIRATION RATE: 18 BRPM | DIASTOLIC BLOOD PRESSURE: 70 MMHG | SYSTOLIC BLOOD PRESSURE: 131 MMHG | BODY MASS INDEX: 19.7 KG/M2 | TEMPERATURE: 98 F | WEIGHT: 104.25 LBS

## 2025-03-19 DIAGNOSIS — F11.20 OPIOID DEPENDENCE WITH CURRENT USE: ICD-10-CM

## 2025-03-19 DIAGNOSIS — D75.839 THROMBOCYTOSIS: ICD-10-CM

## 2025-03-19 DIAGNOSIS — G89.4 CHRONIC PAIN SYNDROME: ICD-10-CM

## 2025-03-19 DIAGNOSIS — D46.Z OTHER MYELODYSPLASTIC SYNDROMES: ICD-10-CM

## 2025-03-19 DIAGNOSIS — J44.0 COPD WITH ACUTE LOWER RESPIRATORY INFECTION: Primary | ICD-10-CM

## 2025-03-19 DIAGNOSIS — D53.9 NUTRITIONAL ANEMIA, UNSPECIFIED: ICD-10-CM

## 2025-03-19 DIAGNOSIS — I10 HYPERTENSION, ESSENTIAL: ICD-10-CM

## 2025-03-19 DIAGNOSIS — D64.9 NORMOCYTIC ANEMIA: ICD-10-CM

## 2025-03-19 PROCEDURE — 3075F SYST BP GE 130 - 139MM HG: CPT | Mod: HCNC,CPTII,S$GLB, | Performed by: INTERNAL MEDICINE

## 2025-03-19 PROCEDURE — 3008F BODY MASS INDEX DOCD: CPT | Mod: HCNC,CPTII,S$GLB, | Performed by: INTERNAL MEDICINE

## 2025-03-19 PROCEDURE — 3044F HG A1C LEVEL LT 7.0%: CPT | Mod: HCNC,CPTII,S$GLB, | Performed by: INTERNAL MEDICINE

## 2025-03-19 PROCEDURE — 3078F DIAST BP <80 MM HG: CPT | Mod: HCNC,CPTII,S$GLB, | Performed by: INTERNAL MEDICINE

## 2025-03-19 PROCEDURE — 99999 PR PBB SHADOW E&M-EST. PATIENT-LVL V: CPT | Mod: PBBFAC,HCNC,, | Performed by: INTERNAL MEDICINE

## 2025-03-19 PROCEDURE — 1159F MED LIST DOCD IN RCRD: CPT | Mod: HCNC,CPTII,S$GLB, | Performed by: INTERNAL MEDICINE

## 2025-03-19 PROCEDURE — 99204 OFFICE O/P NEW MOD 45 MIN: CPT | Mod: HCNC,S$GLB,, | Performed by: INTERNAL MEDICINE

## 2025-03-19 PROCEDURE — 1111F DSCHRG MED/CURRENT MED MERGE: CPT | Mod: HCNC,CPTII,S$GLB, | Performed by: INTERNAL MEDICINE

## 2025-03-19 NOTE — PROGRESS NOTES
Subjective:       Patient ID: Floresita Martin is a 65 y.o. female.    Chief Complaint:  Anemia and thrombocytosis    HPI  65-year-old with significant lung disease for which she follows with pulmonary sent in for anemia and thrombocytosis  Review of Systems    Patient denies issues related to appetite or recent weight change.  Feels well overall.  Denies issues with generalized weakness .  Denies fatigue over above what is normally experienced with day-to-day activities  Denies fever, chills, rigors  Denies issues with ambulation  Denies generalized swelling or new lumps and bumps felt in any part  of body  Denies visual or hearing loss  Denies issues with congestion, sinus issues, cough, sputum production runny nose or itching eyes  Denies chest pain or palpitations, or passing out  Denies abdominal pain, reflux symptoms, nausea vomiting loose stools or constipation  Denies seizure activity or focal weaknesses or symptoms related to TIA, no head aches or blurred vision reported  Denies issues with skin rash or bruising  Denies issues with swelling of feet, tingling or numbness   No issues with sleep,   No recent foreign travel   Good family support reported         Past Medical History:   Diagnosis Date    Alcohol abuse     Cellulitis of toe of right foot 2019    COPD (chronic obstructive pulmonary disease)     Digestive disorder     Headache     Hearing loss     HLD (hyperlipidemia)      Past Surgical History:   Procedure Laterality Date    BACK SURGERY       SECTION      2    COLONOSCOPY N/A 11/15/2017    Procedure: COLONOSCOPY;  Surgeon: Peg Powers MD;  Location: [facility];  Service: Endoscopy;  Laterality: N/A;    EPIDURAL STEROID INJECTION INTO CERVICAL SPINE      EPIDURAL STEROID INJECTION INTO LUMBAR SPINE      JOINT REPLACEMENT  2018    LIPOMA RESECTION Right 2023    Procedure: EXCISION, LIPOMA;  Surgeon: Ruben Brooks Jr., MD;  Location: [facility];  Service: General;   Laterality: Right;    LUMBAR FUSION      with cage    LUNG BIOPSY      NECK SURGERY  2018    with hardware    RADIOFREQUENCY ABLATION      cervical    RADIOFREQUENCY ABLATION      lumbar    SPINE SURGERY  2018    STAPEDECTOMY Right 2015    STAPEDECTOMY Left     TUBAL LIGATION  1990     Family History   Problem Relation Name Age of Onset    Arthritis Mother      Diabetes Mother      Hypertension Father Joseterrell Gutierrezing     Cancer Sister Lilly     Breast cancer Sister Lilly     Breast cancer Sister kristen     Breast cancer Sister Melissa     Cancer Sister Melissa     Breast cancer Sister Mercy     Cancer Sister Mercy     Cancer Sister Ana       Social History     Socioeconomic History    Marital status: Single   Tobacco Use    Smoking status: Every Day     Current packs/day: 0.00     Average packs/day: 0.5 packs/day for 46.4 years (23.2 ttl pk-yrs)     Types: Cigarettes     Start date: 1978     Last attempt to quit: 2024     Years since quittin.8     Passive exposure: Past    Smokeless tobacco: Never    Tobacco comments:     Patient has smoked since a teenager, states that she has cut down from 2pk/day to .5pk/day. Can quit on her own when she's ready. Information and education on outpatient smoking cessation when ready. 3/26/24     Smokes 10 cigarettes daily 2024   Substance and Sexual Activity    Alcohol use: Not Currently    Drug use: No    Sexual activity: Not Currently     Partners: Male     Birth control/protection: Post-menopausal     Social Drivers of Health     Financial Resource Strain: Patient Declined (3/2/2025)    Overall Financial Resource Strain (CARDIA)     Difficulty of Paying Living Expenses: Patient declined   Food Insecurity: Patient Declined (3/2/2025)    Hunger Vital Sign     Worried About Running Out of Food in the Last Year: Patient declined     Ran Out of Food in the Last Year: Patient declined   Transportation Needs: No Transportation Needs (2025)     PRAPARE - Transportation     Lack of Transportation (Medical): No     Lack of Transportation (Non-Medical): No   Physical Activity: Inactive (2/18/2025)    Exercise Vital Sign     Days of Exercise per Week: 0 days     Minutes of Exercise per Session: 0 min   Stress: Patient Declined (3/2/2025)    [employer] - Occupational Stress Questionnaire     Feeling of Stress : Patient declined   Housing Stability: Patient Declined (3/2/2025)    Housing Stability Vital Sign     Unable to Pay for Housing in the Last Year: Patient declined     Homeless in the Last Year: Patient declined     Review of patient's allergies indicates:   Allergen Reactions    Acetaminophen-codeine Nausea And Vomiting    Codeine Nausea And Vomiting     Current Medications[1]    Physical Exam    VITAL SIGNS:  as above   GENERAL: appears well-built, well-nourished.  No anxiety, no agitation, and in no distress.  Patient is awake, alert, oriented and cooperative.  HEENT:  Showed no congestion. Trachea is central no obvious icterus or pallor noted no hoarseness. no obvious JVD   NECK:  Supple.  No JVD. No obvious cervical submental or supraclavicular adenopathy.  RS:the visualized portion of  Chest expands well. chest appears symmetric, no audible wheezes.  No dyspnea recognized  ABDOMEN:  abdomen appears undistended.  EXTREMITIES:  Without edema.  NEUROLOGICAL:  The patient is appropriate, higher functions are normal.  No  obvious neurological deficits.  normal judgement normal thought content  No confusion, no speech impediment. Cranial nerves are intact and show no deficit. No gross motor deficits noted   SKIN MUSCULOSKELETAL: no joint or skeletal deformity, no clubbing of nails.  No visible rash ecchymosis or petechiae   Lab Results   Component Value Date    WBC 8.61 03/13/2025    HGB 9.5 (L) 03/13/2025    HCT 29.5 (L) 03/13/2025    MCV 85 03/13/2025     (H) 03/13/2025       BMP  Lab Results   Component Value  Date     (L) 03/17/2025    K 5.0 03/17/2025     03/17/2025    CO2 26 03/17/2025    BUN 7 (L) 03/17/2025    CREATININE 0.5 03/17/2025    CALCIUM 8.8 03/17/2025    ANIONGAP 8 03/17/2025    ESTGFRAFRICA >60.0 01/05/2022    EGFRNONAA >60.0 01/05/2022     Lab Results   Component Value Date    IRON 31 03/26/2024    TIBC 259 03/26/2024    FERRITIN 417.2 (H) 03/26/2024         Problem List[2]     Assessment and Plan   Anemia  Patient has a history of heavy menstrual cycles in her youth we will check for iron-deficiency along with several other anemia workup see patient back with results    Thrombocytosis probably resulted from iron-deficiency will get JAK2 with reflex    COPD and alveolar lung disease; patient recently discharged from hospital with pneumonia she has been advised to continue close follow-up with Pulmonary outpatient setting is already established with Dr. Ramsey    HTN patient is careful with medications on losartan    Dyslipidemia; patient on atorvastatin attempts to watch her diet  Chronic pain syndrome patient follows with pain clinic she is on Lyrica as well as Neurontin morphine cyclobenzaprine oxycodone are all listed         [1]   Current Outpatient Medications:     albuterol (PROVENTIL/VENTOLIN HFA) 90 mcg/actuation inhaler, Inhale 2 puffs into the lungs every 6 (six) hours as needed for Wheezing or Shortness of Breath. Rescue, Disp: 18 g, Rfl: 11    albuterol-ipratropium (DUO-NEB) 2.5 mg-0.5 mg/3 mL nebulizer solution, Take 3 mLs by nebulization every 6 (six) hours as needed for Wheezing or Shortness of Breath. Rescue, Disp: 75 mL, Rfl: 11    amitriptyline (ELAVIL) 25 MG tablet, Take 1-2 tablet by mouth every night at bedtime, Disp: 60 tablet, Rfl: 1    amitriptyline (ELAVIL) 25 MG tablet, Take 1-2 tablets (25-50 mg total) by mouth nightly at bedtime., Disp: 60 tablet, Rfl: 1    atorvastatin (LIPITOR) 40 MG tablet, TAKE 1 TABLET BY MOUTH DAILY, Disp: 90 tablet, Rfl: 0    co-enzyme Q-10  30 mg capsule, Take 30 mg by mouth once daily., Disp: , Rfl:     cyclobenzaprine (FLEXERIL) 10 MG tablet, Take 1 tablet by mouth three times a day as needed, Disp: 90 tablet, Rfl: 1    cyclobenzaprine (FLEXERIL) 10 MG tablet, Take 1 tablet (10 mg total) by mouth 3 (three) times daily as needed., Disp: 90 tablet, Rfl: 1    diclofenac sodium (PENNSAID) 20 mg/gram /actuation(2 %) sopm, apply a small amount to skin twice a day, Disp: 224 g, Rfl: 1    diphenhydrAMINE-aluminum-magnesium hydroxide-simethicone-LIDOcaine viscous HCl 2%, Swish and spit 15 mLs every 4 (four) hours as needed., Disp: 1 each, Rfl: 2    fluticasone propionate (FLONASE) 50 mcg/actuation nasal spray, SHAKE LIQUID AND USE 1 SPRAY(50 MCG) IN EACH NOSTRIL EVERY DAY, Disp: 48 g, Rfl: 1    gabapentin (NEURONTIN) 300 MG capsule, Take 1 capsule by mouth three times a day as directed More than a 7 day supply are medically necessary, Disp: 90 capsule, Rfl: 1    lactulose (CHRONULAC) 10 gram/15 mL solution, 15 ml twice a day as needed, Disp: 946 mL, Rfl: 1    lactulose (CHRONULAC) 10 gram/15 mL solution, Take 15 mLs (10 g total) by mouth 2 (two) times daily as needed., Disp: 900 mL, Rfl: 1    loratadine (CLARITIN) 10 mg tablet, TAKE 1 TABLET BY MOUTH DAILY, Disp: 90 tablet, Rfl: 2    losartan (COZAAR) 50 MG tablet, Take 1 tablet (50 mg total) by mouth once daily. (Patient not taking: Reported on 3/2/2025), Disp: 90 tablet, Rfl: 3    magnesium oxide (MAG-OX) 400 mg (241.3 mg magnesium) tablet, Take 1 tablet (400 mg total) by mouth once daily., Disp: 90 tablet, Rfl: 1    [START ON 3/28/2025] morphine (MS CONTIN) 30 MG 12 hr tablet, Take 1 tablet by mouth every twelve hours More than 7 days supply is medically necessary, Disp: 60 tablet, Rfl: 0    morphine (MS CONTIN) 60 MG 12 hr tablet, Take 1 tablet by mouth once a day More than 7 days supply is medically necessary, Disp: 30 tablet, Rfl: 0    multivitamin capsule, Take 1 capsule by mouth once daily., Disp: ,  Rfl:     naloxone (NARCAN) 4 mg/actuation Spry, Spray 1 spray into one nostril single dose may repeat every 2-3 minutes until responsive or EMS arrives, Disp: 1 each, Rfl: 1    nicotine (NICODERM CQ) 14 mg/24 hr, Place 1 patch onto the skin once daily., Disp: 30 patch, Rfl: 2    omeprazole (PRILOSEC) 40 MG capsule, TAKE 1 CAPSULE(40 MG) BY MOUTH EVERY DAY, Disp: 90 capsule, Rfl: 3    oxyCODONE (ROXICODONE) 20 mg Tab immediate release tablet, Take 1 tablet by mouth five times a day as needed for pain More than a 7 day supply are medically necessary, Disp: 150 tablet, Rfl: 0    oxyCODONE (ROXICODONE) 20 mg Tab immediate release tablet, Take 1 tablet (20 mg total) by mouth 5 (five) times daily as neede dfor pain., Disp: 150 tablet, Rfl: 0    pregabalin (LYRICA) 100 MG capsule, Take 1 capsule by mouth three times a day More than 7 days supply is medically necessary, Disp: 90 capsule, Rfl: 1    pregabalin (LYRICA) 75 MG capsule, Take 1 capsule by mouth three times a day More than 7 days supply is medically necessary, Disp: 90 capsule, Rfl: 1  [2]   Patient Active Problem List  Diagnosis    Otosclerosis    Melanosis coli    Gastroesophageal reflux disease without esophagitis    Other hyperlipidemia    Dependence on nicotine from cigarettes    COPD exacerbation    Smoker    Atherosclerosis of aorta    Calcified granuloma of lung- petscan 2023    Chronic pain syndrome    Opioid dependence with current use    Hyponatremia    Multifocal pneumonia    Cystitis    MSSA bacteremia    Normocytic anemia    Hypertension, essential    COPD with acute lower respiratory infection    Moderate protein-calorie malnutrition    Thrush    Nonthrombocytopenic purpura    Chest pain

## 2025-03-20 ENCOUNTER — LAB VISIT (OUTPATIENT)
Dept: LAB | Facility: HOSPITAL | Age: 65
End: 2025-03-20
Payer: MEDICARE

## 2025-03-20 DIAGNOSIS — D46.Z OTHER MYELODYSPLASTIC SYNDROMES: ICD-10-CM

## 2025-03-20 DIAGNOSIS — D75.839 THROMBOCYTOSIS: ICD-10-CM

## 2025-03-20 DIAGNOSIS — D64.9 NORMOCYTIC ANEMIA: ICD-10-CM

## 2025-03-20 DIAGNOSIS — D53.9 NUTRITIONAL ANEMIA, UNSPECIFIED: ICD-10-CM

## 2025-03-20 LAB
FERRITIN SERPL-MCNC: 309 NG/ML (ref 20–300)
FOLATE SERPL-MCNC: 15.2 NG/ML (ref 4–24)
IRON SERPL-MCNC: 32 UG/DL (ref 30–160)
MPNR  SPECIMEN TYPE: NORMAL
SATURATED IRON: 10 % (ref 20–50)
TOTAL IRON BINDING CAPACITY: 325 UG/DL (ref 250–450)
TRANSFERRIN SERPL-MCNC: 232 MG/DL (ref 200–375)
VIT B12 SERPL-MCNC: 608 PG/ML (ref 210–950)

## 2025-03-20 PROCEDURE — 86334 IMMUNOFIX E-PHORESIS SERUM: CPT | Mod: 26,HCNC,, | Performed by: PATHOLOGY

## 2025-03-20 PROCEDURE — 84466 ASSAY OF TRANSFERRIN: CPT | Performed by: INTERNAL MEDICINE

## 2025-03-20 PROCEDURE — 82607 VITAMIN B-12: CPT | Performed by: INTERNAL MEDICINE

## 2025-03-20 PROCEDURE — 36415 COLL VENOUS BLD VENIPUNCTURE: CPT | Mod: HCNC,PO | Performed by: INTERNAL MEDICINE

## 2025-03-20 PROCEDURE — 81270 JAK2 GENE: CPT | Mod: HCNC | Performed by: INTERNAL MEDICINE

## 2025-03-20 PROCEDURE — 82746 ASSAY OF FOLIC ACID SERUM: CPT | Performed by: INTERNAL MEDICINE

## 2025-03-20 PROCEDURE — 84165 PROTEIN E-PHORESIS SERUM: CPT | Mod: HCNC | Performed by: INTERNAL MEDICINE

## 2025-03-20 PROCEDURE — 82728 ASSAY OF FERRITIN: CPT | Performed by: INTERNAL MEDICINE

## 2025-03-20 PROCEDURE — 86880 COOMBS TEST DIRECT: CPT | Mod: HCNC | Performed by: INTERNAL MEDICINE

## 2025-03-20 PROCEDURE — 84165 PROTEIN E-PHORESIS SERUM: CPT | Mod: 26,HCNC,, | Performed by: PATHOLOGY

## 2025-03-20 PROCEDURE — 83010 ASSAY OF HAPTOGLOBIN QUANT: CPT | Mod: HCNC | Performed by: INTERNAL MEDICINE

## 2025-03-20 PROCEDURE — 83521 IG LIGHT CHAINS FREE EACH: CPT | Mod: 59,HCNC | Performed by: INTERNAL MEDICINE

## 2025-03-20 PROCEDURE — 86334 IMMUNOFIX E-PHORESIS SERUM: CPT | Mod: HCNC | Performed by: INTERNAL MEDICINE

## 2025-03-21 LAB
ALBUMIN SERPL ELPH-MCNC: 2.92 G/DL (ref 3.35–5.55)
ALPHA1 GLOB SERPL ELPH-MCNC: 0.52 G/DL (ref 0.17–0.41)
ALPHA2 GLOB SERPL ELPH-MCNC: 0.96 G/DL (ref 0.43–0.99)
B-GLOBULIN SERPL ELPH-MCNC: 0.85 G/DL (ref 0.5–1.1)
DAT IGG-SP REAG RBC-IMP: NORMAL
GAMMA GLOB SERPL ELPH-MCNC: 0.84 G/DL (ref 0.67–1.58)
HAPTOGLOB SERPL-MCNC: 341 MG/DL (ref 30–250)
INTERPRETATION SERPL IFE-IMP: NORMAL
KAPPA LC SER QL IA: 2.11 MG/DL (ref 0.33–1.94)
KAPPA LC/LAMBDA SER IA: 0.89 (ref 0.26–1.65)
LAMBDA LC SER QL IA: 2.36 MG/DL (ref 0.57–2.63)
PATHOLOGIST INTERPRETATION IFE: NORMAL
PATHOLOGIST INTERPRETATION SPE: NORMAL
PROT SERPL-MCNC: 6.1 G/DL (ref 6–8.4)

## 2025-03-28 ENCOUNTER — HOSPITAL ENCOUNTER (OUTPATIENT)
Dept: RADIOLOGY | Facility: HOSPITAL | Age: 65
Discharge: HOME | End: 2025-03-28
Attending: NURSE PRACTITIONER
Payer: MEDICARE

## 2025-03-28 DIAGNOSIS — J18.9 PNEUMONIA DUE TO INFECTIOUS ORGANISM, UNSPECIFIED LATERALITY, UNSPECIFIED PART OF LUNG: ICD-10-CM

## 2025-03-28 PROCEDURE — 71250 CT THORAX DX C-: CPT | Mod: 26,,, | Performed by: RADIOLOGY

## 2025-03-28 PROCEDURE — 71250 CT THORAX DX C-: CPT | Mod: TC

## 2025-03-31 ENCOUNTER — RESULTS FOLLOW-UP (OUTPATIENT)
Dept: PULMONOLOGY | Facility: CLINIC | Age: 65
End: 2025-03-31

## 2025-03-31 ENCOUNTER — TELEPHONE (OUTPATIENT)
Dept: PULMONOLOGY | Facility: CLINIC | Age: 65
End: 2025-03-31
Payer: MEDICARE

## 2025-03-31 DIAGNOSIS — J18.9 PNEUMONIA DUE TO INFECTIOUS ORGANISM, UNSPECIFIED LATERALITY, UNSPECIFIED PART OF LUNG: Primary | ICD-10-CM

## 2025-03-31 RX ORDER — LEVOFLOXACIN 500 MG/1
500 TABLET, FILM COATED ORAL DAILY
Qty: 14 TABLET | Refills: 0 | Status: SHIPPED | OUTPATIENT
Start: 2025-03-31 | End: 2025-04-14

## 2025-03-31 NOTE — PROGRESS NOTES
Discussed case and recent CT imaging with Dr. Mechelle López - patient with recent admission for multifocal pneumonia at the beginning of March. Repeat CT imaging obtained revealing fluctuating resolution/worsening throughout - patient with known history of Pseudomonas - was discharged from the hospital with Rx for DOXY and Omnicef in which she reports completion. States she is currently experiencing chest congestion with minimal mucous production, white in color. Denies fever. Only experiences worsening SOB with ascending her staircase.     Recommend treat with Levaquin x 14 days and repeat CXR monthly x3. CT Chest due in JUNE 2025.

## 2025-03-31 NOTE — TELEPHONE ENCOUNTER
----- Message from Lylette sent at 3/31/2025  3:29 PM CDT -----  Regarding: Pt Advice  Contact: [phone]  Missed Callback Pt returning callback from missed call. Requesting to speak with somebody in   office. Please call.[phone]

## 2025-04-02 ENCOUNTER — HOSPITAL ENCOUNTER (OUTPATIENT)
Dept: RADIOLOGY | Facility: HOSPITAL | Age: 65
Discharge: HOME | End: 2025-04-02
Attending: NURSE PRACTITIONER
Payer: MEDICARE

## 2025-04-02 DIAGNOSIS — J18.9 PNEUMONIA DUE TO INFECTIOUS ORGANISM, UNSPECIFIED LATERALITY, UNSPECIFIED PART OF LUNG: ICD-10-CM

## 2025-04-02 PROCEDURE — 71046 X-RAY EXAM CHEST 2 VIEWS: CPT | Mod: TC

## 2025-04-02 PROCEDURE — 71046 X-RAY EXAM CHEST 2 VIEWS: CPT | Mod: 26,,, | Performed by: RADIOLOGY

## 2025-04-03 ENCOUNTER — TELEPHONE (OUTPATIENT)
Dept: PULMONOLOGY | Facility: CLINIC | Age: 65
End: 2025-04-03
Payer: MEDICARE

## 2025-04-03 ENCOUNTER — RESULTS FOLLOW-UP (OUTPATIENT)
Dept: PULMONOLOGY | Facility: CLINIC | Age: 65
End: 2025-04-03

## 2025-04-03 NOTE — TELEPHONE ENCOUNTER
Called pt, she was calling in regards to her cxr results. Pt notified cxr is worsening from previous xray per jayla and pt assures she is taking her antibiotics and plans to do a f/u xray and visit at end of the month.

## 2025-04-03 NOTE — TELEPHONE ENCOUNTER
----- Message from Digna sent at 4/3/2025  4:36 PM CDT -----  Regarding: Patient Returning Call  Contact: patient at [phone]  Type:  Patient Returning CallWho Called:  patient at [phone] Left Message for Patient:  Rain Sanches the patient know what this is regarding?:  chest x-ray Additional Information:  Please call and advise. Thank you.

## 2025-04-11 ENCOUNTER — OFFICE VISIT (OUTPATIENT)
Dept: HEMATOLOGY/ONCOLOGY | Facility: CLINIC | Age: 65
End: 2025-04-11
Payer: MEDICARE

## 2025-04-11 VITALS
HEART RATE: 52 BPM | HEIGHT: 61 IN | BODY MASS INDEX: 19.81 KG/M2 | TEMPERATURE: 97 F | SYSTOLIC BLOOD PRESSURE: 142 MMHG | WEIGHT: 104.94 LBS | DIASTOLIC BLOOD PRESSURE: 83 MMHG | OXYGEN SATURATION: 99 % | RESPIRATION RATE: 18 BRPM

## 2025-04-11 DIAGNOSIS — D64.9 NORMOCYTIC ANEMIA: ICD-10-CM

## 2025-04-11 DIAGNOSIS — B95.61 MSSA BACTEREMIA: ICD-10-CM

## 2025-04-11 DIAGNOSIS — R78.81 MSSA BACTEREMIA: ICD-10-CM

## 2025-04-11 DIAGNOSIS — I10 HYPERTENSION, ESSENTIAL: Primary | ICD-10-CM

## 2025-04-11 DIAGNOSIS — D46.Z OTHER MYELODYSPLASTIC SYNDROMES: ICD-10-CM

## 2025-04-11 DIAGNOSIS — J44.0 COPD WITH ACUTE LOWER RESPIRATORY INFECTION: ICD-10-CM

## 2025-04-11 PROCEDURE — 99999 PR PBB SHADOW E&M-EST. PATIENT-LVL V: CPT | Mod: PBBFAC,HCNC,, | Performed by: INTERNAL MEDICINE

## 2025-04-11 NOTE — PROGRESS NOTES
Subjective:       Patient ID: Floresita Martin is a 65 y.o. female.    Chief Complaint:  Anemia and thrombocytosis    HPI  65-year-old with significant lung disease for which she follows with pulmonary sent in for anemia and thrombocytosis      Patient denies issues related to appetite or recent weight change.  Feels well overall.  Denies issues with generalized weakness .  Denies fatigue over above what is normally experienced with day-to-day activities  Denies fever, chills, rigors  Denies issues with ambulation  Denies generalized swelling or new lumps and bumps felt in any part  of body  Denies visual or hearing loss  Denies issues with congestion, sinus issues, cough, sputum production runny nose or itching eyes  Denies chest pain or palpitations, or passing out  Denies abdominal pain, reflux symptoms, nausea vomiting loose stools or constipation  Denies seizure activity or focal weaknesses or symptoms related to TIA, no head aches or blurred vision reported  Denies issues with skin rash or bruising  Denies issues with swelling of feet, tingling or numbness   No issues with sleep,   No recent foreign travel   Good family support reported         Past Medical History:   Diagnosis Date    Alcohol abuse     Cellulitis of toe of right foot 2019    COPD (chronic obstructive pulmonary disease)     Digestive disorder     Headache     Hearing loss     HLD (hyperlipidemia)      Past Surgical History:   Procedure Laterality Date    BACK SURGERY       SECTION      2    COLONOSCOPY N/A 11/15/2017    Procedure: COLONOSCOPY;  Surgeon: Peg Powers MD;  Location: [facility];  Service: Endoscopy;  Laterality: N/A;    EPIDURAL STEROID INJECTION INTO CERVICAL SPINE      EPIDURAL STEROID INJECTION INTO LUMBAR SPINE      JOINT REPLACEMENT  2018    LIPOMA RESECTION Right 2023    Procedure: EXCISION, LIPOMA;  Surgeon: Ruben Brooks Jr., MD;  Location: [facility];  Service: General;  Laterality: Right;     LUMBAR FUSION      with cage    LUNG BIOPSY      NECK SURGERY  2018    with hardware    RADIOFREQUENCY ABLATION      cervical    RADIOFREQUENCY ABLATION      lumbar    SPINE SURGERY  2018    STAPEDECTOMY Right 2015    STAPEDECTOMY Left     TUBAL LIGATION  1990     Family History   Problem Relation Name Age of Onset    Arthritis Mother      Diabetes Mother      Hypertension Father Jose Gutierrezing     Cancer Sister Lilly     Breast cancer Sister Lilly     Breast cancer Sister kristen     Breast cancer Sister Melissa     Cancer Sister Melissa     Breast cancer Sister Mercy     Cancer Sister Mercy     Cancer Sister Ana       Social History     Socioeconomic History    Marital status: Single   Tobacco Use    Smoking status: Every Day     Current packs/day: 0.00     Average packs/day: 0.5 packs/day for 46.4 years (23.2 ttl pk-yrs)     Types: Cigarettes     Start date: 1978     Last attempt to quit: 2024     Years since quittin.8     Passive exposure: Past    Smokeless tobacco: Never    Tobacco comments:     Patient has smoked since a teenager, states that she has cut down from 2pk/day to .5pk/day. Can quit on her own when she's ready. Information and education on outpatient smoking cessation when ready. 3/26/24     Smokes 10 cigarettes daily 2024   Substance and Sexual Activity    Alcohol use: Not Currently    Drug use: No    Sexual activity: Not Currently     Partners: Male     Birth control/protection: Post-menopausal     Social Drivers of Health     Financial Resource Strain: Patient Declined (3/2/2025)    Overall Financial Resource Strain (CARDIA)     Difficulty of Paying Living Expenses: Patient declined   Food Insecurity: Patient Declined (3/2/2025)    Hunger Vital Sign     Worried About Running Out of Food in the Last Year: Patient declined     Ran Out of Food in the Last Year: Patient declined   Transportation Needs: No Transportation Needs (2025)    PRAPARE -  Transportation     Lack of Transportation (Medical): No     Lack of Transportation (Non-Medical): No   Physical Activity: Inactive (2/18/2025)    Exercise Vital Sign     Days of Exercise per Week: 0 days     Minutes of Exercise per Session: 0 min   Stress: Patient Declined (3/2/2025)    [employer] - Occupational Stress Questionnaire     Feeling of Stress : Patient declined   Housing Stability: Patient Declined (3/2/2025)    Housing Stability Vital Sign     Unable to Pay for Housing in the Last Year: Patient declined     Homeless in the Last Year: Patient declined     Review of patient's allergies indicates:   Allergen Reactions    Acetaminophen-codeine Nausea And Vomiting    Codeine Nausea And Vomiting     Current Medications[1]    Physical Exam    VITAL SIGNS:  as above   GENERAL: appears well-built, well-nourished.  No anxiety, no agitation, and in no distress.  Patient is awake, alert, oriented and cooperative.  HEENT:  Showed no congestion. Trachea is central no obvious icterus or pallor noted no hoarseness. no obvious JVD   NECK:  Supple.  No JVD. No obvious cervical submental or supraclavicular adenopathy.  RS:the visualized portion of  Chest expands well. chest appears symmetric, no audible wheezes.  No dyspnea recognized  ABDOMEN:  abdomen appears undistended.  EXTREMITIES:  Without edema.  NEUROLOGICAL:  The patient is appropriate, higher functions are normal.  No  obvious neurological deficits.  normal judgement normal thought content  No confusion, no speech impediment. Cranial nerves are intact and show no deficit. No gross motor deficits noted   SKIN MUSCULOSKELETAL: no joint or skeletal deformity, no clubbing of nails.  No visible rash ecchymosis or petechiae   Lab Results   Component Value Date    WBC 8.61 03/13/2025    HGB 9.5 (L) 03/13/2025    HCT 29.5 (L) 03/13/2025    MCV 85 03/13/2025     (H) 03/13/2025       BMP  Lab Results   Component Value Date      (L) 03/17/2025    K 5.0 03/17/2025     03/17/2025    CO2 26 03/17/2025    BUN 7 (L) 03/17/2025    CREATININE 0.5 03/17/2025    CALCIUM 8.8 03/17/2025    ANIONGAP 8 03/17/2025    ESTGFRAFRICA >60.0 01/05/2022    EGFRNONAA >60.0 01/05/2022     Lab Results   Component Value Date    IRON 32 03/20/2025    TIBC 325 03/20/2025    FERRITIN 309 (H) 03/20/2025         Problem List[2]     Assessment and Plan   Anemia  Patient has a history of heavy menstrual cycles studies appropriate continue to follow-up  Thrombocytosis probably resulted from iron-deficiency  JAK2 with reflex is negative    COPD and alveolar lung disease; patient recently discharged from hospital with pneumonia she has been advised to continue close follow-up with Pulmonary outpatient setting is already established with Dr. Ramsey    HTN patient is careful with medications on losartan    Dyslipidemia; patient on atorvastatin attempts to watch her diet  Chronic pain syndrome patient follows with pain clinic she is on Lyrica as well as Neurontin morphine cyclobenzaprine oxycodone are all listed    See me in 3 months with CBC CMP iron studies ferritin  Answers submitted by the patient for this visit:  Review of Systems Questionnaire (Submitted on 4/10/2025)  appetite change : No  unexpected weight change: No  mouth sores: Yes  visual disturbance: No  cough: Yes  shortness of breath: Yes  chest pain: No  abdominal pain: No  diarrhea: No  frequency: No  back pain: Yes  rash: No  headaches: No  adenopathy: No  nervous/ anxious: Yes         [1]   Current Outpatient Medications:     albuterol (PROVENTIL/VENTOLIN HFA) 90 mcg/actuation inhaler, Inhale 2 puffs into the lungs every 6 (six) hours as needed for Wheezing or Shortness of Breath. Rescue, Disp: 18 g, Rfl: 11    albuterol-ipratropium (DUO-NEB) 2.5 mg-0.5 mg/3 mL nebulizer solution, Take 3 mLs by nebulization every 6 (six) hours as needed for Wheezing or Shortness of Breath. Rescue, Disp: 75 mL, Rfl: 11     amitriptyline (ELAVIL) 25 MG tablet, Take 1-2 tablet by mouth every night at bedtime, Disp: 60 tablet, Rfl: 1    amitriptyline (ELAVIL) 25 MG tablet, Take 1-2 tablets (25-50 mg total) by mouth nightly at bedtime., Disp: 60 tablet, Rfl: 1    amitriptyline (ELAVIL) 25 MG tablet, Take 1-2 tablet by mouth every night at bedtime, Disp: 60 tablet, Rfl: 1    atorvastatin (LIPITOR) 40 MG tablet, TAKE 1 TABLET BY MOUTH DAILY, Disp: 90 tablet, Rfl: 0    co-enzyme Q-10 30 mg capsule, Take 30 mg by mouth once daily., Disp: , Rfl:     cyclobenzaprine (FLEXERIL) 10 MG tablet, Take 1 tablet by mouth three times a day as needed, Disp: 90 tablet, Rfl: 1    cyclobenzaprine (FLEXERIL) 10 MG tablet, Take 1 tablet (10 mg total) by mouth 3 (three) times daily as needed., Disp: 90 tablet, Rfl: 1    cyclobenzaprine (FLEXERIL) 10 MG tablet, Take 1 tablet by mouth three times a day as needed, Disp: 90 tablet, Rfl: 1    diclofenac sodium (PENNSAID) 20 mg/gram /actuation(2 %) sopm, apply a small amount to skin twice a day, Disp: 224 g, Rfl: 1    diphenhydrAMINE-aluminum-magnesium hydroxide-simethicone-LIDOcaine viscous HCl 2%, Swish and spit 15 mLs every 4 (four) hours as needed., Disp: 1 each, Rfl: 2    fluticasone propionate (FLONASE) 50 mcg/actuation nasal spray, SHAKE LIQUID AND USE 1 SPRAY(50 MCG) IN EACH NOSTRIL EVERY DAY, Disp: 48 g, Rfl: 1    gabapentin (NEURONTIN) 300 MG capsule, Take 1 capsule by mouth three times a day as directed More than a 7 day supply are medically necessary, Disp: 90 capsule, Rfl: 1    gabapentin (NEURONTIN) 400 MG capsule, Take 1 capsule by mouth three times a day as directed More than a 7 day supply are medically necessary, Disp: 90 capsule, Rfl: 1    ibuprofen (ADVIL,MOTRIN) 800 MG tablet, Take 1 tablet by mouth three times a day as needed for pain, Disp: 90 tablet, Rfl: 1    lactulose (CHRONULAC) 10 gram/15 mL solution, 15 ml twice a day as needed, Disp: 946 mL, Rfl: 1    lactulose (CHRONULAC) 10 gram/15  mL solution, Take 15 mLs (10 g total) by mouth 2 (two) times daily as needed., Disp: 900 mL, Rfl: 1    lactulose (CHRONULAC) 10 gram/15 mL solution, Take 15 ml by mouth twice a day, Disp: 946 mL, Rfl: 1    levoFLOXacin (LEVAQUIN) 500 MG tablet, Take 1 tablet (500 mg total) by mouth once daily. for 14 days, Disp: 14 tablet, Rfl: 0    loratadine (CLARITIN) 10 mg tablet, TAKE 1 TABLET BY MOUTH DAILY, Disp: 90 tablet, Rfl: 2    magnesium oxide (MAG-OX) 400 mg (241.3 mg magnesium) tablet, Take 1 tablet (400 mg total) by mouth once daily., Disp: 90 tablet, Rfl: 1    morphine (MS CONTIN) 30 MG 12 hr tablet, Take 1 tablet by mouth every twelve hours More than 7 days supply is medically necessary, Disp: 60 tablet, Rfl: 0    morphine (MS CONTIN) 30 MG 12 hr tablet, Take 1 tablet by mouth twice a day More than 7 days supply is medically necessary, Disp: 60 tablet, Rfl: 0    [START ON 5/7/2025] morphine (MS CONTIN) 30 MG 12 hr tablet, Take 1 tablet by mouth twice a day More than 7 days supply is medically necessary, Disp: 60 tablet, Rfl: 0    morphine (MS CONTIN) 60 MG 12 hr tablet, Take 1 tablet by mouth once a day More than 7 days supply is medically necessary, Disp: 30 tablet, Rfl: 0    multivitamin capsule, Take 1 capsule by mouth once daily., Disp: , Rfl:     naloxone (NARCAN) 4 mg/actuation Spry, Spray 1 spray into one nostril single dose may repeat every 2-3 minutes until responsive or EMS arrives, Disp: 1 each, Rfl: 1    nicotine (NICODERM CQ) 14 mg/24 hr, Place 1 patch onto the skin once daily., Disp: 30 patch, Rfl: 2    omeprazole (PRILOSEC) 40 MG capsule, TAKE 1 CAPSULE(40 MG) BY MOUTH EVERY DAY, Disp: 90 capsule, Rfl: 3    oxyCODONE (ROXICODONE) 20 mg Tab immediate release tablet, Take 1 tablet by mouth five times a day as needed for pain More than a 7 day supply are medically necessary, Disp: 150 tablet, Rfl: 0    oxyCODONE (ROXICODONE) 20 mg Tab immediate release tablet, Take 1 tablet (20 mg total) by mouth 5  (five) times daily as neede dfor pain., Disp: 150 tablet, Rfl: 0    [START ON 5/15/2025] oxyCODONE (ROXICODONE) 20 mg Tab immediate release tablet, Take 1 tablet by mouth five times a day as needed for pain More than a 7 day supply are medically necessary, Disp: 150 tablet, Rfl: 0    [START ON 4/16/2025] oxyCODONE (ROXICODONE) 20 mg Tab immediate release tablet, Take 1 tablet by mouth five times a day as needed for pain More than a 7 day supply are medically necessary, Disp: 150 tablet, Rfl: 0    pregabalin (LYRICA) 100 MG capsule, Take 1 capsule by mouth three times a day More than 7 days supply is medically necessary, Disp: 90 capsule, Rfl: 1    pregabalin (LYRICA) 75 MG capsule, Take 1 capsule by mouth three times a day More than 7 days supply is medically necessary, Disp: 90 capsule, Rfl: 1    losartan (COZAAR) 50 MG tablet, Take 1 tablet (50 mg total) by mouth once daily. (Patient not taking: Reported on 3/2/2025), Disp: 90 tablet, Rfl: 3  [2]   Patient Active Problem List  Diagnosis    Otosclerosis    Melanosis coli    Gastroesophageal reflux disease without esophagitis    Other hyperlipidemia    Dependence on nicotine from cigarettes    COPD exacerbation    Smoker    Atherosclerosis of aorta    Calcified granuloma of lung- petscan 2023    Chronic pain syndrome    Opioid dependence with current use    Hyponatremia    Multifocal pneumonia    Cystitis    MSSA bacteremia    Normocytic anemia    Hypertension, essential    COPD with acute lower respiratory infection    Moderate protein-calorie malnutrition    Thrush    Nonthrombocytopenic purpura    Chest pain

## 2025-04-11 NOTE — Clinical Note
Please disregard previous CC chart that was for a different patient This patient just to see me 3 months with CBC CMP iron studies ferritin Please do not set up phlebotomies for her

## 2025-04-11 NOTE — Clinical Note
Pt needs 4 phlebotomiess before he gets labs 4 weekly draws written. see me in 6 weeks with abs if the 4 are completed

## 2025-04-25 ENCOUNTER — HOSPITAL ENCOUNTER (OUTPATIENT)
Dept: RADIOLOGY | Facility: HOSPITAL | Age: 65
Discharge: HOME | End: 2025-04-25
Attending: NURSE PRACTITIONER
Payer: MEDICARE

## 2025-04-25 DIAGNOSIS — J18.9 PNEUMONIA DUE TO INFECTIOUS ORGANISM, UNSPECIFIED LATERALITY, UNSPECIFIED PART OF LUNG: ICD-10-CM

## 2025-04-25 PROCEDURE — 71046 X-RAY EXAM CHEST 2 VIEWS: CPT | Mod: TC

## 2025-04-25 PROCEDURE — 71046 X-RAY EXAM CHEST 2 VIEWS: CPT | Mod: 26,,, | Performed by: RADIOLOGY

## 2025-04-28 ENCOUNTER — OFFICE VISIT (OUTPATIENT)
Dept: PULMONOLOGY | Facility: CLINIC | Age: 65
End: 2025-04-28
Payer: MEDICARE

## 2025-04-28 VITALS
SYSTOLIC BLOOD PRESSURE: 127 MMHG | WEIGHT: 103.81 LBS | HEIGHT: 61 IN | BODY MASS INDEX: 19.6 KG/M2 | DIASTOLIC BLOOD PRESSURE: 77 MMHG | HEART RATE: 97 BPM | OXYGEN SATURATION: 98 %

## 2025-04-28 DIAGNOSIS — J18.9 PNEUMONIA DUE TO INFECTIOUS ORGANISM, UNSPECIFIED LATERALITY, UNSPECIFIED PART OF LUNG: ICD-10-CM

## 2025-04-28 DIAGNOSIS — J43.2 CENTRILOBULAR EMPHYSEMA: Primary | ICD-10-CM

## 2025-04-28 DIAGNOSIS — J98.4 CALCIFIED GRANULOMA OF LUNG: ICD-10-CM

## 2025-04-28 DIAGNOSIS — R91.8 LUNG NODULES: ICD-10-CM

## 2025-04-28 DIAGNOSIS — R91.1 SOLITARY PULMONARY NODULE ON LUNG CT: ICD-10-CM

## 2025-04-28 DIAGNOSIS — F17.210 CIGARETTE NICOTINE DEPENDENCE WITHOUT COMPLICATION: ICD-10-CM

## 2025-04-28 PROCEDURE — 99999 PR PBB SHADOW E&M-EST. PATIENT-LVL V: CPT | Mod: PBBFAC,HCNC,, | Performed by: NURSE PRACTITIONER

## 2025-04-28 RX ORDER — RESPIRATORY SYNCYTIAL VISUS VACCINE RECOMBINANT, ADJUVANTED 120MCG/0.5
0.5 KIT INTRAMUSCULAR ONCE
Qty: 0.5 ML | Refills: 0 | Status: SHIPPED | OUTPATIENT
Start: 2025-04-28 | End: 2025-04-28

## 2025-04-28 NOTE — PATIENT INSTRUCTIONS
Overall your repeat CT chest from [facility] concerning for pneumonia - you were treated with strong antibiotics and seem to somewhat be improving clinically.    I recommend another CXR at the end of MAY and CT Chest to be done in JUNE 2025.    You do have a diagnosis of COPD and I recommend a maintenance inhaler.  We will give sample of Breztri inhaler.  This is 2 puffs twice per day. This inhaler contains an inhaled steroid component. Rinse mouth after each use due to risk for thrush development. If mouth or tongue develops white sores please contact the clinic and I will order a prescription mouth wash.     Continue the use of albuterol as needed for shortness of breath, wheezing, cough.    Start using nebulized treatments to help aid with mucous expectoration.    It would be beneficial to stop tobacco use. Please inform the office if you become interested in [facility] as discussed in the clinic today.      Check Immune labs.     Recommend RSV VACCINE AT THIS TIME.     Continue current prescription medication regiment. Keep follow up appointment as scheduled. Please call the office if you have any questions or concerns.

## 2025-04-28 NOTE — PROGRESS NOTES
4/28/2025    Floresita Martin  In office visit     Chief Complaint   Patient presents with    Emphysema       HPI:  04/28/2025:  Required hospitalization on 3/2 at [facility] for diagnosis of Multifocal Pneumonia - was treated during that time with IV Abx and IV Steroids - was subsequently dc home on 3/4 with home oxygen after qualifying for 2L with activity. Was dc home with Rx for DOXY, Cefdinir, and Prednisone 40 mg x 3 days.   States since discharge home her shortness of breath is improving but she is still not back to baseline. Has to climb 14 stair at home which is quite challenging for her. Has oxygen at home however not using, states has never used since being sent home.   States main complaint is mucous in the chest, currently taking Mucinex which seems to help her clear that congestion. Using Albuterol inhaler PRN, approx 4 times per day. Not on daily ICS inhaler - states can't remember if she used Breztri provided at last appt or not.   Underwent CT Chest on 3/28 revealing interval response with several infiltrates throughout - I prescribed LEVAQUIN at that time in which she reports completion. Recently underwent repeat CXR showing mild improvement.   Did receive Pneumonia and FLU vaccine in JAN 2025.  Still smoking 1 ppd - not interested in smoking cessation program at this time.         10/28/2024:  Previously seen per Belinda Damon NP for Hospital Follow up.  On March 24, 2024 patient presented to [facility] Emergency room and was diagnosed with pneumonia.  Patient admitted at that time,  was also known to have mental status change and hyponatremia on labs.  Patient blood culture positive for Pseudomonas.  Patient left AMA on 03/26/2024.  Patient returned to the emergency room at [facility] on 05/21/2024 with reports of shortness of breath, cough, confusion.  Patient was diagnosed with sepsis and CT chest revealing bilateral pneumonia with patchy infiltrations.  Was concern  at that time for septic emboli in which FRANCO was obtained, ruled out.  Patient underwent infectious disease consultation during hospitalization for respiratory and blood cultures both positive for MSSA.  Patient was subsequently discharged home with PICC line and 6 week regimen of cefazolin.  Patient reports completion of antibiotic regimen since then, removal of PICC line.  Patient states she has not taken Trelegy inhaler since 1st hospitalization due to the development of oral thrush.  Does currently use albuterol only as needed.  Has undergone Infectious Disease office visit since discharge - note reviewed.  States she feels as if breathing is back to baseline at this time - denies wheezing, chest tightness. Does endorse intermittent cough with mild mucous production.   Denies current use of CPAP or Oxygen. Still smoking 1 ppd - not interested in smoking cessation program at this time.   Patient Instructions   Overall your repeat CT chest from September shows great improvement in comparison to prior from a pneumonia perspective.  It does not show complete resolution as of yet so I do recommend a repeat CT chest in 6 months, due in March 2025.  You do have a diagnosis of COPD and I recommend a maintenance inhaler.  We will give sample of Breztri inhaler.  This is 2 puffs twice per day. This inhaler contains an inhaled steroid component. Rinse mouth after each use due to risk for thrush development. If mouth or tongue develops white sores please contact the clinic and I will order a prescription mouth wash.   Continue the use of albuterol as needed for shortness of breath, wheezing, cough.  It would be beneficial to stop tobacco use. Please inform the office if you become interested in the [facility] as discussed in the clinic today.    Recommend PNEUMONIA, FLU, AND RSV VACCINE AT THIS TIME.   Continue current prescription medication regiment. Keep follow up appointment as scheduled. Please  call the office if you have any questions or concerns.             02/27/2024: Hx: Lung nodules, Tobacco Dependence  Using Trelegy once per week with benefit - only using PRN.   States shortness of breath is at baseline - denies wheezing, chest tightness, denies cough, mucous production.  Still smoking - approx 15 cigarettes per day - has recently cut down in use.  Denies recent UC or ER visit for respiratory complaints, denies recent steroid or abx use.  Patient Instructions   CT Due in Sept 2024.  It would be beneficial to stop tobacco use. Please inform the office if you become interested in the [facility] as discussed in the clinic today.    Continue COPD regimen including Trelegy once per day.   Can continue to use Albuterol as needed for shortness of breath, wheezing, cough.  If you need nebulized treatments, can trial half a vial of medicine and see if you tolerate that better.  Continue current prescription medication regiment. Keep follow up appointment as scheduled. Please call the office if you have any questions or concerns.       09/20/2023: Hx: Lung nodules, Tobacco Dependence  States shortness of breath is at baseline - denies wheezing, chest tightness. Cough has resolved, mucous production is minimal with white mucous.   Still smoking - at approx 1 ppd at this point - not interested in smoking cessation program.  Using Trelegy once per day with benefit and has Albuterol inhaler for as needed use. Did trial Neb treatments after last appt however states she experienced dizziness after use.   Did not submit sputum samples - states not enough mucous is being produced.   Denies recent UC or ER visits for respiratory complaints - denies recent use of abx, steroids.  Patient Instructions   Overall CT Chest with no acute changes - recommend repeat CT in one year to further monitor nodules. CT Due in Sept 2024.  Your PFT shows overall preserved lung function or lung strength.   It would  "be beneficial to stop tobacco use. Please inform the office if you become interested in the [facility] as discussed in the clinic today.    Continue COPD regimen including Trelegy once per day. Can continue to use Albuterol as needed. Will give spacer in clinic today for Albuterol.  If you need nebulized treatments, can trial half a vial of medicine and see if you tolerate that better.  Continue current prescription medication regiment. Keep follow up appointment as scheduled. Please call the office if you have any questions or concerns.         05/17/2023:  Underwent lung biopsy to lung nodule of BELEN - lung biopsy was negative for malignancy - Per pathology report, "Alveolated lung tissue with regionally dense fibrosis and occasional interstitial lymphoid follicles."  Underwent repeat CT Chest last week revealing unchanged lung nodule however shows new ground glass opacities throughout both lungs, with new area of concern to LLL.   Developed symptoms of a head cold approx 5 days ago - states has been coughing over the last three days. Initially, mucous was thick, difficult to expectorate however has been on Mucinex for the last three days - mucous is yellow/white in color.  Denies wheezing, chest tightness.  Recently cut back on smoking over the last five days - states was smoking 2.5 ppd however is now down to less than 15 cigarettes per day.   Did not yet undergo PFT.  Trialed Trelegy for one month - states did not notice much improvement in symptoms with use.  Patient Instructions   Repeat CT Chest shows unchanged extent of prior noted nodule - now shows concern to left lower lobe in addition to ground glass opacities throughout.  I have ordered sputum cultures - you will leave the office today with sputum specimen cups. Bring to any [facility] within 4 hours of obtaining specimen. Rinse your mouth prior to collecting sample. Please collect sample in the morning by deep coughing prior to " eating or drinking.  Checking Respiratory culture, AFB, Fungal.  I ordered a Lung Function Test to evaluate lung strength. Please complete prior to next appointment. Do this In one month.  Recommend getting back on COPD inhaler - Trelegy, one puff once per day.  This inhaler contains an inhaled steroid component. Rinse mouth after each use due to risk for thrush development. If mouth or tongue develops white sores please contact the clinic and I will order a prescription mouth wash.   Can use Albuterol as needed for shortness of breath, wheezing, cough.  Recommend doing nebulized treatments, two treatments per day for the next 10 days.  Continue Mucinex.  Continue current medication regiment. Keep follow up appointment as scheduled. Please call the office if you have any questions or concerns.           02/03/2023:  Denies being seen by prior pulmonologist. Denies current inhaler use, supplemental oxygen use, CPAP use.  Denies personal history of cancer, PE, anticoagulation use.  Reports she believes she is diagnosed with early stages of COPD.  Underwent CT chest per PCP was referred to pulmonology.  Denies shortness of breath, states she is able to do all of her ADLs without issue.  Denies wheezing, chest tightness.  Cough:  Intermittent, productive with white sputum.  No time correlation identified.  Denies frequent use of antibiotic, steroid.  Current smoker - typical 1 pack per day use.  Started at 17 years old.  Currently vaping as well, nicotine containing. Not interested in smoking cessation referral at this time however requesting Nicotine patches.  Patient Instructions   CT chest reviewed, shows mild emphysema to bilateral upper lung fields.  This is consistent with a diagnosis of COPD.  I ordered a Lung Function Test to evaluate lung strength. Please complete prior to next appointment.   Can trial daily inhaler, Trelegy.  This is 1 puff once per day.  This inhaler contains an inhaled steroid component.  Rinse mouth after each use due to risk for thrush development. If mouth or tongue develops white sores please contact the clinic and I will order a prescription mouth wash.   CT chest does show new 12 mm nodule to the left upper lung and new 5 mm nodule to the left lower lung.  Will proceed with PET scan at this time.  Broch calculator 32% change of nodule being cancerous.   Will review CT images with collaborating MD next week and if change of plan, will call you.  Nicotine patches ordered.  Continue current medication regiment. Keep follow up appointment as scheduled. Please call the office if you have any questions or concerns.         Social Hx: Lives with friend - 1 dog in the home.  Former .  No Asbestosis exposure, Smoking Hx:  As above  Family Hx:  No Lung Cancer, no COPD, no Asthma  Medical Hx:  No previous pneumonia ; no previous shoulder/chest surgery        The chief compliant problem varies with instability at times.  PFSH:  Past Medical History:   Diagnosis Date    Alcohol abuse     Cellulitis of toe of right foot 2019    COPD (chronic obstructive pulmonary disease)     Digestive disorder     Headache     Hearing loss     HLD (hyperlipidemia)          Past Surgical History:   Procedure Laterality Date    BACK SURGERY       SECTION      2    COLONOSCOPY N/A 11/15/2017    Procedure: COLONOSCOPY;  Surgeon: Peg Powesr MD;  Location: [facility];  Service: Endoscopy;  Laterality: N/A;    EPIDURAL STEROID INJECTION INTO CERVICAL SPINE      EPIDURAL STEROID INJECTION INTO LUMBAR SPINE      JOINT REPLACEMENT  2018    LIPOMA RESECTION Right 2023    Procedure: EXCISION, LIPOMA;  Surgeon: Ruben Brooks Jr., MD;  Location: [facility] [place] [facility];  Service: General;  Laterality: Right;    LUMBAR FUSION      with cage    LUNG BIOPSY      NECK SURGERY  2018    with hardware    RADIOFREQUENCY ABLATION      cervical    RADIOFREQUENCY ABLATION      lumbar    SPINE SURGERY  2018     "STAPEDECTOMY Right 2015    STAPEDECTOMY Left     TUBAL LIGATION  1990     Social History     Tobacco Use    Smoking status: Every Day     Current packs/day: 0.00     Average packs/day: 0.5 packs/day for 46.4 years (23.2 ttl pk-yrs)     Types: Cigarettes     Start date: 1978     Last attempt to quit: 2024     Years since quittin.9     Passive exposure: Past    Smokeless tobacco: Never    Tobacco comments:     Patient has smoked since a teenager, states that she has cut down from 2pk/day to .5pk/day. Can quit on her own when she's ready. Information and education on outpatient smoking cessation when ready. 3/26/24     Smokes 10 cigarettes daily 2024   Substance Use Topics    Alcohol use: Not Currently    Drug use: No     Family History   Problem Relation Name Age of Onset    Arthritis Mother      Diabetes Mother      Hypertension Father Jose Hartleynning     Cancer Sister Lilly     Breast cancer Sister Lilly     Breast cancer Sister kristen     Breast cancer Sister Melissa     Cancer Sister Melissa     Breast cancer Sister Mercy     Cancer Sister Mercy     Cancer Sister Ana      Review of patient's allergies indicates:   Allergen Reactions    Acetaminophen-codeine Nausea And Vomiting    Codeine Nausea And Vomiting     I have reviewed past medical, family, and social history. I have reviewed previous nurse notes.    Performance Status:The patient's activity level is no limits with regular activity.      Review of Systems:  a review of eleven systems covering constitutional, Eye, HEENT, Psych, Respiratory, Cardiac, GI, , Musculoskeletal, Endocrine, Dermatologic was negative except for pertinent findings as listed ABOVE and below: pertinent positive as above, rest is good       Exam:Comprehensive exam done. /77 (BP Location: Right arm, Patient Position: Sitting)   Pulse 97   Ht 5' 1" (1.549 m)   Wt 47.1 kg (103 lb 13.4 oz)   LMP  (LMP Unknown)   SpO2 98% Comment: on room air at " rest  BMI 19.62 kg/m²   Exam included Vitals as listed  Constitutional: She is oriented to person, place, and time. She appears well-developed. No distress.   Nose: Nose normal.   Mouth/Throat: Uvula is midline, oropharynx is clear and moist and mucous membranes are normal. No dental caries. No oropharyngeal exudate, posterior oropharyngeal edema, posterior oropharyngeal erythema or tonsillar abscesses.    Eyes: Pupils are equal, round, and reactive to light.   Neck: No JVD present. No thyromegaly present.   Cardiovascular: Normal rate, regular rhythm and normal heart sounds. Exam reveals no gallop and no friction rub.   No murmur heard.  Pulmonary/Chest: Effort normal and breath sounds normal. No accessory muscle usage or stridor. No apnea and no tachypnea. No respiratory distress, decreased breath sounds, wheezes, rhonchi, rales, or tenderness. Clear breath sounds with very minimal wheezing to upper lung fields throughout all lung fields, on room air, in no acute distress.   Abdominal: Soft. She exhibits no mass. There is no tenderness. No hepatosplenomegaly, hernias and normoactive bowel sounds  Musculoskeletal: Normal range of motion. exhibits no edema.   Neurological:  alert and oriented to person, place, and time. not disoriented.   Skin: Skin is warm and dry. Capillary refill takes less 2 sec. No cyanosis or erythema. No pallor. Nails show no clubbing.   Psychiatric: normal mood and affect. behavior is normal. Judgment and thought content normal.       Radiographs (ct chest and cxr) reviewed: view by direct vision   Patient imaging studies were reviewed and interpreted independently. My personal interpretation of most recent Chest Xray and CT Chest includes:    CT Chest March 2025 - Interval improvement/development of bilateral infiltrates.   CT Chest 9/24/2024 - Improvement overall in comparison to prior from pneumonia perspective, still residual micro nodules noted to L. Calcified granulomas. 7 mm  partially calcified nodule to BELEN. Emphysema.   CT Chest - 9/14/2023 Nodule with no growth noted to BELEN - two smaller nodules to LLL have been present and unchanged since at least Jan 2023.    CT Chest Without Contrast 5/10/2023 FINDINGS:  The heart and great vessels are of normal size and contour. Enlargement or aneurysm is not seen. Adenopathy or soft tissue masses within the mediastinum are not seen.   In the left upper lobe there is still seen a peanut shaped 1.6 cm by 1.1 cm soft tissue density mass.  This was biopsied on March 10, 2023 with a benign result.  There is a strand extending to the lateral pleura.   There is however new patchy interstitial infiltrate identified in the left lingula and at the lung bases..  This is most likely infectious since it was not present on the prior CTs chest.  There is a small area of consolidation seen in the lingula on series 4, image 314 measuring 1.5 cm.  A mass or infiltrate in the right lung is not identified.  No pneumothorax or pleural effusion is noted.   Impression:   Stable 1.6 x 1.1 cm peanut shaped soft tissue density mass of the left upper lobe status post biopsy.  New patchy interstitial infiltrates identified in the left lingula and lung bases, however with a small area of consolidation measuring 1.5 cm.  These are most likely an infectious process.  Follow-up by CT scan is recommended.   This report was flagged in Epic as abnormal.             CT Chest Lung Screening Low Dose 1/11/2023 FINDINGS:  Lungs: There is a new spiculated, solid nodule within the left upper lobe measuring 1.2 cm.  There is also new solid nodule at the left lung base measuring 5 mm (series 4, image 378).  A few scattered old calcified granulomas are present.  There are mild emphysematous changes.   Pleura:   No effusion..   Heart and pericardium: Normal size without effusion.   Aorta and vasculature: Atherosclerosis including coronary arteries.   Lymph nodes: No hilar, mediastinal, or  axial lymphadenopathy.   Chest wall and skeletal structures: Postoperative change of the lower cervical spine.  No suspicious bony lesions.   Upper abdomen: Unremarkable.   Impression:   Lung-RADS Category:  4B - Suspicious - consultation advised - possible next steps  Chest CT, tissue sampling and-or PET/CT.   Clinically or potentially clinically significant non lung cancer finding:  None.   Prior Lung Cancer Modifier:  No history of prior lung cancer.   This report was flagged in Epic as abnormal.       X-Ray Chest PA And Lateral 4/15/2016 Findings: The cardiomediastinal silhouette is within normal limits.  The lungs are well expanded and clear.   In pression: No active cardiopulmonary process.    Patient's labs were reviewed including CBC and CMP    Lab Results   Component Value Date    WBC 8.61 03/13/2025    RBC 3.47 (L) 03/13/2025    HGB 9.5 (L) 03/13/2025    HCT 29.5 (L) 03/13/2025    MCV 85 03/13/2025    MCH 27.4 03/13/2025    MCHC 32.2 03/13/2025    RDW 18.5 (H) 03/13/2025     (H) 03/13/2025    MPV 8.2 (L) 03/13/2025    GRAN 4.4 03/13/2025    GRAN 51.5 03/13/2025    LYMPH 3.3 03/13/2025    LYMPH 37.7 03/13/2025    MONO 0.7 03/13/2025    MONO 8.0 03/13/2025    EOS 0.2 03/13/2025    BASO 0.04 03/13/2025    EOSINOPHIL 2.1 03/13/2025    BASOPHIL 0.5 03/13/2025   CMP  Sodium   Date Value Ref Range Status   03/17/2025 134 (L) 136 - 145 mmol/L Final     Potassium   Date Value Ref Range Status   03/17/2025 5.0 3.5 - 5.1 mmol/L Final     Chloride   Date Value Ref Range Status   03/17/2025 100 95 - 110 mmol/L Final     CO2   Date Value Ref Range Status   03/17/2025 26 23 - 29 mmol/L Final     Glucose   Date Value Ref Range Status   03/17/2025 99 70 - 110 mg/dL Final     BUN   Date Value Ref Range Status   03/17/2025 7 (L) 8 - 23 mg/dL Final     Creatinine   Date Value Ref Range Status   03/17/2025 0.5 0.5 - 1.4 mg/dL Final     Calcium   Date Value Ref Range Status   03/17/2025 8.8 8.7 - 10.5 mg/dL Final      Total Protein   Date Value Ref Range Status   03/13/2025 6.4 6.0 - 8.4 g/dL Final     Albumin   Date Value Ref Range Status   03/13/2025 2.6 (L) 3.5 - 5.2 g/dL Final     Total Bilirubin   Date Value Ref Range Status   03/13/2025 0.1 0.1 - 1.0 mg/dL Final     Comment:     For infants and newborns, interpretation of results should be based  on gestational age, weight and in agreement with clinical  observations.    Premature Infant recommended reference ranges:  Up to 24 hours.............<8.0 mg/dL  Up to 48 hours............<12.0 mg/dL  3-5 days..................<15.0 mg/dL  6-29 days.................<15.0 mg/dL       Alkaline Phosphatase   Date Value Ref Range Status   03/13/2025 178 (H) 40 - 150 U/L Final     AST   Date Value Ref Range Status   03/13/2025 24 10 - 40 U/L Final     ALT   Date Value Ref Range Status   03/13/2025 15 10 - 44 U/L Final     Anion Gap   Date Value Ref Range Status   03/17/2025 8 8 - 16 mmol/L Final     eGFR   Date Value Ref Range Status   03/17/2025 >60.0 >60 mL/min/1.73 m^2 Final         PFT reviewed  Pulmonary Functions Testing Results:        Plan:  Clinical impression is ambiguous and will need repeated evaluation wrt will require conference with MD.    Floresita was seen today for emphysema.    Diagnoses and all orders for this visit:    Centrilobular emphysema  -     RSVPreF3 antigen-AS01E, PF, (AREXVY, PF,) 120 mcg/0.5 mL SusR vaccine; Inject 0.5 mLs into the muscle once. for 1 dose    Pneumonia due to infectious organism, unspecified laterality, unspecified part of lung  -     Immunoglobulins (IgG, IgA, IgM) Quantitative; Future    Solitary pulmonary nodule on lung CT    Calcified granuloma of lung    Cigarette nicotine dependence without complication    Lung nodules          Follow up in about 3 months (around 7/28/2025), or if symptoms worsen or fail to improve.    Discussed with patient above for education the following:      Patient Instructions   Overall your repeat CT chest  from MARCH concerning for pneumonia - you were treated with strong antibiotics and seem to somewhat be improving clinically.    I recommend another CXR at the end of MAY and CT Chest to be done in JUNE 2025.    You do have a diagnosis of COPD and I recommend a maintenance inhaler.  We will give sample of Breztri inhaler.  This is 2 puffs twice per day. This inhaler contains an inhaled steroid component. Rinse mouth after each use due to risk for thrush development. If mouth or tongue develops white sores please contact the clinic and I will order a prescription mouth wash.     Continue the use of albuterol as needed for shortness of breath, wheezing, cough.    Start using nebulized treatments to help aid with mucous expectoration.    It would be beneficial to stop tobacco use. Please inform the office if you become interested in the [facility] as discussed in the clinic today.      Check Immune labs.     Recommend RSV VACCINE AT THIS TIME.     Continue current prescription medication regiment. Keep follow up appointment as scheduled. Please call the office if you have any questions or concerns.

## 2025-04-29 ENCOUNTER — TELEPHONE (OUTPATIENT)
Dept: PULMONOLOGY | Facility: CLINIC | Age: 65
End: 2025-04-29
Payer: MEDICARE

## 2025-05-11 DIAGNOSIS — J30.9 ALLERGIC RHINITIS: ICD-10-CM

## 2025-05-11 DIAGNOSIS — E78.49 OTHER HYPERLIPIDEMIA: ICD-10-CM

## 2025-05-11 DIAGNOSIS — K21.9 GASTROESOPHAGEAL REFLUX DISEASE WITHOUT ESOPHAGITIS: ICD-10-CM

## 2025-05-11 DIAGNOSIS — J43.2 CENTRILOBULAR EMPHYSEMA: ICD-10-CM

## 2025-05-12 RX ORDER — LORATADINE 10 MG/1
10 TABLET ORAL
Qty: 90 TABLET | Refills: 2 | Status: SHIPPED | OUTPATIENT
Start: 2025-05-12

## 2025-05-12 RX ORDER — ATORVASTATIN CALCIUM 40 MG/1
40 TABLET, FILM COATED ORAL
Qty: 90 TABLET | Refills: 0 | Status: SHIPPED | OUTPATIENT
Start: 2025-05-12

## 2025-05-12 RX ORDER — ALBUTEROL SULFATE 90 UG/1
2 INHALANT RESPIRATORY (INHALATION) EVERY 6 HOURS PRN
Qty: 18 G | Refills: 11 | Status: SHIPPED | OUTPATIENT
Start: 2025-05-12

## 2025-05-12 RX ORDER — OMEPRAZOLE 40 MG/1
40 CAPSULE, DELAYED RELEASE ORAL
Qty: 90 CAPSULE | Refills: 3 | Status: SHIPPED | OUTPATIENT
Start: 2025-05-12

## 2025-05-12 RX ORDER — FLUTICASONE PROPIONATE 50 MCG
SPRAY, SUSPENSION (ML) NASAL
Qty: 48 G | Refills: 1 | Status: SHIPPED | OUTPATIENT
Start: 2025-05-12

## 2025-05-12 NOTE — TELEPHONE ENCOUNTER
No care due was identified.  [facility] Embedded Care Due Messages. Reference number: [id number].   5/11/2025 11:50:26 PM CDT

## 2025-05-29 ENCOUNTER — HOSPITAL ENCOUNTER (OUTPATIENT)
Dept: RADIOLOGY | Facility: HOSPITAL | Age: 65
Discharge: HOME | End: 2025-05-29
Attending: NURSE PRACTITIONER
Payer: MEDICARE

## 2025-05-29 ENCOUNTER — HOSPITAL ENCOUNTER (OUTPATIENT)
Dept: RADIOLOGY | Facility: CLINIC | Age: 65
Discharge: HOME | End: 2025-05-29
Attending: NURSE PRACTITIONER
Payer: MEDICARE

## 2025-05-29 DIAGNOSIS — J18.9 PNEUMONIA DUE TO INFECTIOUS ORGANISM, UNSPECIFIED LATERALITY, UNSPECIFIED PART OF LUNG: ICD-10-CM

## 2025-05-29 PROCEDURE — 71046 X-RAY EXAM CHEST 2 VIEWS: CPT | Mod: 26,,, | Performed by: RADIOLOGY

## 2025-05-29 PROCEDURE — 71046 X-RAY EXAM CHEST 2 VIEWS: CPT | Mod: TC

## 2025-06-02 ENCOUNTER — TELEPHONE (OUTPATIENT)
Dept: PULMONOLOGY | Facility: CLINIC | Age: 65
End: 2025-06-02
Payer: MEDICARE

## 2025-06-17 ENCOUNTER — HOSPITAL ENCOUNTER (OUTPATIENT)
Dept: RADIOLOGY | Facility: HOSPITAL | Age: 65
Discharge: HOME | End: 2025-06-17
Attending: NURSE PRACTITIONER
Payer: MEDICARE

## 2025-06-17 DIAGNOSIS — J18.9 PNEUMONIA DUE TO INFECTIOUS ORGANISM, UNSPECIFIED LATERALITY, UNSPECIFIED PART OF LUNG: ICD-10-CM

## 2025-06-17 PROCEDURE — 71250 CT THORAX DX C-: CPT | Mod: TC

## 2025-06-17 PROCEDURE — 71250 CT THORAX DX C-: CPT | Mod: 26,,, | Performed by: RADIOLOGY

## 2025-06-23 DIAGNOSIS — J18.9 PNEUMONIA DUE TO INFECTIOUS ORGANISM, UNSPECIFIED LATERALITY, UNSPECIFIED PART OF LUNG: Primary | ICD-10-CM

## 2025-06-23 NOTE — PROGRESS NOTES
Briefly discussed case with Dr. Mechelle López - regarding recent imaging.    Patient with pneumonia requiring hospitalization in 2024 - discharged with 6 week duration of IV abx therapy (CEFTIN) - pneumonia cleared on repeat imaging from 9/2024.    Recurrent pneumonia in MARCH 2025 - treated with Doxy and Cefdinir - repeat CT imaging showed incomplete resolution of infiltrates to the R lung - treated with Levaquin.    Repeat CT Chest in JUNE shows persistent infiltrates to RUL - bronchoscopy recommended.    Case request placed in [facility] for Bronch on Thursday at 10 AM per Dr. López.    Patient VU on plan of care. Discussed via telephone on 06/23/2025 at 1603.

## 2025-06-25 ENCOUNTER — HOSPITAL ENCOUNTER (OUTPATIENT)
Dept: PREADMISSION TESTING | Facility: HOSPITAL | Age: 65
Discharge: HOME | End: 2025-06-25
Attending: DENTIST
Payer: MEDICARE

## 2025-06-25 VITALS — BODY MASS INDEX: 19.83 KG/M2 | HEIGHT: 61 IN | WEIGHT: 105 LBS

## 2025-06-25 DIAGNOSIS — Z01.818 PREOP TESTING: Primary | ICD-10-CM

## 2025-06-25 LAB
ANION GAP (SMH): 8 MMOL/L (ref 8–16)
BUN SERPL-MCNC: 9 MG/DL (ref 8–23)
CALCIUM SERPL-MCNC: 9.5 MG/DL (ref 8.7–10.5)
CHLORIDE SERPL-SCNC: 98 MMOL/L (ref 95–110)
CO2 SERPL-SCNC: 28 MMOL/L (ref 23–29)
CREAT SERPL-MCNC: 0.6 MG/DL (ref 0.5–1.4)
ERYTHROCYTE [DISTWIDTH] IN BLOOD BY AUTOMATED COUNT: 21.6 % (ref 11.5–14.5)
GFR SERPLBLD CREATININE-BSD FMLA CKD-EPI: >60 ML/MIN/1.73/M2
GLUCOSE SERPL-MCNC: 94 MG/DL (ref 70–110)
HCT VFR BLD AUTO: 35.4 % (ref 37–48.5)
HGB BLD-MCNC: 11.3 GM/DL (ref 12–16)
MCH RBC QN AUTO: 26.8 PG (ref 27–31)
MCHC RBC AUTO-ENTMCNC: 31.9 G/DL (ref 32–36)
MCV RBC AUTO: 84 FL (ref 82–98)
PLATELET # BLD AUTO: 726 K/UL (ref 150–450)
PMV BLD AUTO: 7.6 FL (ref 9.2–12.9)
POTASSIUM SERPL-SCNC: 5.3 MMOL/L (ref 3.5–5.1)
RBC # BLD AUTO: 4.21 M/UL (ref 4–5.4)
SODIUM SERPL-SCNC: 134 MMOL/L (ref 136–145)
WBC # BLD AUTO: 8.07 K/UL (ref 3.9–12.7)

## 2025-06-25 PROCEDURE — 36415 COLL VENOUS BLD VENIPUNCTURE: CPT | Performed by: ANESTHESIOLOGY

## 2025-06-25 PROCEDURE — 85027 COMPLETE CBC AUTOMATED: CPT | Performed by: ANESTHESIOLOGY

## 2025-06-25 PROCEDURE — 80048 BASIC METABOLIC PNL TOTAL CA: CPT | Performed by: ANESTHESIOLOGY

## 2025-06-25 NOTE — DISCHARGE INSTRUCTIONS
To confirm, Your doctor has instructed you that surgery is scheduled for:  Thursday 6/26/25    Endoscopy  will call the afternoon prior to surgery with the final arrival time.      Please report to Outpatient Registration the morning of surgery.     Do not eat anything after midnight the night before your surgery - You may have water  up to 2 hours before coming in for the procedure.      Follow the preop given by the Doctor    YOU MAY BRUSH YOUR TEETH BUT DO NOT SWALLOW     TAKE ONLY THESE MEDICATIONS WITH A SMALL SIP OF WATER THE MORNING OF YOUR PROCEDURE: see medication list      ONLY if you are diabetic, check your sugar in the morning before your procedure.       Do not take any diabetic medicines or insulin the morning of surgery .     PLEASE NOTE:  The surgery schedule has many variables which may affect the time of your surgery case.  Family members should be available if your surgery time changes.  Plan to be here the day of your procedure between 2-3 hours.    DO NOT TAKE THESE MEDICATIONS 5-7 DAYS PRIOR to your procedure or per your surgeon's request: ASPIRIN, ALEVE, ADVIL, IBUPROFEN,  JORDAN SELTZER, BC , FISH OIL , VITAMIN E, HERBALS  (May take Tylenol)    ONLY if you are prescribed any types of blood thinners such as:  Aspirin, Coumadin, Plavix, Pradaxa, Xarelto, Aggrenox, Effient, Eliquis, Savasya, Brilinta, or any other, ask your surgeon whether you should stop taking them and how long before surgery you should stop.  You may also need to verify with the prescribing physician if it is ok to stop your medication.                                                       IMPORTANT INSTRUCTIONS    Do not smoke, vape or drink alcoholic beverages 24 hours prior to your procedure.  Shower the night before with  Dial antibacterial soap from the neck down.   You may use your own shampoo and face wash. This helps your skin to be as bacteria free as possible.    If you wear contact lenses, dentures, hearing aids or  glasses, bring a container to put them in during surgery and give to a family member for safe keeping.    Please leave all jewelry, piercing's and valuables at home.   Make arrangements in advance for transportation home by a responsible adult.  You must make arrangements for transportation, TAXI'S, UBER'S OR LYFTS ARE NOT ALLOWED.        If you have any questions about these instructions, call the Endoscopy Department at [phone]

## 2025-06-26 ENCOUNTER — ANESTHESIA EVENT (OUTPATIENT)
Dept: SURGERY | Facility: HOSPITAL | Age: 65
End: 2025-06-26
Payer: MEDICARE

## 2025-06-26 ENCOUNTER — ANESTHESIA (OUTPATIENT)
Dept: SURGERY | Facility: HOSPITAL | Age: 65
End: 2025-06-26
Payer: MEDICARE

## 2025-06-26 ENCOUNTER — HOSPITAL ENCOUNTER (OUTPATIENT)
Facility: HOSPITAL | Age: 65
Discharge: HOME | End: 2025-06-26
Attending: INTERNAL MEDICINE | Admitting: INTERNAL MEDICINE
Payer: MEDICARE

## 2025-06-26 VITALS
DIASTOLIC BLOOD PRESSURE: 57 MMHG | OXYGEN SATURATION: 93 % | TEMPERATURE: 99 F | RESPIRATION RATE: 16 BRPM | SYSTOLIC BLOOD PRESSURE: 104 MMHG | HEART RATE: 103 BPM

## 2025-06-26 DIAGNOSIS — J18.9 PNEUMONIA DUE TO INFECTIOUS ORGANISM: ICD-10-CM

## 2025-06-26 DIAGNOSIS — J18.9 PNEUMONIA OF RIGHT UPPER LOBE DUE TO INFECTIOUS ORGANISM: Primary | ICD-10-CM

## 2025-06-26 DIAGNOSIS — J18.9 PNEUMONIA DUE TO INFECTIOUS ORGANISM, UNSPECIFIED LATERALITY, UNSPECIFIED PART OF LUNG: Primary | ICD-10-CM

## 2025-06-26 LAB
APPEARANCE FLD: NORMAL
BASOPHILS NFR FLD MANUAL: 1 %
COLOR FLD: NORMAL
KOH PREP SPEC: NORMAL
LYMPHOCYTES NFR FLD MANUAL: 2 %
MESOTHL CELL NFR FLD MANUAL: 1 %
MONOS+MACROS NFR FLD MANUAL: 1 %
NEUTROPHILS NFR FLD MANUAL: 95 %
WBC # FLD: 2350 /CU MM

## 2025-06-26 PROCEDURE — 31624 DX BRONCHOSCOPE/LAVAGE: CPT | Performed by: INTERNAL MEDICINE

## 2025-06-26 PROCEDURE — 25000003 PHARM REV CODE 250: Performed by: INTERNAL MEDICINE

## 2025-06-26 PROCEDURE — 63600175 PHARM REV CODE 636 W HCPCS: Performed by: NURSE ANESTHETIST, CERTIFIED REGISTERED

## 2025-06-26 PROCEDURE — 87206 SMEAR FLUORESCENT/ACID STAI: CPT | Performed by: INTERNAL MEDICINE

## 2025-06-26 PROCEDURE — 87210 SMEAR WET MOUNT SALINE/INK: CPT | Performed by: INTERNAL MEDICINE

## 2025-06-26 PROCEDURE — 63600175 PHARM REV CODE 636 W HCPCS: Performed by: INTERNAL MEDICINE

## 2025-06-26 PROCEDURE — 87116 MYCOBACTERIA CULTURE: CPT | Performed by: INTERNAL MEDICINE

## 2025-06-26 PROCEDURE — 37000008 HC ANESTHESIA 1ST 15 MINUTES: Performed by: INTERNAL MEDICINE

## 2025-06-26 PROCEDURE — 87077 CULTURE AEROBIC IDENTIFY: CPT | Performed by: INTERNAL MEDICINE

## 2025-06-26 PROCEDURE — 89051 BODY FLUID CELL COUNT: CPT | Performed by: INTERNAL MEDICINE

## 2025-06-26 PROCEDURE — 25000003 PHARM REV CODE 250: Performed by: NURSE ANESTHETIST, CERTIFIED REGISTERED

## 2025-06-26 PROCEDURE — 31624 DX BRONCHOSCOPE/LAVAGE: CPT | Mod: ,,, | Performed by: INTERNAL MEDICINE

## 2025-06-26 PROCEDURE — 25000242 PHARM REV CODE 250 ALT 637 W/ HCPCS: Performed by: INTERNAL MEDICINE

## 2025-06-26 PROCEDURE — 27201114 HC TRAP (ANY): Performed by: INTERNAL MEDICINE

## 2025-06-26 PROCEDURE — 87102 FUNGUS ISOLATION CULTURE: CPT | Performed by: INTERNAL MEDICINE

## 2025-06-26 PROCEDURE — 37000009 HC ANESTHESIA EA ADD 15 MINS: Performed by: INTERNAL MEDICINE

## 2025-06-26 RX ORDER — LIDOCAINE HYDROCHLORIDE 40 MG/ML
SOLUTION TOPICAL
Status: COMPLETED | OUTPATIENT
Start: 2025-06-26 | End: 2025-06-26

## 2025-06-26 RX ORDER — LIDOCAINE HYDROCHLORIDE 20 MG/ML
INJECTION INTRAVENOUS
Status: DISCONTINUED | OUTPATIENT
Start: 2025-06-26 | End: 2025-06-26

## 2025-06-26 RX ORDER — OXYMETAZOLINE HCL 0.05 %
2 SPRAY, NON-AEROSOL (ML) NASAL ONCE
Status: COMPLETED | OUTPATIENT
Start: 2025-06-26 | End: 2025-06-26

## 2025-06-26 RX ORDER — LIDOCAINE HYDROCHLORIDE 10 MG/ML
INJECTION, SOLUTION INFILTRATION; PERINEURAL
Status: COMPLETED | OUTPATIENT
Start: 2025-06-26 | End: 2025-06-26

## 2025-06-26 RX ORDER — ALBUTEROL SULFATE 0.83 MG/ML
SOLUTION RESPIRATORY (INHALATION)
Status: COMPLETED | OUTPATIENT
Start: 2025-06-26 | End: 2025-06-26

## 2025-06-26 RX ORDER — PROPOFOL 10 MG/ML
VIAL (ML) INTRAVENOUS
Status: DISCONTINUED | OUTPATIENT
Start: 2025-06-26 | End: 2025-06-26

## 2025-06-26 RX ORDER — LEVOFLOXACIN 500 MG/1
500 TABLET, FILM COATED ORAL DAILY
Qty: 7 TABLET | Refills: 0 | Status: SHIPPED | OUTPATIENT
Start: 2025-06-26 | End: 2025-07-04

## 2025-06-26 RX ADMIN — LIDOCAINE HYDROCHLORIDE 50 MG: 20 INJECTION, SOLUTION INTRAVENOUS at 09:06

## 2025-06-26 RX ADMIN — SODIUM CHLORIDE: 0.9 INJECTION, SOLUTION INTRAVENOUS at 08:06

## 2025-06-26 RX ADMIN — PROPOFOL 30 MG: 10 INJECTION, EMULSION INTRAVENOUS at 09:06

## 2025-06-26 RX ADMIN — PROPOFOL 50 MG: 10 INJECTION, EMULSION INTRAVENOUS at 09:06

## 2025-06-26 NOTE — H&P
History & Physical - Short Stay  Pulmonary      SUBJECTIVE:     Procedure: bronchoscopy    Chief Complaint/Indication for Procedure: recurrent infection/lung abscess    PTA Medications   Medication Sig    albuterol (PROVENTIL/VENTOLIN HFA) 90 mcg/actuation inhaler Inhale 2 puffs into the lungs every 6 (six) hours as needed for Wheezing or Shortness of Breath. Rescue    amitriptyline (ELAVIL) 25 MG tablet Take 1 to 2 tablet by mouth every night at bedtime    atorvastatin (LIPITOR) 40 MG tablet TAKE 1 TABLET BY MOUTH DAILY    co-enzyme Q-10 30 mg capsule Take 30 mg by mouth once daily.    cyclobenzaprine (FLEXERIL) 10 MG tablet Take 1 tablet by mouth three times a day as needed for pain    fluticasone propionate (FLONASE) 50 mcg/actuation nasal spray SHAKE LIQUID AND USE 1 SPRAY(50 MCG) IN EACH NOSTRIL EVERY DAY    gabapentin (NEURONTIN) 400 MG capsule Take 1 capsule by mouth three times a day as directed More than a 7 day supply are medically necessary    loratadine (CLARITIN) 10 mg tablet TAKE 1 TABLET BY MOUTH DAILY    magnesium oxide (MAG-OX) 400 mg (241.3 mg magnesium) tablet Take 1 tablet (400 mg total) by mouth once daily.    oxyCODONE (ROXICODONE) 20 mg Tab immediate release tablet Take 1 tablet by mouth five times a day as needed for pain More than a 7 day supply are medically necessary    albuterol-ipratropium (DUO-NEB) 2.5 mg-0.5 mg/3 mL nebulizer solution Take 3 mLs by nebulization every 6 (six) hours as needed for Wheezing or Shortness of Breath. Rescue    amitriptyline (ELAVIL) 25 MG tablet Take 1-2 tablet by mouth every night at bedtime    amitriptyline (ELAVIL) 25 MG tablet Take 1-2 tablets (25-50 mg total) by mouth nightly at bedtime.    amitriptyline (ELAVIL) 25 MG tablet Take 1-2 tablet by mouth every night at bedtime    cyclobenzaprine (FLEXERIL) 10 MG tablet Take 1 tablet by mouth three times a day as needed    cyclobenzaprine (FLEXERIL) 10 MG tablet Take 1 tablet (10 mg total) by mouth 3 (three)  times daily as needed.    cyclobenzaprine (FLEXERIL) 10 MG tablet Take 1 tablet by mouth three times a day as needed    diclofenac sodium (PENNSAID) 20 mg/gram /actuation(2 %) sopm apply a small amount to skin twice a day    diphenhydrAMINE-aluminum-magnesium hydroxide-simethicone-LIDOcaine viscous HCl 2% Swish and spit 15 mLs every 4 (four) hours as needed.    gabapentin (NEURONTIN) 300 MG capsule Take 1 capsule by mouth three times a day as directed More than a 7 day supply are medically necessary    ibuprofen (ADVIL,MOTRIN) 800 MG tablet Take 1 tablet by mouth three times a day as needed for pain    lactulose (CHRONULAC) 10 gram/15 mL solution 15 ml twice a day as needed    lactulose (CHRONULAC) 10 gram/15 mL solution Take 15 mLs (10 g total) by mouth 2 (two) times daily as needed.    lactulose (CHRONULAC) 10 gram/15 mL solution Take 15 mLs (10 g total) by mouth 2 (two) times a day.    lactulose (CHRONULAC) 10 gram/15 mL solution Take 15 ml twice a day as needed for constipation    losartan (COZAAR) 50 MG tablet Take 1 tablet (50 mg total) by mouth once daily. (Patient taking differently: Take 50 mg by mouth as needed.)    morphine (MS CONTIN) 30 MG 12 hr tablet Take 1 tablet by mouth every twelve hours More than 7 days supply is medically necessary    morphine (MS CONTIN) 30 MG 12 hr tablet Take 1 tablet by mouth twice a day More than 7 days supply is medically necessary    morphine (MS CONTIN) 30 MG 12 hr tablet Take 1 tablet by mouth twice a day More than 7 days supply is medically necessary    morphine (MS CONTIN) 60 MG 12 hr tablet Take 1 tablet by mouth once a day More than 7 days supply is medically necessary    multivitamin capsule Take 1 capsule by mouth once daily.    naloxone (NARCAN) 4 mg/actuation Spry Spray 1 spray into one nostril single dose may repeat every 2-3 minutes until responsive or EMS arrives    nicotine (NICODERM CQ) 14 mg/24 hr Place 1 patch onto the skin once daily. (Patient taking  differently: Place 1 patch onto the skin as needed.)    omeprazole (PRILOSEC) 40 MG capsule TAKE 1 CAPSULE(40 MG) BY MOUTH EVERY DAY    [START ON 7/10/2025] oxyCODONE (OXYCONTIN) 20 mg 12 hr tablet Take 1 tablet by mouth every twelve hours    oxyCODONE (OXYCONTIN) 20 mg 12 hr tablet Take 1 tablet by mouth every twelve hours    oxyCODONE (ROXICODONE) 20 mg Tab immediate release tablet Take 1 tablet by mouth five times a day as needed for pain More than a 7 day supply are medically necessary    oxyCODONE (ROXICODONE) 20 mg Tab immediate release tablet Take 1 tablet (20 mg total) by mouth 5 (five) times daily as neede dfor pain.    oxyCODONE (ROXICODONE) 20 mg Tab immediate release tablet Take 1 tablet by mouth five times a day as needed for pain More than a 7 day supply are medically necessary    [START ON 2025] oxyCODONE (ROXICODONE) 20 mg Tab immediate release tablet Take 1 tablet by mouth five times a day as needed for pain More than a 7 day supply are medically necessary    pregabalin (LYRICA) 100 MG capsule Take 1 capsule by mouth three times a day More than 7 days supply is medically necessary    pregabalin (LYRICA) 75 MG capsule Take 1 capsule by mouth three times a day More than 7 days supply is medically necessary       Review of patient's allergies indicates:   Allergen Reactions    Acetaminophen-codeine Nausea And Vomiting    Codeine Nausea And Vomiting        Past Medical History:   Diagnosis Date    Alcohol abuse     Cellulitis of toe of right foot 2019    Chronic pain syndrome     COPD (chronic obstructive pulmonary disease)     Digestive disorder     GERD    Headache     Hearing loss     HLD (hyperlipidemia)      Past Surgical History:   Procedure Laterality Date    BACK SURGERY       SECTION      2    COLONOSCOPY N/A 11/15/2017    Procedure: COLONOSCOPY;  Surgeon: Peg Powers MD;  Location: [facility];  Service: Endoscopy;  Laterality: N/A;    EPIDURAL STEROID INJECTION INTO  CERVICAL SPINE      EPIDURAL STEROID INJECTION INTO LUMBAR SPINE      JOINT REPLACEMENT  5/2018    neck    LIPOMA RESECTION Right 01/23/2023    Procedure: EXCISION, LIPOMA;  Surgeon: Ruben Brooks Jr., MD;  Location: [facility];  Service: General;  Laterality: Right;    LUMBAR FUSION  2019    with cage    LUNG BIOPSY  2023    ??right    NECK SURGERY  05/2018    with hardware    RADIOFREQUENCY ABLATION      cervical    RADIOFREQUENCY ABLATION      lumbar    SPINE SURGERY  05/2018    STAPEDECTOMY Right 05/28/2015    STAPEDECTOMY Left     TUBAL LIGATION  1/1990     Family History   Problem Relation Name Age of Onset    Arthritis Mother      Diabetes Mother      Hypertension Father Jose Brenning     Cancer Sister Lilly     Breast cancer Sister Lilly     Breast cancer Sister kristen     Breast cancer Sister Melissa     Cancer Sister Melissa     Breast cancer Sister Mercy     Cancer Sister Mercy     Cancer Sister Ana      Social History[1]      OBJECTIVE:     Vital Signs (Most Recent)  Temp: 98.2 °F (36.8 °C) (06/26/25 0843)  Pulse: (!) 112 (06/26/25 0843)  Resp: 18 (06/26/25 0843)  BP: 127/62 (06/26/25 0843)  SpO2: 95 % (RA) (06/26/25 0843)    Physical Exam:                                                       GENERAL:  Comfortable, in no acute distress.                                 HEENT EXAM:  Nonicteric.  No adenopathy.  Oropharynx is clear.               NECK:  Supple.                                                               LUNGS:  scattered rhonchi, fine rales R base.                                                               CARDIAC:  Regular rate and rhythm.  S1, S2.  No murmur.                      ABDOMEN:  Soft, positive bowel sounds, nontender.  No hepatosplenomegaly or masses.  No rebound or guarding.                                             EXTREMITIES:  No edema.     MENTAL STATUS:  Normal, alert and oriented.      ASSESSMENT/PLAN:     Assessment: 65F with lung infiltrates, nodules,  recurrent infections    Plan: Bronchoscopy, Diagnostic  Bronchoalveolar lavage, BAL    Anesthesia Plan: per anesthesia    ASA Grade: ASA 2 - Patient with mild systemic disease with no functional limitations    MALLAMPATI SCORE:  I (soft palate, uvula, fauces, and tonsillar pillars visible)           [1]   Social History  Tobacco Use    Smoking status: Every Day     Current packs/day: 1.00     Average packs/day: 1 pack/day for 47.5 years (47.5 ttl pk-yrs)     Types: Cigarettes     Start date: 1/1/1978     Passive exposure: Past    Smokeless tobacco: Never    Tobacco comments:     Patient has smoked since a teenager, states that she has cut down from 2pk/day to .5pk/day. Can quit on her own when she's ready. Information and education on outpatient smoking cessation when ready. 3/26/24     Smokes 10 cigarettes daily 08/01/2024   Substance Use Topics    Alcohol use: Not Currently     Comment: social    Drug use: No

## 2025-06-26 NOTE — PROCEDURES
BRONCHOSCOPY NOTE   Date:   06/26/2025    Clinical History:  65F with recurrent respiratory infections, CT abnormalities    Indication for procedure:  1- Evaluate for infection    MD performing procedure:  LANE [address]     Consent:   The risks, benefits, complications, treatment options and expected outcomes were discussed with the patient.  The possibilities of reaction to medication, pulmonary aspiration, pneumothorax, bleeding, failure to diagnose his/her condition, and creating a complication requiring transfusion or operation were discussed with the patient who freely signed the consent.      Topical anesthesia:   Lidocaine: 5ml of 4% nebulized and 1.5% endobronchial as needed.     Sedation/Anesthesia:  Per anesthesia    Bronchoscopic airway inspection:   The standard flexible bronchovideoscope was inserted nasally. The oropharynx was normal. The vocal cords were visualized and were normal.   The bronchoscope was passed into the trachea. Endoscopic inspection of the tracheobronchial mucosa to the sub-segmental level revealed normal airway anatomy and mucosa with thick small to moderate amt of yellow secretions.    Bronchoalveolar lavage (BAL) of RUL:  With the bronchoscope wedged in the RUL posterior subsegment, a 100ml aliquot of normal saline was used to lavage this lobe, returning 15ml. This was sent for analysis. The return was clear in color.     Total sedation administered:  See anesthesia documentation    Immediate complications:  None. The patient tolerated the procedure without difficulty. Vital signs were stable as compared to those pre-procedure. Patient awakened and answered appropriately to stimuli.       Impression:  1- pneumonia suspected, RUL infiltrate progressive and BAL cultures taken  2-impaired airway clearance which may be impacted by pt's chronic opiate use.    Recommendations:  1- Await microbiology results.  2- consider immune workup  3- antibiotic treatment may be indicated depending on  culture results    Patient advised my office will call in approx 3-5 days with initial bronch results.  Follow up appointments will be made as necessary.  Patient instructed to call my office with any concerns including, but not limited to, fever greater than 100.5 degrees, respiratory difficulties, hemoptysis, epistaxis, chest pain or any other concerns regarding their procedure.    I called pt's spouse and discussed with him after procedure.

## 2025-06-26 NOTE — ANESTHESIA PREPROCEDURE EVALUATION
2025  Floresita Martin is a 65 y.o., female.      Problem List[1]    Past Surgical History:   Procedure Laterality Date    BACK SURGERY       SECTION      2    COLONOSCOPY N/A 11/15/2017    Procedure: COLONOSCOPY;  Surgeon: Peg Powers MD;  Location: [facility];  Service: Endoscopy;  Laterality: N/A;    EPIDURAL STEROID INJECTION INTO CERVICAL SPINE      EPIDURAL STEROID INJECTION INTO LUMBAR SPINE      JOINT REPLACEMENT  2018    neck    LIPOMA RESECTION Right 2023    Procedure: EXCISION, LIPOMA;  Surgeon: Ruben Brooks Jr., MD;  Location: [facility] OR;  Service: General;  Laterality: Right;    LUMBAR FUSION      with cage    LUNG BIOPSY      ??right    NECK SURGERY  2018    with hardware    RADIOFREQUENCY ABLATION      cervical    RADIOFREQUENCY ABLATION      lumbar    SPINE SURGERY  2018    STAPEDECTOMY Right 2015    STAPEDECTOMY Left     TUBAL LIGATION  1990        Tobacco Use:  The patient  reports that she has been smoking cigarettes. She started smoking about 47 years ago. She has a 47.5 pack-year smoking history. She has been exposed to tobacco smoke. She has never used smokeless tobacco.     Results for orders placed or performed during the hospital encounter of 25   EKG 12-lead    Collection Time: 25  2:26 AM   Result Value Ref Range    QRS Duration 90 ms    [facility] QTC Calculation 493 ms    Narrative    Test Reason : CHEST PAIN    Vent. Rate :  88 BPM     Atrial Rate :  88 BPM     P-R Int : 162 ms          QRS Dur :  90 ms      QT Int : 408 ms       P-R-T Axes :  45  47 104 degrees    QTcB Int : 493 ms    Normal sinus rhythm  Low voltage QRS  Septal infarct (cited on or before 02-Mar-2025)  T wave abnormality, consider anterolateral ischemia  Abnormal ECG  When compared with ECG of 02-Mar-2025 14:43,  Serial changes of Septal infarct  Present  Confirmed by Erik Martin ([id number]) on 3/4/2025 5:48:05 PM    Referred By: AAAREFERRAL SELF           Confirmed By: Erik Martin             Lab Results   Component Value Date    WBC 8.07 06/25/2025    HGB 11.3 (L) 06/25/2025    HCT 35.4 (L) 06/25/2025    MCV 84 06/25/2025     (H) 06/25/2025     BMP  Lab Results   Component Value Date     (L) 06/25/2025    K 5.3 (H) 06/25/2025    CL 98 06/25/2025    CO2 28 06/25/2025    BUN 9 06/25/2025    CREATININE 0.6 06/25/2025    CALCIUM 9.5 06/25/2025    ANIONGAP 8 06/25/2025    GLU 94 06/25/2025    GLU 99 03/17/2025    GLU 75 03/13/2025       Results for orders placed during the hospital encounter of 03/02/25    Echo    Interpretation Summary    Left Ventricle: The left ventricle is normal in size. Normal wall thickness. Regional wall motion abnormalities present.  There is mild anterior apical hypokinesis. There is mildly reduced systolic function. Ejection fraction is approximately 49%. Grade I diastolic dysfunction.    Right Ventricle: The right ventricle is normal in size. Wall thickness is normal. Systolic function is normal.    IVC/SVC: Normal venous pressure at 3 mmHg.              Pre-op Assessment    I have reviewed the Patient Summary Reports.     I have reviewed the Nursing Notes. I have reviewed the NPO Status.   I have reviewed the Medications.     Review of Systems  Anesthesia Hx:  No problems with previous Anesthesia             Denies Family Hx of Anesthesia complications.    Denies Personal Hx of Anesthesia complications.                    Social:  Smoker       Hematology/Oncology:  Hematology Normal                                     Cardiovascular:     Hypertension, well controlled                                          Pulmonary:  Pneumonia (03/2025) COPD (daily inhaler, rescue prn), moderate Asthma moderate and mild                   Hepatic/GI:     GERD, well controlled                Musculoskeletal:         Spine Disorders:  lumbar and cervical Degenerative disease and Chronic Pain           Neurological:      Headaches (chronic intermittent headaches)           Chronic Pain Syndrome (on chronic opioids)                         Endocrine:  Endocrine Normal                Physical Exam  General: Cooperative, Alert and Oriented    Airway:  Mallampati: III / II  Mouth Opening: Normal  TM Distance: Normal  Tongue: Normal  Neck ROM: Normal ROM    Dental:  Intact, Dentures    Chest/Lungs:  Clear to auscultation    Heart:  Rate: Normal  Rhythm: Regular Rhythm  Sounds: Normal    Abdomen:  Normal, Soft, Nontender        Anesthesia Plan  Type of Anesthesia, risks & benefits discussed:    Anesthesia Type: Gen Natural Airway  Intra-op Monitoring Plan: Standard ASA Monitors  Post Op Pain Control Plan:   (medical reason for not using multimodal pain management)  Induction:  IV  Informed Consent: Informed consent signed with the Patient and all parties understand the risks and agree with anesthesia plan.  All questions answered. Patient consented to blood products? No  ASA Score: 3 Emergent  Anesthesia Plan Notes:   General Natural Airway  POM  Propofol  Zofran    Ready For Surgery From Anesthesia Perspective.     .           [1]   Patient Active Problem List  Diagnosis    Otosclerosis    Melanosis coli    Gastroesophageal reflux disease without esophagitis    Other hyperlipidemia    Dependence on nicotine from cigarettes    COPD exacerbation    Smoker    Atherosclerosis of aorta    Calcified granuloma of lung- petscan 2023    Chronic pain syndrome    Opioid dependence with current use    Hyponatremia    Multifocal pneumonia    Cystitis    MSSA bacteremia    Normocytic anemia    Hypertension, essential    COPD with acute lower respiratory infection    Moderate protein-calorie malnutrition    Thrush    Nonthrombocytopenic purpura    Chest pain

## 2025-06-26 NOTE — ANESTHESIA POSTPROCEDURE EVALUATION
Anesthesia Post Evaluation    Patient: Floresita Martin    Procedure(s) Performed: Procedure(s) (LRB):  Bronchoscopy (N/A)    Final Anesthesia Type: general      Patient location during evaluation: GI PACU  Patient participation: Yes- Able to Participate  Level of consciousness: awake and alert and oriented  Post-procedure vital signs: reviewed and stable  Pain management: adequate  Airway patency: patent    PONV status at discharge: No PONV  Anesthetic complications: no      Cardiovascular status: blood pressure returned to baseline  Respiratory status: unassisted, spontaneous ventilation and room air  Hydration status: euvolemic  Follow-up not needed.              Vitals Value Taken Time   /57 06/26/25 09:55   Temp 37.1 °C (98.7 °F) 06/26/25 09:35   Pulse 100 06/26/25 09:57   Resp 16 06/26/25 09:35   SpO2 90 % 06/26/25 09:57   Vitals shown include unfiled device data.      Event Time   Out of Recovery 10:05:00         Pain/Lauren Score: Laurne Score: 10 (6/26/2025 10:00 AM)

## 2025-06-26 NOTE — TRANSFER OF CARE
Anesthesia Transfer of Care Note    Patient: Floresita Martin    Procedure(s) Performed: Procedure(s) (LRB):  Bronchoscopy (N/A)    Patient location: PACU    Anesthesia Type: general    Transport from OR: Transported from OR on 2-3 L/min O2 by NC with adequate spontaneous ventilation    Post pain: adequate analgesia    Post assessment: no apparent anesthetic complications and tolerated procedure well    Post vital signs: stable    Level of consciousness: awake, alert and oriented    Nausea/Vomiting: no nausea/vomiting    Complications: none    Transfer of care protocol was followed      Last vitals: Visit Vitals  /62   Pulse (!) 112   Temp 36.8 °C (98.2 °F) (Temporal)   Resp 18   LMP  (LMP Unknown)   SpO2 95%

## 2025-06-26 NOTE — DISCHARGE SUMMARY
Patient underwent an outpatient bronchoscopy without complications and was subsequently discharged.  Please see procedure note for further details.    BRONCHOSCOPY NOTE   Date:   06/26/2025     Clinical History:  65F with recurrent respiratory infections, CT abnormalities     Indication for procedure:  1- Evaluate for infection     MD performing procedure:  L[address]      Consent:   The risks, benefits, complications, treatment options and expected outcomes were discussed with the patient.  The possibilities of reaction to medication, pulmonary aspiration, pneumothorax, bleeding, failure to diagnose his/her condition, and creating a complication requiring transfusion or operation were discussed with the patient who freely signed the consent.      Topical anesthesia:   Lidocaine: 5ml of 4% nebulized and 1.5% endobronchial as needed.      Sedation/Anesthesia:  Per anesthesia     Bronchoscopic airway inspection:   The standard flexible bronchovideoscope was inserted nasally. The oropharynx was normal. The vocal cords were visualized and were normal.   The bronchoscope was passed into the trachea. Endoscopic inspection of the tracheobronchial mucosa to the sub-segmental level revealed normal airway anatomy and mucosa with thick small to moderate amt of yellow secretions.    Bronchoalveolar lavage (BAL) of RUL:  With the bronchoscope wedged in the RUL posterior subsegment, a 100ml aliquot of normal saline was used to lavage this lobe, returning 15ml. This was sent for analysis. The return was clear in color.      Total sedation administered:  See anesthesia documentation     Immediate complications:  None. The patient tolerated the procedure without difficulty. Vital signs were stable as compared to those pre-procedure. Patient awakened and answered appropriately to stimuli.         Impression:  1- pneumonia suspected, RUL infiltrate progressive and BAL cultures taken  2-impaired airway clearance which may be impacted  by pt's chronic opiate use.     Recommendations:  1- Await microbiology results.  2- consider immune workup  3- antibiotic treatment may be indicated depending on culture results     Patient advised my office will call in approx 3-5 days with initial bronch results.  Follow up appointments will be made as necessary.  Patient instructed to call my office with any concerns including, but not limited to, fever greater than 100.5 degrees, respiratory difficulties, hemoptysis, epistaxis, chest pain or any other concerns regarding their procedure.     I called pt's spouse and discussed with him after procedure.

## 2025-06-27 ENCOUNTER — TELEPHONE (OUTPATIENT)
Dept: PULMONOLOGY | Facility: CLINIC | Age: 65
End: 2025-06-27
Payer: MEDICARE

## 2025-06-27 NOTE — TELEPHONE ENCOUNTER
----- Message from Freya Swartz NP sent at 6/26/2025  1:05 PM CDT -----  Regarding: Call  Please call patient and let her know I ordered some blood work to evaluate the immune system - this does not need to be done right away and it's non-fasting.    Also I ordered her some Levaquin antibiotic and sent to [facility] at [facility]. This is for the back pain she's experiencing, in case that's a sign of developing pneumonia again.

## 2025-06-30 LAB
ACID FAST MOD KINY STN SPEC: NEGATIVE
BACTERIA SPEC CULT: ABNORMAL
GRAM STAIN (RESPIRATORY) (SMH): ABNORMAL

## 2025-07-01 ENCOUNTER — PATIENT MESSAGE (OUTPATIENT)
Dept: PULMONOLOGY | Facility: CLINIC | Age: 65
End: 2025-07-01
Payer: MEDICARE

## 2025-07-03 LAB
FUNGUS SPEC CULT: NORMAL
MYCOBACTERIUM SPEC QL CULT: NORMAL

## 2025-07-08 ENCOUNTER — LAB VISIT (OUTPATIENT)
Dept: LAB | Facility: HOSPITAL | Age: 65
End: 2025-07-08
Attending: NURSE PRACTITIONER
Payer: MEDICARE

## 2025-07-08 DIAGNOSIS — J18.9 PNEUMONIA DUE TO INFECTIOUS ORGANISM, UNSPECIFIED LATERALITY, UNSPECIFIED PART OF LUNG: ICD-10-CM

## 2025-07-08 PROCEDURE — 86684 HEMOPHILUS INFLUENZA ANTIBDY: CPT

## 2025-07-08 PROCEDURE — 82784 ASSAY IGA/IGD/IGG/IGM EACH: CPT

## 2025-07-08 PROCEDURE — 86317 IMMUNOASSAY INFECTIOUS AGENT: CPT

## 2025-07-08 PROCEDURE — 82784 ASSAY IGA/IGD/IGG/IGM EACH: CPT | Mod: 59

## 2025-07-08 PROCEDURE — 36415 COLL VENOUS BLD VENIPUNCTURE: CPT

## 2025-07-09 LAB
IGA SERPL-MCNC: 276 MG/DL (ref 40–350)
IGG SERPL-MCNC: 1145 MG/DL (ref 650–1600)
IGM SERPL-MCNC: 144 MG/DL (ref 50–300)

## 2025-07-10 LAB
C DIPHTHERIAE IGG SER QL: POSITIVE
C DIPHTHERIAE TOX IGG SER IA-ACNC: 0.12 IU/ML
C TETANI TOXOID AB SER QL: POSITIVE
C TETANI TOXOID IGG SERPL IA-ACNC: 0.78 IU/ML
HAEM INFLU B IGG SER IA-MCNC: >=9 MG/L

## 2025-07-21 ENCOUNTER — TELEPHONE (OUTPATIENT)
Facility: CLINIC | Age: 65
End: 2025-07-21
Payer: MEDICARE

## 2025-07-25 ENCOUNTER — LAB VISIT (OUTPATIENT)
Dept: LAB | Facility: HOSPITAL | Age: 65
End: 2025-07-25
Attending: INTERNAL MEDICINE
Payer: MEDICARE

## 2025-07-25 DIAGNOSIS — D64.9 NORMOCYTIC ANEMIA: ICD-10-CM

## 2025-07-25 DIAGNOSIS — D46.Z OTHER MYELODYSPLASTIC SYNDROMES: ICD-10-CM

## 2025-07-25 DIAGNOSIS — I10 HYPERTENSION, ESSENTIAL: ICD-10-CM

## 2025-07-25 LAB
ABSOLUTE EOSINOPHIL (SMH): 0.1 K/UL
ABSOLUTE MONOCYTE (SMH): 0.55 K/UL (ref 0.3–1)
ABSOLUTE NEUTROPHIL COUNT (SMH): 3.5 K/UL (ref 1.8–7.7)
ALBUMIN SERPL-MCNC: 3.4 G/DL (ref 3.5–5.2)
ALP SERPL-CCNC: 230 UNIT/L (ref 40–150)
ALT SERPL-CCNC: 18 UNIT/L (ref 10–44)
ANION GAP (SMH): 10 MMOL/L (ref 8–16)
AST SERPL-CCNC: 48 UNIT/L (ref 11–45)
BASOPHILS # BLD AUTO: 0.02 K/UL
BASOPHILS NFR BLD AUTO: 0.3 %
BILIRUB SERPL-MCNC: 0.2 MG/DL (ref 0.1–1)
BUN SERPL-MCNC: 12 MG/DL (ref 8–23)
CALCIUM SERPL-MCNC: 9.6 MG/DL (ref 8.7–10.5)
CHLORIDE SERPL-SCNC: 97 MMOL/L (ref 95–110)
CO2 SERPL-SCNC: 27 MMOL/L (ref 23–29)
CREAT SERPL-MCNC: 0.7 MG/DL (ref 0.5–1.4)
ERYTHROCYTE [DISTWIDTH] IN BLOOD BY AUTOMATED COUNT: 20.9 % (ref 11.5–14.5)
FERRITIN SERPL-MCNC: 432.6 NG/ML (ref 20–300)
GFR SERPLBLD CREATININE-BSD FMLA CKD-EPI: >60 ML/MIN/1.73/M2
GLUCOSE SERPL-MCNC: 105 MG/DL (ref 70–110)
HCT VFR BLD AUTO: 38.5 % (ref 37–48.5)
HGB BLD-MCNC: 12.5 GM/DL (ref 12–16)
IMM GRANULOCYTES # BLD AUTO: 0.03 K/UL (ref 0–0.04)
IMM GRANULOCYTES NFR BLD AUTO: 0.5 % (ref 0–0.5)
IRON SATN MFR SERPL: 13 % (ref 20–50)
IRON SERPL-MCNC: 50 UG/DL (ref 30–160)
LYMPHOCYTES # BLD AUTO: 2.24 K/UL (ref 1–4.8)
MCH RBC QN AUTO: 26.3 PG (ref 27–31)
MCHC RBC AUTO-ENTMCNC: 32.5 G/DL (ref 32–36)
MCV RBC AUTO: 81 FL (ref 82–98)
NUCLEATED RBC (/100WBC) (SMH): 0 /100 WBC
PLATELET # BLD AUTO: 524 K/UL (ref 150–450)
PMV BLD AUTO: 7.6 FL (ref 9.2–12.9)
POTASSIUM SERPL-SCNC: 4.5 MMOL/L (ref 3.5–5.1)
PROT SERPL-MCNC: 7.6 GM/DL (ref 6–8.4)
RBC # BLD AUTO: 4.75 M/UL (ref 4–5.4)
RELATIVE EOSINOPHIL (SMH): 1.6 % (ref 0–8)
RELATIVE LYMPHOCYTE (SMH): 34.9 % (ref 18–48)
RELATIVE MONOCYTE (SMH): 8.6 % (ref 4–15)
RELATIVE NEUTROPHIL (SMH): 54.1 % (ref 38–73)
SODIUM SERPL-SCNC: 134 MMOL/L (ref 136–145)
TIBC SERPL-MCNC: 398 UG/DL (ref 250–450)
TRANSFERRIN SERPL-MCNC: 269 MG/DL (ref 200–375)
WBC # BLD AUTO: 6.42 K/UL (ref 3.9–12.7)

## 2025-07-25 PROCEDURE — 36415 COLL VENOUS BLD VENIPUNCTURE: CPT

## 2025-07-25 PROCEDURE — 85025 COMPLETE CBC W/AUTO DIFF WBC: CPT

## 2025-07-25 PROCEDURE — 84466 ASSAY OF TRANSFERRIN: CPT

## 2025-07-25 PROCEDURE — 82374 ASSAY BLOOD CARBON DIOXIDE: CPT

## 2025-07-25 PROCEDURE — 82728 ASSAY OF FERRITIN: CPT

## 2025-07-29 ENCOUNTER — OFFICE VISIT (OUTPATIENT)
Dept: HEMATOLOGY/ONCOLOGY | Facility: CLINIC | Age: 65
End: 2025-07-29
Payer: MEDICARE

## 2025-07-29 VITALS
BODY MASS INDEX: 19.19 KG/M2 | TEMPERATURE: 98 F | WEIGHT: 101.63 LBS | RESPIRATION RATE: 18 BRPM | SYSTOLIC BLOOD PRESSURE: 112 MMHG | HEART RATE: 90 BPM | OXYGEN SATURATION: 95 % | DIASTOLIC BLOOD PRESSURE: 75 MMHG | HEIGHT: 61 IN

## 2025-07-29 DIAGNOSIS — E78.49 OTHER HYPERLIPIDEMIA: Primary | ICD-10-CM

## 2025-07-29 DIAGNOSIS — R79.89 LFT ELEVATION: ICD-10-CM

## 2025-07-29 DIAGNOSIS — I10 HYPERTENSION, ESSENTIAL: ICD-10-CM

## 2025-07-29 PROCEDURE — 3078F DIAST BP <80 MM HG: CPT | Mod: CPTII,HCNC,S$GLB, | Performed by: INTERNAL MEDICINE

## 2025-07-29 PROCEDURE — 3008F BODY MASS INDEX DOCD: CPT | Mod: CPTII,HCNC,S$GLB, | Performed by: INTERNAL MEDICINE

## 2025-07-29 PROCEDURE — 4010F ACE/ARB THERAPY RXD/TAKEN: CPT | Mod: CPTII,HCNC,S$GLB, | Performed by: INTERNAL MEDICINE

## 2025-07-29 PROCEDURE — 3074F SYST BP LT 130 MM HG: CPT | Mod: CPTII,HCNC,S$GLB, | Performed by: INTERNAL MEDICINE

## 2025-07-29 PROCEDURE — 99214 OFFICE O/P EST MOD 30 MIN: CPT | Mod: HCNC,S$GLB,, | Performed by: INTERNAL MEDICINE

## 2025-07-29 PROCEDURE — 3044F HG A1C LEVEL LT 7.0%: CPT | Mod: CPTII,HCNC,S$GLB, | Performed by: INTERNAL MEDICINE

## 2025-07-29 PROCEDURE — 99999 PR PBB SHADOW E&M-EST. PATIENT-LVL V: CPT | Mod: PBBFAC,HCNC,, | Performed by: INTERNAL MEDICINE

## 2025-07-29 PROCEDURE — 1159F MED LIST DOCD IN RCRD: CPT | Mod: CPTII,HCNC,S$GLB, | Performed by: INTERNAL MEDICINE

## 2025-07-29 NOTE — PROGRESS NOTES
Subjective:       Patient ID: Floresita Martin is a 65 y.o. female.    Chief Complaint:  Anemia and thrombocytosis    HPI  65-year-old with significant lung disease for which she follows with pulmonary sent in for anemia and thrombocytosis      Patient denies issues related to appetite or recent weight change.  Feels well overall.  Denies issues with generalized weakness .  Denies fatigue over above what is normally experienced with day-to-day activities  Denies fever, chills, rigors  Denies issues with ambulation  Denies generalized swelling or new lumps and bumps felt in any part  of body  Denies visual or hearing loss  Denies issues with congestion, sinus issues, cough, sputum production runny nose or itching eyes  Denies chest pain or palpitations, or passing out  Denies abdominal pain, reflux symptoms, nausea vomiting loose stools or constipation  Denies seizure activity or focal weaknesses or symptoms related to TIA, no head aches or blurred vision reported  Denies issues with skin rash or bruising  Denies issues with swelling of feet, tingling or numbness   No issues with sleep,   No recent foreign travel   Good family support reported         Past Medical History:   Diagnosis Date    Alcohol abuse     Cellulitis of toe of right foot 2019    Chronic pain syndrome     COPD (chronic obstructive pulmonary disease)     Digestive disorder     GERD    Headache     Hearing loss     HLD (hyperlipidemia)      Past Surgical History:   Procedure Laterality Date    BACK SURGERY      BRONCHOSCOPY N/A 2025    Procedure: Bronchoscopy;  Surgeon: Mechelle López MD;  Location: [facility];  Service: Pulmonary;  Laterality: N/A;     SECTION      2    COLONOSCOPY N/A 11/15/2017    Procedure: COLONOSCOPY;  Surgeon: Peg Powers MD;  Location: [facility];  Service: Endoscopy;  Laterality: N/A;    EPIDURAL STEROID INJECTION INTO CERVICAL SPINE      EPIDURAL STEROID INJECTION INTO LUMBAR SPINE      JOINT  REPLACEMENT  5/2018    neck    LIPOMA RESECTION Right 01/23/2023    Procedure: EXCISION, LIPOMA;  Surgeon: Ruben Brooks Jr., MD;  Location: [facility];  Service: General;  Laterality: Right;    LUMBAR FUSION  2019    with cage    LUNG BIOPSY  2023    ??right    NECK SURGERY  05/2018    with hardware    RADIOFREQUENCY ABLATION      cervical    RADIOFREQUENCY ABLATION      lumbar    SPINE SURGERY  05/2018    STAPEDECTOMY Right 05/28/2015    STAPEDECTOMY Left     TUBAL LIGATION  1/1990     Family History   Problem Relation Name Age of Onset    Arthritis Mother      Diabetes Mother      Hypertension Father Jose Brenning     Cancer Sister Lilly     Breast cancer Sister Lilly     Breast cancer Sister kristen     Breast cancer Sister Melissa     Cancer Sister Melissa     Breast cancer Sister Mercy     Cancer Sister Mercy     Cancer Sister Ana       Social History     Socioeconomic History    Marital status:    Tobacco Use    Smoking status: Every Day     Current packs/day: 1.00     Average packs/day: 1 pack/day for 47.6 years (47.6 ttl pk-yrs)     Types: Cigarettes     Start date: 1/1/1978     Passive exposure: Past    Smokeless tobacco: Never    Tobacco comments:     Patient has smoked since a teenager, states that she has cut down from 2pk/day to .5pk/day. Can quit on her own when she's ready. Information and education on outpatient smoking cessation when ready. 3/26/24     Smokes 10 cigarettes daily 08/01/2024   Substance and Sexual Activity    Alcohol use: Not Currently     Comment: social    Drug use: No    Sexual activity: Not Currently     Partners: Male     Birth control/protection: Post-menopausal     Social Drivers of Health     Financial Resource Strain: Low Risk  (7/29/2025)    Overall Financial Resource Strain (CARDIA)     Difficulty of Paying Living Expenses: Not very hard   Food Insecurity: No Food Insecurity (7/29/2025)    Hunger Vital Sign     Worried About Running Out of Food in the Last  Year: Never true     Ran Out of Food in the Last Year: Never true   Transportation Needs: Unmet Transportation Needs (7/29/2025)    PRAPARE - Transportation     Lack of Transportation (Medical): No     Lack of Transportation (Non-Medical): Yes   Physical Activity: Inactive (7/29/2025)    Exercise Vital Sign     Days of Exercise per Week: 0 days     Minutes of Exercise per Session: 0 min   Stress: No Stress Concern Present (7/29/2025)    [employer] - Occupational Stress Questionnaire     Feeling of Stress : Only a little   Housing Stability: Low Risk  (7/29/2025)    Housing Stability Vital Sign     Unable to Pay for Housing in the Last Year: No     Homeless in the Last Year: No     Review of patient's allergies indicates:   Allergen Reactions    Acetaminophen-codeine Nausea And Vomiting    Codeine Nausea And Vomiting     Current Medications[1]    Physical Exam    VITAL SIGNS:  as above   GENERAL: appears well-built, well-nourished.  No anxiety, no agitation, and in no distress.  Patient is awake, alert, oriented and cooperative.  HEENT:  Showed no congestion. Trachea is central no obvious icterus or pallor noted no hoarseness. no obvious JVD   NECK:  Supple.  No JVD. No obvious cervical submental or supraclavicular adenopathy.  RS:the visualized portion of  Chest expands well. chest appears symmetric, no audible wheezes.  No dyspnea recognized  ABDOMEN:  abdomen appears undistended.  EXTREMITIES:  Without edema.  NEUROLOGICAL:  The patient is appropriate, higher functions are normal.  No  obvious neurological deficits.  normal judgement normal thought content  No confusion, no speech impediment. Cranial nerves are intact and show no deficit. No gross motor deficits noted   SKIN MUSCULOSKELETAL: no joint or skeletal deformity, no clubbing of nails.  No visible rash ecchymosis or petechiae   Lab Results   Component Value Date    WBC 6.42 07/25/2025    HGB 12.5 07/25/2025    HCT 38.5  07/25/2025    MCV 81 (L) 07/25/2025     (H) 07/25/2025       BMP  Lab Results   Component Value Date     (L) 07/25/2025    K 4.5 07/25/2025    CL 97 07/25/2025    CO2 27 07/25/2025    BUN 12 07/25/2025    CREATININE 0.7 07/25/2025    CALCIUM 9.6 07/25/2025    ANIONGAP 10 07/25/2025    ESTGFRAFRICA >60.0 01/05/2022    EGFRNONAA >60.0 01/05/2022     Lab Results   Component Value Date    IRON 50 07/25/2025    TIBC 398 07/25/2025    FERRITIN 432.6 (H) 07/25/2025         Problem List[2]     Assessment and Plan   Anemia  Patient has a history of heavy menstrual cycles iron studies appropriate continue to follow-up  Thrombocytosis probably resulted from iron-deficiency  JAK2 with reflex is negative    COPD and alveolar lung disease; patient had been admitted with pneumonia she has been advised to continue close follow-up with Pulmonary outpatient setting is already established with Dr. Ramsey    HTN patient is careful with medications on losartan good control watch diet     Dyslipidemia; patient on atorvastatin attempts to watch her diet  Chronic pain syndrome patient follows with pain clinic she is on Lyrica as well as Neurontin morphine cyclobenzaprine oxycodone are all listed   Alp is rising will get usg of gallbladder  and phrev  See me in 3 months with CBC CMP iron studies ferritin    MDM includes  :    - Acute or chronic illness or injury that poses a threat to life or bodily function  - Independent review and explanation of 3+ results from unique tests  - Discussion of management and ordering 3+ unique tests  - Extensive discussion of treatment and management  - Prescription drug management  - Drug therapy requiring intensive monitoring for toxicity Visit today included increased complexity associated with the care of the episodic problem such as iron-deficiency as well as the effect of chronic illnesses such as COPD/dyslipidemia on her thrombocytosis and anemia addressed and managing the longitudinal  care of the patient due to the serious and/or complex managed problem(s) as discussed in Assessment Plan.   Answers submitted by the patient for this visit:  Review of Systems Questionnaire (Submitted on 7/22/2025)  appetite change : No  unexpected weight change: No  mouth sores: Yes  visual disturbance: No  cough: No  shortness of breath: No  chest pain: No  abdominal pain: No  diarrhea: No  frequency: No  back pain: Yes  rash: No  headaches: No  adenopathy: No  nervous/ anxious: No         [1]   Current Outpatient Medications:     albuterol (PROVENTIL/VENTOLIN HFA) 90 mcg/actuation inhaler, Inhale 2 puffs into the lungs every 6 (six) hours as needed for Wheezing or Shortness of Breath. Rescue, Disp: 18 g, Rfl: 11    albuterol-ipratropium (DUO-NEB) 2.5 mg-0.5 mg/3 mL nebulizer solution, Take 3 mLs by nebulization every 6 (six) hours as needed for Wheezing or Shortness of Breath. Rescue, Disp: 75 mL, Rfl: 11    amitriptyline (ELAVIL) 25 MG tablet, Take 1-2 tablet by mouth every night at bedtime, Disp: 60 tablet, Rfl: 1    amitriptyline (ELAVIL) 25 MG tablet, Take 1-2 tablets (25-50 mg total) by mouth nightly at bedtime., Disp: 60 tablet, Rfl: 1    amitriptyline (ELAVIL) 25 MG tablet, Take 1-2 tablet by mouth every night at bedtime, Disp: 60 tablet, Rfl: 1    amitriptyline (ELAVIL) 25 MG tablet, Take 1 to 2 tablet by mouth every night at bedtime, Disp: 60 tablet, Rfl: 1    atorvastatin (LIPITOR) 40 MG tablet, TAKE 1 TABLET BY MOUTH DAILY, Disp: 90 tablet, Rfl: 0    co-enzyme Q-10 30 mg capsule, Take 30 mg by mouth once daily., Disp: , Rfl:     cyclobenzaprine (FLEXERIL) 10 MG tablet, Take 1 tablet by mouth three times a day as needed, Disp: 90 tablet, Rfl: 1    cyclobenzaprine (FLEXERIL) 10 MG tablet, Take 1 tablet (10 mg total) by mouth 3 (three) times daily as needed., Disp: 90 tablet, Rfl: 1    cyclobenzaprine (FLEXERIL) 10 MG tablet, Take 1 tablet by mouth three times a day as needed, Disp: 90 tablet, Rfl: 1     cyclobenzaprine (FLEXERIL) 10 MG tablet, Take 1 tablet by mouth three times a day as needed for pain, Disp: 90 tablet, Rfl: 1    diclofenac sodium (PENNSAID) 20 mg/gram /actuation(2 %) sopm, apply a small amount to skin twice a day, Disp: 224 g, Rfl: 1    diphenhydrAMINE-aluminum-magnesium hydroxide-simethicone-LIDOcaine viscous HCl 2%, Swish and spit 15 mLs every 4 (four) hours as needed., Disp: 1 each, Rfl: 2    fluticasone propionate (FLONASE) 50 mcg/actuation nasal spray, SHAKE LIQUID AND USE 1 SPRAY(50 MCG) IN EACH NOSTRIL EVERY DAY, Disp: 48 g, Rfl: 1    gabapentin (NEURONTIN) 300 MG capsule, Take 1 capsule by mouth three times a day as directed More than a 7 day supply are medically necessary, Disp: 90 capsule, Rfl: 1    gabapentin (NEURONTIN) 400 MG capsule, Take 1 capsule by mouth three times a day as directed More than a 7 day supply are medically necessary, Disp: 90 capsule, Rfl: 1    ibuprofen (ADVIL,MOTRIN) 800 MG tablet, Take 1 tablet by mouth three times a day as needed for pain, Disp: 90 tablet, Rfl: 1    lactulose (CHRONULAC) 10 gram/15 mL solution, 15 ml twice a day as needed, Disp: 946 mL, Rfl: 1    lactulose (CHRONULAC) 10 gram/15 mL solution, Take 15 mLs (10 g total) by mouth 2 (two) times daily as needed., Disp: 900 mL, Rfl: 1    lactulose (CHRONULAC) 10 gram/15 mL solution, Take 15 mLs (10 g total) by mouth 2 (two) times a day., Disp: 946 mL, Rfl: 1    lactulose (CHRONULAC) 10 gram/15 mL solution, Take 15 ml twice a day as needed for constipation, Disp: 946 mL, Rfl: 1    loratadine (CLARITIN) 10 mg tablet, TAKE 1 TABLET BY MOUTH DAILY, Disp: 90 tablet, Rfl: 2    losartan (COZAAR) 50 MG tablet, Take 1 tablet (50 mg total) by mouth once daily. (Patient taking differently: Take 50 mg by mouth as needed.), Disp: 90 tablet, Rfl: 3    magnesium oxide (MAG-OX) 400 mg (241.3 mg magnesium) tablet, Take 1 tablet (400 mg total) by mouth once daily., Disp: 90 tablet, Rfl: 1    morphine (MS CONTIN) 30 MG  12 hr tablet, Take 1 tablet by mouth every twelve hours More than 7 days supply is medically necessary, Disp: 60 tablet, Rfl: 0    morphine (MS CONTIN) 30 MG 12 hr tablet, Take 1 tablet by mouth twice a day More than 7 days supply is medically necessary, Disp: 60 tablet, Rfl: 0    morphine (MS CONTIN) 30 MG 12 hr tablet, Take 1 tablet by mouth twice a day More than 7 days supply is medically necessary, Disp: 60 tablet, Rfl: 0    morphine (MS CONTIN) 60 MG 12 hr tablet, Take 1 tablet by mouth once a day More than 7 days supply is medically necessary, Disp: 30 tablet, Rfl: 0    multivitamin capsule, Take 1 capsule by mouth once daily., Disp: , Rfl:     naloxone (NARCAN) 4 mg/actuation Spry, Spray 1 spray into one nostril single dose may repeat every 2-3 minutes until responsive or EMS arrives, Disp: 1 each, Rfl: 1    nicotine (NICODERM CQ) 14 mg/24 hr, Place 1 patch onto the skin once daily. (Patient taking differently: Place 1 patch onto the skin as needed.), Disp: 30 patch, Rfl: 2    omeprazole (PRILOSEC) 40 MG capsule, TAKE 1 CAPSULE BY MOUTH EVERY MORNING, Disp: 90 capsule, Rfl: 3    oxyCODONE (OXYCONTIN) 20 mg 12 hr tablet, Take 1 tablet by mouth every twelve hours, Disp: 60 tablet, Rfl: 0    oxyCODONE (OXYCONTIN) 20 mg 12 hr tablet, Take 1 tablet by mouth every twelve hours, Disp: 60 tablet, Rfl: 0    oxyCODONE (ROXICODONE) 20 mg Tab immediate release tablet, Take 1 tablet by mouth five times a day as needed for pain More than a 7 day supply are medically necessary, Disp: 150 tablet, Rfl: 0    oxyCODONE (ROXICODONE) 20 mg Tab immediate release tablet, Take 1 tablet (20 mg total) by mouth 5 (five) times daily as neede dfor pain., Disp: 150 tablet, Rfl: 0    oxyCODONE (ROXICODONE) 20 mg Tab immediate release tablet, Take 1 tablet by mouth five times a day as needed for pain More than a 7 day supply are medically necessary, Disp: 150 tablet, Rfl: 0    oxyCODONE (ROXICODONE) 20 mg Tab immediate release tablet, Take  1 tablet by mouth five times a day as needed for pain More than a 7 day supply are medically necessary, Disp: 150 tablet, Rfl: 0    oxyCODONE (ROXICODONE) 20 mg Tab immediate release tablet, Take 1 tablet by mouth five times a day as needed for pain More than a 7 day supply are medically necessary, Disp: 150 tablet, Rfl: 0    pregabalin (LYRICA) 100 MG capsule, Take 1 capsule by mouth three times a day More than 7 days supply is medically necessary, Disp: 90 capsule, Rfl: 1    pregabalin (LYRICA) 75 MG capsule, Take 1 capsule by mouth three times a day More than 7 days supply is medically necessary, Disp: 90 capsule, Rfl: 1  [2]   Patient Active Problem List  Diagnosis    Otosclerosis    Melanosis coli    Gastroesophageal reflux disease without esophagitis    Other hyperlipidemia    Dependence on nicotine from cigarettes    COPD exacerbation    Smoker    Atherosclerosis of aorta    Calcified granuloma of lung- petscan 2023    Chronic pain syndrome    Opioid dependence with current use    Hyponatremia    Pneumonia of right upper lobe due to infectious organism    Cystitis    MSSA bacteremia    Normocytic anemia    Hypertension, essential    COPD with acute lower respiratory infection    Moderate protein-calorie malnutrition    Thrush    Nonthrombocytopenic purpura    Chest pain

## 2025-07-31 ENCOUNTER — HOSPITAL ENCOUNTER (OUTPATIENT)
Dept: RADIOLOGY | Facility: HOSPITAL | Age: 65
Discharge: HOME | End: 2025-07-31
Attending: NURSE PRACTITIONER
Payer: MEDICARE

## 2025-07-31 ENCOUNTER — OFFICE VISIT (OUTPATIENT)
Dept: PULMONOLOGY | Facility: CLINIC | Age: 65
End: 2025-07-31
Payer: MEDICARE

## 2025-07-31 VITALS
BODY MASS INDEX: 19.63 KG/M2 | SYSTOLIC BLOOD PRESSURE: 141 MMHG | DIASTOLIC BLOOD PRESSURE: 90 MMHG | OXYGEN SATURATION: 98 % | WEIGHT: 103.94 LBS | HEIGHT: 61 IN | HEART RATE: 82 BPM

## 2025-07-31 DIAGNOSIS — R91.8 LUNG NODULES: ICD-10-CM

## 2025-07-31 DIAGNOSIS — J18.9 PNEUMONIA DUE TO INFECTIOUS ORGANISM, UNSPECIFIED LATERALITY, UNSPECIFIED PART OF LUNG: ICD-10-CM

## 2025-07-31 DIAGNOSIS — J98.4 CALCIFIED GRANULOMA OF LUNG: ICD-10-CM

## 2025-07-31 DIAGNOSIS — F17.210 CIGARETTE NICOTINE DEPENDENCE WITHOUT COMPLICATION: ICD-10-CM

## 2025-07-31 DIAGNOSIS — J43.2 CENTRILOBULAR EMPHYSEMA: Primary | ICD-10-CM

## 2025-07-31 PROCEDURE — 71046 X-RAY EXAM CHEST 2 VIEWS: CPT | Mod: TC

## 2025-07-31 PROCEDURE — 99999 PR PBB SHADOW E&M-EST. PATIENT-LVL V: CPT | Mod: PBBFAC,HCNC,, | Performed by: NURSE PRACTITIONER

## 2025-07-31 PROCEDURE — 71046 X-RAY EXAM CHEST 2 VIEWS: CPT | Mod: 26,,, | Performed by: RADIOLOGY

## 2025-07-31 RX ORDER — ALBUTEROL SULFATE 90 UG/1
2 INHALANT RESPIRATORY (INHALATION) EVERY 6 HOURS PRN
Qty: 18 G | Refills: 11 | Status: SHIPPED | OUTPATIENT
Start: 2025-07-31

## 2025-07-31 NOTE — PROGRESS NOTES
7/31/2025    Floresita Martin  In office visit     Chief Complaint   Patient presents with    COPD       HPI:  07/31/2025:  Underwent Bronchoscopy on 6/26 per Dr. Lópze for concern of unresolved pneumonia on imaging - resp culture positive for Klebsiella from [facility] - was treated with PO Levaquin. States she completed Levaquin as prescribed. AFB still pending.  Labs were obtained, no evidence of immune system weakness noted from IG levels.  Was on Breztri here lately but has since run out - hasn't used in approx two weeks. Also using Albuterol PRN - approx once per day.  Using supplemental oxygen at night time at 2.5L via [place] - hasn't required much during the day as of late.  Received RSV vaccine since prior visit.   Reports current cough with mucous production but states the mucous is clear - has PRN neb treatments at home but doesn't use currently.   Still smoking 1 ppd - not interested in smoking cessation program at this time.       04/28/2025:  Required hospitalization on 3/2 at [facility] for diagnosis of Multifocal Pneumonia - was treated during that time with IV Abx and IV Steroids - was subsequently dc home on 3/4 with home oxygen after qualifying for 2L with activity. Was dc home with Rx for DOXY, Cefdinir, and Prednisone 40 mg x 3 days.   States since discharge home her shortness of breath is improving but she is still not back to baseline. Has to climb 14 stair at home which is quite challenging for her. Has oxygen at home however not using, states has never used since being sent home.   States main complaint is mucous in the chest, currently taking Mucinex which seems to help her clear that congestion. Using Albuterol inhaler PRN, approx 4 times per day. Not on daily ICS inhaler - states can't remember if she used Breztri provided at last appt or not.   Underwent CT Chest on 3/28 revealing interval response with several infiltrates throughout - I prescribed LEVAQUIN at that time in which she  reports completion. Recently underwent repeat CXR showing mild improvement.   Did receive Pneumonia and FLU vaccine in JAN 2025.  Still smoking 1 ppd - not interested in smoking cessation program at this time.   Patient Instructions   Overall your repeat CT chest from MARCH concerning for pneumonia - you were treated with strong antibiotics and seem to somewhat be improving clinically.  I recommend another CXR at the end of MAY and CT Chest to be done in JUNE 2025.  You do have a diagnosis of COPD and I recommend a maintenance inhaler.  We will give sample of Breztri inhaler.  This is 2 puffs twice per day. This inhaler contains an inhaled steroid component. Rinse mouth after each use due to risk for thrush development. If mouth or tongue develops white sores please contact the clinic and I will order a prescription mouth wash.   Continue the use of albuterol as needed for shortness of breath, wheezing, cough.  Start using nebulized treatments to help aid with mucous expectoration.  It would be beneficial to stop tobacco use. Please inform the office if you become interested in the [facility] as discussed in the clinic today.    Check Immune labs.   Recommend RSV VACCINE AT THIS TIME.   Continue current prescription medication regiment. Keep follow up appointment as scheduled. Please call the office if you have any questions or concerns.         10/28/2024:  Previously seen per Belinda Damon NP for Hospital Follow up.  On March 24, 2024 patient presented to [facility] Emergency room and was diagnosed with pneumonia.  Patient admitted at that time,  was also known to have mental status change and hyponatremia on labs.  Patient blood culture positive for Pseudomonas.  Patient left AMA on 03/26/2024.  Patient returned to the emergency room at [facility] on 05/21/2024 with reports of shortness of breath, cough, confusion.  Patient was diagnosed with sepsis and CT chest revealing  bilateral pneumonia with patchy infiltrations.  Was concern at that time for septic emboli in which FRANCO was obtained, ruled out.  Patient underwent infectious disease consultation during hospitalization for respiratory and blood cultures both positive for MSSA.  Patient was subsequently discharged home with PICC line and 6 week regimen of cefazolin.  Patient reports completion of antibiotic regimen since then, removal of PICC line.  Patient states she has not taken Trelegy inhaler since 1st hospitalization due to the development of oral thrush.  Does currently use albuterol only as needed.  Has undergone Infectious Disease office visit since discharge - note reviewed.  States she feels as if breathing is back to baseline at this time - denies wheezing, chest tightness. Does endorse intermittent cough with mild mucous production.   Denies current use of CPAP or Oxygen. Still smoking 1 ppd - not interested in smoking cessation program at this time.   Patient Instructions   Overall your repeat CT chest from September shows great improvement in comparison to prior from a pneumonia perspective.  It does not show complete resolution as of yet so I do recommend a repeat CT chest in 6 months, due in March 2025.  You do have a diagnosis of COPD and I recommend a maintenance inhaler.  We will give sample of Breztri inhaler.  This is 2 puffs twice per day. This inhaler contains an inhaled steroid component. Rinse mouth after each use due to risk for thrush development. If mouth or tongue develops white sores please contact the clinic and I will order a prescription mouth wash.   Continue the use of albuterol as needed for shortness of breath, wheezing, cough.  It would be beneficial to stop tobacco use. Please inform the office if you become interested in the [facility] as discussed in the clinic today.    Recommend PNEUMONIA, FLU, AND RSV VACCINE AT THIS TIME.   Continue current prescription medication  regiment. Keep follow up appointment as scheduled. Please call the office if you have any questions or concerns.             02/27/2024: Hx: Lung nodules, Tobacco Dependence  Using Trelegy once per week with benefit - only using PRN.   States shortness of breath is at baseline - denies wheezing, chest tightness, denies cough, mucous production.  Still smoking - approx 15 cigarettes per day - has recently cut down in use.  Denies recent UC or ER visit for respiratory complaints, denies recent steroid or abx use.  Patient Instructions   CT Due in Sept 2024.  It would be beneficial to stop tobacco use. Please inform the office if you become interested in the [facility] as discussed in the clinic today.    Continue COPD regimen including Trelegy once per day.   Can continue to use Albuterol as needed for shortness of breath, wheezing, cough.  If you need nebulized treatments, can trial half a vial of medicine and see if you tolerate that better.  Continue current prescription medication regiment. Keep follow up appointment as scheduled. Please call the office if you have any questions or concerns.       09/20/2023: Hx: Lung nodules, Tobacco Dependence  States shortness of breath is at baseline - denies wheezing, chest tightness. Cough has resolved, mucous production is minimal with white mucous.   Still smoking - at approx 1 ppd at this point - not interested in smoking cessation program.  Using Trelegy once per day with benefit and has Albuterol inhaler for as needed use. Did trial Neb treatments after last appt however states she experienced dizziness after use.   Did not submit sputum samples - states not enough mucous is being produced.   Denies recent UC or ER visits for respiratory complaints - denies recent use of abx, steroids.  Patient Instructions   Overall CT Chest with no acute changes - recommend repeat CT in one year to further monitor nodules. CT Due in Sept 2024.  Your PFT shows  "overall preserved lung function or lung strength.   It would be beneficial to stop tobacco use. Please inform the office if you become interested in the [facility] as discussed in the clinic today.    Continue COPD regimen including Trelegy once per day. Can continue to use Albuterol as needed. Will give spacer in clinic today for Albuterol.  If you need nebulized treatments, can trial half a vial of medicine and see if you tolerate that better.  Continue current prescription medication regiment. Keep follow up appointment as scheduled. Please call the office if you have any questions or concerns.         05/17/2023:  Underwent lung biopsy to lung nodule of BELEN - lung biopsy was negative for malignancy - Per pathology report, "Alveolated lung tissue with regionally dense fibrosis and occasional interstitial lymphoid follicles."  Underwent repeat CT Chest last week revealing unchanged lung nodule however shows new ground glass opacities throughout both lungs, with new area of concern to LLL.   Developed symptoms of a head cold approx 5 days ago - states has been coughing over the last three days. Initially, mucous was thick, difficult to expectorate however has been on Mucinex for the last three days - mucous is yellow/white in color.  Denies wheezing, chest tightness.  Recently cut back on smoking over the last five days - states was smoking 2.5 ppd however is now down to less than 15 cigarettes per day.   Did not yet undergo PFT.  Trialed Trelegy for one month - states did not notice much improvement in symptoms with use.  Patient Instructions   Repeat CT Chest shows unchanged extent of prior noted nodule - now shows concern to left lower lobe in addition to ground glass opacities throughout.  I have ordered sputum cultures - you will leave the office today with sputum specimen cups. Bring to any [facility] within 4 hours of obtaining specimen. Rinse your mouth prior to collecting sample. " Please collect sample in the morning by deep coughing prior to eating or drinking.  Checking Respiratory culture, AFB, Fungal.  I ordered a Lung Function Test to evaluate lung strength. Please complete prior to next appointment. Do this In one month.  Recommend getting back on COPD inhaler - Trelegy, one puff once per day.  This inhaler contains an inhaled steroid component. Rinse mouth after each use due to risk for thrush development. If mouth or tongue develops white sores please contact the clinic and I will order a prescription mouth wash.   Can use Albuterol as needed for shortness of breath, wheezing, cough.  Recommend doing nebulized treatments, two treatments per day for the next 10 days.  Continue Mucinex.  Continue current medication regiment. Keep follow up appointment as scheduled. Please call the office if you have any questions or concerns.           02/03/2023:  Denies being seen by prior pulmonologist. Denies current inhaler use, supplemental oxygen use, CPAP use.  Denies personal history of cancer, PE, anticoagulation use.  Reports she believes she is diagnosed with early stages of COPD.  Underwent CT chest per PCP was referred to pulmonology.  Denies shortness of breath, states she is able to do all of her ADLs without issue.  Denies wheezing, chest tightness.  Cough:  Intermittent, productive with white sputum.  No time correlation identified.  Denies frequent use of antibiotic, steroid.  Current smoker - typical 1 pack per day use.  Started at 17 years old.  Currently vaping as well, nicotine containing. Not interested in smoking cessation referral at this time however requesting Nicotine patches.  Patient Instructions   CT chest reviewed, shows mild emphysema to bilateral upper lung fields.  This is consistent with a diagnosis of COPD.  I ordered a Lung Function Test to evaluate lung strength. Please complete prior to next appointment.   Can trial daily inhaler, Trelegy.  This is 1 puff once  per day.  This inhaler contains an inhaled steroid component. Rinse mouth after each use due to risk for thrush development. If mouth or tongue develops white sores please contact the clinic and I will order a prescription mouth wash.   CT chest does show new 12 mm nodule to the left upper lung and new 5 mm nodule to the left lower lung.  Will proceed with PET scan at this time.  Broch calculator 32% change of nodule being cancerous.   Will review CT images with collaborating MD next week and if change of plan, will call you.  Nicotine patches ordered.  Continue current medication regiment. Keep follow up appointment as scheduled. Please call the office if you have any questions or concerns.         Social Hx: Lives with friend - 1 dog in the home.  Former .  No Asbestosis exposure, Smoking Hx:  As above  Family Hx:  No Lung Cancer, no COPD, no Asthma  Medical Hx:  No previous pneumonia ; no previous shoulder/chest surgery        The chief compliant problem varies with instability at times.  PFSH:  Past Medical History:   Diagnosis Date    Alcohol abuse     Cellulitis of toe of right foot 2019    Chronic pain syndrome     COPD (chronic obstructive pulmonary disease)     Digestive disorder     GERD    Headache     Hearing loss     HLD (hyperlipidemia)          Past Surgical History:   Procedure Laterality Date    BACK SURGERY      BRONCHOSCOPY N/A 2025    Procedure: Bronchoscopy;  Surgeon: Mechelle López MD;  Location: [facility] ENDO;  Service: Pulmonary;  Laterality: N/A;     SECTION      2    COLONOSCOPY N/A 11/15/2017    Procedure: COLONOSCOPY;  Surgeon: Peg Powers MD;  Location: [facility];  Service: Endoscopy;  Laterality: N/A;    EPIDURAL STEROID INJECTION INTO CERVICAL SPINE      EPIDURAL STEROID INJECTION INTO LUMBAR SPINE      JOINT REPLACEMENT  2018    neck    LIPOMA RESECTION Right 2023    Procedure: EXCISION, LIPOMA;  Surgeon: Ruben Brooks Jr., MD;   Location: [facility] OR;  Service: General;  Laterality: Right;    LUMBAR FUSION  2019    with cage    LUNG BIOPSY  2023    ??right    NECK SURGERY  05/2018    with hardware    RADIOFREQUENCY ABLATION      cervical    RADIOFREQUENCY ABLATION      lumbar    SPINE SURGERY  05/2018    STAPEDECTOMY Right 05/28/2015    STAPEDECTOMY Left     TUBAL LIGATION  1/1990     Social History     Tobacco Use    Smoking status: Every Day     Current packs/day: 1.00     Average packs/day: 1 pack/day for 47.6 years (47.6 ttl pk-yrs)     Types: Cigarettes     Start date: 1/1/1978     Passive exposure: Past    Smokeless tobacco: Never    Tobacco comments:     Patient has smoked since a teenager, states that she has cut down from 2pk/day to .5pk/day. Can quit on her own when she's ready. Information and education on outpatient smoking cessation when ready. 3/26/24     Smokes 10 cigarettes daily 08/01/2024   Substance Use Topics    Alcohol use: Not Currently     Comment: social    Drug use: No     Family History   Problem Relation Name Age of Onset    Arthritis Mother      Diabetes Mother      Hypertension Father Jose Gutierrezing     Cancer Sister Lilly     Breast cancer Sister Lilly     Breast cancer Sister kristen     Breast cancer Sister Melissa     Cancer Sister Melissa     Breast cancer Sister Mercy     Cancer Sister Mercy     Cancer Sister Ana      Review of patient's allergies indicates:   Allergen Reactions    Acetaminophen-codeine Nausea And Vomiting    Codeine Nausea And Vomiting     I have reviewed past medical, family, and social history. I have reviewed previous nurse notes.    Performance Status:The patient's activity level is no limits with regular activity.      Review of Systems:  a review of eleven systems covering constitutional, Eye, HEENT, Psych, Respiratory, Cardiac, GI, , Musculoskeletal, Endocrine, Dermatologic was negative except for pertinent findings as listed ABOVE and below: pertinent positive as above, rest is  "good       Exam:Comprehensive exam done. BP (!) 141/90 (BP Location: Right arm, Patient Position: Sitting)   Pulse 82   Ht 5' 1" (1.549 m)   Wt 47.1 kg (103 lb 15.2 oz)   LMP  (LMP Unknown)   SpO2 98% Comment: on room air at rest  BMI 19.64 kg/m²   Exam included Vitals as listed  Constitutional: She is oriented to person, place, and time. She appears well-developed. No distress.   Nose: Nose normal.   Mouth/Throat: Uvula is midline, oropharynx is clear and moist and mucous membranes are normal. No dental caries. No oropharyngeal exudate, posterior oropharyngeal edema, posterior oropharyngeal erythema or tonsillar abscesses.    Eyes: Pupils are equal, round, and reactive to light.   Neck: No JVD present. No thyromegaly present.   Cardiovascular: Normal rate, regular rhythm and normal heart sounds. Exam reveals no gallop and no friction rub.   No murmur heard.  Pulmonary/Chest: Effort normal and breath sounds normal. No accessory muscle usage or stridor. No apnea and no tachypnea. No respiratory distress, decreased breath sounds, wheezes, rhonchi, rales, or tenderness. Clear breath sounds throughout all lung fields, on room air, in no acute distress.   Abdominal: Soft. She exhibits no mass. There is no tenderness. No hepatosplenomegaly, hernias and normoactive bowel sounds  Musculoskeletal: Normal range of motion. exhibits no edema.   Neurological:  alert and oriented to person, place, and time. not disoriented.   Skin: Skin is warm and dry. Capillary refill takes less 2 sec. No cyanosis or erythema. No pallor. Nails show no clubbing.   Psychiatric: normal mood and affect. behavior is normal. Judgment and thought content normal.       Radiographs (ct chest and cxr) reviewed: view by direct vision   Patient imaging studies were reviewed and interpreted independently. My personal interpretation of most recent Chest Xray and CT Chest includes:  CT Chest Without Contrast 6/17/2025 persistent infiltrates throughout " the R lung  CT Chest March 2025 - Interval improvement/development of bilateral infiltrates.   CT Chest 9/24/2024 - Improvement overall in comparison to prior from pneumonia perspective, still residual micro nodules noted to RML. Calcified granulomas. 7 mm partially calcified nodule to BELEN. Emphysema.   CT Chest - 9/14/2023 Nodule with no growth noted to BELEN - two smaller nodules to LLL have been present and unchanged since at least Jan 2023.    CT Chest Without Contrast 5/10/2023 FINDINGS:  The heart and great vessels are of normal size and contour. Enlargement or aneurysm is not seen. Adenopathy or soft tissue masses within the mediastinum are not seen.   In the left upper lobe there is still seen a peanut shaped 1.6 cm by 1.1 cm soft tissue density mass.  This was biopsied on March 10, 2023 with a benign result.  There is a strand extending to the lateral pleura.   There is however new patchy interstitial infiltrate identified in the left lingula and at the lung bases..  This is most likely infectious since it was not present on the prior CTs chest.  There is a small area of consolidation seen in the lingula on series 4, image 314 measuring 1.5 cm.  A mass or infiltrate in the right lung is not identified.  No pneumothorax or pleural effusion is noted.   Impression:   Stable 1.6 x 1.1 cm peanut shaped soft tissue density mass of the left upper lobe status post biopsy.  New patchy interstitial infiltrates identified in the left lingula and lung bases, however with a small area of consolidation measuring 1.5 cm.  These are most likely an infectious process.  Follow-up by CT scan is recommended.   This report was flagged in Epic as abnormal.         CT Chest Lung Screening Low Dose 1/11/2023 FINDINGS:  Lungs: There is a new spiculated, solid nodule within the left upper lobe measuring 1.2 cm.  There is also new solid nodule at the left lung base measuring 5 mm (series 4, image 378).  A few scattered old calcified  granulomas are present.  There are mild emphysematous changes.   Pleura:   No effusion..   Heart and pericardium: Normal size without effusion.   Aorta and vasculature: Atherosclerosis including coronary arteries.   Lymph nodes: No hilar, mediastinal, or axial lymphadenopathy.   Chest wall and skeletal structures: Postoperative change of the lower cervical spine.  No suspicious bony lesions.   Upper abdomen: Unremarkable.   Impression:   Lung-RADS Category:  4B - Suspicious - consultation advised - possible next steps  Chest CT, tissue sampling and-or PET/CT.   Clinically or potentially clinically significant non lung cancer finding:  None.   Prior Lung Cancer Modifier:  No history of prior lung cancer.   This report was flagged in Epic as abnormal.       X-Ray Chest PA And Lateral 4/15/2016 Findings: The cardiomediastinal silhouette is within normal limits.  The lungs are well expanded and clear.   In pression: No active cardiopulmonary process.    Patient's labs were reviewed including CBC and CMP    Lab Results   Component Value Date    WBC 6.42 07/25/2025    RBC 4.75 07/25/2025    HGB 12.5 07/25/2025    HCT 38.5 07/25/2025    MCV 81 (L) 07/25/2025    MCH 26.3 (L) 07/25/2025    MCHC 32.5 07/25/2025    RDW 20.9 (H) 07/25/2025     (H) 07/25/2025    MPV 7.6 (L) 07/25/2025    GRAN 4.4 03/13/2025    GRAN 51.5 03/13/2025    LYMPH 3.3 03/13/2025    LYMPH 37.7 03/13/2025    MONO 0.7 03/13/2025    MONO 8.0 03/13/2025    EOS 0.2 03/13/2025    BASO 0.04 03/13/2025    EOSINOPHIL 2.1 03/13/2025    BASOPHIL 0.5 03/13/2025   CMP  Sodium   Date Value Ref Range Status   07/25/2025 134 (L) 136 - 145 mmol/L Final     Potassium   Date Value Ref Range Status   07/25/2025 4.5 3.5 - 5.1 mmol/L Final     Chloride   Date Value Ref Range Status   07/25/2025 97 95 - 110 mmol/L Final     CO2   Date Value Ref Range Status   07/25/2025 27 23 - 29 mmol/L Final     Glucose   Date Value Ref Range Status   07/25/2025 105 70 - 110 mg/dL  Final     BUN   Date Value Ref Range Status   07/25/2025 12 8 - 23 mg/dL Final     Creatinine   Date Value Ref Range Status   07/25/2025 0.7 0.5 - 1.4 mg/dL Final     Calcium   Date Value Ref Range Status   07/25/2025 9.6 8.7 - 10.5 mg/dL Final     Protein Total   Date Value Ref Range Status   07/25/2025 7.6 6.0 - 8.4 gm/dL Final     Albumin   Date Value Ref Range Status   07/25/2025 3.4 (L) 3.5 - 5.2 g/dL Final     Bilirubin Total   Date Value Ref Range Status   07/25/2025 0.2 0.1 - 1.0 mg/dL Final     Comment:     For infants and newborns, interpretation of results should be based   on gestational age, weight and in agreement with clinical   observations.    Premature Infant recommended reference ranges:   0-24 hours:  <8.0 mg/dL   24-48 hours: <12.0 mg/dL   3-5 days:    <15.0 mg/dL   6-29 days:   <15.0 mg/dL     ALP   Date Value Ref Range Status   07/25/2025 230 (H) 40 - 150 unit/L Final     AST   Date Value Ref Range Status   07/25/2025 48 (H) 11 - 45 unit/L Final     ALT   Date Value Ref Range Status   07/25/2025 18 10 - 44 unit/L Final     Anion Gap   Date Value Ref Range Status   07/25/2025 10 8 - 16 mmol/L Final     eGFR   Date Value Ref Range Status   07/25/2025 >60 >60 mL/min/1.73/m2 Final   03/17/2025 >60.0 >60 mL/min/1.73 m^2 Final         PFT reviewed  Pulmonary Functions Testing Results:        Plan:  Clinical impression is ambiguous and will need repeated evaluation wrt will require conference with MD. Canas was seen today for copd.    Diagnoses and all orders for this visit:    Centrilobular emphysema  -     albuterol (PROVENTIL/VENTOLIN HFA) 90 mcg/actuation inhaler; Inhale 2 puffs into the lungs every 6 (six) hours as needed for Wheezing or Shortness of Breath. Rescue    Pneumonia due to infectious organism, unspecified laterality, unspecified part of lung  -     X-Ray Chest PA And Lateral; Future  -     CT Chest Without Contrast; Future    Calcified granuloma of lung    Cigarette nicotine  dependence without complication    Lung nodules            Follow up in about 3 months (around 10/31/2025), or if symptoms worsen or fail to improve.    Discussed with patient above for education the following:      Patient Instructions   You underwent Bronchoscopy which revealed Klebsiella on your respiratory culture. You completed antibiotic for this bug.    Recommend Chest XRAY now.    CT Chest due in SEPT.    Continue Breztri inhaler.  This is 2 puffs twice per day. This inhaler contains an inhaled steroid component. Rinse mouth after each use due to risk for thrush development. If mouth or tongue develops white sores please contact the clinic and I will order a prescription mouth wash.     Continue the use of albuterol as needed for shortness of breath, wheezing, cough.    Can use nebulized treatments to help aid with mucous expectoration.    It would be beneficial to stop tobacco use. Please inform the office if you become interested in the [facility] as discussed in the clinic today.      Continue current prescription medication regiment. Keep follow up appointment as scheduled. Please call the office if you have any questions or concerns.

## 2025-07-31 NOTE — PATIENT INSTRUCTIONS
You underwent Bronchoscopy which revealed Klebsiella on your respiratory culture. You completed antibiotic for this bug.    Recommend Chest XRAY now.    CT Chest due in SEPT.    Continue Breztri inhaler.  This is 2 puffs twice per day. This inhaler contains an inhaled steroid component. Rinse mouth after each use due to risk for thrush development. If mouth or tongue develops white sores please contact the clinic and I will order a prescription mouth wash.     Continue the use of albuterol as needed for shortness of breath, wheezing, cough.    Can use nebulized treatments to help aid with mucous expectoration.    It would be beneficial to stop tobacco use. Please inform the office if you become [facility] as discussed in the clinic today.      Continue current prescription medication regiment. Keep follow up appointment as scheduled. Please call the office if you have any questions or concerns.

## 2025-08-02 ENCOUNTER — LAB VISIT (OUTPATIENT)
Dept: LAB | Facility: HOSPITAL | Age: 65
End: 2025-08-02
Attending: FAMILY MEDICINE
Payer: MEDICARE

## 2025-08-02 DIAGNOSIS — I10 HYPERTENSION, ESSENTIAL: ICD-10-CM

## 2025-08-02 DIAGNOSIS — R79.9 ABNORMAL BLOOD FINDING: ICD-10-CM

## 2025-08-02 DIAGNOSIS — E78.49 OTHER HYPERLIPIDEMIA: ICD-10-CM

## 2025-08-02 LAB
ABSOLUTE EOSINOPHIL (OHS): 0.04 K/UL
ABSOLUTE MONOCYTE (OHS): 0.54 K/UL (ref 0.3–1)
ABSOLUTE NEUTROPHIL COUNT (OHS): 2.83 K/UL (ref 1.8–7.7)
ALBUMIN SERPL BCP-MCNC: 3.8 G/DL (ref 3.5–5.2)
ALP SERPL-CCNC: 237 UNIT/L (ref 40–150)
ALT SERPL W/O P-5'-P-CCNC: 19 UNIT/L (ref 0–55)
ANION GAP (OHS): 10 MMOL/L (ref 8–16)
ANISOCYTOSIS BLD QL SMEAR: SLIGHT
AST SERPL-CCNC: 43 UNIT/L (ref 0–50)
BASOPHILS # BLD AUTO: 0.03 K/UL
BASOPHILS NFR BLD AUTO: 0.5 %
BILIRUB SERPL-MCNC: 0.4 MG/DL (ref 0.1–1)
BUN SERPL-MCNC: 13 MG/DL (ref 8–23)
CALCIUM SERPL-MCNC: 9.6 MG/DL (ref 8.7–10.5)
CHLORIDE SERPL-SCNC: 100 MMOL/L (ref 95–110)
CHOLEST SERPL-MCNC: 207 MG/DL (ref 120–199)
CHOLEST/HDLC SERPL: 2.5 {RATIO} (ref 2–5)
CO2 SERPL-SCNC: 24 MMOL/L (ref 23–29)
CREAT SERPL-MCNC: 0.7 MG/DL (ref 0.5–1.4)
EAG (OHS): 123 MG/DL (ref 68–131)
ERYTHROCYTE [DISTWIDTH] IN BLOOD BY AUTOMATED COUNT: 22.4 % (ref 11.5–14.5)
GFR SERPLBLD CREATININE-BSD FMLA CKD-EPI: >60 ML/MIN/1.73/M2
GLUCOSE SERPL-MCNC: 84 MG/DL (ref 70–110)
HBA1C MFR BLD: 5.9 % (ref 4–5.6)
HCT VFR BLD AUTO: 38.4 % (ref 37–48.5)
HDLC SERPL-MCNC: 83 MG/DL (ref 40–75)
HDLC SERPL: 40.1 % (ref 20–50)
HGB BLD-MCNC: 12.4 GM/DL (ref 12–16)
HYPOCHROMIA BLD QL SMEAR: NORMAL
IMM GRANULOCYTES # BLD AUTO: 0.01 K/UL (ref 0–0.04)
IMM GRANULOCYTES NFR BLD AUTO: 0.2 % (ref 0–0.5)
LDLC SERPL CALC-MCNC: 114 MG/DL (ref 63–159)
LYMPHOCYTES # BLD AUTO: 2.5 K/UL (ref 1–4.8)
MCH RBC QN AUTO: 27 PG (ref 27–31)
MCHC RBC AUTO-ENTMCNC: 32.3 G/DL (ref 32–36)
MCV RBC AUTO: 84 FL (ref 82–98)
NONHDLC SERPL-MCNC: 124 MG/DL
NUCLEATED RBC (/100WBC) (OHS): 0 /100 WBC
OVALOCYTES BLD QL SMEAR: NORMAL
PLATELET # BLD AUTO: 531 K/UL (ref 150–450)
PMV BLD AUTO: 8.4 FL (ref 9.2–12.9)
POIKILOCYTOSIS BLD QL SMEAR: SLIGHT
POTASSIUM SERPL-SCNC: 4.9 MMOL/L (ref 3.5–5.1)
PROT SERPL-MCNC: 7.9 GM/DL (ref 6–8.4)
RBC # BLD AUTO: 4.59 M/UL (ref 4–5.4)
RELATIVE EOSINOPHIL (OHS): 0.7 %
RELATIVE LYMPHOCYTE (OHS): 42 % (ref 18–48)
RELATIVE MONOCYTE (OHS): 9.1 % (ref 4–15)
RELATIVE NEUTROPHIL (OHS): 47.5 % (ref 38–73)
SCHISTOCYTES BLD QL SMEAR: NORMAL
SCHISTOCYTES BLD QL SMEAR: PRESENT
SODIUM SERPL-SCNC: 134 MMOL/L (ref 136–145)
TRIGL SERPL-MCNC: 50 MG/DL (ref 30–150)
WBC # BLD AUTO: 5.95 K/UL (ref 3.9–12.7)

## 2025-08-02 PROCEDURE — 80053 COMPREHEN METABOLIC PANEL: CPT | Mod: HCNC

## 2025-08-02 PROCEDURE — 83036 HEMOGLOBIN GLYCOSYLATED A1C: CPT | Mod: HCNC

## 2025-08-02 PROCEDURE — 85025 COMPLETE CBC W/AUTO DIFF WBC: CPT | Mod: HCNC

## 2025-08-02 PROCEDURE — 83718 ASSAY OF LIPOPROTEIN: CPT | Mod: HCNC

## 2025-08-02 PROCEDURE — 36415 COLL VENOUS BLD VENIPUNCTURE: CPT | Mod: HCNC,PO

## 2025-08-05 ENCOUNTER — OFFICE VISIT (OUTPATIENT)
Dept: FAMILY MEDICINE | Facility: CLINIC | Age: 65
End: 2025-08-05
Payer: MEDICARE

## 2025-08-05 VITALS
SYSTOLIC BLOOD PRESSURE: 120 MMHG | HEART RATE: 72 BPM | TEMPERATURE: 98 F | DIASTOLIC BLOOD PRESSURE: 64 MMHG | OXYGEN SATURATION: 94 % | WEIGHT: 105.63 LBS | HEIGHT: 61 IN | BODY MASS INDEX: 19.94 KG/M2

## 2025-08-05 DIAGNOSIS — I10 HYPERTENSION, ESSENTIAL: ICD-10-CM

## 2025-08-05 DIAGNOSIS — R74.8 ELEVATED ALKALINE PHOSPHATASE LEVEL: ICD-10-CM

## 2025-08-05 DIAGNOSIS — E78.49 OTHER HYPERLIPIDEMIA: ICD-10-CM

## 2025-08-05 DIAGNOSIS — R73.03 PREDIABETES: ICD-10-CM

## 2025-08-05 DIAGNOSIS — E87.1 HYPONATREMIA: Primary | ICD-10-CM

## 2025-08-05 DIAGNOSIS — L30.9 DERMATITIS: ICD-10-CM

## 2025-08-05 DIAGNOSIS — J43.2 CENTRILOBULAR EMPHYSEMA: ICD-10-CM

## 2025-08-05 DIAGNOSIS — D64.9 NORMOCYTIC ANEMIA: ICD-10-CM

## 2025-08-05 DIAGNOSIS — J30.89 ALLERGIC RHINITIS DUE TO OTHER ALLERGIC TRIGGER, UNSPECIFIED SEASONALITY: ICD-10-CM

## 2025-08-05 PROCEDURE — 3074F SYST BP LT 130 MM HG: CPT | Mod: CPTII,HCNC,S$GLB,

## 2025-08-05 PROCEDURE — 1160F RVW MEDS BY RX/DR IN RCRD: CPT | Mod: CPTII,HCNC,S$GLB,

## 2025-08-05 PROCEDURE — 99999 PR PBB SHADOW E&M-EST. PATIENT-LVL V: CPT | Mod: PBBFAC,HCNC,,

## 2025-08-05 PROCEDURE — 4010F ACE/ARB THERAPY RXD/TAKEN: CPT | Mod: CPTII,HCNC,S$GLB,

## 2025-08-05 PROCEDURE — 3008F BODY MASS INDEX DOCD: CPT | Mod: CPTII,HCNC,S$GLB,

## 2025-08-05 PROCEDURE — 3078F DIAST BP <80 MM HG: CPT | Mod: CPTII,HCNC,S$GLB,

## 2025-08-05 PROCEDURE — 1159F MED LIST DOCD IN RCRD: CPT | Mod: CPTII,HCNC,S$GLB,

## 2025-08-05 PROCEDURE — 99214 OFFICE O/P EST MOD 30 MIN: CPT | Mod: HCNC,S$GLB,,

## 2025-08-05 PROCEDURE — G2211 COMPLEX E/M VISIT ADD ON: HCPCS | Mod: HCNC,S$GLB,,

## 2025-08-05 PROCEDURE — 3044F HG A1C LEVEL LT 7.0%: CPT | Mod: CPTII,HCNC,S$GLB,

## 2025-08-05 RX ORDER — LORATADINE 10 MG/1
10 TABLET ORAL
Qty: 90 TABLET | Refills: 2 | Status: SHIPPED | OUTPATIENT
Start: 2025-08-05

## 2025-08-05 RX ORDER — ATORVASTATIN CALCIUM 40 MG/1
40 TABLET, FILM COATED ORAL
Qty: 90 TABLET | Refills: 0 | Status: SHIPPED | OUTPATIENT
Start: 2025-08-05

## 2025-08-05 RX ORDER — FLUTICASONE PROPIONATE 50 MCG
SPRAY, SUSPENSION (ML) NASAL
Qty: 48 G | Refills: 1 | Status: SHIPPED | OUTPATIENT
Start: 2025-08-05

## 2025-08-05 RX ORDER — TRIAMCINOLONE ACETONIDE 1 MG/G
CREAM TOPICAL 2 TIMES DAILY
Qty: 80 G | Refills: 0 | Status: SHIPPED | OUTPATIENT
Start: 2025-08-05

## 2025-08-05 NOTE — PROGRESS NOTES
[facility]     Subjective:       Patient ID:  [id number]     Chief Complaint: Follow-up and Hypertension    Floresita Martin is a 65 y.o. female with a past medical history significant for centrilobular emphysema, HLD, HTN, anemia, allergic rhinitis who presents to the clinic for routine follow up. Endorses some occasional SOB on exertion that is well-controlled with use of her rescue albuterol inhaler. Reports adherence to daily Breztri. She follows with pulmonology routinely for emphysema and has next follow up in 11/2025. Reports she is sleeping well and feels well-rested upon awakening. Does admit to some poor dietary habits including enjoying sweets and fatty/greasy foods recently. Denies fatigue, chills, diaphoresis, fever, CP at rest or on exertion, palpitations, peripheral edema, lightheadedness, dizziness, LOC, headaches, visual changes, abdominal pains, nausea, vomiting, diarrhea, hematochezia, melena. Notes discontinuing her losartan several months ago with at-home BP readings remaining in range of 120s/70s without losartan.     Reports pruritus and dry skin to the dorsum of her bilateral hands x several weeks. Pruritus is worse at night. She has tried OTC cortisone-10 without relief and attempts to keep her hands well moisturized without improvement. Notes a few excoriations from pruritus.     Past Medical History:   Diagnosis Date    Alcohol abuse     Cellulitis of toe of right foot 12/27/2019    Chronic pain syndrome     COPD (chronic obstructive pulmonary disease)     Digestive disorder     GERD    Headache     Hearing loss     HLD (hyperlipidemia)       Review of patient's allergies indicates:   Allergen Reactions    Acetaminophen-codeine Nausea And Vomiting    Codeine Nausea And Vomiting       Lab Results   Component Value Date    WBC 5.95 08/02/2025    HGB 12.4 08/02/2025    HCT 38.4 08/02/2025     (H) 08/02/2025    CHOL 207 (H) 08/02/2025    TRIG 50 08/02/2025    HDL 83 (H)  08/02/2025    ALT 19 08/02/2025    AST 43 08/02/2025     (L) 08/02/2025    K 4.9 08/02/2025     08/02/2025    CREATININE 0.7 08/02/2025    BUN 13 08/02/2025    CO2 24 08/02/2025    TSH 0.553 03/17/2025    INR 0.8 03/10/2023    HGBA1C 5.9 (H) 08/02/2025       Review of Systems   Constitutional:  Negative for chills, diaphoresis, fatigue and fever.   Eyes:  Negative for visual disturbance.   Respiratory:  Positive for shortness of breath (on exertion; chronic and well-controlled with rescue inhaler PRN).    Cardiovascular:  Negative for chest pain, palpitations and leg swelling.   Gastrointestinal:  Negative for abdominal pain, blood in stool, diarrhea, nausea and vomiting.   Neurological:  Negative for dizziness, syncope, light-headedness and headaches.         Objective:      Physical Exam  Vitals reviewed.   Constitutional:       General: She is not in acute distress.     Appearance: Normal appearance. She is not ill-appearing.   Eyes:      General: No scleral icterus.     Extraocular Movements: Extraocular movements intact.      Conjunctiva/sclera: Conjunctivae normal.      Pupils: Pupils are equal, round, and reactive to light.   Cardiovascular:      Rate and Rhythm: Normal rate and regular rhythm.      Heart sounds: No murmur heard.  Pulmonary:      Effort: Pulmonary effort is normal. No respiratory distress.      Breath sounds: Examination of the right-upper field reveals rhonchi. Examination of the left-upper field reveals rhonchi. Rhonchi present.      Comments: Rhonchi appreciated in upper lobes cleared with coughing.   Abdominal:      General: Bowel sounds are normal.      Tenderness: There is no abdominal tenderness.   Musculoskeletal:      Right lower leg: No edema.      Left lower leg: No edema.   Skin:     General: Skin is warm.      Findings: Erythema and rash present.      Comments: Diffuse erythema with dry, flaking skin and few excoriations to dorsum of bilateral hands, concentrated to  distal portion of hands and interdigits.    Neurological:      General: No focal deficit present.      Mental Status: She is alert and oriented to person, place, and time.   Psychiatric:         Mood and Affect: Mood normal.         Behavior: Behavior normal.           Assessment:       1. Hyponatremia    2. Allergic rhinitis    3. Other hyperlipidemia    4. Hypertension, essential    5. Normocytic anemia    6. Centrilobular emphysema    7. Prediabetes    8. Dermatitis    9. Elevated alkaline phosphatase level          Plan:       Floresita was seen today for follow-up and hypertension.    Diagnoses and all orders for this visit:    Hyponatremia        -   Noted. Will continue to monitor.     Allergic rhinitis  -     fluticasone propionate (FLONASE) 50 mcg/actuation nasal spray; SHAKE LIQUID AND USE 1 SPRAY(50 MCG) IN EACH NOSTRIL EVERY DAY  -     loratadine (CLARITIN) 10 mg tablet; TAKE 1 TABLET BY MOUTH DAILY  -     Controlled.  -     Continue current regimen of Claritin and Flonase.     Other hyperlipidemia  -     atorvastatin (LIPITOR) 40 MG tablet; TAKE 1 TABLET BY MOUTH DAILY  -     Lipid Panel; Future  -     Overall well-controlled.  -     Counseled on avoidance of greasy/fatty foods.   -     Continue current regimen of atorvastatin.     Hypertension, essential        -     Well-controlled off losartan.         -     Reporting home BP readings in 120s/70s without losartan x several months. /66 today in clinic.         -     Discontinue losartan. Will continue to monitor.    Normocytic anemia  -     CBC Auto Differential; Future  -     Comprehensive Metabolic Panel; Future  -     PMHx noted. Followed by heme/onc routinely.   -     Hgb within normal range on recent labs this month.   -     Keep follow up with heme/onc on 12/01/25. Recommendations per heme/onc.     Centrilobular emphysema        -     PMHx noted. Followed by pulmonology.         -     Controlled on Breztri daily and albuterol PRN.        -      Keep follow up with pulmonology in 11/2025.        -     Continue current regimen. Further recommendations per pulmonology.     Prediabetes  -     Hemoglobin A1C; Future  -     A1c 5.9 on labs this month. Admits to recent increase in carb intake.   -     Counseled on dietary changes to help improve glycemic control.     Dermatitis  -     triamcinolone acetonide 0.1% (KENALOG) 0.1 % cream; Apply topically 2 (two) times daily.  -     Reports pruritus and dry skin to dorsum of bilateral hands x several weeks. No improvement with OTC cortisone-10.   -     Appears c/w eczema however pruritus secondary to recently elevated ALP cannot be excluded.  -     Prescription for kenalog sent to pharmacy.   -     Patient instructed to contact clinic or follow up in clinic as needed for dermatitis to bilateral hands if no improvement or worsening of symptoms in 2 weeks with kenalog.     Elevated alkaline phosphatase level        -   Noted. US abdomen and labs ordered by other provider.      Routine labs CBC, CMP, lipid panel, A1c ordered to be completed prior to routine follow up in 6 months. Otherwise, patient instructed to contact clinic or follow up in clinic as needed for dermatitis to bilateral hands if no improvement or worsening of symptoms in 2 weeks with kenalog.         Follow up in about 6 months (around 2/5/2026).    Salud Liu PA-C  Family Medicine Physician Assistant     Future Appointments       Date Provider Specialty Appt Notes    11/6/2025 Freya Swartz NP Pulmonology 3 month f/u    12/1/2025 Desirae Cordova MD Hematology and Oncology Thrombocytosis/anemia/Labs/4mofu    2/3/2026  Lab     2/10/2026 Toni Hughes MD Family Medicine annual             Tests to Keep You Healthy    Mammogram: Met on 2/13/2025  Colon Cancer Screening: Met on 11/15/2017  Cervical Cancer Screening: Met on 2/27/2025  Last Blood Pressure <= 139/89 (8/5/2025): Yes  Tobacco Cessation: NO       I spent a total of 30 minutes on  the day of the visit.This includes face to face time and non-face to face time preparing to see the patient (eg, review of tests), obtaining and/or reviewing separately obtained history, documenting clinical information in the electronic or other health record, independently interpreting results and communicating results to the patient/family/caregiver, or care coordinator.

## 2025-08-08 ENCOUNTER — HOSPITAL ENCOUNTER (OUTPATIENT)
Dept: RADIOLOGY | Facility: HOSPITAL | Age: 65
Discharge: HOME | End: 2025-08-08
Attending: INTERNAL MEDICINE
Payer: MEDICARE

## 2025-08-08 DIAGNOSIS — E78.49 OTHER HYPERLIPIDEMIA: ICD-10-CM

## 2025-08-08 DIAGNOSIS — R79.89 LFT ELEVATION: ICD-10-CM

## 2025-08-08 PROCEDURE — 76700 US EXAM ABDOM COMPLETE: CPT | Mod: 26,,, | Performed by: RADIOLOGY

## 2025-08-08 PROCEDURE — 76700 US EXAM ABDOM COMPLETE: CPT | Mod: TC

## 2025-08-11 ENCOUNTER — TELEPHONE (OUTPATIENT)
Dept: FAMILY MEDICINE | Facility: CLINIC | Age: 65
End: 2025-08-11
Payer: MEDICARE

## 2025-08-11 ENCOUNTER — RESULTS FOLLOW-UP (OUTPATIENT)
Dept: FAMILY MEDICINE | Facility: CLINIC | Age: 65
End: 2025-08-11
Payer: MEDICARE

## 2025-08-11 DIAGNOSIS — B15.9 HEPATITIS A TEST POSITIVE: Primary | ICD-10-CM

## 2025-08-12 ENCOUNTER — PATIENT MESSAGE (OUTPATIENT)
Dept: HEMATOLOGY/ONCOLOGY | Facility: CLINIC | Age: 65
End: 2025-08-12
Payer: MEDICARE

## 2025-08-14 ENCOUNTER — TELEPHONE (OUTPATIENT)
Dept: FAMILY MEDICINE | Facility: CLINIC | Age: 65
End: 2025-08-14
Payer: MEDICARE

## 2025-08-14 ENCOUNTER — TELEPHONE (OUTPATIENT)
Dept: PULMONOLOGY | Facility: CLINIC | Age: 65
End: 2025-08-14
Payer: MEDICARE

## 2025-08-19 ENCOUNTER — LAB VISIT (OUTPATIENT)
Dept: LAB | Facility: HOSPITAL | Age: 65
End: 2025-08-19
Attending: FAMILY MEDICINE
Payer: MEDICARE

## 2025-08-19 DIAGNOSIS — B15.9 HEPATITIS A TEST POSITIVE: ICD-10-CM

## 2025-08-19 PROCEDURE — 86790 VIRUS ANTIBODY NOS: CPT

## 2025-08-19 PROCEDURE — 36415 COLL VENOUS BLD VENIPUNCTURE: CPT

## 2025-08-19 PROCEDURE — 86709 HEPATITIS A IGM ANTIBODY: CPT | Mod: 59

## 2025-08-20 LAB
HAV AB SER QL IA: REACTIVE
HAV IGM SERPL QL IA: ABNORMAL

## 2025-09-02 ENCOUNTER — LAB VISIT (OUTPATIENT)
Dept: LAB | Facility: HOSPITAL | Age: 65
End: 2025-09-02
Attending: FAMILY MEDICINE
Payer: MEDICARE

## 2025-09-02 DIAGNOSIS — B15.9 HEPATITIS A TEST POSITIVE: ICD-10-CM

## 2025-09-02 LAB
ALBUMIN SERPL BCP-MCNC: 3 G/DL (ref 3.5–5.2)
ALP SERPL-CCNC: 201 UNIT/L (ref 40–150)
ALT SERPL W/O P-5'-P-CCNC: 17 UNIT/L (ref 0–55)
AST SERPL-CCNC: 33 UNIT/L (ref 0–50)
BILIRUB DIRECT SERPL-MCNC: 0.1 MG/DL (ref 0.1–0.3)
BILIRUB SERPL-MCNC: 0.2 MG/DL (ref 0.1–1)
PROT SERPL-MCNC: 7.7 GM/DL (ref 6–8.4)

## 2025-09-02 PROCEDURE — 80076 HEPATIC FUNCTION PANEL: CPT | Mod: HCNC

## 2025-09-02 PROCEDURE — 36415 COLL VENOUS BLD VENIPUNCTURE: CPT | Mod: HCNC,PO

## (undated) DEVICE — GLOVE BIOGEL PI  GOLD SZ 7

## (undated) DEVICE — SUTURE MONOCRYL 4-0 PS-2 27 MCP426H

## (undated) DEVICE — SUTURE CHROMIC 3-0 SH 27 G122H

## (undated) DEVICE — SUTURE VICRYL 3-0 18 SH VCP864D

## (undated) DEVICE — PREP CHLORA 10.5ML ORANGE

## (undated) DEVICE — SYRINGE HYPODERMC LL 22G 1.5 ECLIPSE 30

## (undated) DEVICE — PAD BOVIE ADULT

## (undated) DEVICE — DERMABOND HVD MINI  DHVM12

## (undated) DEVICE — UNDERGLOVE BIOGEL PI MICRO BLUE SZ 7.5

## (undated) DEVICE — SOLUTION IRRI NS BOTTLE 1000ML R5200-01

## (undated) DEVICE — DRAPE LAPAROTOMY TRANSVERSE 89281